# Patient Record
Sex: FEMALE | Race: OTHER | HISPANIC OR LATINO
[De-identification: names, ages, dates, MRNs, and addresses within clinical notes are randomized per-mention and may not be internally consistent; named-entity substitution may affect disease eponyms.]

---

## 2017-02-07 ENCOUNTER — TRANSCRIPTION ENCOUNTER (OUTPATIENT)
Age: 82
End: 2017-02-07

## 2017-02-07 ENCOUNTER — OUTPATIENT (OUTPATIENT)
Dept: OUTPATIENT SERVICES | Facility: HOSPITAL | Age: 82
LOS: 1 days | End: 2017-02-07
Payer: COMMERCIAL

## 2017-02-07 DIAGNOSIS — M70.70 OTHER BURSITIS OF HIP, UNSPECIFIED HIP: ICD-10-CM

## 2017-02-07 DIAGNOSIS — M51.17 INTERVERTEBRAL DISC DISORDERS WITH RADICULOPATHY, LUMBOSACRAL REGION: ICD-10-CM

## 2017-02-07 PROCEDURE — T1013: CPT

## 2017-02-07 PROCEDURE — 64483 NJX AA&/STRD TFRM EPI L/S 1: CPT | Mod: RT

## 2017-02-07 PROCEDURE — 76000 FLUOROSCOPY <1 HR PHYS/QHP: CPT

## 2017-02-07 PROCEDURE — 64484 NJX AA&/STRD TFRM EPI L/S EA: CPT | Mod: RT

## 2017-05-09 ENCOUNTER — APPOINTMENT (OUTPATIENT)
Dept: ELECTROPHYSIOLOGY | Facility: CLINIC | Age: 82
End: 2017-05-09

## 2017-06-02 ENCOUNTER — NON-APPOINTMENT (OUTPATIENT)
Age: 82
End: 2017-06-02

## 2017-06-02 ENCOUNTER — APPOINTMENT (OUTPATIENT)
Dept: CARDIOLOGY | Facility: CLINIC | Age: 82
End: 2017-06-02
Payer: MEDICARE

## 2017-06-02 VITALS
HEART RATE: 83 BPM | OXYGEN SATURATION: 98 % | WEIGHT: 118 LBS | BODY MASS INDEX: 24.66 KG/M2 | DIASTOLIC BLOOD PRESSURE: 80 MMHG | SYSTOLIC BLOOD PRESSURE: 145 MMHG

## 2017-06-02 DIAGNOSIS — I65.29 OCCLUSION AND STENOSIS OF UNSPECIFIED CAROTID ARTERY: ICD-10-CM

## 2017-06-02 PROCEDURE — 93000 ELECTROCARDIOGRAM COMPLETE: CPT

## 2017-06-02 PROCEDURE — 99214 OFFICE O/P EST MOD 30 MIN: CPT | Mod: 25

## 2017-06-30 ENCOUNTER — OTHER (OUTPATIENT)
Age: 82
End: 2017-06-30

## 2017-07-07 ENCOUNTER — OUTPATIENT (OUTPATIENT)
Dept: OUTPATIENT SERVICES | Facility: HOSPITAL | Age: 82
LOS: 1 days | End: 2017-07-07
Payer: COMMERCIAL

## 2017-07-07 DIAGNOSIS — Z01.810 ENCOUNTER FOR PREPROCEDURAL CARDIOVASCULAR EXAMINATION: ICD-10-CM

## 2017-07-07 PROCEDURE — 93018 CV STRESS TEST I&R ONLY: CPT

## 2017-07-07 PROCEDURE — 78452 HT MUSCLE IMAGE SPECT MULT: CPT

## 2017-07-07 PROCEDURE — 93016 CV STRESS TEST SUPVJ ONLY: CPT

## 2017-07-07 PROCEDURE — 78452 HT MUSCLE IMAGE SPECT MULT: CPT | Mod: 26

## 2017-07-07 PROCEDURE — 93017 CV STRESS TEST TRACING ONLY: CPT

## 2017-07-07 PROCEDURE — T1013: CPT

## 2017-07-07 PROCEDURE — A9500: CPT

## 2017-07-28 ENCOUNTER — OUTPATIENT (OUTPATIENT)
Dept: OUTPATIENT SERVICES | Facility: HOSPITAL | Age: 82
LOS: 1 days | End: 2017-07-28
Payer: COMMERCIAL

## 2017-07-28 VITALS
SYSTOLIC BLOOD PRESSURE: 135 MMHG | RESPIRATION RATE: 16 BRPM | WEIGHT: 121.03 LBS | HEART RATE: 84 BPM | TEMPERATURE: 98 F | HEIGHT: 58 IN | DIASTOLIC BLOOD PRESSURE: 60 MMHG

## 2017-07-28 DIAGNOSIS — Z01.818 ENCOUNTER FOR OTHER PREPROCEDURAL EXAMINATION: ICD-10-CM

## 2017-07-28 DIAGNOSIS — I25.10 ATHEROSCLEROTIC HEART DISEASE OF NATIVE CORONARY ARTERY WITHOUT ANGINA PECTORIS: ICD-10-CM

## 2017-07-28 DIAGNOSIS — N18.9 CHRONIC KIDNEY DISEASE, UNSPECIFIED: ICD-10-CM

## 2017-07-28 DIAGNOSIS — E11.9 TYPE 2 DIABETES MELLITUS WITHOUT COMPLICATIONS: ICD-10-CM

## 2017-07-28 DIAGNOSIS — Z95.1 PRESENCE OF AORTOCORONARY BYPASS GRAFT: Chronic | ICD-10-CM

## 2017-07-28 LAB
ANION GAP SERPL CALC-SCNC: 14 MMOL/L — SIGNIFICANT CHANGE UP (ref 5–17)
ANISOCYTOSIS BLD QL: SIGNIFICANT CHANGE UP
APTT BLD: 33.9 SEC — SIGNIFICANT CHANGE UP (ref 27.5–37.4)
BASOPHILS NFR BLD AUTO: 1 % — SIGNIFICANT CHANGE UP (ref 0–2)
BUN SERPL-MCNC: 64 MG/DL — HIGH (ref 8–20)
CALCIUM SERPL-MCNC: 8.7 MG/DL — SIGNIFICANT CHANGE UP (ref 8.6–10.2)
CHLORIDE SERPL-SCNC: 106 MMOL/L — SIGNIFICANT CHANGE UP (ref 98–107)
CO2 SERPL-SCNC: 21 MMOL/L — LOW (ref 22–29)
CREAT SERPL-MCNC: 4.19 MG/DL — HIGH (ref 0.5–1.3)
EOSINOPHIL NFR BLD AUTO: 2 % — SIGNIFICANT CHANGE UP (ref 0–5)
GLUCOSE SERPL-MCNC: 124 MG/DL — HIGH (ref 70–115)
HBA1C BLD-MCNC: 7.7 % — HIGH (ref 4–5.6)
HCT VFR BLD CALC: 27.6 % — LOW (ref 37–47)
HGB BLD-MCNC: 9.1 G/DL — LOW (ref 12–16)
HYPOCHROMIA BLD QL: SLIGHT — SIGNIFICANT CHANGE UP
INR BLD: 1.12 RATIO — SIGNIFICANT CHANGE UP (ref 0.88–1.16)
LYMPHOCYTES # BLD AUTO: 18 % — LOW (ref 20–55)
MCHC RBC-ENTMCNC: 28.6 PG — SIGNIFICANT CHANGE UP (ref 27–31)
MCHC RBC-ENTMCNC: 33 G/DL — SIGNIFICANT CHANGE UP (ref 32–36)
MCV RBC AUTO: 86.8 FL — SIGNIFICANT CHANGE UP (ref 81–99)
MICROCYTES BLD QL: SIGNIFICANT CHANGE UP
MONOCYTES NFR BLD AUTO: 6 % — SIGNIFICANT CHANGE UP (ref 3–10)
NEUTROPHILS NFR BLD AUTO: 72 % — SIGNIFICANT CHANGE UP (ref 37–73)
NEUTS BAND # BLD: 1 % — SIGNIFICANT CHANGE UP (ref 0–8)
OVALOCYTES BLD QL SMEAR: SLIGHT — SIGNIFICANT CHANGE UP
PLAT MORPH BLD: NORMAL — SIGNIFICANT CHANGE UP
PLATELET # BLD AUTO: 154 K/UL — SIGNIFICANT CHANGE UP (ref 150–400)
POIKILOCYTOSIS BLD QL AUTO: SLIGHT — SIGNIFICANT CHANGE UP
POTASSIUM SERPL-MCNC: 5.7 MMOL/L — HIGH (ref 3.5–5.3)
POTASSIUM SERPL-SCNC: 5.7 MMOL/L — HIGH (ref 3.5–5.3)
PROTHROM AB SERPL-ACNC: 12.3 SEC — SIGNIFICANT CHANGE UP (ref 9.8–12.7)
RBC # BLD: 3.18 M/UL — LOW (ref 4.4–5.2)
RBC # FLD: 15.9 % — HIGH (ref 11–15.6)
RBC BLD AUTO: ABNORMAL
SCHISTOCYTES BLD QL AUTO: SLIGHT — SIGNIFICANT CHANGE UP
SODIUM SERPL-SCNC: 141 MMOL/L — SIGNIFICANT CHANGE UP (ref 135–145)
WBC # BLD: 4.9 K/UL — SIGNIFICANT CHANGE UP (ref 4.8–10.8)
WBC # FLD AUTO: 4.9 K/UL — SIGNIFICANT CHANGE UP (ref 4.8–10.8)

## 2017-07-28 PROCEDURE — 85730 THROMBOPLASTIN TIME PARTIAL: CPT

## 2017-07-28 PROCEDURE — 93010 ELECTROCARDIOGRAM REPORT: CPT

## 2017-07-28 PROCEDURE — 83036 HEMOGLOBIN GLYCOSYLATED A1C: CPT

## 2017-07-28 PROCEDURE — 85027 COMPLETE CBC AUTOMATED: CPT

## 2017-07-28 PROCEDURE — T1013: CPT

## 2017-07-28 PROCEDURE — 80048 BASIC METABOLIC PNL TOTAL CA: CPT

## 2017-07-28 PROCEDURE — G0463: CPT

## 2017-07-28 PROCEDURE — 85610 PROTHROMBIN TIME: CPT

## 2017-07-28 PROCEDURE — 36415 COLL VENOUS BLD VENIPUNCTURE: CPT

## 2017-07-28 PROCEDURE — 93005 ELECTROCARDIOGRAM TRACING: CPT

## 2017-07-28 NOTE — H&P PST ADULT - PROBLEM SELECTOR PLAN 3
Cardiac clearance pending. Aspirin and Plavix on hold since 8/27/17 as per Dr. Smith and cleared by cardiology.

## 2017-07-28 NOTE — H&P PST ADULT - LAB RESULTS AND INTERPRETATION
Abnormal labs discussed with Jackie from Dr. De Leon's office (Nephrology), also faxed to Dr. Smith and Dr. YOMI Morejon.

## 2017-07-28 NOTE — H&P PST ADULT - NSANTHOSAYNRD_GEN_A_CORE
No. KERRIE screening performed.  STOP BANG Legend: 0-2 = LOW Risk; 3-4 = INTERMEDIATE Risk; 5-8 = HIGH Risk

## 2017-07-28 NOTE — H&P PST ADULT - ASSESSMENT
85F PMH HTN, Hyperlipidemia, LBBB, CAD, CABG, DM, Chronic Renal Failure, Hypothyroid, PAD, Diabetic Neuropathy, Osteoporosis and Spinal Stenosis for Right Brachial Axillary AV Graft.

## 2017-07-28 NOTE — ASU PATIENT PROFILE, ADULT - LEARNING ASSESSMENT (PATIENT) ADDITIONAL COMMENTS
Patient And patient's son verbalized understanding of all instructions including surgical wash and pain scale.  in attendancea

## 2017-07-28 NOTE — ASU PATIENT PROFILE, ADULT - ABILITY TO HEAR (WITH HEARING AID OR HEARING APPLIANCE IF NORMALLY USED):
Mildly to Moderately Impaired: difficulty hearing in some environments or speaker may need to increase volume or speak distinctly/left ear

## 2017-08-03 ENCOUNTER — OUTPATIENT (OUTPATIENT)
Dept: OUTPATIENT SERVICES | Facility: HOSPITAL | Age: 82
LOS: 1 days | End: 2017-08-03
Payer: COMMERCIAL

## 2017-08-03 VITALS
OXYGEN SATURATION: 100 % | TEMPERATURE: 97 F | WEIGHT: 119.93 LBS | HEART RATE: 75 BPM | RESPIRATION RATE: 16 BRPM | HEIGHT: 59 IN

## 2017-08-03 VITALS — RESPIRATION RATE: 14 BRPM | OXYGEN SATURATION: 99 %

## 2017-08-03 DIAGNOSIS — N18.9 CHRONIC KIDNEY DISEASE, UNSPECIFIED: ICD-10-CM

## 2017-08-03 DIAGNOSIS — Z95.1 PRESENCE OF AORTOCORONARY BYPASS GRAFT: Chronic | ICD-10-CM

## 2017-08-03 LAB
BLD GP AB SCN SERPL QL: SIGNIFICANT CHANGE UP
POTASSIUM SERPL-MCNC: 3.2 MMOL/L — LOW (ref 3.5–5.3)
POTASSIUM SERPL-SCNC: 3.2 MMOL/L — LOW (ref 3.5–5.3)
TYPE + AB SCN PNL BLD: SIGNIFICANT CHANGE UP

## 2017-08-03 PROCEDURE — C1768: CPT

## 2017-08-03 PROCEDURE — 84132 ASSAY OF SERUM POTASSIUM: CPT

## 2017-08-03 PROCEDURE — 86900 BLOOD TYPING SEROLOGIC ABO: CPT

## 2017-08-03 PROCEDURE — 86850 RBC ANTIBODY SCREEN: CPT

## 2017-08-03 PROCEDURE — 36821 AV FUSION DIRECT ANY SITE: CPT | Mod: RT

## 2017-08-03 PROCEDURE — 36415 COLL VENOUS BLD VENIPUNCTURE: CPT

## 2017-08-03 PROCEDURE — 86901 BLOOD TYPING SEROLOGIC RH(D): CPT

## 2017-08-03 PROCEDURE — T1013: CPT

## 2017-08-03 PROCEDURE — 36830 ARTERY-VEIN NONAUTOGRAFT: CPT | Mod: AS,RT

## 2017-08-03 RX ORDER — SODIUM CHLORIDE 9 MG/ML
3 INJECTION INTRAMUSCULAR; INTRAVENOUS; SUBCUTANEOUS ONCE
Qty: 0 | Refills: 0 | Status: DISCONTINUED | OUTPATIENT
Start: 2017-08-03 | End: 2017-08-03

## 2017-08-03 RX ORDER — FENTANYL CITRATE 50 UG/ML
25 INJECTION INTRAVENOUS
Qty: 0 | Refills: 0 | Status: DISCONTINUED | OUTPATIENT
Start: 2017-08-03 | End: 2017-08-03

## 2017-08-03 RX ORDER — ONDANSETRON 8 MG/1
4 TABLET, FILM COATED ORAL ONCE
Qty: 0 | Refills: 0 | Status: COMPLETED | OUTPATIENT
Start: 2017-08-03 | End: 2017-08-03

## 2017-08-03 RX ORDER — SODIUM CHLORIDE 9 MG/ML
1000 INJECTION INTRAMUSCULAR; INTRAVENOUS; SUBCUTANEOUS
Qty: 0 | Refills: 0 | Status: DISCONTINUED | OUTPATIENT
Start: 2017-08-03 | End: 2017-08-03

## 2017-08-03 RX ORDER — VANCOMYCIN HCL 1 G
750 VIAL (EA) INTRAVENOUS ONCE
Qty: 0 | Refills: 0 | Status: COMPLETED | OUTPATIENT
Start: 2017-08-03 | End: 2017-08-03

## 2017-08-03 RX ADMIN — ONDANSETRON 4 MILLIGRAM(S): 8 TABLET, FILM COATED ORAL at 14:36

## 2017-08-03 RX ADMIN — Medication 150 MILLIGRAM(S): at 07:21

## 2017-08-03 NOTE — ASU DISCHARGE PLAN (ADULT/PEDIATRIC). - MEDICATION SUMMARY - MEDICATIONS TO TAKE
I will START or STAY ON the medications listed below when I get home from the hospital:    traMADol 50 mg oral tablet  -- 1 tab(s) by mouth 3 times a day, As Needed - for severe pain  -- Indication: For pain    Percocet 5/325 oral tablet  -- 1 tab(s) by mouth every 4 hours MDD:6 tabs. Take for pain as needed  -- Caution federal law prohibits the transfer of this drug to any person other  than the person for whom it was prescribed.  May cause drowsiness.  Alcohol may intensify this effect.  Use care when operating dangerous machinery.  This prescription cannot be refilled.  This product contains acetaminophen.  Do not use  with any other product containing acetaminophen to prevent possible liver damage.  Using more of this medication than prescribed may cause serious breathing problems.    -- Indication: For pain    cloNIDine 0.3 mg/24 hr transdermal film, extended release  -- 1 patch by transdermal patch once a week  -- Indication: For per md    gabapentin 100 mg oral capsule  -- 1 cap(s) by mouth 3 times a day  -- Indication: For per md    Levemir 100 units/mL subcutaneous solution  --  subcutaneous once a day (at bedtime) according to sliding scale  -- patient states takes between 7-12 units bases on accucheck  -- Indication: For DM    labetalol 100 mg oral tablet  -- 1 tab(s) by mouth 2 times a day  -- Indication: For HTN    potassium chloride 10 mEq oral capsule, extended release  -- 1 cap(s) by mouth once a day  -- Indication: For electrolyte supplement    Artificial Tears ophthalmic solution  -- 1 drop(s) to each affected eye 4 times a day  -- Indication: For per md    levothyroxine 100 mcg (0.1 mg) oral tablet  -- 1 tab(s) by mouth once a day  -- Indication: For hypothyroidism     folic acid 0.4 mg oral tablet  -- 1 tab(s) by mouth once a day  -- Indication: For supplement    calcitriol 0.25 mcg oral capsule  -- 1 cap(s) by mouth once a day  -- Indication: For supplement

## 2017-08-03 NOTE — BRIEF OPERATIVE NOTE - POST-OP DX
CRF (chronic renal failure), stage 4 (severe)  08/03/2017    Active  Goyo Smith
CRF (chronic renal failure), stage 4 (severe)  08/03/2017    Active  Goyo Smith

## 2017-08-03 NOTE — ASU DISCHARGE PLAN (ADULT/PEDIATRIC). - ITEMS TO FOLLOWUP WITH YOUR PHYSICIAN'S
BATHING: Please do not submerge wound underwater. You may shower and/or sponge bathe in 24 hours.   ACTIVITY: No heavy lifting or straining, do not lift anything with your right arm. Otherwise, you may return to your usual level of physical activity. If you are taking narcotic pain medication (such as Percocet) DO NOT drive a car, operate machinery or make important decisions.  DIET: Return to your usual diet.  NOTIFY YOUR SURGEON IF: You have any bleeding that does not stop, any pus draining from your wound(s), any fever (over 100.4 F) or chills, persistent nausea/vomiting, persistent diarrhea, or if your pain is not controlled on your discharge pain medications. Please call your doctor with any pain , color changes, temperature changes, numbness or tingling in your right hand.  FOLLOW-UP: Please follow up with your primary care physician and Dr. Smith in one week regarding your hospitalization. Call for appointment upon discharge.   Do not allow anyone to use your right arm for any blood draws or IVs

## 2017-08-03 NOTE — BRIEF OPERATIVE NOTE - PROCEDURE
Arteriovenous anastomosis, for renal dialysis  08/03/2017  Creation of right brachial to axillary arteriovenous graft (4-7)  Active  MOLLY
AV fistula  08/03/2017  right brachial axillary AV fistula creation with graft  Active  TFELD1

## 2017-08-03 NOTE — ASU DISCHARGE PLAN (ADULT/PEDIATRIC). - NOTIFY
Persistent Nausea and Vomiting/Fever greater than 101/Unable to Urinate/Numbness, color, or temperature change to extremity/Inability to Tolerate Liquids or Foods/Numbness, tingling/Swelling that continues/Increased Irritability or Sluggishness/Pain not relieved by Medications/Bleeding that does not stop

## 2017-08-03 NOTE — ASU DISCHARGE PLAN (ADULT/PEDIATRIC). - SPECIAL INSTRUCTIONS
BATHING: Please do not submerge wound underwater. You may shower and/or sponge bathe in 24 hours.   ACTIVITY: No heavy lifting or straining, do not lift anything with your right arm. Otherwise, you may return to your usual level of physical activity. If you are taking narcotic pain medication (such as Percocet) DO NOT drive a car, operate machinery or make important decisions.  DIET: Return to your usual diet.  PAIN: For pain, please take Percocet, one every four hours as needed. Please take only as directed and please do not drive or operate machinery while taking pain medication  NOTIFY YOUR SURGEON IF: You have any bleeding that does not stop, any pus draining from your wound(s), any fever (over 100.4 F) or chills, persistent nausea/vomiting, persistent diarrhea, or if your pain is not controlled on your discharge pain medications. Please call your doctor with any pain , color changes, temperature changes, numbness or tingling in your right hand.  FOLLOW-UP: Please follow up with your primary care physician and Dr. Smith in one week regarding your hospitalization. Call for appointment upon discharge.   Do not allow anyone to use your right arm for any blood draws or IVs

## 2017-08-04 ENCOUNTER — TRANSCRIPTION ENCOUNTER (OUTPATIENT)
Age: 82
End: 2017-08-04

## 2017-11-15 ENCOUNTER — APPOINTMENT (OUTPATIENT)
Dept: ELECTROPHYSIOLOGY | Facility: CLINIC | Age: 82
End: 2017-11-15

## 2018-01-05 ENCOUNTER — APPOINTMENT (OUTPATIENT)
Dept: CARDIOLOGY | Facility: CLINIC | Age: 83
End: 2018-01-05

## 2018-01-09 ENCOUNTER — APPOINTMENT (OUTPATIENT)
Dept: CARDIOLOGY | Facility: CLINIC | Age: 83
End: 2018-01-09
Payer: MEDICARE

## 2018-01-09 ENCOUNTER — NON-APPOINTMENT (OUTPATIENT)
Age: 83
End: 2018-01-09

## 2018-01-09 VITALS — DIASTOLIC BLOOD PRESSURE: 84 MMHG | SYSTOLIC BLOOD PRESSURE: 164 MMHG

## 2018-01-09 VITALS
BODY MASS INDEX: 27.8 KG/M2 | HEART RATE: 81 BPM | OXYGEN SATURATION: 99 % | WEIGHT: 133 LBS | DIASTOLIC BLOOD PRESSURE: 84 MMHG | SYSTOLIC BLOOD PRESSURE: 200 MMHG

## 2018-01-09 DIAGNOSIS — Z01.810 ENCOUNTER FOR PREPROCEDURAL CARDIOVASCULAR EXAMINATION: ICD-10-CM

## 2018-01-09 DIAGNOSIS — I44.7 LEFT BUNDLE-BRANCH BLOCK, UNSPECIFIED: ICD-10-CM

## 2018-01-09 DIAGNOSIS — E87.70 FLUID OVERLOAD, UNSPECIFIED: ICD-10-CM

## 2018-01-09 DIAGNOSIS — R60.9 EDEMA, UNSPECIFIED: ICD-10-CM

## 2018-01-09 DIAGNOSIS — I70.219 ATHEROSCLEROSIS OF NATIVE ARTERIES OF EXTREMITIES WITH INTERMITTENT CLAUDICATION, UNSPECIFIED EXTREMITY: ICD-10-CM

## 2018-01-09 PROCEDURE — 99214 OFFICE O/P EST MOD 30 MIN: CPT

## 2018-01-09 PROCEDURE — 93000 ELECTROCARDIOGRAM COMPLETE: CPT

## 2018-01-17 ENCOUNTER — APPOINTMENT (OUTPATIENT)
Dept: ELECTROPHYSIOLOGY | Facility: CLINIC | Age: 83
End: 2018-01-17

## 2018-02-05 ENCOUNTER — INPATIENT (INPATIENT)
Facility: HOSPITAL | Age: 83
LOS: 11 days | Discharge: HOME | DRG: 682 | End: 2018-02-17
Attending: INTERNAL MEDICINE | Admitting: INTERNAL MEDICINE
Payer: COMMERCIAL

## 2018-02-05 VITALS
WEIGHT: 132.06 LBS | RESPIRATION RATE: 16 BRPM | HEART RATE: 96 BPM | DIASTOLIC BLOOD PRESSURE: 78 MMHG | SYSTOLIC BLOOD PRESSURE: 176 MMHG | HEIGHT: 59 IN | TEMPERATURE: 98 F | OXYGEN SATURATION: 100 %

## 2018-02-05 DIAGNOSIS — R74.8 ABNORMAL LEVELS OF OTHER SERUM ENZYMES: ICD-10-CM

## 2018-02-05 DIAGNOSIS — E11.42 TYPE 2 DIABETES MELLITUS WITH DIABETIC POLYNEUROPATHY: ICD-10-CM

## 2018-02-05 DIAGNOSIS — N19 UNSPECIFIED KIDNEY FAILURE: ICD-10-CM

## 2018-02-05 DIAGNOSIS — E03.9 HYPOTHYROIDISM, UNSPECIFIED: ICD-10-CM

## 2018-02-05 DIAGNOSIS — N18.6 END STAGE RENAL DISEASE: ICD-10-CM

## 2018-02-05 DIAGNOSIS — Z95.1 PRESENCE OF AORTOCORONARY BYPASS GRAFT: Chronic | ICD-10-CM

## 2018-02-05 DIAGNOSIS — I25.10 ATHEROSCLEROTIC HEART DISEASE OF NATIVE CORONARY ARTERY WITHOUT ANGINA PECTORIS: ICD-10-CM

## 2018-02-05 LAB
ALBUMIN SERPL ELPH-MCNC: 3.6 G/DL — SIGNIFICANT CHANGE UP (ref 3.3–5.2)
ALP SERPL-CCNC: 242 U/L — HIGH (ref 40–120)
ALT FLD-CCNC: 13 U/L — SIGNIFICANT CHANGE UP
ANION GAP SERPL CALC-SCNC: 20 MMOL/L — HIGH (ref 5–17)
APTT BLD: 38.1 SEC — HIGH (ref 27.5–37.4)
AST SERPL-CCNC: 22 U/L — SIGNIFICANT CHANGE UP
BASOPHILS # BLD AUTO: 0 K/UL — SIGNIFICANT CHANGE UP (ref 0–0.2)
BASOPHILS NFR BLD AUTO: 0.4 % — SIGNIFICANT CHANGE UP (ref 0–2)
BILIRUB SERPL-MCNC: 0.7 MG/DL — SIGNIFICANT CHANGE UP (ref 0.4–2)
BUN SERPL-MCNC: 70 MG/DL — HIGH (ref 8–20)
CALCIUM SERPL-MCNC: 7.3 MG/DL — LOW (ref 8.6–10.2)
CHLORIDE SERPL-SCNC: 101 MMOL/L — SIGNIFICANT CHANGE UP (ref 98–107)
CO2 SERPL-SCNC: 24 MMOL/L — SIGNIFICANT CHANGE UP (ref 22–29)
CREAT SERPL-MCNC: 4.27 MG/DL — HIGH (ref 0.5–1.3)
EOSINOPHIL # BLD AUTO: 0.2 K/UL — SIGNIFICANT CHANGE UP (ref 0–0.5)
EOSINOPHIL NFR BLD AUTO: 2.9 % — SIGNIFICANT CHANGE UP (ref 0–6)
GLUCOSE BLDC GLUCOMTR-MCNC: 129 MG/DL — HIGH (ref 70–99)
GLUCOSE BLDC GLUCOMTR-MCNC: 143 MG/DL — HIGH (ref 70–99)
GLUCOSE SERPL-MCNC: 128 MG/DL — HIGH (ref 70–115)
HBA1C BLD-MCNC: 7.6 % — HIGH (ref 4–5.6)
HCT VFR BLD CALC: 34.8 % — LOW (ref 37–47)
HGB BLD-MCNC: 11.3 G/DL — LOW (ref 12–16)
INR BLD: 1.4 RATIO — HIGH (ref 0.88–1.16)
LYMPHOCYTES # BLD AUTO: 1 K/UL — SIGNIFICANT CHANGE UP (ref 1–4.8)
LYMPHOCYTES # BLD AUTO: 19.6 % — LOW (ref 20–55)
MCHC RBC-ENTMCNC: 28.9 PG — SIGNIFICANT CHANGE UP (ref 27–31)
MCHC RBC-ENTMCNC: 32.5 G/DL — SIGNIFICANT CHANGE UP (ref 32–36)
MCV RBC AUTO: 89 FL — SIGNIFICANT CHANGE UP (ref 81–99)
MONOCYTES # BLD AUTO: 0.5 K/UL — SIGNIFICANT CHANGE UP (ref 0–0.8)
MONOCYTES NFR BLD AUTO: 10.6 % — HIGH (ref 3–10)
NEUTROPHILS # BLD AUTO: 3.4 K/UL — SIGNIFICANT CHANGE UP (ref 1.8–8)
NEUTROPHILS NFR BLD AUTO: 66.5 % — SIGNIFICANT CHANGE UP (ref 37–73)
NT-PROBNP SERPL-SCNC: HIGH PG/ML (ref 0–300)
PLATELET # BLD AUTO: 106 K/UL — LOW (ref 150–400)
POTASSIUM SERPL-MCNC: 3.9 MMOL/L — SIGNIFICANT CHANGE UP (ref 3.5–5.3)
POTASSIUM SERPL-SCNC: 3.9 MMOL/L — SIGNIFICANT CHANGE UP (ref 3.5–5.3)
PROT SERPL-MCNC: 6.1 G/DL — LOW (ref 6.6–8.7)
PROTHROM AB SERPL-ACNC: 15.5 SEC — HIGH (ref 9.8–12.7)
RBC # BLD: 3.91 M/UL — LOW (ref 4.4–5.2)
RBC # FLD: 18.5 % — HIGH (ref 11–15.6)
SODIUM SERPL-SCNC: 145 MMOL/L — SIGNIFICANT CHANGE UP (ref 135–145)
TROPONIN T SERPL-MCNC: 0.11 NG/ML — HIGH (ref 0–0.06)
TROPONIN T SERPL-MCNC: 0.12 NG/ML — HIGH (ref 0–0.06)
WBC # BLD: 5.1 K/UL — SIGNIFICANT CHANGE UP (ref 4.8–10.8)
WBC # FLD AUTO: 5.1 K/UL — SIGNIFICANT CHANGE UP (ref 4.8–10.8)

## 2018-02-05 PROCEDURE — 99285 EMERGENCY DEPT VISIT HI MDM: CPT

## 2018-02-05 PROCEDURE — 99222 1ST HOSP IP/OBS MODERATE 55: CPT

## 2018-02-05 PROCEDURE — 71045 X-RAY EXAM CHEST 1 VIEW: CPT | Mod: 26

## 2018-02-05 PROCEDURE — 93010 ELECTROCARDIOGRAM REPORT: CPT

## 2018-02-05 PROCEDURE — 99223 1ST HOSP IP/OBS HIGH 75: CPT

## 2018-02-05 RX ORDER — GABAPENTIN 400 MG/1
100 CAPSULE ORAL THREE TIMES A DAY
Qty: 0 | Refills: 0 | Status: DISCONTINUED | OUTPATIENT
Start: 2018-02-05 | End: 2018-02-06

## 2018-02-05 RX ORDER — LABETALOL HCL 100 MG
100 TABLET ORAL
Qty: 0 | Refills: 0 | Status: DISCONTINUED | OUTPATIENT
Start: 2018-02-05 | End: 2018-02-07

## 2018-02-05 RX ORDER — POTASSIUM CHLORIDE 20 MEQ
1 PACKET (EA) ORAL
Qty: 0 | Refills: 0 | COMMUNITY

## 2018-02-05 RX ORDER — INSULIN DETEMIR 100/ML (3)
6 INSULIN PEN (ML) SUBCUTANEOUS AT BEDTIME
Qty: 0 | Refills: 0 | Status: DISCONTINUED | OUTPATIENT
Start: 2018-02-05 | End: 2018-02-06

## 2018-02-05 RX ORDER — LEVOTHYROXINE SODIUM 125 MCG
100 TABLET ORAL DAILY
Qty: 0 | Refills: 0 | Status: DISCONTINUED | OUTPATIENT
Start: 2018-02-05 | End: 2018-02-06

## 2018-02-05 RX ORDER — FUROSEMIDE 40 MG
40 TABLET ORAL DAILY
Qty: 0 | Refills: 0 | Status: DISCONTINUED | OUTPATIENT
Start: 2018-02-05 | End: 2018-02-12

## 2018-02-05 RX ORDER — ASPIRIN/CALCIUM CARB/MAGNESIUM 324 MG
81 TABLET ORAL DAILY
Qty: 0 | Refills: 0 | Status: DISCONTINUED | OUTPATIENT
Start: 2018-02-05 | End: 2018-02-17

## 2018-02-05 RX ORDER — CLOPIDOGREL BISULFATE 75 MG/1
75 TABLET, FILM COATED ORAL DAILY
Qty: 0 | Refills: 0 | Status: DISCONTINUED | OUTPATIENT
Start: 2018-02-05 | End: 2018-02-17

## 2018-02-05 RX ORDER — CALCIUM ACETATE 667 MG
1334 TABLET ORAL
Qty: 0 | Refills: 0 | Status: DISCONTINUED | OUTPATIENT
Start: 2018-02-05 | End: 2018-02-17

## 2018-02-05 RX ORDER — FOLIC ACID 0.8 MG
1 TABLET ORAL DAILY
Qty: 0 | Refills: 0 | Status: DISCONTINUED | OUTPATIENT
Start: 2018-02-05 | End: 2018-02-17

## 2018-02-05 RX ORDER — HEPARIN SODIUM 5000 [USP'U]/ML
5000 INJECTION INTRAVENOUS; SUBCUTANEOUS EVERY 12 HOURS
Qty: 0 | Refills: 0 | Status: DISCONTINUED | OUTPATIENT
Start: 2018-02-05 | End: 2018-02-17

## 2018-02-05 RX ORDER — TRAMADOL HYDROCHLORIDE 50 MG/1
50 TABLET ORAL
Qty: 0 | Refills: 0 | Status: DISCONTINUED | OUTPATIENT
Start: 2018-02-05 | End: 2018-02-10

## 2018-02-05 RX ORDER — CALCITRIOL 0.5 UG/1
0.25 CAPSULE ORAL DAILY
Qty: 0 | Refills: 0 | Status: DISCONTINUED | OUTPATIENT
Start: 2018-02-05 | End: 2018-02-17

## 2018-02-05 RX ADMIN — Medication 1334 MILLIGRAM(S): at 18:53

## 2018-02-05 RX ADMIN — Medication 100 MILLIGRAM(S): at 17:59

## 2018-02-05 RX ADMIN — Medication 6 UNIT(S): at 23:02

## 2018-02-05 RX ADMIN — HEPARIN SODIUM 5000 UNIT(S): 5000 INJECTION INTRAVENOUS; SUBCUTANEOUS at 18:55

## 2018-02-05 RX ADMIN — GABAPENTIN 100 MILLIGRAM(S): 400 CAPSULE ORAL at 23:02

## 2018-02-05 NOTE — ED PROVIDER NOTE - NS_ ATTENDINGSCRIBEDETAILS _ED_A_ED_FT
I, Erik Qiu, performed the initial face to face bedside interview with this patient regarding history of present illness, review of symptoms and relevant past medical, social and family history.  I completed an independent physical examination.   The history, relevant review of systems, past medical and surgical history, medical decision making, and physical examination was documented by the scribe in my presence and I attest to the accuracy of the documentation.

## 2018-02-05 NOTE — ED ADULT NURSE NOTE - ABDOMEN
Discharged from [facility].  Therapy stopped 07/07/2016 by Dr. Kailash Friend, PHARMD         soft/nontender/nondistended

## 2018-02-05 NOTE — ED ADULT NURSE NOTE - OBJECTIVE STATEMENT
Patient BIBA to ED today after being sent by Dr. De Leon in need for dialysis.  Patient has dyspnea on exertion as per son.  Patient has 4+ edema to LE's bilaterally.  Fistula to left arm + thrill, graft has not yet been used. Patient does make urine as per son.  Patient denies pain of any kind, fever, numbness or tingling, n/v.

## 2018-02-05 NOTE — ED PROVIDER NOTE - PMH
Chronic renal failure    Coronary artery disease    Diabetes    Diabetic neuropathy    Hyperlipemia    Hypertension    Hypothyroid    Left bundle branch block    Osteoporosis    Pacemaker  [employer] 2011  Peripheral arterial disease    Spinal stenosis

## 2018-02-05 NOTE — CONSULT NOTE ADULT - SUBJECTIVE AND OBJECTIVE BOX
History obtained from patient with son providing interpretation.     This is a pleasant 86 year old female patient with a known history of hypertension, dyslipidemia, DM, CAD s/p CABG, CKD, PAD, hypothyroidism and sinus node dysfunction (MDT dual chamber pacemaker implantation) who presents to the ER with a complaint of worsening fatigue, dyspnea on exertion, and abdominal and LE edema for past month with has become much worse last 3-4 days. At this point she reports she can only take 2-3 steps before she becomes very short of breath. The swelling in her legs has been going on for a month but has gotten much worse these last few days. She presented to her Nephrologist' s office who sent her to the ED in anticipation of hemodialysis. Otherwise she denies any overt chest pain, palpitations or syncope.     MDT device interrogation performed personally in the ED revealing excellent dual chamber pacemaker pacing and sensing. NO arrhythmias recorded recently.     Nephrologist: Dr. De Leon  Cardiologist: Dr. Padilla    PAST MEDICAL & SURGICAL HISTORY:  Left bundle branch block  Osteoporosis  Diabetic neuropathy  Peripheral arterial disease  Spinal stenosis  Coronary artery disease  Chronic renal failure  Pacemaker: SYSTRAN   Hypothyroid  Hyperlipemia  Hypertension  Diabetes  S/P CABG x 3  Artificial cardiac pacemaker    REVIEW OF SYSTEMS  General: +weakness, - fever or chills	  Skin/Breast: - rashes  Ophthalmologic: - blurred vision	  ENMT: - sore throat	  Respiratory and Thorax: +Dyspnea, +LE edema and abdominal girth	  Cardiovascular: - chest pain or palpitations	  Gastrointestinal:	- N/V/D/C  Genitourinary: - dysuria  Musculoskeletal:	 - arthritis  Neurological: -CVA	  Psychiatric: +anxiety	  Endocrine: +DM    MEDICATIONS  (STANDING):  aspirin enteric coated 81 milliGRAM(s) Oral daily  calcitriol   Capsule 0.25 MICROGram(s) Oral daily  calcium acetate 1334 milliGRAM(s) Oral three times a day with meals  cloNIDine Patch 0.3 mG/24Hr(s) 1 patch Topical every 7 days  clopidogrel Tablet 75 milliGRAM(s) Oral daily  folic acid 1 milliGRAM(s) Oral daily  furosemide    Tablet 40 milliGRAM(s) Oral daily  gabapentin 100 milliGRAM(s) Oral three times a day  heparin  Injectable 5000 Unit(s) SubCutaneous every 12 hours  insulin detemir injectable (LEVEMIR) 6 Unit(s) SubCutaneous at bedtime  labetalol 100 milliGRAM(s) Oral two times a day  levothyroxine 100 MICROGram(s) Oral daily    MEDICATIONS  (PRN):  traMADol 50 milliGRAM(s) Oral five times a day PRN Moderate Pain (4 - 6)    Allergies  penicillin (Anaphylaxis)  seafood (Anaphylaxis)  shellfish (Anaphylaxis)    SOCIAL HISTORY: former smoker stopped 20 years ago (40 years 5-6 cigarettes/day) non-drinker. No drug use    FAMILY HISTORY:  No pertinent family history in first degree relatives    Vital Signs Last 24 Hrs  T(C): 36.7 (2018 10:31), Max: 36.7 (2018 10:31)  T(F): 98 (2018 10:31), Max: 98 (2018 10:31)  HR: 66 (2018 10:38) (66 - 96)  BP: 162/69 (2018 10:38) (149/65 - 176/78)  RR: 16 (2018 10:38) (16 - 18)  SpO2: 97% (2018 10:38) (97% - 100%)    Physical Exam:  Constitutional: AAOx3, NAD  Neck: supple, No JVD  Cardiovascular: +S1S2 RRR, no murmurs, rubs, gallops   Pulmonary: Coarse breath sounds b/l, Slight rales at bases  Abdomen: +BS, soft NTND  Extremities: 3+ bilateral LE edema, +distal pulses b/l  RUE Graft. Unused.   Neuro: non focal, speech clear, LOONEY x 4    LABS:                        11.3   5.1   )-----------( 106      ( 2018 09:28 )             34.8   02-05    145  |  101  |  70.0<H>  ----------------------------<  128<H>  3.9   |  24.0  |  4.27<H>    Ca    7.3<L>      2018 09:28  TPro  6.1<L>  /  Alb  3.6  /  TBili  0.7  /  DBili  x   /  AST  22  /  ALT  13  /  AlkPhos  242<H>  02-05  LIVER FUNCTIONS - ( 2018 09:28 )  Alb: 3.6 g/dL / Pro: 6.1 g/dL / ALK PHOS: 242 U/L / ALT: 13 U/L / AST: 22 U/L / GGT: x         PT/INR - ( 2018 09:28 )   PT: 15.5 sec;   INR: 1.40 ratio    PTT - ( 2018 09:28 )  PTT:38.1 secCARDIAC MARKERS ( 2018 09:28 )  x     / 0.12 ng/mL / x     / x     / x        RADIOLOGY & ADDITIONAL STUDIES:    Stress Test: 2017  IMPRESSIONS: Normal Study  Unable to exercise due to unsteady gait / walks with cane  No Symptom. ECG changes could not be interpreted due to  LBBB.  The left ventricle was normal LV size.  No ischemia/infarction.  Gated wall motion analysis shows normal wall motion with  LVEF of 59%.    EK17  SR at 68 bpm; LBBB    Echo: 10/2014  Normal LV systolic function    CXR 18  Findings:  Cardiomegaly. Again noted is a pacemaker with 2 leads, unchanged. No   hilar or mediastinal abnormality. Pulmonary vascular congestion is noted.   No definite evidence of pleural effusion..  Impression:  Cardiomegaly and pulmonary vascular congestion..      A/P   86 year old female patient with a known history of hypertension, dyslipidemia, DM, CAD s/p CABG, CKD, PAD, hypothyroidism and sinus node dysfunction (MDT dual chamber pacemaker implantation) who presents with ESRD in anticipation of HD. Noted to have elevated BNP and positive troponin.     - Elevated BNP and troponin likely due to fluid overload secondary to renal failure  - would obtain TTE.   - full recommendations to follow. History obtained from patient with son providing interpretation.     This is a pleasant 86 year old female patient with a known history of hypertension, dyslipidemia, DM, CAD s/p CABG, CKD, PAD, hypothyroidism and sinus node dysfunction (MDT dual chamber pacemaker implantation) who presents to the ER with a complaint of worsening fatigue, dyspnea on exertion, and abdominal and LE edema for past month with has become much worse last 3-4 days. At this point she reports she can only take 2-3 steps before she becomes very short of breath. The swelling in her legs has been going on for a month but has gotten much worse these last few days. She presented to her Nephrologist' s office who sent her to the ED in anticipation of hemodialysis. Otherwise she denies any overt chest pain, palpitations or syncope.     MDT device interrogation performed personally in the ED revealing excellent dual chamber pacemaker pacing and sensing. NO arrhythmias recorded recently.     Nephrologist: Dr. De Leon  Cardiologist: Dr. Padilla    PAST MEDICAL & SURGICAL HISTORY:  Left bundle branch block  Osteoporosis  Diabetic neuropathy  Peripheral arterial disease  Spinal stenosis  Coronary artery disease  Chronic renal failure  Pacemaker: [employer]   Hypothyroid  Hyperlipemia  Hypertension  Diabetes  S/P CABG x 3  Artificial cardiac pacemaker    REVIEW OF SYSTEMS  General: +weakness, - fever or chills	  Skin/Breast: - rashes  Ophthalmologic: - blurred vision	  ENMT: - sore throat	  Respiratory and Thorax: +Dyspnea, +LE edema and abdominal girth	  Cardiovascular: - chest pain or palpitations	  Gastrointestinal:	- N/V/D/C  Genitourinary: - dysuria  Musculoskeletal:	 - arthritis  Neurological: -CVA	  Psychiatric: +anxiety	  Endocrine: +DM    MEDICATIONS  (STANDING):  aspirin enteric coated 81 milliGRAM(s) Oral daily  calcitriol   Capsule 0.25 MICROGram(s) Oral daily  calcium acetate 1334 milliGRAM(s) Oral three times a day with meals  cloNIDine Patch 0.3 mG/24Hr(s) 1 patch Topical every 7 days  clopidogrel Tablet 75 milliGRAM(s) Oral daily  folic acid 1 milliGRAM(s) Oral daily  furosemide    Tablet 40 milliGRAM(s) Oral daily  gabapentin 100 milliGRAM(s) Oral three times a day  heparin  Injectable 5000 Unit(s) SubCutaneous every 12 hours  insulin detemir injectable (LEVEMIR) 6 Unit(s) SubCutaneous at bedtime  labetalol 100 milliGRAM(s) Oral two times a day  levothyroxine 100 MICROGram(s) Oral daily    MEDICATIONS  (PRN):  traMADol 50 milliGRAM(s) Oral five times a day PRN Moderate Pain (4 - 6)    Allergies  penicillin (Anaphylaxis)  seafood (Anaphylaxis)  shellfish (Anaphylaxis)    SOCIAL HISTORY: former smoker stopped 20 years ago (40 years 5-6 cigarettes/day) non-drinker. No drug use    FAMILY HISTORY:  No pertinent family history in first degree relatives    Vital Signs Last 24 Hrs  T(C): 36.7 (2018 10:31), Max: 36.7 (2018 10:31)  T(F): 98 (2018 10:31), Max: 98 (2018 10:31)  HR: 66 (2018 10:38) (66 - 96)  BP: 162/69 (2018 10:38) (149/65 - 176/78)  RR: 16 (2018 10:38) (16 - 18)  SpO2: 97% (2018 10:38) (97% - 100%)    Physical Exam:  Constitutional: AAOx3, NAD  Neck: supple, No JVD  Cardiovascular: +S1S2 RRR, no murmurs, rubs, gallops   Pulmonary: Coarse breath sounds b/l, Slight rales at bases  Abdomen: +BS, soft NTND  Extremities: 3+ bilateral LE edema, +distal pulses b/l  RUE Graft. Unused.   Neuro: non focal, speech clear, LOONEY x 4    LABS:                        11.3   5.1   )-----------( 106      ( 2018 09:28 )             34.8   02-05    145  |  101  |  70.0<H>  ----------------------------<  128<H>  3.9   |  24.0  |  4.27<H>    Ca    7.3<L>      2018 09:28  TPro  6.1<L>  /  Alb  3.6  /  TBili  0.7  /  DBili  x   /  AST  22  /  ALT  13  /  AlkPhos  242<H>  02-05  LIVER FUNCTIONS - ( 2018 09:28 )  Alb: 3.6 g/dL / Pro: 6.1 g/dL / ALK PHOS: 242 U/L / ALT: 13 U/L / AST: 22 U/L / GGT: x         PT/INR - ( 2018 09:28 )   PT: 15.5 sec;   INR: 1.40 ratio    PTT - ( 2018 09:28 )  PTT:38.1 secCARDIAC MARKERS ( 2018 09:28 )  x     / 0.12 ng/mL / x     / x     / x        RADIOLOGY & ADDITIONAL STUDIES:    Stress Test: 2017  IMPRESSIONS: Normal Study  Unable to exercise due to unsteady gait / walks with cane  No Symptom. ECG changes could not be interpreted due to  LBBB.  The left ventricle was normal LV size.  No ischemia/infarction.  Gated wall motion analysis shows normal wall motion with  LVEF of 59%.    EK17  SR at 68 bpm; LBBB    Echo: 10/2014  Normal LV systolic function    CXR 18  Findings:  Cardiomegaly. Again noted is a pacemaker with 2 leads, unchanged. No   hilar or mediastinal abnormality. Pulmonary vascular congestion is noted.   No definite evidence of pleural effusion..  Impression:  Cardiomegaly and pulmonary vascular congestion..      A/P   86 year old female patient with a known history of hypertension, dyslipidemia, DM, CAD s/p CABG, CKD, PAD, hypothyroidism and sinus node dysfunction (MDT dual chamber pacemaker implantation) who presents with ESRD in anticipation of HD. Noted to have elevated BNP and positive troponin.     - Elevated BNP and troponin likely due to fluid overload secondary to renal failure  - would obtain TTE.   - Trend Troponin levels.  - full recommendations to follow.

## 2018-02-05 NOTE — ED PROVIDER NOTE - OBJECTIVE STATEMENT
85 y/o F with PMHx spinal stenosis, osteoporosis, HTN, DM, chronic renal failure and pacemaker presents to ED c/o need for dialysis. Dr. De Leon (nephrologist) recommended pt needed dialysis "right away". As per family, pt has difficulty breathing with certain movements. Family reports pt's creatinine levels were over 4.7 but is unsure of levels now. Pt is currently on blood thinners, water pill (Torsemide) and takes tramadol for pain. Family members denies fever or chills. No further complaints at this time.    : Family member   PCP: Dr. Kasper

## 2018-02-05 NOTE — ED PROVIDER NOTE - MEDICAL DECISION MAKING DETAILS
Pt with multiple medical problems. Sent to ED by nephrologist (Dr. De Leon) with recommendation for dialysis. Lab work, EKG and chest x-ray.

## 2018-02-05 NOTE — H&P ADULT - HISTORY OF PRESENT ILLNESS
87 yo F w/ hx CKD5, CAD, Dm2 presents to ER for dialysis initiation from nephrologist office.  Patient seen with son at bedside, complaining of SOB and ROSS.  no fevers/cough or cp/palps.   +large abdominal girth and LE edema.

## 2018-02-05 NOTE — CONSULT NOTE ADULT - SUBJECTIVE AND OBJECTIVE BOX
HPI: 85 y/o F with PMHx spinal stenosis, osteoporosis, HTN, DM, chronic renal failure and pacemaker presents to ED volume overloaded with uremic symptoms and decreased UOP. Pt spoke w Dr. De Leon (nephrologist) who recommended dialysis "right away". As per family, pt has difficulty breathing with certain movements. Family reports pt's creatinine levels were over 4.7 but is unsure of levels now. Pt is currently on blood thinners, water pill (Torsemide) and takes tramadol for pain. Family members denies fever or chills, HA CP V/D has decreased appetite +nausea    ROS per HPI    PAST MEDICAL & SURGICAL HISTORY:  Left bundle branch block  Osteoporosis  Diabetic neuropathy  Peripheral arterial disease  Spinal stenosis  Coronary artery disease  Chronic renal failure  Pacemaker: [employer] 2011  Hypothyroid  Hyperlipemia  Hypertension  Diabetes  S/P CABG x 3  Artificial cardiac pacemaker      FAMILY HISTORY:  No pertinent family history in first degree relatives  [place]    Social History:Non smoker    MEDICATIONS  (STANDING):    MEDICATIONS  (PRN):   Meds reviewed      Vital Signs Last 24 Hrs  T(C): 36.7 (05 Feb 2018 10:31), Max: 36.7 (05 Feb 2018 10:31)  T(F): 98 (05 Feb 2018 10:31), Max: 98 (05 Feb 2018 10:31)  HR: 66 (05 Feb 2018 10:38) (66 - 96)  BP: 162/69 (05 Feb 2018 10:38) (149/65 - 176/78)  BP(mean): --  RR: 16 (05 Feb 2018 10:38) (16 - 18)  SpO2: 97% (05 Feb 2018 10:38) (97% - 100%)  Daily Height in cm: 149.86 (05 Feb 2018 08:14)    Daily     PHYSICAL EXAM:    GENERAL: appears chronically ill  HEAD:  Atraumatic, Normocephalic  EYES: EOMI  NECK: Supple, neck  veins full  NERVOUS SYSTEM:  Alert & Oriented X3, Setswana speaking primarily  CHEST/LUNG: Clear to percussion bilaterally; No rales  HEART: Regular rate and rhythm; No murmurs  ABDOMEN: Soft, Nontender, Nondistended; Bowel sounds present  EXTREMITIES:  +2 pitting edema B/L LE      LABS:                        11.3   5.1   )-----------( 106      ( 05 Feb 2018 09:28 )             34.8     02-05    145  |  101  |  70.0<H>  ----------------------------<  128<H>  3.9   |  24.0  |  4.27<H>    Ca    7.3<L>      05 Feb 2018 09:28    TPro  6.1<L>  /  Alb  3.6  /  TBili  0.7  /  DBili  x   /  AST  22  /  ALT  13  /  AlkPhos  242<H>  02-05    PT/INR - ( 05 Feb 2018 09:28 )   PT: 15.5 sec;   INR: 1.40 ratio         PTT - ( 05 Feb 2018 09:28 )  PTT:38.1 sec            RADIOLOGY & ADDITIONAL TESTS:

## 2018-02-06 DIAGNOSIS — I50.82 BIVENTRICULAR HEART FAILURE: ICD-10-CM

## 2018-02-06 LAB
ANION GAP SERPL CALC-SCNC: 19 MMOL/L — HIGH (ref 5–17)
BUN SERPL-MCNC: 48 MG/DL — HIGH (ref 8–20)
CALCIUM SERPL-MCNC: 7.5 MG/DL — LOW (ref 8.4–10.5)
CALCIUM SERPL-MCNC: 8.1 MG/DL — LOW (ref 8.6–10.2)
CHLORIDE SERPL-SCNC: 99 MMOL/L — SIGNIFICANT CHANGE UP (ref 98–107)
CO2 SERPL-SCNC: 25 MMOL/L — SIGNIFICANT CHANGE UP (ref 22–29)
CREAT SERPL-MCNC: 3.1 MG/DL — HIGH (ref 0.5–1.3)
GLUCOSE BLDC GLUCOMTR-MCNC: 109 MG/DL — HIGH (ref 70–99)
GLUCOSE BLDC GLUCOMTR-MCNC: 121 MG/DL — HIGH (ref 70–99)
GLUCOSE BLDC GLUCOMTR-MCNC: 165 MG/DL — HIGH (ref 70–99)
GLUCOSE BLDC GLUCOMTR-MCNC: 221 MG/DL — HIGH (ref 70–99)
GLUCOSE BLDC GLUCOMTR-MCNC: 46 MG/DL — LOW (ref 70–99)
GLUCOSE SERPL-MCNC: 50 MG/DL — CRITICAL LOW (ref 70–115)
HBV SURFACE AB SER-ACNC: <3 MIU/ML — LOW
HBV SURFACE AG SER-ACNC: SIGNIFICANT CHANGE UP
HCT VFR BLD CALC: 37.9 % — SIGNIFICANT CHANGE UP (ref 37–47)
HCV AB S/CO SERPL IA: 0.06 S/CO — SIGNIFICANT CHANGE UP
HCV AB SERPL-IMP: SIGNIFICANT CHANGE UP
HGB BLD-MCNC: 12.4 G/DL — SIGNIFICANT CHANGE UP (ref 12–16)
MCHC RBC-ENTMCNC: 29 PG — SIGNIFICANT CHANGE UP (ref 27–31)
MCHC RBC-ENTMCNC: 32.7 G/DL — SIGNIFICANT CHANGE UP (ref 32–36)
MCV RBC AUTO: 88.8 FL — SIGNIFICANT CHANGE UP (ref 81–99)
PLATELET # BLD AUTO: 129 K/UL — LOW (ref 150–400)
POTASSIUM SERPL-MCNC: 3.7 MMOL/L — SIGNIFICANT CHANGE UP (ref 3.5–5.3)
POTASSIUM SERPL-SCNC: 3.7 MMOL/L — SIGNIFICANT CHANGE UP (ref 3.5–5.3)
PTH-INTACT FLD-MCNC: 186 PG/ML — HIGH (ref 15–65)
RBC # BLD: 4.27 M/UL — LOW (ref 4.4–5.2)
RBC # FLD: 18.4 % — HIGH (ref 11–15.6)
SODIUM SERPL-SCNC: 143 MMOL/L — SIGNIFICANT CHANGE UP (ref 135–145)
WBC # BLD: 5.7 K/UL — SIGNIFICANT CHANGE UP (ref 4.8–10.8)
WBC # FLD AUTO: 5.7 K/UL — SIGNIFICANT CHANGE UP (ref 4.8–10.8)

## 2018-02-06 PROCEDURE — 99233 SBSQ HOSP IP/OBS HIGH 50: CPT

## 2018-02-06 PROCEDURE — 93306 TTE W/DOPPLER COMPLETE: CPT | Mod: 26

## 2018-02-06 RX ORDER — SODIUM CHLORIDE 9 MG/ML
1000 INJECTION, SOLUTION INTRAVENOUS
Qty: 0 | Refills: 0 | Status: DISCONTINUED | OUTPATIENT
Start: 2018-02-06 | End: 2018-02-17

## 2018-02-06 RX ORDER — CALCIUM ACETATE 667 MG
2 TABLET ORAL
Qty: 0 | Refills: 0 | COMMUNITY

## 2018-02-06 RX ORDER — DEXTROSE 50 % IN WATER 50 %
12.5 SYRINGE (ML) INTRAVENOUS ONCE
Qty: 0 | Refills: 0 | Status: DISCONTINUED | OUTPATIENT
Start: 2018-02-06 | End: 2018-02-17

## 2018-02-06 RX ORDER — TUBERCULIN PURIFIED PROTEIN DERIVATIVE 5 [IU]/.1ML
5 INJECTION, SOLUTION INTRADERMAL ONCE
Qty: 0 | Refills: 0 | Status: COMPLETED | OUTPATIENT
Start: 2018-02-06 | End: 2018-02-06

## 2018-02-06 RX ORDER — DEXTROSE 50 % IN WATER 50 %
25 SYRINGE (ML) INTRAVENOUS ONCE
Qty: 0 | Refills: 0 | Status: DISCONTINUED | OUTPATIENT
Start: 2018-02-06 | End: 2018-02-17

## 2018-02-06 RX ORDER — DEXTROSE 50 % IN WATER 50 %
25 SYRINGE (ML) INTRAVENOUS ONCE
Qty: 0 | Refills: 0 | Status: COMPLETED | OUTPATIENT
Start: 2018-02-06 | End: 2018-02-06

## 2018-02-06 RX ORDER — ERGOCALCIFEROL 1.25 MG/1
5000 CAPSULE ORAL
Qty: 0 | Refills: 0 | COMMUNITY

## 2018-02-06 RX ORDER — LISINOPRIL 2.5 MG/1
10 TABLET ORAL DAILY
Qty: 0 | Refills: 0 | Status: DISCONTINUED | OUTPATIENT
Start: 2018-02-06 | End: 2018-02-08

## 2018-02-06 RX ORDER — GLUCAGON INJECTION, SOLUTION 0.5 MG/.1ML
1 INJECTION, SOLUTION SUBCUTANEOUS ONCE
Qty: 0 | Refills: 0 | Status: DISCONTINUED | OUTPATIENT
Start: 2018-02-06 | End: 2018-02-17

## 2018-02-06 RX ORDER — PETROLATUM,WHITE
1 JELLY (GRAM) TOPICAL
Qty: 0 | Refills: 0 | Status: DISCONTINUED | OUTPATIENT
Start: 2018-02-06 | End: 2018-02-17

## 2018-02-06 RX ORDER — DEXTROSE 50 % IN WATER 50 %
1 SYRINGE (ML) INTRAVENOUS ONCE
Qty: 0 | Refills: 0 | Status: DISCONTINUED | OUTPATIENT
Start: 2018-02-06 | End: 2018-02-17

## 2018-02-06 RX ORDER — INSULIN DETEMIR 100/ML (3)
0 INSULIN PEN (ML) SUBCUTANEOUS
Qty: 0 | Refills: 0 | COMMUNITY

## 2018-02-06 RX ORDER — GABAPENTIN 400 MG/1
1 CAPSULE ORAL
Qty: 0 | Refills: 0 | COMMUNITY

## 2018-02-06 RX ORDER — ASPIRIN/CALCIUM CARB/MAGNESIUM 324 MG
1 TABLET ORAL
Qty: 0 | Refills: 0 | COMMUNITY

## 2018-02-06 RX ORDER — INSULIN LISPRO 100/ML
VIAL (ML) SUBCUTANEOUS
Qty: 0 | Refills: 0 | Status: DISCONTINUED | OUTPATIENT
Start: 2018-02-06 | End: 2018-02-08

## 2018-02-06 RX ORDER — LABETALOL HCL 100 MG
1 TABLET ORAL
Qty: 0 | Refills: 0 | COMMUNITY

## 2018-02-06 RX ORDER — FUROSEMIDE 40 MG
1 TABLET ORAL
Qty: 0 | Refills: 0 | COMMUNITY

## 2018-02-06 RX ORDER — LEVOTHYROXINE SODIUM 125 MCG
112 TABLET ORAL DAILY
Qty: 0 | Refills: 0 | Status: DISCONTINUED | OUTPATIENT
Start: 2018-02-07 | End: 2018-02-17

## 2018-02-06 RX ORDER — LEVOTHYROXINE SODIUM 125 MCG
1 TABLET ORAL
Qty: 0 | Refills: 0 | COMMUNITY

## 2018-02-06 RX ORDER — CLOPIDOGREL BISULFATE 75 MG/1
1 TABLET, FILM COATED ORAL
Qty: 0 | Refills: 0 | COMMUNITY

## 2018-02-06 RX ADMIN — TUBERCULIN PURIFIED PROTEIN DERIVATIVE 5 UNIT(S): 5 INJECTION, SOLUTION INTRADERMAL at 22:59

## 2018-02-06 RX ADMIN — Medication 1334 MILLIGRAM(S): at 14:57

## 2018-02-06 RX ADMIN — Medication 100 MILLIGRAM(S): at 06:10

## 2018-02-06 RX ADMIN — Medication 40 MILLIGRAM(S): at 06:09

## 2018-02-06 RX ADMIN — Medication 100 MICROGRAM(S): at 06:10

## 2018-02-06 RX ADMIN — Medication 25 GRAM(S): at 07:50

## 2018-02-06 RX ADMIN — CALCITRIOL 0.25 MICROGRAM(S): 0.5 CAPSULE ORAL at 14:56

## 2018-02-06 RX ADMIN — LISINOPRIL 10 MILLIGRAM(S): 2.5 TABLET ORAL at 22:29

## 2018-02-06 RX ADMIN — TRAMADOL HYDROCHLORIDE 50 MILLIGRAM(S): 50 TABLET ORAL at 03:21

## 2018-02-06 RX ADMIN — Medication 1 PATCH: at 14:56

## 2018-02-06 RX ADMIN — Medication 81 MILLIGRAM(S): at 14:56

## 2018-02-06 RX ADMIN — Medication 1 APPLICATION(S): at 22:32

## 2018-02-06 RX ADMIN — GABAPENTIN 100 MILLIGRAM(S): 400 CAPSULE ORAL at 06:10

## 2018-02-06 RX ADMIN — Medication 100 MILLIGRAM(S): at 18:36

## 2018-02-06 RX ADMIN — Medication 1334 MILLIGRAM(S): at 16:59

## 2018-02-06 RX ADMIN — Medication 1 MILLIGRAM(S): at 14:57

## 2018-02-06 RX ADMIN — HEPARIN SODIUM 5000 UNIT(S): 5000 INJECTION INTRAVENOUS; SUBCUTANEOUS at 16:59

## 2018-02-06 RX ADMIN — HEPARIN SODIUM 5000 UNIT(S): 5000 INJECTION INTRAVENOUS; SUBCUTANEOUS at 06:09

## 2018-02-06 RX ADMIN — CLOPIDOGREL BISULFATE 75 MILLIGRAM(S): 75 TABLET, FILM COATED ORAL at 14:57

## 2018-02-06 NOTE — PROGRESS NOTE ADULT - ASSESSMENT
CKD V h/o DM 40yrs and HTN the likely etiology of renal disease  Now has uremic symptoms is clinically volume overloaded  Pt has a AVG  w +bruit  HD 2/5 first treatment will HD again today and jones    will check HEP B and C panel and PPD all in preparation for outpt HD chair    Pt family preference is [place] [facility]  Second Choice is [place]- [facility]  Cosent for HD obtained by me from pt son     HypoCalcemia will check VitD 25OH and iPTH    HTN BP on high side suspect will improve w UF removal on HD

## 2018-02-06 NOTE — PROGRESS NOTE ADULT - PROBLEM SELECTOR PLAN 1
-Echo shows Biventricular Failure with LVEF 35-40%  -If Patient agrees needs Right and Left heart Cardiac Cath will be scheduled for Friday, will d/w patient and family on 2/6/2018  -Suggest ACE Inhibitor therapy and Coreg

## 2018-02-06 NOTE — PROGRESS NOTE ADULT - SUBJECTIVE AND OBJECTIVE BOX
[place] [facility]                                                        Office: [address] [address]                                                       Telephone: [phone]. Fax:[phone]                                                                             PROGRESS NOTE    Subjective: Patient still appears dyspneic     Review of symptoms: Cardiac:  No chest pain. + dyspnea. No palpitations.  Respiratory:no cough. No dyspnea  Gastrointestinal: No diarrhea. No abdominal pain. No bleeding.       	  Vitals:  T(C): 36.7 (02-06-18 @ 17:55), Max: 36.9 (02-05-18 @ 23:44)  HR: 73 (02-06-18 @ 17:55) (67 - 75)  BP: 179/80 (02-06-18 @ 17:55) (149/68 - 179/80)  RR: 18 (02-06-18 @ 17:55) (17 - 18)  SpO2: 100% (02-06-18 @ 17:55) (96% - 100%)  Wt(kg): --  I&O's Summary    05 Feb 2018 07:01  -  06 Feb 2018 07:00  --------------------------------------------------------  IN: 600 mL / OUT: 1800 mL / NET: -1200 mL      Weight (kg): 59.9 (02-05 @ 08:14)    PHYSICAL EXAM:  Appearance: Mild respiratory distress  HEENT:  Head and neck: Atraumatic. Normocephalic.  Normal oral mucosa, PERRL, Neck is supple. No JVD, No carotid bruit.   Neurologic: A & O x 3, no focal deficits. EOMI , Cranial nerves are intact.  Lymphatic: No cervical lymphadenopathy  Cardiovascular: Normal S1 S2, No murmur, rubs/gallops. No JVD, No edema  Respiratory: Lungs clear to auscultation  Gastrointestinal:  Soft, Non-tender, + BS  Lower Extremities: + edema  Psychiatry: Patient is calm. No agitation. Mood & affect appropriate  Skin: No rashes/ ecchymoses/cyanosis/ulcers visualized on the face, hands or feet.    CURRENT MEDICATIONS:  cloNIDine Patch 0.3 mG/24Hr(s) 1 patch Topical every 7 days  furosemide    Tablet 40 milliGRAM(s) Oral daily  labetalol 100 milliGRAM(s) Oral two times a day    dextrose 50% Injectable  dextrose 50% Injectable  dextrose 50% Injectable  insulin lispro (HumaLOG) corrective regimen sliding scale  levothyroxine  AQUAPHOR (petrolatum Ointment)  aspirin enteric coated  calcitriol   Capsule  calcium acetate  clopidogrel Tablet  dextrose 5%.  folic acid  heparin  Injectable  PPD  5 Tuberculin Unit(s) Injectable      LABS:	 	  CARDIAC MARKERS ( 05 Feb 2018 16:14 )  x     / 0.11 ng/mL / x     / x     / x      p-BNP 05 Feb 2018 16:14: x    , CARDIAC MARKERS ( 05 Feb 2018 09:28 )  x     / 0.12 ng/mL / x     / x     / x      p-BNP 05 Feb 2018 09:28: >06880 pg/mL                            12.4   5.7   )-----------( 129      ( 06 Feb 2018 07:04 )             37.9     02-06    143  |  99  |  48.0<H>  ----------------------------<  50<LL>  3.7   |  25.0  |  3.10<H>    Ca    8.1<L>      06 Feb 2018 07:04    TPro  6.1<L>  /  Alb  3.6  /  TBili  0.7  /  DBili  x   /  AST  22  /  ALT  13  /  AlkPhos  242<H>  02-05    proBNP: Serum Pro-Brain Natriuretic Peptide: >37518 pg/mL (02-05 @ 09:28)    Lipid Profile:   HgA1c: Hemoglobin A1C, Whole Blood: 7.6 %  TSH:     < from: TTE Echo Complete w/Doppler (02.06.18 @ 13:32) >  Summary:   1. Left ventricular ejection fraction, by visual estimation, is 35 to   40%.   2. Moderately to severely decreased global left ventricular systolic   function.   3. Spectral Doppler shows pseudonormal pattern of left ventricular   myocardial filling (Grade II diastolic dysfunction).   4. There is moderate concentric left ventricular hypertrophy.   5. Moderately reduced RV systolic function.   6. Mildly dilated right atrium.   7. Moderate-severe tricuspid regurgitation.   8. Sclerotic aortic valve with decreased opening.   9. Estimated pulmonary artery systolic pressure is 50.0 mmHg assuming a   right atrial pressure of 8 mmHg, which is consistent with mild pulmonary   hypertension.  10. Severely enlarged left atrium.  11. There is no evidence of pericardial effusion.    MD David Electronically signed on 2/6/2018 at 6:47:09 PM       < end of copied text >

## 2018-02-06 NOTE — PROGRESS NOTE ADULT - SUBJECTIVE AND OBJECTIVE BOX
seen for HD initiation    ROSS persists but improved  ROS otherwise negative     MEDICATIONS  (STANDING):  aspirin enteric coated 81 milliGRAM(s) Oral daily  calcitriol   Capsule 0.25 MICROGram(s) Oral daily  calcium acetate 1334 milliGRAM(s) Oral three times a day with meals  cloNIDine Patch 0.3 mG/24Hr(s) 1 patch Topical every 7 days  clopidogrel Tablet 75 milliGRAM(s) Oral daily  dextrose 5%. 1000 milliLiter(s) (50 mL/Hr) IV Continuous <Continuous>  dextrose 50% Injectable 12.5 Gram(s) IV Push once  dextrose 50% Injectable 25 Gram(s) IV Push once  dextrose 50% Injectable 25 Gram(s) IV Push once  folic acid 1 milliGRAM(s) Oral daily  furosemide    Tablet 40 milliGRAM(s) Oral daily  gabapentin 100 milliGRAM(s) Oral three times a day  heparin  Injectable 5000 Unit(s) SubCutaneous every 12 hours  insulin lispro (HumaLOG) corrective regimen sliding scale   SubCutaneous three times a day before meals  labetalol 100 milliGRAM(s) Oral two times a day  levothyroxine 100 MICROGram(s) Oral daily    MEDICATIONS  (PRN):  dextrose Gel 1 Dose(s) Oral once PRN Blood Glucose LESS THAN 70 milliGRAM(s)/deciliter  glucagon  Injectable 1 milliGRAM(s) IntraMuscular once PRN Glucose LESS THAN 70 milligrams/deciliter  traMADol 50 milliGRAM(s) Oral five times a day PRN Moderate Pain (4 - 6)      Allergies    penicillin (Anaphylaxis)  seafood (Anaphylaxis)  shellfish (Anaphylaxis)    Vital Signs Last 24 Hrs  T(C): 36.7 (06 Feb 2018 07:21), Max: 36.9 (05 Feb 2018 23:44)  T(F): 98.1 (06 Feb 2018 07:21), Max: 98.4 (05 Feb 2018 23:44)  HR: 69 (06 Feb 2018 07:21) (69 - 80)  BP: 163/66 (06 Feb 2018 07:21) (160/72 - 182/79)  BP(mean): --  RR: 18 (06 Feb 2018 07:21) (17 - 20)  SpO2: 100% (06 Feb 2018 07:21) (95% - 100%)    PHYSICAL EXAM:    GENERAL: NAD  CHEST/LUNG: dec bs at bases  HEART: Regular rate and rhythm; S1 S2  ABDOMEN: Soft, Nontender, Nondistended; Bowel sounds present  EXTREMITIES:  trace edema.   NERVOUS SYSTEM:  Alert & Oriented X3, nonfocal  PSYCH: normal mood, appropriate response.    LABS:                        12.4   5.7   )-----------( 129      ( 06 Feb 2018 07:04 )             37.9     02-06    143  |  99  |  48.0<H>  ----------------------------<  50<LL>  3.7   |  25.0  |  3.10<H>    Ca    8.1<L>      06 Feb 2018 07:04    TPro  6.1<L>  /  Alb  3.6  /  TBili  0.7  /  DBili  x   /  AST  22  /  ALT  13  /  AlkPhos  242<H>  02-05    PT/INR - ( 05 Feb 2018 09:28 )   PT: 15.5 sec;   INR: 1.40 ratio         PTT - ( 05 Feb 2018 09:28 )  PTT:38.1 sec      CAPILLARY BLOOD GLUCOSE      POCT Blood Glucose.: 121 mg/dL (06 Feb 2018 11:25)  POCT Blood Glucose.: 165 mg/dL (06 Feb 2018 08:08)  POCT Blood Glucose.: 46 mg/dL (06 Feb 2018 07:38)  POCT Blood Glucose.: 143 mg/dL (05 Feb 2018 22:36)  POCT Blood Glucose.: 129 mg/dL (05 Feb 2018 17:33)        RADIOLOGY & ADDITIONAL TESTS:

## 2018-02-06 NOTE — PROGRESS NOTE ADULT - ASSESSMENT
Patient is an elderly 86 year old female patient with a known history of hypertension, dyslipidemia, DM, CAD s/p CABG, CKD, PAD, hypothyroidism,CKD comes to ER with progressive dyspnea, leg swelling

## 2018-02-06 NOTE — PROVIDER CONTACT NOTE (CRITICAL VALUE NOTIFICATION) - SITUATION
PT was received on bedside report, alert and awake, Lab called with critical glucose 50, RN checked BSFS 46, MD called to get hypoglycemic orders and ACHS orders.

## 2018-02-06 NOTE — PROGRESS NOTE ADULT - SUBJECTIVE AND OBJECTIVE BOX
NEPHROLOGY INTERVAL HPI/OVERNIGHT EVENTS:    Examined earlier    MEDICATIONS  (STANDING):  AQUAPHOR (petrolatum Ointment) 1 Application(s) Topical two times a day  aspirin enteric coated 81 milliGRAM(s) Oral daily  calcitriol   Capsule 0.25 MICROGram(s) Oral daily  calcium acetate 1334 milliGRAM(s) Oral three times a day with meals  cloNIDine Patch 0.3 mG/24Hr(s) 1 patch Topical every 7 days  clopidogrel Tablet 75 milliGRAM(s) Oral daily  dextrose 5%. 1000 milliLiter(s) (50 mL/Hr) IV Continuous <Continuous>  dextrose 50% Injectable 12.5 Gram(s) IV Push once  dextrose 50% Injectable 25 Gram(s) IV Push once  dextrose 50% Injectable 25 Gram(s) IV Push once  folic acid 1 milliGRAM(s) Oral daily  furosemide    Tablet 40 milliGRAM(s) Oral daily  gabapentin 100 milliGRAM(s) Oral three times a day  heparin  Injectable 5000 Unit(s) SubCutaneous every 12 hours  insulin lispro (HumaLOG) corrective regimen sliding scale   SubCutaneous three times a day before meals  labetalol 100 milliGRAM(s) Oral two times a day  levothyroxine 100 MICROGram(s) Oral daily  PPD  5 Tuberculin Unit(s) Injectable 5 Unit(s) IntraDermal once    MEDICATIONS  (PRN):  dextrose Gel 1 Dose(s) Oral once PRN Blood Glucose LESS THAN 70 milliGRAM(s)/deciliter  glucagon  Injectable 1 milliGRAM(s) IntraMuscular once PRN Glucose LESS THAN 70 milligrams/deciliter  traMADol 50 milliGRAM(s) Oral five times a day PRN Moderate Pain (4 - 6)      Allergies    penicillin (Anaphylaxis)  seafood (Anaphylaxis)  shellfish (Anaphylaxis)    Intolerances        Vital Signs Last 24 Hrs  T(C): 36.9 (06 Feb 2018 13:18), Max: 36.9 (05 Feb 2018 23:44)  T(F): 98.4 (06 Feb 2018 13:18), Max: 98.4 (05 Feb 2018 23:44)  HR: 67 (06 Feb 2018 13:18) (67 - 80)  BP: 150/64 (06 Feb 2018 13:18) (150/64 - 182/79)  BP(mean): --  RR: 17 (06 Feb 2018 13:18) (17 - 20)  SpO2: 100% (06 Feb 2018 13:18) (95% - 100%)  Daily     Daily     PHYSICAL EXAM:  GENERAL: appears chronically ill  HEAD:  Atraumatic, Normocephalic  EYES: EOMI  NECK: Supple, neck  veins full  NERVOUS SYSTEM:  Alert & Oriented X3, Barb[place] speaking primarily  CHEST/LUNG: Clear to percussion bilaterally; No rales  HEART: Regular rate and rhythm; No murmurs  ABDOMEN: Soft, Nontender, Nondistended; Bowel sounds present  EXTREMITIES:  +2 pitting edema B/L LE    LABS:                        12.4   5.7   )-----------( 129      ( 06 Feb 2018 07:04 )             37.9     02-06    143  |  99  |  48.0<H>  ----------------------------<  50<LL>  3.7   |  25.0  |  3.10<H>    Ca    8.1<L>      06 Feb 2018 07:04    TPro  6.1<L>  /  Alb  3.6  /  TBili  0.7  /  DBili  x   /  AST  22  /  ALT  13  /  AlkPhos  242<H>  02-05    PT/INR - ( 05 Feb 2018 09:28 )   PT: 15.5 sec;   INR: 1.40 ratio         PTT - ( 05 Feb 2018 09:28 )  PTT:38.1 sec            RADIOLOGY & ADDITIONAL TESTS:

## 2018-02-06 NOTE — PROVIDER CONTACT NOTE (CRITICAL VALUE NOTIFICATION) - BACKGROUND
[facility] Inter[facility] Nae came to assist RN, on interp pt c/o of blurry vision, but stated "felt better after RN gave sugar", RN and inter stayed with pt till 0815am, BSFS rechecked 15 min from 0745pm, and bsfs 165, pt states "feel so much better, and my shortness of breath is gone now, I felt lost all night", "feel so much better', MERARI Gurrola aware- covering for MD Reddy

## 2018-02-06 NOTE — PROVIDER CONTACT NOTE (CRITICAL VALUE NOTIFICATION) - ASSESSMENT
PT received hypoglycemic protocol, rechecked BSFS, Neurochecks performed as well, MERARI beltrán aware of full assessment

## 2018-02-06 NOTE — PROGRESS NOTE ADULT - PROBLEM SELECTOR PLAN 2
likely poor clearance. no cardiac complaints  [facility] cardiology following  neg stress test recently  echo pending.

## 2018-02-07 DIAGNOSIS — I34.0 NONRHEUMATIC MITRAL (VALVE) INSUFFICIENCY: ICD-10-CM

## 2018-02-07 LAB
24R-OH-CALCIDIOL SERPL-MCNC: 18.3 NG/ML — LOW (ref 30–80)
24R-OH-CALCIDIOL SERPL-MCNC: 21.9 NG/ML — LOW (ref 30–80)
CALCIUM SERPL-MCNC: 8.3 MG/DL — LOW (ref 8.4–10.5)
GLUCOSE BLDC GLUCOMTR-MCNC: 121 MG/DL — HIGH (ref 70–99)
GLUCOSE BLDC GLUCOMTR-MCNC: 130 MG/DL — HIGH (ref 70–99)
GLUCOSE BLDC GLUCOMTR-MCNC: 134 MG/DL — HIGH (ref 70–99)
GLUCOSE BLDC GLUCOMTR-MCNC: 167 MG/DL — HIGH (ref 70–99)
HBV CORE AB SER-ACNC: SIGNIFICANT CHANGE UP
HBV CORE IGM SER-ACNC: SIGNIFICANT CHANGE UP
HBV E AG SER-ACNC: NEGATIVE — SIGNIFICANT CHANGE UP
HBV SURFACE AB SER-ACNC: <3 MIU/ML — LOW
HBV SURFACE AB SER-ACNC: SIGNIFICANT CHANGE UP
PTH-INTACT FLD-MCNC: 144 PG/ML — HIGH (ref 15–65)

## 2018-02-07 PROCEDURE — 99233 SBSQ HOSP IP/OBS HIGH 50: CPT

## 2018-02-07 PROCEDURE — 99232 SBSQ HOSP IP/OBS MODERATE 35: CPT

## 2018-02-07 RX ORDER — GABAPENTIN 400 MG/1
100 CAPSULE ORAL AT BEDTIME
Qty: 0 | Refills: 0 | Status: DISCONTINUED | OUTPATIENT
Start: 2018-02-07 | End: 2018-02-17

## 2018-02-07 RX ORDER — CARVEDILOL PHOSPHATE 80 MG/1
6.25 CAPSULE, EXTENDED RELEASE ORAL EVERY 12 HOURS
Qty: 0 | Refills: 0 | Status: DISCONTINUED | OUTPATIENT
Start: 2018-02-07 | End: 2018-02-09

## 2018-02-07 RX ADMIN — CALCITRIOL 0.25 MICROGRAM(S): 0.5 CAPSULE ORAL at 12:35

## 2018-02-07 RX ADMIN — Medication 40 MILLIGRAM(S): at 05:09

## 2018-02-07 RX ADMIN — Medication 81 MILLIGRAM(S): at 12:35

## 2018-02-07 RX ADMIN — LISINOPRIL 10 MILLIGRAM(S): 2.5 TABLET ORAL at 05:09

## 2018-02-07 RX ADMIN — Medication 1 MILLIGRAM(S): at 12:35

## 2018-02-07 RX ADMIN — TRAMADOL HYDROCHLORIDE 50 MILLIGRAM(S): 50 TABLET ORAL at 08:04

## 2018-02-07 RX ADMIN — Medication 1 APPLICATION(S): at 05:09

## 2018-02-07 RX ADMIN — CARVEDILOL PHOSPHATE 6.25 MILLIGRAM(S): 80 CAPSULE, EXTENDED RELEASE ORAL at 18:20

## 2018-02-07 RX ADMIN — Medication 100 MILLIGRAM(S): at 05:09

## 2018-02-07 RX ADMIN — Medication 1 APPLICATION(S): at 18:20

## 2018-02-07 RX ADMIN — TRAMADOL HYDROCHLORIDE 50 MILLIGRAM(S): 50 TABLET ORAL at 12:33

## 2018-02-07 RX ADMIN — CLOPIDOGREL BISULFATE 75 MILLIGRAM(S): 75 TABLET, FILM COATED ORAL at 12:35

## 2018-02-07 RX ADMIN — Medication 1: at 12:36

## 2018-02-07 RX ADMIN — Medication 1334 MILLIGRAM(S): at 18:20

## 2018-02-07 RX ADMIN — Medication 1334 MILLIGRAM(S): at 08:04

## 2018-02-07 RX ADMIN — HEPARIN SODIUM 5000 UNIT(S): 5000 INJECTION INTRAVENOUS; SUBCUTANEOUS at 05:09

## 2018-02-07 RX ADMIN — Medication 112 MICROGRAM(S): at 05:09

## 2018-02-07 RX ADMIN — HEPARIN SODIUM 5000 UNIT(S): 5000 INJECTION INTRAVENOUS; SUBCUTANEOUS at 18:20

## 2018-02-07 RX ADMIN — GABAPENTIN 100 MILLIGRAM(S): 400 CAPSULE ORAL at 21:45

## 2018-02-07 NOTE — PROGRESS NOTE ADULT - ASSESSMENT
ESRD - Next HD tomorrow   Watch on Lasix   Hep B anc C negative     Anemia - Hgb stable     RO - Rocaltrol and Phoslo     HTN - Watch on meds

## 2018-02-07 NOTE — PROGRESS NOTE ADULT - SUBJECTIVE AND OBJECTIVE BOX
[place] [facility]                                                        Office: [address] [address]                                                       Telephone: [phone]. Fax:[phone]                                                                             PROGRESS NOTE    Subjective: Patient is complaining of a  right shoulder pain and the  right-sided headache, she apparently fell about 2 weeks ago. Patient's breathing has improved.   -Patient's son    Review of symptoms: as per HPI  	  Vitals:  T(C): 37 (02-07-18 @ 15:42), Max: 37 (02-07-18 @ 15:42)  HR: 64 (02-07-18 @ 15:42) (64 - 73)  BP: 164/63 (02-07-18 @ 15:42) (149/68 - 179/80)  RR: 18 (02-07-18 @ 15:42) (17 - 18)  SpO2: 100% (02-07-18 @ 15:42) (98% - 100%)  Wt(kg): --  I&O's Summary    06 Feb 2018 07:01  -  07 Feb 2018 07:00  --------------------------------------------------------  IN: 400 mL / OUT: 2300 mL / NET: -1900 mL      Weight (kg): 59.9 (02-05 @ 08:14)    PHYSICAL EXAM:  Appearance: Comfortable. No acute distress, Frail elderly lady  HEENT:  Head and neck: Atraumatic. Normocephalic.  Normal oral mucosa, PERRL, Neck is supple. No JVD, No carotid bruit.   Neurologic: A & O x 3, no focal deficits. EOMI , Cranial nerves are intact.  Lymphatic: No cervical lymphadenopathy  Cardiovascular: Normal S1 S2, No murmur, rubs/gallops. No JVD, No edema  Respiratory: Lungs clear to auscultation  Gastrointestinal:  Soft, Non-tender, + BS  Psychiatry: Patient is calm. No agitation. Mood & affect appropriate  Skin: No rashes/ ecchymoses/cyanosis/ulcers visualized on the face, hands or feet.    CURRENT MEDICATIONS:  carvedilol 6.25 milliGRAM(s) Oral every 12 hours  cloNIDine Patch 0.3 mG/24Hr(s) 1 patch Topical every 7 days  furosemide    Tablet 40 milliGRAM(s) Oral daily  lisinopril 10 milliGRAM(s) Oral daily    gabapentin  dextrose 50% Injectable  dextrose 50% Injectable  dextrose 50% Injectable  insulin lispro (HumaLOG) corrective regimen sliding scale  levothyroxine  AQUAPHOR (petrolatum Ointment)  aspirin enteric coated  calcitriol   Capsule  calcium acetate  clopidogrel Tablet  dextrose 5%.  folic acid  heparin  Injectable      LABS:	 	  CARDIAC MARKERS ( 05 Feb 2018 16:14 )  x     / 0.11 ng/mL / x     / x     / x      p-BNP 05 Feb 2018 16:14: x    , CARDIAC MARKERS ( 05 Feb 2018 09:28 )  x     / 0.12 ng/mL / x     / x     / x      p-BNP 05 Feb 2018 09:28: >86472 pg/mL                            12.4   5.7   )-----------( 129      ( 06 Feb 2018 07:04 )             37.9     02-06    143  |  99  |  48.0<H>  ----------------------------<  50<LL>  3.7   |  25.0  |  3.10<H>    Ca    8.1<L>      06 Feb 2018 07:04      proBNP: Serum Pro-Brain Natriuretic Peptide: >88111 pg/mL (02-05 @ 09:28)    Lipid Profile:   HgA1c: Hemoglobin A1C, Whole Blood: 7.6 %  TSH:     TELEMETRY: Reviewed  - No arrhythmias on the monitor.    < from: TTE Echo Complete w/Doppler (02.06.18 @ 13:32) >  Summary:   1. Left ventricular ejection fraction, by visual estimation, is 35 to   40%.   2. Moderately to severely decreased global left ventricular systolic   function.   3. Spectral Doppler shows pseudonormal pattern of left ventricular   myocardial filling (Grade II diastolic dysfunction).   4. There is moderate concentric left ventricular hypertrophy.   5. Moderately reduced RV systolic function.   6. Mildly dilated right atrium.   7. Moderate-severe tricuspid regurgitation.   8. Sclerotic aortic valve with decreased opening.   9. Estimated pulmonary artery systolic pressure is 50.0 mmHg assuming a   right atrial pressure of 8 mmHg, which is consistent with mild pulmonary   hypertension.  10. Severely enlarged left atrium.  11. There is no evidence of pericardial effusion.  12. Moderate MR    MD David Electronically signed on 2/6/2018 at 6:47:09 PM       < end of copied text >

## 2018-02-07 NOTE — PROGRESS NOTE ADULT - PROBLEM SELECTOR PLAN 1
-Echo shows Biventricular Failure with LVEF 35-40%.  I had a long discussion with the patient along with her son who translated about Cardiac Cath, procedure risks and benefits were discussed. Patient is agreeable  For Right and Left heart Cath on 2/9/2018  Continue Coreg and Lisinopril

## 2018-02-07 NOTE — PROGRESS NOTE ADULT - SUBJECTIVE AND OBJECTIVE BOX
CC: shortness of breath    HPI:  87 yo F w/ hx CKD5, CAD, Dm2 presents to ER for dialysis initiation from nephrologist office.      INTERVAL HPI/OVERNIGHT EVENTS: Patient seen and examined with son at bedside.  Patient complains of SOB, abdominal distention and difficulty swallowing.  Patient denies any headache, dizziness, CP, nausea, vomiting.  Other ROS reviewed and are negative.    Vital Signs Last 24 Hrs  T(C): 37 (07 Feb 2018 15:42), Max: 37 (07 Feb 2018 15:42)  T(F): 98.6 (07 Feb 2018 15:42), Max: 98.6 (07 Feb 2018 15:42)  HR: 64 (07 Feb 2018 15:42) (64 - 73)  BP: 164/63 (07 Feb 2018 15:42) (149/68 - 179/80)  BP(mean): --  RR: 18 (07 Feb 2018 15:42) (17 - 18)  SpO2: 100% (07 Feb 2018 15:42) (98% - 100%)  I&O's Detail    06 Feb 2018 07:01  -  07 Feb 2018 07:00  --------------------------------------------------------  IN:    Other: 400 mL  Total IN: 400 mL    OUT:    Other: 2300 mL  Total OUT: 2300 mL    Total NET: -1900 mL    PHYSICAL EXAM:  GENERAL: NAD, well-groomed, well-developed  HEAD:  Atraumatic, Normocephalic  NECK: Supple, No JVD, Normal thyroid  NERVOUS SYSTEM:  Alert & Oriented X3, Good concentration; Motor Strength 5/5 B/L upper and lower extremities  CHEST/LUNG: Clear to auscultation bilaterally; No rales, rhonchi, wheezing, or rubs  HEART: Regular rate and rhythm; No murmurs, rubs, or gallops  ABDOMEN: Soft, Nontender, distended; Bowel sounds present  EXTREMITIES:  2+ Peripheral Pulses, No clubbing, cyanosis, or edema        CARDIAC MARKERS ( 05 Feb 2018 16:14 )  x     / 0.11 ng/mL / x     / x     / x                                12.4   5.7   )-----------( 129      ( 06 Feb 2018 07:04 )             37.9     06 Feb 2018 07:04    143    |  99     |  48.0   ----------------------------<  50     3.7     |  25.0   |  3.10     Ca    8.1        06 Feb 2018 07:04        CAPILLARY BLOOD GLUCOSE      POCT Blood Glucose.: 167 mg/dL (07 Feb 2018 11:54)  POCT Blood Glucose.: 121 mg/dL (07 Feb 2018 08:11)  POCT Blood Glucose.: 221 mg/dL (06 Feb 2018 22:28)  POCT Blood Glucose.: 109 mg/dL (06 Feb 2018 16:57)        Hemoglobin A1C, Whole Blood: 7.6 % (02-05-18 @ 16:14)    MEDICATIONS  (STANDING):  AQUAPHOR (petrolatum Ointment) 1 Application(s) Topical two times a day  aspirin enteric coated 81 milliGRAM(s) Oral daily  calcitriol   Capsule 0.25 MICROGram(s) Oral daily  calcium acetate 1334 milliGRAM(s) Oral three times a day with meals  carvedilol 6.25 milliGRAM(s) Oral every 12 hours  cloNIDine Patch 0.3 mG/24Hr(s) 1 patch Topical every 7 days  clopidogrel Tablet 75 milliGRAM(s) Oral daily  dextrose 5%. 1000 milliLiter(s) (50 mL/Hr) IV Continuous <Continuous>  dextrose 50% Injectable 12.5 Gram(s) IV Push once  dextrose 50% Injectable 25 Gram(s) IV Push once  dextrose 50% Injectable 25 Gram(s) IV Push once  folic acid 1 milliGRAM(s) Oral daily  furosemide    Tablet 40 milliGRAM(s) Oral daily  heparin  Injectable 5000 Unit(s) SubCutaneous every 12 hours  insulin lispro (HumaLOG) corrective regimen sliding scale   SubCutaneous three times a day before meals  levothyroxine 112 MICROGram(s) Oral daily  lisinopril 10 milliGRAM(s) Oral daily    MEDICATIONS  (PRN):  dextrose Gel 1 Dose(s) Oral once PRN Blood Glucose LESS THAN 70 milliGRAM(s)/deciliter  glucagon  Injectable 1 milliGRAM(s) IntraMuscular once PRN Glucose LESS THAN 70 milligrams/deciliter  traMADol 50 milliGRAM(s) Oral five times a day PRN Moderate Pain (4 - 6)

## 2018-02-07 NOTE — PROGRESS NOTE ADULT - SUBJECTIVE AND OBJECTIVE BOX
yes NEPHROLOGY INTERVAL HPI/OVERNIGHT EVENTS:    Feels better post second HD   No new events   no access issues       MEDICATIONS  (STANDING):  AQUAPHOR (petrolatum Ointment) 1 Application(s) Topical two times a day  aspirin enteric coated 81 milliGRAM(s) Oral daily  calcitriol   Capsule 0.25 MICROGram(s) Oral daily  calcium acetate 1334 milliGRAM(s) Oral three times a day with meals  carvedilol 6.25 milliGRAM(s) Oral every 12 hours  cloNIDine Patch 0.3 mG/24Hr(s) 1 patch Topical every 7 days  clopidogrel Tablet 75 milliGRAM(s) Oral daily  dextrose 5%. 1000 milliLiter(s) (50 mL/Hr) IV Continuous <Continuous>  dextrose 50% Injectable 12.5 Gram(s) IV Push once  dextrose 50% Injectable 25 Gram(s) IV Push once  dextrose 50% Injectable 25 Gram(s) IV Push once  folic acid 1 milliGRAM(s) Oral daily  furosemide    Tablet 40 milliGRAM(s) Oral daily  heparin  Injectable 5000 Unit(s) SubCutaneous every 12 hours  insulin lispro (HumaLOG) corrective regimen sliding scale   SubCutaneous three times a day before meals  levothyroxine 112 MICROGram(s) Oral daily  lisinopril 10 milliGRAM(s) Oral daily    MEDICATIONS  (PRN):  dextrose Gel 1 Dose(s) Oral once PRN Blood Glucose LESS THAN 70 milliGRAM(s)/deciliter  glucagon  Injectable 1 milliGRAM(s) IntraMuscular once PRN Glucose LESS THAN 70 milligrams/deciliter  traMADol 50 milliGRAM(s) Oral five times a day PRN Moderate Pain (4 - 6)      Allergies    penicillin (Anaphylaxis)  seafood (Anaphylaxis)  shellfish (Anaphylaxis)    Intolerances          Vital Signs Last 24 Hrs  T(C): 36.8 (07 Feb 2018 08:07), Max: 36.9 (06 Feb 2018 13:18)  T(F): 98.2 (07 Feb 2018 08:07), Max: 98.5 (06 Feb 2018 23:41)  HR: 66 (07 Feb 2018 08:07) (66 - 73)  BP: 154/63 (07 Feb 2018 08:07) (149/68 - 179/80)  BP(mean): --  RR: 18 (07 Feb 2018 08:07) (17 - 18)  SpO2: 98% (07 Feb 2018 08:07) (98% - 100%)  Daily     Daily   I&O's Detail    06 Feb 2018 07:01  -  07 Feb 2018 07:00  --------------------------------------------------------  IN:    Other: 400 mL  Total IN: 400 mL    OUT:    Other: 2300 mL  Total OUT: 2300 mL    Total NET: -1900 mL        I&O's Summary    06 Feb 2018 07:01  -  07 Feb 2018 07:00  --------------------------------------------------------  IN: 400 mL / OUT: 2300 mL / NET: -1900 mL        PHYSICAL EXAM:  GENERAL: Feels better   HEAD:  Atraumatic, Normocephalic  EYES: EOMI  NECK: Supple, neck  veins full  CHEST/LUNG: Clear to percussion bilaterally; No rales  HEART: Regular rate and rhythm; No murmurs  ABDOMEN: Soft, Nontender, Nondistended; Bowel sounds present  EXTREMITIES:  +2 pitting edema B/L LE, R access with thrill       LABS:                        12.4   5.7   )-----------( 129      ( 06 Feb 2018 07:04 )             37.9     02-06    143  |  99  |  48.0<H>  ----------------------------<  50<LL>  3.7   |  25.0  |  3.10<H>    Ca    8.1<L>      06 Feb 2018 07:04          Intact PTH: 186 pg/mL (02-06 @ 18:16)  Vitamin D, 25-Hydroxy: 21.9 ng/mL (02-06 @ 17:48)          RADIOLOGY & ADDITIONAL TESTS:

## 2018-02-07 NOTE — PROGRESS NOTE ADULT - PROBLEM SELECTOR PLAN 2
likely poor clearance. no cardiac complaints  [facility] cardiology following  neg stress test recently  echo with EF 35-40%.  [facility] pending - possibly Friday 2/9/18.

## 2018-02-08 LAB
ANION GAP SERPL CALC-SCNC: 12 MMOL/L — SIGNIFICANT CHANGE UP (ref 5–17)
BUN SERPL-MCNC: 35 MG/DL — HIGH (ref 8–20)
CALCIUM SERPL-MCNC: 7.9 MG/DL — LOW (ref 8.6–10.2)
CHLORIDE SERPL-SCNC: 98 MMOL/L — SIGNIFICANT CHANGE UP (ref 98–107)
CO2 SERPL-SCNC: 27 MMOL/L — SIGNIFICANT CHANGE UP (ref 22–29)
CREAT SERPL-MCNC: 2.85 MG/DL — HIGH (ref 0.5–1.3)
GLUCOSE BLDC GLUCOMTR-MCNC: 100 MG/DL — HIGH (ref 70–99)
GLUCOSE BLDC GLUCOMTR-MCNC: 182 MG/DL — HIGH (ref 70–99)
GLUCOSE BLDC GLUCOMTR-MCNC: 225 MG/DL — HIGH (ref 70–99)
GLUCOSE BLDC GLUCOMTR-MCNC: 93 MG/DL — SIGNIFICANT CHANGE UP (ref 70–99)
GLUCOSE SERPL-MCNC: 107 MG/DL — SIGNIFICANT CHANGE UP (ref 70–115)
HCT VFR BLD CALC: 34.6 % — LOW (ref 37–47)
HGB BLD-MCNC: 11.3 G/DL — LOW (ref 12–16)
MCHC RBC-ENTMCNC: 29.5 PG — SIGNIFICANT CHANGE UP (ref 27–31)
MCHC RBC-ENTMCNC: 32.7 G/DL — SIGNIFICANT CHANGE UP (ref 32–36)
MCV RBC AUTO: 90.3 FL — SIGNIFICANT CHANGE UP (ref 81–99)
PLATELET # BLD AUTO: 117 K/UL — LOW (ref 150–400)
POTASSIUM SERPL-MCNC: 3.8 MMOL/L — SIGNIFICANT CHANGE UP (ref 3.5–5.3)
POTASSIUM SERPL-SCNC: 3.8 MMOL/L — SIGNIFICANT CHANGE UP (ref 3.5–5.3)
RBC # BLD: 3.83 M/UL — LOW (ref 4.4–5.2)
RBC # FLD: 18.2 % — HIGH (ref 11–15.6)
SODIUM SERPL-SCNC: 137 MMOL/L — SIGNIFICANT CHANGE UP (ref 135–145)
WBC # BLD: 3.6 K/UL — LOW (ref 4.8–10.8)
WBC # FLD AUTO: 3.6 K/UL — LOW (ref 4.8–10.8)

## 2018-02-08 PROCEDURE — 99233 SBSQ HOSP IP/OBS HIGH 50: CPT

## 2018-02-08 PROCEDURE — 99232 SBSQ HOSP IP/OBS MODERATE 35: CPT

## 2018-02-08 RX ORDER — INSULIN LISPRO 100/ML
VIAL (ML) SUBCUTANEOUS
Qty: 0 | Refills: 0 | Status: DISCONTINUED | OUTPATIENT
Start: 2018-02-08 | End: 2018-02-17

## 2018-02-08 RX ORDER — INSULIN LISPRO 100/ML
2 VIAL (ML) SUBCUTANEOUS ONCE
Qty: 0 | Refills: 0 | Status: COMPLETED | OUTPATIENT
Start: 2018-02-08 | End: 2018-02-08

## 2018-02-08 RX ORDER — LISINOPRIL 2.5 MG/1
20 TABLET ORAL DAILY
Qty: 0 | Refills: 0 | Status: DISCONTINUED | OUTPATIENT
Start: 2018-02-09 | End: 2018-02-11

## 2018-02-08 RX ORDER — ERGOCALCIFEROL 1.25 MG/1
50000 CAPSULE ORAL
Qty: 0 | Refills: 0 | Status: DISCONTINUED | OUTPATIENT
Start: 2018-02-08 | End: 2018-02-17

## 2018-02-08 RX ORDER — HYDRALAZINE HCL 50 MG
25 TABLET ORAL EVERY 6 HOURS
Qty: 0 | Refills: 0 | Status: DISCONTINUED | OUTPATIENT
Start: 2018-02-08 | End: 2018-02-17

## 2018-02-08 RX ORDER — LISINOPRIL 2.5 MG/1
10 TABLET ORAL ONCE
Qty: 0 | Refills: 0 | Status: COMPLETED | OUTPATIENT
Start: 2018-02-08 | End: 2018-02-08

## 2018-02-08 RX ADMIN — CARVEDILOL PHOSPHATE 6.25 MILLIGRAM(S): 80 CAPSULE, EXTENDED RELEASE ORAL at 05:39

## 2018-02-08 RX ADMIN — Medication 1334 MILLIGRAM(S): at 08:46

## 2018-02-08 RX ADMIN — LISINOPRIL 10 MILLIGRAM(S): 2.5 TABLET ORAL at 05:40

## 2018-02-08 RX ADMIN — Medication 1 MILLIGRAM(S): at 16:00

## 2018-02-08 RX ADMIN — Medication 40 MILLIGRAM(S): at 05:39

## 2018-02-08 RX ADMIN — Medication 112 MICROGRAM(S): at 05:39

## 2018-02-08 RX ADMIN — HEPARIN SODIUM 5000 UNIT(S): 5000 INJECTION INTRAVENOUS; SUBCUTANEOUS at 05:39

## 2018-02-08 RX ADMIN — GABAPENTIN 100 MILLIGRAM(S): 400 CAPSULE ORAL at 22:24

## 2018-02-08 RX ADMIN — Medication 2 UNIT(S): at 23:05

## 2018-02-08 RX ADMIN — Medication 25 MILLIGRAM(S): at 16:00

## 2018-02-08 RX ADMIN — TUBERCULIN PURIFIED PROTEIN DERIVATIVE 5 UNIT(S): 5 INJECTION, SOLUTION INTRADERMAL at 22:11

## 2018-02-08 RX ADMIN — HEPARIN SODIUM 5000 UNIT(S): 5000 INJECTION INTRAVENOUS; SUBCUTANEOUS at 17:39

## 2018-02-08 RX ADMIN — Medication 81 MILLIGRAM(S): at 16:00

## 2018-02-08 RX ADMIN — CLOPIDOGREL BISULFATE 75 MILLIGRAM(S): 75 TABLET, FILM COATED ORAL at 16:00

## 2018-02-08 RX ADMIN — LISINOPRIL 10 MILLIGRAM(S): 2.5 TABLET ORAL at 16:00

## 2018-02-08 RX ADMIN — Medication 1334 MILLIGRAM(S): at 17:39

## 2018-02-08 RX ADMIN — ERGOCALCIFEROL 50000 UNIT(S): 1.25 CAPSULE ORAL at 17:39

## 2018-02-08 RX ADMIN — Medication 1 APPLICATION(S): at 17:39

## 2018-02-08 RX ADMIN — CALCITRIOL 0.25 MICROGRAM(S): 0.5 CAPSULE ORAL at 16:00

## 2018-02-08 RX ADMIN — Medication 1: at 17:39

## 2018-02-08 RX ADMIN — CARVEDILOL PHOSPHATE 6.25 MILLIGRAM(S): 80 CAPSULE, EXTENDED RELEASE ORAL at 17:39

## 2018-02-08 RX ADMIN — Medication 0.1 MILLIGRAM(S): at 13:55

## 2018-02-08 NOTE — PHYSICAL THERAPY INITIAL EVALUATION ADULT - CRITERIA FOR SKILLED THERAPEUTIC INTERVENTIONS
risk reduction/prevention/functional limitations in following categories/therapy frequency/predicted duration of therapy intervention/rehab potential/impairments found/anticipated discharge recommendation/anticipated equipment needs at discharge

## 2018-02-08 NOTE — PROGRESS NOTE ADULT - PROBLEM SELECTOR PLAN 1
-Echo shows Biventricular Failure with LVEF 35-40%.  -Continue Lisinopril/Coreg  -For Right and Left heart Cath on 2/9/2018

## 2018-02-08 NOTE — PROGRESS NOTE ADULT - PROBLEM SELECTOR PLAN 2
appreciate cardiology input  TTE showed LVEF 35-40%, grade 2 diastolic dysfunction, moderately reduced RV systolic function.  For Right and Left heart Cath on 2/9/2018  Continue aspirin, plavix, Coreg and Lisinopril.

## 2018-02-08 NOTE — PROGRESS NOTE ADULT - PROBLEM SELECTOR PLAN 4
home meds uncontrolled, newly started on HD  c/w lisinopril 10, clonidine Patch 0.3, coreg   monitor BP and adjust medication as needed

## 2018-02-08 NOTE — PROGRESS NOTE ADULT - PROBLEM SELECTOR PROBLEM 4
Coronary artery disease involving native coronary artery of native heart without angina pectoris R/O HTN (hypertension)

## 2018-02-08 NOTE — PROGRESS NOTE ADULT - SUBJECTIVE AND OBJECTIVE BOX
NEPHROLOGY INTERVAL HPI/OVERNIGHT EVENTS:    Feels better   No new complaints     MEDICATIONS  (STANDING):  AQUAPHOR (petrolatum Ointment) 1 Application(s) Topical two times a day  aspirin enteric coated 81 milliGRAM(s) Oral daily  calcitriol   Capsule 0.25 MICROGram(s) Oral daily  calcium acetate 1334 milliGRAM(s) Oral three times a day with meals  carvedilol 6.25 milliGRAM(s) Oral every 12 hours  cloNIDine Patch 0.3 mG/24Hr(s) 1 patch Topical every 7 days  clopidogrel Tablet 75 milliGRAM(s) Oral daily  dextrose 5%. 1000 milliLiter(s) (50 mL/Hr) IV Continuous <Continuous>  dextrose 50% Injectable 12.5 Gram(s) IV Push once  dextrose 50% Injectable 25 Gram(s) IV Push once  dextrose 50% Injectable 25 Gram(s) IV Push once  folic acid 1 milliGRAM(s) Oral daily  furosemide    Tablet 40 milliGRAM(s) Oral daily  gabapentin 100 milliGRAM(s) Oral at bedtime  heparin  Injectable 5000 Unit(s) SubCutaneous every 12 hours  insulin lispro (HumaLOG) corrective regimen sliding scale   SubCutaneous three times a day before meals  levothyroxine 112 MICROGram(s) Oral daily  lisinopril 10 milliGRAM(s) Oral daily    MEDICATIONS  (PRN):  dextrose Gel 1 Dose(s) Oral once PRN Blood Glucose LESS THAN 70 milliGRAM(s)/deciliter  glucagon  Injectable 1 milliGRAM(s) IntraMuscular once PRN Glucose LESS THAN 70 milligrams/deciliter  traMADol 50 milliGRAM(s) Oral five times a day PRN Moderate Pain (4 - 6)      Allergies    penicillin (Anaphylaxis)  seafood (Anaphylaxis)  shellfish (Anaphylaxis)    Intolerances        Vital Signs Last 24 Hrs  T(C): 36.5 (08 Feb 2018 08:02), Max: 37 (07 Feb 2018 15:42)  T(F): 97.7 (08 Feb 2018 08:02), Max: 98.6 (07 Feb 2018 15:42)  HR: 61 (08 Feb 2018 08:02) (61 - 70)  BP: 161/63 (08 Feb 2018 08:02) (155/63 - 164/63)  BP(mean): --  RR: 18 (08 Feb 2018 08:02) (18 - 18)  SpO2: 98% (08 Feb 2018 08:02) (98% - 100%)  Daily     Daily   I&O's Detail    I&O's Summary      PHYSICAL EXAM:  GENERAL: Feels better   HEAD:  Atraumatic, Normocephalic  EYES: EOMI  NECK: Supple, neck  veins full  CHEST/LUNG: Clear to percussion bilaterally; No rales  HEART: Regular rate and rhythm; No murmurs  ABDOMEN: Soft, Nontender, Nondistended; Bowel sounds present  EXTREMITIES:  +2 pitting edema B/L LE, R access with thrill   LABS:                        11.3   3.6   )-----------( 117      ( 08 Feb 2018 07:04 )             34.6     02-08    137  |  98  |  35.0<H>  ----------------------------<  107  3.8   |  27.0  |  2.85<H>    Ca    7.9<L>      08 Feb 2018 07:02          Vitamin D, 25-Hydroxy: 18.3 ng/mL (02-07 @ 18:20)  Intact PTH: 144 pg/mL (02-07 @ 18:20)          RADIOLOGY & ADDITIONAL TESTS:

## 2018-02-08 NOTE — PROGRESS NOTE ADULT - SUBJECTIVE AND OBJECTIVE BOX
Internal Medicine Hospitalist - Dr. Giovanni ROBERSON    [id number]    86y      Female    Patient is a 86y old  Female who presents with a chief complaint of shortness of breath (05 Feb 2018 13:31)    INTERVAL HPI/ OVERNIGHT EVENTS: Patient is seen and examined, report feeling better     REVIEW OF SYSTEMS:    Denied fever, chills, abd. pain, nausea, vomiting, chest pain, SOB, headache, dizziness    PHYSICAL EXAM:    Vital Signs Last 24 Hrs  T(C): 36.5 (08 Feb 2018 08:02), Max: 37 (07 Feb 2018 15:42)  T(F): 97.7 (08 Feb 2018 08:02), Max: 98.6 (07 Feb 2018 15:42)  HR: 61 (08 Feb 2018 08:02) (61 - 70)  BP: 161/63 (08 Feb 2018 08:02) (155/63 - 164/63)  BP(mean): --  RR: 18 (08 Feb 2018 08:02) (18 - 18)  SpO2: 98% (08 Feb 2018 08:02) (98% - 100%)    GENERAL: NAD  HEENT: EOMI  Neck: supple  CHEST/LUNG: positive air entry   HEART: S1S2+ audible  ABDOMEN: Soft, Nontender, Nondistended; Bowel sounds present  EXTREMITIES:  no edema  CNS: AAO X 3  Psychiatry: normal mood    LABS:                        11.3   3.6   )-----------( 117      ( 08 Feb 2018 07:04 )             34.6     02-08    137  |  98  |  35.0<H>  ----------------------------<  107  3.8   |  27.0  |  2.85<H>    Ca    7.9<L>      08 Feb 2018 07:02              MEDICATIONS  (STANDING):  AQUAPHOR (petrolatum Ointment) 1 Application(s) Topical two times a day  aspirin enteric coated 81 milliGRAM(s) Oral daily  calcitriol   Capsule 0.25 MICROGram(s) Oral daily  calcium acetate 1334 milliGRAM(s) Oral three times a day with meals  carvedilol 6.25 milliGRAM(s) Oral every 12 hours  cloNIDine Patch 0.3 mG/24Hr(s) 1 patch Topical every 7 days  clopidogrel Tablet 75 milliGRAM(s) Oral daily  dextrose 5%. 1000 milliLiter(s) (50 mL/Hr) IV Continuous <Continuous>  dextrose 50% Injectable 12.5 Gram(s) IV Push once  dextrose 50% Injectable 25 Gram(s) IV Push once  dextrose 50% Injectable 25 Gram(s) IV Push once  folic acid 1 milliGRAM(s) Oral daily  furosemide    Tablet 40 milliGRAM(s) Oral daily  gabapentin 100 milliGRAM(s) Oral at bedtime  heparin  Injectable 5000 Unit(s) SubCutaneous every 12 hours  insulin lispro (HumaLOG) corrective regimen sliding scale   SubCutaneous three times a day before meals  levothyroxine 112 MICROGram(s) Oral daily  lisinopril 10 milliGRAM(s) Oral daily    MEDICATIONS  (PRN):  dextrose Gel 1 Dose(s) Oral once PRN Blood Glucose LESS THAN 70 milliGRAM(s)/deciliter  glucagon  Injectable 1 milliGRAM(s) IntraMuscular once PRN Glucose LESS THAN 70 milligrams/deciliter  traMADol 50 milliGRAM(s) Oral five times a day PRN Moderate Pain (4 - 6)      RADIOLOGY & ADDITIONAL TEST  < from: TTE Echo Complete w/Doppler (02.06.18 @ 13:32) >  Summary:   1. Left ventricular ejection fraction, by visual estimation, is 35 to   40%.   2. Moderately to severely decreased global left ventricular systolic   function.   3. Spectral Doppler shows pseudonormal pattern of left ventricular   myocardial filling (Grade II diastolic dysfunction).   4. There is moderate concentric left ventricular hypertrophy.   5. Moderately reduced RV systolic function.   6. Mildly dilated right atrium.   7. Moderate-severe tricuspid regurgitation.   8. Sclerotic aortic valve with decreased opening.   9. Estimated pulmonary artery systolic pressure is 50.0 mmHg assuming a   right atrial pressure of 8 mmHg, which is consistent with mild pulmonary   hypertension.  10. Severely enlarged left atrium.  11. There is no evidence of pericardial effusion.    < end of copied text >

## 2018-02-08 NOTE — PHYSICAL THERAPY INITIAL EVALUATION ADULT - ADDITIONAL COMMENTS
Pt. reports she lives in a house with 3 HILARIO and one rail. No stairs inside. Pt. was modified independent with all functional skills prior to admission with SAC vs RW.

## 2018-02-08 NOTE — PROGRESS NOTE ADULT - PROBLEM SELECTOR PROBLEM 5
Acquired hypothyroidism Coronary artery disease involving native coronary artery of native heart without angina pectoris

## 2018-02-08 NOTE — PROGRESS NOTE ADULT - PROBLEM SELECTOR PLAN 1
appreciate renal input, newly started on HD  f/u PPD   hepatitis B and C negative appreciate renal input, newly started on HD  f/u PPD   hepatitis B and C negative  c/w lasix per renal

## 2018-02-08 NOTE — PROGRESS NOTE ADULT - ASSESSMENT
87 yo F w/ hx CKD5, CAD, Dm2 presents to ER for dialysis initiation from nephrologist office. Pt was noted to have positive troponin, TTE showed LVEF 35-40%, grade 2 diastolic dysfunction, moderately reduced RV systolic function. Pt was seen by cardiology, schedule  For Right and Left heart Cath on 2/9/2018

## 2018-02-08 NOTE — PROGRESS NOTE ADULT - ASSESSMENT
ESRD - Next HD today  Awaits outpt slot   Watch on Lasix   Hep B and C negative     Anemia - Hgb stable     RO - Rocaltrol and Phoslo , Add weekly ergo    HTN - Watch on meds

## 2018-02-08 NOTE — PROGRESS NOTE ADULT - SUBJECTIVE AND OBJECTIVE BOX
[place] [facility]                                                        Office: [address] [address]                                                       Telephone: [phone]. Fax:[phone]                                                                             PROGRESS NOTE    Subjective: Patient's breathing is better.   Patients son translated via phone     Review of symptoms: Cardiac:  No chest pain. + dyspnea. No palpitations.  Respiratory:no cough. No dyspnea  Gastrointestinal: No diarrhea. No abdominal pain. No bleeding.       	  Vitals:  T(C): 36.5 (02-08-18 @ 15:42), Max: 36.8 (02-08-18 @ 00:38)  HR: 72 (02-08-18 @ 15:42) (61 - 72)  BP: 178/66 (02-08-18 @ 15:42) (154/58 - 178/66)  RR: 18 (02-08-18 @ 15:42) (18 - 18)  SpO2: 97% (02-08-18 @ 15:42) (97% - 100%)  Wt(kg): --  I&O's Summary    08 Feb 2018 07:01  -  08 Feb 2018 17:48  --------------------------------------------------------  IN: 0 mL / OUT: 2000 mL / NET: -2000 mL      Weight (kg): 59.9 (02-05 @ 08:14)    PHYSICAL EXAM:  Appearance: Comfortable. No acute distress  HEENT:  Head and neck: Atraumatic. Normocephalic.  Normal oral mucosa, PERRL, Neck is supple. No JVD, No carotid bruit.   Neurologic: A & O x 3, no focal deficits. EOMI , Cranial nerves are intact.  Lymphatic: No cervical lymphadenopathy  Cardiovascular: Normal S1 S2, No murmur, rubs/gallops. No JVD, No edema  Respiratory: Lungs clear to auscultation  Gastrointestinal:  Soft, Non-tender, + BS  Lower Extremities: No edema  Psychiatry: Patient is calm. No agitation. Mood & affect appropriate  Skin: No rashes/ ecchymoses/cyanosis/ulcers visualized on the face, hands or feet.    CURRENT MEDICATIONS:  carvedilol 6.25 milliGRAM(s) Oral every 12 hours  cloNIDine Patch 0.3 mG/24Hr(s) 1 patch Topical every 7 days  furosemide    Tablet 40 milliGRAM(s) Oral daily  hydrALAZINE 25 milliGRAM(s) Oral every 6 hours PRN    gabapentin  dextrose 50% Injectable  dextrose 50% Injectable  dextrose 50% Injectable  insulin lispro (HumaLOG) corrective regimen sliding scale  levothyroxine  AQUAPHOR (petrolatum Ointment)  aspirin enteric coated  calcitriol   Capsule  calcium acetate  clopidogrel Tablet  dextrose 5%.  ergocalciferol  folic acid  heparin  Injectable      LABS:	 	                              11.3   3.6   )-----------( 117      ( 08 Feb 2018 07:04 )             34.6     02-08    137  |  98  |  35.0<H>  ----------------------------<  107  3.8   |  27.0  |  2.85<H>    Ca    7.9<L>      08 Feb 2018 07:02      proBNP: Serum Pro-Brain Natriuretic Peptide: >92088 pg/mL (02-05 @ 09:28)    Lipid Profile:   HgA1c: Hemoglobin A1C, Whole Blood: 7.6 %  TSH:     TELEMETRY: Reviewed      ECG:  Reviewed by me

## 2018-02-09 LAB
GLUCOSE BLDC GLUCOMTR-MCNC: 107 MG/DL — HIGH (ref 70–99)
GLUCOSE BLDC GLUCOMTR-MCNC: 125 MG/DL — HIGH (ref 70–99)
GLUCOSE BLDC GLUCOMTR-MCNC: 132 MG/DL — HIGH (ref 70–99)
GLUCOSE BLDC GLUCOMTR-MCNC: 144 MG/DL — HIGH (ref 70–99)
GLUCOSE BLDC GLUCOMTR-MCNC: 167 MG/DL — HIGH (ref 70–99)
GLUCOSE BLDC GLUCOMTR-MCNC: 173 MG/DL — HIGH (ref 70–99)

## 2018-02-09 PROCEDURE — 99223 1ST HOSP IP/OBS HIGH 75: CPT | Mod: 24

## 2018-02-09 PROCEDURE — 99233 SBSQ HOSP IP/OBS HIGH 50: CPT

## 2018-02-09 PROCEDURE — 93461 R&L HRT ART/VENTRICLE ANGIO: CPT | Mod: 26

## 2018-02-09 RX ORDER — CARVEDILOL PHOSPHATE 80 MG/1
12.5 CAPSULE, EXTENDED RELEASE ORAL EVERY 12 HOURS
Qty: 0 | Refills: 0 | Status: DISCONTINUED | OUTPATIENT
Start: 2018-02-09 | End: 2018-02-17

## 2018-02-09 RX ADMIN — TRAMADOL HYDROCHLORIDE 50 MILLIGRAM(S): 50 TABLET ORAL at 23:10

## 2018-02-09 RX ADMIN — CLOPIDOGREL BISULFATE 75 MILLIGRAM(S): 75 TABLET, FILM COATED ORAL at 13:26

## 2018-02-09 RX ADMIN — CALCITRIOL 0.25 MICROGRAM(S): 0.5 CAPSULE ORAL at 13:26

## 2018-02-09 RX ADMIN — CARVEDILOL PHOSPHATE 12.5 MILLIGRAM(S): 80 CAPSULE, EXTENDED RELEASE ORAL at 17:16

## 2018-02-09 RX ADMIN — TRAMADOL HYDROCHLORIDE 50 MILLIGRAM(S): 50 TABLET ORAL at 22:35

## 2018-02-09 RX ADMIN — LISINOPRIL 20 MILLIGRAM(S): 2.5 TABLET ORAL at 06:04

## 2018-02-09 RX ADMIN — Medication 112 MICROGRAM(S): at 06:04

## 2018-02-09 RX ADMIN — HEPARIN SODIUM 5000 UNIT(S): 5000 INJECTION INTRAVENOUS; SUBCUTANEOUS at 18:39

## 2018-02-09 RX ADMIN — Medication 40 MILLIGRAM(S): at 06:04

## 2018-02-09 RX ADMIN — HEPARIN SODIUM 5000 UNIT(S): 5000 INJECTION INTRAVENOUS; SUBCUTANEOUS at 06:04

## 2018-02-09 RX ADMIN — Medication 1334 MILLIGRAM(S): at 13:26

## 2018-02-09 RX ADMIN — Medication 1 APPLICATION(S): at 06:04

## 2018-02-09 RX ADMIN — GABAPENTIN 100 MILLIGRAM(S): 400 CAPSULE ORAL at 21:19

## 2018-02-09 RX ADMIN — Medication 1334 MILLIGRAM(S): at 17:16

## 2018-02-09 RX ADMIN — Medication 81 MILLIGRAM(S): at 13:25

## 2018-02-09 RX ADMIN — CARVEDILOL PHOSPHATE 6.25 MILLIGRAM(S): 80 CAPSULE, EXTENDED RELEASE ORAL at 06:04

## 2018-02-09 RX ADMIN — Medication 1 APPLICATION(S): at 21:18

## 2018-02-09 RX ADMIN — Medication 1: at 22:33

## 2018-02-09 RX ADMIN — Medication 1 MILLIGRAM(S): at 18:39

## 2018-02-09 NOTE — PROGRESS NOTE ADULT - ASSESSMENT
85 yo F w/ hx CKD5, CAD, Dm2 presents to ER for dialysis initiation from nephrologist office. Pt was noted to have positive troponin, TTE showed LVEF 35-40%, grade 2 diastolic dysfunction, moderately reduced RV systolic function. Pt was seen by cardiology. S/p Right and Left heart Cath on 2/9/2018.

## 2018-02-09 NOTE — PROGRESS NOTE ADULT - SUBJECTIVE AND OBJECTIVE BOX
[place] [facility]                                                        Office: [address] [address]                                                       Telephone: [phone]. Fax:[phone]                                                                             PROGRESS NOTE    Subjective:  Patient is s/p Cardiac Cath. Patients son at bedside. Patient is overall feeling much better compared to admission. Dyspnea has improved.     Review of symptoms: Cardiac:  No chest pain.  dyspnea has improved, No palpitations.  Respiratory:no cough. No dyspnea  Gastrointestinal: No diarrhea. No abdominal pain. No bleeding.       	  Vitals:  T(C): 36.3 (02-09-18 @ 11:03), Max: 36.9 (02-08-18 @ 23:30)  HR: 60 (02-09-18 @ 13:30) (60 - 72)  BP: 161/72 (02-09-18 @ 13:30) (136/57 - 178/66)  RR: 16 (02-09-18 @ 13:30) (16 - 20)  SpO2: 100% (02-09-18 @ 13:30) (97% - 100%)  Wt(kg): --  I&O's Summary    08 Feb 2018 07:01  -  09 Feb 2018 07:00  --------------------------------------------------------  IN: 0 mL / OUT: 2000 mL / NET: -2000 mL      Weight (kg): 59.9 (02-05 @ 08:14)    PHYSICAL EXAM:  Appearance: Comfortable. No acute distress  HEENT:  Head and neck: Atraumatic. Normocephalic.  Normal oral mucosa, PERRL, Neck is supple. No JVD, No carotid bruit.   Neurologic: A & O x 3, no focal deficits. EOMI , Cranial nerves are intact.  Lymphatic: No cervical lymphadenopathy  Cardiovascular: Normal S1 S2, No murmur, rubs/gallops. No JVD, No edema  Respiratory: Lungs clear to auscultation  Gastrointestinal:  Soft, Non-tender, + BS  Lower Extremities: No edema  Psychiatry: Patient is calm. No agitation. Mood & affect appropriate    CURRENT MEDICATIONS:  carvedilol 12.5 milliGRAM(s) Oral every 12 hours  cloNIDine Patch 0.3 mG/24Hr(s) 1 patch Topical every 7 days  furosemide    Tablet 40 milliGRAM(s) Oral daily  hydrALAZINE 25 milliGRAM(s) Oral every 6 hours PRN  lisinopril 20 milliGRAM(s) Oral daily    gabapentin  dextrose 50% Injectable  dextrose 50% Injectable  dextrose 50% Injectable  insulin lispro (HumaLOG) corrective regimen sliding scale  levothyroxine  AQUAPHOR (petrolatum Ointment)  aspirin enteric coated  calcitriol   Capsule  calcium acetate  clopidogrel Tablet  dextrose 5%.  ergocalciferol  folic acid  heparin  Injectable      LABS:	 	                              11.3   3.6   )-----------( 117      ( 08 Feb 2018 07:04 )             34.6     02-08    137  |  98  |  35.0<H>  ----------------------------<  107  3.8   |  27.0  |  2.85<H>    Ca    7.9<L>      08 Feb 2018 07:02      proBNP: Serum Pro-Brain Natriuretic Peptide: >73358 pg/mL (02-05 @ 09:28)    Lipid Profile:   HgA1c: Hemoglobin A1C, Whole Blood: 7.6 %  TSH:     < from: TTE Echo Complete w/Doppler (02.06.18 @ 13:32) >  Summary:   1. Left ventricular ejection fraction, by visual estimation, is 35 to   40%.   2. Moderately to severely decreased global left ventricular systolic   function.   3. Spectral Doppler shows pseudonormal pattern of left ventricular   myocardial filling (Grade II diastolic dysfunction).   4. There is moderate concentric left ventricular hypertrophy.   5. Moderately reduced RV systolic function.   6. Mildly dilated right atrium.   7. Moderate-severe tricuspid regurgitation.   8. Sclerotic aortic valve with decreased opening.   9. Estimated pulmonary artery systolic pressure is 50.0 mmHg assuming a   right atrial pressure of 8 mmHg, which is consistent with mild pulmonary   hypertension.  10. Severely enlarged left atrium.  11. There is no evidence of pericardial effusion.    MD David Electronically signed on 2/6/2018 at 6:47:09 PM    < end of copied text >    Right and Left Heart Cath 2/9/2018  [place], Moderate MR.

## 2018-02-09 NOTE — PROGRESS NOTE ADULT - PROBLEM SELECTOR PLAN 1
-Echo shows Biventricular Failure with LVEF 35-40%.  -Continue Lisinopril/Coreg  - Consider D/C Clonidine  - Titrate Coreg (Increase the dose as tolerated)  - Increase the dose of Lisinopril as tolerated.

## 2018-02-09 NOTE — PROGRESS NOTE ADULT - SUBJECTIVE AND OBJECTIVE BOX
NEPHROLOGY INTERVAL HPI/OVERNIGHT EVENTS: no new events.    MEDICATIONS  (STANDING):  AQUAPHOR (petrolatum Ointment) 1 Application(s) Topical two times a day  aspirin enteric coated 81 milliGRAM(s) Oral daily  calcitriol   Capsule 0.25 MICROGram(s) Oral daily  calcium acetate 1334 milliGRAM(s) Oral three times a day with meals  carvedilol 6.25 milliGRAM(s) Oral every 12 hours  cloNIDine Patch 0.3 mG/24Hr(s) 1 patch Topical every 7 days  clopidogrel Tablet 75 milliGRAM(s) Oral daily  dextrose 5%. 1000 milliLiter(s) (50 mL/Hr) IV Continuous <Continuous>  dextrose 50% Injectable 12.5 Gram(s) IV Push once  dextrose 50% Injectable 25 Gram(s) IV Push once  dextrose 50% Injectable 25 Gram(s) IV Push once  ergocalciferol 41166 Unit(s) Oral every week  folic acid 1 milliGRAM(s) Oral daily  furosemide    Tablet 40 milliGRAM(s) Oral daily  gabapentin 100 milliGRAM(s) Oral at bedtime  heparin  Injectable 5000 Unit(s) SubCutaneous every 12 hours  insulin lispro (HumaLOG) corrective regimen sliding scale   SubCutaneous Before meals and at bedtime  levothyroxine 112 MICROGram(s) Oral daily  lisinopril 20 milliGRAM(s) Oral daily    MEDICATIONS  (PRN):  dextrose Gel 1 Dose(s) Oral once PRN Blood Glucose LESS THAN 70 milliGRAM(s)/deciliter  glucagon  Injectable 1 milliGRAM(s) IntraMuscular once PRN Glucose LESS THAN 70 milligrams/deciliter  hydrALAZINE 25 milliGRAM(s) Oral every 6 hours PRN Systolic blood pressure > 160  traMADol 50 milliGRAM(s) Oral five times a day PRN Moderate Pain (4 - 6)      Allergies    penicillin (Anaphylaxis)  seafood (Anaphylaxis)  shellfish (Anaphylaxis)            Vital Signs Last 24 Hrs  T(C): 36.9 (2018 06:02), Max: 36.9 (2018 23:30)  T(F): 98.4 (2018 06:02), Max: 98.5 (2018 23:30)  HR: 70 (2018 06:02) (65 - 72)  BP: 150/70 (2018 06:02) (136/57 - 178/66)  BP(mean): --  RR: 18 (2018 06:02) (18 - 18)  SpO2: 98% (2018 06:02) (97% - 100%)  Daily     Daily Weight in k (2018 14:35)    PHYSICAL EXAM:    GENERAL: appears  chronically ill  HEAD:  wnl  EYES:   ENMT:   NECK: veins  flat  NERVOUS SYSTEM:  same  CHEST/LUNG: clearer  HEART: sternal scar, no rub  ABDOMEN:  soft not t ivory, eating in bed  EXTREMITIES:  diffuse muscle wasting  LYMPH:   SKIN: no rash    LABS:                        11.3   3.6   )-----------( 117      ( 2018 07:04 )             34.6     02-08    137  |  98  |  35.0<H>  ----------------------------<  107  3.8   |  27.0  |  2.85<H>    Ca    7.9<L>      2018 07:02                  RADIOLOGY & ADDITIONAL TESTS:

## 2018-02-09 NOTE — CONSULT NOTE ADULT - ASSESSMENT
85 y/o female with hx of CAD s/p CABG ( x 3 ) , CKD started on dialysis presented with volume overload, cardiomyopathy with elevated TNI and BNP   LHC/RHC are indicated for evaluation of filling pressure and presence of ischemia triggering heart failure
CKD V h/o DM 40yrs and HTN the likely etiology of renal disease  No has uremic symptoms is clinically volume overloaded  Pt has a AVG  w +bruit/ thrill will HD today  will check HEP B and C panel and PPD all in preparation for outpt HD chair  Pt family preference is [place] [facility]  Second Choice is [place]- [facility]  Cosent for HD obtained by me from pt son     HypoCalcemia will check VitD 25OH and iPTH    HTN BP on high side suspect will improve w UF removal on HD
Patient was seen and examined by me.    Patient is an elderly 86 year old female patient with a known history of hypertension, dyslipidemia, DM, CAD s/p CABG, CKD, PAD, hypothyroidism,CKD comes to ER with progressive dyspnea, leg swelling as described in HPI. Patient denies any chest pain or any symptoms suggestive of angina. Patient is supposed to go on Hemodialysis. Patient was recently seen by Dr Padilla in the office.   Patients son at bedside who translated for me.    Exam: HEENT-Unremarkable, except for engorged veins on in the neck  Chest - Bilateral fine crackles  Cardiac-Normal S1 and S2  Abdominal exam-soft, non distended and tender  legs -Bilateral 2-3+ pitting leg edema    Diagnosis:  1. Elevated Tropoin- Two troponins are low range and flat, Elevated tropnin is probably related to CKD. Unlilkely to be ACS. Patient had a normal nuclear stress test  2. CKD- Patient to be started on dialysis soon    Recommendations:  Continue home meds  Check Echo    Discussed with Dr Roque Bernardo

## 2018-02-09 NOTE — PROGRESS NOTE ADULT - SUBJECTIVE AND OBJECTIVE BOX
Patient is a 86y old  Female who presents with a chief complaint of shortness of breath (05 Feb 2018 13:31)       HPI:  85 yo F w/ hx CKD5, CAD, Dm2 presents to ER for dialysis initiation from nephrologist office.  Patient seen with son at bedside, complaining of SOB and ROSS.  no fevers/cough or cp/palps.   +large abdominal girth and LE edema. (05 Feb 2018 13:31)      PAST MEDICAL & SURGICAL HISTORY:  Left bundle branch block  Osteoporosis  Diabetic neuropathy  Peripheral arterial disease  Spinal stenosis  Coronary artery disease  Chronic renal failure  Pacemaker: [employer]onic 2011  Hypothyroid  Hyperlipemia  Hypertension  Diabetes  S/P CABG x 3  Artificial cardiac pacemaker      Allergies:    penicillin (Anaphylaxis)  seafood (Anaphylaxis)  shellfish (Anaphylaxis)    MEDICATIONS  (STANDING):  AQUAPHOR (petrolatum Ointment) 1 Application(s) Topical two times a day  aspirin enteric coated 81 milliGRAM(s) Oral daily  calcitriol   Capsule 0.25 MICROGram(s) Oral daily  calcium acetate 1334 milliGRAM(s) Oral three times a day with meals  carvedilol 6.25 milliGRAM(s) Oral every 12 hours  cloNIDine Patch 0.3 mG/24Hr(s) 1 patch Topical every 7 days  clopidogrel Tablet 75 milliGRAM(s) Oral daily  dextrose 5%. 1000 milliLiter(s) (50 mL/Hr) IV Continuous <Continuous>  dextrose 50% Injectable 12.5 Gram(s) IV Push once  dextrose 50% Injectable 25 Gram(s) IV Push once  dextrose 50% Injectable 25 Gram(s) IV Push once  ergocalciferol 05628 Unit(s) Oral every week  folic acid 1 milliGRAM(s) Oral daily  furosemide    Tablet 40 milliGRAM(s) Oral daily  gabapentin 100 milliGRAM(s) Oral at bedtime  heparin  Injectable 5000 Unit(s) SubCutaneous every 12 hours  insulin lispro (HumaLOG) corrective regimen sliding scale   SubCutaneous Before meals and at bedtime  levothyroxine 112 MICROGram(s) Oral daily  lisinopril 20 milliGRAM(s) Oral daily    MEDICATIONS  (PRN):  dextrose Gel 1 Dose(s) Oral once PRN Blood Glucose LESS THAN 70 milliGRAM(s)/deciliter  glucagon  Injectable 1 milliGRAM(s) IntraMuscular once PRN Glucose LESS THAN 70 milligrams/deciliter  hydrALAZINE 25 milliGRAM(s) Oral every 6 hours PRN Systolic blood pressure > 160  traMADol 50 milliGRAM(s) Oral five times a day PRN Moderate Pain (4 - 6)    	    Vital Signs Last 24 Hrs  T(C): 36.3 (09 Feb 2018 11:03), Max: 36.9 (08 Feb 2018 23:30)  T(F): 97.3 (09 Feb 2018 11:03), Max: 98.5 (08 Feb 2018 23:30)  HR: 65 (09 Feb 2018 11:03) (65 - 72)  BP: 156/72 (09 Feb 2018 11:03) (136/57 - 178/66)    RR: 20 (09 Feb 2018 11:03) (18 - 20)  SpO2: 98% (09 Feb 2018 11:03) (97% - 100%)    I&O's Detail    08 Feb 2018 07:01  -  09 Feb 2018 07:00  --------------------------------------------------------  IN:  Total IN: 0 mL    OUT:    Other: 2000 mL  Total OUT: 2000 mL    Total NET: -2000 mL          PHYSICAL EXAM:  Appearance: Normal, frail 	  HEENT:   Normal oral mucosa,	  Cardiovascular: Normal S1 S2, No JVD, No cardiac murmurs, No carotid bruits, No peripheral edema  Respiratory: Lungs clear to auscultation	  Psychiatry: A & O x 3, Mood & affecGastrointestinal:  Soft, Non-tender, 	  Skin: No rashes, No ecchymoses, No cyanosis  Neurologic: Grossly non-focal with full strength in all four extremities  Extremities: Normal range of motion, No clubbing, cyanosis or edema  Vascular: Peripheral pulses palpable 2+ bilaterally      LABS:                        11.3   3.6   )-----------( 117      ( 08 Feb 2018 07:04 )             34.6     02-08    137  |  98  |  35.0<H>  ----------------------------<  107  3.8   |  27.0  |  2.85<H>    Ca    7.9<L>      08 Feb 2018 07:02      I&O's Summary    08 Feb 2018 07:01  -  09 Feb 2018 07:00  --------------------------------------------------------  IN: 0 mL / OUT: 2000 mL / NET: -2000 m          < from: TTE Echo Complete w/Doppler (02.06.18 @ 13:32) >  Summary:   1. Left ventricular ejection fraction, by visual estimation, is 35 to   40%.   2. Moderately to severely decreased global left ventricular systolic   function.   3. Spectral Doppler shows pseudonormal pattern of left ventricular   myocardial filling (Grade II diastolic dysfunction).   4. There is moderate concentric left ventricular hypertrophy.   5. Moderately reduced RV systolic function.   6. Mildly dilated right atrium.   7. Moderate-severe tricuspid regurgitation.   8. Sclerotic aortic valve with decreased opening.   9. Estimated pulmonary artery systolic pressure is 50.0 mmHg assuming a   right atrial pressure of 8 mmHg, which is consistent with mild pulmonary   hypertension.  10. Severely enlarged left atrium.  11. There is no evidence of pericardial effusion.    MD David Electronically signed on 2/6/2018 at 6:47:09 PM         < end of copied text >        ASSESSMENT and PLAN:    Abnormal Echo, HFrEF with elevated PA systolic pressure  Plan: Left and Right Heart Cath

## 2018-02-09 NOTE — PROGRESS NOTE ADULT - ASSESSMENT
Patient is an elderly 86 year old female patient with a known history of hypertension, dyslipidemia, DM, CAD s/p CABG, CKD, PAD, hypothyroidism,CKD comes to ER with progressive dyspnea, leg swelling. Since admission patient has been started on Dialysis. Patient is treated for Biventricular failure with Coreg and Lisinopril. Patient had Cardiac Cath today, she has patent grafts.    Patient is cleared for Discharge from Cardiology Standpoint.    D/W Dr Davila. Patient is an elderly 86 year old female patient with a known history of hypertension, dyslipidemia, DM, CAD s/p CABG, CKD, PAD, hypothyroidism,CKD comes to ER with progressive dyspnea, leg swelling. Since admission patient has been started on Dialysis. Patient is treated for Biventricular failure with Coreg and Lisinopril. Patient had Cardiac Cath today, she has patent grafts.    Patient is cleared for Discharge from Cardiology Standpoint.    D/W Dr Davila.    I WILL SIGN OFF

## 2018-02-09 NOTE — PROGRESS NOTE ADULT - PROBLEM SELECTOR PLAN 1
appreciate renal input, newly started on HD  f/u PPD   hepatitis B and C negative  c/w lasix per renal

## 2018-02-09 NOTE — PROGRESS NOTE ADULT - PROBLEM SELECTOR PLAN 2
appreciate cardiology input  TTE showed LVEF 35-40%, grade 2 diastolic dysfunction, moderately reduced RV systolic function.  S/p Right and Left heart Cath today  Continue aspirin, plavix, Coreg and Lisinopril.

## 2018-02-09 NOTE — PROGRESS NOTE ADULT - SUBJECTIVE AND OBJECTIVE BOX
Nurse Practitioner Progress note:   s/p LHC and RHC which revealed 80% occlusion to the LM; 100% occlusion to the mLAD; 100% occlusion to the RCA and 100% occlusion to the OM1 WITH patent LIMA to the LAD; patent SVG to the PDA:  and patent SVG to the OM. No intervention needed at this time.  PA pressures improving from ECHO.     Patient feels well.  Denies chest pain, shortness of breath, dizziness or palpitations at this time  All other ROS unremarkable at this time      Pt A&O x3  Lungs CTA  S1S2 no MRG  Right groin procedure site CDI.  no bleeding, no hematoma, site soft, non tender, positive pedal pulses bilaterally      T(C): 36.3 (02-09-18 @ 11:03), Max: 36.9 (02-08-18 @ 23:30)  HR: 69 (02-09-18 @ 12:54) (65 - 72)  BP: 169/70 (02-09-18 @ 12:54) (136/57 - 178/66)  RR: 18 (02-09-18 @ 12:54) (18 - 20)  SpO2: 99% (02-09-18 @ 12:54) (97% - 100%)    CBC Full  -  ( 08 Feb 2018 07:04 )  WBC Count : 3.6 K/uL  Hemoglobin : 11.3 g/dL  Hematocrit : 34.6 %  Platelet Count - Automated : 117 K/uL  Mean Cell Volume : 90.3 fl  Mean Cell Hemoglobin : 29.5 pg  Mean Cell Hemoglobin Concentration : 32.7 g/dL      02-08    137  |  98  |  35.0<H>  ----------------------------<  107  3.8   |  27.0  |  2.85<H>    Ca    7.9<L>      08 Feb 2018 07:02              MEDICATIONS  (STANDING):  AQUAPHOR (petrolatum Ointment) 1 Application(s) Topical two times a day  aspirin enteric coated 81 milliGRAM(s) Oral daily  calcitriol   Capsule 0.25 MICROGram(s) Oral daily  calcium acetate 1334 milliGRAM(s) Oral three times a day with meals  carvedilol 12.5 milliGRAM(s) Oral every 12 hours  cloNIDine Patch 0.3 mG/24Hr(s) 1 patch Topical every 7 days  clopidogrel Tablet 75 milliGRAM(s) Oral daily  dextrose 5%. 1000 milliLiter(s) (50 mL/Hr) IV Continuous <Continuous>  dextrose 50% Injectable 12.5 Gram(s) IV Push once  dextrose 50% Injectable 25 Gram(s) IV Push once  dextrose 50% Injectable 25 Gram(s) IV Push once  ergocalciferol 90154 Unit(s) Oral every week  folic acid 1 milliGRAM(s) Oral daily  furosemide    Tablet 40 milliGRAM(s) Oral daily  gabapentin 100 milliGRAM(s) Oral at bedtime  heparin  Injectable 5000 Unit(s) SubCutaneous every 12 hours  insulin lispro (HumaLOG) corrective regimen sliding scale   SubCutaneous Before meals and at bedtime  levothyroxine 112 MICROGram(s) Oral daily  lisinopril 20 milliGRAM(s) Oral daily    MEDICATIONS  (PRN):  dextrose Gel 1 Dose(s) Oral once PRN Blood Glucose LESS THAN 70 milliGRAM(s)/deciliter  glucagon  Injectable 1 milliGRAM(s) IntraMuscular once PRN Glucose LESS THAN 70 milligrams/deciliter  hydrALAZINE 25 milliGRAM(s) Oral every 6 hours PRN Systolic blood pressure > 160  traMADol 50 milliGRAM(s) Oral five times a day PRN Moderate Pain (4 - 6)        HPI:  87 yo F w/ hx CKD5, CAD, Dm2 presents to ER for dialysis initiation from nephrologist office.  Patient seen with son at bedside, complaining of SOB and ROSS.  no fevers/cough or cp/palps.   +large abdominal girth and LE edema. (05 Feb 2018 13:31)    s/p LHC and RHC which revealed 80% occlusion to the LM; 100% occlusion to the mLAD; 100% occlusion to the RCA and 100% occlusion to the OM1 WITH patent LIMA to the LAD; patent SVG to the PDA:  and patent SVG to the OM. No intervention needed at this time.  PA pressures improving from ECHO.         ASSESSMENT/PLAN:    Coronary artery disease  -Return to telemetry bed  -VS, labs, diet, activity as per PCI orders  -IV hydration  -Continue current medications  -Hemodialysis as scheduled for tomorrow for dye load/  -Plan of care discussed with patient, family and MD  -NP to see in AM to evaluate labs, EKG and procedure site check  -Follow-up with attending MD   within 1 week  -Discussed therapeutic lifestyle changes to reduce risk factors such as following a cardiac diet, weight loss, maintaining a healthy weight, exercise, smoking cessation, medication compliance, and regular follow-up  with MD to know your numbers (BP, cholesterol, weight, and glucose)

## 2018-02-09 NOTE — PROGRESS NOTE ADULT - SUBJECTIVE AND OBJECTIVE BOX
CC: shortness of breath, ESRD requiring HD    HPI:  87 yo F w/ hx CKD5, CAD, Dm2 presents to ER for dialysis initiation from nephrologist office.      INTERVAL HPI/OVERNIGHT EVENTS:  Patient seen and examined this am with son at bedside providing translation.  Patient reported ROSS.  Patient denied any headache, dizziness, CP, abdominal pain, nausea, vomiting.  Other ROS reviewed and are negative.    Vital Signs Last 24 Hrs  T(C): 36.3 (09 Feb 2018 11:03), Max: 36.9 (08 Feb 2018 23:30)  T(F): 97.3 (09 Feb 2018 11:03), Max: 98.5 (08 Feb 2018 23:30)  HR: 60 (09 Feb 2018 13:30) (60 - 72)  BP: 161/72 (09 Feb 2018 13:30) (136/57 - 178/66)  BP(mean): --  RR: 16 (09 Feb 2018 13:30) (16 - 20)  SpO2: 100% (09 Feb 2018 13:30) (97% - 100%)  I&O's Detail    08 Feb 2018 07:01  -  09 Feb 2018 07:00  --------------------------------------------------------  IN:  Total IN: 0 mL    OUT:    Other: 2000 mL  Total OUT: 2000 mL    Total NET: -2000 mL      PHYSICAL EXAM:  GENERAL: NAD, well-groomed, well-developed  HEAD:  Atraumatic, Normocephalic  NECK: Supple, No JVD, Normal thyroid  NERVOUS SYSTEM:  Alert & Oriented X3, Good concentration; Motor Strength 5/5 B/L upper and lower extremities  CHEST/LUNG: Rhonchi in bases bilaterally  HEART: Regular rate and rhythm; No murmurs, rubs, or gallops  ABDOMEN: Soft, Nontender, Nondistended; Bowel sounds present  EXTREMITIES:  2+ Peripheral Pulses, No clubbing, cyanosis, or edema                                11.3   3.6   )-----------( 117      ( 08 Feb 2018 07:04 )             34.6     08 Feb 2018 07:02    137    |  98     |  35.0   ----------------------------<  107    3.8     |  27.0   |  2.85     Ca    7.9        08 Feb 2018 07:02        CAPILLARY BLOOD GLUCOSE      POCT Blood Glucose.: 132 mg/dL (09 Feb 2018 13:01)  POCT Blood Glucose.: 173 mg/dL (09 Feb 2018 10:58)  POCT Blood Glucose.: 107 mg/dL (09 Feb 2018 08:13)  POCT Blood Glucose.: 225 mg/dL (08 Feb 2018 22:15)  POCT Blood Glucose.: 182 mg/dL (08 Feb 2018 17:30)        Hemoglobin A1C, Whole Blood: 7.6 % (02-05-18 @ 16:14)    MEDICATIONS  (STANDING):  AQUAPHOR (petrolatum Ointment) 1 Application(s) Topical two times a day  aspirin enteric coated 81 milliGRAM(s) Oral daily  calcitriol   Capsule 0.25 MICROGram(s) Oral daily  calcium acetate 1334 milliGRAM(s) Oral three times a day with meals  carvedilol 12.5 milliGRAM(s) Oral every 12 hours  cloNIDine Patch 0.3 mG/24Hr(s) 1 patch Topical every 7 days  clopidogrel Tablet 75 milliGRAM(s) Oral daily  dextrose 5%. 1000 milliLiter(s) (50 mL/Hr) IV Continuous <Continuous>  dextrose 50% Injectable 12.5 Gram(s) IV Push once  dextrose 50% Injectable 25 Gram(s) IV Push once  dextrose 50% Injectable 25 Gram(s) IV Push once  ergocalciferol 17856 Unit(s) Oral every week  folic acid 1 milliGRAM(s) Oral daily  furosemide    Tablet 40 milliGRAM(s) Oral daily  gabapentin 100 milliGRAM(s) Oral at bedtime  heparin  Injectable 5000 Unit(s) SubCutaneous every 12 hours  insulin lispro (HumaLOG) corrective regimen sliding scale   SubCutaneous Before meals and at bedtime  levothyroxine 112 MICROGram(s) Oral daily  lisinopril 20 milliGRAM(s) Oral daily    MEDICATIONS  (PRN):  dextrose Gel 1 Dose(s) Oral once PRN Blood Glucose LESS THAN 70 milliGRAM(s)/deciliter  glucagon  Injectable 1 milliGRAM(s) IntraMuscular once PRN Glucose LESS THAN 70 milligrams/deciliter  hydrALAZINE 25 milliGRAM(s) Oral every 6 hours PRN Systolic blood pressure > 160  traMADol 50 milliGRAM(s) Oral five times a day PRN Moderate Pain (4 - 6)

## 2018-02-09 NOTE — CONSULT NOTE ADULT - SUBJECTIVE AND OBJECTIVE BOX
CARDIOLOGY CONSULTATION NOTE ([facility])      History obtained by: Patient and medical record     obtained: No    Chief complaint:    Patient is a 86y old  Female who presents with a chief complaint of shortness of breath (05 Feb 2018 13:31)      HPI:  87 yo F w/ hx CKD5, CAD s/p CABG with LIMA to LAD , SVG to PDA and SVG to OM1, Dm2,PAD, hypothyroidism and sinus node dysfunction (MDT dual chamber pacemaker implantation) presents with progressive SOB, LE edema and increased abdominal girth.  . Her SOB became functional class III in the last week.  At this point . She presented to her Nephrologist' s office who sent her to the ED in anticipation of hemodialysis. Otherwise she denies any overt chest pain, palpitations or syncope.       REVIEW OF SYMPTOMS: Cardiovascular:  as per HPI;  Respiratory:  as per HPI  Genitourinary:  No dysuria, no hematuria; Gastrointestinal:  No nausea, no vomiting. No diarrhea.  No abdominal pain. No dark color stool, no melena ; Neurological: No headache, no dizziness, no slurred speech;  Psychiatric: No agitation, no anxiety.  ALL OTHER REVIEW OF SYSTEMS ARE NEGATIVE.    ALLERGIES: Allergies    penicillin (Anaphylaxis)  seafood (Anaphylaxis)  shellfish (Anaphylaxis)    Intolerances        MEDICATIONS  (STANDING):  AQUAPHOR (petrolatum Ointment) 1 Application(s) Topical two times a day  aspirin enteric coated 81 milliGRAM(s) Oral daily  calcitriol   Capsule 0.25 MICROGram(s) Oral daily  calcium acetate 1334 milliGRAM(s) Oral three times a day with meals  carvedilol 12.5 milliGRAM(s) Oral every 12 hours  cloNIDine Patch 0.3 mG/24Hr(s) 1 patch Topical every 7 days  clopidogrel Tablet 75 milliGRAM(s) Oral daily  dextrose 5%. 1000 milliLiter(s) (50 mL/Hr) IV Continuous <Continuous>  dextrose 50% Injectable 12.5 Gram(s) IV Push once  dextrose 50% Injectable 25 Gram(s) IV Push once  dextrose 50% Injectable 25 Gram(s) IV Push once  ergocalciferol 80070 Unit(s) Oral every week  folic acid 1 milliGRAM(s) Oral daily  furosemide    Tablet 40 milliGRAM(s) Oral daily  gabapentin 100 milliGRAM(s) Oral at bedtime  heparin  Injectable 5000 Unit(s) SubCutaneous every 12 hours  insulin lispro (HumaLOG) corrective regimen sliding scale   SubCutaneous Before meals and at bedtime  levothyroxine 112 MICROGram(s) Oral daily  lisinopril 20 milliGRAM(s) Oral daily    MEDICATIONS  (PRN):  dextrose Gel 1 Dose(s) Oral once PRN Blood Glucose LESS THAN 70 milliGRAM(s)/deciliter  glucagon  Injectable 1 milliGRAM(s) IntraMuscular once PRN Glucose LESS THAN 70 milligrams/deciliter  hydrALAZINE 25 milliGRAM(s) Oral every 6 hours PRN Systolic blood pressure > 160  traMADol 50 milliGRAM(s) Oral five times a day PRN Moderate Pain (4 - 6)          PAST MEDICAL HISTORY  Left bundle branch block  Osteoporosis  Diabetic neuropathy  Peripheral arterial disease  Spinal stenosis  Coronary artery disease  Chronic renal failure  Renal insufficiency  Pacemaker  Hypothyroid  Hyperlipemia  Hypertension  Diabetes      PAST SURGICAL HISTORY  S/P CABG x 3  Artificial cardiac pacemaker      FAMILY HISTORY:  No pertinent family history in first degree relatives      SOCIAL HISTORY:  Denies smoking/alcohol/drugs      Vital Signs Last 24 Hrs  T(C): 36.3 (09 Feb 2018 11:03), Max: 36.9 (08 Feb 2018 23:30)  T(F): 97.3 (09 Feb 2018 11:03), Max: 98.5 (08 Feb 2018 23:30)  HR: 60 (09 Feb 2018 13:09) (60 - 72)  BP: 169/72 (09 Feb 2018 13:09) (136/57 - 178/66)  BP(mean): --  RR: 16 (09 Feb 2018 13:09) (16 - 20)  SpO2: 100% (09 Feb 2018 13:09) (97% - 100%)      PHYSICAL EXAM:  Constitutional: Comfortable . No acute distress.   HEENT: Atraumatic and normcephalic , neck is supple . no JVD. No carotid bruit.   CNS: A&Ox3. No focal deficits.  Lymph Nodes: Cervical : Not palpable.  Respiratory: CTAB  Cardiovascular: S1S2 RRR. No murmur/rubs or gallop.  Gastrointestinal: Soft non-tender and non distended . +Bowel sounds, no HSM  Extremities: mild LE Edema  Psychiatric: Calm . no agitation., mood appropriate  Skin: No skin lesions    Intake and output:   02-08 @ 07:01  -  02-09 @ 07:00  --------------------------------------------------------  IN: 0 mL / OUT: 2000 mL / NET: -2000 mL        LABS:                        11.3   3.6   )-----------( 117      ( 08 Feb 2018 07:04 )             34.6     02-08    137  |  98  |  35.0<H>  ----------------------------<  107  3.8   |  27.0  |  2.85<H>    Ca    7.9<L>      08 Feb 2018 07:02          INTERPRETATION OF TELEMETRY:  ECG: NSR, LAE, LBBB     RADIOLOGY & ADDITIONAL STUDIES:     ECHO FINDINGS:    1. Left ventricular ejection fraction, by visual estimation, is 35 to   40%.   2. Moderately to severely decreased global left ventricular systolic   function.   3. Spectral Doppler shows pseudonormal pattern of left ventricular   myocardial filling (Grade II diastolic dysfunction).   4. There is moderate concentric left ventricular hypertrophy.   5. Moderately reduced RV systolic function.   6. Mildly dilated right atrium.   7. Moderate-severe tricuspid regurgitation.   8. Sclerotic aortic valve with decreased opening.   9. Estimated pulmonary artery systolic pressure is 50.0 mmHg assuming a   right atrial pressure of 8 mmHg, which is consistent with mild pulmonary   hypertension.  10. Severely enlarged left atrium.  11. There is no evidence of pericardial effusion.

## 2018-02-09 NOTE — PROGRESS NOTE ADULT - PROBLEM SELECTOR PLAN 4
uncontrolled, newly started on HD  c/w lisinopril 20, clonidine Patch 0.3, coreg   monitor BP and adjust medication as needed uncontrolled, newly started on HD  c/w lisinopril 20  Coreg increased to 12.5mg BID  Clonidine patch discontinued.   monitor BP and adjust medication as needed

## 2018-02-10 LAB
ANION GAP SERPL CALC-SCNC: 14 MMOL/L — SIGNIFICANT CHANGE UP (ref 5–17)
BUN SERPL-MCNC: 34 MG/DL — HIGH (ref 8–20)
CALCIUM SERPL-MCNC: 7.9 MG/DL — LOW (ref 8.6–10.2)
CHLORIDE SERPL-SCNC: 101 MMOL/L — SIGNIFICANT CHANGE UP (ref 98–107)
CO2 SERPL-SCNC: 27 MMOL/L — SIGNIFICANT CHANGE UP (ref 22–29)
CREAT SERPL-MCNC: 2.78 MG/DL — HIGH (ref 0.5–1.3)
GLUCOSE BLDC GLUCOMTR-MCNC: 101 MG/DL — HIGH (ref 70–99)
GLUCOSE BLDC GLUCOMTR-MCNC: 115 MG/DL — HIGH (ref 70–99)
GLUCOSE BLDC GLUCOMTR-MCNC: 122 MG/DL — HIGH (ref 70–99)
GLUCOSE BLDC GLUCOMTR-MCNC: 200 MG/DL — HIGH (ref 70–99)
GLUCOSE BLDC GLUCOMTR-MCNC: 89 MG/DL — SIGNIFICANT CHANGE UP (ref 70–99)
GLUCOSE SERPL-MCNC: 92 MG/DL — SIGNIFICANT CHANGE UP (ref 70–115)
HCT VFR BLD CALC: 34.8 % — LOW (ref 37–47)
HGB BLD-MCNC: 11.1 G/DL — LOW (ref 12–16)
MCHC RBC-ENTMCNC: 29.4 PG — SIGNIFICANT CHANGE UP (ref 27–31)
MCHC RBC-ENTMCNC: 31.9 G/DL — LOW (ref 32–36)
MCV RBC AUTO: 92.1 FL — SIGNIFICANT CHANGE UP (ref 81–99)
PHOSPHATE SERPL-MCNC: 4.8 MG/DL — HIGH (ref 2.4–4.7)
PLATELET # BLD AUTO: 128 K/UL — LOW (ref 150–400)
POTASSIUM SERPL-MCNC: 4.2 MMOL/L — SIGNIFICANT CHANGE UP (ref 3.5–5.3)
POTASSIUM SERPL-SCNC: 4.2 MMOL/L — SIGNIFICANT CHANGE UP (ref 3.5–5.3)
RBC # BLD: 3.78 M/UL — LOW (ref 4.4–5.2)
RBC # FLD: 18.5 % — HIGH (ref 11–15.6)
SODIUM SERPL-SCNC: 142 MMOL/L — SIGNIFICANT CHANGE UP (ref 135–145)
WBC # BLD: 4.4 K/UL — LOW (ref 4.8–10.8)
WBC # FLD AUTO: 4.4 K/UL — LOW (ref 4.8–10.8)

## 2018-02-10 PROCEDURE — 99233 SBSQ HOSP IP/OBS HIGH 50: CPT

## 2018-02-10 RX ADMIN — Medication 1334 MILLIGRAM(S): at 17:39

## 2018-02-10 RX ADMIN — CALCITRIOL 0.25 MICROGRAM(S): 0.5 CAPSULE ORAL at 17:39

## 2018-02-10 RX ADMIN — HEPARIN SODIUM 5000 UNIT(S): 5000 INJECTION INTRAVENOUS; SUBCUTANEOUS at 05:39

## 2018-02-10 RX ADMIN — Medication 81 MILLIGRAM(S): at 17:39

## 2018-02-10 RX ADMIN — Medication 1 APPLICATION(S): at 17:41

## 2018-02-10 RX ADMIN — LISINOPRIL 20 MILLIGRAM(S): 2.5 TABLET ORAL at 05:39

## 2018-02-10 RX ADMIN — Medication 1 APPLICATION(S): at 05:40

## 2018-02-10 RX ADMIN — Medication 2: at 13:18

## 2018-02-10 RX ADMIN — CARVEDILOL PHOSPHATE 12.5 MILLIGRAM(S): 80 CAPSULE, EXTENDED RELEASE ORAL at 05:39

## 2018-02-10 RX ADMIN — Medication 1334 MILLIGRAM(S): at 09:15

## 2018-02-10 RX ADMIN — GABAPENTIN 100 MILLIGRAM(S): 400 CAPSULE ORAL at 21:55

## 2018-02-10 RX ADMIN — Medication 112 MICROGRAM(S): at 05:39

## 2018-02-10 RX ADMIN — TRAMADOL HYDROCHLORIDE 50 MILLIGRAM(S): 50 TABLET ORAL at 21:55

## 2018-02-10 RX ADMIN — Medication 1 MILLIGRAM(S): at 17:40

## 2018-02-10 RX ADMIN — CLOPIDOGREL BISULFATE 75 MILLIGRAM(S): 75 TABLET, FILM COATED ORAL at 17:39

## 2018-02-10 RX ADMIN — Medication 40 MILLIGRAM(S): at 05:39

## 2018-02-10 RX ADMIN — HEPARIN SODIUM 5000 UNIT(S): 5000 INJECTION INTRAVENOUS; SUBCUTANEOUS at 17:40

## 2018-02-10 RX ADMIN — TRAMADOL HYDROCHLORIDE 50 MILLIGRAM(S): 50 TABLET ORAL at 22:55

## 2018-02-10 RX ADMIN — CARVEDILOL PHOSPHATE 12.5 MILLIGRAM(S): 80 CAPSULE, EXTENDED RELEASE ORAL at 17:40

## 2018-02-10 NOTE — PROGRESS NOTE ADULT - SUBJECTIVE AND OBJECTIVE BOX
Internal Medicine Hospitalist - Dr. Giovanni ROBERSON    [id number]    86y      Female    Patient is a 86y old  Female who presents with a chief complaint of shortness of breath (05 Feb 2018 13:31)    INTERVAL HPI/ OVERNIGHT EVENTS: Patient is seen and examined, s/p cardiac cath yesterday, feeling better, denied chest pain, SOB     REVIEW OF SYSTEMS:    Denied fever, chills, abd. pain, nausea, vomiting, chest pain, SOB, headache, dizziness    PHYSICAL EXAM:    Vital Signs Last 24 Hrs  T(C): 37.1 (10 Feb 2018 05:38), Max: 37.1 (10 Feb 2018 05:38)  T(F): 98.7 (10 Feb 2018 05:38), Max: 98.7 (10 Feb 2018 05:38)  HR: 62 (10 Feb 2018 05:38) (60 - 71)  BP: 138/68 (10 Feb 2018 05:38) (132/62 - 178/71)  BP(mean): --  RR: 17 (10 Feb 2018 05:38) (16 - 22)  SpO2: 98% (10 Feb 2018 05:38) (98% - 100%)    GENERAL: NAD  HEENT: EOMI  Neck: supple  CHEST/LUNG: CTA b/l   HEART: S1S2+ audible  ABDOMEN: Soft, Nontender, Nondistended; Bowel sounds present  EXTREMITIES:  no edema  CNS: AAO X 3  Psychiatry: normal mood    LABS:                        11.1   4.4   )-----------( 128      ( 10 Feb 2018 05:12 )             34.8     02-10    142  |  101  |  34.0<H>  ----------------------------<  92  4.2   |  27.0  |  2.78<H>    Ca    7.9<L>      10 Feb 2018 05:11              MEDICATIONS  (STANDING):  AQUAPHOR (petrolatum Ointment) 1 Application(s) Topical two times a day  aspirin enteric coated 81 milliGRAM(s) Oral daily  calcitriol   Capsule 0.25 MICROGram(s) Oral daily  calcium acetate 1334 milliGRAM(s) Oral three times a day with meals  carvedilol 12.5 milliGRAM(s) Oral every 12 hours  clopidogrel Tablet 75 milliGRAM(s) Oral daily  dextrose 5%. 1000 milliLiter(s) (50 mL/Hr) IV Continuous <Continuous>  dextrose 50% Injectable 12.5 Gram(s) IV Push once  dextrose 50% Injectable 25 Gram(s) IV Push once  dextrose 50% Injectable 25 Gram(s) IV Push once  ergocalciferol 54525 Unit(s) Oral every week  folic acid 1 milliGRAM(s) Oral daily  furosemide    Tablet 40 milliGRAM(s) Oral daily  gabapentin 100 milliGRAM(s) Oral at bedtime  heparin  Injectable 5000 Unit(s) SubCutaneous every 12 hours  insulin lispro (HumaLOG) corrective regimen sliding scale   SubCutaneous Before meals and at bedtime  levothyroxine 112 MICROGram(s) Oral daily  lisinopril 20 milliGRAM(s) Oral daily    MEDICATIONS  (PRN):  dextrose Gel 1 Dose(s) Oral once PRN Blood Glucose LESS THAN 70 milliGRAM(s)/deciliter  glucagon  Injectable 1 milliGRAM(s) IntraMuscular once PRN Glucose LESS THAN 70 milligrams/deciliter  hydrALAZINE 25 milliGRAM(s) Oral every 6 hours PRN Systolic blood pressure > 160  traMADol 50 milliGRAM(s) Oral five times a day PRN Moderate Pain (4 - 6)      RADIOLOGY & ADDITIONAL TEST

## 2018-02-10 NOTE — PROGRESS NOTE ADULT - SUBJECTIVE AND OBJECTIVE BOX
NEPHROLOGY INTERVAL HPI/OVERNIGHT EVENTS: no new events.    MEDICATIONS  (STANDING):  AQUAPHOR (petrolatum Ointment) 1 Application(s) Topical two times a day  aspirin enteric coated 81 milliGRAM(s) Oral daily  calcitriol   Capsule 0.25 MICROGram(s) Oral daily  calcium acetate 1334 milliGRAM(s) Oral three times a day with meals  carvedilol 6.25 milliGRAM(s) Oral every 12 hours  cloNIDine Patch 0.3 mG/24Hr(s) 1 patch Topical every 7 days  clopidogrel Tablet 75 milliGRAM(s) Oral daily  dextrose 5%. 1000 milliLiter(s) (50 mL/Hr) IV Continuous <Continuous>  dextrose 50% Injectable 12.5 Gram(s) IV Push once  dextrose 50% Injectable 25 Gram(s) IV Push once  dextrose 50% Injectable 25 Gram(s) IV Push once  ergocalciferol 83191 Unit(s) Oral every week  folic acid 1 milliGRAM(s) Oral daily  furosemide    Tablet 40 milliGRAM(s) Oral daily  gabapentin 100 milliGRAM(s) Oral at bedtime  heparin  Injectable 5000 Unit(s) SubCutaneous every 12 hours  insulin lispro (HumaLOG) corrective regimen sliding scale   SubCutaneous Before meals and at bedtime  levothyroxine 112 MICROGram(s) Oral daily  lisinopril 20 milliGRAM(s) Oral daily    MEDICATIONS  (PRN):  dextrose Gel 1 Dose(s) Oral once PRN Blood Glucose LESS THAN 70 milliGRAM(s)/deciliter  glucagon  Injectable 1 milliGRAM(s) IntraMuscular once PRN Glucose LESS THAN 70 milligrams/deciliter  hydrALAZINE 25 milliGRAM(s) Oral every 6 hours PRN Systolic blood pressure > 160  traMADol 50 milliGRAM(s) Oral five times a day PRN Moderate Pain (4 - 6)      Allergies    penicillin (Anaphylaxis)  seafood (Anaphylaxis)  shellfish (Anaphylaxis)            Vital Signs Last 24 Hrs    T(C): 36.3 (10 Feb 2018 13:30), Max: 37.1 (10 Feb 2018 05:38)  T(F): 97.4 (10 Feb 2018 13:30), Max: 98.7 (10 Feb 2018 05:38)  HR: 70 (10 Feb 2018 13:30) (60 - 84)  BP: 152/54 (10 Feb 2018 13:30) (132/62 - 156/52)  BP(mean): --  RR: 18 (10 Feb 2018 13:30) (16 - 22)  SpO2: 99% (10 Feb 2018 13:30) (98% - 100%)  T(C): 36.9 (09 Feb 2018 06:02), Max: 36.9 (08 Feb 2018 23:30)  T(F): 98.4 (09 Feb 2018 06:02), Max: 98.5 (08 Feb 2018 23:30)  HR: 70 (09 Feb 2018 06:02) (65 - 72)  BP: 150/70 (09 Feb 2018 06:02) (136/57 - 178/66)  BP(mean): --  RR: 18 (09 Feb 2018 06:02) (18 - 18)  SpO2: 98% (09 Feb 2018 06:02) (97% - 100%)      PHYSICAL EXAM:    GENERAL: appears  chronically ill  HEAD:  wnl  EYES:   ENMT:   NECK: veins  flat  NERVOUS SYSTEM:  same  CHEST/LUNG: clearer  HEART: sternal scar, no rub  ABDOMEN:  soft not tender, comfortable in bed  EXTREMITIES:  diffuse muscle wasting  LYMPH:   SKIN: no rash    LABS:  02-10    142  |  101  |  34.0<H>  ----------------------------<  92  4.2   |  27.0  |  2.78<H>    Ca    7.9<L>      10 Feb 2018 05:11  Phos  4.8     02-10                            11.3   3.6   )-----------( 117      ( 08 Feb 2018 07:04 )             34.6     02-08    137  |  98  |  35.0<H>  ----------------------------<  107  3.8   |  27.0  |  2.85<H>    Ca    7.9<L>      08 Feb 2018 07:02                  RADIOLOGY & ADDITIONAL TESTS:

## 2018-02-10 NOTE — PROGRESS NOTE ADULT - SUBJECTIVE AND OBJECTIVE BOX
Department of Cardiology                                                                  [facility]/[place] [address]                                                            Telephone: [phone]. Fax:[phone]                                                                                         PCI NOTE       Subjective:  86y  Female who had a left heart catheterization which showed:       LM: Normal       LAD: Normal       LCX: Normal       RCA: Normal    PAST MEDICAL & SURGICAL HISTORY:  Left bundle branch block  Osteoporosis  Diabetic neuropathy  Peripheral arterial disease  Spinal stenosis  Coronary artery disease  Chronic renal failure  Pacemaker: Medtronic 2011  Hypothyroid  Hyperlipemia  Hypertension  Diabetes  S/P CABG x 3  Artificial cardiac pacemaker    FAMILY HISTORY:  No pertinent family history in first degree relatives    Home Medications:  calcitriol 0.25 mcg oral capsule: 1 cap(s) orally once a day (06 Feb 2018 12:13)  calcium acetate 667 mg oral tablet: 2 tab(s) orally 3 times a day (06 Feb 2018 12:13)  cloNIDine 0.3 mg/24 hr transdermal film, extended release: 1 patch transdermal once a week (06 Feb 2018 12:13)  clopidogrel 75 mg oral tablet: 1 tab(s) orally once a day (06 Feb 2018 12:13)  fludrocortisone 0.1 mg oral tablet: 1 tab(s) orally once a day (06 Feb 2018 12:13)  furosemide 40 mg oral tablet: 1 tab(s) orally once a day (06 Feb 2018 12:13)  Levemir 100 units/mL subcutaneous solution: 20 unit(s) subcutaneous once a day (at bedtime) (06 Feb 2018 12:13)  levothyroxine 112 mcg (0.112 mg) oral tablet: 1 tab(s) orally once a day (06 Feb 2018 12:13)  omeprazole 20 mg oral delayed release capsule: 1 cap(s) orally once a day (06 Feb 2018 12:13)  traMADol 50 mg oral tablet: 1 tab(s) orally 3 times a day, As Needed - for severe pain (06 Feb 2018 12:13)  Vitamin D2 50,000 intl units (1.25 mg) oral capsule: 1 cap(s) orally once a week (06 Feb 2018 12:13)    Patient is a 86y old  Female who presents with a chief complaint of shortness of breath (05 Feb 2018 13:31)    HEALTH ISSUES - PROBLEM Dx:  Mitral valve insufficiency, unspecified etiology: Mitral valve insufficiency, unspecified etiology  Biventricular heart failure: Biventricular heart failure  Diabetic polyneuropathy associated with type 2 diabetes mellitus: Diabetic polyneuropathy associated with type 2 diabetes mellitus  Acquired hypothyroidism: Acquired hypothyroidism  Coronary artery disease involving native coronary artery of native heart without angina pectoris: Coronary artery disease involving native coronary artery of native heart without angina pectoris  Elevated troponin: Elevated troponin  ESRD (end stage renal disease) on dialysis: ESRD (end stage renal disease) on dialysis  Right groin site benign         HPI:  85 yo F w/ hx CKD5, CAD, Dm2 presents to ER for dialysis initiation from nephrologist office.  Patient seen with son at bedside, complaining of SOB and ROSS.  no fevers/cough or cp/palps.   +large abdominal girth and LE edema. (05 Feb 2018 13:31)    General: No fatigue, no fevers/chills  Respiratory: No dyspnea, no cough, no wheeze  CV: No chest pain, no palpitations  Abd: No nausea  Neuro: No headache, no dizziness  penicillin (Anaphylaxis)  seafood (Anaphylaxis)  shellfish (Anaphylaxis)      Objective:  Vital Signs Last 24 Hrs  T(C): 36.3 (10 Feb 2018 13:30), Max: 37.1 (10 Feb 2018 05:38)  T(F): 97.4 (10 Feb 2018 13:30), Max: 98.7 (10 Feb 2018 05:38)  HR: 70 (10 Feb 2018 13:30) (60 - 84)  BP: 152/54 (10 Feb 2018 13:30) (132/62 - 156/52)  BP(mean): --  RR: 18 (10 Feb 2018 13:30) (16 - 22)  SpO2: 99% (10 Feb 2018 13:30) (98% - 100%)    CM: SR  Neuro: A&OX3, CN 2-12 intact  HEENT: NC, AT  Lungs: CTA B/L  CV: S1, S2, no murmur, RRR  Abd: Soft  Right Groin: Soft, no bleeding, no hematoma  Extremity: + distal pulses                          11.1   4.4   )-----------( 128      ( 10 Feb 2018 05:12 )             34.8     02-10    142  |  101  |  34.0<H>  ----------------------------<  92  4.2   |  27.0  |  2.78<H>    Ca    7.9<L>      10 Feb 2018 05:11  Phos  4.8     02-10

## 2018-02-10 NOTE — PROGRESS NOTE ADULT - PROBLEM SELECTOR PLAN 4
improving   c/w lisinopril 20 Coreg increased to 12.5mg BID  Clonidine patch discontinued.   monitor BP and adjust medication as needed

## 2018-02-10 NOTE — PROGRESS NOTE ADULT - ASSESSMENT
87 yo F w/ hx CKD5, CAD, Dm2 presents to ER for dialysis initiation from nephrologist office. Pt was noted to have positive troponin, TTE showed LVEF 35-40%, grade 2 diastolic dysfunction, moderately reduced RV systolic function. Pt was seen by cardiology. S/p Right and Left heart Cath on 2/9/2018, [place]- Patent Grafts, Moderate MR.

## 2018-02-10 NOTE — PROGRESS NOTE ADULT - PROBLEM SELECTOR PLAN 2
appreciate cardiology input  TTE showed LVEF 35-40%, grade 2 diastolic dysfunction, moderately reduced RV systolic function.  Right and Left Heart Cath 2/9/2018 Findings- Patent Grafts, Moderate MR.  Continue aspirin, plavix, Coreg and Lisinopril.

## 2018-02-11 DIAGNOSIS — R13.10 DYSPHAGIA, UNSPECIFIED: ICD-10-CM

## 2018-02-11 LAB
GLUCOSE BLDC GLUCOMTR-MCNC: 145 MG/DL — HIGH (ref 70–99)
GLUCOSE BLDC GLUCOMTR-MCNC: 162 MG/DL — HIGH (ref 70–99)
GLUCOSE BLDC GLUCOMTR-MCNC: 229 MG/DL — HIGH (ref 70–99)

## 2018-02-11 PROCEDURE — 99233 SBSQ HOSP IP/OBS HIGH 50: CPT

## 2018-02-11 RX ORDER — LISINOPRIL 2.5 MG/1
10 TABLET ORAL ONCE
Qty: 0 | Refills: 0 | Status: COMPLETED | OUTPATIENT
Start: 2018-02-11 | End: 2018-02-11

## 2018-02-11 RX ORDER — ACETAMINOPHEN 500 MG
650 TABLET ORAL EVERY 6 HOURS
Qty: 0 | Refills: 0 | Status: DISCONTINUED | OUTPATIENT
Start: 2018-02-11 | End: 2018-02-17

## 2018-02-11 RX ORDER — LISINOPRIL 2.5 MG/1
30 TABLET ORAL DAILY
Qty: 0 | Refills: 0 | Status: DISCONTINUED | OUTPATIENT
Start: 2018-02-11 | End: 2018-02-17

## 2018-02-11 RX ADMIN — CARVEDILOL PHOSPHATE 12.5 MILLIGRAM(S): 80 CAPSULE, EXTENDED RELEASE ORAL at 18:03

## 2018-02-11 RX ADMIN — Medication 2: at 18:06

## 2018-02-11 RX ADMIN — Medication 1 MILLIGRAM(S): at 12:59

## 2018-02-11 RX ADMIN — Medication 25 MILLIGRAM(S): at 18:02

## 2018-02-11 RX ADMIN — Medication 1: at 21:40

## 2018-02-11 RX ADMIN — GABAPENTIN 100 MILLIGRAM(S): 400 CAPSULE ORAL at 21:02

## 2018-02-11 RX ADMIN — CALCITRIOL 0.25 MICROGRAM(S): 0.5 CAPSULE ORAL at 12:59

## 2018-02-11 RX ADMIN — LISINOPRIL 20 MILLIGRAM(S): 2.5 TABLET ORAL at 05:26

## 2018-02-11 RX ADMIN — Medication 1334 MILLIGRAM(S): at 13:00

## 2018-02-11 RX ADMIN — CLOPIDOGREL BISULFATE 75 MILLIGRAM(S): 75 TABLET, FILM COATED ORAL at 12:59

## 2018-02-11 RX ADMIN — LISINOPRIL 10 MILLIGRAM(S): 2.5 TABLET ORAL at 13:02

## 2018-02-11 RX ADMIN — CARVEDILOL PHOSPHATE 12.5 MILLIGRAM(S): 80 CAPSULE, EXTENDED RELEASE ORAL at 05:26

## 2018-02-11 RX ADMIN — Medication 1334 MILLIGRAM(S): at 18:02

## 2018-02-11 RX ADMIN — Medication 1 APPLICATION(S): at 18:02

## 2018-02-11 RX ADMIN — Medication 1 APPLICATION(S): at 05:27

## 2018-02-11 RX ADMIN — HEPARIN SODIUM 5000 UNIT(S): 5000 INJECTION INTRAVENOUS; SUBCUTANEOUS at 18:03

## 2018-02-11 RX ADMIN — Medication 112 MICROGRAM(S): at 05:26

## 2018-02-11 RX ADMIN — Medication 40 MILLIGRAM(S): at 05:26

## 2018-02-11 RX ADMIN — Medication 1334 MILLIGRAM(S): at 09:15

## 2018-02-11 RX ADMIN — HEPARIN SODIUM 5000 UNIT(S): 5000 INJECTION INTRAVENOUS; SUBCUTANEOUS at 05:26

## 2018-02-11 RX ADMIN — Medication 81 MILLIGRAM(S): at 12:58

## 2018-02-11 NOTE — SWALLOW BEDSIDE ASSESSMENT ADULT - ORAL PHASE
Within functional limits Decreased anterior-posterior movement of the bolus/Delayed oral transit time/Lingual stasis

## 2018-02-11 NOTE — SWALLOW BEDSIDE ASSESSMENT ADULT - SWALLOW EVAL: RECOMMENDED FEEDING/EATING TECHNIQUES
alternate food with liquid/oral hygiene/check mouth frequently for oral residue/pocketing/crush medication (when feasible)/position upright (90 degrees)/allow for swallow between intakes/maintain upright posture during/after eating for 30 mins/small sips/bites

## 2018-02-11 NOTE — PROGRESS NOTE ADULT - SUBJECTIVE AND OBJECTIVE BOX
Internal Medicine Hospitalist - Dr. Giovanni ROBERSON    [id number]    86y      Female    Patient is a 86y old  Female who presents with a chief complaint of shortness of breath (05 Feb 2018 13:31)    INTERVAL HPI/ OVERNIGHT EVENTS: Patient is seen and examined, pt was noted to be choking this morning, seen by speech, denied chest pain, SOB.     REVIEW OF SYSTEMS:    Denied fever, chills, abd. pain, nausea, vomiting, chest pain, SOB, headache, dizziness    PHYSICAL EXAM:    Vital Signs Last 24 Hrs  T(C): 36.7 (11 Feb 2018 10:09), Max: 37.1 (10 Feb 2018 20:45)  T(F): 98.1 (11 Feb 2018 10:09), Max: 98.7 (10 Feb 2018 20:45)  HR: 81 (11 Feb 2018 10:09) (60 - 81)  BP: 170/78 (11 Feb 2018 10:09) (152/54 - 170/78)  BP(mean): --  RR: 18 (11 Feb 2018 10:09) (18 - 20)  SpO2: 100% (10 Feb 2018 20:45) (99% - 100%)    GENERAL: NAD  HEENT: EOMI  Neck: supple  CHEST/LUNG: CTA b/l   HEART: S1S2+ audible  ABDOMEN: Soft, Nontender, Nondistended; Bowel sounds present  EXTREMITIES:  no edema  CNS: AAO X 3  Psychiatry: normal mood    LABS:                        11.1   4.4   )-----------( 128      ( 10 Feb 2018 05:12 )             34.8     02-10    142  |  101  |  34.0<H>  ----------------------------<  92  4.2   |  27.0  |  2.78<H>    Ca    7.9<L>      10 Feb 2018 05:11  Phos  4.8     02-10              MEDICATIONS  (STANDING):  AQUAPHOR (petrolatum Ointment) 1 Application(s) Topical two times a day  aspirin enteric coated 81 milliGRAM(s) Oral daily  calcitriol   Capsule 0.25 MICROGram(s) Oral daily  calcium acetate 1334 milliGRAM(s) Oral three times a day with meals  carvedilol 12.5 milliGRAM(s) Oral every 12 hours  clopidogrel Tablet 75 milliGRAM(s) Oral daily  dextrose 5%. 1000 milliLiter(s) (50 mL/Hr) IV Continuous <Continuous>  dextrose 50% Injectable 12.5 Gram(s) IV Push once  dextrose 50% Injectable 25 Gram(s) IV Push once  dextrose 50% Injectable 25 Gram(s) IV Push once  ergocalciferol 63702 Unit(s) Oral every week  folic acid 1 milliGRAM(s) Oral daily  furosemide    Tablet 40 milliGRAM(s) Oral daily  gabapentin 100 milliGRAM(s) Oral at bedtime  heparin  Injectable 5000 Unit(s) SubCutaneous every 12 hours  insulin lispro (HumaLOG) corrective regimen sliding scale   SubCutaneous Before meals and at bedtime  levothyroxine 112 MICROGram(s) Oral daily  lisinopril 30 milliGRAM(s) Oral daily  lisinopril 10 milliGRAM(s) Oral once    MEDICATIONS  (PRN):  acetaminophen   Tablet. 650 milliGRAM(s) Oral every 6 hours PRN headache  dextrose Gel 1 Dose(s) Oral once PRN Blood Glucose LESS THAN 70 milliGRAM(s)/deciliter  glucagon  Injectable 1 milliGRAM(s) IntraMuscular once PRN Glucose LESS THAN 70 milligrams/deciliter  hydrALAZINE 25 milliGRAM(s) Oral every 6 hours PRN Systolic blood pressure > 160  traMADol 50 milliGRAM(s) Oral five times a day PRN Moderate Pain (4 - 6)      RADIOLOGY & ADDITIONAL TEST

## 2018-02-11 NOTE — PROGRESS NOTE ADULT - PROBLEM SELECTOR PLAN 4
improving   increase lisinopril 30, c/w Coreg to 12.5mg BID  Clonidine patch discontinued.   monitor BP and adjust medication as needed

## 2018-02-11 NOTE — SWALLOW BEDSIDE ASSESSMENT ADULT - ASR SWALLOW ASPIRATION MONITOR
upper respiratory infection/change of breathing pattern/cough/gurgly voice/oral hygiene/position upright (90Y)/fever/throat clearing/pneumonia

## 2018-02-12 LAB
ANION GAP SERPL CALC-SCNC: 14 MMOL/L — SIGNIFICANT CHANGE UP (ref 5–17)
BUN SERPL-MCNC: 31 MG/DL — HIGH (ref 8–20)
CALCIUM SERPL-MCNC: 8 MG/DL — LOW (ref 8.6–10.2)
CHLORIDE SERPL-SCNC: 94 MMOL/L — LOW (ref 98–107)
CO2 SERPL-SCNC: 26 MMOL/L — SIGNIFICANT CHANGE UP (ref 22–29)
CREAT SERPL-MCNC: 2.76 MG/DL — HIGH (ref 0.5–1.3)
GLUCOSE BLDC GLUCOMTR-MCNC: 103 MG/DL — HIGH (ref 70–99)
GLUCOSE BLDC GLUCOMTR-MCNC: 150 MG/DL — HIGH (ref 70–99)
GLUCOSE BLDC GLUCOMTR-MCNC: 163 MG/DL — HIGH (ref 70–99)
GLUCOSE BLDC GLUCOMTR-MCNC: 97 MG/DL — SIGNIFICANT CHANGE UP (ref 70–99)
GLUCOSE SERPL-MCNC: 83 MG/DL — SIGNIFICANT CHANGE UP (ref 70–115)
HCT VFR BLD CALC: 35.6 % — LOW (ref 37–47)
HGB BLD-MCNC: 11.9 G/DL — LOW (ref 12–16)
MCHC RBC-ENTMCNC: 30.2 PG — SIGNIFICANT CHANGE UP (ref 27–31)
MCHC RBC-ENTMCNC: 33.4 G/DL — SIGNIFICANT CHANGE UP (ref 32–36)
MCV RBC AUTO: 90.4 FL — SIGNIFICANT CHANGE UP (ref 81–99)
PLATELET # BLD AUTO: 113 K/UL — LOW (ref 150–400)
POTASSIUM SERPL-MCNC: 3.9 MMOL/L — SIGNIFICANT CHANGE UP (ref 3.5–5.3)
POTASSIUM SERPL-SCNC: 3.9 MMOL/L — SIGNIFICANT CHANGE UP (ref 3.5–5.3)
RBC # BLD: 3.94 M/UL — LOW (ref 4.4–5.2)
RBC # FLD: 18.3 % — HIGH (ref 11–15.6)
SODIUM SERPL-SCNC: 134 MMOL/L — LOW (ref 135–145)
WBC # BLD: 3.5 K/UL — LOW (ref 4.8–10.8)
WBC # FLD AUTO: 3.5 K/UL — LOW (ref 4.8–10.8)

## 2018-02-12 PROCEDURE — 99233 SBSQ HOSP IP/OBS HIGH 50: CPT

## 2018-02-12 RX ADMIN — Medication 1334 MILLIGRAM(S): at 12:05

## 2018-02-12 RX ADMIN — Medication 1334 MILLIGRAM(S): at 08:23

## 2018-02-12 RX ADMIN — HEPARIN SODIUM 5000 UNIT(S): 5000 INJECTION INTRAVENOUS; SUBCUTANEOUS at 05:59

## 2018-02-12 RX ADMIN — Medication 81 MILLIGRAM(S): at 12:04

## 2018-02-12 RX ADMIN — HEPARIN SODIUM 5000 UNIT(S): 5000 INJECTION INTRAVENOUS; SUBCUTANEOUS at 17:27

## 2018-02-12 RX ADMIN — LISINOPRIL 30 MILLIGRAM(S): 2.5 TABLET ORAL at 06:00

## 2018-02-12 RX ADMIN — CLOPIDOGREL BISULFATE 75 MILLIGRAM(S): 75 TABLET, FILM COATED ORAL at 12:04

## 2018-02-12 RX ADMIN — CALCITRIOL 0.25 MICROGRAM(S): 0.5 CAPSULE ORAL at 12:04

## 2018-02-12 RX ADMIN — Medication 40 MILLIGRAM(S): at 06:00

## 2018-02-12 RX ADMIN — Medication 1: at 12:39

## 2018-02-12 RX ADMIN — Medication 1334 MILLIGRAM(S): at 17:26

## 2018-02-12 RX ADMIN — GABAPENTIN 100 MILLIGRAM(S): 400 CAPSULE ORAL at 23:35

## 2018-02-12 RX ADMIN — Medication 1 APPLICATION(S): at 17:26

## 2018-02-12 RX ADMIN — Medication 650 MILLIGRAM(S): at 23:35

## 2018-02-12 RX ADMIN — CARVEDILOL PHOSPHATE 12.5 MILLIGRAM(S): 80 CAPSULE, EXTENDED RELEASE ORAL at 17:27

## 2018-02-12 RX ADMIN — Medication 1 MILLIGRAM(S): at 12:04

## 2018-02-12 RX ADMIN — Medication 112 MICROGRAM(S): at 05:59

## 2018-02-12 RX ADMIN — Medication 1 APPLICATION(S): at 06:08

## 2018-02-12 RX ADMIN — CARVEDILOL PHOSPHATE 12.5 MILLIGRAM(S): 80 CAPSULE, EXTENDED RELEASE ORAL at 06:00

## 2018-02-12 NOTE — PROGRESS NOTE ADULT - PROBLEM SELECTOR PLAN 4
improving   c/w lisinopril 30, c/w Coreg to 12.5mg BID  Clonidine patch discontinued.   monitor BP and adjust medication as needed

## 2018-02-12 NOTE — PROGRESS NOTE ADULT - SUBJECTIVE AND OBJECTIVE BOX
NEPHROLOGY INTERVAL HPI/OVERNIGHT EVENTS: no new events.    MEDICATIONS  (STANDING):  AQUAPHOR (petrolatum Ointment) 1 Application(s) Topical two times a day  aspirin enteric coated 81 milliGRAM(s) Oral daily  calcitriol   Capsule 0.25 MICROGram(s) Oral daily  calcium acetate 1334 milliGRAM(s) Oral three times a day with meals  carvedilol 6.25 milliGRAM(s) Oral every 12 hours  cloNIDine Patch 0.3 mG/24Hr(s) 1 patch Topical every 7 days  clopidogrel Tablet 75 milliGRAM(s) Oral daily  dextrose 5%. 1000 milliLiter(s) (50 mL/Hr) IV Continuous <Continuous>  dextrose 50% Injectable 12.5 Gram(s) IV Push once  dextrose 50% Injectable 25 Gram(s) IV Push once  dextrose 50% Injectable 25 Gram(s) IV Push once  ergocalciferol 27392 Unit(s) Oral every week  folic acid 1 milliGRAM(s) Oral daily  furosemide    Tablet 40 milliGRAM(s) Oral daily  gabapentin 100 milliGRAM(s) Oral at bedtime  heparin  Injectable 5000 Unit(s) SubCutaneous every 12 hours  insulin lispro (HumaLOG) corrective regimen sliding scale   SubCutaneous Before meals and at bedtime  levothyroxine 112 MICROGram(s) Oral daily  lisinopril 20 milliGRAM(s) Oral daily    MEDICATIONS  (PRN):  dextrose Gel 1 Dose(s) Oral once PRN Blood Glucose LESS THAN 70 milliGRAM(s)/deciliter  glucagon  Injectable 1 milliGRAM(s) IntraMuscular once PRN Glucose LESS THAN 70 milligrams/deciliter  hydrALAZINE 25 milliGRAM(s) Oral every 6 hours PRN Systolic blood pressure > 160  traMADol 50 milliGRAM(s) Oral five times a day PRN Moderate Pain (4 - 6)      Allergies    penicillin (Anaphylaxis)  seafood (Anaphylaxis)  shellfish (Anaphylaxis)            Vital Signs Last 24 Hrs    T(C): 36.9 (12 Feb 2018 10:15), Max: 37.1 (12 Feb 2018 05:53)  T(F): 98.5 (12 Feb 2018 10:15), Max: 98.8 (12 Feb 2018 05:53)  HR: 75 (12 Feb 2018 10:15) (64 - 75)  BP: 150/50 (12 Feb 2018 05:53) (145/50 - 162/64)  BP(mean): --  RR: 18 (12 Feb 2018 10:15) (18 - 18)  SpO2: 97% (12 Feb 2018 05:53) (94% - 97%)    T(C): 36.3 (10 Feb 2018 13:30), Max: 37.1 (10 Feb 2018 05:38)  T(F): 97.4 (10 Feb 2018 13:30), Max: 98.7 (10 Feb 2018 05:38)  HR: 70 (10 Feb 2018 13:30) (60 - 84)  BP: 152/54 (10 Feb 2018 13:30) (132/62 - 156/52)  BP(mean): --  RR: 18 (10 Feb 2018 13:30) (16 - 22)  SpO2: 99% (10 Feb 2018 13:30) (98% - 100%)  T(C): 36.9 (09 Feb 2018 06:02), Max: 36.9 (08 Feb 2018 23:30)  T(F): 98.4 (09 Feb 2018 06:02), Max: 98.5 (08 Feb 2018 23:30)  HR: 70 (09 Feb 2018 06:02) (65 - 72)  BP: 150/70 (09 Feb 2018 06:02) (136/57 - 178/66)  BP(mean): --  RR: 18 (09 Feb 2018 06:02) (18 - 18)  SpO2: 98% (09 Feb 2018 06:02) (97% - 100%)      PHYSICAL EXAM:    GENERAL: appears  chronically ill  HEAD:  wnl  EYES:   ENMT:  difficulty in swallowing, food gets " stuck "  NECK: veins  flat  NERVOUS SYSTEM:  same  CHEST/LUNG: no 02, no wheezes  HEART: sternal scar, no rub  ABDOMEN:  soft not tender, comfortable in bed  EXTREMITIES: same  LYMPH:   SKIN: no rash    LABS:      02-12    134<L>  |  94<L>  |  31.0<H>  ----------------------------<  83  3.9   |  26.0  |  2.76<H>    Ca    8.0<L>      12 Feb 2018 05:45  Phos  4.8     02-10      02-10    142  |  101  |  34.0<H>  ----------------------------<  92  4.2   |  27.0  |  2.78<H>    Ca    7.9<L>      10 Feb 2018 05:11  Phos  4.8     02-10                            11.3   3.6   )-----------( 117      ( 08 Feb 2018 07:04 )             34.6     02-08    137  |  98  |  35.0<H>  ----------------------------<  107  3.8   |  27.0  |  2.85<H>    Ca    7.9<L>      08 Feb 2018 07:02                  RADIOLOGY & ADDITIONAL TESTS:

## 2018-02-12 NOTE — PROGRESS NOTE ADULT - PROBLEM SELECTOR PLAN 2
appreciate cardiology input  TTE showed LVEF 35-40%, grade 2 diastolic dysfunction, moderately reduced RV systolic function.  Right and Left Heart Cath 2/9/2018 Findings- Patent Grafts, Moderate MR.  Continue aspirin, plavix, Coreg and Lisinopril. appreciate cardiology input  TTE showed LVEF 35-40%, grade 2 diastolic dysfunction, moderately reduced RV systolic function.  Right and Left Heart Cath 2/9/2018 Findings- Patent Grafts, Moderate MR.  Continue aspirin, plavix, Coreg and Lisinopril.  discussed with Dr. Mckeon, no further work up needed, can be downgraded to medical floor

## 2018-02-12 NOTE — PROGRESS NOTE ADULT - SUBJECTIVE AND OBJECTIVE BOX
Internal Medicine Hospitalist - Dr. Giovanni ROBERSON    [id number]    86y      Female    Patient is a 86y old  Female who presents with a chief complaint of shortness of breath (05 Feb 2018 13:31)      INTERVAL HPI/ OVERNIGHT EVENTS: Patient is seen and examined, report mild headache, denied chest pain, SOB. Pt report difficulty swallowing with puree diet.     REVIEW OF SYSTEMS:    Denied fever, chills, abd. pain, nausea, vomiting, chest pain, SOB, dizziness    PHYSICAL EXAM:    Vital Signs Last 24 Hrs  T(C): 36.9 (12 Feb 2018 10:15), Max: 37.1 (12 Feb 2018 05:53)  T(F): 98.5 (12 Feb 2018 10:15), Max: 98.8 (12 Feb 2018 05:53)  HR: 75 (12 Feb 2018 10:15) (64 - 75)  BP: 150/50 (12 Feb 2018 05:53) (145/50 - 162/64)  BP(mean): --  RR: 18 (12 Feb 2018 10:15) (18 - 18)  SpO2: 97% (12 Feb 2018 05:53) (94% - 97%)    GENERAL: NAD  HEENT: EOMI  Neck: supple  CHEST/LUNG: CTA b/l   HEART: S1S2+ audible  ABDOMEN: Soft, Nontender, Nondistended; Bowel sounds present  EXTREMITIES:  no edema  CNS: AAO X 3    LABS:                        11.9   3.5   )-----------( 113      ( 12 Feb 2018 05:45 )             35.6     02-12    134<L>  |  94<L>  |  31.0<H>  ----------------------------<  83  3.9   |  26.0  |  2.76<H>    Ca    8.0<L>      12 Feb 2018 05:45  Phos  4.8     02-10        MEDICATIONS  (STANDING):  AQUAPHOR (petrolatum Ointment) 1 Application(s) Topical two times a day  aspirin enteric coated 81 milliGRAM(s) Oral daily  calcitriol   Capsule 0.25 MICROGram(s) Oral daily  calcium acetate 1334 milliGRAM(s) Oral three times a day with meals  carvedilol 12.5 milliGRAM(s) Oral every 12 hours  clopidogrel Tablet 75 milliGRAM(s) Oral daily  dextrose 5%. 1000 milliLiter(s) (50 mL/Hr) IV Continuous <Continuous>  dextrose 50% Injectable 12.5 Gram(s) IV Push once  dextrose 50% Injectable 25 Gram(s) IV Push once  dextrose 50% Injectable 25 Gram(s) IV Push once  ergocalciferol 47631 Unit(s) Oral every week  folic acid 1 milliGRAM(s) Oral daily  gabapentin 100 milliGRAM(s) Oral at bedtime  heparin  Injectable 5000 Unit(s) SubCutaneous every 12 hours  insulin lispro (HumaLOG) corrective regimen sliding scale   SubCutaneous Before meals and at bedtime  levothyroxine 112 MICROGram(s) Oral daily  lisinopril 30 milliGRAM(s) Oral daily    MEDICATIONS  (PRN):  acetaminophen   Tablet. 650 milliGRAM(s) Oral every 6 hours PRN headache  dextrose Gel 1 Dose(s) Oral once PRN Blood Glucose LESS THAN 70 milliGRAM(s)/deciliter  glucagon  Injectable 1 milliGRAM(s) IntraMuscular once PRN Glucose LESS THAN 70 milligrams/deciliter  hydrALAZINE 25 milliGRAM(s) Oral every 6 hours PRN Systolic blood pressure > 160  traMADol 50 milliGRAM(s) Oral five times a day PRN Moderate Pain (4 - 6)      RADIOLOGY & ADDITIONAL TEST

## 2018-02-13 LAB
ANION GAP SERPL CALC-SCNC: 16 MMOL/L — SIGNIFICANT CHANGE UP (ref 5–17)
BUN SERPL-MCNC: 42 MG/DL — HIGH (ref 8–20)
CALCIUM SERPL-MCNC: 8.1 MG/DL — LOW (ref 8.6–10.2)
CHLORIDE SERPL-SCNC: 99 MMOL/L — SIGNIFICANT CHANGE UP (ref 98–107)
CO2 SERPL-SCNC: 26 MMOL/L — SIGNIFICANT CHANGE UP (ref 22–29)
CREAT SERPL-MCNC: 3.46 MG/DL — HIGH (ref 0.5–1.3)
GLUCOSE BLDC GLUCOMTR-MCNC: 102 MG/DL — HIGH (ref 70–99)
GLUCOSE BLDC GLUCOMTR-MCNC: 133 MG/DL — HIGH (ref 70–99)
GLUCOSE BLDC GLUCOMTR-MCNC: 351 MG/DL — HIGH (ref 70–99)
GLUCOSE BLDC GLUCOMTR-MCNC: 88 MG/DL — SIGNIFICANT CHANGE UP (ref 70–99)
GLUCOSE SERPL-MCNC: 100 MG/DL — SIGNIFICANT CHANGE UP (ref 70–115)
HBV E AB SER-ACNC: NEGATIVE — SIGNIFICANT CHANGE UP
HCT VFR BLD CALC: 33.9 % — LOW (ref 37–47)
HGB BLD-MCNC: 11.1 G/DL — LOW (ref 12–16)
MCHC RBC-ENTMCNC: 29.4 PG — SIGNIFICANT CHANGE UP (ref 27–31)
MCHC RBC-ENTMCNC: 32.7 G/DL — SIGNIFICANT CHANGE UP (ref 32–36)
MCV RBC AUTO: 89.9 FL — SIGNIFICANT CHANGE UP (ref 81–99)
PHOSPHATE SERPL-MCNC: 4.1 MG/DL — SIGNIFICANT CHANGE UP (ref 2.4–4.7)
PLATELET # BLD AUTO: 107 K/UL — LOW (ref 150–400)
POTASSIUM SERPL-MCNC: 4.2 MMOL/L — SIGNIFICANT CHANGE UP (ref 3.5–5.3)
POTASSIUM SERPL-SCNC: 4.2 MMOL/L — SIGNIFICANT CHANGE UP (ref 3.5–5.3)
RBC # BLD: 3.77 M/UL — LOW (ref 4.4–5.2)
RBC # FLD: 18 % — HIGH (ref 11–15.6)
SODIUM SERPL-SCNC: 141 MMOL/L — SIGNIFICANT CHANGE UP (ref 135–145)
WBC # BLD: 4 K/UL — LOW (ref 4.8–10.8)
WBC # FLD AUTO: 4 K/UL — LOW (ref 4.8–10.8)

## 2018-02-13 PROCEDURE — 99232 SBSQ HOSP IP/OBS MODERATE 35: CPT

## 2018-02-13 RX ORDER — OMEPRAZOLE 10 MG/1
1 CAPSULE, DELAYED RELEASE ORAL
Qty: 0 | Refills: 0 | COMMUNITY

## 2018-02-13 RX ORDER — FUROSEMIDE 40 MG
1 TABLET ORAL
Qty: 0 | Refills: 0 | COMMUNITY

## 2018-02-13 RX ORDER — TRAMADOL HYDROCHLORIDE 50 MG/1
50 TABLET ORAL ONCE
Qty: 0 | Refills: 0 | Status: DISCONTINUED | OUTPATIENT
Start: 2018-02-13 | End: 2018-02-13

## 2018-02-13 RX ORDER — LEVOTHYROXINE SODIUM 125 MCG
1 TABLET ORAL
Qty: 0 | Refills: 0 | COMMUNITY

## 2018-02-13 RX ORDER — FLUDROCORTISONE ACETATE 0.1 MG/1
1 TABLET ORAL
Qty: 0 | Refills: 0 | COMMUNITY

## 2018-02-13 RX ORDER — INSULIN DETEMIR 100/ML (3)
20 INSULIN PEN (ML) SUBCUTANEOUS
Qty: 0 | Refills: 0 | COMMUNITY

## 2018-02-13 RX ADMIN — Medication 1 APPLICATION(S): at 17:12

## 2018-02-13 RX ADMIN — TRAMADOL HYDROCHLORIDE 50 MILLIGRAM(S): 50 TABLET ORAL at 16:32

## 2018-02-13 RX ADMIN — Medication 650 MILLIGRAM(S): at 11:32

## 2018-02-13 RX ADMIN — Medication 112 MICROGRAM(S): at 06:18

## 2018-02-13 RX ADMIN — CARVEDILOL PHOSPHATE 12.5 MILLIGRAM(S): 80 CAPSULE, EXTENDED RELEASE ORAL at 17:12

## 2018-02-13 RX ADMIN — Medication 81 MILLIGRAM(S): at 12:45

## 2018-02-13 RX ADMIN — Medication 650 MILLIGRAM(S): at 10:54

## 2018-02-13 RX ADMIN — Medication 1334 MILLIGRAM(S): at 12:45

## 2018-02-13 RX ADMIN — Medication 5: at 17:09

## 2018-02-13 RX ADMIN — TRAMADOL HYDROCHLORIDE 50 MILLIGRAM(S): 50 TABLET ORAL at 16:48

## 2018-02-13 RX ADMIN — HEPARIN SODIUM 5000 UNIT(S): 5000 INJECTION INTRAVENOUS; SUBCUTANEOUS at 17:09

## 2018-02-13 RX ADMIN — CALCITRIOL 0.25 MICROGRAM(S): 0.5 CAPSULE ORAL at 12:45

## 2018-02-13 RX ADMIN — Medication 1 APPLICATION(S): at 06:19

## 2018-02-13 RX ADMIN — CLOPIDOGREL BISULFATE 75 MILLIGRAM(S): 75 TABLET, FILM COATED ORAL at 12:45

## 2018-02-13 RX ADMIN — Medication 650 MILLIGRAM(S): at 00:35

## 2018-02-13 RX ADMIN — HEPARIN SODIUM 5000 UNIT(S): 5000 INJECTION INTRAVENOUS; SUBCUTANEOUS at 06:19

## 2018-02-13 RX ADMIN — Medication 1 PATCH: at 12:50

## 2018-02-13 RX ADMIN — CARVEDILOL PHOSPHATE 12.5 MILLIGRAM(S): 80 CAPSULE, EXTENDED RELEASE ORAL at 06:18

## 2018-02-13 RX ADMIN — GABAPENTIN 100 MILLIGRAM(S): 400 CAPSULE ORAL at 21:06

## 2018-02-13 RX ADMIN — Medication 1334 MILLIGRAM(S): at 17:12

## 2018-02-13 RX ADMIN — LISINOPRIL 30 MILLIGRAM(S): 2.5 TABLET ORAL at 06:19

## 2018-02-13 RX ADMIN — Medication 1 MILLIGRAM(S): at 12:45

## 2018-02-13 NOTE — PROGRESS NOTE ADULT - PROBLEM SELECTOR PLAN 4
stable   c/w lisinopril 30, c/w Coreg to 12.5mg BID  Clonidine patch discontinued.   monitor BP and adjust medication as needed

## 2018-02-13 NOTE — PROGRESS NOTE ADULT - SUBJECTIVE AND OBJECTIVE BOX
Internal Medicine Hospitalist - Dr. Giovanni ROBERSON    [id number]    86y      Female    Patient is a 86y old  Female who presents with a chief complaint of shortness of breath (05 Feb 2018 13:31)    INTERVAL HPI/ OVERNIGHT EVENTS: Patient is seen and examined, having HD today.     REVIEW OF SYSTEMS:    Denied fever, chills, abd. pain, nausea, vomiting, chest pain, SOB, dizziness    PHYSICAL EXAM:    Vital Signs Last 24 Hrs  T(C): 36.8 (13 Feb 2018 11:15), Max: 36.8 (12 Feb 2018 17:25)  T(F): 98.2 (13 Feb 2018 11:15), Max: 98.3 (12 Feb 2018 23:34)  HR: 75 (13 Feb 2018 11:15) (68 - 80)  BP: 110/80 (13 Feb 2018 11:15) (110/80 - 157/66)  BP(mean): --  RR: 18 (13 Feb 2018 11:15) (18 - 18)  SpO2: 98% (13 Feb 2018 11:15) (95% - 100%)    GENERAL: NAD  CHEST/LUNG: CTA b/l   HEART: S1S2+ audible  ABDOMEN: Soft, Nontender, Nondistended; Bowel sounds present  EXTREMITIES:  no edema        LABS:                        11.1   4.0   )-----------( 107      ( 13 Feb 2018 08:30 )             33.9     02-13    141  |  99  |  42.0<H>  ----------------------------<  100  4.2   |  26.0  |  3.46<H>    Ca    8.1<L>      13 Feb 2018 08:30  Phos  4.1     02-13              MEDICATIONS  (STANDING):  AQUAPHOR (petrolatum Ointment) 1 Application(s) Topical two times a day  aspirin enteric coated 81 milliGRAM(s) Oral daily  calcitriol   Capsule 0.25 MICROGram(s) Oral daily  calcium acetate 1334 milliGRAM(s) Oral three times a day with meals  carvedilol 12.5 milliGRAM(s) Oral every 12 hours  clopidogrel Tablet 75 milliGRAM(s) Oral daily  dextrose 5%. 1000 milliLiter(s) (50 mL/Hr) IV Continuous <Continuous>  dextrose 50% Injectable 12.5 Gram(s) IV Push once  dextrose 50% Injectable 25 Gram(s) IV Push once  dextrose 50% Injectable 25 Gram(s) IV Push once  ergocalciferol 37603 Unit(s) Oral every week  folic acid 1 milliGRAM(s) Oral daily  gabapentin 100 milliGRAM(s) Oral at bedtime  heparin  Injectable 5000 Unit(s) SubCutaneous every 12 hours  insulin lispro (HumaLOG) corrective regimen sliding scale   SubCutaneous Before meals and at bedtime  levothyroxine 112 MICROGram(s) Oral daily  lisinopril 30 milliGRAM(s) Oral daily    MEDICATIONS  (PRN):  acetaminophen   Tablet. 650 milliGRAM(s) Oral every 6 hours PRN headache  dextrose Gel 1 Dose(s) Oral once PRN Blood Glucose LESS THAN 70 milliGRAM(s)/deciliter  glucagon  Injectable 1 milliGRAM(s) IntraMuscular once PRN Glucose LESS THAN 70 milligrams/deciliter  hydrALAZINE 25 milliGRAM(s) Oral every 6 hours PRN Systolic blood pressure > 160      RADIOLOGY & ADDITIONAL TEST

## 2018-02-13 NOTE — PROGRESS NOTE ADULT - ASSESSMENT
87 yo F w/ hx CKD5, CAD, Dm2 presents to ER for dialysis initiation from nephrologist office. Pt was noted to have positive troponin, TTE showed LVEF 35-40%, grade 2 diastolic dysfunction, moderately reduced RV systolic function. Pt was seen by cardiology. S/p Right and Left heart Cath on 2/9/2018, [place]- Patent Grafts, Moderate MR. Pt is started on new HD, CC working on HD spot.

## 2018-02-13 NOTE — PROGRESS NOTE ADULT - SUBJECTIVE AND OBJECTIVE BOX
NEPHROLOGY INTERVAL HPI/OVERNIGHT EVENTS: no new events.    MEDICATIONS  (STANDING):  AQUAPHOR (petrolatum Ointment) 1 Application(s) Topical two times a day  aspirin enteric coated 81 milliGRAM(s) Oral daily  calcitriol   Capsule 0.25 MICROGram(s) Oral daily  calcium acetate 1334 milliGRAM(s) Oral three times a day with meals  carvedilol 6.25 milliGRAM(s) Oral every 12 hours  cloNIDine Patch 0.3 mG/24Hr(s) 1 patch Topical every 7 days  clopidogrel Tablet 75 milliGRAM(s) Oral daily  dextrose 5%. 1000 milliLiter(s) (50 mL/Hr) IV Continuous <Continuous>  dextrose 50% Injectable 12.5 Gram(s) IV Push once  dextrose 50% Injectable 25 Gram(s) IV Push once  dextrose 50% Injectable 25 Gram(s) IV Push once  ergocalciferol 98914 Unit(s) Oral every week  folic acid 1 milliGRAM(s) Oral daily  furosemide    Tablet 40 milliGRAM(s) Oral daily  gabapentin 100 milliGRAM(s) Oral at bedtime  heparin  Injectable 5000 Unit(s) SubCutaneous every 12 hours  insulin lispro (HumaLOG) corrective regimen sliding scale   SubCutaneous Before meals and at bedtime  levothyroxine 112 MICROGram(s) Oral daily  lisinopril 20 milliGRAM(s) Oral daily    MEDICATIONS  (PRN):  dextrose Gel 1 Dose(s) Oral once PRN Blood Glucose LESS THAN 70 milliGRAM(s)/deciliter  glucagon  Injectable 1 milliGRAM(s) IntraMuscular once PRN Glucose LESS THAN 70 milligrams/deciliter  hydrALAZINE 25 milliGRAM(s) Oral every 6 hours PRN Systolic blood pressure > 160  traMADol 50 milliGRAM(s) Oral five times a day PRN Moderate Pain (4 - 6)      Allergies    penicillin (Anaphylaxis)  seafood (Anaphylaxis)  shellfish (Anaphylaxis)            Vital Signs Last 24 Hrs    T(C): 36.8 (13 Feb 2018 08:25), Max: 36.8 (12 Feb 2018 17:25)  T(F): 98.2 (13 Feb 2018 08:25), Max: 98.3 (12 Feb 2018 23:34)  HR: 80 (13 Feb 2018 08:25) (68 - 80)  BP: 156/60 (13 Feb 2018 08:25) (140/50 - 157/66)  BP(mean): --  RR: 18 (13 Feb 2018 08:25) (18 - 18)  SpO2: 99% (13 Feb 2018 08:25) (95% - 100%)  T(C): 36.3 (10 Feb 2018 13:30), Max: 37.1 (10 Feb 2018 05:38)  T(F): 97.4 (10 Feb 2018 13:30), Max: 98.7 (10 Feb 2018 05:38)  HR: 70 (10 Feb 2018 13:30) (60 - 84)  BP: 152/54 (10 Feb 2018 13:30) (132/62 - 156/52)  BP(mean): --  RR: 18 (10 Feb 2018 13:30) (16 - 22)  SpO2: 99% (10 Feb 2018 13:30) (98% - 100%)  T(C): 36.9 (09 Feb 2018 06:02), Max: 36.9 (08 Feb 2018 23:30)  T(F): 98.4 (09 Feb 2018 06:02), Max: 98.5 (08 Feb 2018 23:30)  HR: 70 (09 Feb 2018 06:02) (65 - 72)  BP: 150/70 (09 Feb 2018 06:02) (136/57 - 178/66)  BP(mean): --  RR: 18 (09 Feb 2018 06:02) (18 - 18)  SpO2: 98% (09 Feb 2018 06:02) (97% - 100%)      PHYSICAL EXAM:    GENERAL: appears  chronically ill  HEAD:  wnl  EYES:   ENMT:   NECK: veins  flat  NERVOUS SYSTEM:  same  CHEST/LUNG: clearer  HEART: sternal scar, no rub  ABDOMEN:  soft not tender, comfortable in bed  EXTREMITIES:  diffuse muscle wasting  LYMPH:   SKIN: no rash    LABS:  02-13    141  |  99  |  42.0<H>  ----------------------------<  100  4.2   |  26.0  |  3.46<H>    Ca    8.1<L>      13 Feb 2018 08:30  Phos  4.1     02-13          02-10    142  |  101  |  34.0<H>  ----------------------------<  92  4.2   |  27.0  |  2.78<H>    Ca    7.9<L>      10 Feb 2018 05:11  Phos  4.8     02-10                            11.3   3.6   )-----------( 117      ( 08 Feb 2018 07:04 )             34.6     02-08    137  |  98  |  35.0<H>  ----------------------------<  107  3.8   |  27.0  |  2.85<H>    Ca    7.9<L>      08 Feb 2018 07:02                  RADIOLOGY & ADDITIONAL TESTS:

## 2018-02-14 DIAGNOSIS — R13.12 DYSPHAGIA, OROPHARYNGEAL PHASE: ICD-10-CM

## 2018-02-14 LAB
GLUCOSE BLDC GLUCOMTR-MCNC: 116 MG/DL — HIGH (ref 70–99)
GLUCOSE BLDC GLUCOMTR-MCNC: 138 MG/DL — HIGH (ref 70–99)
GLUCOSE BLDC GLUCOMTR-MCNC: 242 MG/DL — HIGH (ref 70–99)
GLUCOSE BLDC GLUCOMTR-MCNC: 84 MG/DL — SIGNIFICANT CHANGE UP (ref 70–99)

## 2018-02-14 PROCEDURE — 74230 X-RAY XM SWLNG FUNCJ C+: CPT | Mod: 26

## 2018-02-14 PROCEDURE — 99232 SBSQ HOSP IP/OBS MODERATE 35: CPT

## 2018-02-14 PROCEDURE — 99223 1ST HOSP IP/OBS HIGH 75: CPT

## 2018-02-14 RX ADMIN — HEPARIN SODIUM 5000 UNIT(S): 5000 INJECTION INTRAVENOUS; SUBCUTANEOUS at 17:10

## 2018-02-14 RX ADMIN — Medication 2: at 11:22

## 2018-02-14 RX ADMIN — CARVEDILOL PHOSPHATE 12.5 MILLIGRAM(S): 80 CAPSULE, EXTENDED RELEASE ORAL at 17:10

## 2018-02-14 RX ADMIN — HEPARIN SODIUM 5000 UNIT(S): 5000 INJECTION INTRAVENOUS; SUBCUTANEOUS at 05:40

## 2018-02-14 RX ADMIN — Medication 1334 MILLIGRAM(S): at 10:37

## 2018-02-14 RX ADMIN — Medication 1334 MILLIGRAM(S): at 07:57

## 2018-02-14 RX ADMIN — CLOPIDOGREL BISULFATE 75 MILLIGRAM(S): 75 TABLET, FILM COATED ORAL at 10:38

## 2018-02-14 RX ADMIN — Medication 81 MILLIGRAM(S): at 10:38

## 2018-02-14 RX ADMIN — GABAPENTIN 100 MILLIGRAM(S): 400 CAPSULE ORAL at 21:43

## 2018-02-14 RX ADMIN — Medication 1 APPLICATION(S): at 05:40

## 2018-02-14 RX ADMIN — Medication 1 APPLICATION(S): at 17:10

## 2018-02-14 RX ADMIN — LISINOPRIL 30 MILLIGRAM(S): 2.5 TABLET ORAL at 05:40

## 2018-02-14 RX ADMIN — Medication 112 MICROGRAM(S): at 05:40

## 2018-02-14 RX ADMIN — CALCITRIOL 0.25 MICROGRAM(S): 0.5 CAPSULE ORAL at 10:38

## 2018-02-14 RX ADMIN — CARVEDILOL PHOSPHATE 12.5 MILLIGRAM(S): 80 CAPSULE, EXTENDED RELEASE ORAL at 05:40

## 2018-02-14 RX ADMIN — Medication 1334 MILLIGRAM(S): at 17:09

## 2018-02-14 RX ADMIN — Medication 30 MILLILITER(S): at 20:26

## 2018-02-14 RX ADMIN — Medication 1 MILLIGRAM(S): at 10:38

## 2018-02-14 NOTE — SWALLOW VFSS/MBS ASSESSMENT ADULT - SLP PERTINENT HISTORY OF CURRENT PROBLEM
Pt last seen at bedside 2/12/18, pt with report of +globus sensation and vomiting after pills in applesauce, Puree with thin Rx'd & MBS.

## 2018-02-14 NOTE — PROGRESS NOTE ADULT - SUBJECTIVE AND OBJECTIVE BOX
Patient is a 86y old  Female who presents with a chief complaint of shortness of breath.    Pt seen and examined at bedside. Pt denies any medical complaints. Pt denies n/v, fever, chills, chest pain, sob, abdominal pain, dysuria.     MEDICATIONS  (STANDING):  AQUAPHOR (petrolatum Ointment) 1 Application(s) Topical two times a day  aspirin enteric coated 81 milliGRAM(s) Oral daily  calcitriol   Capsule 0.25 MICROGram(s) Oral daily  calcium acetate 1334 milliGRAM(s) Oral three times a day with meals  carvedilol 12.5 milliGRAM(s) Oral every 12 hours  clopidogrel Tablet 75 milliGRAM(s) Oral daily  dextrose 5%. 1000 milliLiter(s) (50 mL/Hr) IV Continuous <Continuous>  dextrose 50% Injectable 12.5 Gram(s) IV Push once  dextrose 50% Injectable 25 Gram(s) IV Push once  dextrose 50% Injectable 25 Gram(s) IV Push once  ergocalciferol 83850 Unit(s) Oral every week  folic acid 1 milliGRAM(s) Oral daily  gabapentin 100 milliGRAM(s) Oral at bedtime  heparin  Injectable 5000 Unit(s) SubCutaneous every 12 hours  insulin lispro (HumaLOG) corrective regimen sliding scale   SubCutaneous Before meals and at bedtime  levothyroxine 112 MICROGram(s) Oral daily  lisinopril 30 milliGRAM(s) Oral daily    MEDICATIONS  (PRN):  acetaminophen   Tablet. 650 milliGRAM(s) Oral every 6 hours PRN headache  dextrose Gel 1 Dose(s) Oral once PRN Blood Glucose LESS THAN 70 milliGRAM(s)/deciliter  glucagon  Injectable 1 milliGRAM(s) IntraMuscular once PRN Glucose LESS THAN 70 milligrams/deciliter  hydrALAZINE 25 milliGRAM(s) Oral every 6 hours PRN Systolic blood pressure > 160    ROS: negative    PHYSICAL EXAM:  Vital Signs Last 24 Hrs  T(C): 36.8 (14 Feb 2018 07:48), Max: 37.1 (14 Feb 2018 01:17)  T(F): 98.2 (14 Feb 2018 07:48), Max: 98.8 (14 Feb 2018 01:17)  HR: 64 (14 Feb 2018 07:48) (64 - 80)  BP: 134/60 (14 Feb 2018 07:48) (134/60 - 162/57)  BP(mean): --  RR: 18 (14 Feb 2018 07:48) (18 - 18)  SpO2: 97% (14 Feb 2018 01:17) (97% - 98%)    Physical Exam:  GENERAL: NAD  CHEST/LUNG: CTA b/l   HEART: S1S2+ audible  ABDOMEN: Soft, Nontender, Nondistended; Bowel sounds present  EXTREMITIES:  no edema      LABS:                        11.1   4.0   )-----------( 107      ( 13 Feb 2018 08:30 )             33.9     02-13    141  |  99  |  42.0<H>  ----------------------------<  100  4.2   |  26.0  |  3.46<H>    Ca    8.1<L>      13 Feb 2018 08:30  Phos  4.1     02-13          RADIOLOGY & ADDITIONAL TEST

## 2018-02-14 NOTE — SWALLOW VFSS/MBS ASSESSMENT ADULT - ORAL PHASE
Delayed oral transit time/Reduced anterior - posterior transport/Uncontrolled bolus / spillover in abdirizak-pharynx/Uncontrolled bolus / spillover in hypopharynx Reduced anterior - posterior transport/Uncontrolled bolus / spillover in abdirizak-pharynx/Uncontrolled bolus / spillover in hypopharynx/Delayed oral transit time Uncontrolled bolus / spillover in abdirizak-pharynx/+Piecemeal deglutition/Delayed oral transit time/Reduced anterior - posterior transport/Uncontrolled bolus / spillover in hypopharynx Uncontrolled bolus / spillover in hypopharynx/Uncontrolled bolus / spillover in abdirizak-pharynx/+Piecemeal deglutition Uncontrolled bolus / spillover in abdirizak-pharynx/Uncontrolled bolus / spillover in hypopharynx/Delayed oral transit time/Reduced anterior - posterior transport

## 2018-02-14 NOTE — PROGRESS NOTE ADULT - PROBLEM SELECTOR PLAN 1
Appreciate renal input, newly started on HD  hepatitis B and C negative  CC working on HD spot. Spot approved for TTS

## 2018-02-14 NOTE — PROGRESS NOTE ADULT - ASSESSMENT
87 yo F w/ hx CKD5, CAD, Dm2 presents to ER for dialysis initiation from nephrologist office. Pt was noted to have positive troponin, TTE showed LVEF 35-40%, grade 2 diastolic dysfunction, moderately reduced RV systolic function. Pt was seen by cardiology. S/p Right and Left heart Cath on 2/9/2018, [place]- Patent Grafts, Moderate MR. Pt is started on new HD, received HD Tuesday february 13. Pt had MBS done this morning after noted to have some difficult swallowing food. MBS noted with thyroglossal (c4/c5) hypertrophy. Dysphagia 1 diet as per speech and swallow. GI consult placed, follow up GI recommendation. Case management working on HD spot and transportation to HD. As per family, pt has an Aid at home 7 days a week from 10:30am - 5pm. Family interested in obtaining another aid during the night time. Case management has set up HD spot, 5am spot TTS. Case management to inquire further about increasing aid hours. Pt will also need to be discharged home with transport wheel chair for transportation to and from HD due to decreased mobility.

## 2018-02-14 NOTE — SWALLOW VFSS/MBS ASSESSMENT ADULT - RECOMMENDED FEEDING/EATING TECHNIQUES
crush medication (when feasible)/oral hygiene/position upright (90 degrees)/maintain upright posture during/after eating for 30 mins/small sips/bites

## 2018-02-14 NOTE — PROGRESS NOTE ADULT - ATTENDING COMMENTS
HD  tomorrow, maybe able to decrease time in future.
HD now with fluid removal.
HD today.
HD tomorrow, meds discussed.
Thank you for allowing me to participate in care of your patient.   Please call as needed.
discussed with son present bedside, updated
s/p MBS - discussed with speech - puree with nectar and at C4-5 cricopharyngeal ?hypertrophy noted, patient had episodes of choking during hospitalization, speech suggest GI eval - consulted  cont. rest of home medication  CC working on HD spot
Pt is s/p cardiac cath today, discussed with Dr. Mckeon, non obstructive CAD, suggest to titrate coreg and lisinopril, d/c'd clonidine, monitor BP  c/w HD per renal   discussed with son present bedside, updated

## 2018-02-14 NOTE — SWALLOW VFSS/MBS ASSESSMENT ADULT - NS SWALLOW VFSS REC ASPIR MON
position upright (90Y)/gurgly voice/upper respiratory infection/cough/fever/pneumonia/throat clearing/oral hygiene/change of breathing pattern

## 2018-02-14 NOTE — SWALLOW VFSS/MBS ASSESSMENT ADULT - ESOPHAGEAL STAGE
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  Pt reporting "it feels like shes's choking".

## 2018-02-14 NOTE — SWALLOW VFSS/MBS ASSESSMENT ADULT - ROSENBEK'S PENETRATION ASPIRATION SCALE
(1) no aspiration, contrast does not enter airway (2) contrast enters airway, remains above the vocal cords, no residue remains (penetration)/intermittently with large sips; 1= no penetration observed after small sips with head neutral; 3= contrast remains above the vocal cords, visible residue remains during attempted chin tuck posture/(8) contrast passes glottis, visible subglottic residue remains, absent patient response (aspiration)

## 2018-02-14 NOTE — PROGRESS NOTE ADULT - PROBLEM SELECTOR PLAN 8
MBS studied showed some hypertrophy c4/c5  Pt placed on dysphagia 1 diet as per speech and swallow for now  GI consult placed, will need to follow up GI recommendation

## 2018-02-14 NOTE — CONSULT NOTE ADULT - SUBJECTIVE AND OBJECTIVE BOX
Patient is a 86y old  Female who presents with a chief complaint of shortness of breath (05 Feb 2018 13:31)      HPI:  87 yo F w/ hx CKD5, CAD, Dm2 presents to ER for dialysis initiation from nephrologist office.    Came in with SOB, abdominal distension and URIEL. Had uremia and fluid overload. Had dialysis initiated. Patient had episode of dysphagia. On 2/10 she "chiked " while eating chicken and rice. Bedside swallow  showed decreased mastication ability, and decreased bolus movement. AS per PA note, MBS showed thyroglossal hypertrophy. I saw pt at bedside. Now on dysphagia 1 diet. Pt just ate the entire lunch tray with no difficulty.     REVIEW OF SYSTEMS:  Constitutional: No fever, weight loss or fatigue  ENMT:  No difficulty hearing, tinnitus, vertigo; No sinus or throat pain  Respiratory: No cough, wheezing, chills or hemoptysis  Cardiovascular: No chest pain, palpitations, dizziness or leg swelling +SOB  Gastrointestinal: No abdominal or epigastric pain. No nausea, vomiting or hematemesis; No diarrhea or constipation. No melena or hematochezia.  Skin: No itching, burning, rashes or lesions   Musculoskeletal: No joint pain or swelling; No muscle, back or extremity pain    PAST MEDICAL & SURGICAL HISTORY:  Left bundle branch block  Osteoporosis  Diabetic neuropathy  Peripheral arterial disease  Spinal stenosis  Coronary artery disease  Chronic renal failure  Pacemaker: Medtronic 2011  Hypothyroid  Hyperlipemia  Hypertension  Diabetes  S/P CABG x 3  Artificial cardiac pacemaker      FAMILY HISTORY:  No pertinent family history in first degree relatives      SOCIAL HISTORY:  Smoking Status: [ ] Current, [ ] Former, [ ] Never  Pack Years:  [  ] EtOH-no  [  ] IVDA-no    MEDICATIONS:  MEDICATIONS  (STANDING):  AQUAPHOR (petrolatum Ointment) 1 Application(s) Topical two times a day  aspirin enteric coated 81 milliGRAM(s) Oral daily  calcitriol   Capsule 0.25 MICROGram(s) Oral daily  calcium acetate 1334 milliGRAM(s) Oral three times a day with meals  carvedilol 12.5 milliGRAM(s) Oral every 12 hours  clopidogrel Tablet 75 milliGRAM(s) Oral daily  dextrose 5%. 1000 milliLiter(s) (50 mL/Hr) IV Continuous <Continuous>  dextrose 50% Injectable 12.5 Gram(s) IV Push once  dextrose 50% Injectable 25 Gram(s) IV Push once  dextrose 50% Injectable 25 Gram(s) IV Push once  ergocalciferol 34491 Unit(s) Oral every week  folic acid 1 milliGRAM(s) Oral daily  gabapentin 100 milliGRAM(s) Oral at bedtime  heparin  Injectable 5000 Unit(s) SubCutaneous every 12 hours  insulin lispro (HumaLOG) corrective regimen sliding scale   SubCutaneous Before meals and at bedtime  levothyroxine 112 MICROGram(s) Oral daily  lisinopril 30 milliGRAM(s) Oral daily    MEDICATIONS  (PRN):  acetaminophen   Tablet. 650 milliGRAM(s) Oral every 6 hours PRN headache  dextrose Gel 1 Dose(s) Oral once PRN Blood Glucose LESS THAN 70 milliGRAM(s)/deciliter  glucagon  Injectable 1 milliGRAM(s) IntraMuscular once PRN Glucose LESS THAN 70 milligrams/deciliter  hydrALAZINE 25 milliGRAM(s) Oral every 6 hours PRN Systolic blood pressure > 160      Allergies    penicillin (Anaphylaxis)  seafood (Anaphylaxis)  shellfish (Anaphylaxis)    Intolerances        Vital Signs Last 24 Hrs  T(C): 36.8 (14 Feb 2018 07:48), Max: 37.1 (14 Feb 2018 01:17)  T(F): 98.2 (14 Feb 2018 07:48), Max: 98.8 (14 Feb 2018 01:17)  HR: 64 (14 Feb 2018 07:48) (64 - 80)  BP: 134/60 (14 Feb 2018 07:48) (134/60 - 162/57)  BP(mean): --  RR: 18 (14 Feb 2018 07:48) (18 - 18)  SpO2: 97% (14 Feb 2018 01:17) (97% - 98%)    02-13 @ 07:01  -  02-14 @ 07:00  --------------------------------------------------------  IN: 1000 mL / OUT: 2500 mL / NET: -1500 mL          PHYSICAL EXAM:    General: Well developed; well nourished; in no acute distress  HEENT: MMM, conjunctiva and sclera clear  H -RRR  l -CTA  Gastrointestinal: Soft, non-tender non-distended; Normal bowel sounds; No rebound or guarding  Extremities: Normal range of motion, No clubbing, cyanosis or edema  Neurological: Alert and oriented x3  Skin: Warm and dry. No obvious rash      LABS:                        11.1   4.0   )-----------( 107      ( 13 Feb 2018 08:30 )             33.9     13 Feb 2018 08:30    141    |  99     |  42.0   ----------------------------<  100    4.2     |  26.0   |  3.46     Ca    8.1        13 Feb 2018 08:30  Phos  4.1       13 Feb 2018 08:30                RADIOLOGY & ADDITIONAL STUDIES: Patient is a 86y old  Female who presents with a chief complaint of shortness of breath (05 Feb 2018 13:31)      HPI: anisha  used   87 yo F w/ hx CKD5, CAD, Dm2 presents to ER for dialysis initiation from nephrologist office.    Came in with SOB, abdominal distension and URIEL. Had uremia and fluid overload. Had dialysis initiated.  Patient states for the past one month, pills ge stuck in the cervical esophagus area. Hoarse voice, dry mouth and tounge.  Patient had episode of dysphagia. On 2/10 she "choked  " while eating chicken and rice. Bedside swallow  showed decreased mastication ability, and decreased bolus movement. AS per PA note, MBS showed thyroglossal/cricopharygeal  hypertrophy. I saw pt at bedside. Now on dysphagia 1 diet. Pt just ate the entire lunch tray with no difficulty. + gerd, has heartburn/regurgitation at night. Sometimes severe.  was taking Mylanta . No weight loss.     REVIEW OF SYSTEMS:  Constitutional: No fever, weight loss or fatigue  ENMT:  No difficulty hearing, tinnitus, vertigo; No sinus or throat pain  Respiratory: No cough, wheezing, chills or hemoptysis  Cardiovascular: No chest pain, palpitations, dizziness or leg swelling +SOB  Gastrointestinal: No abdominal or epigastric pain. No nausea, vomiting or hematemesis; No diarrhea or constipation. No melena or hematochezia.+GERD  Skin: No itching, burning, rashes or lesions   Musculoskeletal: No joint pain or swelling; No muscle, back or extremity pain    PAST MEDICAL & SURGICAL HISTORY:  Left bundle branch block  Osteoporosis  Diabetic neuropathy  Peripheral arterial disease  Spinal stenosis  Coronary artery disease  Chronic renal failure  Pacemaker: Medtronic 2011  Hypothyroid  Hyperlipemia  Hypertension  Diabetes  S/P CABG x 3  Artificial cardiac pacemaker      FAMILY HISTORY:  No pertinent family history in first degree relatives      SOCIAL HISTORY:  Smoking Status: [ ] Current, [ ] Former, [ ] Never  Pack Years:  [  ] EtOH-no  [  ] IVDA-no    MEDICATIONS:  MEDICATIONS  (STANDING):  AQUAPHOR (petrolatum Ointment) 1 Application(s) Topical two times a day  aspirin enteric coated 81 milliGRAM(s) Oral daily  calcitriol   Capsule 0.25 MICROGram(s) Oral daily  calcium acetate 1334 milliGRAM(s) Oral three times a day with meals  carvedilol 12.5 milliGRAM(s) Oral every 12 hours  clopidogrel Tablet 75 milliGRAM(s) Oral daily  dextrose 5%. 1000 milliLiter(s) (50 mL/Hr) IV Continuous <Continuous>  dextrose 50% Injectable 12.5 Gram(s) IV Push once  dextrose 50% Injectable 25 Gram(s) IV Push once  dextrose 50% Injectable 25 Gram(s) IV Push once  ergocalciferol 14852 Unit(s) Oral every week  folic acid 1 milliGRAM(s) Oral daily  gabapentin 100 milliGRAM(s) Oral at bedtime  heparin  Injectable 5000 Unit(s) SubCutaneous every 12 hours  insulin lispro (HumaLOG) corrective regimen sliding scale   SubCutaneous Before meals and at bedtime  levothyroxine 112 MICROGram(s) Oral daily  lisinopril 30 milliGRAM(s) Oral daily    MEDICATIONS  (PRN):  acetaminophen   Tablet. 650 milliGRAM(s) Oral every 6 hours PRN headache  dextrose Gel 1 Dose(s) Oral once PRN Blood Glucose LESS THAN 70 milliGRAM(s)/deciliter  glucagon  Injectable 1 milliGRAM(s) IntraMuscular once PRN Glucose LESS THAN 70 milligrams/deciliter  hydrALAZINE 25 milliGRAM(s) Oral every 6 hours PRN Systolic blood pressure > 160      Allergies    penicillin (Anaphylaxis)  seafood (Anaphylaxis)  shellfish (Anaphylaxis)    Intolerances        Vital Signs Last 24 Hrs  T(C): 36.8 (14 Feb 2018 07:48), Max: 37.1 (14 Feb 2018 01:17)  T(F): 98.2 (14 Feb 2018 07:48), Max: 98.8 (14 Feb 2018 01:17)  HR: 64 (14 Feb 2018 07:48) (64 - 80)  BP: 134/60 (14 Feb 2018 07:48) (134/60 - 162/57)  BP(mean): --  RR: 18 (14 Feb 2018 07:48) (18 - 18)  SpO2: 97% (14 Feb 2018 01:17) (97% - 98%)    02-13 @ 07:01  -  02-14 @ 07:00  --------------------------------------------------------  IN: 1000 mL / OUT: 2500 mL / NET: -1500 mL          PHYSICAL EXAM:    General: Well developed; well nourished; in no acute distress  HEENT: MMM, conjunctiva and sclera clear  H -RRR  l -CTA  Gastrointestinal: Soft, non-tender non-distended; Normal bowel sounds; No rebound or guarding  Extremities: Normal range of motion, No clubbing, cyanosis or edema  Neurological: Alert and oriented x3  Skin: Warm and dry. No obvious rash      LABS:                        11.1   4.0   )-----------( 107      ( 13 Feb 2018 08:30 )             33.9     13 Feb 2018 08:30    141    |  99     |  42.0   ----------------------------<  100    4.2     |  26.0   |  3.46     Ca    8.1        13 Feb 2018 08:30  Phos  4.1       13 Feb 2018 08:30                RADIOLOGY & ADDITIONAL STUDIES:

## 2018-02-14 NOTE — SWALLOW VFSS/MBS ASSESSMENT ADULT - DEMONSTRATES NEED FOR REFERRAL TO ANOTHER SERVICE
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  Pt reporting "it feels like shes's choking".

## 2018-02-14 NOTE — SWALLOW VFSS/MBS ASSESSMENT ADULT - SLP GENERAL OBSERVATIONS
A&A, Ox3, c/o pain in left side (head, neck and shoulder), MICKEY Barth notified, Language line utilized ID#[id number]

## 2018-02-14 NOTE — SWALLOW VFSS/MBS ASSESSMENT ADULT - DIAGNOSTIC IMPRESSIONS
Mild-moderate oral dysphagia marked by reduced lingual coordination; Mild pharyngeal dysphagia with foods marked by reduced tongue base retraction, vallecular residue noted to increase after soft solid; Mild pharyngeal dysphagia with nectar thick liquids during large sips marked by reduced airway closure, reduced laryngeal elevation +intermittent trace penetration above the vocal cords with clearance, no penetration observed during small sips; Severe pharyngeal dysphagia with thin liquid marked by +silent aspiration with head neutral, +silent penetration above the vocal cords without clearance observed during chin tuck posture.  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  Pt reporting "it feels like shes's choking" after all consistencies with exception of puree.

## 2018-02-14 NOTE — SWALLOW VFSS/MBS ASSESSMENT ADULT - LARYNGEAL PENETRATION DURING THE SWALLOW - SILENT
Trace/intermittently with large sips, above the vocal cords with clearance Mild/during attempted chin tuck posture

## 2018-02-15 LAB
ANION GAP SERPL CALC-SCNC: 13 MMOL/L — SIGNIFICANT CHANGE UP (ref 5–17)
BUN SERPL-MCNC: 47 MG/DL — HIGH (ref 8–20)
CALCIUM SERPL-MCNC: 8.1 MG/DL — LOW (ref 8.6–10.2)
CHLORIDE SERPL-SCNC: 99 MMOL/L — SIGNIFICANT CHANGE UP (ref 98–107)
CO2 SERPL-SCNC: 28 MMOL/L — SIGNIFICANT CHANGE UP (ref 22–29)
CREAT SERPL-MCNC: 3.04 MG/DL — HIGH (ref 0.5–1.3)
GLUCOSE BLDC GLUCOMTR-MCNC: 104 MG/DL — HIGH (ref 70–99)
GLUCOSE BLDC GLUCOMTR-MCNC: 147 MG/DL — HIGH (ref 70–99)
GLUCOSE BLDC GLUCOMTR-MCNC: 164 MG/DL — HIGH (ref 70–99)
GLUCOSE BLDC GLUCOMTR-MCNC: 98 MG/DL — SIGNIFICANT CHANGE UP (ref 70–99)
GLUCOSE SERPL-MCNC: 107 MG/DL — SIGNIFICANT CHANGE UP (ref 70–115)
HCT VFR BLD CALC: 34.4 % — LOW (ref 37–47)
HGB BLD-MCNC: 11 G/DL — LOW (ref 12–16)
MCHC RBC-ENTMCNC: 29.2 PG — SIGNIFICANT CHANGE UP (ref 27–31)
MCHC RBC-ENTMCNC: 32 G/DL — SIGNIFICANT CHANGE UP (ref 32–36)
MCV RBC AUTO: 91.2 FL — SIGNIFICANT CHANGE UP (ref 81–99)
PLATELET # BLD AUTO: 152 K/UL — SIGNIFICANT CHANGE UP (ref 150–400)
POTASSIUM SERPL-MCNC: 5 MMOL/L — SIGNIFICANT CHANGE UP (ref 3.5–5.3)
POTASSIUM SERPL-SCNC: 5 MMOL/L — SIGNIFICANT CHANGE UP (ref 3.5–5.3)
RBC # BLD: 3.77 M/UL — LOW (ref 4.4–5.2)
RBC # FLD: 17.8 % — HIGH (ref 11–15.6)
SODIUM SERPL-SCNC: 140 MMOL/L — SIGNIFICANT CHANGE UP (ref 135–145)
TSH SERPL-MCNC: 5.54 UIU/ML — HIGH (ref 0.27–4.2)
WBC # BLD: 4.8 K/UL — SIGNIFICANT CHANGE UP (ref 4.8–10.8)
WBC # FLD AUTO: 4.8 K/UL — SIGNIFICANT CHANGE UP (ref 4.8–10.8)

## 2018-02-15 PROCEDURE — 99232 SBSQ HOSP IP/OBS MODERATE 35: CPT

## 2018-02-15 RX ORDER — PANTOPRAZOLE SODIUM 20 MG/1
40 TABLET, DELAYED RELEASE ORAL DAILY
Qty: 0 | Refills: 0 | Status: DISCONTINUED | OUTPATIENT
Start: 2018-02-15 | End: 2018-02-15

## 2018-02-15 RX ORDER — TRAMADOL HYDROCHLORIDE 50 MG/1
50 TABLET ORAL ONCE
Qty: 0 | Refills: 0 | Status: DISCONTINUED | OUTPATIENT
Start: 2018-02-15 | End: 2018-02-15

## 2018-02-15 RX ORDER — PANTOPRAZOLE SODIUM 20 MG/1
40 TABLET, DELAYED RELEASE ORAL
Qty: 0 | Refills: 0 | Status: DISCONTINUED | OUTPATIENT
Start: 2018-02-15 | End: 2018-02-17

## 2018-02-15 RX ORDER — ONDANSETRON 8 MG/1
4 TABLET, FILM COATED ORAL EVERY 6 HOURS
Qty: 0 | Refills: 0 | Status: DISCONTINUED | OUTPATIENT
Start: 2018-02-15 | End: 2018-02-17

## 2018-02-15 RX ADMIN — ONDANSETRON 4 MILLIGRAM(S): 8 TABLET, FILM COATED ORAL at 17:32

## 2018-02-15 RX ADMIN — Medication 1 MILLIGRAM(S): at 12:27

## 2018-02-15 RX ADMIN — Medication 112 MICROGRAM(S): at 06:36

## 2018-02-15 RX ADMIN — GABAPENTIN 100 MILLIGRAM(S): 400 CAPSULE ORAL at 22:18

## 2018-02-15 RX ADMIN — Medication 25 MILLIGRAM(S): at 17:28

## 2018-02-15 RX ADMIN — Medication 1 APPLICATION(S): at 06:36

## 2018-02-15 RX ADMIN — Medication 650 MILLIGRAM(S): at 18:27

## 2018-02-15 RX ADMIN — Medication 81 MILLIGRAM(S): at 12:27

## 2018-02-15 RX ADMIN — CALCITRIOL 0.25 MICROGRAM(S): 0.5 CAPSULE ORAL at 12:27

## 2018-02-15 RX ADMIN — PANTOPRAZOLE SODIUM 40 MILLIGRAM(S): 20 TABLET, DELAYED RELEASE ORAL at 17:28

## 2018-02-15 RX ADMIN — LISINOPRIL 30 MILLIGRAM(S): 2.5 TABLET ORAL at 06:36

## 2018-02-15 RX ADMIN — CARVEDILOL PHOSPHATE 12.5 MILLIGRAM(S): 80 CAPSULE, EXTENDED RELEASE ORAL at 17:28

## 2018-02-15 RX ADMIN — Medication 650 MILLIGRAM(S): at 06:39

## 2018-02-15 RX ADMIN — Medication 1 APPLICATION(S): at 17:28

## 2018-02-15 RX ADMIN — HEPARIN SODIUM 5000 UNIT(S): 5000 INJECTION INTRAVENOUS; SUBCUTANEOUS at 17:29

## 2018-02-15 RX ADMIN — Medication 650 MILLIGRAM(S): at 06:58

## 2018-02-15 RX ADMIN — Medication 650 MILLIGRAM(S): at 17:28

## 2018-02-15 RX ADMIN — HEPARIN SODIUM 5000 UNIT(S): 5000 INJECTION INTRAVENOUS; SUBCUTANEOUS at 06:36

## 2018-02-15 RX ADMIN — CARVEDILOL PHOSPHATE 12.5 MILLIGRAM(S): 80 CAPSULE, EXTENDED RELEASE ORAL at 06:36

## 2018-02-15 RX ADMIN — CLOPIDOGREL BISULFATE 75 MILLIGRAM(S): 75 TABLET, FILM COATED ORAL at 12:27

## 2018-02-15 RX ADMIN — Medication 1334 MILLIGRAM(S): at 12:27

## 2018-02-15 RX ADMIN — TRAMADOL HYDROCHLORIDE 50 MILLIGRAM(S): 50 TABLET ORAL at 12:12

## 2018-02-15 RX ADMIN — ERGOCALCIFEROL 50000 UNIT(S): 1.25 CAPSULE ORAL at 12:27

## 2018-02-15 RX ADMIN — Medication 1: at 17:28

## 2018-02-15 RX ADMIN — TRAMADOL HYDROCHLORIDE 50 MILLIGRAM(S): 50 TABLET ORAL at 13:00

## 2018-02-15 NOTE — PROGRESS NOTE ADULT - ASSESSMENT
87 yo F w/ hx CKD5, CAD, Dm2 presents to ER for dialysis initiation from nephrologist office. Pt was noted to have positive troponin, TTE showed LVEF 35-40%, grade 2 diastolic dysfunction, moderately reduced RV systolic function. Pt was seen by cardiology. S/p Right and Left heart Cath on 2/9/2018, [place]- Patent Grafts, Moderate MR. Pt is started on new HD, received HD Tuesday february 13. Pt had MBS done this morning after noted to have some difficult swallowing food. MBS noted with thyroglossal (c4/c5) hypertrophy. Dysphagia 1 diet as per speech and swallow. GI consult placed, follow up GI recommendation. Case management working on HD spot and transportation to HD. As per family, pt has an Aid at home 7 days a week from 10:30am - 5pm. Family interested in obtaining another aid during the night time. Case management has set up HD spot, 5am spot TTS. Case management to inquire further about increasing aid hours. Pt will also need to be discharged home with transport wheel chair for transportation to and from HD due to decreased mobility. 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 May have element of GERD as well. Nevertheless she is doing better on modified diet. Suggest continue pantoprazole BID. No further w/u or plans from GI standpoint.

## 2018-02-15 NOTE — PROGRESS NOTE ADULT - SUBJECTIVE AND OBJECTIVE BOX
NEPHROLOGY INTERVAL HPI/OVERNIGHT EVENTS:  pt seen at HD  tolerating well with no complaints    MEDICATIONS  (STANDING):  AQUAPHOR (petrolatum Ointment) 1 Application(s) Topical two times a day  aspirin enteric coated 81 milliGRAM(s) Oral daily  calcitriol   Capsule 0.25 MICROGram(s) Oral daily  calcium acetate 1334 milliGRAM(s) Oral three times a day with meals  carvedilol 12.5 milliGRAM(s) Oral every 12 hours  clopidogrel Tablet 75 milliGRAM(s) Oral daily  dextrose 5%. 1000 milliLiter(s) (50 mL/Hr) IV Continuous <Continuous>  dextrose 50% Injectable 12.5 Gram(s) IV Push once  dextrose 50% Injectable 25 Gram(s) IV Push once  dextrose 50% Injectable 25 Gram(s) IV Push once  ergocalciferol 03518 Unit(s) Oral every week  folic acid 1 milliGRAM(s) Oral daily  gabapentin 100 milliGRAM(s) Oral at bedtime  heparin  Injectable 5000 Unit(s) SubCutaneous every 12 hours  insulin lispro (HumaLOG) corrective regimen sliding scale   SubCutaneous Before meals and at bedtime  levothyroxine 112 MICROGram(s) Oral daily  lisinopril 30 milliGRAM(s) Oral daily  pantoprazole   Suspension 40 milliGRAM(s) Oral daily    MEDICATIONS  (PRN):  acetaminophen   Tablet. 650 milliGRAM(s) Oral every 6 hours PRN headache  dextrose Gel 1 Dose(s) Oral once PRN Blood Glucose LESS THAN 70 milliGRAM(s)/deciliter  glucagon  Injectable 1 milliGRAM(s) IntraMuscular once PRN Glucose LESS THAN 70 milligrams/deciliter  hydrALAZINE 25 milliGRAM(s) Oral every 6 hours PRN Systolic blood pressure > 160      Allergies    penicillin (Anaphylaxis)  seafood (Anaphylaxis)  shellfish (Anaphylaxis)      Vital Signs Last 24 Hrs  T(C): 36.6 (15 Feb 2018 07:30), Max: 37.1 (14 Feb 2018 16:22)  T(F): 97.9 (15 Feb 2018 07:30), Max: 98.8 (14 Feb 2018 16:22)  HR: 64 (15 Feb 2018 07:30) (64 - 74)  BP: 143/59 (15 Feb 2018 07:30) (143/59 - 151/63)  BP(mean): --  RR: 18 (15 Feb 2018 07:30) (18 - 18)  SpO2: 100% (15 Feb 2018 07:30) (100% - 100%)    PHYSICAL EXAM:  GENERAL: NAD   HEAD:  Atraumatic, Normocephalic  EYES: EOMI  NECK: Supple, no jvd  CHEST/LUNG: Clear bilaterally  HEART: Regular rate and rhythm; No rub  ABDOMEN: Soft, Nontender, Nondistended; Bowel sounds+  EXTREMITIES:  + edema B/L LE, R AV access patent    LABS:                        11.0   4.8   )-----------( 152      ( 15 Feb 2018 08:09 )             34.4     02-15    140  |  99  |  47.0<H>  ----------------------------<  107  5.0   |  28.0  |  3.04<H>    Ca    8.1<L>      15 Feb 2018 08:09              RADIOLOGY & ADDITIONAL TESTS:

## 2018-02-15 NOTE — PROGRESS NOTE ADULT - SUBJECTIVE AND OBJECTIVE BOX
Internal Medicine Hospitalist - Dr. Giovanni ROBERSON    [id number]    86y      Female    Patient is a 86y old  Female who presents with a chief complaint of shortness of breath (05 Feb 2018 13:31)    INTERVAL HPI/ OVERNIGHT EVENTS: Patient is seen and examined, getting HD today, denied cough and SOB, report mild cough.    REVIEW OF SYSTEMS:    Denied fever, chills, abd. pain, nausea, vomiting, chest pain, SOB, dizziness    PHYSICAL EXAM:    Vital Signs Last 24 Hrs  T(C): 36.6 (15 Feb 2018 07:30), Max: 37.1 (14 Feb 2018 16:22)  T(F): 97.9 (15 Feb 2018 07:30), Max: 98.8 (14 Feb 2018 16:22)  HR: 64 (15 Feb 2018 07:30) (64 - 74)  BP: 143/59 (15 Feb 2018 07:30) (143/59 - 151/63)  BP(mean): --  RR: 18 (15 Feb 2018 07:30) (18 - 18)  SpO2: 100% (15 Feb 2018 07:30) (100% - 100%)    GENERAL: NAD  HEENT: EOMI  Neck: supple  CHEST/LUNG: CTA b/l   HEART: S1S2+ audible  ABDOMEN: Soft, Nontender, Nondistended; Bowel sounds present  EXTREMITIES:  no edema  CNS: AAO X 3    LABS:                        11.0   4.8   )-----------( 152      ( 15 Feb 2018 08:09 )             34.4     02-15    140  |  99  |  47.0<H>  ----------------------------<  107  5.0   |  28.0  |  3.04<H>    Ca    8.1<L>      15 Feb 2018 08:09    Thyroid Stimulating Hormone, Serum (02.15.18 @ 08:09)    Thyroid Stimulating Hormone, Serum: 5.54 uIU/mL        MEDICATIONS  (STANDING):  AQUAPHOR (petrolatum Ointment) 1 Application(s) Topical two times a day  aspirin enteric coated 81 milliGRAM(s) Oral daily  calcitriol   Capsule 0.25 MICROGram(s) Oral daily  calcium acetate 1334 milliGRAM(s) Oral three times a day with meals  carvedilol 12.5 milliGRAM(s) Oral every 12 hours  clopidogrel Tablet 75 milliGRAM(s) Oral daily  dextrose 5%. 1000 milliLiter(s) (50 mL/Hr) IV Continuous <Continuous>  dextrose 50% Injectable 12.5 Gram(s) IV Push once  dextrose 50% Injectable 25 Gram(s) IV Push once  dextrose 50% Injectable 25 Gram(s) IV Push once  ergocalciferol 00700 Unit(s) Oral every week  folic acid 1 milliGRAM(s) Oral daily  gabapentin 100 milliGRAM(s) Oral at bedtime  heparin  Injectable 5000 Unit(s) SubCutaneous every 12 hours  insulin lispro (HumaLOG) corrective regimen sliding scale   SubCutaneous Before meals and at bedtime  levothyroxine 112 MICROGram(s) Oral daily  lisinopril 30 milliGRAM(s) Oral daily  pantoprazole   Suspension 40 milliGRAM(s) Oral daily  traMADol 50 milliGRAM(s) Oral once    MEDICATIONS  (PRN):  acetaminophen   Tablet. 650 milliGRAM(s) Oral every 6 hours PRN headache  dextrose Gel 1 Dose(s) Oral once PRN Blood Glucose LESS THAN 70 milliGRAM(s)/deciliter  glucagon  Injectable 1 milliGRAM(s) IntraMuscular once PRN Glucose LESS THAN 70 milligrams/deciliter  hydrALAZINE 25 milliGRAM(s) Oral every 6 hours PRN Systolic blood pressure > 160      RADIOLOGY & ADDITIONAL TEST Internal Medicine Hospitalist - Dr. Giovanni ROBERSON    [id number]    86y      Female    Patient is a 86y old  Female who presents with a chief complaint of shortness of breath (05 Feb 2018 13:31)    INTERVAL HPI/ OVERNIGHT EVENTS: Patient is seen and examined, getting HD today, denied cough and SOB, report mild cough.    REVIEW OF SYSTEMS:    Denied fever, chills, abd. pain, nausea, vomiting, chest pain, SOB, dizziness    PHYSICAL EXAM:    Vital Signs Last 24 Hrs  T(C): 36.6 (15 Feb 2018 07:30), Max: 37.1 (14 Feb 2018 16:22)  T(F): 97.9 (15 Feb 2018 07:30), Max: 98.8 (14 Feb 2018 16:22)  HR: 64 (15 Feb 2018 07:30) (64 - 74)  BP: 143/59 (15 Feb 2018 07:30) (143/59 - 151/63)  BP(mean): --  RR: 18 (15 Feb 2018 07:30) (18 - 18)  SpO2: 100% (15 Feb 2018 07:30) (100% - 100%)    GENERAL: NAD  CHEST/LUNG: CTA b/l   HEART: S1S2+ audible  ABDOMEN: Soft, Nontender, Nondistended; Bowel sounds present  EXTREMITIES:  no edema      LABS:                        11.0   4.8   )-----------( 152      ( 15 Feb 2018 08:09 )             34.4     02-15    140  |  99  |  47.0<H>  ----------------------------<  107  5.0   |  28.0  |  3.04<H>    Ca    8.1<L>      15 Feb 2018 08:09    Thyroid Stimulating Hormone, Serum (02.15.18 @ 08:09)    Thyroid Stimulating Hormone, Serum: 5.54 uIU/mL        MEDICATIONS  (STANDING):  AQUAPHOR (petrolatum Ointment) 1 Application(s) Topical two times a day  aspirin enteric coated 81 milliGRAM(s) Oral daily  calcitriol   Capsule 0.25 MICROGram(s) Oral daily  calcium acetate 1334 milliGRAM(s) Oral three times a day with meals  carvedilol 12.5 milliGRAM(s) Oral every 12 hours  clopidogrel Tablet 75 milliGRAM(s) Oral daily  dextrose 5%. 1000 milliLiter(s) (50 mL/Hr) IV Continuous <Continuous>  dextrose 50% Injectable 12.5 Gram(s) IV Push once  dextrose 50% Injectable 25 Gram(s) IV Push once  dextrose 50% Injectable 25 Gram(s) IV Push once  ergocalciferol 09706 Unit(s) Oral every week  folic acid 1 milliGRAM(s) Oral daily  gabapentin 100 milliGRAM(s) Oral at bedtime  heparin  Injectable 5000 Unit(s) SubCutaneous every 12 hours  insulin lispro (HumaLOG) corrective regimen sliding scale   SubCutaneous Before meals and at bedtime  levothyroxine 112 MICROGram(s) Oral daily  lisinopril 30 milliGRAM(s) Oral daily  pantoprazole   Suspension 40 milliGRAM(s) Oral daily  traMADol 50 milliGRAM(s) Oral once    MEDICATIONS  (PRN):  acetaminophen   Tablet. 650 milliGRAM(s) Oral every 6 hours PRN headache  dextrose Gel 1 Dose(s) Oral once PRN Blood Glucose LESS THAN 70 milliGRAM(s)/deciliter  glucagon  Injectable 1 milliGRAM(s) IntraMuscular once PRN Glucose LESS THAN 70 milligrams/deciliter  hydrALAZINE 25 milliGRAM(s) Oral every 6 hours PRN Systolic blood pressure > 160      RADIOLOGY & ADDITIONAL TEST

## 2018-02-15 NOTE — PROGRESS NOTE ADULT - ASSESSMENT
ESRD - HD now  - awaiting outpatient HD arrangements    Anemia - Hgb stable   - no need for MILLIE as yet    RO - Rocaltrol, Phoslo and Ergocalciferol

## 2018-02-15 NOTE — PROGRESS NOTE ADULT - SUBJECTIVE AND OBJECTIVE BOX
Pt seen and examined f/u oropharyngeal dysphagia  This morning results of MBS appreciated and patient on dysphagia 1 pureed diet with nectar consistancy. Yesterday she had some cramping and a loose BM as well as heartburn but tolerating diet without dysphagia, nausea or vomiting. This morning she feels much better without abdominal pain or further BMs. No heartburn and tolerating the diet.    REVIEW OF SYSTEMS:    CONSTITUTIONAL: No fever, weight loss, or fatigue  EYES: No eye pain, visual disturbances, or discharge  ENMT:  No difficulty hearing, tinnitus, vertigo; No sinus or throat pain  RESPIRATORY: No cough, wheezing, chills or hemoptysis; No shortness of breath  CARDIOVASCULAR: No chest pain, palpitations, dizziness, or leg swelling  GASTROINTESTINAL: as above    MEDICATIONS:  MEDICATIONS  (STANDING):  AQUAPHOR (petrolatum Ointment) 1 Application(s) Topical two times a day  aspirin enteric coated 81 milliGRAM(s) Oral daily  calcitriol   Capsule 0.25 MICROGram(s) Oral daily  calcium acetate 1334 milliGRAM(s) Oral three times a day with meals  carvedilol 12.5 milliGRAM(s) Oral every 12 hours  clopidogrel Tablet 75 milliGRAM(s) Oral daily  dextrose 5%. 1000 milliLiter(s) (50 mL/Hr) IV Continuous <Continuous>  dextrose 50% Injectable 12.5 Gram(s) IV Push once  dextrose 50% Injectable 25 Gram(s) IV Push once  dextrose 50% Injectable 25 Gram(s) IV Push once  ergocalciferol 39144 Unit(s) Oral every week  folic acid 1 milliGRAM(s) Oral daily  gabapentin 100 milliGRAM(s) Oral at bedtime  heparin  Injectable 5000 Unit(s) SubCutaneous every 12 hours  insulin lispro (HumaLOG) corrective regimen sliding scale   SubCutaneous Before meals and at bedtime  levothyroxine 112 MICROGram(s) Oral daily  lisinopril 30 milliGRAM(s) Oral daily  pantoprazole   Suspension 40 milliGRAM(s) Oral daily    MEDICATIONS  (PRN):  acetaminophen   Tablet. 650 milliGRAM(s) Oral every 6 hours PRN headache  dextrose Gel 1 Dose(s) Oral once PRN Blood Glucose LESS THAN 70 milliGRAM(s)/deciliter  glucagon  Injectable 1 milliGRAM(s) IntraMuscular once PRN Glucose LESS THAN 70 milligrams/deciliter  hydrALAZINE 25 milliGRAM(s) Oral every 6 hours PRN Systolic blood pressure > 160      Allergies    penicillin (Anaphylaxis)  seafood (Anaphylaxis)  shellfish (Anaphylaxis)    Intolerances        Vital Signs Last 24 Hrs  T(C): 37.1 (14 Feb 2018 23:41), Max: 37.1 (14 Feb 2018 16:22)  T(F): 98.8 (14 Feb 2018 23:41), Max: 98.8 (14 Feb 2018 16:22)  HR: 72 (15 Feb 2018 06:36) (72 - 74)  BP: 145/59 (15 Feb 2018 06:36) (145/59 - 151/63)  BP(mean): --  RR: 18 (14 Feb 2018 23:41) (18 - 18)  SpO2: 100% (14 Feb 2018 23:41) (100% - 100%)      PHYSICAL EXAM:    General: Well developed; well nourished, elderly female seen in dialysis and in no acute distress  HEENT: MMM, conjunctiva and sclera clear  Gastrointestinal:Abdomen: Soft non-tender non-distended; Normal bowel sounds; No hepatosplenomegaly  Extremities: no cyanosis, clubbing or edema.  Skin: Warm and dry. No obvious rash    LABS:      CBC Full  -  ( 13 Feb 2018 08:30 )  WBC Count : 4.0 K/uL  Hemoglobin : 11.1 g/dL  Hematocrit : 33.9 %  Platelet Count - Automated : 107 K/uL  Mean Cell Volume : 89.9 fl  Mean Cell Hemoglobin : 29.4 pg  Mean Cell Hemoglobin Concentration : 32.7 g/dL  Auto Neutrophil # : x  Auto Lymphocyte # : x  Auto Monocyte # : x  Auto Eosinophil # : x  Auto Basophil # : x  Auto Neutrophil % : x  Auto Lymphocyte % : x  Auto Monocyte % : x  Auto Eosinophil % : x  Auto Basophil % : x    02-13    141  |  99  |  42.0<H>  ----------------------------<  100  4.2   |  26.0  |  3.46<H>    Ca    8.1<L>      13 Feb 2018 08:30  Phos  4.1     02-13

## 2018-02-15 NOTE — PROGRESS NOTE ADULT - PROBLEM SELECTOR PLAN 8
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 May have element of GERD as well. Nevertheless she is doing better on modified diet. Suggest continue pantoprazole BID. No further w/u or plans from GI standpoint. 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 May have element of GERD as well. Nevertheless she is doing better on modified diet. Suggest continue pantoprazole BID. No further w/u or plans from GI standpoint.

## 2018-02-15 NOTE — PROGRESS NOTE ADULT - PROBLEM SELECTOR PLAN 1
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 May have element of GERD as well. Nevertheless she is doing better on modified diet. Suggest continue pantoprazole. No further w/u or plans from GI standpoint. Will see prn your request.

## 2018-02-16 ENCOUNTER — TRANSCRIPTION ENCOUNTER (OUTPATIENT)
Age: 83
End: 2018-02-16

## 2018-02-16 LAB
GLUCOSE BLDC GLUCOMTR-MCNC: 109 MG/DL — HIGH (ref 70–99)
GLUCOSE BLDC GLUCOMTR-MCNC: 134 MG/DL — HIGH (ref 70–99)
GLUCOSE BLDC GLUCOMTR-MCNC: 155 MG/DL — HIGH (ref 70–99)
GLUCOSE BLDC GLUCOMTR-MCNC: 167 MG/DL — HIGH (ref 70–99)

## 2018-02-16 PROCEDURE — 99239 HOSP IP/OBS DSCHRG MGMT >30: CPT

## 2018-02-16 RX ORDER — TRAMADOL HYDROCHLORIDE 50 MG/1
1 TABLET ORAL
Qty: 0 | Refills: 0 | COMMUNITY
Start: 2018-02-16

## 2018-02-16 RX ORDER — INSULIN DETEMIR 100/ML (3)
0 INSULIN PEN (ML) SUBCUTANEOUS
Qty: 0 | Refills: 0 | COMMUNITY

## 2018-02-16 RX ORDER — LEVOTHYROXINE SODIUM 125 MCG
1 TABLET ORAL
Qty: 0 | Refills: 0 | COMMUNITY

## 2018-02-16 RX ORDER — BUMETANIDE 0.25 MG/ML
1 INJECTION INTRAMUSCULAR; INTRAVENOUS
Qty: 0 | Refills: 0 | COMMUNITY

## 2018-02-16 RX ORDER — LEVOTHYROXINE SODIUM 125 MCG
1 TABLET ORAL
Qty: 30 | Refills: 0
Start: 2018-02-16 | End: 2018-03-17

## 2018-02-16 RX ORDER — ASPIRIN/CALCIUM CARB/MAGNESIUM 324 MG
1 TABLET ORAL
Qty: 0 | Refills: 0 | COMMUNITY
Start: 2018-02-16

## 2018-02-16 RX ORDER — INSULIN LISPRO 100/ML
1 VIAL (ML) SUBCUTANEOUS
Qty: 1 | Refills: 0
Start: 2018-02-16 | End: 2018-03-17

## 2018-02-16 RX ORDER — PANTOPRAZOLE SODIUM 20 MG/1
1 TABLET, DELAYED RELEASE ORAL
Qty: 30 | Refills: 0
Start: 2018-02-16 | End: 2018-03-17

## 2018-02-16 RX ORDER — LABETALOL HCL 100 MG
1 TABLET ORAL
Qty: 0 | Refills: 0 | COMMUNITY

## 2018-02-16 RX ORDER — TRAMADOL HYDROCHLORIDE 50 MG/1
50 TABLET ORAL ONCE
Qty: 0 | Refills: 0 | Status: DISCONTINUED | OUTPATIENT
Start: 2018-02-16 | End: 2018-02-16

## 2018-02-16 RX ORDER — GABAPENTIN 400 MG/1
1 CAPSULE ORAL
Qty: 30 | Refills: 0 | OUTPATIENT
Start: 2018-02-16 | End: 2018-03-17

## 2018-02-16 RX ORDER — CARVEDILOL PHOSPHATE 80 MG/1
1 CAPSULE, EXTENDED RELEASE ORAL
Qty: 60 | Refills: 0
Start: 2018-02-16 | End: 2018-03-17

## 2018-02-16 RX ORDER — TRAMADOL HYDROCHLORIDE 50 MG/1
50 TABLET ORAL
Qty: 0 | Refills: 0 | Status: DISCONTINUED | OUTPATIENT
Start: 2018-02-16 | End: 2018-02-17

## 2018-02-16 RX ORDER — LISINOPRIL 2.5 MG/1
1 TABLET ORAL
Qty: 30 | Refills: 0 | OUTPATIENT
Start: 2018-02-16 | End: 2018-03-17

## 2018-02-16 RX ADMIN — CALCITRIOL 0.25 MICROGRAM(S): 0.5 CAPSULE ORAL at 11:45

## 2018-02-16 RX ADMIN — TRAMADOL HYDROCHLORIDE 50 MILLIGRAM(S): 50 TABLET ORAL at 17:42

## 2018-02-16 RX ADMIN — CARVEDILOL PHOSPHATE 12.5 MILLIGRAM(S): 80 CAPSULE, EXTENDED RELEASE ORAL at 17:22

## 2018-02-16 RX ADMIN — ONDANSETRON 4 MILLIGRAM(S): 8 TABLET, FILM COATED ORAL at 10:46

## 2018-02-16 RX ADMIN — HEPARIN SODIUM 5000 UNIT(S): 5000 INJECTION INTRAVENOUS; SUBCUTANEOUS at 17:22

## 2018-02-16 RX ADMIN — Medication 650 MILLIGRAM(S): at 06:51

## 2018-02-16 RX ADMIN — Medication 1 APPLICATION(S): at 17:22

## 2018-02-16 RX ADMIN — CARVEDILOL PHOSPHATE 12.5 MILLIGRAM(S): 80 CAPSULE, EXTENDED RELEASE ORAL at 06:14

## 2018-02-16 RX ADMIN — TRAMADOL HYDROCHLORIDE 50 MILLIGRAM(S): 50 TABLET ORAL at 21:50

## 2018-02-16 RX ADMIN — PANTOPRAZOLE SODIUM 40 MILLIGRAM(S): 20 TABLET, DELAYED RELEASE ORAL at 06:14

## 2018-02-16 RX ADMIN — Medication 112 MICROGRAM(S): at 06:14

## 2018-02-16 RX ADMIN — Medication 1 MILLIGRAM(S): at 11:45

## 2018-02-16 RX ADMIN — Medication 1: at 17:22

## 2018-02-16 RX ADMIN — TRAMADOL HYDROCHLORIDE 50 MILLIGRAM(S): 50 TABLET ORAL at 18:42

## 2018-02-16 RX ADMIN — LISINOPRIL 30 MILLIGRAM(S): 2.5 TABLET ORAL at 06:14

## 2018-02-16 RX ADMIN — PANTOPRAZOLE SODIUM 40 MILLIGRAM(S): 20 TABLET, DELAYED RELEASE ORAL at 17:23

## 2018-02-16 RX ADMIN — Medication 1 APPLICATION(S): at 06:15

## 2018-02-16 RX ADMIN — Medication 1334 MILLIGRAM(S): at 17:22

## 2018-02-16 RX ADMIN — HEPARIN SODIUM 5000 UNIT(S): 5000 INJECTION INTRAVENOUS; SUBCUTANEOUS at 06:14

## 2018-02-16 RX ADMIN — CLOPIDOGREL BISULFATE 75 MILLIGRAM(S): 75 TABLET, FILM COATED ORAL at 11:45

## 2018-02-16 RX ADMIN — TRAMADOL HYDROCHLORIDE 50 MILLIGRAM(S): 50 TABLET ORAL at 12:46

## 2018-02-16 RX ADMIN — TRAMADOL HYDROCHLORIDE 50 MILLIGRAM(S): 50 TABLET ORAL at 11:46

## 2018-02-16 RX ADMIN — Medication 25 MILLIGRAM(S): at 06:14

## 2018-02-16 RX ADMIN — Medication 650 MILLIGRAM(S): at 06:15

## 2018-02-16 RX ADMIN — TRAMADOL HYDROCHLORIDE 50 MILLIGRAM(S): 50 TABLET ORAL at 21:20

## 2018-02-16 RX ADMIN — Medication 1: at 11:45

## 2018-02-16 RX ADMIN — Medication 1334 MILLIGRAM(S): at 07:43

## 2018-02-16 RX ADMIN — Medication 1334 MILLIGRAM(S): at 11:45

## 2018-02-16 RX ADMIN — Medication 81 MILLIGRAM(S): at 11:45

## 2018-02-16 RX ADMIN — GABAPENTIN 100 MILLIGRAM(S): 400 CAPSULE ORAL at 21:20

## 2018-02-16 NOTE — DISCHARGE NOTE ADULT - PROVIDER TOKENS
FREE:[LAST:[Primary care physician],PHONE:[(   )    -],FAX:[(   )    -]] TOKEN:'[id number]',TOKEN:'[id number]',FREE:[LAST:[Primary care physician],PHONE:[(   )    -],FAX:[(   )    -]],TOKEN:'[id number]'

## 2018-02-16 NOTE — DISCHARGE NOTE ADULT - CARE PLAN
Principal Discharge DX:	ESRD (end stage renal disease) on dialysis  Goal:	Stable  Assessment and plan of treatment:	Newly started on HD  Pt has outpatient HD spot for TTS  Pt to follow up with o/p nephrologist as needed  Secondary Diagnosis:	Coronary artery disease  Assessment and plan of treatment:	Pt to continue taking all home meds   Stable  Secondary Diagnosis:	Diabetes  Assessment and plan of treatment:	Pt to continue taking all home meds  Secondary Diagnosis:	HTN (hypertension)  Assessment and plan of treatment:	Home meds  Secondary Diagnosis:	Hyperlipemia  Assessment and plan of treatment:	Home meds  Secondary Diagnosis:	Acquired hypothyroidism  Assessment and plan of treatment:	Synthroid increased  Will need repeat TFT in 4-6 weeks as outpatient  Secondary Diagnosis:	Dysphagia  Assessment and plan of treatment:	Modified diet  Protonix BID   Follow up with o/p GI as needed Principal Discharge DX:	ESRD (end stage renal disease) on dialysis  Goal:	Stable  Assessment and plan of treatment:	Newly started on HD  Pt has outpatient HD spot for TTS  Pt to follow up with o/p nephrologist as needed  Secondary Diagnosis:	Coronary artery disease  Assessment and plan of treatment:	Pt to continue taking all home meds   Stable  Secondary Diagnosis:	Diabetes  Assessment and plan of treatment:	Pt to continue taking all home meds  Secondary Diagnosis:	HTN (hypertension)  Assessment and plan of treatment:	c/w medication as prescribed, monitor BP  Secondary Diagnosis:	Hyperlipemia  Assessment and plan of treatment:	Home meds  Secondary Diagnosis:	Acquired hypothyroidism  Assessment and plan of treatment:	Synthroid increased 112  Will need repeat TFT in 4-6 weeks as outpatient  Secondary Diagnosis:	Dysphagia  Assessment and plan of treatment:	Modified diet  Protonix BID   Follow up with o/p GI as needed Principal Discharge DX:	ESRD (end stage renal disease) on dialysis  Goal:	Stable  Assessment and plan of treatment:	Newly started on HD  Pt has outpatient HD spot for TTS  Pt to follow up with o/p nephrologist as needed  Secondary Diagnosis:	Coronary artery disease  Assessment and plan of treatment:	Pt to continue taking all home meds   Stable  Secondary Diagnosis:	Diabetes  Assessment and plan of treatment:	sliding scale  Secondary Diagnosis:	HTN (hypertension)  Assessment and plan of treatment:	c/w medication as prescribed, monitor BP  Secondary Diagnosis:	Hyperlipemia  Assessment and plan of treatment:	Home meds  Secondary Diagnosis:	Acquired hypothyroidism  Assessment and plan of treatment:	Synthroid increased 112  Will need repeat TFT in 4-6 weeks as outpatient  Secondary Diagnosis:	Dysphagia  Assessment and plan of treatment:	Modified diet  Protonix BID   Follow up with o/p GI as needed

## 2018-02-16 NOTE — DISCHARGE NOTE ADULT - MEDICATION SUMMARY - MEDICATIONS TO TAKE
I will START or STAY ON the medications listed below when I get home from the hospital:    transport wheelchair   -- 1 application between cheek and gums once a day   -- Indication: For weakness     fludrocortisone 0.1 mg oral tablet  -- 1 tab(s) by mouth every other day   -- Indication: For Home medication    traMADol 50 mg oral tablet  -- 1 tab(s) by mouth 2 times a day, As needed, Severe Pain (7 - 10)  -- Indication: For Home medication    aspirin 81 mg oral delayed release tablet  -- 1 tab(s) by mouth once a day  -- Indication: For Cad    lisinopril 20 mg oral tablet  -- 1 tab(s) by mouth once a day   -- Do not take this drug if you are pregnant.  It is very important that you take or use this exactly as directed.  Do not skip doses or discontinue unless directed by your doctor.  Some non-prescription drugs may aggravate your condition.  Read all labels carefully.  If a warning appears, check with your doctor before taking.    -- Indication: For HTN (hypertension)    gabapentin 100 mg oral capsule  -- 1 cap(s) by mouth once a day (at bedtime)  -- Indication: For neuropathy    HumaLOG 100 units/mL injectable solution  -- 1 milliliter(s) injectable 3 times a day (before meals)   -- Indication: For Dm    clopidogrel 75 mg oral tablet  -- 1 tab(s) by mouth once a day  -- Indication: For Cad    carvedilol 12.5 mg oral tablet  -- 1 tab(s) by mouth every 12 hours  -- Indication: For Chf    calcium acetate 667 mg oral tablet  -- 2 tab(s) by mouth 3 times a day  -- Indication: For ESRD (end stage renal disease) on dialysis    pantoprazole 40 mg oral granule, delayed release  -- 1 tab(s) by mouth once a day   -- Indication: For prophylaxis    levothyroxine 112 mcg (0.112 mg) oral tablet  -- 1 tab(s) by mouth once a day  -- Indication: For Hypothyroidism     calcitriol 0.25 mcg oral capsule  -- 1 cap(s) by mouth once a day  -- Indication: For supplement    Vitamin D2 50,000 intl units (1.25 mg) oral capsule  -- 1 cap(s) by mouth once a week  -- Indication: For supplement    folic acid 0.4 mg oral tablet  -- 1 tab(s) by mouth once a day  -- Indication: For supplement

## 2018-02-16 NOTE — DISCHARGE NOTE ADULT - CARE PROVIDER_API CALL
Primary care physician,   Phone: (   )    -  Fax: (   )    - Laura Philippe (DO), Gastroenterology  [address]  [address]  [place] [address]  Phone: [phone]  Fax: [phone]    Omar Lou), Internal Medicine; Nephrology  [address] [place] [address] A  [place], [place] [id number]  Phone: [phone]  Fax: [phone]    Primary care physician,   Phone: (   )    -  Fax: (   )    -    Rishi Mckeon), Cardiology; Cardiovascular Disease; Internal Medicine  [address] [place] [address]  Phone: [phone]  Fax: [phone]

## 2018-02-16 NOTE — PROGRESS NOTE ADULT - ASSESSMENT
85 yo F w/ hx CKD5, CAD, Dm2 presents to ER for dialysis initiation from nephrologist office. Pt was noted to have positive troponin, TTE showed LVEF 35-40%, grade 2 diastolic dysfunction, moderately reduced RV systolic function. Pt was seen by cardiology. S/p Right and Left heart Cath on 2/9/2018, [place]- [place], Moderate MR. Pt is started on new HD, received HD Tuesday february 13. Pt had MBS done this morning after noted to have some difficult swallowing food. MBS noted with thyroglossal (c4/c5) hypertrophy. Dysphagia 1 diet as per speech and swallow. GI consult placed, follow up GI recommendation. Case management working on HD spot and transportation to HD. As per family, pt has an Aid at home 7 days a week from 10:30am - 5pm. Family interested in obtaining another aid during the night time. Case management has set up HD spot, 5am spot TTS. Case management to inquire further about increasing aid hours. Pt will also need to be discharged home with transport wheel chair for transportation to and from HD due to decreased mobility. 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 May have element of GERD as well. Nevertheless she is doing better on modified diet. Suggest continue pantoprazole BID. No further w/u or plans from GI standpoint. Pt is possibly discharge to home later today

## 2018-02-16 NOTE — PROGRESS NOTE ADULT - SUBJECTIVE AND OBJECTIVE BOX
Patient seen and examined    Feels better  no c/o CP SOB NV   No swelling feet    Vital Signs Last 24 Hrs  T(C): 36.9 (16 Feb 2018 17:23), Max: 37.1 (16 Feb 2018 00:11)  T(F): 98.4 (16 Feb 2018 17:23), Max: 98.7 (16 Feb 2018 00:11)  HR: 68 (16 Feb 2018 17:23) (68 - 72)  BP: 122/77 (16 Feb 2018 17:23) (119/59 - 161/75)  BP(mean): --  RR: 19 (16 Feb 2018 07:22) (18 - 19)  SpO2: 98% (16 Feb 2018 00:11) (98% - 98%)    PHYSICAL EXAM    GENERAL: NAD,   EYES:  conjunctiva and sclera clear  NECK: Supple, No JVD/Bruit  NERVOUS SYSTEM:  A/O x3,   CHEST:  No rales, No rhonchi  HEART:  RRR, No murmur  ABDOMEN: Soft, NT/ND BS+  EXTREMITIES:  No Edema;  SKIN: No rashes    15 Feb 2018 08:09    140    |  99     |  47.0   ----------------------------<  107    5.0     |  28.0   |  3.04     Ca    8.1        15 Feb 2018 08:09                            11.0   4.8   )-----------( 152      ( 15 Feb 2018 08:09 )             34.4       HD am  likely d/c after HD  Continue present treatment

## 2018-02-16 NOTE — DISCHARGE NOTE ADULT - HOSPITAL COURSE
87 yo F w/ hx CKD5, CAD, Dm2 presents to ER for dialysis initiation from nephrologist office. Pt was noted to have positive troponin, TTE showed LVEF 35-40%, grade 2 diastolic dysfunction, moderately reduced RV systolic function. Pt was seen by cardiology. S/p Right and Left heart Cath on 2/9/2018, [place]- [place], Moderate MR. Pt is started on new HD, received HD Tuesday february 13. Pt had MBS done this morning after noted to have some difficult swallowing food. MBS noted with thyroglossal (c4/c5) hypertrophy. Dysphagia 1 diet as per speech and swallow. GI consult placed, follow up GI recommendation. Case management working on HD spot and transportation to HD. As per family, pt has an Aid at home 7 days a week from 10:30am - 5pm. Family interested in obtaining another aid during the night time. Case management has set up HD spot, 5am spot TTS. Case management to inquire further about increasing aid hours. Pt will also need to be discharged home with transport wheel chair for transportation to and from HD due to decreased mobility. 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 May have element of GERD as well. Nevertheless she is doing better on modified diet. Suggest continue pantoprazole BID. No further w/u or plans from GI standpoint. Pt to receive dialysis tomorrow Saturday 2/17/18 and then to be discharged home. Pt has dialysis appointment scheduled for Tuesday as outpatient This is 87 yo woman w/ hx CKD5, CAD, Dm2 presents to ER for dialysis initiation from nephrologist office. Pt was noted to have positive troponin, TTE showed LVEF 35-40%, grade 2 diastolic dysfunction, moderately reduced RV systolic function. Pt was seen by cardiology. S/p Right and Left heart Cath on 2/9/2018, [place]- [place], Moderate MR. Pt is started on new HD, received HD Tuesday february 13.  Case management involved for outpatient HD spot and transportation to HD. As per family, pt has an Aid at home 7 days a week from 10:30am - 5pm. Case management has set up HD spot, 5am spot TTS. Pt will also need to be discharged home with transport wheel chair for transportation to and from HD due to decreased mobility. 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 May have element of GERD as well. Nevertheless she is doing better on modified diet. Suggest continue pantoprazole BID. No further w/u or plans from GI standpoint. Pt to receive dialysis today Saturday 2/17/18 and then to be discharged home. Pt has dialysis appointment scheduled for Tuesday as outpatient.     Time spent 40 minutes

## 2018-02-16 NOTE — DISCHARGE NOTE ADULT - MEDICATION SUMMARY - MEDICATIONS TO STOP TAKING
I will STOP taking the medications listed below when I get home from the hospital:    labetalol 100 mg oral tablet  -- 1 tab(s) by mouth 2 times a day    Levemir 100 units/mL subcutaneous solution  --  subcutaneous once a day (at bedtime) according to sliding scale  -- patient states takes between 7-12 units bases on accucheck    cloNIDine 0.3 mg/24 hr transdermal film, extended release  -- 1 patch by transdermal patch once a week    furosemide 40 mg oral tablet  -- 1 tab(s) by mouth once a day    Levemir 100 units/mL subcutaneous solution  -- 6 to 10 unit(s) subcutaneous once a day (at bedtime)    bumetanide 2 mg oral tablet  -- 1 tab(s) by mouth once a day

## 2018-02-16 NOTE — DISCHARGE NOTE ADULT - PATIENT PORTAL LINK FT
You can access the [facility] Patient Portal, offered by [facility], by registering with the following website: [email or web address]

## 2018-02-16 NOTE — DISCHARGE NOTE ADULT - CARE PROVIDERS DIRECT ADDRESSES
,DirectAddress_Unknown ,[email or web address],DirectAddress_Unknown,DirectAddress_Unknown,DirectAddress_Unknown

## 2018-02-16 NOTE — DISCHARGE NOTE ADULT - PLAN OF CARE
Home meds Synthroid increased  Will need repeat TFT in 4-6 weeks as outpatient Modified diet  Protonix BID   Follow up with o/p GI as needed Stable Newly started on HD  Pt has outpatient HD spot for TTS  Pt to follow up with o/p nephrologist as needed Pt to continue taking all home meds   Stable Pt to continue taking all home meds c/w medication as prescribed, monitor BP Synthroid increased 112  Will need repeat TFT in 4-6 weeks as outpatient sliding scale

## 2018-02-16 NOTE — DISCHARGE NOTE ADULT - MEDICATION SUMMARY - MEDICATIONS TO CHANGE
I will SWITCH the dose or number of times a day I take the medications listed below when I get home from the hospital:    levothyroxine 100 mcg (0.1 mg) oral tablet  -- 1 tab(s) by mouth once a day    levothyroxine 100 mcg (0.1 mg) oral tablet  -- 1 tab(s) by mouth once a day

## 2018-02-16 NOTE — PROGRESS NOTE ADULT - PROBLEM SELECTOR PLAN 8
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 May have element of GERD as well. Nevertheless she is doing better on modified diet. Suggest continue pantoprazole BID. No further w/u or plans from GI standpoint.

## 2018-02-16 NOTE — PROGRESS NOTE ADULT - PROBLEM SELECTOR PLAN 1
Appreciate renal input, newly started on HD  hepatitis B and C negative  CC - has HD spot in community

## 2018-02-16 NOTE — PROGRESS NOTE ADULT - SUBJECTIVE AND OBJECTIVE BOX
Internal Medicine Hospitalist - Dr. Giovanni ROBERSON    [id number]    86y      Female    Patient is a 86y old  Female who presents with a chief complaint of shortness of breath (05 Feb 2018 13:31)    INTERVAL HPI/ OVERNIGHT EVENTS: Patient is seen and examined, no new event overnight.     REVIEW OF SYSTEMS:    Denied fever, chills, abd. pain, nausea, vomiting, chest pain, SOB, headache, dizziness    PHYSICAL EXAM:    Vital Signs Last 24 Hrs  T(C): 36.7 (16 Feb 2018 07:22), Max: 37.1 (16 Feb 2018 00:11)  T(F): 98.1 (16 Feb 2018 07:22), Max: 98.7 (16 Feb 2018 00:11)  HR: 69 (16 Feb 2018 07:22) (69 - 72)  BP: 119/59 (16 Feb 2018 07:22) (119/59 - 166/66)  BP(mean): --  RR: 19 (16 Feb 2018 07:22) (18 - 19)  SpO2: 98% (16 Feb 2018 00:11) (98% - 100%)    GENERAL: NAD  CHEST/LUNG: CTA b/l   HEART: S1S2+ audible  ABDOMEN: Soft, Nontender, Nondistended; Bowel sounds present  EXTREMITIES:  no edema      LABS:                        11.0   4.8   )-----------( 152      ( 15 Feb 2018 08:09 )             34.4     02-15    140  |  99  |  47.0<H>  ----------------------------<  107  5.0   |  28.0  |  3.04<H>    Ca    8.1<L>      15 Feb 2018 08:09              MEDICATIONS  (STANDING):  AQUAPHOR (petrolatum Ointment) 1 Application(s) Topical two times a day  aspirin enteric coated 81 milliGRAM(s) Oral daily  calcitriol   Capsule 0.25 MICROGram(s) Oral daily  calcium acetate 1334 milliGRAM(s) Oral three times a day with meals  carvedilol 12.5 milliGRAM(s) Oral every 12 hours  clopidogrel Tablet 75 milliGRAM(s) Oral daily  dextrose 5%. 1000 milliLiter(s) (50 mL/Hr) IV Continuous <Continuous>  dextrose 50% Injectable 12.5 Gram(s) IV Push once  dextrose 50% Injectable 25 Gram(s) IV Push once  dextrose 50% Injectable 25 Gram(s) IV Push once  ergocalciferol 64179 Unit(s) Oral every week  folic acid 1 milliGRAM(s) Oral daily  gabapentin 100 milliGRAM(s) Oral at bedtime  heparin  Injectable 5000 Unit(s) SubCutaneous every 12 hours  insulin lispro (HumaLOG) corrective regimen sliding scale   SubCutaneous Before meals and at bedtime  levothyroxine 112 MICROGram(s) Oral daily  lisinopril 30 milliGRAM(s) Oral daily  pantoprazole   Suspension 40 milliGRAM(s) Oral two times a day before meals    MEDICATIONS  (PRN):  acetaminophen   Tablet. 650 milliGRAM(s) Oral every 6 hours PRN headache  dextrose Gel 1 Dose(s) Oral once PRN Blood Glucose LESS THAN 70 milliGRAM(s)/deciliter  glucagon  Injectable 1 milliGRAM(s) IntraMuscular once PRN Glucose LESS THAN 70 milligrams/deciliter  hydrALAZINE 25 milliGRAM(s) Oral every 6 hours PRN Systolic blood pressure > 160  ondansetron Injectable 4 milliGRAM(s) IV Push every 6 hours PRN Nausea and/or Vomiting      RADIOLOGY & ADDITIONAL TEST

## 2018-02-17 VITALS
HEART RATE: 85 BPM | OXYGEN SATURATION: 98 % | SYSTOLIC BLOOD PRESSURE: 123 MMHG | DIASTOLIC BLOOD PRESSURE: 59 MMHG | TEMPERATURE: 98 F | RESPIRATION RATE: 18 BRPM

## 2018-02-17 LAB
ANION GAP SERPL CALC-SCNC: 14 MMOL/L — SIGNIFICANT CHANGE UP (ref 5–17)
BUN SERPL-MCNC: 43 MG/DL — HIGH (ref 8–20)
CALCIUM SERPL-MCNC: 8.7 MG/DL — SIGNIFICANT CHANGE UP (ref 8.6–10.2)
CHLORIDE SERPL-SCNC: 96 MMOL/L — LOW (ref 98–107)
CO2 SERPL-SCNC: 25 MMOL/L — SIGNIFICANT CHANGE UP (ref 22–29)
CREAT SERPL-MCNC: 3.36 MG/DL — HIGH (ref 0.5–1.3)
GLUCOSE BLDC GLUCOMTR-MCNC: 109 MG/DL — HIGH (ref 70–99)
GLUCOSE BLDC GLUCOMTR-MCNC: 117 MG/DL — HIGH (ref 70–99)
GLUCOSE BLDC GLUCOMTR-MCNC: 135 MG/DL — HIGH (ref 70–99)
GLUCOSE SERPL-MCNC: 123 MG/DL — HIGH (ref 70–115)
HCT VFR BLD CALC: 36.1 % — LOW (ref 37–47)
HGB BLD-MCNC: 11.4 G/DL — LOW (ref 12–16)
MCHC RBC-ENTMCNC: 29.2 PG — SIGNIFICANT CHANGE UP (ref 27–31)
MCHC RBC-ENTMCNC: 31.6 G/DL — LOW (ref 32–36)
MCV RBC AUTO: 92.6 FL — SIGNIFICANT CHANGE UP (ref 81–99)
PLATELET # BLD AUTO: 204 K/UL — SIGNIFICANT CHANGE UP (ref 150–400)
POTASSIUM SERPL-MCNC: 5.5 MMOL/L — HIGH (ref 3.5–5.3)
POTASSIUM SERPL-SCNC: 5.5 MMOL/L — HIGH (ref 3.5–5.3)
RBC # BLD: 3.9 M/UL — LOW (ref 4.4–5.2)
RBC # FLD: 17.8 % — HIGH (ref 11–15.6)
SODIUM SERPL-SCNC: 135 MMOL/L — SIGNIFICANT CHANGE UP (ref 135–145)
WBC # BLD: 4.4 K/UL — LOW (ref 4.8–10.8)
WBC # FLD AUTO: 4.4 K/UL — LOW (ref 4.8–10.8)

## 2018-02-17 PROCEDURE — 80053 COMPREHEN METABOLIC PANEL: CPT

## 2018-02-17 PROCEDURE — 99153 MOD SED SAME PHYS/QHP EA: CPT

## 2018-02-17 PROCEDURE — 82310 ASSAY OF CALCIUM: CPT

## 2018-02-17 PROCEDURE — 92610 EVALUATE SWALLOWING FUNCTION: CPT

## 2018-02-17 PROCEDURE — 92611 MOTION FLUOROSCOPY/SWALLOW: CPT

## 2018-02-17 PROCEDURE — 99152 MOD SED SAME PHYS/QHP 5/>YRS: CPT

## 2018-02-17 PROCEDURE — 84443 ASSAY THYROID STIM HORMONE: CPT

## 2018-02-17 PROCEDURE — T1013: CPT

## 2018-02-17 PROCEDURE — C1887: CPT

## 2018-02-17 PROCEDURE — 83970 ASSAY OF PARATHORMONE: CPT

## 2018-02-17 PROCEDURE — 82962 GLUCOSE BLOOD TEST: CPT

## 2018-02-17 PROCEDURE — 85730 THROMBOPLASTIN TIME PARTIAL: CPT

## 2018-02-17 PROCEDURE — 99285 EMERGENCY DEPT VISIT HI MDM: CPT | Mod: 25

## 2018-02-17 PROCEDURE — 82306 VITAMIN D 25 HYDROXY: CPT

## 2018-02-17 PROCEDURE — 93005 ELECTROCARDIOGRAM TRACING: CPT

## 2018-02-17 PROCEDURE — 99232 SBSQ HOSP IP/OBS MODERATE 35: CPT

## 2018-02-17 PROCEDURE — C1894: CPT

## 2018-02-17 PROCEDURE — 97163 PT EVAL HIGH COMPLEX 45 MIN: CPT

## 2018-02-17 PROCEDURE — 84484 ASSAY OF TROPONIN QUANT: CPT

## 2018-02-17 PROCEDURE — 71045 X-RAY EXAM CHEST 1 VIEW: CPT

## 2018-02-17 PROCEDURE — 36415 COLL VENOUS BLD VENIPUNCTURE: CPT

## 2018-02-17 PROCEDURE — 93461 R&L HRT ART/VENTRICLE ANGIO: CPT

## 2018-02-17 PROCEDURE — 85027 COMPLETE CBC AUTOMATED: CPT

## 2018-02-17 PROCEDURE — 92526 ORAL FUNCTION THERAPY: CPT

## 2018-02-17 PROCEDURE — 80048 BASIC METABOLIC PNL TOTAL CA: CPT

## 2018-02-17 PROCEDURE — 97116 GAIT TRAINING THERAPY: CPT

## 2018-02-17 PROCEDURE — 85610 PROTHROMBIN TIME: CPT

## 2018-02-17 PROCEDURE — 97110 THERAPEUTIC EXERCISES: CPT

## 2018-02-17 PROCEDURE — 84100 ASSAY OF PHOSPHORUS: CPT

## 2018-02-17 PROCEDURE — 97530 THERAPEUTIC ACTIVITIES: CPT

## 2018-02-17 PROCEDURE — 74230 X-RAY XM SWLNG FUNCJ C+: CPT

## 2018-02-17 PROCEDURE — 93306 TTE W/DOPPLER COMPLETE: CPT

## 2018-02-17 PROCEDURE — C1760: CPT

## 2018-02-17 PROCEDURE — C1769: CPT

## 2018-02-17 PROCEDURE — 83036 HEMOGLOBIN GLYCOSYLATED A1C: CPT

## 2018-02-17 PROCEDURE — C1889: CPT

## 2018-02-17 PROCEDURE — 83880 ASSAY OF NATRIURETIC PEPTIDE: CPT

## 2018-02-17 PROCEDURE — 99261: CPT

## 2018-02-17 RX ORDER — LISINOPRIL 2.5 MG/1
1 TABLET ORAL
Qty: 30 | Refills: 0
Start: 2018-02-17 | End: 2018-03-18

## 2018-02-17 RX ADMIN — Medication 81 MILLIGRAM(S): at 11:28

## 2018-02-17 RX ADMIN — CARVEDILOL PHOSPHATE 12.5 MILLIGRAM(S): 80 CAPSULE, EXTENDED RELEASE ORAL at 06:03

## 2018-02-17 RX ADMIN — HEPARIN SODIUM 5000 UNIT(S): 5000 INJECTION INTRAVENOUS; SUBCUTANEOUS at 16:45

## 2018-02-17 RX ADMIN — Medication 650 MILLIGRAM(S): at 10:26

## 2018-02-17 RX ADMIN — PANTOPRAZOLE SODIUM 40 MILLIGRAM(S): 20 TABLET, DELAYED RELEASE ORAL at 15:52

## 2018-02-17 RX ADMIN — CARVEDILOL PHOSPHATE 12.5 MILLIGRAM(S): 80 CAPSULE, EXTENDED RELEASE ORAL at 16:45

## 2018-02-17 RX ADMIN — Medication 1334 MILLIGRAM(S): at 12:12

## 2018-02-17 RX ADMIN — TRAMADOL HYDROCHLORIDE 50 MILLIGRAM(S): 50 TABLET ORAL at 15:51

## 2018-02-17 RX ADMIN — LISINOPRIL 30 MILLIGRAM(S): 2.5 TABLET ORAL at 06:03

## 2018-02-17 RX ADMIN — Medication 1 APPLICATION(S): at 15:54

## 2018-02-17 RX ADMIN — ONDANSETRON 4 MILLIGRAM(S): 8 TABLET, FILM COATED ORAL at 16:47

## 2018-02-17 RX ADMIN — PANTOPRAZOLE SODIUM 40 MILLIGRAM(S): 20 TABLET, DELAYED RELEASE ORAL at 06:02

## 2018-02-17 RX ADMIN — Medication 112 MICROGRAM(S): at 06:02

## 2018-02-17 RX ADMIN — CLOPIDOGREL BISULFATE 75 MILLIGRAM(S): 75 TABLET, FILM COATED ORAL at 11:28

## 2018-02-17 RX ADMIN — HEPARIN SODIUM 5000 UNIT(S): 5000 INJECTION INTRAVENOUS; SUBCUTANEOUS at 06:03

## 2018-02-17 RX ADMIN — CALCITRIOL 0.25 MICROGRAM(S): 0.5 CAPSULE ORAL at 11:28

## 2018-02-17 RX ADMIN — Medication 1 MILLIGRAM(S): at 11:28

## 2018-02-17 RX ADMIN — Medication 1 APPLICATION(S): at 06:03

## 2018-02-17 RX ADMIN — TRAMADOL HYDROCHLORIDE 50 MILLIGRAM(S): 50 TABLET ORAL at 15:00

## 2018-02-17 RX ADMIN — Medication 25 MILLIGRAM(S): at 15:57

## 2018-02-17 NOTE — PROGRESS NOTE ADULT - PROBLEM SELECTOR PLAN 7
d/c levemir due to hypoglycemia  stable FS now   c/w raiss  gabapentin, tramadol prn
d/c levemir due to hypoglycemia  c/w raiss  gabapentin, tramadol prn
d/c levemir due to hypoglycemia  c/w raiss  gabapentin, tramadol prn
d/c levemir due to hypoglycemia  stable FS now   c/w raiss  gabapentin, tramadol prn
d/c levemir due to hypoglycemia  c/w raiss  gabapentin, tramadol prn

## 2018-02-17 NOTE — PROGRESS NOTE ADULT - ASSESSMENT
87 yo F w/ hx CKD5, CAD, Dm2 presents to ER for dialysis initiation from nephrologist office. Pt was noted to have positive troponin, TTE showed LVEF 35-40%, grade 2 diastolic dysfunction, moderately reduced RV systolic function. Pt was seen by cardiology. S/p Right and Left heart Cath on 2/9/2018, [place]- Patent Grafts, Moderate MR. Pt is started on new HD, received HD Tuesday february 13. Pt had MBS done this morning after noted to have some difficult swallowing food. MBS noted with thyroglossal (c4/c5) hypertrophy. Dysphagia 1 diet as per speech and swallow. GI consult placed, follow up GI recommendation. Case management working on HD spot and transportation to HD. As per family, pt has an Aid at home 7 days a week from 10:30am - 5pm. Family interested in obtaining another aid during the night time. Case management has set up HD spot, 5am spot TTS. Case management to inquire further about increasing aid hours. Pt will also need to be discharged home with transport wheel chair for transportation to and from HD due to decreased mobility. 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 May have element of GERD as well. Nevertheless she is doing better on modified diet. Suggest continue pantoprazole BID. No further w/u or plans from GI standpoint. Pt is getting HD today and schedule for HS on Tuesday, will be discharge after HD

## 2018-02-17 NOTE — PROGRESS NOTE ADULT - PROBLEM SELECTOR PLAN 4
noted to have low normal BP at HD  decersae lisinopril 20, c/w Coreg to 12.5mg BID  Clonidine patch discontinued.   monitor BP and adjust medication as needed

## 2018-02-17 NOTE — PROGRESS NOTE ADULT - PROBLEM SELECTOR PLAN 3
appreciate cardiology input  TTE showed LVEF 35-40%, grade 2 diastolic dysfunction, moderately reduced RV systolic function.  Right and Left Heart Cath 2/9/2018 Findings- Patent Grafts, Moderate MR.  Continue aspirin, plavix, Coreg and Lisinopril.  Per cardiology d/c clonidine patch, titrate coreg and lisinopril
appreciate cardiology input  TTE showed LVEF 35-40%, grade 2 diastolic dysfunction, moderately reduced RV systolic function.  For Right and Left heart Cath on 2/9/2018  Continue aspirin, plavix, Coreg and Lisinopril.
appreciate cardiology input  TTE showed LVEF 35-40%, grade 2 diastolic dysfunction, moderately reduced RV systolic function.  S/p Right and Left heart Cath today  Continue aspirin, plavix, Coreg and Lisinopril.
appreciate cardiology input  TTE showed LVEF 35-40%, grade 2 diastolic dysfunction, moderately reduced RV systolic function.  Right and Left Heart Cath 2/9/2018 Findings- Patent Grafts, Moderate MR.  Continue aspirin, plavix, Coreg and Lisinopril.  Per cardiology d/c clonidine patch, titrate coreg and lisinopril
home meds
appreciate cardiology input  TTE showed LVEF 35-40%, grade 2 diastolic dysfunction, moderately reduced RV systolic function.  Right and Left Heart Cath 2/9/2018 Findings- Patent Grafts, Moderate MR.  Continue aspirin, plavix, Coreg and Lisinopril.  Per cardiology d/c clonidine patch, titrate coreg and lisinopril
home meds

## 2018-02-17 NOTE — PROGRESS NOTE ADULT - SUBJECTIVE AND OBJECTIVE BOX
Patient was seen and evaluated on dialysis.   No c/o CP SOB NV  no F/C  no swelling    T(C): 36.6 (02-17-18 @ 15:45), Max: 37.3 (02-16-18 @ 23:35)  HR: 89 (02-17-18 @ 15:45) (68 - 89)  BP: 172/76 (02-17-18 @ 15:45) (122/77 - 172/76)  Wt(kg): --  PE ;  NAD  lungs - CTA  CV gr 1 murmer,  No gallop or rub  Abd : soft, NT BS +, No masses  Ext- No edema  Neuro : Grossly intact, moving extremities                             11.4   4.4   )-----------( 204      ( 17 Feb 2018 08:12 )             36.1        02-17    135  |  96<L>  |  43.0<H>  ----------------------------<  123<H>  5.5<H>   |  25.0  |  3.36<H>    Ca    8.7      17 Feb 2018 08:12        MEDICATIONS  (STANDING):  acetaminophen   Tablet. PRN  AQUAPHOR (petrolatum Ointment)  aspirin enteric coated  calcitriol   Capsule  calcium acetate  carvedilol  clopidogrel Tablet  dextrose 5%.  dextrose 50% Injectable  dextrose 50% Injectable  dextrose 50% Injectable  dextrose Gel PRN  ergocalciferol  folic acid  gabapentin  glucagon  Injectable PRN  heparin  Injectable  hydrALAZINE PRN  insulin lispro (HumaLOG) corrective regimen sliding scale  levothyroxine  lisinopril  ondansetron Injectable PRN  pantoprazole   Suspension  traMADol PRN      Patient stable  [facility] HD easily; Changed to 2 K bath last 30 minutes  Continue  has an outpt chair at [facility]

## 2018-02-17 NOTE — PROGRESS NOTE ADULT - PROBLEM SELECTOR PROBLEM 2
Elevated troponin
Coronary artery disease involving native coronary artery of native heart without angina pectoris
Elevated troponin

## 2018-02-17 NOTE — PROGRESS NOTE ADULT - PROBLEM SELECTOR PROBLEM 7
Diabetic polyneuropathy associated with type 2 diabetes mellitus

## 2018-02-17 NOTE — PROGRESS NOTE ADULT - PROBLEM SELECTOR PROBLEM 1
ESRD (end stage renal disease) on dialysis
Oropharyngeal dysphagia
Biventricular heart failure
ESRD (end stage renal disease) on dialysis

## 2018-02-17 NOTE — PROGRESS NOTE ADULT - PROVIDER SPECIALTY LIST ADULT
Cardiology
Gastroenterology
Hospitalist
Internal Medicine
Nephrology
Hospitalist

## 2018-02-17 NOTE — PROGRESS NOTE ADULT - PROBLEM SELECTOR PROBLEM 3
R/O Acute systolic congestive heart failure
Coronary artery disease involving native coronary artery of native heart without angina pectoris
Elevated troponin
R/O Acute systolic congestive heart failure
Coronary artery disease involving native coronary artery of native heart without angina pectoris
R/O Acute systolic congestive heart failure

## 2018-02-17 NOTE — PROGRESS NOTE ADULT - SUBJECTIVE AND OBJECTIVE BOX
Internal Medicine Hospitalist - Dr. Giovanni ROBERSON    [id number]    86y      Female    Patient is a 86y old  Female who presents with a chief complaint of SOB (16 Feb 2018 15:55)    INTERVAL HPI/ OVERNIGHT EVENTS: Patient is seen and examined, getting HD today, denied any new complaints     REVIEW OF SYSTEMS:    Denied fever, chills, abd. pain, nausea, vomiting, chest pain, SOB, headache, dizziness    PHYSICAL EXAM:    Vital Signs Last 24 Hrs  T(C): 36.9 (17 Feb 2018 11:33), Max: 37.3 (16 Feb 2018 23:35)  T(F): 98.5 (17 Feb 2018 11:33), Max: 99.1 (16 Feb 2018 23:35)  HR: 80 (17 Feb 2018 11:33) (68 - 88)  BP: 129/68 (17 Feb 2018 11:33) (122/77 - 170/77)  BP(mean): --  RR: 18 (17 Feb 2018 11:33) (18 - 18)  SpO2: 98% (17 Feb 2018 11:33) (93% - 98%)    GENERAL: NAD  CHEST/LUNG: CTA b/l   HEART: S1S2+ audible  ABDOMEN: Soft, Nontender, Nondistended; Bowel sounds present  EXTREMITIES:  no edema    LABS:                        11.4   4.4   )-----------( 204      ( 17 Feb 2018 08:12 )             36.1     02-17    135  |  96<L>  |  43.0<H>  ----------------------------<  123<H>  5.5<H>   |  25.0  |  3.36<H>    Ca    8.7      17 Feb 2018 08:12              MEDICATIONS  (STANDING):  AQUAPHOR (petrolatum Ointment) 1 Application(s) Topical two times a day  aspirin enteric coated 81 milliGRAM(s) Oral daily  calcitriol   Capsule 0.25 MICROGram(s) Oral daily  calcium acetate 1334 milliGRAM(s) Oral three times a day with meals  carvedilol 12.5 milliGRAM(s) Oral every 12 hours  clopidogrel Tablet 75 milliGRAM(s) Oral daily  dextrose 5%. 1000 milliLiter(s) (50 mL/Hr) IV Continuous <Continuous>  dextrose 50% Injectable 12.5 Gram(s) IV Push once  dextrose 50% Injectable 25 Gram(s) IV Push once  dextrose 50% Injectable 25 Gram(s) IV Push once  ergocalciferol 08570 Unit(s) Oral every week  folic acid 1 milliGRAM(s) Oral daily  gabapentin 100 milliGRAM(s) Oral at bedtime  heparin  Injectable 5000 Unit(s) SubCutaneous every 12 hours  insulin lispro (HumaLOG) corrective regimen sliding scale   SubCutaneous Before meals and at bedtime  levothyroxine 112 MICROGram(s) Oral daily  lisinopril 30 milliGRAM(s) Oral daily  pantoprazole   Suspension 40 milliGRAM(s) Oral two times a day before meals    MEDICATIONS  (PRN):  acetaminophen   Tablet. 650 milliGRAM(s) Oral every 6 hours PRN headache  dextrose Gel 1 Dose(s) Oral once PRN Blood Glucose LESS THAN 70 milliGRAM(s)/deciliter  glucagon  Injectable 1 milliGRAM(s) IntraMuscular once PRN Glucose LESS THAN 70 milligrams/deciliter  hydrALAZINE 25 milliGRAM(s) Oral every 6 hours PRN Systolic blood pressure > 160  ondansetron Injectable 4 milliGRAM(s) IV Push every 6 hours PRN Nausea and/or Vomiting  traMADol 50 milliGRAM(s) Oral two times a day PRN Severe Pain (7 - 10)      RADIOLOGY & ADDITIONAL TEST

## 2018-03-05 ENCOUNTER — APPOINTMENT (OUTPATIENT)
Dept: CARDIOLOGY | Facility: CLINIC | Age: 83
End: 2018-03-05
Payer: MEDICARE

## 2018-03-05 ENCOUNTER — TRANSCRIPTION ENCOUNTER (OUTPATIENT)
Age: 83
End: 2018-03-05

## 2018-03-05 ENCOUNTER — NON-APPOINTMENT (OUTPATIENT)
Age: 83
End: 2018-03-05

## 2018-03-05 VITALS
BODY MASS INDEX: 22.46 KG/M2 | DIASTOLIC BLOOD PRESSURE: 68 MMHG | OXYGEN SATURATION: 98 % | HEART RATE: 81 BPM | SYSTOLIC BLOOD PRESSURE: 123 MMHG | HEIGHT: 58 IN | WEIGHT: 107 LBS

## 2018-03-05 DIAGNOSIS — K21.9 GASTRO-ESOPHAGEAL REFLUX DISEASE W/OUT ESOPHAGITIS: ICD-10-CM

## 2018-03-05 PROCEDURE — 99214 OFFICE O/P EST MOD 30 MIN: CPT

## 2018-03-05 PROCEDURE — 93000 ELECTROCARDIOGRAM COMPLETE: CPT

## 2018-03-05 RX ORDER — CALCITRIOL 0.25 UG/1
0.25 CAPSULE, LIQUID FILLED ORAL
Qty: 90 | Refills: 0 | Status: ACTIVE | COMMUNITY
Start: 2017-05-01

## 2018-03-05 RX ORDER — OMEPRAZOLE 20 MG/1
20 CAPSULE, DELAYED RELEASE ORAL
Qty: 90 | Refills: 0 | Status: DISCONTINUED | COMMUNITY
Start: 2017-04-06

## 2018-03-05 RX ORDER — CALCIUM ACETATE 667 MG/1
667 CAPSULE ORAL
Qty: 540 | Refills: 0 | Status: ACTIVE | COMMUNITY
Start: 2017-09-07

## 2018-03-05 RX ORDER — CARVEDILOL 12.5 MG/1
12.5 TABLET, FILM COATED ORAL TWICE DAILY
Qty: 120 | Refills: 2 | Status: ACTIVE | COMMUNITY
Start: 2018-03-05 | End: 1900-01-01

## 2018-03-05 RX ORDER — CYCLOSPORINE 0.5 MG/ML
0.05 EMULSION OPHTHALMIC
Qty: 60 | Refills: 0 | Status: ACTIVE | COMMUNITY
Start: 2017-04-07

## 2018-03-05 RX ORDER — FLUDROCORTISONE ACETATE 0.1 MG/1
0.1 TABLET ORAL
Qty: 45 | Refills: 0 | Status: ACTIVE | COMMUNITY
Start: 2017-06-07

## 2018-03-05 RX ORDER — BRIMONIDINE TARTRATE 1 MG/ML
0.1 SOLUTION/ DROPS OPHTHALMIC
Qty: 5 | Refills: 0 | Status: ACTIVE | COMMUNITY
Start: 2017-10-25

## 2018-03-05 RX ORDER — BLOOD SUGAR DIAGNOSTIC
STRIP MISCELLANEOUS
Qty: 100 | Refills: 0 | Status: ACTIVE | COMMUNITY
Start: 2017-02-15

## 2018-03-05 RX ORDER — INSULIN DETEMIR 100 [IU]/ML
100 INJECTION, SOLUTION SUBCUTANEOUS
Qty: 6 | Refills: 0 | Status: ACTIVE | COMMUNITY
Start: 2017-09-16

## 2018-03-05 RX ORDER — LANCETS 33 GAUGE
31G X 6 MM EACH MISCELLANEOUS
Qty: 100 | Refills: 0 | Status: ACTIVE | COMMUNITY
Start: 2017-10-02

## 2018-03-05 RX ORDER — CHOLECALCIFEROL (VITAMIN D3) 10(400)/ML
DROPS ORAL
Qty: 100 | Refills: 0 | Status: ACTIVE | COMMUNITY
Start: 2017-05-24

## 2018-03-06 ENCOUNTER — MEDICATION RENEWAL (OUTPATIENT)
Age: 83
End: 2018-03-06

## 2018-03-06 RX ORDER — LABETALOL HYDROCHLORIDE 100 MG/1
100 TABLET, FILM COATED ORAL
Qty: 180 | Refills: 1 | Status: ACTIVE | COMMUNITY
Start: 1900-01-01 | End: 1900-01-01

## 2018-03-20 ENCOUNTER — APPOINTMENT (OUTPATIENT)
Dept: CARDIOLOGY | Facility: CLINIC | Age: 83
End: 2018-03-20

## 2018-06-07 ENCOUNTER — NON-APPOINTMENT (OUTPATIENT)
Age: 83
End: 2018-06-07

## 2018-06-07 ENCOUNTER — APPOINTMENT (OUTPATIENT)
Dept: CARDIOLOGY | Facility: CLINIC | Age: 83
End: 2018-06-07
Payer: MEDICARE

## 2018-06-07 VITALS
OXYGEN SATURATION: 98 % | BODY MASS INDEX: 23.2 KG/M2 | WEIGHT: 111 LBS | SYSTOLIC BLOOD PRESSURE: 118 MMHG | DIASTOLIC BLOOD PRESSURE: 58 MMHG | HEART RATE: 85 BPM

## 2018-06-07 PROCEDURE — 99213 OFFICE O/P EST LOW 20 MIN: CPT

## 2018-06-07 PROCEDURE — 93000 ELECTROCARDIOGRAM COMPLETE: CPT

## 2018-07-09 ENCOUNTER — APPOINTMENT (OUTPATIENT)
Dept: ELECTROPHYSIOLOGY | Facility: CLINIC | Age: 83
End: 2018-07-09
Payer: MEDICARE

## 2018-07-09 PROCEDURE — 93296 REM INTERROG EVL PM/IDS: CPT

## 2018-07-09 PROCEDURE — 93294 REM INTERROG EVL PM/LDLS PM: CPT

## 2018-08-17 ENCOUNTER — APPOINTMENT (OUTPATIENT)
Dept: CARDIOLOGY | Facility: CLINIC | Age: 83
End: 2018-08-17

## 2018-09-05 PROBLEM — N18.9 CHRONIC KIDNEY DISEASE, UNSPECIFIED: Chronic | Status: ACTIVE | Noted: 2017-07-28

## 2018-09-05 PROBLEM — M81.0 AGE-RELATED OSTEOPOROSIS WITHOUT CURRENT PATHOLOGICAL FRACTURE: Chronic | Status: ACTIVE | Noted: 2017-07-28

## 2018-09-05 PROBLEM — I44.7 LEFT BUNDLE-BRANCH BLOCK, UNSPECIFIED: Chronic | Status: ACTIVE | Noted: 2017-07-28

## 2018-09-05 PROBLEM — E11.40 TYPE 2 DIABETES MELLITUS WITH DIABETIC NEUROPATHY, UNSPECIFIED: Chronic | Status: ACTIVE | Noted: 2017-07-28

## 2018-09-05 PROBLEM — I73.9 PERIPHERAL VASCULAR DISEASE, UNSPECIFIED: Chronic | Status: ACTIVE | Noted: 2017-07-28

## 2018-09-05 PROBLEM — I25.10 ATHEROSCLEROTIC HEART DISEASE OF NATIVE CORONARY ARTERY WITHOUT ANGINA PECTORIS: Chronic | Status: ACTIVE | Noted: 2017-07-28

## 2018-09-05 PROBLEM — M48.00 SPINAL STENOSIS, SITE UNSPECIFIED: Chronic | Status: ACTIVE | Noted: 2017-07-28

## 2018-09-24 NOTE — H&P PST ADULT - MALLAMPATI CLASS
Pt calling     Stating she has not had any symptoms of seizures since removal in 2014 , pt feels just a neurologist at this time for follow up  for referral and would benny to go the [facility] area     Pt has been having on and off again headaches more than normal - denies headaches affecting vision, but odes affect the depth perception     Please advise     Thank you     Naila Mar RN, BSN  [place] [facility]riage        Class II - visualization of the soft palate, fauces, and uvula

## 2018-12-21 ENCOUNTER — NON-APPOINTMENT (OUTPATIENT)
Age: 83
End: 2018-12-21

## 2018-12-21 ENCOUNTER — APPOINTMENT (OUTPATIENT)
Dept: CARDIOLOGY | Facility: CLINIC | Age: 83
End: 2018-12-21
Payer: MEDICARE

## 2018-12-21 ENCOUNTER — APPOINTMENT (OUTPATIENT)
Dept: ELECTROPHYSIOLOGY | Facility: CLINIC | Age: 83
End: 2018-12-21
Payer: MEDICARE

## 2018-12-21 VITALS
BODY MASS INDEX: 24.56 KG/M2 | HEART RATE: 74 BPM | HEIGHT: 58 IN | DIASTOLIC BLOOD PRESSURE: 80 MMHG | SYSTOLIC BLOOD PRESSURE: 140 MMHG | OXYGEN SATURATION: 99 % | WEIGHT: 117 LBS

## 2018-12-21 DIAGNOSIS — Z95.0 PRESENCE OF CARDIAC PACEMAKER: ICD-10-CM

## 2018-12-21 DIAGNOSIS — I25.10 ATHEROSCLEROTIC HEART DISEASE OF NATIVE CORONARY ARTERY W/OUT ANGINA PECTORIS: ICD-10-CM

## 2018-12-21 DIAGNOSIS — N18.6 END STAGE RENAL DISEASE: ICD-10-CM

## 2018-12-21 DIAGNOSIS — E11.9 TYPE 2 DIABETES MELLITUS W/OUT COMPLICATIONS: ICD-10-CM

## 2018-12-21 DIAGNOSIS — E03.9 HYPOTHYROIDISM, UNSPECIFIED: ICD-10-CM

## 2018-12-21 PROCEDURE — 93000 ELECTROCARDIOGRAM COMPLETE: CPT | Mod: 59

## 2018-12-21 PROCEDURE — 93280 PM DEVICE PROGR EVAL DUAL: CPT

## 2018-12-21 PROCEDURE — 99214 OFFICE O/P EST MOD 30 MIN: CPT

## 2018-12-21 RX ORDER — LISINOPRIL 20 MG/1
20 TABLET ORAL DAILY
Qty: 90 | Refills: 3 | Status: ACTIVE | COMMUNITY
Start: 2018-03-05 | End: 1900-01-01

## 2018-12-21 RX ORDER — TRAMADOL HYDROCHLORIDE 50 MG/1
50 TABLET, COATED ORAL
Qty: 90 | Refills: 0 | Status: DISCONTINUED | COMMUNITY
Start: 2016-12-07 | End: 2018-12-21

## 2018-12-21 RX ORDER — PANTOPRAZOLE 40 MG/1
40 TABLET, DELAYED RELEASE ORAL DAILY
Qty: 90 | Refills: 3 | Status: ACTIVE | COMMUNITY
Start: 2018-03-05 | End: 1900-01-01

## 2018-12-21 RX ORDER — LEVOTHYROXINE SODIUM 0.1 MG/1
100 TABLET ORAL
Qty: 90 | Refills: 3 | Status: ACTIVE | COMMUNITY
Start: 2016-11-29

## 2019-01-13 NOTE — HISTORY OF PRESENT ILLNESS
[FreeTextEntry1] : 87 yr old female with a history of 3-vessel CAD s/p CABG, diabetes mellitus, carotid atherosclerosis, hypertension, hyperlipidemia, LBBB, pacemaker,   small vessel PAD, diabetic neuropathy, who presents to the office for routine follow up. She is now on hemodialysis and had a cardiac catheterization in Feb 2018 with diffuce CAD that is being managed medically.  Her LVEF was 45% at the time. She has trace pretibial edema and her lungs are clear bilateral. She has been compliant with her medications and her Blood pressure is optimized on labetalol and lisinopril. Nicolle denies chest pain, palpitations, SOB, ROSS, activity intolerance, dizziness, lightheadedness, syncope or near syncope.  Pacemaker check today with normal function.

## 2019-01-13 NOTE — REVIEW OF SYSTEMS
[Negative] : Heme/Lymph [Recent Weight Loss (___ Lbs)] : no recent weight loss [Dyspnea on exertion] : not dyspnea during exertion [Chest Pain] : no chest pain [Lower Ext Edema] : no extremity edema

## 2019-01-13 NOTE — PHYSICAL EXAM
[General Appearance - Well Developed] : well developed [Normal Appearance] : normal appearance [General Appearance - Well Nourished] : well nourished [General Appearance - In No Acute Distress] : no acute distress [Normal Conjunctiva] : the conjunctiva exhibited no abnormalities [No Oral Pallor] : no oral pallor [No Oral Cyanosis] : no oral cyanosis [JVD Elevated _____cm] : JVD elevated [unfilled] ~U cm above clavicle [Normal Jugular Venous V Waves Present] : normal jugular venous V waves present [No Jugular Venous Gil A Waves] : no jugular venous gil A waves [Respiration, Rhythm And Depth] : normal respiratory rhythm and effort [Exaggerated Use Of Accessory Muscles For Inspiration] : no accessory muscle use [Auscultation Breath Sounds / Voice Sounds] : lungs were clear to auscultation bilaterally [Heart Rate And Rhythm] : heart rate and rhythm were normal [Heart Sounds] : normal S1 and S2 [Murmurs] : no murmurs present [Arterial Pulses Normal] : the arterial pulses were normal [Abdomen Soft] : soft [Abdomen Tenderness] : non-tender [Abnormal Walk] : normal gait [Nail Clubbing] : no clubbing of the fingernails [Cyanosis, Localized] : no localized cyanosis [Skin Turgor] : normal skin turgor [] : no rash [Oriented To Time, Place, And Person] : oriented to person, place, and time [Impaired Insight] : insight and judgment were intact [Affect] : the affect was normal [Memory Recent] : recent memory was not impaired [FreeTextEntry1] : 4+ bilateral edema to shins

## 2019-03-24 ENCOUNTER — INPATIENT (INPATIENT)
Facility: HOSPITAL | Age: 84
LOS: 1 days | Discharge: HOME | DRG: 64 | End: 2019-03-26
Attending: INTERNAL MEDICINE | Admitting: HOSPITALIST
Payer: COMMERCIAL

## 2019-03-24 VITALS
HEART RATE: 87 BPM | SYSTOLIC BLOOD PRESSURE: 190 MMHG | HEIGHT: 60 IN | WEIGHT: 149.91 LBS | DIASTOLIC BLOOD PRESSURE: 89 MMHG | TEMPERATURE: 98 F | RESPIRATION RATE: 18 BRPM | OXYGEN SATURATION: 95 %

## 2019-03-24 DIAGNOSIS — Z95.1 PRESENCE OF AORTOCORONARY BYPASS GRAFT: Chronic | ICD-10-CM

## 2019-03-24 DIAGNOSIS — Z95.828 PRESENCE OF OTHER VASCULAR IMPLANTS AND GRAFTS: Chronic | ICD-10-CM

## 2019-03-24 PROCEDURE — 99285 EMERGENCY DEPT VISIT HI MDM: CPT | Mod: 25

## 2019-03-24 RX ORDER — HYDRALAZINE HCL 50 MG
10 TABLET ORAL ONCE
Qty: 0 | Refills: 0 | Status: COMPLETED | OUTPATIENT
Start: 2019-03-24 | End: 2019-03-24

## 2019-03-24 NOTE — ED ADULT NURSE NOTE - OBJECTIVE STATEMENT
Pt. presents AxOx3 to ED brought in by family for increasing dizziness, pain, and weakness. Pt.'s son last saw her normal last night around 2200. Pt.'s son states she has "not been feeling well" and this morning he noticed nystagmus and stated her eyes seemed unable to focus. Pt. states she has had increasingly blurry vision on R. eye. Pt.'s son pt. appears weak and not like her normal self. Pt.'s son states she has also been having high BP's and blood sugars at home. Pt. gets dialysis Mondays and Fridays with no recent schedule changes. Pt. endorses 10/10 R. hip/leg pain. PERRL 2mm.

## 2019-03-24 NOTE — ED ADULT NURSE NOTE - NSIMPLEMENTINTERV_GEN_ALL_ED
Implemented All Fall with Harm Risk Interventions:  [place] to call system. Call bell, personal items and telephone within reach. Instruct patient to call for assistance. Room bathroom lighting operational. Non-slip footwear when patient is off stretcher. Physically safe environment: no spills, clutter or unnecessary equipment. Stretcher in lowest position, wheels locked, appropriate side rails in place. Provide visual cue, wrist band, yellow gown, etc. Monitor gait and stability. Monitor for mental status changes and reorient to person, place, and time. Review medications for side effects contributing to fall risk. Reinforce activity limits and safety measures with patient and family. Provide visual clues: red socks.

## 2019-03-24 NOTE — ED PROVIDER NOTE - OBJECTIVE STATEMENT
88 y/o F with PMHx of CAD, DM, chronic renal failure, HLD, HTN (on Labetalol and Carvedilol), hypothyroid, osteoporosis, peripheral artery disease and spinal stenosis  and PSHx of CABG x3, pacemaker and dialysis catheter placement presents to ED c/o intermittent dizziness onset today. Feels unsteady on her feet, occasionally sees double and feels as if everything is moving around her, unchanged with sitting or ambulating. As per son, they noticed her eyes appear to "deviate" when visiting her around 1100 this morning, which was not happening when he saw her last night. Patient states she has had changes in her vision/difficulty seeing (worse in the R eye) for several months, only seeing shadows.     The patient has nursing care in home, family states they believe the patient is compliant with her medications. She goes to dialysis 2 days per week (monday/friday) and as per family "the dialysis makes her blood pressure jump all over the place all the time". Denies fevers, chills, chest pain/palpitations/shortness of breath, abdominal pain, n/v/d, urinary symptoms, syncope, recent travel or sick contacts. No further acute complaints at this time. 86 y/o F with PMHx of CAD, DM, chronic renal failure, HLD, HTN (on Labetalol and Carvedilol), hypothyroid, osteoporosis, peripheral artery disease and spinal stenosis  and PSHx of CABG x3, pacemaker and dialysis catheter placement presents to ED c/o intermittent dizziness onset today. Feels unsteady on her feet, occasionally sees double and feels as if everything is moving around her, unchanged with sitting or ambulating. As per son, they noticed her eyes appear to "deviate" when visiting her around 1100 this morning, which was not happening when he saw her last night. Patient states she has had changes in her vision/difficulty seeing (worse in the R eye) for several months, only seeing shadows. Additionally is experiencing R knee pain, which she states is chronic despite multiple injections as well as R calf pain described as cramping, onset unknown.     The patient has nursing care in home, family states they believe the patient is compliant with her medications. She goes to dialysis 2 days per week (monday/friday) and as per family "the dialysis makes her blood pressure jump all over the place all the time". Denies fevers, chills, chest pain/palpitations/shortness of breath, abdominal pain, n/v/d, urinary symptoms, syncope, recent travel or sick contacts. No further acute complaints at this time. 86 y/o F with PMHx of CAD, DM, chronic renal failure, HLD, HTN (on Labetalol and Carvedilol), hypothyroid, osteoporosis, peripheral artery disease and spinal stenosis  and PSHx of CABG x3, pacemaker and dialysis catheter placement presents to ED c/o intermittent dizziness onset today. Feels unsteady on her feet, occasionally sees double and feels as if everything is moving around her, unchanged with sitting or ambulating. As per son, they noticed her eyes appear to "deviate" when visiting her around 1100 this morning, which was not happening when he saw her last night. Patient states she has had changes in her vision/difficulty seeing (worse in the R eye) for several months, only seeing shadows. Additionally is experiencing R knee pain, which she states is chronic despite multiple injections as well as R calf pain described as cramping, onset unknown.     The patient has nursing care in home, family states they believe the patient is compliant with her medications. She goes to dialysis 2 days per week (monday/friday) and as per family "the dialysis makes her blood pressure jump all over the place all the time". Denies fevers, chills, chest pain/palpitations/shortness of breath, abdominal pain, n/v/d, urinary symptoms, syncope, recent travel or sick contacts. No further acute complaints at this time.    Nephrologist: Dr. De Leon   PCP: Dr. España 88 y/o F with PMHx of CAD, DM, chronic renal failure, HLD, HTN (on Labetalol and Carvedilol), hypothyroid, osteoporosis, peripheral artery disease and spinal stenosis  and PSHx of CABG x3, pacemaker and dialysis catheter placement presents to ED c/o intermittent dizziness onset today. Feels unsteady on her feet, occasionally sees double and feels as if everything is moving around her, unchanged with sitting or ambulating. As per son, they noticed her eyes appear to "deviate" when visiting her around 1100 this morning, which was not happening when he saw her last night. Patient states she has had changes in her vision/difficulty seeing (worse in the R eye) for several months, only seeing shadows. Additionally is experiencing R knee pain, which she states is chronic despite multiple injections as well as R calf pain described as cramping, onset unknown.     The patient has nursing care in home, family states they believe the patient is compliant with her medications. She goes to dialysis 2 days per week (monday/friday) and as per family "the dialysis makes her blood pressure jump all over the place all the time". Denies fevers, chills, chest pain/palpitations/shortness of breath, abdominal pain, n/v/d, urinary symptoms, syncope, recent travel or sick contacts. No further acute complaints at this time.    Nephrologist: Dr. De Leon   PCP: Dr. Morejon

## 2019-03-24 NOTE — ED PROVIDER NOTE - CLINICAL SUMMARY MEDICAL DECISION MAKING FREE TEXT BOX
88 y/o F with PMHx of CAD, DM, chronic renal failure, HLD, HTN (on Labetalol and Carvedilol), hypothyroid, osteoporosis, peripheral artery disease and spinal stenosis  and PSHx of CABG x3, pacemaker and dialysis catheter placement presents to ED c/o intermittent dizziness onset today. 88 y/o F with PMHx of CAD, DM, chronic renal failure, HLD, HTN (on Labetalol and Carvedilol), hypothyroid, osteoporosis, peripheral artery disease and spinal stenosis  and PSHx of CABG x3, pacemaker and dialysis catheter placement presents to ED c/o intermittent dizziness onset today with trochlear nerve palsy - concern for CVA, no acute finding on CTH, will admit for further work up

## 2019-03-24 NOTE — ED ADULT TRIAGE NOTE - CHIEF COMPLAINT QUOTE
Pt A&Ox4 states "I am dizzy and feel weak." BIBA c/o RLE pain, pain all over, headache. MD CHRISTINA Shay at bedside for eval. Patient is calm and cooperative, skin warm and dry, breathing even and un labored, Patient appears in no acute distress.

## 2019-03-25 ENCOUNTER — TRANSCRIPTION ENCOUNTER (OUTPATIENT)
Age: 84
End: 2019-03-25

## 2019-03-25 DIAGNOSIS — I63.9 CEREBRAL INFARCTION, UNSPECIFIED: ICD-10-CM

## 2019-03-25 LAB
ALBUMIN SERPL ELPH-MCNC: 4.2 G/DL — SIGNIFICANT CHANGE UP (ref 3.3–5.2)
ALP SERPL-CCNC: 119 U/L — SIGNIFICANT CHANGE UP (ref 40–120)
ALT FLD-CCNC: 17 U/L — SIGNIFICANT CHANGE UP
ANION GAP SERPL CALC-SCNC: 17 MMOL/L — SIGNIFICANT CHANGE UP (ref 5–17)
APTT BLD: 35 SEC — SIGNIFICANT CHANGE UP (ref 27.5–36.3)
AST SERPL-CCNC: 21 U/L — SIGNIFICANT CHANGE UP
BASOPHILS # BLD AUTO: 0 K/UL — SIGNIFICANT CHANGE UP (ref 0–0.2)
BASOPHILS NFR BLD AUTO: 0.5 % — SIGNIFICANT CHANGE UP (ref 0–2)
BILIRUB SERPL-MCNC: 0.3 MG/DL — LOW (ref 0.4–2)
BUN SERPL-MCNC: 42 MG/DL — HIGH (ref 8–20)
CALCIUM SERPL-MCNC: 8.4 MG/DL — LOW (ref 8.6–10.2)
CHLORIDE SERPL-SCNC: 96 MMOL/L — LOW (ref 98–107)
CO2 SERPL-SCNC: 25 MMOL/L — SIGNIFICANT CHANGE UP (ref 22–29)
CREAT SERPL-MCNC: 3.8 MG/DL — HIGH (ref 0.5–1.3)
EOSINOPHIL # BLD AUTO: 0.1 K/UL — SIGNIFICANT CHANGE UP (ref 0–0.5)
EOSINOPHIL NFR BLD AUTO: 2.5 % — SIGNIFICANT CHANGE UP (ref 0–6)
GLUCOSE BLDC GLUCOMTR-MCNC: 108 MG/DL — HIGH (ref 70–99)
GLUCOSE BLDC GLUCOMTR-MCNC: 116 MG/DL — HIGH (ref 70–99)
GLUCOSE BLDC GLUCOMTR-MCNC: 134 MG/DL — HIGH (ref 70–99)
GLUCOSE SERPL-MCNC: 207 MG/DL — HIGH (ref 70–115)
HCT VFR BLD CALC: 35.9 % — LOW (ref 37–47)
HGB BLD-MCNC: 11.7 G/DL — LOW (ref 12–16)
INR BLD: 1.1 RATIO — SIGNIFICANT CHANGE UP (ref 0.88–1.16)
LYMPHOCYTES # BLD AUTO: 1.1 K/UL — SIGNIFICANT CHANGE UP (ref 1–4.8)
LYMPHOCYTES # BLD AUTO: 26.8 % — SIGNIFICANT CHANGE UP (ref 20–55)
MCHC RBC-ENTMCNC: 32.1 PG — HIGH (ref 27–31)
MCHC RBC-ENTMCNC: 32.6 G/DL — SIGNIFICANT CHANGE UP (ref 32–36)
MCV RBC AUTO: 98.4 FL — SIGNIFICANT CHANGE UP (ref 81–99)
MONOCYTES # BLD AUTO: 0.2 K/UL — SIGNIFICANT CHANGE UP (ref 0–0.8)
MONOCYTES NFR BLD AUTO: 6.2 % — SIGNIFICANT CHANGE UP (ref 3–10)
NEUTROPHILS # BLD AUTO: 2.6 K/UL — SIGNIFICANT CHANGE UP (ref 1.8–8)
NEUTROPHILS NFR BLD AUTO: 63.8 % — SIGNIFICANT CHANGE UP (ref 37–73)
PLATELET # BLD AUTO: 115 K/UL — LOW (ref 150–400)
POTASSIUM SERPL-MCNC: 4.3 MMOL/L — SIGNIFICANT CHANGE UP (ref 3.5–5.3)
POTASSIUM SERPL-SCNC: 4.3 MMOL/L — SIGNIFICANT CHANGE UP (ref 3.5–5.3)
PROT SERPL-MCNC: 6.5 G/DL — LOW (ref 6.6–8.7)
PROTHROM AB SERPL-ACNC: 12.7 SEC — SIGNIFICANT CHANGE UP (ref 10–12.9)
RBC # BLD: 3.65 M/UL — LOW (ref 4.4–5.2)
RBC # FLD: 15.1 % — SIGNIFICANT CHANGE UP (ref 11–15.6)
SODIUM SERPL-SCNC: 138 MMOL/L — SIGNIFICANT CHANGE UP (ref 135–145)
TROPONIN T SERPL-MCNC: 0.05 NG/ML — SIGNIFICANT CHANGE UP (ref 0–0.06)
WBC # BLD: 4.1 K/UL — LOW (ref 4.8–10.8)
WBC # FLD AUTO: 4.1 K/UL — LOW (ref 4.8–10.8)

## 2019-03-25 PROCEDURE — 70450 CT HEAD/BRAIN W/O DYE: CPT | Mod: 26

## 2019-03-25 PROCEDURE — 12345: CPT | Mod: NC

## 2019-03-25 PROCEDURE — 93880 EXTRACRANIAL BILAT STUDY: CPT | Mod: 26

## 2019-03-25 PROCEDURE — 93971 EXTREMITY STUDY: CPT | Mod: 26,RT

## 2019-03-25 PROCEDURE — 93010 ELECTROCARDIOGRAM REPORT: CPT

## 2019-03-25 PROCEDURE — 71045 X-RAY EXAM CHEST 1 VIEW: CPT | Mod: 26

## 2019-03-25 RX ORDER — ATORVASTATIN CALCIUM 80 MG/1
40 TABLET, FILM COATED ORAL AT BEDTIME
Qty: 0 | Refills: 0 | Status: DISCONTINUED | OUTPATIENT
Start: 2019-03-25 | End: 2019-03-26

## 2019-03-25 RX ORDER — CLOPIDOGREL BISULFATE 75 MG/1
75 TABLET, FILM COATED ORAL DAILY
Qty: 0 | Refills: 0 | Status: DISCONTINUED | OUTPATIENT
Start: 2019-03-25 | End: 2019-03-26

## 2019-03-25 RX ORDER — CHLORHEXIDINE GLUCONATE 213 G/1000ML
1 SOLUTION TOPICAL
Qty: 0 | Refills: 0 | Status: DISCONTINUED | OUTPATIENT
Start: 2019-03-25 | End: 2019-03-26

## 2019-03-25 RX ORDER — DEXTROSE 50 % IN WATER 50 %
12.5 SYRINGE (ML) INTRAVENOUS ONCE
Qty: 0 | Refills: 0 | Status: DISCONTINUED | OUTPATIENT
Start: 2019-03-25 | End: 2019-03-26

## 2019-03-25 RX ORDER — PANTOPRAZOLE SODIUM 20 MG/1
40 TABLET, DELAYED RELEASE ORAL
Qty: 0 | Refills: 0 | Status: DISCONTINUED | OUTPATIENT
Start: 2019-03-25 | End: 2019-03-26

## 2019-03-25 RX ORDER — DEXTROSE 50 % IN WATER 50 %
25 SYRINGE (ML) INTRAVENOUS ONCE
Qty: 0 | Refills: 0 | Status: DISCONTINUED | OUTPATIENT
Start: 2019-03-25 | End: 2019-03-26

## 2019-03-25 RX ORDER — ASPIRIN/CALCIUM CARB/MAGNESIUM 324 MG
81 TABLET ORAL DAILY
Qty: 0 | Refills: 0 | Status: DISCONTINUED | OUTPATIENT
Start: 2019-03-25 | End: 2019-03-26

## 2019-03-25 RX ORDER — HYDRALAZINE HCL 50 MG
50 TABLET ORAL THREE TIMES A DAY
Qty: 0 | Refills: 0 | Status: DISCONTINUED | OUTPATIENT
Start: 2019-03-25 | End: 2019-03-26

## 2019-03-25 RX ORDER — LISINOPRIL 2.5 MG/1
20 TABLET ORAL DAILY
Qty: 0 | Refills: 0 | Status: DISCONTINUED | OUTPATIENT
Start: 2019-03-25 | End: 2019-03-26

## 2019-03-25 RX ORDER — ASPIRIN/CALCIUM CARB/MAGNESIUM 324 MG
300 TABLET ORAL DAILY
Qty: 0 | Refills: 0 | Status: DISCONTINUED | OUTPATIENT
Start: 2019-03-25 | End: 2019-03-25

## 2019-03-25 RX ORDER — MORPHINE SULFATE 50 MG/1
2 CAPSULE, EXTENDED RELEASE ORAL ONCE
Qty: 0 | Refills: 0 | Status: DISCONTINUED | OUTPATIENT
Start: 2019-03-25 | End: 2019-03-25

## 2019-03-25 RX ORDER — GLUCAGON INJECTION, SOLUTION 0.5 MG/.1ML
1 INJECTION, SOLUTION SUBCUTANEOUS ONCE
Qty: 0 | Refills: 0 | Status: DISCONTINUED | OUTPATIENT
Start: 2019-03-25 | End: 2019-03-26

## 2019-03-25 RX ORDER — OXYCODONE AND ACETAMINOPHEN 5; 325 MG/1; MG/1
1 TABLET ORAL EVERY 6 HOURS
Qty: 0 | Refills: 0 | Status: DISCONTINUED | OUTPATIENT
Start: 2019-03-25 | End: 2019-03-26

## 2019-03-25 RX ORDER — DEXTROSE 50 % IN WATER 50 %
15 SYRINGE (ML) INTRAVENOUS ONCE
Qty: 0 | Refills: 0 | Status: DISCONTINUED | OUTPATIENT
Start: 2019-03-25 | End: 2019-03-26

## 2019-03-25 RX ORDER — CARVEDILOL PHOSPHATE 80 MG/1
12.5 CAPSULE, EXTENDED RELEASE ORAL EVERY 12 HOURS
Qty: 0 | Refills: 0 | Status: DISCONTINUED | OUTPATIENT
Start: 2019-03-25 | End: 2019-03-26

## 2019-03-25 RX ORDER — LEVOTHYROXINE SODIUM 125 MCG
112 TABLET ORAL DAILY
Qty: 0 | Refills: 0 | Status: DISCONTINUED | OUTPATIENT
Start: 2019-03-25 | End: 2019-03-26

## 2019-03-25 RX ORDER — SODIUM CHLORIDE 9 MG/ML
1000 INJECTION, SOLUTION INTRAVENOUS
Qty: 0 | Refills: 0 | Status: DISCONTINUED | OUTPATIENT
Start: 2019-03-25 | End: 2019-03-26

## 2019-03-25 RX ORDER — HYDRALAZINE HCL 50 MG
15 TABLET ORAL EVERY 6 HOURS
Qty: 0 | Refills: 0 | Status: DISCONTINUED | OUTPATIENT
Start: 2019-03-25 | End: 2019-03-26

## 2019-03-25 RX ORDER — HEPARIN SODIUM 5000 [USP'U]/ML
5000 INJECTION INTRAVENOUS; SUBCUTANEOUS EVERY 12 HOURS
Qty: 0 | Refills: 0 | Status: DISCONTINUED | OUTPATIENT
Start: 2019-03-25 | End: 2019-03-26

## 2019-03-25 RX ORDER — INSULIN LISPRO 100/ML
VIAL (ML) SUBCUTANEOUS
Qty: 0 | Refills: 0 | Status: DISCONTINUED | OUTPATIENT
Start: 2019-03-25 | End: 2019-03-26

## 2019-03-25 RX ADMIN — ATORVASTATIN CALCIUM 40 MILLIGRAM(S): 80 TABLET, FILM COATED ORAL at 21:11

## 2019-03-25 RX ADMIN — PANTOPRAZOLE SODIUM 40 MILLIGRAM(S): 20 TABLET, DELAYED RELEASE ORAL at 08:42

## 2019-03-25 RX ADMIN — OXYCODONE AND ACETAMINOPHEN 1 TABLET(S): 5; 325 TABLET ORAL at 17:46

## 2019-03-25 RX ADMIN — CARVEDILOL PHOSPHATE 12.5 MILLIGRAM(S): 80 CAPSULE, EXTENDED RELEASE ORAL at 05:20

## 2019-03-25 RX ADMIN — Medication 0.1 MILLIGRAM(S): at 23:54

## 2019-03-25 RX ADMIN — Medication 0.1 MILLIGRAM(S): at 18:54

## 2019-03-25 RX ADMIN — CARVEDILOL PHOSPHATE 12.5 MILLIGRAM(S): 80 CAPSULE, EXTENDED RELEASE ORAL at 18:54

## 2019-03-25 RX ADMIN — OXYCODONE AND ACETAMINOPHEN 1 TABLET(S): 5; 325 TABLET ORAL at 16:46

## 2019-03-25 RX ADMIN — CLOPIDOGREL BISULFATE 75 MILLIGRAM(S): 75 TABLET, FILM COATED ORAL at 11:20

## 2019-03-25 RX ADMIN — Medication 50 MILLIGRAM(S): at 22:56

## 2019-03-25 RX ADMIN — LISINOPRIL 20 MILLIGRAM(S): 2.5 TABLET ORAL at 05:20

## 2019-03-25 RX ADMIN — Medication 10 MILLIGRAM(S): at 00:54

## 2019-03-25 RX ADMIN — MORPHINE SULFATE 2 MILLIGRAM(S): 50 CAPSULE, EXTENDED RELEASE ORAL at 03:05

## 2019-03-25 RX ADMIN — MORPHINE SULFATE 2 MILLIGRAM(S): 50 CAPSULE, EXTENDED RELEASE ORAL at 00:25

## 2019-03-25 RX ADMIN — Medication 112 MICROGRAM(S): at 05:31

## 2019-03-25 RX ADMIN — OXYCODONE AND ACETAMINOPHEN 1 TABLET(S): 5; 325 TABLET ORAL at 05:20

## 2019-03-25 RX ADMIN — HEPARIN SODIUM 5000 UNIT(S): 5000 INJECTION INTRAVENOUS; SUBCUTANEOUS at 18:53

## 2019-03-25 RX ADMIN — HEPARIN SODIUM 5000 UNIT(S): 5000 INJECTION INTRAVENOUS; SUBCUTANEOUS at 05:20

## 2019-03-25 RX ADMIN — Medication 81 MILLIGRAM(S): at 11:20

## 2019-03-25 RX ADMIN — Medication 50 MILLIGRAM(S): at 05:20

## 2019-03-25 RX ADMIN — Medication 15 MILLIGRAM(S): at 20:03

## 2019-03-25 NOTE — H&P ADULT - HISTORY OF PRESENT ILLNESS
87F presented with difficulty seeing and dizziness. The patient had been reporting intermittent episodes of dizziness and double vision. When she was seen by her son on Sunday, he had noticed that the patient had deviation of her eyes. The patient denied any eye pain but did note intermittent headaches. She has had poor vision in the past and has required laser treatments for what appears to be a history of diabetic retinopathy. Most recently she has had worsened vision from her right eye. She is unaware of any aggravating or relieving factors. Her son had also noted that her right eyelid was also drooping. The patient is noted to have had declining vision over the past few months, but not to the extend or the acuity that developed in her right eye yesterday. She denied any difficulty with speech or noted any slurring of her words. Her son reports that the patient had been having difficulty with ambulation but this was due to pain in the right knee which has been chronic. On presentation to the emergency department, the patient was noted to have poorly controlled blood pressure.

## 2019-03-25 NOTE — CONSULT NOTE ADULT - SUBJECTIVE AND OBJECTIVE BOX
Patient is a 87y old  Female who presents with a chief complaint of high Bp with HA.    HPI:  87F presented with difficulty seeing and dizziness. The patient had been reporting intermittent episodes of dizziness and double vision. When she was seen by her son on Sunday, he had noticed that the patient had deviation of her eyes. The patient denied any eye pain but did note intermittent headaches. She has had poor vision in the past and has required laser treatments for what appears to be a history of diabetic retinopathy. Most recently she has had worsened vision from her right eye. She is unaware of any aggravating or relieving factors. Her son had also noted that her right eyelid was also drooping. The patient is noted to have had declining vision over the past few months, but not to the extend or the acuity that developed in her right eye yesterday. She denied any difficulty with speech or noted any slurring of her words. Her son reports that the patient had been having difficulty with ambulation but this was due to pain in the right knee which has been chronic. On presentation to the emergency department, the patient was noted to have poorly controlled blood pressure. (25 Mar 2019 03:46)     PAST MEDICAL & SURGICAL HISTORY:  Left bundle branch block  Osteoporosis  Diabetic neuropathy  Peripheral arterial disease  Spinal stenosis  Coronary artery disease  Chronic renal failure  Pacemaker: Medtronic 2011  Hypothyroid  Hyperlipemia  Hypertension  Diabetes  S/P dialysis catheter insertion: R. arm  S/P CABG x 3  Artificial cardiac pacemaker      FAMILY HISTORY:  No pertinent family history in first degree relatives  [place]    Social History:Non smoker    MEDICATIONS  (STANDING):  aspirin enteric coated 81 milliGRAM(s) Oral daily  carvedilol 12.5 milliGRAM(s) Oral every 12 hours  clopidogrel Tablet 75 milliGRAM(s) Oral daily  dextrose 5%. 1000 milliLiter(s) (50 mL/Hr) IV Continuous <Continuous>  dextrose 50% Injectable 12.5 Gram(s) IV Push once  dextrose 50% Injectable 25 Gram(s) IV Push once  dextrose 50% Injectable 25 Gram(s) IV Push once  heparin  Injectable 5000 Unit(s) SubCutaneous every 12 hours  hydrALAZINE 50 milliGRAM(s) Oral three times a day  insulin lispro (HumaLOG) corrective regimen sliding scale   SubCutaneous three times a day before meals  levothyroxine 112 MICROGram(s) Oral daily  lisinopril 20 milliGRAM(s) Oral daily  pantoprazole    Tablet 40 milliGRAM(s) Oral before breakfast    MEDICATIONS  (PRN):  dextrose 40% Gel 15 Gram(s) Oral once PRN Blood Glucose LESS THAN 70 milliGRAM(s)/deciliter  glucagon  Injectable 1 milliGRAM(s) IntraMuscular once PRN Glucose LESS THAN 70 milligrams/deciliter  oxyCODONE    5 mG/acetaminophen 325 mG 1 Tablet(s) Oral every 6 hours PRN Moderate Pain (4 - 6)   Meds reviewed    Allergies    penicillin (Anaphylaxis)  seafood (Anaphylaxis)  shellfish (Anaphylaxis)        REVIEW OF SYSTEMS:    CONSTITUTIONAL:  feels weak  EYES: No eye pain, visual disturbances, or discharge  ENMT:  No difficulty hearing, tinnitus, vertigo; No sinus or throat pain  NECK: No pain or stiffness  BREASTS: No pain, masses, or nipple discharge  RESPIRATORY: neg  CARDIOVASCULAR: No chest pain, palpitations, dizziness,   GASTROINTESTINAL: No abdominal or epigastric pain. No nausea, vomiting, or hematemesis; No diarrhea or constipation. No melena or hematochezia.Trunckal obesity  GENITOURINARY: No dysuria, frequency, hematuria, or incontinence  NEUROLOGICAL: + headaches  SKIN: Neg  LYMPH NODES: No enlarged glands  ENDOCRINE: No heat or cold intolerance; No hair loss  MUSCULOSKELETAL: Chronic muscle wasting  PSYCHIATRIC: No depression, anxiety, mood swings, or difficulty sleeping  HEME/LYMPH: No easy bruising, or bleeding gums  ALLERY AND IMMUNOLOGIC: No hives or eczema      Vital Signs Last 24 Hrs  T(C): 36.6 (25 Mar 2019 07:13), Max: 36.7 (24 Mar 2019 22:35)  T(F): 97.9 (25 Mar 2019 07:13), Max: 98.1 (24 Mar 2019 22:35)  HR: 67 (25 Mar 2019 07:13) (67 - 87)  BP: 112/61 (25 Mar 2019 07:13) (112/61 - 232/92)  BP(mean): --  RR: 20 (25 Mar 2019 07:13) (18 - 22)  SpO2: 93% (25 Mar 2019 07:13) (93% - 99%)  Daily Height in cm: 152.4 (24 Mar 2019 22:35)    Daily     PHYSICAL EXAM:    GENERAL: appears chronically ill, oriented.  HEAD:  Atraumatic, Normocephalic  EYES: EOMI, PERRLA, conjunctiva and sclera clear  ENMT: No tonsillar erythema, exudates, or enlargement; Moist mucous membranes, Good dentition, No lesions  NECK: Supple, neck  veins full  NERVOUS SYSTEM:  Alert , right eye not moving medially; Moves upper and lower extremities;  CHEST/LUNG: Clear to percussion bilaterally; No rales, rhonchi, wheezing, or rubs  HEART: Regular rate and rhythm; + murmurs, no rubs, or gallops; scar sternal area.  ABDOMEN: Soft, Nontender, Nondistended; Bowel sounds present, body wall and flank edema  EXTREMITIES: no edema; right arm fistula with bruit  LYMPH: No lymphadenopathy noted  SKIN: No rashes or lesions, pale      LABS:                        11.7   4.1   )-----------( 115      ( 25 Mar 2019 00:33 )             35.9     03-25    138  |  96<L>  |  42.0<H>  ----------------------------<  207<H>  4.3   |  25.0  |  3.80<H>    Ca    8.4<L>      25 Mar 2019 00:33    TPro  6.5<L>  /  Alb  4.2  /  TBili  0.3<L>  /  DBili  x   /  AST  21  /  ALT  17  /  AlkPhos  119  03-25    PT/INR - ( 25 Mar 2019 00:33 )   PT: 12.7 sec;   INR: 1.10 ratio         PTT - ( 25 Mar 2019 00:33 )  PTT:35.0 sec            RADIOLOGY & ADDITIONAL TESTS:

## 2019-03-25 NOTE — H&P ADULT - ASSESSMENT
87F with decrease vision and dizziness found to have deviated right eye    Occulomotor palsy - CT of the head was without acute intracranial pathology. Unable to have MRI/MRA due to the presence of a pacemaker. For CT angiogram of the head. No unilateral motor deficits on examination. Neurology consultation requested.    ESRD - For hemodialysis. Nephrology consultation requested.    Hypertension - Poorly controlled. On carvedilol and lisinopril with the addition of hydralazine. Close blood pressure monitoring.    Diabetes - Insulin coverage, close monitoring of blood glucose levels.    Hypothyroidism - On levothyroxine.    CAD - On aspirin and clopidogrel. EKG was compared to a prior study and was without significant change.    Spinal stenosis - Analgesic medication as needed.    Discussed with the patient and her son at the bedside. 87F with decrease vision and dizziness found to have deviated right eye    Occulomotor palsy - CT of the head was without acute intracranial pathology. Unable to have MRI/MRA due to the presence of a pacemaker. For CT angiogram of the head. No unilateral motor deficits on examination. Neurology consultation requested.    ESRD - For hemodialysis. Nephrology consultation requested.    Hypertension - Poorly controlled. On carvedilol and lisinopril with the addition of hydralazine. Close blood pressure monitoring.    Diabetes - Insulin coverage, close monitoring of blood glucose levels.    Hypothyroidism - On levothyroxine.    CAD - On aspirin and clopidogrel. EKG was compared to a prior study and was without significant change.    Dysphagia - Continue on modifed diet as per prior Speech Pathology recommendations.    Spinal stenosis - Analgesic medication as needed.    Discussed with the patient and her son at the bedside. 87F with decrease vision and dizziness found to have deviated right eye    Occulomotor palsy - CT of the head was without acute intracranial pathology. Unable to have MRI/MRA due to the presence of a pacemaker. For CT angiogram of the head. No unilateral motor deficits on examination. Neurology consultation requested.    ESRD - For hemodialysis. Nephrology consultation requested.    Hypertension - Poorly controlled. On carvedilol and lisinopril with the addition of hydralazine. Close blood pressure monitoring.    Diabetes - Insulin coverage, close monitoring of blood glucose levels.    Hypothyroidism - On levothyroxine.    CAD - On aspirin and clopidogrel. EKG was compared to a prior study and was without significant change.    Dysphagia - Continue on modified diet as per prior Speech Pathology recommendations.    Spinal stenosis - Analgesic medication as needed.    Discussed with the patient and her son at the bedside.

## 2019-03-25 NOTE — H&P ADULT - NEGATIVE OPHTHALMOLOGIC SYMPTOMS
Tried calling patient.     Phone numbers on file have been disconnected.     Please send reminder letter.      no discharge L/no pain L/no photophobia/no discharge R/no pain R

## 2019-03-25 NOTE — ED ADULT NURSE REASSESSMENT NOTE - NS ED NURSE REASSESS COMMENT FT1
MD Linda made aware pt. failed dysphagia screen following 20ml thin liquid.  Pt. able to tolerate meds with applesauce and new diet to be placed.  MD Linda also made aware as per family, pt. makes urine sparsely and  approximately once a day if at all; MD Linda to cancel UA pending on pt.

## 2019-03-25 NOTE — H&P ADULT - NSICDXPASTSURGICALHX_GEN_ALL_CORE_FT
PAST SURGICAL HISTORY:  Artificial cardiac pacemaker     S/P CABG x 3     S/P dialysis catheter insertion R. arm

## 2019-03-25 NOTE — H&P ADULT - NSHPLABSRESULTS_GEN_ALL_CORE
EKG was reviewed, 73 beats per minute with left bundle branch block and T wave inversion in V6    CT of the brain was reviewed, no acute intracranial pathology.

## 2019-03-25 NOTE — H&P ADULT - NSHPPHYSICALEXAM_GEN_ALL_CORE
Vital Signs Last 24 Hrs  T(C): 36.7 (24 Mar 2019 22:35), Max: 36.7 (24 Mar 2019 22:35)  T(F): 98.1 (24 Mar 2019 22:35), Max: 98.1 (24 Mar 2019 22:35)  HR: 81 (25 Mar 2019 01:05) (71 - 87)  BP: 184/71 (25 Mar 2019 01:05) (184/71 - 232/92)  BP(mean): --  RR: 20 (24 Mar 2019 23:12) (18 - 20)  SpO2: 99% (24 Mar 2019 23:12) (95% - 99%)    General appearance: No acute distress, Awake, Alert  HEENT: Normocephalic, Atraumatic, Conjunctiva clear, Right eye deviated to the right, Pupils responsive  Neck: Supple, No JVD, No tenderness  Lungs: Breath sound equal bilaterally, No wheezes, No rales  Cardiovascular: S1S2, Regular rhythm  Abdomen: Soft, Nontender, Nondistended, No guarding/rebound, Positive bowel sounds  Extremities: No clubbing, No cyanosis, No edema, No calf tenderness, Right upper extremity AV graft  Neuro: Strength equal bilaterally, No tremors  Psychiatric: Appropriate mood, Normal affect

## 2019-03-25 NOTE — H&P ADULT - NSICDXPASTMEDICALHX_GEN_ALL_CORE_FT
PAST MEDICAL HISTORY:  Chronic renal failure     Coronary artery disease     Diabetes     Diabetic neuropathy     Hyperlipemia     Hypertension     Hypothyroid     Left bundle branch block     Osteoporosis     Pacemaker Medtronic 2011    Peripheral arterial disease     Spinal stenosis

## 2019-03-25 NOTE — CONSULT NOTE ADULT - SUBJECTIVE AND OBJECTIVE BOX
CHIEF COMPLAINT: diplopia    HPI: 87yFemale  87F presented with difficulty seeing and dizziness. The patient had been reporting intermittent episodes of dizziness and double vision. When she was seen by her son on Sunday, he had noticed that the patient had deviation of her eyes. The patient denied any eye pain but did note intermittent headaches. She has had poor vision in the past and has required laser treatments for what appears to be a history of diabetic retinopathy. Most recently she has had worsened vision from her right eye. She is unaware of any aggravating or relieving factors. Her son had also noted that her right eyelid was also drooping. The patient is noted to have had declining vision over the past few months, but not to the extend or the acuity that developed in her right eye yesterday. She denied any difficulty with speech or noted any slurring of her words. Her son reports that the patient had been having difficulty with ambulation but this was due to pain in the right knee which has been chronic. On presentation to the emergency department, the patient was noted to have poorly controlled blood pressure.     PAST MEDICAL & SURGICAL HISTORY:  Left bundle branch block  Osteoporosis  Diabetic neuropathy  Peripheral arterial disease  Spinal stenosis  Coronary artery disease  Chronic renal failure  Pacemaker: [place] 2011  Hypothyroid  Hyperlipemia  Hypertension  Diabetes  S/P dialysis catheter insertion: R. arm  S/P CABG x 3  Artificial cardiac pacemaker    MEDICATIONS  (STANDING):  aspirin enteric coated 81 milliGRAM(s) Oral daily  carvedilol 12.5 milliGRAM(s) Oral every 12 hours  cloNIDine 0.1 milliGRAM(s) Oral every 6 hours  clopidogrel Tablet 75 milliGRAM(s) Oral daily  dextrose 5%. 1000 milliLiter(s) (50 mL/Hr) IV Continuous <Continuous>  dextrose 50% Injectable 12.5 Gram(s) IV Push once  dextrose 50% Injectable 25 Gram(s) IV Push once  dextrose 50% Injectable 25 Gram(s) IV Push once  heparin  Injectable 5000 Unit(s) SubCutaneous every 12 hours  hydrALAZINE 50 milliGRAM(s) Oral three times a day  insulin lispro (HumaLOG) corrective regimen sliding scale   SubCutaneous three times a day before meals  levothyroxine 112 MICROGram(s) Oral daily  lisinopril 20 milliGRAM(s) Oral daily  pantoprazole    Tablet 40 milliGRAM(s) Oral before breakfast    MEDICATIONS  (PRN):  dextrose 40% Gel 15 Gram(s) Oral once PRN Blood Glucose LESS THAN 70 milliGRAM(s)/deciliter  glucagon  Injectable 1 milliGRAM(s) IntraMuscular once PRN Glucose LESS THAN 70 milligrams/deciliter  hydrALAZINE Injectable 15 milliGRAM(s) IV Push every 6 hours PRN systolic > 165  oxyCODONE    5 mG/acetaminophen 325 mG 1 Tablet(s) Oral every 6 hours PRN Moderate Pain (4 - 6)    Allergies    penicillin (Anaphylaxis)  seafood (Anaphylaxis)  shellfish (Anaphylaxis)    Intolerances        FAMILY HISTORY:  No pertinent family history in first degree relatives          SOCIAL HISTORY:    Tobacco:  no  Alcohol:  no  Drugs:  no        REVIEW OF SYSTEMS:    Relevant systems are negative except as noted in the chart, HPI, and PMH      VITAL SIGNS:  Vital Signs Last 24 Hrs  T(C): 36.6 (25 Mar 2019 07:13), Max: 36.7 (24 Mar 2019 22:35)  T(F): 97.9 (25 Mar 2019 07:13), Max: 98.1 (24 Mar 2019 22:35)  HR: 67 (25 Mar 2019 07:13) (67 - 87)  BP: 112/61 (25 Mar 2019 07:13) (112/61 - 232/92)  BP(mean): --  RR: 20 (25 Mar 2019 07:13) (18 - 22)  SpO2: 93% (25 Mar 2019 07:13) (93% - 99%)    PHYSICAL EXAMINATION:    General: Well-developed, well nourished, in no acute distress.  Cardiac:  Regular rate and rhythm. No carotid bruits appreciated.  Eyes: Fundoscopic examination was deferred.  Neurologic:  - Mental Status:  Alert, awake, oriented to person, place, and time; Speech is fluent. Language is normal. Follows commands well.  Insight and knowledge appear appropriate.  Cranial Nerves II-XII:    II:  Visual acuity is normal for age ; Visual fields are full to confrontation; Pupils are equal, round, and reactive to light.  III, IV, VI:   Right DOMINICK  V:  Facial sensation is intact in the V1-V3 distribution bilaterally.  VII:  Face is symmetric with normal eye closure and smile  VIII:  Hearing is grossly intact  IX, X, XII:  speech is clear  XI:  Head turning and shoulder shrug are intact.  - Motor:  Strength is 5/5 omn right. Mild left hemiparesis  There is slight left pronator drift. .  - Reflexes:  1+ and symmetric at the knees.  Plantar responses extensor on the left  - Sensory:  Symmetric to light touch  - Coordination:  Finger-nose-finger is normal. Rapid alternating hand and foot  movements are intact. Dexterity appears normal      LABS:                          11.7   4.1   )-----------( 115      ( 25 Mar 2019 00:33 )             35.9     25 Mar 2019 00:33    138    |  96     |  42.0   ----------------------------<  207    4.3     |  25.0   |  3.80     Ca    8.4        25 Mar 2019 00:33    TPro  6.5    /  Alb  4.2    /  TBili  0.3    /  DBili  x      /  AST  21     /  ALT  17     /  AlkPhos  119    25 Mar 2019 00:33    LIVER FUNCTIONS - ( 25 Mar 2019 00:33 )  Alb: 4.2 g/dL / Pro: 6.5 g/dL / ALK PHOS: 119 U/L / ALT: 17 U/L / AST: 21 U/L / GGT: x           PT/INR - ( 25 Mar 2019 00:33 )   PT: 12.7 sec;   INR: 1.10 ratio         PTT - ( 25 Mar 2019 00:33 )  PTT:35.0 sec      RADIOLOGY & ADDITIONAL STUDIES:    < from: CT Head No Cont (03.25.19 @ 00:05) >  There is no intracranial hemorrhage, abnormal extra-axial fluid   collection, mass effect, midline shift or large acute cortical infarct.    Gray-white differentiation is maintained.  There are age appropriate   involutional changes with commensurate dilatation of the CSF spaces.    There are subcortical and periventricular white matter lucencies likely   representing microvascular ischemic disease.    The mastoid air cells and visualized paranasal sinuses are well aerated.    IMPRESSION:    No intracranial hemorrhage, mass effect or large acute cortical infarct.    < end of copied text >        IMPRESSION:  87 year old woman with multiple risk factors for stroke presents with severe HTN and diplopia  Her exam demonstrates an right DOMINICK with mild left hemiparesis consistent with a lacunar infarct in the right ted (brainstem).  Cannot do MRI (pacemaker).    PLAN:  1. Cerebrovascular risk factor assessment and management. Gradual Normalization of BP  2. Antiplatelet therapy as prescribed.  3. Medical and Cardiac evaluation and treatment as indicated  4. Consider CTA but she is on dialysis.  Will do Carotid duplex  5.

## 2019-03-25 NOTE — H&P ADULT - NSHPSOCIALHISTORY_GEN_ALL_CORE
Prior tobacco use  Denied alcohol or illicit drug use  Live at home by herself    Family History:  Mother with diabetes. No recollection fo strokes in her parents.

## 2019-03-26 ENCOUNTER — TRANSCRIPTION ENCOUNTER (OUTPATIENT)
Age: 84
End: 2019-03-26

## 2019-03-26 VITALS
HEART RATE: 74 BPM | OXYGEN SATURATION: 97 % | SYSTOLIC BLOOD PRESSURE: 126 MMHG | TEMPERATURE: 98 F | DIASTOLIC BLOOD PRESSURE: 68 MMHG | RESPIRATION RATE: 18 BRPM

## 2019-03-26 LAB
ANION GAP SERPL CALC-SCNC: 13 MMOL/L — SIGNIFICANT CHANGE UP (ref 5–17)
BUN SERPL-MCNC: 23 MG/DL — HIGH (ref 8–20)
CALCIUM SERPL-MCNC: 8.2 MG/DL — LOW (ref 8.6–10.2)
CHLORIDE SERPL-SCNC: 102 MMOL/L — SIGNIFICANT CHANGE UP (ref 98–107)
CHOLEST SERPL-MCNC: 186 MG/DL — SIGNIFICANT CHANGE UP (ref 110–199)
CO2 SERPL-SCNC: 25 MMOL/L — SIGNIFICANT CHANGE UP (ref 22–29)
CREAT SERPL-MCNC: 2.99 MG/DL — HIGH (ref 0.5–1.3)
GLUCOSE BLDC GLUCOMTR-MCNC: 132 MG/DL — HIGH (ref 70–99)
GLUCOSE BLDC GLUCOMTR-MCNC: 150 MG/DL — HIGH (ref 70–99)
GLUCOSE BLDC GLUCOMTR-MCNC: 195 MG/DL — HIGH (ref 70–99)
GLUCOSE SERPL-MCNC: 139 MG/DL — HIGH (ref 70–115)
HBA1C BLD-MCNC: 6.7 % — HIGH (ref 4–5.6)
HBV CORE AB SER-ACNC: SIGNIFICANT CHANGE UP
HBV CORE IGM SER-ACNC: SIGNIFICANT CHANGE UP
HBV SURFACE AB SER-ACNC: <3 MIU/ML — LOW
HBV SURFACE AG SER-ACNC: SIGNIFICANT CHANGE UP
HCV AB S/CO SERPL IA: 0.07 S/CO — SIGNIFICANT CHANGE UP (ref 0–0.79)
HCV AB SERPL-IMP: SIGNIFICANT CHANGE UP
HDLC SERPL-MCNC: 73 MG/DL — SIGNIFICANT CHANGE UP
LIPID PNL WITH DIRECT LDL SERPL: 97 MG/DL — SIGNIFICANT CHANGE UP
POTASSIUM SERPL-MCNC: 4.1 MMOL/L — SIGNIFICANT CHANGE UP (ref 3.5–5.3)
POTASSIUM SERPL-SCNC: 4.1 MMOL/L — SIGNIFICANT CHANGE UP (ref 3.5–5.3)
SODIUM SERPL-SCNC: 140 MMOL/L — SIGNIFICANT CHANGE UP (ref 135–145)
TOTAL CHOLESTEROL/HDL RATIO MEASUREMENT: 3 RATIO — LOW (ref 3.3–7.1)
TRIGL SERPL-MCNC: 80 MG/DL — SIGNIFICANT CHANGE UP (ref 10–200)

## 2019-03-26 PROCEDURE — 99285 EMERGENCY DEPT VISIT HI MDM: CPT | Mod: 25

## 2019-03-26 PROCEDURE — 93306 TTE W/DOPPLER COMPLETE: CPT

## 2019-03-26 PROCEDURE — 99223 1ST HOSP IP/OBS HIGH 75: CPT | Mod: GC

## 2019-03-26 PROCEDURE — 86704 HEP B CORE ANTIBODY TOTAL: CPT

## 2019-03-26 PROCEDURE — 99239 HOSP IP/OBS DSCHRG MGMT >30: CPT

## 2019-03-26 PROCEDURE — 83036 HEMOGLOBIN GLYCOSYLATED A1C: CPT

## 2019-03-26 PROCEDURE — 93971 EXTREMITY STUDY: CPT

## 2019-03-26 PROCEDURE — 85610 PROTHROMBIN TIME: CPT

## 2019-03-26 PROCEDURE — 96375 TX/PRO/DX INJ NEW DRUG ADDON: CPT

## 2019-03-26 PROCEDURE — 93306 TTE W/DOPPLER COMPLETE: CPT | Mod: 26

## 2019-03-26 PROCEDURE — 93005 ELECTROCARDIOGRAM TRACING: CPT

## 2019-03-26 PROCEDURE — 70450 CT HEAD/BRAIN W/O DYE: CPT

## 2019-03-26 PROCEDURE — 87340 HEPATITIS B SURFACE AG IA: CPT

## 2019-03-26 PROCEDURE — 36415 COLL VENOUS BLD VENIPUNCTURE: CPT

## 2019-03-26 PROCEDURE — 93880 EXTRACRANIAL BILAT STUDY: CPT

## 2019-03-26 PROCEDURE — 80048 BASIC METABOLIC PNL TOTAL CA: CPT

## 2019-03-26 PROCEDURE — 97163 PT EVAL HIGH COMPLEX 45 MIN: CPT

## 2019-03-26 PROCEDURE — 86803 HEPATITIS C AB TEST: CPT

## 2019-03-26 PROCEDURE — 86705 HEP B CORE ANTIBODY IGM: CPT

## 2019-03-26 PROCEDURE — 84484 ASSAY OF TROPONIN QUANT: CPT

## 2019-03-26 PROCEDURE — 85730 THROMBOPLASTIN TIME PARTIAL: CPT

## 2019-03-26 PROCEDURE — 86706 HEP B SURFACE ANTIBODY: CPT

## 2019-03-26 PROCEDURE — 80061 LIPID PANEL: CPT

## 2019-03-26 PROCEDURE — 85027 COMPLETE CBC AUTOMATED: CPT

## 2019-03-26 PROCEDURE — 71045 X-RAY EXAM CHEST 1 VIEW: CPT

## 2019-03-26 PROCEDURE — 80053 COMPREHEN METABOLIC PANEL: CPT

## 2019-03-26 PROCEDURE — 99261: CPT

## 2019-03-26 PROCEDURE — 96374 THER/PROPH/DIAG INJ IV PUSH: CPT

## 2019-03-26 PROCEDURE — 82962 GLUCOSE BLOOD TEST: CPT

## 2019-03-26 RX ORDER — LABETALOL HCL 100 MG
1 TABLET ORAL
Qty: 0 | Refills: 0 | COMMUNITY

## 2019-03-26 RX ORDER — ATORVASTATIN CALCIUM 80 MG/1
1 TABLET, FILM COATED ORAL
Qty: 30 | Refills: 0
Start: 2019-03-26 | End: 2019-04-24

## 2019-03-26 RX ADMIN — Medication 50 MILLIGRAM(S): at 12:09

## 2019-03-26 RX ADMIN — PANTOPRAZOLE SODIUM 40 MILLIGRAM(S): 20 TABLET, DELAYED RELEASE ORAL at 05:51

## 2019-03-26 RX ADMIN — Medication 81 MILLIGRAM(S): at 12:08

## 2019-03-26 RX ADMIN — Medication 0.1 MILLIGRAM(S): at 12:08

## 2019-03-26 RX ADMIN — CHLORHEXIDINE GLUCONATE 1 APPLICATION(S): 213 SOLUTION TOPICAL at 05:52

## 2019-03-26 RX ADMIN — Medication 0.1 MILLIGRAM(S): at 05:50

## 2019-03-26 RX ADMIN — HEPARIN SODIUM 5000 UNIT(S): 5000 INJECTION INTRAVENOUS; SUBCUTANEOUS at 05:51

## 2019-03-26 RX ADMIN — OXYCODONE AND ACETAMINOPHEN 1 TABLET(S): 5; 325 TABLET ORAL at 07:17

## 2019-03-26 RX ADMIN — LISINOPRIL 20 MILLIGRAM(S): 2.5 TABLET ORAL at 05:51

## 2019-03-26 RX ADMIN — CLOPIDOGREL BISULFATE 75 MILLIGRAM(S): 75 TABLET, FILM COATED ORAL at 12:08

## 2019-03-26 RX ADMIN — Medication 50 MILLIGRAM(S): at 05:50

## 2019-03-26 RX ADMIN — Medication 112 MICROGRAM(S): at 05:51

## 2019-03-26 RX ADMIN — Medication 2: at 12:07

## 2019-03-26 RX ADMIN — CARVEDILOL PHOSPHATE 12.5 MILLIGRAM(S): 80 CAPSULE, EXTENDED RELEASE ORAL at 05:51

## 2019-03-26 NOTE — DISCHARGE NOTE NURSING/CASE MANAGEMENT/SOCIAL WORK - NSDCPEPTSTRK_GEN_ALL_CORE
Prescribed medications/Risk factors for stroke/Need for follow up after discharge/Stroke education booklet/Stroke support groups for patients, families, and friends/Stroke warning signs and symptoms/Signs and symptoms of stroke/Call 911 for stroke

## 2019-03-26 NOTE — DISCHARGE NOTE PROVIDER - NSDCCPCAREPLAN_GEN_ALL_CORE_FT
PRINCIPAL DISCHARGE DIAGNOSIS  Diagnosis: CVA (cerebral vascular accident)  Assessment and Plan of Treatment: Po lipitor 40mg OD  Continue aspirin and plavix  Follow up with neurology for outpatient ocular rehab referral      SECONDARY DISCHARGE DIAGNOSES  Diagnosis: Hypertensive urgency  Assessment and Plan of Treatment: Started on PO clonidine  Close blood pressure monitoring

## 2019-03-26 NOTE — DISCHARGE NOTE PROVIDER - HOSPITAL COURSE
The patient is an 87 year old female with a past medical history of ESRD on HD, cad status post cabg, diabetes mellitus type 2 and hypertension who was brought to the ER for blurred vision and dizziness. Noted to have hypertensive urgency with right cranial nerve palsy on admission. CT head was negative, unable to have MRI due to presence of pacemaker.     Carotid duplex showed mild right IC plaque, moderate LICA plaque <50%. Evaluated by neurology, recommend medical risk factor management. Evaluated by PT- HOme.         Discharged home in stable condition. 48 mins spent .

## 2019-03-26 NOTE — DISCHARGE NOTE PROVIDER - CARE PROVIDER_API CALL
Kana Marcial)  Neurology  [address] [place] [address]  Phone: [phone]  Fax: [phone]  Follow Up Time: 2 weeks
See HPI

## 2019-03-26 NOTE — DISCHARGE NOTE NURSING/CASE MANAGEMENT/SOCIAL WORK - NSDCVIVACCINE_GEN_ALL_CORE_FT
Influenza , 2015/1/9 15:00 , [place], [employer] (IS) Influenza , 2015/1/9 15:00 , [place], [employer] (IS)

## 2019-03-26 NOTE — PROGRESS NOTE ADULT - SUBJECTIVE AND OBJECTIVE BOX
CC: Follow up right eye weakness    INTERVAL HPI/OVERNIGHT EVENTS: Patient seen and examined, sons at the bedside for translation. Blurred vision right eye.       Vital Signs Last 24 Hrs  T(C): 36.9 (26 Mar 2019 11:15), Max: 37.3 (26 Mar 2019 03:56)  T(F): 98.4 (26 Mar 2019 11:15), Max: 99.1 (26 Mar 2019 03:56)  HR: 77 (26 Mar 2019 11:15) (65 - 77)  BP: 137/82 (26 Mar 2019 11:15) (128/49 - 198/68)  BP(mean): --  RR: 18 (26 Mar 2019 11:15) (18 - 20)  SpO2: 94% (26 Mar 2019 11:15) (94% - 96%)    PHYSICAL EXAM:    GENERAL: NAD, AOX3  HEAD:  Atraumatic, Normocephalic  EYES: riight ocular nerve palsy   ENMT: Moist mucous membranes  NECK: Supple, No JVD  NERVOUS SYSTEM:  Alert & Oriented X3   CHEST/LUNG: Clear to auscultation bilaterally; No rales, rhonchi, wheezing, or rubs  HEART: Regular rate and rhythm; No murmurs, rubs, or gallops  ABDOMEN: Soft, Nontender, Nondistended; Bowel sounds present  EXTREMITIES:  2+ Peripheral Pulses, No clubbing, cyanosis, or edema        MEDICATIONS  (STANDING):  aspirin enteric coated 81 milliGRAM(s) Oral daily  atorvastatin 40 milliGRAM(s) Oral at bedtime  carvedilol 12.5 milliGRAM(s) Oral every 12 hours  chlorhexidine 2% Cloths 1 Application(s) Topical <User Schedule>  cloNIDine 0.1 milliGRAM(s) Oral every 6 hours  clopidogrel Tablet 75 milliGRAM(s) Oral daily  dextrose 5%. 1000 milliLiter(s) (50 mL/Hr) IV Continuous <Continuous>  dextrose 50% Injectable 12.5 Gram(s) IV Push once  dextrose 50% Injectable 25 Gram(s) IV Push once  dextrose 50% Injectable 25 Gram(s) IV Push once  heparin  Injectable 5000 Unit(s) SubCutaneous every 12 hours  hydrALAZINE 50 milliGRAM(s) Oral three times a day  insulin lispro (HumaLOG) corrective regimen sliding scale   SubCutaneous three times a day before meals  levothyroxine 112 MICROGram(s) Oral daily  lisinopril 20 milliGRAM(s) Oral daily  pantoprazole    Tablet 40 milliGRAM(s) Oral before breakfast    MEDICATIONS  (PRN):  dextrose 40% Gel 15 Gram(s) Oral once PRN Blood Glucose LESS THAN 70 milliGRAM(s)/deciliter  glucagon  Injectable 1 milliGRAM(s) IntraMuscular once PRN Glucose LESS THAN 70 milligrams/deciliter  hydrALAZINE Injectable 15 milliGRAM(s) IV Push every 6 hours PRN systolic > 165  oxyCODONE    5 mG/acetaminophen 325 mG 1 Tablet(s) Oral every 6 hours PRN Moderate Pain (4 - 6)      Allergies    penicillin (Anaphylaxis)  seafood (Anaphylaxis)  shellfish (Anaphylaxis)    Intolerances          LABS:                          11.7   4.1   )-----------( 115      ( 25 Mar 2019 00:33 )             35.9     03-26    140  |  102  |  23.0<H>  ----------------------------<  139<H>  4.1   |  25.0  |  2.99<H>    Ca    8.2<L>      26 Mar 2019 06:34    TPro  6.5<L>  /  Alb  4.2  /  TBili  0.3<L>  /  DBili  x   /  AST  21  /  ALT  17  /  AlkPhos  119  03-25    PT/INR - ( 25 Mar 2019 00:33 )   PT: 12.7 sec;   INR: 1.10 ratio         PTT - ( 25 Mar 2019 00:33 )  PTT:35.0 sec      RADIOLOGY & ADDITIONAL TESTS:
CC: diplopia r/o cva (25 Mar 2019 09:23)    HPI: 87 y.o. Female with PMHx of ESRD on HD, CAD s/p CABG, DMII with DPNP and diabetic ophthalmopathy, HLD, HTN, Hypothyroidism, Spinal stenosis, PAD, s/p PPM presented with difficulty seeing and dizziness. The patient had been reporting intermittent episodes of dizziness and double vision. When she was seen by her son on Sunday, he had noticed that the patient had deviation of her eyes. The patient denied any eye pain but did note intermittent headaches. She has had poor vision in the past and has required laser treatments for what appears to be a history of diabetic retinopathy. Most recently she has had worsened vision from her right eye. She is unaware of any aggravating or relieving factors. Her son had also noted that her right eyelid was also drooping. The patient is noted to have had declining vision over the past few months, but not to the extend or the acuity that developed in her right eye yesterday. She denied any difficulty with speech or noted any slurring of her words. Her son reports that the patient had been having difficulty with ambulation but this was due to pain in the right knee which has been chronic. On presentation to the emergency department, the patient was noted to have poorly controlled blood pressure. (25 Mar 2019 03:46)    INTERVAL HPI/OVERNIGHT EVENTS: pt denies any complaints    Vital Signs Last 24 Hrs  T(C): 36.6 (25 Mar 2019 07:13), Max: 36.7 (24 Mar 2019 22:35)  T(F): 97.9 (25 Mar 2019 07:13), Max: 98.1 (24 Mar 2019 22:35)  HR: 67 (25 Mar 2019 07:13) (67 - 87)  BP: 112/61 (25 Mar 2019 07:13) (112/61 - 232/92)  RR: 20 (25 Mar 2019 07:13) (18 - 22)  SpO2: 93% (25 Mar 2019 07:13) (93% - 99%)  I&O's Detail    CARDIAC MARKERS ( 25 Mar 2019 00:33 )  x     / 0.05 ng/mL / x     / x     / x                      11.7   4.1   )-----------( 115      ( 25 Mar 2019 00:33 )             35.9     25 Mar 2019 00:33    138    |  96     |  42.0   ----------------------------<  207    4.3     |  25.0   |  3.80     Ca    8.4        25 Mar 2019 00:33    TPro  6.5    /  Alb  4.2    /  TBili  0.3    /  DBili  x      /  AST  21     /  ALT  17     /  AlkPhos  119    25 Mar 2019 00:33    PT/INR - ( 25 Mar 2019 00:33 )   PT: 12.7 sec;   INR: 1.10 ratio      PTT - ( 25 Mar 2019 00:33 )  PTT:35.0 sec  CAPILLARY BLOOD GLUCOSE    POCT Blood Glucose.: 116 mg/dL (25 Mar 2019 11:20)  POCT Blood Glucose.: 134 mg/dL (25 Mar 2019 08:04)  POCT Blood Glucose.: 242 mg/dL (24 Mar 2019 22:54)    LIVER FUNCTIONS - ( 25 Mar 2019 00:33 )  Alb: 4.2 g/dL / Pro: 6.5 g/dL / ALK PHOS: 119 U/L / ALT: 17 U/L / AST: 21 U/L / GGT: x           MEDICATIONS  (STANDING):  aspirin enteric coated 81 milliGRAM(s) Oral daily  atorvastatin 40 milliGRAM(s) Oral at bedtime  carvedilol 12.5 milliGRAM(s) Oral every 12 hours  chlorhexidine 2% Cloths 1 Application(s) Topical <User Schedule>  cloNIDine 0.1 milliGRAM(s) Oral every 6 hours  clopidogrel Tablet 75 milliGRAM(s) Oral daily  dextrose 5%. 1000 milliLiter(s) (50 mL/Hr) IV Continuous <Continuous>  dextrose 50% Injectable 12.5 Gram(s) IV Push once  dextrose 50% Injectable 25 Gram(s) IV Push once  dextrose 50% Injectable 25 Gram(s) IV Push once  heparin  Injectable 5000 Unit(s) SubCutaneous every 12 hours  hydrALAZINE 50 milliGRAM(s) Oral three times a day  insulin lispro (HumaLOG) corrective regimen sliding scale   SubCutaneous three times a day before meals  levothyroxine 112 MICROGram(s) Oral daily  lisinopril 20 milliGRAM(s) Oral daily  pantoprazole    Tablet 40 milliGRAM(s) Oral before breakfast    MEDICATIONS  (PRN):  dextrose 40% Gel 15 Gram(s) Oral once PRN Blood Glucose LESS THAN 70 milliGRAM(s)/deciliter  glucagon  Injectable 1 milliGRAM(s) IntraMuscular once PRN Glucose LESS THAN 70 milligrams/deciliter  hydrALAZINE Injectable 15 milliGRAM(s) IV Push every 6 hours PRN systolic > 165  oxyCODONE    5 mG/acetaminophen 325 mG 1 Tablet(s) Oral every 6 hours PRN Moderate Pain (4 - 6)    RADIOLOGY & ADDITIONAL TESTS: personally visualized    PHYSICAL EXAM:    General: elderly fragile female in no acute distress  Eyes: right eye externally deviated with left horizontal nystagmus to left  Head: Normocephalic; atraumatic  ENMT: No nasal discharge; airway clear  Neck: Supple; non tender; no masses  Respiratory: No wheezes, rales or rhonchi  Cardiovascular: S1, S2 reg  Gastrointestinal: Soft abd, NT, + BS  Genitourinary: No costovertebral angle tenderness  Extremities: No clubbing, cyanosis or edema, mild left sided weaknes  Vascular: Peripheral pulses palpable 2+ bilaterally  Neurological: Alert in NAD  Skin: Warm and dry.   Musculoskeletal: Normal tone, mild left sided drift  Psychiatric: Cooperative and appropriate
INTERVAL HISTORY:  stable, no interval changes      VITAL SIGNS:  Vital Signs Last 24 Hrs  T(C): 36.8 (26 Mar 2019 07:59), Max: 37.3 (26 Mar 2019 03:56)  T(F): 98.2 (26 Mar 2019 07:59), Max: 99.1 (26 Mar 2019 03:56)  HR: 65 (26 Mar 2019 07:59) (65 - 76)  BP: 142/53 (26 Mar 2019 07:59) (128/49 - 198/68)  BP(mean): --  RR: 18 (26 Mar 2019 07:59) (18 - 20)  SpO2: 96% (26 Mar 2019 07:59) (94% - 96%)    PHYSICAL EXAMINATION:    Mentation:  nl  Language/Speech: nl  CN: PERRL,  Right DOMINICK  Visual Fields: full  Motor: nl  Sensory:  DTR:  Babinski:      MEDS:  MEDICATIONS  (STANDING):  aspirin enteric coated 81 milliGRAM(s) Oral daily  atorvastatin 40 milliGRAM(s) Oral at bedtime  carvedilol 12.5 milliGRAM(s) Oral every 12 hours  chlorhexidine 2% Cloths 1 Application(s) Topical <User Schedule>  cloNIDine 0.1 milliGRAM(s) Oral every 6 hours  clopidogrel Tablet 75 milliGRAM(s) Oral daily  dextrose 5%. 1000 milliLiter(s) (50 mL/Hr) IV Continuous <Continuous>  dextrose 50% Injectable 12.5 Gram(s) IV Push once  dextrose 50% Injectable 25 Gram(s) IV Push once  dextrose 50% Injectable 25 Gram(s) IV Push once  heparin  Injectable 5000 Unit(s) SubCutaneous every 12 hours  hydrALAZINE 50 milliGRAM(s) Oral three times a day  insulin lispro (HumaLOG) corrective regimen sliding scale   SubCutaneous three times a day before meals  levothyroxine 112 MICROGram(s) Oral daily  lisinopril 20 milliGRAM(s) Oral daily  pantoprazole    Tablet 40 milliGRAM(s) Oral before breakfast    MEDICATIONS  (PRN):  dextrose 40% Gel 15 Gram(s) Oral once PRN Blood Glucose LESS THAN 70 milliGRAM(s)/deciliter  glucagon  Injectable 1 milliGRAM(s) IntraMuscular once PRN Glucose LESS THAN 70 milligrams/deciliter  hydrALAZINE Injectable 15 milliGRAM(s) IV Push every 6 hours PRN systolic > 165  oxyCODONE    5 mG/acetaminophen 325 mG 1 Tablet(s) Oral every 6 hours PRN Moderate Pain (4 - 6)      LABS:                          11.7   4.1   )-----------( 115      ( 25 Mar 2019 00:33 )             35.9     03-26    140  |  102  |  23.0<H>  ----------------------------<  139<H>  4.1   |  25.0  |  2.99<H>    Ca    8.2<L>      26 Mar 2019 06:34    TPro  6.5<L>  /  Alb  4.2  /  TBili  0.3<L>  /  DBili  x   /  AST  21  /  ALT  17  /  AlkPhos  119  03-25    LIVER FUNCTIONS - ( 25 Mar 2019 00:33 )  Alb: 4.2 g/dL / Pro: 6.5 g/dL / ALK PHOS: 119 U/L / ALT: 17 U/L / AST: 21 U/L / GGT: x               RADIOLOGY & ADDITIONAL STUDIES:    Carotid duplex- mild bilateral plaque- carotids    IMPRESSION & PLAN:    Brainstem stroke  (Right DOMINICK)     Antiplatelet therapy as prescribed.  Cerebrovascular risk factor assessment and management  Cerebrovascular risk factor assessment and management  Defer addtional CT contrast studies- will not change treament or prognosis  Will ophthalmology eval as outpateint along with oculomotor therapy  Consider alternate eye patching -(q3h)   Will not actively follow.   Neurologically cleared for discharge/disposition.  Please recontact as needed.  Follow up in office in  4-6 weeks
NEPHROLOGY INTERVAL HPI/OVERNIGHT EVENTS:  No new events.    MEDICATIONS  (STANDING):  aspirin enteric coated 81 milliGRAM(s) Oral daily  atorvastatin 40 milliGRAM(s) Oral at bedtime  carvedilol 12.5 milliGRAM(s) Oral every 12 hours  chlorhexidine 2% Cloths 1 Application(s) Topical <User Schedule>  cloNIDine 0.1 milliGRAM(s) Oral every 6 hours  clopidogrel Tablet 75 milliGRAM(s) Oral daily  dextrose 5%. 1000 milliLiter(s) (50 mL/Hr) IV Continuous <Continuous>  dextrose 50% Injectable 12.5 Gram(s) IV Push once  dextrose 50% Injectable 25 Gram(s) IV Push once  dextrose 50% Injectable 25 Gram(s) IV Push once  heparin  Injectable 5000 Unit(s) SubCutaneous every 12 hours  hydrALAZINE 50 milliGRAM(s) Oral three times a day  insulin lispro (HumaLOG) corrective regimen sliding scale   SubCutaneous three times a day before meals  levothyroxine 112 MICROGram(s) Oral daily  lisinopril 20 milliGRAM(s) Oral daily  pantoprazole    Tablet 40 milliGRAM(s) Oral before breakfast    MEDICATIONS  (PRN):  dextrose 40% Gel 15 Gram(s) Oral once PRN Blood Glucose LESS THAN 70 milliGRAM(s)/deciliter  glucagon  Injectable 1 milliGRAM(s) IntraMuscular once PRN Glucose LESS THAN 70 milligrams/deciliter  hydrALAZINE Injectable 15 milliGRAM(s) IV Push every 6 hours PRN systolic > 165  oxyCODONE    5 mG/acetaminophen 325 mG 1 Tablet(s) Oral every 6 hours PRN Moderate Pain (4 - 6)      Allergies    penicillin (Anaphylaxis)  seafood (Anaphylaxis)  shellfish (Anaphylaxis)    Intolerances        Vital Signs Last 24 Hrs  T(C): 36.8 (26 Mar 2019 07:59), Max: 37.3 (26 Mar 2019 03:56)  T(F): 98.2 (26 Mar 2019 07:59), Max: 99.1 (26 Mar 2019 03:56)  HR: 65 (26 Mar 2019 07:59) (65 - 76)  BP: 142/53 (26 Mar 2019 07:59) (128/49 - 198/68)  BP(mean): --  RR: 18 (26 Mar 2019 07:59) (18 - 20)  SpO2: 96% (26 Mar 2019 07:59) (94% - 96%)      PHYSICAL EXAM:    GENERAL: appears chronically ill  HEAD:    EYES: double vision, right medial motion limited same  ENMT:   NECK: veins not distended  NERVOUS SYSTEM:  more alert  CHEST/LUNG: no 02, no rales noted, decreased bs bases  HEART: no rub; sternal scar, no rub  ABDOMEN: soft, not tender  EXTREMITIES:  muscle wasting, no edema  LYMPH:   SKIN: same    LABS:                        11.7   4.1   )-----------( 115      ( 25 Mar 2019 00:33 )             35.9     03-26    140  |  102  |  23.0<H>  ----------------------------<  139<H>  4.1   |  25.0  |  2.99<H>    Ca    8.2<L>      26 Mar 2019 06:34    TPro  6.5<L>  /  Alb  4.2  /  TBili  0.3<L>  /  DBili  x   /  AST  21  /  ALT  17  /  AlkPhos  119  03-25    PT/INR - ( 25 Mar 2019 00:33 )   PT: 12.7 sec;   INR: 1.10 ratio         PTT - ( 25 Mar 2019 00:33 )  PTT:35.0 sec            RADIOLOGY & ADDITIONAL TESTS:

## 2019-03-26 NOTE — DISCHARGE NOTE NURSING/CASE MANAGEMENT/SOCIAL WORK - NSDCDPATPORTLINK_GEN_ALL_CORE
You can access the [facility] Patient Portal, offered by [facility], by registering with the following website: [email or web address]

## 2019-03-26 NOTE — PROGRESS NOTE ADULT - ASSESSMENT
The patient is an 87 year old female with a past medical history of ESRD on HD, cad status post cabg, diabetes mellitus type 2 and hypertension who was brought to the ER for blurred vision and dizziness. Noted to have hypertensive urgency with right cranial nerve palsy on admission. CT head was negative, unable to have MRI due to presence of pacemaker.   Carotid duplex showed mild right IC plaque, moderate LICA plaque <50%. Evaluated by neurology, recommend medical risk factor management.     Assessment/Plan:    1. Hypertensive urgency on admission: BP now improved    2. Right eye deviation with right DOMINICK and left sided weakness secondary to acute brainstem cva- ASA, statin, DM control, bp control, counselled on the importance of compliance with lipitor.   PT= HOme  Outpatient referred for ocular therapy    3. CAD s/p CABG - medical management    4. ESRD - HD    5. DMII - HISS    6. Hypothyroidism - synthroid    7. HTN - ACEI, coreg, hydralazine      Discharge home pending echocardiogram
87 y.o. Female with PMHx of ESRD on HD, CAD s/p CABG, DMII with DPNP and diabetic ophthalmopathy, HLD, HTN, Hypothyroidism, Spinal stenosis, PAD, s/p PPM presented with difficulty seeing and dizziness - found to be with HTN urgency and right eye CNIII palsy    1. HTN urgency - treated and resolved    2. Right eye deviation with right DOMINICK and left sided weakness due to acute lacunar right ted CVA - ASA, statin, DM control   - f/u CUS   - CTA can be done with HD afterwards but won't    - PT eval    3. CAD s/p CABG - medical management    4. ESRD - HD    5. DMII - HISS    6. Hypothyroidism - synthroid    7. HTN - ACEI, coreg, hydralazine
Acute brain stem CVA, Hypertension better, CAD, CRF5.

## 2019-03-26 NOTE — CHART NOTE - NSCHARTNOTEFT_GEN_A_CORE
D/w Dr. Simpson s/p TTE if wnl can discharge the patient. Patient TTE showed preserved EF and DD grade 2. Unchanged from prior TTE in the system. Will allow patient to be discharged to f/u outpt with pmd/ cardio.

## 2019-03-26 NOTE — CONSULT NOTE ADULT - ATTENDING COMMENTS
Bp meds, HD today, chest x-ray.
Patient seen and examined and cased discussed with resident. Agree with recommendations.    Clinical CVA given CN findings.    Patient performed well with therapy.    Recommend DC HOME with HOME CARE.     Will sign off, please reconsult for additional rehab dispo needs if functional status changes.

## 2019-03-26 NOTE — CONSULT NOTE ADULT - SUBJECTIVE AND OBJECTIVE BOX
HPI:  87F with PMHx of ESRD on HD, CAD s/p CABG, DMII with DPNP and diabetic ophthalmopathy, HLD, HTN, Hypothyroidism, Spinal stenosis, PAD, s/p PPM  who was admitted on 3/25 for difficulty with her vision and dizziness. Son at bedside and reports that she was complaining of trouble seeing out of  her right side. At baseline, patient has problems with vision and has require laser treatments for diabetic retinopathy, but this was worse than before. Son also noticed that the patient's eyes were deviated and so brought her to the hospital. In the ED, patient was found to have hypertensive urgency with /86. CTH did not show acute changes. MRI unable to be done because patient has pacemaker. Neurology was consulted and believes exam is consistent with lacunar infarct in the right ted. Plan for CTA with HD.     Upon evaluation, patient reports that she continues to have trouble with her vision, worse on the right. Denies headache, chest pain, SOB, abdominal or urinary sx.     Imaging showed:  HEAD CT - No intracranial hemorrhage, mass effect or large acute cortical infarct.    REVIEW OF SYSTEMS  Constitutional - No fever, +generalized fatigue  HEENT - No eye pain, + visual disturbances, No difficulty hearing, No tinnitus, No vertigo, No neck pain  Respiratory - No cough, No wheezing, No shortness of breath  Cardiovascular - No chest pain, No palpitations  Gastrointestinal - No abdominal pain, No nausea, No vomiting, No diarrhea, No constipation  Genitourinary - No dysuria, No frequency, No hematuria, No incontinence  Neurological - No headaches, No memory loss, + generalized weakness, No numbness, No tremors  Skin - No itching, No rashes  Endocrine - No temperature intolerance  Musculoskeletal - +joint pain, No joint swelling, +muscle pain  Psychiatric - No depression, No anxiety    VITALS  T(C): 36.8 (03-26-19 @ 07:59), Max: 37.3 (03-26-19 @ 03:56)  HR: 65 (03-26-19 @ 07:59) (65 - 76)  BP: 142/53 (03-26-19 @ 07:59) (128/49 - 198/68)  RR: 18 (03-26-19 @ 07:59) (18 - 20)  SpO2: 96% (03-26-19 @ 07:59) (94% - 96%)  Wt(kg): --    PAST MEDICAL & SURGICAL HISTORY  Left bundle branch block  Osteoporosis  Diabetic neuropathy  Peripheral arterial disease  Spinal stenosis  Coronary artery disease  Chronic renal failure  Renal insufficiency  Pacemaker  Hypothyroid  Hyperlipemia  Hypertension  Diabetes  S/P dialysis catheter insertion  S/P CABG x 3  Artificial cardiac pacemaker      SOCIAL HISTORY  Smoking - Denied  EtOH - Denied   Drugs - Denied    FUNCTIONAL HISTORY  Lives in home alone. She has a nurse that comes 7 days a week for 7-8 hours a day. There are 4 HILARIO and no stairs within the home.   Patient used cane and RW for ambulation. Nurses helped her with dressing and bathing. Per son, has had difficulty with swallowing PTA    CURRENT FUNCTIONAL STATUS  Pending evaluation    FAMILY HISTORY   No pertinent family history in first degree relatives      RECENT LABS/IMAGING  CBC Full  -  ( 25 Mar 2019 00:33 )  WBC Count : 4.1 K/uL  RBC Count : 3.65 M/uL  Hemoglobin : 11.7 g/dL  Hematocrit : 35.9 %  Platelet Count - Automated : 115 K/uL  Mean Cell Volume : 98.4 fl  Mean Cell Hemoglobin : 32.1 pg  Mean Cell Hemoglobin Concentration : 32.6 g/dL  Auto Neutrophil # : 2.6 K/uL  Auto Lymphocyte # : 1.1 K/uL  Auto Monocyte # : 0.2 K/uL  Auto Eosinophil # : 0.1 K/uL  Auto Basophil # : 0.0 K/uL  Auto Neutrophil % : 63.8 %  Auto Lymphocyte % : 26.8 %  Auto Monocyte % : 6.2 %  Auto Eosinophil % : 2.5 %  Auto Basophil % : 0.5 %    03-26    140  |  102  |  23.0<H>  ----------------------------<  139<H>  4.1   |  25.0  |  2.99<H>    Ca    8.2<L>      26 Mar 2019 06:34    TPro  6.5<L>  /  Alb  4.2  /  TBili  0.3<L>  /  DBili  x   /  AST  21  /  ALT  17  /  AlkPhos  119  03-25        ALLERGIES  penicillin (Anaphylaxis)  seafood (Anaphylaxis)  shellfish (Anaphylaxis)      MEDICATIONS   aspirin enteric coated 81 milliGRAM(s) Oral daily  atorvastatin 40 milliGRAM(s) Oral at bedtime  carvedilol 12.5 milliGRAM(s) Oral every 12 hours  chlorhexidine 2% Cloths 1 Application(s) Topical <User Schedule>  cloNIDine 0.1 milliGRAM(s) Oral every 6 hours  clopidogrel Tablet 75 milliGRAM(s) Oral daily  dextrose 40% Gel 15 Gram(s) Oral once PRN  dextrose 5%. 1000 milliLiter(s) IV Continuous <Continuous>  dextrose 50% Injectable 12.5 Gram(s) IV Push once  dextrose 50% Injectable 25 Gram(s) IV Push once  dextrose 50% Injectable 25 Gram(s) IV Push once  glucagon  Injectable 1 milliGRAM(s) IntraMuscular once PRN  heparin  Injectable 5000 Unit(s) SubCutaneous every 12 hours  hydrALAZINE 50 milliGRAM(s) Oral three times a day  hydrALAZINE Injectable 15 milliGRAM(s) IV Push every 6 hours PRN  insulin lispro (HumaLOG) corrective regimen sliding scale   SubCutaneous three times a day before meals  levothyroxine 112 MICROGram(s) Oral daily  lisinopril 20 milliGRAM(s) Oral daily  oxyCODONE    5 mG/acetaminophen 325 mG 1 Tablet(s) Oral every 6 hours PRN  pantoprazole    Tablet 40 milliGRAM(s) Oral before breakfast    ----------------------------------------------------------------------------------------  PHYSICAL EXAM  Constitutional - NAD, Comfortable  HEENT - NCAT  Neck - Supple, No limited ROM  Chest - CTA bilaterally, breathing comfortably on room air  Cardiovascular - RRR, S1S2  Abdomen - BS+, Soft, NTND  Extremities - No C/C/E, No calf tenderness   Neurologic Exam -                    Cognitive - Awake, Alert, oriented to self, place, date, year, situation     Communication - Fluent, No dysarthria     Attention - able to days of the week backward     Naming/Abstract - intact     Memory - able to recall 3/3 items immediate and 0/3 after 3 minutes      Cranial Nerves - CN 2-12 grossly intact except for impaired right eye adduction and decreased visual acuity BL     Motor - No focal deficits                    LEFT    UE - ShAB 5/5, EF 5/5, EE 5/5, WE 5/5,  5/5                    RIGHT UE - ShAB 5/5, EF 5/5, EE 5/5, WE 5/5,  5/5                    LEFT    LE - HF 5/5, KE 5/5, DF 5/5, PF 5/5                    RIGHT LE - HF 5/5, KE 5/5, DF 5/5, PF 5/5        Sensory - Intact to LT     Reflexes - DTR Intact     Coordination - FTN impaired but limited due to visual acuity     Psychiatric - Mood stable, Affect WNL  ----------------------------------------------------------------------------------------  ASSESSMENT/PLAN  87y Female with functional deficits after HTN urgency and clinical presentation of CVA  Pain - Percocet  CVA - ASA, Plavix, Lipitor  HTN - Coreg, Clonidine, Hydralazine, Lisinopril  DVT PPX - Heparin, SCDs  Rehab - Continue bedside therapy as well as OOB throughout the day with mobilization by staff to maintain cardiopulmonary function and prevention of secondary complications related to debility. HPI:  87F with PMHx of ESRD on HD, CAD s/p CABG, DMII with DPNP and diabetic ophthalmopathy, HLD, HTN, Hypothyroidism, Spinal stenosis, PAD, s/p PPM  who was admitted on 3/25 for difficulty with her vision and dizziness. Son at bedside and reports that she was complaining of trouble seeing out of  her right side. At baseline, patient has problems with vision and has require laser treatments for diabetic retinopathy, but this was worse than before. Son also noticed that the patient's eyes were deviated and so brought her to the hospital. In the ED, patient was found to have hypertensive urgency with /86. CTH did not show acute changes. MRI unable to be done because patient has pacemaker. Neurology was consulted and believes exam is consistent with lacunar infarct in the right ted. Plan for CTA with HD.     Upon evaluation, patient reports that she continues to have trouble with her vision, worse on the right. Denies headache, chest pain, SOB, abdominal or urinary sx.     Imaging showed:  HEAD CT - No intracranial hemorrhage, mass effect or large acute cortical infarct.    REVIEW OF SYSTEMS  Constitutional - No fever, +generalized fatigue  HEENT - No eye pain, + visual disturbances, No difficulty hearing, No tinnitus, No vertigo, No neck pain  Respiratory - No cough, No wheezing, No shortness of breath  Cardiovascular - No chest pain, No palpitations  Gastrointestinal - No abdominal pain, No nausea, No vomiting, No diarrhea, No constipation  Genitourinary - No dysuria, No frequency, No hematuria, No incontinence  Neurological - No headaches, No memory loss, + generalized weakness, No numbness, No tremors  Skin - No itching, No rashes  Endocrine - No temperature intolerance  Musculoskeletal - +joint pain, No joint swelling, +muscle pain  Psychiatric - No depression, No anxiety    VITALS  T(C): 36.8 (03-26-19 @ 07:59), Max: 37.3 (03-26-19 @ 03:56)  HR: 65 (03-26-19 @ 07:59) (65 - 76)  BP: 142/53 (03-26-19 @ 07:59) (128/49 - 198/68)  RR: 18 (03-26-19 @ 07:59) (18 - 20)  SpO2: 96% (03-26-19 @ 07:59) (94% - 96%)  Wt(kg): --    PAST MEDICAL & SURGICAL HISTORY  Left bundle branch block  Osteoporosis  Diabetic neuropathy  Peripheral arterial disease  Spinal stenosis  Coronary artery disease  Chronic renal failure  Renal insufficiency  Pacemaker  Hypothyroid  Hyperlipemia  Hypertension  Diabetes  S/P dialysis catheter insertion  S/P CABG x 3  Artificial cardiac pacemaker      SOCIAL HISTORY  Smoking - Denied  EtOH - Denied   Drugs - Denied    FUNCTIONAL HISTORY  Lives in home alone. She has a nurse that comes 7 days a week for 7-8 hours a day. There are 4 HILARIO and no stairs within the home.   Patient used cane and RW for ambulation. Nurses helped her with dressing and bathing. Per son, has had difficulty with swallowing PTA    CURRENT FUNCTIONAL STATUS  Pending evaluation    FAMILY HISTORY   No pertinent family history in first degree relatives      RECENT LABS/IMAGING  CBC Full  -  ( 25 Mar 2019 00:33 )  WBC Count : 4.1 K/uL  RBC Count : 3.65 M/uL  Hemoglobin : 11.7 g/dL  Hematocrit : 35.9 %  Platelet Count - Automated : 115 K/uL  Mean Cell Volume : 98.4 fl  Mean Cell Hemoglobin : 32.1 pg  Mean Cell Hemoglobin Concentration : 32.6 g/dL  Auto Neutrophil # : 2.6 K/uL  Auto Lymphocyte # : 1.1 K/uL  Auto Monocyte # : 0.2 K/uL  Auto Eosinophil # : 0.1 K/uL  Auto Basophil # : 0.0 K/uL  Auto Neutrophil % : 63.8 %  Auto Lymphocyte % : 26.8 %  Auto Monocyte % : 6.2 %  Auto Eosinophil % : 2.5 %  Auto Basophil % : 0.5 %    03-26    140  |  102  |  23.0<H>  ----------------------------<  139<H>  4.1   |  25.0  |  2.99<H>    Ca    8.2<L>      26 Mar 2019 06:34    TPro  6.5<L>  /  Alb  4.2  /  TBili  0.3<L>  /  DBili  x   /  AST  21  /  ALT  17  /  AlkPhos  119  03-25        ALLERGIES  penicillin (Anaphylaxis)  seafood (Anaphylaxis)  shellfish (Anaphylaxis)      MEDICATIONS   aspirin enteric coated 81 milliGRAM(s) Oral daily  atorvastatin 40 milliGRAM(s) Oral at bedtime  carvedilol 12.5 milliGRAM(s) Oral every 12 hours  chlorhexidine 2% Cloths 1 Application(s) Topical <User Schedule>  cloNIDine 0.1 milliGRAM(s) Oral every 6 hours  clopidogrel Tablet 75 milliGRAM(s) Oral daily  dextrose 40% Gel 15 Gram(s) Oral once PRN  dextrose 5%. 1000 milliLiter(s) IV Continuous <Continuous>  dextrose 50% Injectable 12.5 Gram(s) IV Push once  dextrose 50% Injectable 25 Gram(s) IV Push once  dextrose 50% Injectable 25 Gram(s) IV Push once  glucagon  Injectable 1 milliGRAM(s) IntraMuscular once PRN  heparin  Injectable 5000 Unit(s) SubCutaneous every 12 hours  hydrALAZINE 50 milliGRAM(s) Oral three times a day  hydrALAZINE Injectable 15 milliGRAM(s) IV Push every 6 hours PRN  insulin lispro (HumaLOG) corrective regimen sliding scale   SubCutaneous three times a day before meals  levothyroxine 112 MICROGram(s) Oral daily  lisinopril 20 milliGRAM(s) Oral daily  oxyCODONE    5 mG/acetaminophen 325 mG 1 Tablet(s) Oral every 6 hours PRN  pantoprazole    Tablet 40 milliGRAM(s) Oral before breakfast    ----------------------------------------------------------------------------------------  PHYSICAL EXAM  Constitutional - NAD, Comfortable  HEENT - NCAT  Neck - Supple, No limited ROM  Chest - CTA bilaterally, breathing comfortably on room air  Cardiovascular - RRR, S1S2  Abdomen - BS+, Soft, NTND  Extremities - No C/C/E, No calf tenderness   Neurologic Exam -                    Cognitive - Awake, Alert, oriented to self, place, date, year, situation     Communication - Fluent, No dysarthria     Attention - able to days of the week backward     Naming/Abstract - intact     Memory - able to recall 3/3 items immediate and 0/3 after 3 minutes      Cranial Nerves - CN 2-12 grossly intact except for impaired right eye adduction with leftward gaze and decreased visual acuity BL     Motor - No focal deficits                    LEFT    UE - ShAB 5/5, EF 5/5, EE 5/5, WE 5/5,  5/5                    RIGHT UE - ShAB 5/5, EF 5/5, EE 5/5, WE 5/5,  5/5                    LEFT    LE - HF 5/5, KE 5/5, DF 5/5, PF 5/5                    RIGHT LE - HF 5/5, KE 5/5, DF 5/5, PF 5/5        Sensory - Intact to LT     Reflexes - DTR Intact     Coordination - FTN impaired but limited due to visual acuity     Psychiatric - Mood stable, Affect WNL  ----------------------------------------------------------------------------------------  ASSESSMENT/PLAN  87y Female with functional deficits after HTN urgency and clinical presentation of CVA  Pain - Percocet  CVA - ASA, Plavix, Lipitor  HTN - Coreg, Clonidine, Hydralazine, Lisinopril  DVT PPX - Heparin, SCDs  Rehab - Continue bedside therapy as well as OOB throughout the day with mobilization by staff to maintain cardiopulmonary function and prevention of secondary complications related to debility. HPI:  87F with PMHx of ESRD on HD, CAD s/p CABG, DMII with DPNP and diabetic ophthalmopathy, HLD, HTN, Hypothyroidism, Spinal stenosis, PAD, s/p PPM  who was admitted on 3/25 for difficulty with her vision and dizziness. Son at bedside and reports that she was complaining of trouble seeing out of  her right side. At baseline, patient has problems with vision and has require laser treatments for diabetic retinopathy, but this was worse than before. Son also noticed that the patient's eyes were deviated and so brought her to the hospital. In the ED, patient was found to have hypertensive urgency with /86. CTH did not show acute changes. MRI unable to be done because patient has pacemaker. Neurology was consulted and believes exam is consistent with lacunar infarct in the right ted. Plan for CTA with HD.     Upon evaluation, patient reports that she continues to have trouble with her vision, worse on the right. Denies headache, chest pain, SOB, abdominal or urinary sx.     Imaging showed:  HEAD CT - No intracranial hemorrhage, mass effect or large acute cortical infarct.    REVIEW OF SYSTEMS  Constitutional - No fever, +generalized fatigue  HEENT - No eye pain, + visual disturbances, No difficulty hearing, No tinnitus, No vertigo, No neck pain  Respiratory - No cough, No wheezing, No shortness of breath  Cardiovascular - No chest pain, No palpitations  Gastrointestinal - No abdominal pain, No nausea, No vomiting, No diarrhea, No constipation  Genitourinary - No dysuria, No frequency, No hematuria, No incontinence  Neurological - No headaches, No memory loss, + generalized weakness, No numbness, No tremors  Skin - No itching, No rashes  Endocrine - No temperature intolerance  Musculoskeletal - +joint pain, No joint swelling, +muscle pain  Psychiatric - No depression, No anxiety    VITALS  T(C): 36.8 (03-26-19 @ 07:59), Max: 37.3 (03-26-19 @ 03:56)  HR: 65 (03-26-19 @ 07:59) (65 - 76)  BP: 142/53 (03-26-19 @ 07:59) (128/49 - 198/68)  RR: 18 (03-26-19 @ 07:59) (18 - 20)  SpO2: 96% (03-26-19 @ 07:59) (94% - 96%)  Wt(kg): --    PAST MEDICAL & SURGICAL HISTORY  Left bundle branch block  Osteoporosis  Diabetic neuropathy  Peripheral arterial disease  Spinal stenosis  Coronary artery disease  Chronic renal failure  Renal insufficiency  Pacemaker  Hypothyroid  Hyperlipemia  Hypertension  Diabetes  S/P dialysis catheter insertion  S/P CABG x 3  Artificial cardiac pacemaker      SOCIAL HISTORY  Smoking - Denied  EtOH - Denied   Drugs - Denied    FUNCTIONAL HISTORY  Lives in home alone. She has a nurse that comes 7 days a week for 7-8 hours a day. There are 4 HILARIO and no stairs within the home.   Patient used cane and RW for ambulation. Nurses helped her with dressing and bathing. Per son, has had difficulty with swallowing PTA    CURRENT FUNCTIONAL STATUS - 3/26  Bed Mobility: Sit to Supine:     · Level of [place]	supervision	  · Physical Assist/Nonphysical Assist	supervision	    Bed Mobility: Supine to Sit:     · Level of [place]	minimum assist (75% patients effort); moderate assist (50% patients effort)	  · Physical Assist/Nonphysical Assist	1 person assist	  · Assistive Device	bed rails	    Bed Mobility Analysis:     · Bed Mobility Limitations	decreased ability to use legs for bridging/pushing; decreased ability to use arms for pushing/pulling	  · Impairments Contributing to Impaired Bed Mobility	impaired balance; cognition; decreased strength	    Transfer: Sit to Stand:     · Level of [place]	contact guard; stand-by assist	  · Physical Assist/Nonphysical Assist	1 person assist	  · Weight-Bearing Restrictions	weight-bearing as tolerated	  · Assistive Device	rolling walker	    Transfer: Stand to Sit:     · Level of [place]	contact guard; stand-by assist	  · Physical Assist/Nonphysical Assist	1 person assist	  · Weight-Bearing Restrictions	weight-bearing as tolerated	  · Assistive Device	rolling walker	    Sit/Stand Transfer Safety Analysis:     · Transfer Safety Concerns Noted	decreased step length; losing balance; decreased safety awareness	  · Impairments Contributing to Impaired Transfers	impaired balance; cognition; decreased strength	    Gait Skills:     · Level of [place]	contact guard; stand-by assist	  · Physical Assist/Nonphysical Assist	1 person assist	  · Weight-Bearing Restrictions	weight-bearing as tolerated	  · Assistive Device	rolling walker	  · Gait Distance	100 feet	      FAMILY HISTORY   No pertinent family history in first degree relatives      RECENT LABS/IMAGING  CBC Full  -  ( 25 Mar 2019 00:33 )  WBC Count : 4.1 K/uL  RBC Count : 3.65 M/uL  Hemoglobin : 11.7 g/dL  Hematocrit : 35.9 %  Platelet Count - Automated : 115 K/uL  Mean Cell Volume : 98.4 fl  Mean Cell Hemoglobin : 32.1 pg  Mean Cell Hemoglobin Concentration : 32.6 g/dL  Auto Neutrophil # : 2.6 K/uL  Auto Lymphocyte # : 1.1 K/uL  Auto Monocyte # : 0.2 K/uL  Auto Eosinophil # : 0.1 K/uL  Auto Basophil # : 0.0 K/uL  Auto Neutrophil % : 63.8 %  Auto Lymphocyte % : 26.8 %  Auto Monocyte % : 6.2 %  Auto Eosinophil % : 2.5 %  Auto Basophil % : 0.5 %    03-26    140  |  102  |  23.0<H>  ----------------------------<  139<H>  4.1   |  25.0  |  2.99<H>    Ca    8.2<L>      26 Mar 2019 06:34    TPro  6.5<L>  /  Alb  4.2  /  TBili  0.3<L>  /  DBili  x   /  AST  21  /  ALT  17  /  AlkPhos  119  03-25        ALLERGIES  penicillin (Anaphylaxis)  seafood (Anaphylaxis)  shellfish (Anaphylaxis)      MEDICATIONS   aspirin enteric coated 81 milliGRAM(s) Oral daily  atorvastatin 40 milliGRAM(s) Oral at bedtime  carvedilol 12.5 milliGRAM(s) Oral every 12 hours  chlorhexidine 2% Cloths 1 Application(s) Topical <User Schedule>  cloNIDine 0.1 milliGRAM(s) Oral every 6 hours  clopidogrel Tablet 75 milliGRAM(s) Oral daily  dextrose 40% Gel 15 Gram(s) Oral once PRN  dextrose 5%. 1000 milliLiter(s) IV Continuous <Continuous>  dextrose 50% Injectable 12.5 Gram(s) IV Push once  dextrose 50% Injectable 25 Gram(s) IV Push once  dextrose 50% Injectable 25 Gram(s) IV Push once  glucagon  Injectable 1 milliGRAM(s) IntraMuscular once PRN  heparin  Injectable 5000 Unit(s) SubCutaneous every 12 hours  hydrALAZINE 50 milliGRAM(s) Oral three times a day  hydrALAZINE Injectable 15 milliGRAM(s) IV Push every 6 hours PRN  insulin lispro (HumaLOG) corrective regimen sliding scale   SubCutaneous three times a day before meals  levothyroxine 112 MICROGram(s) Oral daily  lisinopril 20 milliGRAM(s) Oral daily  oxyCODONE    5 mG/acetaminophen 325 mG 1 Tablet(s) Oral every 6 hours PRN  pantoprazole    Tablet 40 milliGRAM(s) Oral before breakfast    ----------------------------------------------------------------------------------------  PHYSICAL EXAM  Constitutional - NAD, Comfortable  HEENT - NCAT  Neck - Supple, No limited ROM  Chest - CTA bilaterally, breathing comfortably on room air  Cardiovascular - RRR, S1S2  Abdomen - BS+, Soft, NTND  Extremities - No C/C/E, No calf tenderness   Neurologic Exam -                    Cognitive - Awake, Alert, oriented to self, place, date, year, situation     Communication - Fluent, No dysarthria     Attention - able to days of the week backward     Naming/Abstract - intact     Memory - able to recall 3/3 items immediate and 0/3 after 3 minutes      Cranial Nerves - CN 2-12 grossly intact except for impaired right eye adduction with leftward gaze and decreased visual acuity BL     Motor - No focal deficits                    LEFT    UE - ShAB 5/5, EF 5/5, EE 5/5, WE 5/5,  5/5                    RIGHT UE - ShAB 5/5, EF 5/5, EE 5/5, WE 5/5,  5/5                    LEFT    LE - HF 5/5, KE 5/5, DF 5/5, PF 5/5                    RIGHT LE - HF 5/5, KE 5/5, DF 5/5, PF 5/5        Sensory - Intact to LT     Reflexes - DTR Intact     Coordination - FTN impaired but limited due to visual acuity     Psychiatric - Mood stable, Affect WNL  ----------------------------------------------------------------------------------------  ASSESSMENT/PLAN  87y Female with functional deficits after HTN urgency and clinical presentation of CVA  Pain - Percocet  CVA - ASA, Plavix, Lipitor  HTN - Coreg, Clonidine, Hydralazine, Lisinopril  DVT PPX - Heparin, SCDs  Rehab - Continue bedside therapy as well as OOB throughout the day with mobilization by staff to maintain cardiopulmonary function and prevention of secondary complications related to debility. HPI:  87F with PMHx of ESRD on HD, CAD s/p CABG, DMII with DPNP and diabetic ophthalmopathy, HLD, HTN, Hypothyroidism, Spinal stenosis, PAD, s/p PPM  who was admitted on 3/25 for difficulty with her vision and dizziness. Son at bedside and reports that she was complaining of trouble seeing out of  her right eye. At baseline, patient has problems with vision and has required laser treatments for diabetic retinopathy, but this was worse than before. Son also noticed that the patient's eyes were deviated and so brought her to the hospital. In the ED, patient was found to have hypertensive urgency with /86. CTH did not show acute changes. MRI unable to be done because patient has pacemaker. Neurology was consulted and believes exam is consistent with lacunar infarct in the right ted.     Upon evaluation, patient reports that she continues to have trouble with her vision, worse on the right. Denies headache, chest pain, SOB, abdominal or urinary sx.     Imaging showed:  HEAD CT - No intracranial hemorrhage, mass effect or large acute cortical infarct.    REVIEW OF SYSTEMS  Constitutional - No fever, +generalized fatigue  HEENT - No eye pain, + visual disturbances, No difficulty hearing, No tinnitus, No vertigo, No neck pain  Respiratory - No cough, No wheezing, No shortness of breath  Cardiovascular - No chest pain, No palpitations  Gastrointestinal - No abdominal pain, No nausea, No vomiting, No diarrhea, No constipation  Genitourinary - No dysuria, No frequency, No hematuria, No incontinence  Neurological - No headaches, No memory loss, + generalized weakness, No numbness, No tremors  Skin - No itching, No rashes  Endocrine - No temperature intolerance  Musculoskeletal - +joint pain, No joint swelling, +muscle pain  Psychiatric - No depression, No anxiety    VITALS  T(C): 36.8 (03-26-19 @ 07:59), Max: 37.3 (03-26-19 @ 03:56)  HR: 65 (03-26-19 @ 07:59) (65 - 76)  BP: 142/53 (03-26-19 @ 07:59) (128/49 - 198/68)  RR: 18 (03-26-19 @ 07:59) (18 - 20)  SpO2: 96% (03-26-19 @ 07:59) (94% - 96%)  Wt(kg): --    PAST MEDICAL & SURGICAL HISTORY  Left bundle branch block  Osteoporosis  Diabetic neuropathy  Peripheral arterial disease  Spinal stenosis  Coronary artery disease  Chronic renal failure  Renal insufficiency  Pacemaker  Hypothyroid  Hyperlipemia  Hypertension  Diabetes  S/P dialysis catheter insertion  S/P CABG x 3  Artificial cardiac pacemaker      SOCIAL HISTORY  Smoking - Denied  EtOH - Denied   Drugs - Denied    FUNCTIONAL HISTORY  Lives in home alone. She has a nurse that comes 7 days a week for 7-8 hours a day. There are 4 HILARIO and no stairs within the home.   Patient used cane and RW for ambulation. Nurses helped her with dressing and bathing. Per son, has had difficulty with swallowing PTA    CURRENT FUNCTIONAL STATUS - 3/26  Bed Mobility: Sit to Supine:     · Level of [place]	supervision	  · Physical Assist/Nonphysical Assist	supervision	    Bed Mobility: Supine to Sit:     · Level of [place]	minimum assist (75% patients effort); moderate assist (50% patients effort)	  · Physical Assist/Nonphysical Assist	1 person assist	  · Assistive Device	bed rails	    Bed Mobility Analysis:     · Bed Mobility Limitations	decreased ability to use legs for bridging/pushing; decreased ability to use arms for pushing/pulling	  · Impairments Contributing to Impaired Bed Mobility	impaired balance; cognition; decreased strength	    Transfer: Sit to Stand:     · Level of [place]	contact guard; stand-by assist	  · Physical Assist/Nonphysical Assist	1 person assist	  · Weight-Bearing Restrictions	weight-bearing as tolerated	  · Assistive Device	rolling walker	    Transfer: Stand to Sit:     · Level of [place]	contact guard; stand-by assist	  · Physical Assist/Nonphysical Assist	1 person assist	  · Weight-Bearing Restrictions	weight-bearing as tolerated	  · Assistive Device	rolling walker	    Sit/Stand Transfer Safety Analysis:     · Transfer Safety Concerns Noted	decreased step length; losing balance; decreased safety awareness	  · Impairments Contributing to Impaired Transfers	impaired balance; cognition; decreased strength	    Gait Skills:     · Level of [place]	contact guard; stand-by assist	  · Physical Assist/Nonphysical Assist	1 person assist	  · Weight-Bearing Restrictions	weight-bearing as tolerated	  · Assistive Device	rolling walker	  · Gait Distance	100 feet	      FAMILY HISTORY   No pertinent family history in first degree relatives      RECENT LABS/IMAGING  CBC Full  -  ( 25 Mar 2019 00:33 )  WBC Count : 4.1 K/uL  RBC Count : 3.65 M/uL  Hemoglobin : 11.7 g/dL  Hematocrit : 35.9 %  Platelet Count - Automated : 115 K/uL  Mean Cell Volume : 98.4 fl  Mean Cell Hemoglobin : 32.1 pg  Mean Cell Hemoglobin Concentration : 32.6 g/dL  Auto Neutrophil # : 2.6 K/uL  Auto Lymphocyte # : 1.1 K/uL  Auto Monocyte # : 0.2 K/uL  Auto Eosinophil # : 0.1 K/uL  Auto Basophil # : 0.0 K/uL  Auto Neutrophil % : 63.8 %  Auto Lymphocyte % : 26.8 %  Auto Monocyte % : 6.2 %  Auto Eosinophil % : 2.5 %  Auto Basophil % : 0.5 %    03-26    140  |  102  |  23.0<H>  ----------------------------<  139<H>  4.1   |  25.0  |  2.99<H>    Ca    8.2<L>      26 Mar 2019 06:34    TPro  6.5<L>  /  Alb  4.2  /  TBili  0.3<L>  /  DBili  x   /  AST  21  /  ALT  17  /  AlkPhos  119  03-25        ALLERGIES  penicillin (Anaphylaxis)  seafood (Anaphylaxis)  shellfish (Anaphylaxis)      MEDICATIONS   aspirin enteric coated 81 milliGRAM(s) Oral daily  atorvastatin 40 milliGRAM(s) Oral at bedtime  carvedilol 12.5 milliGRAM(s) Oral every 12 hours  chlorhexidine 2% Cloths 1 Application(s) Topical <User Schedule>  cloNIDine 0.1 milliGRAM(s) Oral every 6 hours  clopidogrel Tablet 75 milliGRAM(s) Oral daily  dextrose 40% Gel 15 Gram(s) Oral once PRN  dextrose 5%. 1000 milliLiter(s) IV Continuous <Continuous>  dextrose 50% Injectable 12.5 Gram(s) IV Push once  dextrose 50% Injectable 25 Gram(s) IV Push once  dextrose 50% Injectable 25 Gram(s) IV Push once  glucagon  Injectable 1 milliGRAM(s) IntraMuscular once PRN  heparin  Injectable 5000 Unit(s) SubCutaneous every 12 hours  hydrALAZINE 50 milliGRAM(s) Oral three times a day  hydrALAZINE Injectable 15 milliGRAM(s) IV Push every 6 hours PRN  insulin lispro (HumaLOG) corrective regimen sliding scale   SubCutaneous three times a day before meals  levothyroxine 112 MICROGram(s) Oral daily  lisinopril 20 milliGRAM(s) Oral daily  oxyCODONE    5 mG/acetaminophen 325 mG 1 Tablet(s) Oral every 6 hours PRN  pantoprazole    Tablet 40 milliGRAM(s) Oral before breakfast    ----------------------------------------------------------------------------------------  PHYSICAL EXAM  Constitutional - NAD, Comfortable  HEENT - NCAT  Neck - Supple, No limited ROM  Chest - CTA bilaterally, breathing comfortably on room air  Cardiovascular - RRR, S1S2  Abdomen - BS+, Soft, NTND  Extremities - No C/C/E, No calf tenderness   Neurologic Exam -                    Cognitive - Awake, Alert, oriented to self, place, date, year, situation     Communication - Fluent, No dysarthria     Attention - able to days of the week backward     Naming/Abstract - intact     Memory - able to recall 3/3 items immediate and 0/3 after 3 minutes      Cranial Nerves - CN 2-12 grossly intact except for impaired right eye adduction with leftward gaze and decreased visual acuity BL     Motor - No focal deficits                    LEFT    UE - ShAB 5/5, EF 5/5, EE 5/5, WE 5/5,  5/5                    RIGHT UE - ShAB 5/5, EF 5/5, EE 5/5, WE 5/5,  5/5                    LEFT    LE - HF 5/5, KE 5/5, DF 5/5, PF 5/5                    RIGHT LE - HF 5/5, KE 5/5, DF 5/5, PF 5/5        Sensory - Intact to LT     Reflexes - DTR Intact     Coordination - FTN impaired but limited due to visual acuity     Psychiatric - Mood stable, Affect WNL  ----------------------------------------------------------------------------------------  ASSESSMENT/PLAN  87y Female with functional deficits after HTN urgency and clinical presentation consistent with CVA.  Pain - Percocet  CVA - ASA, Plavix, Lipitor  HTN - Coreg, Clonidine, Hydralazine, Lisinopril, Clonidine  DVT PPX - Heparin, SCDs  Rehab - Continue bedside therapy as well as OOB throughout the day with mobilization by staff to maintain cardiopulmonary function and prevention of secondary complications related to debility. Expect patient to achieve functional goals for DC HOME with OUTPATIENT HPI:  87F with PMHx of ESRD on HD, CAD s/p CABG, DMII with DPNP and diabetic ophthalmopathy, HLD, HTN, Hypothyroidism, Spinal stenosis, PAD, s/p PPM  who was admitted on 3/25 for difficulty with her vision and dizziness. Son at bedside and reports that she was complaining of trouble seeing out of  her right eye. At baseline, patient has problems with vision and has required laser treatments for diabetic retinopathy, but this was worse than before. Son also noticed that the patient's eyes were deviated and so brought her to the hospital. In the ED, patient was found to have hypertensive urgency with /86. CTH did not show acute changes. MRI unable to be done because patient has pacemaker. Neurology was consulted and believes exam is consistent with lacunar infarct in the right ted.     Upon evaluation, patient reports that she continues to have trouble with her vision, worse on the right. Denies headache, chest pain, SOB, abdominal or urinary sx.     Imaging showed:  HEAD CT - No intracranial hemorrhage, mass effect or large acute cortical infarct.    REVIEW OF SYSTEMS  Constitutional - No fever, +generalized fatigue  HEENT - No eye pain, + visual disturbances, No difficulty hearing, No tinnitus, No vertigo, No neck pain  Respiratory - No cough, No wheezing, No shortness of breath  Cardiovascular - No chest pain, No palpitations  Gastrointestinal - No abdominal pain, No nausea, No vomiting, No diarrhea, No constipation  Genitourinary - No dysuria, No frequency, No hematuria, No incontinence  Neurological - No headaches, No memory loss, + generalized weakness, No numbness, No tremors  Skin - No itching, No rashes  Endocrine - No temperature intolerance  Musculoskeletal - +joint pain, No joint swelling, +muscle pain  Psychiatric - No depression, No anxiety    VITALS  T(C): 36.8 (03-26-19 @ 07:59), Max: 37.3 (03-26-19 @ 03:56)  HR: 65 (03-26-19 @ 07:59) (65 - 76)  BP: 142/53 (03-26-19 @ 07:59) (128/49 - 198/68)  RR: 18 (03-26-19 @ 07:59) (18 - 20)  SpO2: 96% (03-26-19 @ 07:59) (94% - 96%)  Wt(kg): --    PAST MEDICAL & SURGICAL HISTORY  Left bundle branch block  Osteoporosis  Diabetic neuropathy  Peripheral arterial disease  Spinal stenosis  Coronary artery disease  Chronic renal failure  Renal insufficiency  Pacemaker  Hypothyroid  Hyperlipemia  Hypertension  Diabetes  S/P dialysis catheter insertion  S/P CABG x 3  Artificial cardiac pacemaker      SOCIAL HISTORY  Smoking - Denied  EtOH - Denied   Drugs - Denied    FUNCTIONAL HISTORY  Lives in home alone. She has a nurse that comes 7 days a week for 7-8 hours a day. There are 4 HILARIO and no stairs within the home.   Patient used cane and RW for ambulation. Nurses helped her with dressing and bathing. Per son, has had difficulty with swallowing PTA    CURRENT FUNCTIONAL STATUS - 3/26  Bed Mobility: Sit to Supine:     · Level of [place]	supervision	  · Physical Assist/Nonphysical Assist	supervision	    Bed Mobility: Supine to Sit:     · Level of [place]	minimum assist (75% patients effort); moderate assist (50% patients effort)	  · Physical Assist/Nonphysical Assist	1 person assist	  · Assistive Device	bed rails	    Bed Mobility Analysis:     · Bed Mobility Limitations	decreased ability to use legs for bridging/pushing; decreased ability to use arms for pushing/pulling	  · Impairments Contributing to Impaired Bed Mobility	impaired balance; cognition; decreased strength	    Transfer: Sit to Stand:     · Level of [place]	contact guard; stand-by assist	  · Physical Assist/Nonphysical Assist	1 person assist	  · Weight-Bearing Restrictions	weight-bearing as tolerated	  · Assistive Device	rolling walker	    Transfer: Stand to Sit:     · Level of [place]	contact guard; stand-by assist	  · Physical Assist/Nonphysical Assist	1 person assist	  · Weight-Bearing Restrictions	weight-bearing as tolerated	  · Assistive Device	rolling walker	    Sit/Stand Transfer Safety Analysis:     · Transfer Safety Concerns Noted	decreased step length; losing balance; decreased safety awareness	  · Impairments Contributing to Impaired Transfers	impaired balance; cognition; decreased strength	    Gait Skills:     · Level of [place]	contact guard; stand-by assist	  · Physical Assist/Nonphysical Assist	1 person assist	  · Weight-Bearing Restrictions	weight-bearing as tolerated	  · Assistive Device	rolling walker	  · Gait Distance	100 feet	      FAMILY HISTORY   No pertinent family history in first degree relatives      RECENT LABS/IMAGING  CBC Full  -  ( 25 Mar 2019 00:33 )  WBC Count : 4.1 K/uL  RBC Count : 3.65 M/uL  Hemoglobin : 11.7 g/dL  Hematocrit : 35.9 %  Platelet Count - Automated : 115 K/uL  Mean Cell Volume : 98.4 fl  Mean Cell Hemoglobin : 32.1 pg  Mean Cell Hemoglobin Concentration : 32.6 g/dL  Auto Neutrophil # : 2.6 K/uL  Auto Lymphocyte # : 1.1 K/uL  Auto Monocyte # : 0.2 K/uL  Auto Eosinophil # : 0.1 K/uL  Auto Basophil # : 0.0 K/uL  Auto Neutrophil % : 63.8 %  Auto Lymphocyte % : 26.8 %  Auto Monocyte % : 6.2 %  Auto Eosinophil % : 2.5 %  Auto Basophil % : 0.5 %    03-26    140  |  102  |  23.0<H>  ----------------------------<  139<H>  4.1   |  25.0  |  2.99<H>    Ca    8.2<L>      26 Mar 2019 06:34    TPro  6.5<L>  /  Alb  4.2  /  TBili  0.3<L>  /  DBili  x   /  AST  21  /  ALT  17  /  AlkPhos  119  03-25        ALLERGIES  penicillin (Anaphylaxis)  seafood (Anaphylaxis)  shellfish (Anaphylaxis)      MEDICATIONS   aspirin enteric coated 81 milliGRAM(s) Oral daily  atorvastatin 40 milliGRAM(s) Oral at bedtime  carvedilol 12.5 milliGRAM(s) Oral every 12 hours  chlorhexidine 2% Cloths 1 Application(s) Topical <User Schedule>  cloNIDine 0.1 milliGRAM(s) Oral every 6 hours  clopidogrel Tablet 75 milliGRAM(s) Oral daily  dextrose 40% Gel 15 Gram(s) Oral once PRN  dextrose 5%. 1000 milliLiter(s) IV Continuous <Continuous>  dextrose 50% Injectable 12.5 Gram(s) IV Push once  dextrose 50% Injectable 25 Gram(s) IV Push once  dextrose 50% Injectable 25 Gram(s) IV Push once  glucagon  Injectable 1 milliGRAM(s) IntraMuscular once PRN  heparin  Injectable 5000 Unit(s) SubCutaneous every 12 hours  hydrALAZINE 50 milliGRAM(s) Oral three times a day  hydrALAZINE Injectable 15 milliGRAM(s) IV Push every 6 hours PRN  insulin lispro (HumaLOG) corrective regimen sliding scale   SubCutaneous three times a day before meals  levothyroxine 112 MICROGram(s) Oral daily  lisinopril 20 milliGRAM(s) Oral daily  oxyCODONE    5 mG/acetaminophen 325 mG 1 Tablet(s) Oral every 6 hours PRN  pantoprazole    Tablet 40 milliGRAM(s) Oral before breakfast    ----------------------------------------------------------------------------------------  PHYSICAL EXAM  Constitutional - NAD, Comfortable  HEENT - NCAT  Neck - Supple, No limited ROM  Chest - CTA bilaterally, breathing comfortably on room air  Cardiovascular - RRR, S1S2  Abdomen - BS+, Soft, NTND  Extremities - No C/C/E, No calf tenderness   Neurologic Exam -                    Cognitive - Awake, Alert, oriented to self, place, date, year, situation     Communication - Fluent, No dysarthria     Attention - able to days of the week backward     Naming/Abstract - intact     Memory - able to recall 3/3 items immediate and 0/3 after 3 minutes      Cranial Nerves - CN 2-12 grossly intact except for impaired right eye adduction with leftward gaze and decreased visual acuity BL     Motor - No focal deficits                    LEFT    UE - ShAB 5/5, EF 5/5, EE 5/5, WE 5/5,  5/5                    RIGHT UE - ShAB 5/5, EF 5/5, EE 5/5, WE 5/5,  5/5                    LEFT    LE - HF 5/5, KE 5/5, DF 5/5, PF 5/5                    RIGHT LE - HF 5/5, KE 5/5, DF 5/5, PF 5/5        Sensory - Intact to LT     Reflexes - DTR Intact     Coordination - FTN impaired but limited due to visual acuity     Psychiatric - Mood stable, Affect WNL  ----------------------------------------------------------------------------------------  ASSESSMENT/PLAN  87y Female with functional deficits after HTN urgency and clinical presentation consistent with CVA.  Pain - Percocet  CVA - ASA, Plavix, Lipitor  HTN - Coreg, Clonidine, Hydralazine, Lisinopril, Clonidine  DVT PPX - Heparin, SCDs

## 2019-03-26 NOTE — PHYSICAL THERAPY INITIAL EVALUATION ADULT - ADDITIONAL COMMENTS
Pt lives alone in a 1 story house with 4 steps to enter (+) rail. Has HHA, 7hrs/day, 7 days/wk. PTA, Modified Independent with all ADLs and self care, with SAC.

## 2019-03-29 ENCOUNTER — EMERGENCY (EMERGENCY)
Facility: HOSPITAL | Age: 84
LOS: 1 days | Discharge: HOME | End: 2019-03-29
Attending: EMERGENCY MEDICINE
Payer: COMMERCIAL

## 2019-03-29 VITALS
RESPIRATION RATE: 20 BRPM | OXYGEN SATURATION: 97 % | HEIGHT: 64 IN | HEART RATE: 66 BPM | DIASTOLIC BLOOD PRESSURE: 84 MMHG | TEMPERATURE: 98 F | SYSTOLIC BLOOD PRESSURE: 140 MMHG | WEIGHT: 139.99 LBS

## 2019-03-29 DIAGNOSIS — Z95.828 PRESENCE OF OTHER VASCULAR IMPLANTS AND GRAFTS: Chronic | ICD-10-CM

## 2019-03-29 DIAGNOSIS — Z95.1 PRESENCE OF AORTOCORONARY BYPASS GRAFT: Chronic | ICD-10-CM

## 2019-03-29 LAB
ACANTHOCYTES BLD QL SMEAR: SLIGHT — SIGNIFICANT CHANGE UP
ALBUMIN SERPL ELPH-MCNC: 3.3 G/DL — SIGNIFICANT CHANGE UP (ref 3.3–5.2)
ALP SERPL-CCNC: 104 U/L — SIGNIFICANT CHANGE UP (ref 40–120)
ALT FLD-CCNC: 17 U/L — SIGNIFICANT CHANGE UP
ANION GAP SERPL CALC-SCNC: 16 MMOL/L — SIGNIFICANT CHANGE UP (ref 5–17)
ANISOCYTOSIS BLD QL: SLIGHT — SIGNIFICANT CHANGE UP
APTT BLD: 32.6 SEC — SIGNIFICANT CHANGE UP (ref 27.5–36.3)
AST SERPL-CCNC: 24 U/L — SIGNIFICANT CHANGE UP
BASOPHILS # BLD AUTO: 0 K/UL — SIGNIFICANT CHANGE UP (ref 0–0.2)
BASOPHILS NFR BLD AUTO: 0.2 % — SIGNIFICANT CHANGE UP (ref 0–2)
BILIRUB SERPL-MCNC: 0.2 MG/DL — LOW (ref 0.4–2)
BITE CELLS BLD QL SMEAR: PRESENT — SIGNIFICANT CHANGE UP
BLD GP AB SCN SERPL QL: SIGNIFICANT CHANGE UP
BUN SERPL-MCNC: 62 MG/DL — HIGH (ref 8–20)
BURR CELLS BLD QL SMEAR: PRESENT — SIGNIFICANT CHANGE UP
CALCIUM SERPL-MCNC: 7.6 MG/DL — LOW (ref 8.6–10.2)
CHLORIDE SERPL-SCNC: 101 MMOL/L — SIGNIFICANT CHANGE UP (ref 98–107)
CO2 SERPL-SCNC: 20 MMOL/L — LOW (ref 22–29)
CREAT SERPL-MCNC: 5.24 MG/DL — HIGH (ref 0.5–1.3)
DACRYOCYTES BLD QL SMEAR: SLIGHT — SIGNIFICANT CHANGE UP
ELLIPTOCYTES BLD QL SMEAR: SLIGHT — SIGNIFICANT CHANGE UP
EOSINOPHIL # BLD AUTO: 0.1 K/UL — SIGNIFICANT CHANGE UP (ref 0–0.5)
EOSINOPHIL NFR BLD AUTO: 2.2 % — SIGNIFICANT CHANGE UP (ref 0–6)
GLUCOSE SERPL-MCNC: 165 MG/DL — HIGH (ref 70–115)
HCT VFR BLD CALC: 30.4 % — LOW (ref 37–47)
HGB BLD-MCNC: 10.1 G/DL — LOW (ref 12–16)
HYPOCHROMIA BLD QL: SLIGHT — SIGNIFICANT CHANGE UP
INR BLD: 1.08 RATIO — SIGNIFICANT CHANGE UP (ref 0.88–1.16)
LYMPHOCYTES # BLD AUTO: 1.5 K/UL — SIGNIFICANT CHANGE UP (ref 1–4.8)
LYMPHOCYTES # BLD AUTO: 24.5 % — SIGNIFICANT CHANGE UP (ref 20–55)
MACROCYTES BLD QL: SLIGHT — SIGNIFICANT CHANGE UP
MACROCYTES OVAL BLD QL SMEAR: SLIGHT — SIGNIFICANT CHANGE UP
MCHC RBC-ENTMCNC: 32.3 PG — HIGH (ref 27–31)
MCHC RBC-ENTMCNC: 33.2 G/DL — SIGNIFICANT CHANGE UP (ref 32–36)
MCV RBC AUTO: 97.1 FL — SIGNIFICANT CHANGE UP (ref 81–99)
MICROCYTES BLD QL: SLIGHT — SIGNIFICANT CHANGE UP
MONOCYTES # BLD AUTO: 0.3 K/UL — SIGNIFICANT CHANGE UP (ref 0–0.8)
MONOCYTES NFR BLD AUTO: 5 % — SIGNIFICANT CHANGE UP (ref 3–10)
NEUTROPHILS # BLD AUTO: 4.1 K/UL — SIGNIFICANT CHANGE UP (ref 1.8–8)
NEUTROPHILS NFR BLD AUTO: 68.1 % — SIGNIFICANT CHANGE UP (ref 37–73)
PLAT MORPH BLD: NORMAL — SIGNIFICANT CHANGE UP
PLATELET # BLD AUTO: 104 K/UL — LOW (ref 150–400)
POIKILOCYTOSIS BLD QL AUTO: SLIGHT — SIGNIFICANT CHANGE UP
POLYCHROMASIA BLD QL SMEAR: SLIGHT — SIGNIFICANT CHANGE UP
POTASSIUM SERPL-MCNC: 4.9 MMOL/L — SIGNIFICANT CHANGE UP (ref 3.5–5.3)
POTASSIUM SERPL-SCNC: 4.9 MMOL/L — SIGNIFICANT CHANGE UP (ref 3.5–5.3)
PROT SERPL-MCNC: 5.4 G/DL — LOW (ref 6.6–8.7)
PROTHROM AB SERPL-ACNC: 12.4 SEC — SIGNIFICANT CHANGE UP (ref 10–12.9)
RBC # BLD: 3.13 M/UL — LOW (ref 4.4–5.2)
RBC # FLD: 14.8 % — SIGNIFICANT CHANGE UP (ref 11–15.6)
RBC BLD AUTO: ABNORMAL
SCHISTOCYTES BLD QL AUTO: SLIGHT — SIGNIFICANT CHANGE UP
SODIUM SERPL-SCNC: 137 MMOL/L — SIGNIFICANT CHANGE UP (ref 135–145)
T4 AB SER-ACNC: 6.5 UG/DL — SIGNIFICANT CHANGE UP (ref 4.5–12)
TSH SERPL-MCNC: 2.43 UIU/ML — SIGNIFICANT CHANGE UP (ref 0.27–4.2)
TYPE + AB SCN PNL BLD: SIGNIFICANT CHANGE UP
WBC # BLD: 6 K/UL — SIGNIFICANT CHANGE UP (ref 4.8–10.8)
WBC # FLD AUTO: 6 K/UL — SIGNIFICANT CHANGE UP (ref 4.8–10.8)

## 2019-03-29 PROCEDURE — 85730 THROMBOPLASTIN TIME PARTIAL: CPT

## 2019-03-29 PROCEDURE — 84443 ASSAY THYROID STIM HORMONE: CPT

## 2019-03-29 PROCEDURE — 86900 BLOOD TYPING SEROLOGIC ABO: CPT

## 2019-03-29 PROCEDURE — 36415 COLL VENOUS BLD VENIPUNCTURE: CPT

## 2019-03-29 PROCEDURE — 86901 BLOOD TYPING SEROLOGIC RH(D): CPT

## 2019-03-29 PROCEDURE — 80053 COMPREHEN METABOLIC PANEL: CPT

## 2019-03-29 PROCEDURE — 85027 COMPLETE CBC AUTOMATED: CPT

## 2019-03-29 PROCEDURE — T1013: CPT

## 2019-03-29 PROCEDURE — 84436 ASSAY OF TOTAL THYROXINE: CPT

## 2019-03-29 PROCEDURE — 99284 EMERGENCY DEPT VISIT MOD MDM: CPT

## 2019-03-29 PROCEDURE — 86850 RBC ANTIBODY SCREEN: CPT

## 2019-03-29 PROCEDURE — 85610 PROTHROMBIN TIME: CPT

## 2019-03-29 PROCEDURE — 99283 EMERGENCY DEPT VISIT LOW MDM: CPT

## 2019-03-29 NOTE — ED PROVIDER NOTE - MUSCULOSKELETAL, MLM
Spine appears normal, range of motion is not limited, no muscle or joint tenderness. fistul;a to RUE, no active bleeding

## 2019-03-29 NOTE — ED ADULT TRIAGE NOTE - CHIEF COMPLAINT QUOTE
Patient brought in by ambulance A/Ox3, sent from [facility] dialysis [place] for bleeding fistula.  Bleeding controlled at this time.

## 2019-03-29 NOTE — ED ADULT NURSE NOTE - NSIMPLEMENTINTERV_GEN_ALL_ED
Implemented All Universal Safety Interventions:  [place] to call system. Call bell, personal items and telephone within reach. Instruct patient to call for assistance. Room bathroom lighting operational. Non-slip footwear when patient is off stretcher. Physically safe environment: no spills, clutter or unnecessary equipment. Stretcher in lowest position, wheels locked, appropriate side rails in place.

## 2019-03-29 NOTE — ED ADULT NURSE NOTE - CHIEF COMPLAINT QUOTE
Patient brought in by ambulance A/Ox3, sent from [facility] dialysis [place] for bleeding fistula.  Bleeding controlled at this time.

## 2019-03-29 NOTE — ED PROVIDER NOTE - OBJECTIVE STATEMENT
87 year old female with PMH ESRD on HD, CABD, LBBB, HTN, HLD, DM presents with bleeding fistula. pt states that she received 2 out of 3 hours of dialysis today but was having profuse bleeding from the fistula site, and was sent to the ED. She states she feels fatigued, but denies chest pain, SOB, abd pain, blurry vision, numbness, tingling, cough, black stools, fever,. chills.

## 2019-03-29 NOTE — ED PROVIDER NOTE - CLINICAL SUMMARY MEDICAL DECISION MAKING FREE TEXT BOX
OPt has not been bleeding at all the entire time here. No coagulopathy, hbg stable, and she is at her baseline. Discussed labs with Dr. Lou, he agrees with dc and f/u monday for continued dialysis

## 2019-05-08 ENCOUNTER — OUTPATIENT (OUTPATIENT)
Dept: OUTPATIENT SERVICES | Facility: HOSPITAL | Age: 84
LOS: 1 days | End: 2019-05-08

## 2019-05-08 ENCOUNTER — APPOINTMENT (OUTPATIENT)
Dept: CT IMAGING | Facility: CLINIC | Age: 84
End: 2019-05-08
Payer: MEDICARE

## 2019-05-08 DIAGNOSIS — Z95.1 PRESENCE OF AORTOCORONARY BYPASS GRAFT: Chronic | ICD-10-CM

## 2019-05-08 DIAGNOSIS — Z00.8 ENCOUNTER FOR OTHER GENERAL EXAMINATION: ICD-10-CM

## 2019-05-08 DIAGNOSIS — Z95.828 PRESENCE OF OTHER VASCULAR IMPLANTS AND GRAFTS: Chronic | ICD-10-CM

## 2019-05-08 PROCEDURE — 70450 CT HEAD/BRAIN W/O DYE: CPT | Mod: 26

## 2019-07-04 ENCOUNTER — INPATIENT (INPATIENT)
Facility: HOSPITAL | Age: 84
LOS: 32 days | Discharge: HOSPICE | DRG: 314 | End: 2019-08-06
Attending: INTERNAL MEDICINE | Admitting: STUDENT IN AN ORGANIZED HEALTH CARE EDUCATION/TRAINING PROGRAM
Payer: COMMERCIAL

## 2019-07-04 VITALS
RESPIRATION RATE: 16 BRPM | HEART RATE: 81 BPM | DIASTOLIC BLOOD PRESSURE: 64 MMHG | TEMPERATURE: 100 F | OXYGEN SATURATION: 95 % | SYSTOLIC BLOOD PRESSURE: 146 MMHG

## 2019-07-04 DIAGNOSIS — N30.00 ACUTE CYSTITIS WITHOUT HEMATURIA: ICD-10-CM

## 2019-07-04 DIAGNOSIS — Z95.828 PRESENCE OF OTHER VASCULAR IMPLANTS AND GRAFTS: Chronic | ICD-10-CM

## 2019-07-04 DIAGNOSIS — Z95.1 PRESENCE OF AORTOCORONARY BYPASS GRAFT: Chronic | ICD-10-CM

## 2019-07-04 LAB
ALBUMIN SERPL ELPH-MCNC: 3.1 G/DL — LOW (ref 3.3–5.2)
ALP SERPL-CCNC: 119 U/L — SIGNIFICANT CHANGE UP (ref 40–120)
ALT FLD-CCNC: 29 U/L — SIGNIFICANT CHANGE UP
ANION GAP SERPL CALC-SCNC: 15 MMOL/L — SIGNIFICANT CHANGE UP (ref 5–17)
APPEARANCE UR: CLEAR — SIGNIFICANT CHANGE UP
APTT BLD: 32.1 SEC — SIGNIFICANT CHANGE UP (ref 27.5–36.3)
AST SERPL-CCNC: 66 U/L — HIGH
BACTERIA # UR AUTO: ABNORMAL
BASOPHILS # BLD AUTO: 0.04 K/UL — SIGNIFICANT CHANGE UP (ref 0–0.2)
BASOPHILS NFR BLD AUTO: 0.3 % — SIGNIFICANT CHANGE UP (ref 0–2)
BILIRUB SERPL-MCNC: 0.6 MG/DL — SIGNIFICANT CHANGE UP (ref 0.4–2)
BILIRUB UR-MCNC: NEGATIVE — SIGNIFICANT CHANGE UP
BUN SERPL-MCNC: 52 MG/DL — HIGH (ref 8–20)
CALCIUM SERPL-MCNC: 8 MG/DL — LOW (ref 8.6–10.2)
CHLORIDE SERPL-SCNC: 91 MMOL/L — LOW (ref 98–107)
CO2 SERPL-SCNC: 25 MMOL/L — SIGNIFICANT CHANGE UP (ref 22–29)
COLOR SPEC: YELLOW — SIGNIFICANT CHANGE UP
CREAT SERPL-MCNC: 5.65 MG/DL — HIGH (ref 0.5–1.3)
DIFF PNL FLD: ABNORMAL
EOSINOPHIL # BLD AUTO: 0.15 K/UL — SIGNIFICANT CHANGE UP (ref 0–0.5)
EOSINOPHIL NFR BLD AUTO: 1 % — SIGNIFICANT CHANGE UP (ref 0–6)
EPI CELLS # UR: NEGATIVE — SIGNIFICANT CHANGE UP
GLUCOSE SERPL-MCNC: 131 MG/DL — HIGH (ref 70–115)
GLUCOSE UR QL: NEGATIVE MG/DL — SIGNIFICANT CHANGE UP
HCT VFR BLD CALC: 36.3 % — SIGNIFICANT CHANGE UP (ref 34.5–45)
HGB BLD-MCNC: 12 G/DL — SIGNIFICANT CHANGE UP (ref 11.5–15.5)
IMM GRANULOCYTES NFR BLD AUTO: 1.1 % — SIGNIFICANT CHANGE UP (ref 0–1.5)
INR BLD: 1.34 RATIO — HIGH (ref 0.88–1.16)
KETONES UR-MCNC: ABNORMAL
LACTATE BLDV-MCNC: 2 MMOL/L — SIGNIFICANT CHANGE UP (ref 0.5–2)
LEUKOCYTE ESTERASE UR-ACNC: ABNORMAL
LIDOCAIN IGE QN: 4 U/L — LOW (ref 22–51)
LYMPHOCYTES # BLD AUTO: 0.6 K/UL — LOW (ref 1–3.3)
LYMPHOCYTES # BLD AUTO: 4.1 % — LOW (ref 13–44)
MAGNESIUM SERPL-MCNC: 1.9 MG/DL — SIGNIFICANT CHANGE UP (ref 1.6–2.6)
MCHC RBC-ENTMCNC: 32.2 PG — SIGNIFICANT CHANGE UP (ref 27–34)
MCHC RBC-ENTMCNC: 33.1 GM/DL — SIGNIFICANT CHANGE UP (ref 32–36)
MCV RBC AUTO: 97.3 FL — SIGNIFICANT CHANGE UP (ref 80–100)
MONOCYTES # BLD AUTO: 1.02 K/UL — HIGH (ref 0–0.9)
MONOCYTES NFR BLD AUTO: 7 % — SIGNIFICANT CHANGE UP (ref 2–14)
NEUTROPHILS # BLD AUTO: 12.53 K/UL — HIGH (ref 1.8–7.4)
NEUTROPHILS NFR BLD AUTO: 86.5 % — HIGH (ref 43–77)
NITRITE UR-MCNC: NEGATIVE — SIGNIFICANT CHANGE UP
PH UR: 6 — SIGNIFICANT CHANGE UP (ref 5–8)
PHOSPHATE SERPL-MCNC: 3.9 MG/DL — SIGNIFICANT CHANGE UP (ref 2.4–4.7)
PLATELET # BLD AUTO: 90 K/UL — LOW (ref 150–400)
POTASSIUM SERPL-MCNC: 3.6 MMOL/L — SIGNIFICANT CHANGE UP (ref 3.5–5.3)
POTASSIUM SERPL-SCNC: 3.6 MMOL/L — SIGNIFICANT CHANGE UP (ref 3.5–5.3)
PROT SERPL-MCNC: 5.8 G/DL — LOW (ref 6.6–8.7)
PROT UR-MCNC: 500 MG/DL
PROTHROM AB SERPL-ACNC: 15.5 SEC — HIGH (ref 10–12.9)
RBC # BLD: 3.73 M/UL — LOW (ref 3.8–5.2)
RBC # FLD: 14.7 % — HIGH (ref 10.3–14.5)
RBC CASTS # UR COMP ASSIST: SIGNIFICANT CHANGE UP /HPF (ref 0–4)
SODIUM SERPL-SCNC: 131 MMOL/L — LOW (ref 135–145)
SP GR SPEC: 1.02 — SIGNIFICANT CHANGE UP (ref 1.01–1.02)
TROPONIN T SERPL-MCNC: 0.16 NG/ML — HIGH (ref 0–0.06)
UROBILINOGEN FLD QL: NEGATIVE MG/DL — SIGNIFICANT CHANGE UP
WBC # BLD: 14.5 K/UL — HIGH (ref 3.8–10.5)
WBC # FLD AUTO: 14.5 K/UL — HIGH (ref 3.8–10.5)
WBC UR QL: ABNORMAL

## 2019-07-04 PROCEDURE — 74176 CT ABD & PELVIS W/O CONTRAST: CPT | Mod: 26

## 2019-07-04 PROCEDURE — 99291 CRITICAL CARE FIRST HOUR: CPT

## 2019-07-04 PROCEDURE — 72128 CT CHEST SPINE W/O DYE: CPT | Mod: 26

## 2019-07-04 PROCEDURE — 99223 1ST HOSP IP/OBS HIGH 75: CPT

## 2019-07-04 PROCEDURE — 72125 CT NECK SPINE W/O DYE: CPT | Mod: 26

## 2019-07-04 PROCEDURE — 72131 CT LUMBAR SPINE W/O DYE: CPT | Mod: 26

## 2019-07-04 PROCEDURE — 71045 X-RAY EXAM CHEST 1 VIEW: CPT | Mod: 26

## 2019-07-04 PROCEDURE — 76705 ECHO EXAM OF ABDOMEN: CPT | Mod: 26

## 2019-07-04 PROCEDURE — 70450 CT HEAD/BRAIN W/O DYE: CPT | Mod: 26

## 2019-07-04 PROCEDURE — 71250 CT THORAX DX C-: CPT | Mod: 26

## 2019-07-04 RX ORDER — ACETAMINOPHEN 500 MG
650 TABLET ORAL EVERY 6 HOURS
Refills: 0 | Status: DISCONTINUED | OUTPATIENT
Start: 2019-07-04 | End: 2019-07-24

## 2019-07-04 RX ORDER — FOLIC ACID 0.8 MG
1 TABLET ORAL DAILY
Refills: 0 | Status: DISCONTINUED | OUTPATIENT
Start: 2019-07-04 | End: 2019-07-24

## 2019-07-04 RX ORDER — DEXTROSE 50 % IN WATER 50 %
25 SYRINGE (ML) INTRAVENOUS ONCE
Refills: 0 | Status: DISCONTINUED | OUTPATIENT
Start: 2019-07-04 | End: 2019-07-24

## 2019-07-04 RX ORDER — MORPHINE SULFATE 50 MG/1
4 CAPSULE, EXTENDED RELEASE ORAL ONCE
Refills: 0 | Status: DISCONTINUED | OUTPATIENT
Start: 2019-07-04 | End: 2019-07-04

## 2019-07-04 RX ORDER — HEPARIN SODIUM 5000 [USP'U]/ML
5000 INJECTION INTRAVENOUS; SUBCUTANEOUS EVERY 8 HOURS
Refills: 0 | Status: DISCONTINUED | OUTPATIENT
Start: 2019-07-04 | End: 2019-07-16

## 2019-07-04 RX ORDER — ONDANSETRON 8 MG/1
4 TABLET, FILM COATED ORAL ONCE
Refills: 0 | Status: COMPLETED | OUTPATIENT
Start: 2019-07-04 | End: 2019-07-04

## 2019-07-04 RX ORDER — INSULIN LISPRO 100/ML
VIAL (ML) SUBCUTANEOUS AT BEDTIME
Refills: 0 | Status: DISCONTINUED | OUTPATIENT
Start: 2019-07-04 | End: 2019-07-24

## 2019-07-04 RX ORDER — INSULIN LISPRO 100/ML
VIAL (ML) SUBCUTANEOUS
Refills: 0 | Status: DISCONTINUED | OUTPATIENT
Start: 2019-07-04 | End: 2019-07-24

## 2019-07-04 RX ORDER — SODIUM CHLORIDE 9 MG/ML
1000 INJECTION INTRAMUSCULAR; INTRAVENOUS; SUBCUTANEOUS ONCE
Refills: 0 | Status: COMPLETED | OUTPATIENT
Start: 2019-07-04 | End: 2019-07-04

## 2019-07-04 RX ORDER — LEVOTHYROXINE SODIUM 125 MCG
112 TABLET ORAL DAILY
Refills: 0 | Status: DISCONTINUED | OUTPATIENT
Start: 2019-07-04 | End: 2019-07-24

## 2019-07-04 RX ORDER — GLUCAGON INJECTION, SOLUTION 0.5 MG/.1ML
1 INJECTION, SOLUTION SUBCUTANEOUS ONCE
Refills: 0 | Status: DISCONTINUED | OUTPATIENT
Start: 2019-07-04 | End: 2019-07-24

## 2019-07-04 RX ORDER — PANTOPRAZOLE SODIUM 20 MG/1
40 TABLET, DELAYED RELEASE ORAL
Refills: 0 | Status: DISCONTINUED | OUTPATIENT
Start: 2019-07-04 | End: 2019-07-24

## 2019-07-04 RX ORDER — DEXTROSE 50 % IN WATER 50 %
15 SYRINGE (ML) INTRAVENOUS ONCE
Refills: 0 | Status: DISCONTINUED | OUTPATIENT
Start: 2019-07-04 | End: 2019-07-24

## 2019-07-04 RX ORDER — CALCIUM ACETATE 667 MG
667 TABLET ORAL
Refills: 0 | Status: DISCONTINUED | OUTPATIENT
Start: 2019-07-04 | End: 2019-07-24

## 2019-07-04 RX ORDER — CALCITRIOL 0.5 UG/1
0.25 CAPSULE ORAL DAILY
Refills: 0 | Status: DISCONTINUED | OUTPATIENT
Start: 2019-07-04 | End: 2019-07-24

## 2019-07-04 RX ORDER — AZTREONAM 2 G
1000 VIAL (EA) INJECTION ONCE
Refills: 0 | Status: COMPLETED | OUTPATIENT
Start: 2019-07-04 | End: 2019-07-04

## 2019-07-04 RX ORDER — AZTREONAM 2 G
250 VIAL (EA) INJECTION EVERY 8 HOURS
Refills: 0 | Status: DISCONTINUED | OUTPATIENT
Start: 2019-07-04 | End: 2019-07-06

## 2019-07-04 RX ORDER — CARVEDILOL PHOSPHATE 80 MG/1
12.5 CAPSULE, EXTENDED RELEASE ORAL EVERY 12 HOURS
Refills: 0 | Status: DISCONTINUED | OUTPATIENT
Start: 2019-07-04 | End: 2019-07-12

## 2019-07-04 RX ORDER — CLOPIDOGREL BISULFATE 75 MG/1
75 TABLET, FILM COATED ORAL DAILY
Refills: 0 | Status: DISCONTINUED | OUTPATIENT
Start: 2019-07-04 | End: 2019-07-24

## 2019-07-04 RX ORDER — DEXTROSE 50 % IN WATER 50 %
12.5 SYRINGE (ML) INTRAVENOUS ONCE
Refills: 0 | Status: DISCONTINUED | OUTPATIENT
Start: 2019-07-04 | End: 2019-07-24

## 2019-07-04 RX ORDER — LISINOPRIL 2.5 MG/1
20 TABLET ORAL DAILY
Refills: 0 | Status: DISCONTINUED | OUTPATIENT
Start: 2019-07-04 | End: 2019-07-12

## 2019-07-04 RX ORDER — VANCOMYCIN HCL 1 G
1000 VIAL (EA) INTRAVENOUS ONCE
Refills: 0 | Status: COMPLETED | OUTPATIENT
Start: 2019-07-04 | End: 2019-07-04

## 2019-07-04 RX ORDER — SODIUM CHLORIDE 9 MG/ML
1000 INJECTION, SOLUTION INTRAVENOUS
Refills: 0 | Status: DISCONTINUED | OUTPATIENT
Start: 2019-07-04 | End: 2019-07-24

## 2019-07-04 RX ORDER — ATORVASTATIN CALCIUM 80 MG/1
40 TABLET, FILM COATED ORAL AT BEDTIME
Refills: 0 | Status: DISCONTINUED | OUTPATIENT
Start: 2019-07-04 | End: 2019-07-24

## 2019-07-04 RX ORDER — ASPIRIN/CALCIUM CARB/MAGNESIUM 324 MG
81 TABLET ORAL DAILY
Refills: 0 | Status: DISCONTINUED | OUTPATIENT
Start: 2019-07-04 | End: 2019-07-24

## 2019-07-04 RX ORDER — ACETAMINOPHEN 500 MG
650 TABLET ORAL ONCE
Refills: 0 | Status: COMPLETED | OUTPATIENT
Start: 2019-07-04 | End: 2019-07-04

## 2019-07-04 RX ADMIN — MORPHINE SULFATE 4 MILLIGRAM(S): 50 CAPSULE, EXTENDED RELEASE ORAL at 15:49

## 2019-07-04 RX ADMIN — Medication 650 MILLIGRAM(S): at 13:30

## 2019-07-04 RX ADMIN — Medication 650 MILLIGRAM(S): at 15:50

## 2019-07-04 RX ADMIN — SODIUM CHLORIDE 1000 MILLILITER(S): 9 INJECTION INTRAMUSCULAR; INTRAVENOUS; SUBCUTANEOUS at 13:20

## 2019-07-04 RX ADMIN — Medication 100 MILLIGRAM(S): at 13:25

## 2019-07-04 RX ADMIN — ONDANSETRON 4 MILLIGRAM(S): 8 TABLET, FILM COATED ORAL at 13:52

## 2019-07-04 RX ADMIN — Medication 1000 MILLIGRAM(S): at 15:49

## 2019-07-04 RX ADMIN — Medication 250 MILLIGRAM(S): at 13:52

## 2019-07-04 RX ADMIN — MORPHINE SULFATE 4 MILLIGRAM(S): 50 CAPSULE, EXTENDED RELEASE ORAL at 13:52

## 2019-07-04 RX ADMIN — SODIUM CHLORIDE 1000 MILLILITER(S): 9 INJECTION INTRAMUSCULAR; INTRAVENOUS; SUBCUTANEOUS at 15:49

## 2019-07-04 RX ADMIN — Medication 1000 MILLIGRAM(S): at 13:52

## 2019-07-04 NOTE — CONSULT NOTE ADULT - SUBJECTIVE AND OBJECTIVE BOX
87 yr old female w CAD s/p CABG, CVA, DM II, ESRD on HD M-F, HTN, hypothyroid LBBB, PPM presented to ED by ambulance c/o weakness x 3 days      PAST MEDICAL HX  Chronic renal failure    CAD coronary artery disease s/p CABG  CVA     DM diabetes mellitus II    Diabetic neuropathy    HLD hyperlipemia    HTN hypertension    Hypothyroid    LBBB  left bundle branch block    Osteoporosis    Pacemaker  Medtronic   Peripheral arterial disease    Spinal stenosis      PAST SURGICAL HX  Artificial cardiac pacemaker: Medtronic     S/P CABG x 3    S/P dialysis catheter insertion  R arm      FAMILY HX  Am unable to obtain      SOCIAL HX  Nonsmoker  No alcohol        T(C): 37.1 (19 @ 16:00), Max: 39.2 (19 @ 13:14)  HR: 60 (19 @ 16:00) (60 - 81)  BP: 118/70 (19 @ 16:00) (118/70 - 146/64)  RR: 18 (19 @ 16:00) (16 - 20)  SpO2: 94% (19 @ 16:00) (94% - 95%)    PHYSICAL EXAM: evaluation precludes physical exam. Pertinent physical exam findings as per video conference with  teleNA at bedside is as follows:    CARDIAC MARKERS ( 2019 13:29 )  x     / 0.16 ng/mL / x     / x     / x                                12.0   14.50 )-----------( 90       ( 2019 13:29 )             36.3     2019 13:29    131    |  91     |  52.0   ----------------------------<  131    3.6     |  25.0   |  5.65     Ca    8.0        2019 13:29  Phos  3.9       2019 13:29  Mg     1.9       2019 13:29    TPro  5.8    /  Alb  3.1    /  TBili  0.6    /  DBili  x      /  AST  66     /  ALT  29     /  AlkPhos  119    2019 13:29    PT/INR - ( 2019 13:29 )   PT: 15.5 sec;   INR: 1.34 ratio         PTT - ( 2019 13:29 )  PTT:32.1 sec  CAPILLARY BLOOD GLUCOSE        LIVER FUNCTIONS - ( 2019 13:29 )  Alb: 3.1 g/dL / Pro: 5.8 g/dL / ALK PHOS: 119 U/L / ALT: 29 U/L / AST: 66 U/L / GGT: x           Urinalysis Basic - ( 2019 14:34 )    Color: Yellow / Appearance: Clear / S.020 / pH: x  Gluc: x / Ketone: Trace  / Bili: Negative / Urobili: Negative mg/dL   Blood: x / Protein: 500 mg/dL / Nitrite: Negative   Leuk Esterase: Moderate / RBC: 0-2 /HPF / WBC 11-25   Sq Epi: x / Non Sq Epi: Negative / Bacteria: Many      RADIOLOGY    1)   EXAM:  CT REFORM SPINE T                         EXAM:  CT REFORM SPINE L                         EXAM:  CT ABDOMEN AND PELVIS OC                         EXAM:  CT CHEST                          PROCEDURE DATE:  2019          INTERPRETATION:  CT CHEST, CT ABDOMEN AND PELVIS WITH ORAL CONTRAST, CT   LUMBAR SPINE REFORMAT, CT THORACIC SPINE REFORMAT    CLINICAL INFORMATION: sepsis, cough, generalized abdominal pain      COMPARISON: CT abdomen and pelvis 2015    PROCEDURE:   CT of the Chest, Abdomen and Pelvis was performed without intravenous   contrast.   Oral contrast: positive contrast was administered.  Sagittal and coronal reformats were performed of the thoracolumbar spine.    Contrast: Oral contrast only    FINDINGS:    CHEST:     LUNGS, AIRWAYS: The central airways are patent. No focal consolidation.   Small bilateral atelectasis.  PLEURA: Trace bilateral effusions.  VESSELS: Aortic atherosclerosis without aneurysm.  HEART: Left dual-lead pacer. Normal heart size. No pericardial effusion.   Coronary artery calcifications are present.  MEDIASTINUM AND TRACY: No adenopathy.  CHEST WALL: No masses.    ABDOMEN AND PELVIS:    LIVER: Normal.  BILE DUCTS: Nondilated.  GALLBLADDER: Mild haziness of the gallbladder wall.  SPLEEN: Normal.  PANCREAS: Diffuse atrophy. Multiple cystic lesions.  ADRENALS: Stable right adrenal nodule  KIDNEYS/URETERS: Atrophic kidneys. No hydronephrosis. Extensive vascular calcification.    BLADDER: Underdistended limiting evaluation.  REPRODUCTIVE ORGANS: No uterine or adnexal abnormality.    BOWEL: No bowel-related abnormality. Specifically, no evidence of acute   diverticulitis. Normal appendix and ileocecal region. No bowel   obstruction or bowel inflammation.  PERITONEUM: No intra-abdominal fluid collection. No free air or ascites.  VESSELS:  Aortoiliac atherosclerosis without aneurysm.  RETROPERITONEUM: No adenopathy or hematoma.    ABDOMINAL WALL: Postsurgical changes. No collection.  BONES: Sternotomy. No aggressive lesion.    THORACOLUMBAR SPINE: No acute fracture, subluxation, or aggressive   process.    IMPRESSION:     No focal consolidation.    Haziness of the gallbladder wall. Correlate for cholecystitis.     THORACOLUMBAR SPINE: No acute fracture, subluxation, or aggressive   process.        2)   EXAM:  CT BRAIN                          PROCEDURE DATE:  2019    INTERPRETATION:  CLINICAL Indications:  AMS; weakness, sepsis    COMPARISON: CT head dated 2019    TECHNIQUE: Noncontrast CT of the head. Multiplanar reformations are   submitted.    FINDINGS:  Chronic small right cerebellar hemisphere infarcts. Chronic tiny right of   midline pontine lacunar infarct.  There is periventricular and subcortical white matter hypodensity without   mass effect, nonspecific, likely representing moderate chronic   microvascular ischemic changes. There is no compelling evidence for an   acute transcortical infarction. There is no evidence of mass, mass   effect, midline shift or extra-axial fluid collection. The lateral   ventricles and cortical sulci are age-appropriate in size and   configuration. The patient is status post bilateral ocular lens   replacement surgery.  The orbits, mastoid air cells and visualized   paranasal sinuses are unremarkable. The calvarium is intact.    IMPRESSION:  Moderate chronic microvascular changes without evidence of   an acute transcortical infarction or hemorrhage. MR is a more sensitive   imaging modality for the evaluation of an acute infarction.       3)   EXAM:  CT CERVICAL SPINE                          PROCEDURE DATE:  2019    INTERPRETATION:  CLINICAL INDICATIONS: neck pain; sepsis; ? Fall    COMPARISON: None.    TECHNIQUE: Noncontrast CT of the cervical spine. Multiple contiguous   axial images through the cervical spine as well as multiplanar   reformatted images are submitted for interpretation without the   administration of intravenous contrast. 3-D images.    FINDINGS:  Osseous structures are osteopenic. Severe loss of disc space height at   C3/C4 and C6/C7 levels. Moderate loss of disc space height at the   remaining levels. Facet hypertrophy and moderate multilevel uncovertebral   hypertrophy. Moderate rightward curvature to the mid cervical spine. 4 mm   grade 1 retrolisthesis of C3 over C4 likely on the basis of degenerative   changes.    There is no evidence for acute fracture.  Craniocervical junction is   normal. The cervicovertebral body heights and remaining intervertebral   disc spaces are preserved. There is no prevertebral soft tissue   abnormality. The odontoid process is intact.     Thyroid gland demonstrates small bilateral calcifications and mild   heterogeneity. The airway is patent. The upper lung apices are   unremarkable. Left-sided pacemaker leads are noted. Severe bilateral   carotid bulb calcifications.    Evaluation of the individual levels:    C2/C3 level: No spinal canal stenosis or foraminal narrowing.    C3/C4 level: Broad-based ridging causing severe left-sided foraminal   narrowing mild right-sided foraminal narrowing. Mild spinal canal   stenosis.     C4/C5 level: Broad-based ridging. No spinal canal stenosis or foraminal   narrowing.     C5/C6 level: Broad-based ridging causing severe bilateral foraminal   narrowing. No spinal canal stenosis.     C6/C7 level: Broad-based ridging causing moderate bilateral foraminal   narrowing. No spinal canal stenosis.     C7/T1 level: No spinal canal stenosis or foraminal narrowing.      IMPRESSION:      Advanced degenerative changes. No acute osseous abnormality.        4)  EXAM:  US GALLBLADDER                          PROCEDURE DATE:  2019    INTERPRETATION:  CLINICAL INFORMATION: Sepsis and abdominal pain    COMPARISON: CT dated 2019    TECHNIQUE: Sonography of the right upper quadrant.     FINDINGS:    Liver: Visualized portions within normal limits.    Bile ducts: Normal caliber. Common bile duct measures 5.8 mm.     Gallbladder: No cholelithiasis. Borderline wall thickening (3 mm).   Negative sonographic Stewart's sign.        Ascites: None.    IMPRESSION:     No cholelithiasis or biliary ductal dilatation. 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  No fever or chills.  Has felt progressive weakness of her lower extremities as well as diffuse myalgias  She has had nausea and a few episodes of vomiting.  Cannot tell mw what makes it better or worse.  Had c/o generalized abd pain to ED but not when I interviewed her.    In ambulance VSS.  Pt alert and oriented according to the Ambulance Call Report.    On ED arrival, was code sepsis w /64 HR 79 T 102.6 rectal RR 29. 91% sat RA.  Given 1 L NS, cultured then placed on aztreonam, vanco  Head, C pine CT unremarkable.  CT chest/abd and pelvis revealed haziness aroung gallbladder.  No other acute pathology seen.  GB ultrasound neg for acute cholecystitis w neg Stewart's    Hx obtained through chart review and interview w pt (in [place]) who is a poor historian.    Renal  Dr. Lou [phone]    PAST MEDICAL HX  Chronic renal failure    CAD coronary artery disease s/p CABG  CVA -    DM diabetes mellitus II    Diabetic neuropathy    HLD hyperlipemia    HTN hypertension    Hypothyroid    LBBB  left bundle branch block    Osteoporosis    Pacemaker  Medtronic   Peripheral arterial disease    Spinal stenosis      PAST SURGICAL HX  Artificial cardiac pacemaker: Medtronic     S/P CABG x 3    S/P dialysis catheter insertion  R arm      FAMILY HX  Am unable to obtain      SOCIAL HX  Nonsmoker  No alcohol    ROS  very limited as pt appeared to be lethargic and di not answer questions at times        T(C): 37.1 (19 @ 16:00), Max: 39.2 (19 @ 13:14)  HR: 60 (19 @ 16:00) (60 - 81)  BP: 118/70 (19 @ 16:00) (118/70 - 146/64)  RR: 18 (19 @ 16:00) (16 - 20)  SpO2: 94% (19 @ 16:00) (94% - 95%)    PHYSICAL EXAM: evaluation precludes physical exam. Pertinent physical exam findings as per video conference with  teleNA at bedside is as follows:    Lethargic chronically ill appearing female  VS repeated at bedside T 98.5  /76 HR 60s sats 98% on 2 L nasal    SR w premature supraventricular complexes, LBBB  no change when I compared to older ekgs    CARDIAC MARKERS ( 2019 13:29 )  x     / 0.16 ng/mL / x     / x     / x                                12.0   14.50 )-----------( 90       ( 2019 13:29 )             36.3     2019 13:29    131    |  91     |  52.0   ----------------------------<  131    3.6     |  25.0   |  5.65     Ca    8.0        2019 13:29  Phos  3.9       2019 13:29  Mg     1.9       2019 13:29    TPro  5.8    /  Alb  3.1    /  TBili  0.6    /  DBili  x      /  AST  66     /  ALT  29     /  AlkPhos  119    2019 13:29    PT/INR - ( 2019 13:29 )   PT: 15.5 sec;   INR: 1.34 ratio         PTT - ( 2019 13:29 )  PTT:32.1 sec  CAPILLARY BLOOD GLUCOSE        LIVER FUNCTIONS - ( 2019 13:29 )  Alb: 3.1 g/dL / Pro: 5.8 g/dL / ALK PHOS: 119 U/L / ALT: 29 U/L / AST: 66 U/L / GGT: x           Urinalysis Basic - ( 2019 14:34 )    Color: Yellow / Appearance: Clear / S.020 / pH: x  Gluc: x / Ketone: Trace  / Bili: Negative / Urobili: Negative mg/dL   Blood: x / Protein: 500 mg/dL / Nitrite: Negative   Leuk Esterase: Moderate / RBC: 0-2 /HPF / WBC 11-25   Sq Epi: x / Non Sq Epi: Negative / Bacteria: Many      RADIOLOGY    1)   EXAM:  CT REFORM SPINE T                         EXAM:  CT REFORM SPINE L                         EXAM:  CT ABDOMEN AND PELVIS OC                         EXAM:  CT CHEST                          PROCEDURE DATE:  2019          INTERPRETATION:  CT CHEST, CT ABDOMEN AND PELVIS WITH ORAL CONTRAST, CT   LUMBAR SPINE REFORMAT, CT THORACIC SPINE REFORMAT    CLINICAL INFORMATION: sepsis, cough, generalized abdominal pain      COMPARISON: CT abdomen and pelvis 2015    PROCEDURE:   CT of the Chest, Abdomen and Pelvis was performed without intravenous   contrast.   Oral contrast: positive contrast was administered.  Sagittal and coronal reformats were performed of the thoracolumbar spine.    Contrast: Oral contrast only    FINDINGS:    CHEST:     LUNGS, AIRWAYS: The central airways are patent. No focal consolidation.   Small bilateral atelectasis.  PLEURA: Trace bilateral effusions.  VESSELS: Aortic atherosclerosis without aneurysm.  HEART: Left dual-lead pacer. Normal heart size. No pericardial effusion.   Coronary artery calcifications are present.  MEDIASTINUM AND TRACY: No adenopathy.  CHEST WALL: No masses.    ABDOMEN AND PELVIS:    LIVER: Normal.  BILE DUCTS: Nondilated.  GALLBLADDER: Mild haziness of the gallbladder wall.  SPLEEN: Normal.  PANCREAS: Diffuse atrophy. Multiple cystic lesions.  ADRENALS: Stable right adrenal nodule  KIDNEYS/URETERS: Atrophic kidneys. No hydronephrosis. Extensive vascular calcification.    BLADDER: Underdistended limiting evaluation.  REPRODUCTIVE ORGANS: No uterine or adnexal abnormality.    BOWEL: No bowel-related abnormality. Specifically, no evidence of acute   diverticulitis. Normal appendix and ileocecal region. No bowel   obstruction or bowel inflammation.  PERITONEUM: No intra-abdominal fluid collection. No free air or ascites.  VESSELS:  Aortoiliac atherosclerosis without aneurysm.  RETROPERITONEUM: No adenopathy or hematoma.    ABDOMINAL WALL: Postsurgical changes. No collection.  BONES: Sternotomy. No aggressive lesion.    THORACOLUMBAR SPINE: No acute fracture, subluxation, or aggressive   process.    IMPRESSION:     No focal consolidation.    Haziness of the gallbladder wall. Correlate for cholecystitis.     THORACOLUMBAR SPINE: No acute fracture, subluxation, or aggressive   process.        2)   EXAM:  CT BRAIN                          PROCEDURE DATE:  2019    INTERPRETATION:  CLINICAL Indications:  AMS; weakness, sepsis    COMPARISON: CT head dated 2019    TECHNIQUE: Noncontrast CT of the head. Multiplanar reformations are   submitted.    FINDINGS:  Chronic small right cerebellar hemisphere infarcts. Chronic tiny right of   midline pontine lacunar infarct.  There is periventricular and subcortical white matter hypodensity without   mass effect, nonspecific, likely representing moderate chronic   microvascular ischemic changes. There is no compelling evidence for an   acute transcortical infarction. There is no evidence of mass, mass   effect, midline shift or extra-axial fluid collection. The lateral   ventricles and cortical sulci are age-appropriate in size and   configuration. The patient is status post bilateral ocular lens   replacement surgery.  The orbits, mastoid air cells and visualized   paranasal sinuses are unremarkable. The calvarium is intact.    IMPRESSION:  Moderate chronic microvascular changes without evidence of   an acute transcortical infarction or hemorrhage. MR is a more sensitive   imaging modality for the evaluation of an acute infarction.       3)   EXAM:  CT CERVICAL SPINE                          PROCEDURE DATE:  2019    INTERPRETATION:  CLINICAL INDICATIONS: neck pain; sepsis; ? Fall    COMPARISON: None.    TECHNIQUE: Noncontrast CT of the cervical spine. Multiple contiguous   axial images through the cervical spine as well as multiplanar   reformatted images are submitted for interpretation without the   administration of intravenous contrast. 3-D images.    FINDINGS:  Osseous structures are osteopenic. Severe loss of disc space height at   C3/C4 and C6/C7 levels. Moderate loss of disc space height at the   remaining levels. Facet hypertrophy and moderate multilevel uncovertebral   hypertrophy. Moderate rightward curvature to the mid cervical spine. 4 mm   grade 1 retrolisthesis of C3 over C4 likely on the basis of degenerative   changes.    There is no evidence for acute fracture.  Craniocervical junction is   normal. The cervicovertebral body heights and remaining intervertebral   disc spaces are preserved. There is no prevertebral soft tissue   abnormality. The odontoid process is intact.     Thyroid gland demonstrates small bilateral calcifications and mild   heterogeneity. The airway is patent. The upper lung apices are   unremarkable. Left-sided pacemaker leads are noted. Severe bilateral   carotid bulb calcifications.    Evaluation of the individual levels:    C2/C3 level: No spinal canal stenosis or foraminal narrowing.    C3/C4 level: Broad-based ridging causing severe left-sided foraminal   narrowing mild right-sided foraminal narrowing. Mild spinal canal   stenosis.     C4/C5 level: Broad-based ridging. No spinal canal stenosis or foraminal   narrowing.     C5/C6 level: Broad-based ridging causing severe bilateral foraminal   narrowing. No spinal canal stenosis.     C6/C7 level: Broad-based ridging causing moderate bilateral foraminal   narrowing. No spinal canal stenosis.     C7/T1 level: No spinal canal stenosis or foraminal narrowing.      IMPRESSION:      Advanced degenerative changes. No acute osseous abnormality.        4)  EXAM:  US GALLBLADDER                          PROCEDURE DATE:  2019    INTERPRETATION:  CLINICAL INFORMATION: Sepsis and abdominal pain    COMPARISON: CT dated 2019    TECHNIQUE: Sonography of the right upper quadrant.     FINDINGS:    Liver: Visualized portions within normal limits.    Bile ducts: Normal caliber. Common bile duct measures 5.8 mm.     Gallbladder: No cholelithiasis. Borderline wall thickening (3 mm).   Negative sonographic Stewart's sign.        Ascites: None.    IMPRESSION:     No cholelithiasis or biliary ductal dilatation.

## 2019-07-04 NOTE — ED ADULT NURSE NOTE - OBJECTIVE STATEMENT
pt received from critical care with labs / IV / antibx infusing. alert and pleasantly confused per Bengali interpret. denies any complaints of pain, pmd - HD Monday and Wednesday.

## 2019-07-04 NOTE — ED ADULT NURSE REASSESSMENT NOTE - NS ED NURSE REASSESS COMMENT FT1
received pt in room. pt a&ox3. pt has no complaints at this time. denies chest pain or sob at this time. pt maintaining airway on room air. pt maintianing nsr on cardiac monitor. vitals stable at this time. will continue to monitor.

## 2019-07-04 NOTE — CONSULT NOTE ADULT - ATTENDING COMMENTS
VTE  med rec through EMR, pt unable to assist  renal consulted at 22:39 for AM consult at [phone]

## 2019-07-04 NOTE — ED PROVIDER NOTE - PHYSICAL EXAMINATION
Const: Awake, alert and oriented. In no acute distress. Appears chronically unwell.  HEENT: NC/AT. Moist mucous membranes.  Eyes: No scleral icterus. EOMI.  Neck:. Soft and supple. Full ROM without pain.  Cardiac: Regular rate and regular rhythm. +S1/S2. No murmurs. AV fistula in right upper arm with + thrill. No LE edema.  Resp: Speaking in full sentences. No evidence of respiratory distress. No wheezes, rales or rhonchi.  Abd: Soft, non-tender, non-distended. Normal bowel sounds in all 4 quadrants. No guarding or rebound.  Back: Spine midline and non-tender. No CVAT.  Skin: Hot to touch. No rashes, abrasions or lacerations.  Neuro: Awake, alert & oriented x 3. Moves all extremities symmetrically.

## 2019-07-04 NOTE — ED PROVIDER NOTE - NS ED ROS FT
Const: + fever, + chills, + generalized weakness, + diffuse body pain.  HEENT: Denies blurry vision, sore throat  Neck: Denies neck pain/stiffness  Resp: + coughing, + SOB  Cardiovascular: + CP.  Denies palpitations, LE edema  GI: + nausea, + vomiting, + abdominal pain. Denies diarrhea, constipation, blood in stool  : Denies urinary frequency/urgency/dysuria, hematuria  MSK: + back pain  Neuro: Denies HA, dizziness, numbness, weakness  Skin: Denies rashes.

## 2019-07-04 NOTE — H&P ADULT - HISTORY OF PRESENT ILLNESS
87yoF hx ESRD on HD (M and F only), CAD s/p CABG, HTN, DM, PAD, hypothyroidism presenting with generalized weakness.  Pt is poor historian despite use of   She reports generalized weakness for past few days associated with generalized, abdominal pain that she is unable to describe associated with nausea, some episodes of vomiting.  Reports difficulty with urination and ?dysuria but believes her urinary symptoms are due to not drinking enough fluids recently. Unable to explain why she is only on HD twice per week, but says last HD session was on 7/1.  Denies fevers, chest pain, dyspnea, endorses chronic pain in face and all extremities which is chronic for her.  On admission, febrile, leukocytosis, UA positive, was pan scanned by ED and CT abd/pelvis showed haziness around the gallbladder however follow up abd US was negative for cholelithiasis and Stewart's sign and no LFT elevation on labs.

## 2019-07-04 NOTE — ED ADULT TRIAGE NOTE - CHIEF COMPLAINT QUOTE
pt a+ox3, reports weakness x 3 days with vomiting. pt a+ox3, reports weakness x 3 days with vomiting. MD Smith @ bedside.

## 2019-07-04 NOTE — H&P ADULT - NSHPLABSRESULTS_GEN_ALL_CORE
12.0   14.50 )-----------( 90       ( 04 Jul 2019 13:29 )             36.3       07-04    131<L>  |  91<L>  |  52.0<H>  ----------------------------<  131<H>  3.6   |  25.0  |  5.65<H>    Ca    8.0<L>      04 Jul 2019 13:29  Phos  3.9     07-04  Mg     1.9     07-04    TPro  5.8<L>  /  Alb  3.1<L>  /  TBili  0.6  /  DBili  x   /  AST  66<H>  /  ALT  29  /  AlkPhos  119  07-04 12.0   14.50 )-----------( 90       ( 04 Jul 2019 13:29 )             36.3       07-04    131<L>  |  91<L>  |  52.0<H>  ----------------------------<  131<H>  3.6   |  25.0  |  5.65<H>    Ca    8.0<L>      04 Jul 2019 13:29  Phos  3.9     07-04  Mg     1.9     07-04    TPro  5.8<L>  /  Alb  3.1<L>  /  TBili  0.6  /  DBili  x   /  AST  66<H>  /  ALT  29  /  AlkPhos  119  07-04    EKG: LBBB

## 2019-07-04 NOTE — H&P ADULT - NSHPPHYSICALEXAM_GEN_ALL_CORE
Vital Signs Last 24 Hrs  T(C): 37.4 (05 Jul 2019 00:11), Max: 39.2 (04 Jul 2019 13:14)  T(F): 99.4 (05 Jul 2019 00:11), Max: 102.6 (04 Jul 2019 13:14)  HR: 59 (05 Jul 2019 06:05) (59 - 81)  BP: 100/60 (05 Jul 2019 06:05) (100/60 - 146/71)  BP(mean): --  RR: 18 (05 Jul 2019 06:05) (16 - 20)  SpO2: 96% (05 Jul 2019 06:05) (94% - 98%)    GENERAL:  Tired appearing elderly female,, not in acute distress  EYES:  Clear conjuctiva, extraocular movement intact  ENT: Moist mucous membranes  RESP:  Non-labored breathing pattern, bibasilar crackles at bases   CV: Regular rate and rhythm, no murmurs appreciated, no lower extremity edema, RUE AVF with palpable thrill   GI: Soft, non-tender, non-distended  NEURO: Awake, alert, conversant, oriented to self, location, year, non-focal, light touch sensation grossly intact  PSYCH: Calm, cooperative  SKIN: No rash or lesions, warm and dry Vital Signs Last 24 Hrs  T(C): 37.4 (05 Jul 2019 00:11), Max: 39.2 (04 Jul 2019 13:14)  T(F): 99.4 (05 Jul 2019 00:11), Max: 102.6 (04 Jul 2019 13:14)  HR: 59 (05 Jul 2019 06:05) (59 - 81)  BP: 100/60 (05 Jul 2019 06:05) (100/60 - 146/71)  BP(mean): --  RR: 18 (05 Jul 2019 06:05) (16 - 20)  SpO2: 96% (05 Jul 2019 06:05) (94% - 98%)    GENERAL:  Tired appearing elderly female,, not in acute distress  EYES:  Clear conjuctiva, extraocular movement intact  ENT: Moist mucous membranes  RESP:  Non-labored breathing pattern, bibasilar crackles at bases   CV: Regular rate and rhythm, no murmurs appreciated, no lower extremity edema, RUE AVF with palpable thrill   GI: Soft, suprapubic tenderness, non-distended  NEURO: Awake, alert, conversant, oriented to self, location, year, non-focal, light touch sensation grossly intact  PSYCH: Calm, cooperative  SKIN: No rash or lesions, warm and dry

## 2019-07-04 NOTE — ED PROVIDER NOTE - CARE PLAN
Principal Discharge DX:	Acute cystitis without hematuria  Secondary Diagnosis:	Sepsis, due to unspecified organism

## 2019-07-04 NOTE — ED PROVIDER NOTE - CLINICAL SUMMARY MEDICAL DECISION MAKING FREE TEXT BOX
Patient with multiple medical problems, on dialysis, presents from home for generalized weakness, vomiting, upper back pain (chronic) and pain in her lower legs. She presents as Code Sepsis, febrile and pulse ox 91 on room air, improved to 97 on 2L NC, lungs clear, appears dry, will give gentle fluids 1000 mL over 60 minutes and reassess volume status as patient is on dialysis and 30 cc/kg fluid bolus per protocol will cause pulmonary edema. Will evaluate for PNA vs. UTI as source of infection, will treat empirically with Vanco/Azactam, tylenol for fever, morphine for pain and reassess.

## 2019-07-04 NOTE — ED PROVIDER NOTE - OBJECTIVE STATEMENT
Patient with PMH ESRD on HD (M/F ??), DM, HTN, LBBB, PPM, PAD, hypothyroidism, CAD s/p CABG x 3 presents by ambulance for generalized weakness x 3 days, complaining of diffuse body pain all over and a non-productive cough, nausea, vomiting, back pain and abdominal pain. Her initial symptoms were vomiting and pain in her feet and symmetrical weakness in her legs. Then her chronic upper back pain worsened and she began having intermittent, mild, diffuse abdominal pain. She was last dialyzed 3 days ago. She does make some urine, but states she doesn't drink enough which is why she doesn't make much urine. She does not use oxygen at home. She cannot recall the name of her renal doctor. She is an otherwise poor historian. She notes allergy to PCN and fish.

## 2019-07-04 NOTE — H&P ADULT - ASSESSMENT
87yoF hx ESRD on HD (M and F only), CAD s/p CABG, HTN, DM, PAD, hypothyroidism presenting with generalized weakness found to have UTI and meeting sepsis criteria     #UTI complicated by sepsis- Admit to medicine, received aztreonam and vanco in ED.  Penicillin allergy noted, will continue with aztreonam for now and hold off on vanco.  1L IVF given, will not do maintenance IVF given ESRD status.  Urine and blood cultures pending.    #Elevated trop- troponin 0.16, EKG shows LBBB which is not new.  No chest pain or SOB.  Likely due to renal insufficiency or demand ischemia in setting of sepsis.  Trend cardiac enzymes and repeat EKG    #CAD/Hx CVA- ASA, plavix, statin resumed.     #HTN- coreg 12.5 mg BID w parameters, atorvastatin 40  mg, lisinopril 20 mg, clonidine 0.1 mg po q 6 hrs.    #ESRD on HD Monday and Friday-  Renal consulted.  Calcitriol 0.25 mcg and calcium acetate 667 mg 1 po TID w meals.    #Hypothyroid- Check TSH, Continue [id number] mcg    #Prophylactic measure- heparin. 87yoF hx ESRD on HD (M and F only), CAD s/p CABG, HTN, DM, PAD, hypothyroidism presenting with generalized weakness found to have UTI and meeting sepsis criteria     #UTI complicated by sepsis- Admit to medicine, received aztreonam and vanco in ED.  Penicillin allergy noted, will continue with aztreonam for now and hold off on vanco.  1L IVF given, will not do maintenance IVF given ESRD status.  Urine and blood cultures pending.    #Elevated trop- troponin 0.16, EKG shows LBBB which is not new.  No chest pain or SOB.  Likely due to renal insufficiency or demand ischemia in setting of sepsis.  Trend cardiac enzymes and repeat EKG.    #CAD/Hx CVA- ASA, plavix, statin resumed.     #HTN- coreg 12.5 mg BID w parameters, atorvastatin 40  mg, lisinopril 20 mg, clonidine 0.1 mg po q 6 hrs.    #ESRD on HD Monday and Friday-  Renal consulted.  Calcitriol 0.25 mcg and calcium acetate 667 mg 1 po TID w meals.    #Hypothyroid- Check TSH, Continue [id number] mcg.    #Prophylactic measure- heparin.

## 2019-07-04 NOTE — ED ADULT NURSE REASSESSMENT NOTE - NS ED NURSE REASSESS COMMENT FT1
pt's son and daughter in law at bedside updated on plan of care, questions asked and answered. pt to sono .

## 2019-07-05 LAB
ANION GAP SERPL CALC-SCNC: 17 MMOL/L — SIGNIFICANT CHANGE UP (ref 5–17)
BASOPHILS # BLD AUTO: 0.03 K/UL — SIGNIFICANT CHANGE UP (ref 0–0.2)
BASOPHILS NFR BLD AUTO: 0.2 % — SIGNIFICANT CHANGE UP (ref 0–2)
BUN SERPL-MCNC: 60 MG/DL — HIGH (ref 8–20)
CALCIUM SERPL-MCNC: 7.1 MG/DL — LOW (ref 8.6–10.2)
CHLORIDE SERPL-SCNC: 92 MMOL/L — LOW (ref 98–107)
CO2 SERPL-SCNC: 24 MMOL/L — SIGNIFICANT CHANGE UP (ref 22–29)
CREAT SERPL-MCNC: 5.86 MG/DL — HIGH (ref 0.5–1.3)
EOSINOPHIL # BLD AUTO: 0.01 K/UL — SIGNIFICANT CHANGE UP (ref 0–0.5)
EOSINOPHIL NFR BLD AUTO: 0.1 % — SIGNIFICANT CHANGE UP (ref 0–6)
GLUCOSE BLDC GLUCOMTR-MCNC: 106 MG/DL — HIGH (ref 70–99)
GLUCOSE BLDC GLUCOMTR-MCNC: 109 MG/DL — HIGH (ref 70–99)
GLUCOSE BLDC GLUCOMTR-MCNC: 117 MG/DL — HIGH (ref 70–99)
GLUCOSE BLDC GLUCOMTR-MCNC: 239 MG/DL — HIGH (ref 70–99)
GLUCOSE BLDC GLUCOMTR-MCNC: 82 MG/DL — SIGNIFICANT CHANGE UP (ref 70–99)
GLUCOSE SERPL-MCNC: 100 MG/DL — SIGNIFICANT CHANGE UP (ref 70–115)
HBA1C BLD-MCNC: 6.2 % — HIGH (ref 4–5.6)
HCT VFR BLD CALC: 35.4 % — SIGNIFICANT CHANGE UP (ref 34.5–45)
HGB BLD-MCNC: 11.6 G/DL — SIGNIFICANT CHANGE UP (ref 11.5–15.5)
IMM GRANULOCYTES NFR BLD AUTO: 1.2 % — SIGNIFICANT CHANGE UP (ref 0–1.5)
LYMPHOCYTES # BLD AUTO: 0.86 K/UL — LOW (ref 1–3.3)
LYMPHOCYTES # BLD AUTO: 6.7 % — LOW (ref 13–44)
MCHC RBC-ENTMCNC: 32 PG — SIGNIFICANT CHANGE UP (ref 27–34)
MCHC RBC-ENTMCNC: 32.8 GM/DL — SIGNIFICANT CHANGE UP (ref 32–36)
MCV RBC AUTO: 97.5 FL — SIGNIFICANT CHANGE UP (ref 80–100)
METHOD TYPE: SIGNIFICANT CHANGE UP
MONOCYTES # BLD AUTO: 1.46 K/UL — HIGH (ref 0–0.9)
MONOCYTES NFR BLD AUTO: 11.3 % — SIGNIFICANT CHANGE UP (ref 2–14)
MSSA DNA SPEC QL NAA+PROBE: SIGNIFICANT CHANGE UP
NEUTROPHILS # BLD AUTO: 10.35 K/UL — HIGH (ref 1.8–7.4)
NEUTROPHILS NFR BLD AUTO: 80.5 % — HIGH (ref 43–77)
PHOSPHATE SERPL-MCNC: 5.4 MG/DL — HIGH (ref 2.4–4.7)
PLATELET # BLD AUTO: 70 K/UL — LOW (ref 150–400)
POTASSIUM SERPL-MCNC: 3.7 MMOL/L — SIGNIFICANT CHANGE UP (ref 3.5–5.3)
POTASSIUM SERPL-SCNC: 3.7 MMOL/L — SIGNIFICANT CHANGE UP (ref 3.5–5.3)
RBC # BLD: 3.63 M/UL — LOW (ref 3.8–5.2)
RBC # FLD: 14.9 % — HIGH (ref 10.3–14.5)
SODIUM SERPL-SCNC: 133 MMOL/L — LOW (ref 135–145)
TROPONIN T SERPL-MCNC: 0.15 NG/ML — HIGH (ref 0–0.06)
VANCOMYCIN TROUGH SERPL-MCNC: 9.9 UG/ML — LOW (ref 10–20)
WBC # BLD: 12.87 K/UL — HIGH (ref 3.8–10.5)
WBC # FLD AUTO: 12.87 K/UL — HIGH (ref 3.8–10.5)

## 2019-07-05 PROCEDURE — 99223 1ST HOSP IP/OBS HIGH 75: CPT

## 2019-07-05 PROCEDURE — 99233 SBSQ HOSP IP/OBS HIGH 50: CPT

## 2019-07-05 RX ORDER — VANCOMYCIN HCL 1 G
1000 VIAL (EA) INTRAVENOUS ONCE
Refills: 0 | Status: COMPLETED | OUTPATIENT
Start: 2019-07-05 | End: 2019-07-05

## 2019-07-05 RX ADMIN — Medication 650 MILLIGRAM(S): at 22:07

## 2019-07-05 RX ADMIN — HEPARIN SODIUM 5000 UNIT(S): 5000 INJECTION INTRAVENOUS; SUBCUTANEOUS at 00:08

## 2019-07-05 RX ADMIN — HEPARIN SODIUM 5000 UNIT(S): 5000 INJECTION INTRAVENOUS; SUBCUTANEOUS at 06:07

## 2019-07-05 RX ADMIN — LISINOPRIL 20 MILLIGRAM(S): 2.5 TABLET ORAL at 06:07

## 2019-07-05 RX ADMIN — Medication 50 MILLIGRAM(S): at 00:07

## 2019-07-05 RX ADMIN — Medication 667 MILLIGRAM(S): at 17:18

## 2019-07-05 RX ADMIN — CARVEDILOL PHOSPHATE 12.5 MILLIGRAM(S): 80 CAPSULE, EXTENDED RELEASE ORAL at 00:09

## 2019-07-05 RX ADMIN — ATORVASTATIN CALCIUM 40 MILLIGRAM(S): 80 TABLET, FILM COATED ORAL at 00:07

## 2019-07-05 RX ADMIN — Medication 81 MILLIGRAM(S): at 17:18

## 2019-07-05 RX ADMIN — Medication 50 MILLIGRAM(S): at 17:15

## 2019-07-05 RX ADMIN — ATORVASTATIN CALCIUM 40 MILLIGRAM(S): 80 TABLET, FILM COATED ORAL at 21:36

## 2019-07-05 RX ADMIN — Medication 650 MILLIGRAM(S): at 08:26

## 2019-07-05 RX ADMIN — Medication 650 MILLIGRAM(S): at 02:41

## 2019-07-05 RX ADMIN — HEPARIN SODIUM 5000 UNIT(S): 5000 INJECTION INTRAVENOUS; SUBCUTANEOUS at 21:36

## 2019-07-05 RX ADMIN — CALCITRIOL 0.25 MICROGRAM(S): 0.5 CAPSULE ORAL at 17:18

## 2019-07-05 RX ADMIN — Medication 112 MICROGRAM(S): at 06:07

## 2019-07-05 RX ADMIN — HEPARIN SODIUM 5000 UNIT(S): 5000 INJECTION INTRAVENOUS; SUBCUTANEOUS at 17:20

## 2019-07-05 RX ADMIN — Medication 667 MILLIGRAM(S): at 08:26

## 2019-07-05 RX ADMIN — Medication 250 MILLIGRAM(S): at 21:37

## 2019-07-05 RX ADMIN — Medication 1 MILLIGRAM(S): at 17:19

## 2019-07-05 RX ADMIN — CLOPIDOGREL BISULFATE 75 MILLIGRAM(S): 75 TABLET, FILM COATED ORAL at 17:18

## 2019-07-05 RX ADMIN — Medication 650 MILLIGRAM(S): at 21:37

## 2019-07-05 RX ADMIN — Medication 650 MILLIGRAM(S): at 02:11

## 2019-07-05 RX ADMIN — Medication 650 MILLIGRAM(S): at 11:58

## 2019-07-05 RX ADMIN — PANTOPRAZOLE SODIUM 40 MILLIGRAM(S): 20 TABLET, DELAYED RELEASE ORAL at 06:07

## 2019-07-05 RX ADMIN — CARVEDILOL PHOSPHATE 12.5 MILLIGRAM(S): 80 CAPSULE, EXTENDED RELEASE ORAL at 17:19

## 2019-07-05 RX ADMIN — Medication 50 MILLIGRAM(S): at 06:07

## 2019-07-05 RX ADMIN — Medication 50 MILLIGRAM(S): at 22:54

## 2019-07-05 NOTE — PROGRESS NOTE ADULT - SUBJECTIVE AND OBJECTIVE BOX
Patient: LAUREN ROBERSON [id number] 87y Female                           Internal Medicine Hospitalist Progress Note    Initial HPI:  87yoF hx ESRD on HD (M and F only), CAD s/p CABG, HTN, DM, PAD, hypothyroidism presenting with generalized weakness.  On admission, febrile, leukocytosis, UA positive, was CT abd/pelvis showed haziness around the gallbladder however follow up abd US was negative for cholelithiasis and Stewart's sign and no LFT elevation on labs.  Blood cultures positive for gram positive cocci.      Interval History:  Seen today with  device.  Pt offers no specific complaints.     ____________________PHYSICAL EXAM:  Vitals reviewed as indicated below  GENERAL:  NAD.  Alert, confused.   HEENT: NCAT  CARDIOVASCULAR:  S1, S2  LUNGS: CTAB  ABDOMEN:  soft, (-) tenderness, (-) distension, (+) bowel sounds, (-) guarding, (-) rebound (-) rigidity  EXTREMITIES:  no cyanosis / clubbing / edema.   NEURO: strength symmetric.   ____________________      BACKGROUND:  HEALTH ISSUES - PROBLEM Dx:        Allergies    penicillin (Anaphylaxis)  seafood (Anaphylaxis)  shellfish (Anaphylaxis)    Intolerances      PAST MEDICAL & SURGICAL HISTORY:  Left bundle branch block  Osteoporosis  Diabetic neuropathy  Peripheral arterial disease  Spinal stenosis  Coronary artery disease  Chronic renal failure  Pacemaker: Medtronic   Hypothyroid  Hyperlipemia  Hypertension  Diabetes  S/P dialysis catheter insertion: R. arm  S/P CABG x 3  Artificial cardiac pacemaker        VITALS:  Vital Signs Last 24 Hrs  T(C): 36.4 (2019 08:57), Max: 39.2 (2019 13:14)  T(F): 97.5 (2019 08:57), Max: 102.6 (2019 13:14)  HR: 62 (2019 08:57) (59 - 81)  BP: 118/74 (2019 08:57) (100/60 - 146/71)  BP(mean): --  RR: 18 (2019 08:57) (16 - 20)  SpO2: 98% (2019 08:57) (94% - 98%) Daily     Daily   CAPILLARY BLOOD GLUCOSE      POCT Blood Glucose.: 106 mg/dL (2019 08:14)  POCT Blood Glucose.: 82 mg/dL (2019 00:04)    I&O's Summary    2019 07:01  -  2019 07:00  --------------------------------------------------------  IN: 1300 mL / OUT: 0 mL / NET: 1300 mL          LABS:                        11.6   12.87 )-----------( x        ( 2019 08:20 )             35.4       Culture - Urine (collected 2019 14:35)  Source: .Urine  Preliminary Report (2019 09:21):    >100,000 CFU/ml Gram Negative Rods Identification and susceptibility to    follow.    Culture in progress    Culture - Blood (collected 2019 13:32)  Source: .Blood  Preliminary Report (2019 09:15):    Growth in aerobic and anaerobic bottles: Gram Positive Cocci in Clusters    Aerobic Bottle: 9.51 Hours to positivity    Anaerobic Bottle: 10.01 Hours to positivity    .    ***Blood Panel PCR results on this specimen are available    approximately 3 hours after the Gram stain result.***    Gram stain, PCR, and/or culture results may not always    correspond due to difference in methodologies.    ************************************************************    This PCR assay was performed using [employer].    The following targets are tested for: Enterococcus,    vancomycin resistant enterococci, Listeria monocytogenes,    coagulase negative staphylococci, S. aureus,    methicillin resistant S. aureus, Streptococcus agalactiae    (Group B), S. pneumoniae, S. pyogenes (Group A),    Acinetobacter baumannii, Enterobacter cloacae, E. coli,    Klebsiella oxytoca, K. pneumoniae, Proteus sp.,    Serratia marcescens, Haemophilus influenzae,    Neisseria meningitidis, Pseudomonas aeruginosa, Candida    albicans, C. glabrata, C krusei, C parapsilosis,    C. tropicalis and the KPC resistance gene.    "Due to technical problems, Proteus sp. will Not be reported as part of    the BCID panel until further notice"    .    TYPE: (C=Critical, N=Notification, A=Abnormal) C    TESTS:  _ GS    DATE/TIME CALLED: _ 2019 09:14:52    CALLED TO: Kellee Mcpherson RN    READ BACK (2 Patient Identifiers)(Y/N): _ Y    READ BACK VALUES (Y/N): _ Y    CALLED BY: Kellee Field  Organism: Blood Culture PCR (2019 09:16)  Organism: Blood Culture PCR (2019 09:16)    Culture - Blood (collected 2019 13:31)  Source: .Blood  Preliminary Report (2019 09:19):    Growth in aerobic and anaerobic bottles: Gram Positive Cocci in Clusters    Aerobic Bottle: 8.51 Hours to positivity    Anaerobic Bottle: 9.41 Hours to positivity    .    TYPE: (C=Critical, N=Notification, A=Abnormal) C    TESTS:  _ GS    DATE/TIME CALLED: _ 2019 09:17:54    CALLED TO: Kellee Mcpherson RN    READ BACK (2 Patient Identifiers)(Y/N): _ Y    READ BACK VALUES (Y/N): _ Y    CALLED BY: Kellee Field        133<L>  |  92<L>  |  60.0<H>  ----------------------------<  100  3.7   |  24.0  |  5.86<H>    Ca    7.1<L>      2019 08:20  Phos  5.4     07-05  Mg     1.9     07-    TPro  5.8<L>  /  Alb  3.1<L>  /  TBili  0.6  /  DBili  x   /  AST  66<H>  /  ALT  29  /  AlkPhos  119  07-04    PT/INR - ( 2019 13:29 )   PT: 15.5 sec;   INR: 1.34 ratio         PTT - ( 2019 13:29 )  PTT:32.1 sec  LIVER FUNCTIONS - ( 2019 13:29 )  Alb: 3.1 g/dL / Pro: 5.8 g/dL / ALK PHOS: 119 U/L / ALT: 29 U/L / AST: 66 U/L / GGT: x           Urinalysis Basic - ( 2019 14:34 )    Color: Yellow / Appearance: Clear / S.020 / pH: x  Gluc: x / Ketone: Trace  / Bili: Negative / Urobili: Negative mg/dL   Blood: x / Protein: 500 mg/dL / Nitrite: Negative   Leuk Esterase: Moderate / RBC: 0-2 /HPF / WBC 11-25   Sq Epi: x / Non Sq Epi: Negative / Bacteria: Many      CARDIAC MARKERS ( 2019 08:20 )  x     / 0.15 ng/mL / x     / x     / x      CARDIAC MARKERS ( 2019 13:29 )  x     / 0.16 ng/mL / x     / x     / x          Culture - Urine (collected 2019 14:35)  Source: .Urine  Preliminary Report (2019 09:21):    >100,000 CFU/ml Gram Negative Rods Identification and susceptibility to    follow.    Culture in progress    Culture - Blood (collected 2019 13:32)  Source: .Blood  Preliminary Report (2019 09:15):    Growth in aerobic and anaerobic bottles: Gram Positive Cocci in Clusters    Aerobic Bottle: 9.51 Hours to positivity    Anaerobic Bottle: 10.01 Hours to positivity    .    ***Blood Panel PCR results on this specimen are available    approximately 3 hours after the Gram stain result.***    Gram stain, PCR, and/or culture results may not always    correspond due to difference in methodologies.    ************************************************************    This PCR assay was performed using [employer].    The following targets are tested for: Enterococcus,    vancomycin resistant enterococci, Listeria monocytogenes,    coagulase negative staphylococci, S. aureus,    methicillin resistant S. aureus, Streptococcus agalactiae    (Group B), S. pneumoniae, S. pyogenes (Group A),    Acinetobacter baumannii, Enterobacter cloacae, E. coli,    Klebsiella oxytoca, K. pneumoniae, Proteus sp.,    Serratia marcescens, Haemophilus influenzae,    Neisseria meningitidis, Pseudomonas aeruginosa, Candida    albicans, C. glabrata, C krusei, C parapsilosis,    C. tropicalis and the KPC resistance gene.    "Due to technical problems, Proteus sp. will Not be reported as part of    the BCID panel until further notice"    .    TYPE: (C=Critical, N=Notification, A=Abnormal) C    TESTS:  _ GS    DATE/TIME CALLED: _ 2019 09:14:52    CALLED TO: Kellee Mcpherson RN    READ BACK (2 Patient Identifiers)(Y/N): _ Y    READ BACK VALUES (Y/N): _ Y    CALLED BY: Kellee Field  Organism: Blood Culture PCR (2019 09:16)  Organism: Blood Culture PCR (2019 09:16)    Culture - Blood (collected 2019 13:31)  Source: .Blood  Preliminary Report (2019 09:19):    Growth in aerobic and anaerobic bottles: Gram Positive Cocci in Clusters    Aerobic Bottle: 8.51 Hours to positivity    Anaerobic Bottle: 9.41 Hours to positivity    .    TYPE: (C=Critical, N=Notification, A=Abnormal) C    TESTS:  _ GS    DATE/TIME CALLED: _ 2019 09:17:54    CALLED TO: Kellee Mcpherson RN    READ BACK (2 Patient Identifiers)(Y/N): _ Y    READ BACK VALUES (Y/N): _ Y    CALLED BY: Kellee Field          MEDICATIONS:  MEDICATIONS  (STANDING):  aspirin  chewable 81 milliGRAM(s) Oral daily  atorvastatin 40 milliGRAM(s) Oral at bedtime  aztreonam  IVPB 250 milliGRAM(s) IV Intermittent every 8 hours  calcitriol   Capsule 0.25 MICROGram(s) Oral daily  calcium acetate 667 milliGRAM(s) Oral three times a day with meals  carvedilol 12.5 milliGRAM(s) Oral every 12 hours  clopidogrel Tablet 75 milliGRAM(s) Oral daily  dextrose 5%. 1000 milliLiter(s) (50 mL/Hr) IV Continuous <Continuous>  dextrose 50% Injectable 12.5 Gram(s) IV Push once  dextrose 50% Injectable 25 Gram(s) IV Push once  dextrose 50% Injectable 25 Gram(s) IV Push once  folic acid 1 milliGRAM(s) Oral daily  heparin  Injectable 5000 Unit(s) SubCutaneous every 8 hours  insulin lispro (HumaLOG) corrective regimen sliding scale   SubCutaneous three times a day before meals  insulin lispro (HumaLOG) corrective regimen sliding scale   SubCutaneous at bedtime  levothyroxine 112 MICROGram(s) Oral daily  lisinopril 20 milliGRAM(s) Oral daily  pantoprazole    Tablet 40 milliGRAM(s) Oral before breakfast    MEDICATIONS  (PRN):  acetaminophen   Tablet .. 650 milliGRAM(s) Oral every 6 hours PRN Temp greater or equal to 38C (100.4F), Mild Pain (1 - 3)  dextrose 40% Gel 15 Gram(s) Oral once PRN Blood Glucose LESS THAN 70 milliGRAM(s)/deciliter  glucagon  Injectable 1 milliGRAM(s) IntraMuscular once PRN Glucose LESS THAN 70 milligrams/deciliter

## 2019-07-05 NOTE — PROGRESS NOTE ADULT - ASSESSMENT
87yoF hx ESRD on HD (M and F only), CAD s/p CABG, HTN, DM, PAD, hypothyroidism presenting with generalized weakness found to have UTI and meeting sepsis criteria     #Sepsis / UTI / Gram positive bacteremia - UCx suggesting gram negative rods.  continue Aztreonam, s/p Vanco.  Repeat blood cultures requested.  ID evaluated requested.      #Elevated trop- troponin 0.16, EKG shows LBBB which is not new.  Elevated troponin is stable, and in setting of ESRD is not clinically suggestive of acute cardiac issue.  Pt symptomatic.      #CAD/Hx CVA- ASA, plavix, statin resumed.     #HTN- coreg 12.5 mg BID w parameters, atorvastatin 40  mg, lisinopril 20 mg, clonidine 0.1 mg po q 6 hrs.    #ESRD on HD Monday and Friday-  Renal consulted.  Calcitriol 0.25 mcg and calcium acetate 667 mg 1 po TID w meals.    #Hypothyroid- Check TSH, Continue [id number] mcg.    #Prophylactic measure- heparin.

## 2019-07-05 NOTE — CONSULT NOTE ADULT - SUBJECTIVE AND OBJECTIVE BOX
Patient is a 87y old  Female who presents with a chief complaint of weakness, sepsis 2/2 UTI (2019 09:40)      HPI:  87yoF hx ESRD on HD (M and F only), CAD s/p CABG, HTN, DM, PAD, hypothyroidism presenting with generalized weakness.  Pt is poor historian despite use of   She reports generalized weakness for past few days associated with generalized, abdominal pain that she is unable to describe associated with nausea, some episodes of vomiting.  Reports difficulty with urination and ?dysuria but believes her urinary symptoms are due to not drinking enough fluids recently. Unable to explain why she is only on HD twice per week, but says last HD session was on .  Denies fevers, chest pain, dyspnea, endorses chronic pain in face and all extremities which is chronic for her.  On admission, febrile, leukocytosis, UA positive, was pan scanned by ED and CT abd/pelvis showed haziness around the gallbladder however follow up abd US was negative for cholelithiasis and Stewart's sign and no LFT elevation on labs. (2019 22:54)  Above noted   She gets HD [facility] in [place] , due today   Hemodynamics stable   + blood cultures and urine culture  Received Vanco x 1 on  , + Azactam , ID to see     PAST MEDICAL & SURGICAL HISTORY:  Left bundle branch block  Osteoporosis  Diabetic neuropathy  Peripheral arterial disease  Spinal stenosis  Coronary artery disease  Chronic renal failure  Pacemaker: Medtronic   Hypothyroid  Hyperlipemia  Hypertension  Diabetes  S/P dialysis catheter insertion: R. arm  S/P CABG x 3  Artificial cardiac pacemaker      FAMILY HISTORY:  No pertinent family history in first degree relatives      Social History:    MEDICATIONS  (STANDING):  aspirin  chewable 81 milliGRAM(s) Oral daily  atorvastatin 40 milliGRAM(s) Oral at bedtime  aztreonam  IVPB 250 milliGRAM(s) IV Intermittent every 8 hours  calcitriol   Capsule 0.25 MICROGram(s) Oral daily  calcium acetate 667 milliGRAM(s) Oral three times a day with meals  carvedilol 12.5 milliGRAM(s) Oral every 12 hours  clopidogrel Tablet 75 milliGRAM(s) Oral daily  dextrose 5%. 1000 milliLiter(s) (50 mL/Hr) IV Continuous <Continuous>  dextrose 50% Injectable 12.5 Gram(s) IV Push once  dextrose 50% Injectable 25 Gram(s) IV Push once  dextrose 50% Injectable 25 Gram(s) IV Push once  folic acid 1 milliGRAM(s) Oral daily  heparin  Injectable 5000 Unit(s) SubCutaneous every 8 hours  insulin lispro (HumaLOG) corrective regimen sliding scale   SubCutaneous three times a day before meals  insulin lispro (HumaLOG) corrective regimen sliding scale   SubCutaneous at bedtime  levothyroxine 112 MICROGram(s) Oral daily  lisinopril 20 milliGRAM(s) Oral daily  pantoprazole    Tablet 40 milliGRAM(s) Oral before breakfast    MEDICATIONS  (PRN):  acetaminophen   Tablet .. 650 milliGRAM(s) Oral every 6 hours PRN Temp greater or equal to 38C (100.4F), Mild Pain (1 - 3)  dextrose 40% Gel 15 Gram(s) Oral once PRN Blood Glucose LESS THAN 70 milliGRAM(s)/deciliter  glucagon  Injectable 1 milliGRAM(s) IntraMuscular once PRN Glucose LESS THAN 70 milligrams/deciliter      Allergies    penicillin (Anaphylaxis)  seafood (Anaphylaxis)  shellfish (Anaphylaxis)    Intolerances    Vital Signs Last 24 Hrs  T(C): 36.4 (2019 08:57), Max: 39.2 (2019 13:14)  T(F): 97.5 (2019 08:57), Max: 102.6 (2019 13:14)  HR: 62 (2019 08:57) (59 - 81)  BP: 118/74 (2019 08:57) (100/60 - 146/71)  BP(mean): --  RR: 18 (2019 08:57) (16 - 20)  SpO2: 98% (2019 08:57) (94% - 98%)  Daily     Daily   I&O's Detail    2019 07:  -  2019 07:00  --------------------------------------------------------  IN:    IV PiggyBack: 300 mL    Sodium Chloride 0.9% IV Bolus: 1000 mL  Total IN: 1300 mL    OUT:  Total OUT: 0 mL    Total NET: 1300 mL        I&O's Summary    2019 07:01  -  2019 07:00  --------------------------------------------------------  IN: 1300 mL / OUT: 0 mL / NET: 1300 mL        PHYSICAL EXAM:    PHYSICAL EXAM:  GENERAL: NAD , Comfortable flat   HEAD:  Atraumatic, Normocephalic  NECK: Supple, neck  veins full  CHEST/LUNG: EAE , Bibasilar crackles ; No wheeze   HEART: Regular rate and rhythm; No rub   ABDOMEN: Soft, Nontender, Nondistended; Bowel sounds present  EXTREMITIES:  + edema , access with thrill       LABS:                        11.6   12.87 )-----------( 70       ( 2019 08:20 )             35.4     07-05    133<L>  |  92<L>  |  60.0<H>  ----------------------------<  100  3.7   |  24.0  |  5.86<H>    Ca    7.1<L>      2019 08:20  Phos  5.4     07-05  Mg     1.9     07-    TPro  5.8<L>  /  Alb  3.1<L>  /  TBili  0.6  /  DBili  x   /  AST  66<H>  /  ALT  29  /  AlkPhos  119  07-04    PT/INR - ( 2019 13:29 )   PT: 15.5 sec;   INR: 1.34 ratio         PTT - ( 2019 13:29 )  PTT:32.1 sec  Urinalysis Basic - ( 2019 14:34 )    Color: Yellow / Appearance: Clear / S.020 / pH: x  Gluc: x / Ketone: Trace  / Bili: Negative / Urobili: Negative mg/dL   Blood: x / Protein: 500 mg/dL / Nitrite: Negative   Leuk Esterase: Moderate / RBC: 0-2 /HPF / WBC 11-25   Sq Epi: x / Non Sq Epi: Negative / Bacteria: Many      Phosphorus Level, Serum: 5.4 mg/dL ( @ 08:20)  Magnesium, Serum: 1.9 mg/dL ( @ 13:29)  Phosphorus Level, Serum: 3.9 mg/dL ( @ 13:29)          RADIOLOGY & ADDITIONAL TESTS:

## 2019-07-05 NOTE — CONSULT NOTE ADULT - SUBJECTIVE AND OBJECTIVE BOX
[facility]  INFECTIOUS DISEASES AND INTERNAL MEDICINE [place]  =======================================================  Paresh Stark MD  Diplomates American Board of Internal Medicine and Infectious Diseases  Tel: [phone]      Fax: [phone]  =======================================================      [id number]  LAUREN ROBERSON is a 87y  Female     CC: Patient is a 87y old  Female who presents with a chief complaint of weakness, sepsis 2/2 UTI (2019 10:41)    HPI:  87yoF hx ESRD on HD (M and F only), CAD s/p CABG, HTN, DM, PAD, hypothyroidism presenting with generalized weakness.  Pt is poor historian despite use of   She reports generalized weakness for past few days associated with generalized, abdominal pain that she is unable to describe associated with nausea, some episodes of vomiting.  Reports difficulty with urination and ?dysuria but believes her urinary symptoms are due to not drinking enough fluids recently. Unable to explain why she is only on HD twice per week, but says last HD session was on .  Denies fevers, chest pain, dyspnea, endorses chronic pain in face and all extremities which is chronic for her.  On admission, febrile, leukocytosis, UA positive, was pan scanned by ED and CT abd/pelvis showed haziness around the gallbladder however follow up abd US was negative for cholelithiasis and Stewart's sign and no LFT elevation on labs. (2019 22:54)    ID note-above reviewed. Thai interpeter used  Patient says she arrived because she is very tired and had pain all over  Says ssmarisa is on HD twice weekly for about 4 months via AVG. Unclear if she had a catheter prior to this. Dr De Leon renal doc  Has noticed for about 1 month pain in her AVG  Also still makes urine but less than before-noticed pain and uncomfortable feeling while passing urine  No other skin infections, diarrhea, cough, dental work  Has a PPM but no other metal in her body  Does not remember reaction ot penicillin    PAST MEDICAL & SURGICAL HISTORY:  Left bundle branch block  Osteoporosis  Diabetic neuropathy  Peripheral arterial disease  Spinal stenosis  Coronary artery disease  Chronic renal failure  Pacemaker: Medtronic   Hypothyroid  Hyperlipemia  Hypertension  Diabetes  S/P dialysis catheter insertion: R. arm  S/P CABG x 3  Artificial cardiac pacemaker      Social Hx: lives alone, denies smoking etoh or drug use    FAMILY HISTORY:  No pertinent family history in first degree relatives      Allergies    penicillin (Anaphylaxis)  seafood (Anaphylaxis)  shellfish (Anaphylaxis)    Intolerances        MEDICATIONS  (STANDING):  aspirin  chewable 81 milliGRAM(s) Oral daily  atorvastatin 40 milliGRAM(s) Oral at bedtime  aztreonam  IVPB 250 milliGRAM(s) IV Intermittent every 8 hours  calcitriol   Capsule 0.25 MICROGram(s) Oral daily  calcium acetate 667 milliGRAM(s) Oral three times a day with meals  carvedilol 12.5 milliGRAM(s) Oral every 12 hours  clopidogrel Tablet 75 milliGRAM(s) Oral daily  dextrose 5%. 1000 milliLiter(s) (50 mL/Hr) IV Continuous <Continuous>  dextrose 50% Injectable 12.5 Gram(s) IV Push once  dextrose 50% Injectable 25 Gram(s) IV Push once  dextrose 50% Injectable 25 Gram(s) IV Push once  folic acid 1 milliGRAM(s) Oral daily  heparin  Injectable 5000 Unit(s) SubCutaneous every 8 hours  insulin lispro (HumaLOG) corrective regimen sliding scale   SubCutaneous three times a day before meals  insulin lispro (HumaLOG) corrective regimen sliding scale   SubCutaneous at bedtime  levothyroxine 112 MICROGram(s) Oral daily  lisinopril 20 milliGRAM(s) Oral daily  pantoprazole    Tablet 40 milliGRAM(s) Oral before breakfast    MEDICATIONS  (PRN):  acetaminophen   Tablet .. 650 milliGRAM(s) Oral every 6 hours PRN Temp greater or equal to 38C (100.4F), Mild Pain (1 - 3)  dextrose 40% Gel 15 Gram(s) Oral once PRN Blood Glucose LESS THAN 70 milliGRAM(s)/deciliter  glucagon  Injectable 1 milliGRAM(s) IntraMuscular once PRN Glucose LESS THAN 70 milligrams/deciliter      ANTIMICROBIALS:  aztreonam  IVPB 250 every 8 hours      OTHER MEDS: MEDICATIONS  (STANDING):  acetaminophen   Tablet .. 650 every 6 hours PRN  aspirin  chewable 81 daily  atorvastatin 40 at bedtime  carvedilol 12.5 every 12 hours  clopidogrel Tablet 75 daily  dextrose 40% Gel 15 once PRN  dextrose 50% Injectable 12.5 once  dextrose 50% Injectable 25 once  dextrose 50% Injectable 25 once  glucagon  Injectable 1 once PRN  heparin  Injectable 5000 every 8 hours  insulin lispro (HumaLOG) corrective regimen sliding scale  three times a day before meals  insulin lispro (HumaLOG) corrective regimen sliding scale  at bedtime  levothyroxine 112 daily  lisinopril 20 daily  pantoprazole    Tablet 40 before breakfast             REVIEW OF SYSTEMS:  CONSTITUTIONAL:  No Fever or chills +weakness, generalized pain  HEENT:  No diplopia or blurred vision.  No earache, sore throat or runny nose.  CARDIOVASCULAR:  No pressure, squeezing, strangling, tightness, heaviness or aching about the chest, neck, axilla or epigastrium.  RESPIRATORY:  No cough, shortness of breath  GASTROINTESTINAL:  No nausea, vomiting or diarrhea.  GENITOURINARY:  No dysuria, frequency or urgency. No Blood in urine  MUSCULOSKELETAL:  no joint aches, no muscle pain  SKIN:  No change in skin, hair or nails.  NEUROLOGIC:  No Headaches, seizures or weakness.  PSYCHIATRIC:  No disorder of thought or mood.  ENDOCRINE:  No heat or cold intolerance  HEMATOLOGICAL:  No easy bruising or bleeding.           I&O's Detail    2019 07:01  -  2019 07:00  --------------------------------------------------------  IN:    IV PiggyBack: 300 mL    Sodium Chloride 0.9% IV Bolus: 1000 mL  Total IN: 1300 mL    OUT:  Total OUT: 0 mL    Total NET: 1300 mL            Physical Exam:  Vital Signs Last 24 Hrs  T(C): 36.4 (2019 12:22), Max: 37.4 (2019 00:11)  T(F): 97.6 (2019 12:22), Max: 99.4 (2019 00:11)  HR: 84 (2019 12:22) (59 - 84)  BP: 117/53 (2019 12:22) (100/60 - 146/71)  BP(mean): --  RR: 18 (2019 12:22) (18 - 20)  SpO2: 95% (2019 12:22) (94% - 98%)    GEN: NAD, pleasant axox3  HEENT: normocephalic and atraumatic. EOMI. PERRL.  Anicteric  NECK: Supple.   LUNGS: Clear to auscultation.  HEART: Regular rate and rhythm without murmur. left chest ppm in place  ABDOMEN: Soft, nontender, and nondistended.  Positive bowel sounds.    : No CVA tenderness  EXTREMITIES: Without any edema.  MSK: No joint swelling  NEUROLOGIC: Cranial nerves II through XII are grossly intact. No Focal Deficits  PSYCHIATRIC: Appropriate affect .  SKIN: right upper arm AVG undergoing HD        Labs:      133<L>  |  92<L>  |  60.0<H>  ----------------------------<  100  3.7   |  24.0  |  5.86<H>    Ca    7.1<L>      2019 08:20  Phos  5.4     07-05  Mg     1.9     07-04    TPro  5.8<L>  /  Alb  3.1<L>  /  TBili  0.6  /  DBili  x   /  AST  66<H>  /  ALT  29  /  AlkPhos  119  07-04                          11.6   12.87 )-----------( 70       ( 2019 08:20 )             35.4       PT/INR - ( 2019 13:29 )   PT: 15.5 sec;   INR: 1.34 ratio         PTT - ( 2019 13:29 )  PTT:32.1 sec  Urinalysis Basic - ( 2019 14:34 )    Color: Yellow / Appearance: Clear / S.020 / pH: x  Gluc: x / Ketone: Trace  / Bili: Negative / Urobili: Negative mg/dL   Blood: x / Protein: 500 mg/dL / Nitrite: Negative   Leuk Esterase: Moderate / RBC: 0-2 /HPF / WBC 11-25   Sq Epi: x / Non Sq Epi: Negative / Bacteria: Many      LIVER FUNCTIONS - ( 2019 13:29 )  Alb: 3.1 g/dL / Pro: 5.8 g/dL / ALK PHOS: 119 U/L / ALT: 29 U/L / AST: 66 U/L / GGT: x           CARDIAC MARKERS ( 2019 08:20 )  x     / 0.15 ng/mL / x     / x     / x      CARDIAC MARKERS ( 2019 13:29 )  x     / 0.16 ng/mL / x     / x     / x          CAPILLARY BLOOD GLUCOSE      POCT Blood Glucose.: 117 mg/dL (2019 12:34)  POCT Blood Glucose.: 106 mg/dL (2019 08:14)  POCT Blood Glucose.: 82 mg/dL (2019 00:04)        RECENT CULTURES:   @ 14:35 .Urine     >100,000 CFU/ml Gram Negative Rods Identification and susceptibility to  follow.  Culture in progress         @ 13:32 .Blood Blood Culture PCR    Growth in aerobic and anaerobic bottles: Gram Positive Cocci in Clusters  Aerobic Bottle: 9.51 Hours to positivity  Anaerobic Bottle: 10.01 Hours to positivity  .  ***Blood Panel PCR results on this specimen are available  approximately 3 hours after the Gram stain result.***  Gram stain, PCR, and/or culture results may not always  correspond due to difference in methodologies.  ************************************************************  This PCR assay was performed using [employer].  The following targets are tested for: Enterococcus,  vancomycin resistant enterococci, Listeria monocytogenes,  coagulase negative staphylococci, S. aureus,  methicillin resistant S. aureus, Streptococcus agalactiae  (Group B), S. pneumoniae, S. pyogenes (Group A),  Acinetobacter baumannii, Enterobacter cloacae, E. coli,  Klebsiella oxytoca, K. pneumoniae, Proteus sp.,  Serratia marcescens, Haemophilus influenzae,  Neisseria meningitidis, Pseudomonas aeruginosa, Candida  albicans, C. glabrata, C krusei, C parapsilosis,  C. tropicalis and the KPC resistance gene.  "Due to technical problems, Proteus sp. will Not be reported as part of  the BCID panel until further notice"  .  TYPE: (C=Critical, N=Notification, A=Abnormal) C  TESTS:  _ GS  DATE/TIME CALLED: _ 2019 09:14:52  CALLED TO: Kellee Mcpherson RN  READ BACK (2 Patient Identifiers)(Y/N): _ Y  READ BACK VALUES (Y/N): _ Y  CALLED BY: Kellee Field         @ 13:31 .Blood     Growth in aerobic and anaerobic bottles: Gram Positive Cocci in Clusters  Aerobic Bottle: 8.51 Hours to positivity  Anaerobic Bottle: 9.41 Hours to positivity  .  TYPE: (C=Critical, N=Notification, A=Abnormal) C  TESTS:  _ GS  DATE/TIME CALLED: _ 2019 09:17:54  CALLED TO: Kellee Mcpherson RN  READ BACK (2 Patient Identifiers)(Y/N): _ Y  READ BACK VALUES (Y/N): _ Y  CALLED BY: Kellee Field              Radiology: < from: US Gallbladder (19 @ 18:31) >  IMPRESSION:     No cholelithiasis or biliary ductal dilatation.    < end of copied text >    < from: CT Thoracic Spine Reform No Cont (19 @ 16:06) >  IMPRESSION:     No focal consolidation.    Haziness of the gallbladder wall. Correlate for cholecystitis.     THORACOLUMBAR SPINE: No acute fracture, subluxation, or aggressive   process.    < end of copied text >

## 2019-07-06 LAB
-  AMIKACIN: SIGNIFICANT CHANGE UP
-  AMPICILLIN/SULBACTAM: SIGNIFICANT CHANGE UP
-  AMPICILLIN: SIGNIFICANT CHANGE UP
-  AZTREONAM: SIGNIFICANT CHANGE UP
-  CEFAZOLIN: SIGNIFICANT CHANGE UP
-  CEFEPIME: SIGNIFICANT CHANGE UP
-  CEFOXITIN: SIGNIFICANT CHANGE UP
-  CEFTRIAXONE: SIGNIFICANT CHANGE UP
-  CIPROFLOXACIN: SIGNIFICANT CHANGE UP
-  CLINDAMYCIN: SIGNIFICANT CHANGE UP
-  CLINDAMYCIN: SIGNIFICANT CHANGE UP
-  ERTAPENEM: SIGNIFICANT CHANGE UP
-  ERYTHROMYCIN: SIGNIFICANT CHANGE UP
-  ERYTHROMYCIN: SIGNIFICANT CHANGE UP
-  GENTAMICIN: SIGNIFICANT CHANGE UP
-  IMIPENEM: SIGNIFICANT CHANGE UP
-  LEVOFLOXACIN: SIGNIFICANT CHANGE UP
-  MEROPENEM: SIGNIFICANT CHANGE UP
-  NITROFURANTOIN: SIGNIFICANT CHANGE UP
-  OXACILLIN: SIGNIFICANT CHANGE UP
-  OXACILLIN: SIGNIFICANT CHANGE UP
-  PENICILLIN: SIGNIFICANT CHANGE UP
-  PENICILLIN: SIGNIFICANT CHANGE UP
-  PIPERACILLIN/TAZOBACTAM: SIGNIFICANT CHANGE UP
-  RIFAMPIN: SIGNIFICANT CHANGE UP
-  RIFAMPIN: SIGNIFICANT CHANGE UP
-  TETRACYCLINE: SIGNIFICANT CHANGE UP
-  TETRACYCLINE: SIGNIFICANT CHANGE UP
-  TIGECYCLINE: SIGNIFICANT CHANGE UP
-  TOBRAMYCIN: SIGNIFICANT CHANGE UP
-  TRIMETHOPRIM/SULFAMETHOXAZOLE: SIGNIFICANT CHANGE UP
-  VANCOMYCIN: SIGNIFICANT CHANGE UP
-  VANCOMYCIN: SIGNIFICANT CHANGE UP
ANION GAP SERPL CALC-SCNC: 16 MMOL/L — SIGNIFICANT CHANGE UP (ref 5–17)
ANISOCYTOSIS BLD QL: SLIGHT — SIGNIFICANT CHANGE UP
BUN SERPL-MCNC: 32 MG/DL — HIGH (ref 8–20)
CALCIUM SERPL-MCNC: 7.3 MG/DL — LOW (ref 8.6–10.2)
CHLORIDE SERPL-SCNC: 93 MMOL/L — LOW (ref 98–107)
CO2 SERPL-SCNC: 25 MMOL/L — SIGNIFICANT CHANGE UP (ref 22–29)
CREAT SERPL-MCNC: 3.62 MG/DL — HIGH (ref 0.5–1.3)
CULTURE RESULTS: SIGNIFICANT CHANGE UP
ELLIPTOCYTES BLD QL SMEAR: SLIGHT — SIGNIFICANT CHANGE UP
GIANT PLATELETS BLD QL SMEAR: PRESENT — SIGNIFICANT CHANGE UP
GLUCOSE BLDC GLUCOMTR-MCNC: 132 MG/DL — HIGH (ref 70–99)
GLUCOSE BLDC GLUCOMTR-MCNC: 164 MG/DL — HIGH (ref 70–99)
GLUCOSE BLDC GLUCOMTR-MCNC: 222 MG/DL — HIGH (ref 70–99)
GLUCOSE BLDC GLUCOMTR-MCNC: 98 MG/DL — SIGNIFICANT CHANGE UP (ref 70–99)
GLUCOSE SERPL-MCNC: 162 MG/DL — HIGH (ref 70–115)
HCT VFR BLD CALC: 37 % — SIGNIFICANT CHANGE UP (ref 34.5–45)
HGB BLD-MCNC: 12.2 G/DL — SIGNIFICANT CHANGE UP (ref 11.5–15.5)
MACROCYTES BLD QL: SLIGHT — SIGNIFICANT CHANGE UP
MANUAL SMEAR VERIFICATION: SIGNIFICANT CHANGE UP
MCHC RBC-ENTMCNC: 32.3 PG — SIGNIFICANT CHANGE UP (ref 27–34)
MCHC RBC-ENTMCNC: 33 GM/DL — SIGNIFICANT CHANGE UP (ref 32–36)
MCV RBC AUTO: 97.9 FL — SIGNIFICANT CHANGE UP (ref 80–100)
METHOD TYPE: SIGNIFICANT CHANGE UP
MICROCYTES BLD QL: SLIGHT — SIGNIFICANT CHANGE UP
MYELOCYTES NFR BLD: 0.9 % — HIGH (ref 0–0)
NEUTS BAND # BLD: 7 % — SIGNIFICANT CHANGE UP (ref 0–8)
ORGANISM # SPEC MICROSCOPIC CNT: SIGNIFICANT CHANGE UP
OVALOCYTES BLD QL SMEAR: SLIGHT — SIGNIFICANT CHANGE UP
PLAT MORPH BLD: ABNORMAL
PLATELET # BLD AUTO: 82 K/UL — LOW (ref 150–400)
PLATELET COUNT - ESTIMATE: ABNORMAL
POIKILOCYTOSIS BLD QL AUTO: SLIGHT — SIGNIFICANT CHANGE UP
POTASSIUM SERPL-MCNC: 4.1 MMOL/L — SIGNIFICANT CHANGE UP (ref 3.5–5.3)
POTASSIUM SERPL-SCNC: 4.1 MMOL/L — SIGNIFICANT CHANGE UP (ref 3.5–5.3)
RBC # BLD: 3.78 M/UL — LOW (ref 3.8–5.2)
RBC # FLD: 14.7 % — HIGH (ref 10.3–14.5)
RBC BLD AUTO: ABNORMAL
SODIUM SERPL-SCNC: 134 MMOL/L — LOW (ref 135–145)
SPECIMEN SOURCE: SIGNIFICANT CHANGE UP
WBC # BLD: 12.26 K/UL — HIGH (ref 3.8–10.5)
WBC # FLD AUTO: 12.26 K/UL — HIGH (ref 3.8–10.5)

## 2019-07-06 PROCEDURE — 99233 SBSQ HOSP IP/OBS HIGH 50: CPT

## 2019-07-06 PROCEDURE — 93990 DOPPLER FLOW TESTING: CPT | Mod: 26

## 2019-07-06 PROCEDURE — 99232 SBSQ HOSP IP/OBS MODERATE 35: CPT

## 2019-07-06 RX ORDER — OXYCODONE HYDROCHLORIDE 5 MG/1
5 TABLET ORAL EVERY 6 HOURS
Refills: 0 | Status: DISCONTINUED | OUTPATIENT
Start: 2019-07-06 | End: 2019-07-13

## 2019-07-06 RX ORDER — GABAPENTIN 400 MG/1
100 CAPSULE ORAL THREE TIMES A DAY
Refills: 0 | Status: DISCONTINUED | OUTPATIENT
Start: 2019-07-06 | End: 2019-07-17

## 2019-07-06 RX ORDER — CEFAZOLIN SODIUM 1 G
2000 VIAL (EA) INJECTION
Refills: 0 | Status: DISCONTINUED | OUTPATIENT
Start: 2019-07-06 | End: 2019-07-08

## 2019-07-06 RX ADMIN — HEPARIN SODIUM 5000 UNIT(S): 5000 INJECTION INTRAVENOUS; SUBCUTANEOUS at 13:49

## 2019-07-06 RX ADMIN — Medication 667 MILLIGRAM(S): at 08:21

## 2019-07-06 RX ADMIN — ATORVASTATIN CALCIUM 40 MILLIGRAM(S): 80 TABLET, FILM COATED ORAL at 22:14

## 2019-07-06 RX ADMIN — HEPARIN SODIUM 5000 UNIT(S): 5000 INJECTION INTRAVENOUS; SUBCUTANEOUS at 06:45

## 2019-07-06 RX ADMIN — CLOPIDOGREL BISULFATE 75 MILLIGRAM(S): 75 TABLET, FILM COATED ORAL at 13:49

## 2019-07-06 RX ADMIN — HEPARIN SODIUM 5000 UNIT(S): 5000 INJECTION INTRAVENOUS; SUBCUTANEOUS at 22:14

## 2019-07-06 RX ADMIN — PANTOPRAZOLE SODIUM 40 MILLIGRAM(S): 20 TABLET, DELAYED RELEASE ORAL at 06:45

## 2019-07-06 RX ADMIN — Medication 81 MILLIGRAM(S): at 13:49

## 2019-07-06 RX ADMIN — Medication 100 MILLIGRAM(S): at 14:34

## 2019-07-06 RX ADMIN — LISINOPRIL 20 MILLIGRAM(S): 2.5 TABLET ORAL at 06:45

## 2019-07-06 RX ADMIN — CARVEDILOL PHOSPHATE 12.5 MILLIGRAM(S): 80 CAPSULE, EXTENDED RELEASE ORAL at 17:14

## 2019-07-06 RX ADMIN — OXYCODONE HYDROCHLORIDE 5 MILLIGRAM(S): 5 TABLET ORAL at 11:00

## 2019-07-06 RX ADMIN — Medication 667 MILLIGRAM(S): at 17:14

## 2019-07-06 RX ADMIN — Medication 667 MILLIGRAM(S): at 10:31

## 2019-07-06 RX ADMIN — CALCITRIOL 0.25 MICROGRAM(S): 0.5 CAPSULE ORAL at 14:34

## 2019-07-06 RX ADMIN — GABAPENTIN 100 MILLIGRAM(S): 400 CAPSULE ORAL at 22:14

## 2019-07-06 RX ADMIN — CARVEDILOL PHOSPHATE 12.5 MILLIGRAM(S): 80 CAPSULE, EXTENDED RELEASE ORAL at 06:45

## 2019-07-06 RX ADMIN — Medication 50 MILLIGRAM(S): at 06:45

## 2019-07-06 RX ADMIN — Medication 1 MILLIGRAM(S): at 13:49

## 2019-07-06 RX ADMIN — OXYCODONE HYDROCHLORIDE 5 MILLIGRAM(S): 5 TABLET ORAL at 10:31

## 2019-07-06 RX ADMIN — Medication 4: at 08:20

## 2019-07-06 RX ADMIN — Medication 112 MICROGRAM(S): at 06:46

## 2019-07-06 RX ADMIN — GABAPENTIN 100 MILLIGRAM(S): 400 CAPSULE ORAL at 10:30

## 2019-07-06 NOTE — PROGRESS NOTE ADULT - SUBJECTIVE AND OBJECTIVE BOX
Patient: LARUEN ROBERSON [id number] 87y Female                           Internal Medicine Hospitalist Progress Note    Initial HPI:  87yoF hx ESRD on HD (M and F only), CAD s/p CABG, HTN, DM, PAD, hypothyroidism presenting with generalized weakness.  On admission, febrile, leukocytosis, UA positive, was CT abd/pelvis showed haziness around the gallbladder however follow up abd US was negative for cholelithiasis and Stewart's sign and no LFT elevation on labs.  Blood cultures positive for gram positive cocci.      Interval History:  Seen today with RN and  device.  c/o b/l LE pain x 1 day, but was unable to elaborate despite extensive questioning with .   No weakness / numbness.     ____________________PHYSICAL EXAM:  Vitals reviewed as indicated below  GENERAL:  NAD.  Alert and Oriented x 2 to person, place. confused.   HEENT: NCAT  CARDIOVASCULAR:  S1, S2  LUNGS: CTAB  ABDOMEN:  soft, (-) tenderness, (-) distension, (+) bowel sounds, (-) guarding, (-) rebound (-) rigidity  EXTREMITIES:  no cyanosis / clubbing / edema.  2+ pedal pulses.    NEURO: strength symmetric.   ____________________    VITALS:  Vital Signs Last 24 Hrs  T(C): 36.8 (2019 08:42), Max: 37.1 (2019 15:20)  T(F): 98.2 (2019 08:42), Max: 98.7 (2019 15:20)  HR: 65 (2019 08:42) (61 - 84)  BP: 127/54 (2019 08:42) (116/53 - 140/65)  BP(mean): --  RR: 18 (2019 08:42) (18 - 18)  SpO2: 94% (2019 08:42) (93% - 98%) Daily     Daily Weight in k.9 (2019 12:22)  CAPILLARY BLOOD GLUCOSE      POCT Blood Glucose.: 222 mg/dL (2019 08:11)  POCT Blood Glucose.: 239 mg/dL (2019 22:53)  POCT Blood Glucose.: 109 mg/dL (2019 17:06)  POCT Blood Glucose.: 117 mg/dL (2019 12:34)    I&O's Summary    2019 07:01  -  2019 07:00  --------------------------------------------------------  IN: 0 mL / OUT: 1000 mL / NET: -1000 mL        LABS:                        12.2   12.26 )-----------( 82       ( 2019 07:27 )             37.0     07-05    133<L>  |  92<L>  |  60.0<H>  ----------------------------<  100  3.7   |  24.0  |  5.86<H>    Ca    7.1<L>      2019 08:20  Phos  5.4     07-05  Mg     1.9     07-04    TPro  5.8<L>  /  Alb  3.1<L>  /  TBili  0.6  /  DBili  x   /  AST  66<H>  /  ALT  29  /  AlkPhos  119  07-04    PT/INR - ( 2019 13:29 )   PT: 15.5 sec;   INR: 1.34 ratio         PTT - ( 2019 13:29 )  PTT:32.1 sec  LIVER FUNCTIONS - ( 2019 13:29 )  Alb: 3.1 g/dL / Pro: 5.8 g/dL / ALK PHOS: 119 U/L / ALT: 29 U/L / AST: 66 U/L / GGT: x           Urinalysis Basic - ( 2019 14:34 )    Color: Yellow / Appearance: Clear / S.020 / pH: x  Gluc: x / Ketone: Trace  / Bili: Negative / Urobili: Negative mg/dL   Blood: x / Protein: 500 mg/dL / Nitrite: Negative   Leuk Esterase: Moderate / RBC: 0-2 /HPF / WBC 11-25   Sq Epi: x / Non Sq Epi: Negative / Bacteria: Many      CARDIAC MARKERS ( 2019 08:20 )  x     / 0.15 ng/mL / x     / x     / x      CARDIAC MARKERS ( 2019 13:29 )  x     / 0.16 ng/mL / x     / x     / x            Culture - Urine (collected 2019 14:35)  Source: .Urine  Preliminary Report (2019 09:21):    >100,000 CFU/ml Gram Negative Rods Identification and susceptibility to    follow.    Culture in progress    Culture - Blood (collected 2019 13:32)  Source: .Blood  Preliminary Report (2019 09:15):    Growth in aerobic and anaerobic bottles: Gram Positive Cocci in Clusters    Aerobic Bottle: 9.51 Hours to positivity    Anaerobic Bottle: 10.01 Hours to positivity    .    ***Blood Panel PCR results on this specimen are available    approximately 3 hours after the Gram stain result.***    Gram stain, PCR, and/or culture results may not always    correspond due to difference in methodologies.    ************************************************************    This PCR assay was performed using [employer].    The following targets are tested for: Enterococcus,    vancomycin resistant enterococci, Listeria monocytogenes,    coagulase negative staphylococci, S. aureus,    methicillin resistant S. aureus, Streptococcus agalactiae    (Group B), S. pneumoniae, S. pyogenes (Group A),    Acinetobacter baumannii, Enterobacter cloacae, E. coli,    Klebsiella oxytoca, K. pneumoniae, Proteus sp.,    Serratia marcescens, Haemophilus influenzae,    Neisseria meningitidis, Pseudomonas aeruginosa, Candida    albicans, C. glabrata, C krusei, C parapsilosis,    C. tropicalis and the KPC resistance gene.    "Due to technical problems, Proteus sp. will Not be reported as part of    the BCID panel until further notice"    .    TYPE: (C=Critical, N=Notification, A=Abnormal) C    TESTS:  _ LUCIAN    DATE/TIME CALLED: _ 2019 09:14:52    CALLED TO: Kellee Mcpherson RN    READ BACK (2 Patient Identifiers)(Y/N): _ Y    READ BACK VALUES (Y/N): _ Y    CALLED BY: Kellee Field  Organism: Blood Culture PCR (2019 09:16)  Organism: Blood Culture PCR (2019 09:16)    Culture - Blood (collected 2019 13:31)  Source: .Blood  Preliminary Report (2019 09:19):    Growth in aerobic and anaerobic bottles: Gram Positive Cocci in Clusters    Aerobic Bottle: 8.51 Hours to positivity    Anaerobic Bottle: 9.41 Hours to positivity    .    TYPE: (C=Critical, N=Notification, A=Abnormal) C    TESTS:  _ GS    DATE/TIME CALLED: _ 2019 09:17:54    CALLED TO: Kellee Mcpherson RN    READ BACK (2 Patient Identifiers)(Y/N): _ Y    READ BACK VALUES (Y/N): _ Y    CALLED BY: Kellee Field        MEDICATIONS:  acetaminophen   Tablet .. 650 milliGRAM(s) Oral every 6 hours PRN  aspirin  chewable 81 milliGRAM(s) Oral daily  atorvastatin 40 milliGRAM(s) Oral at bedtime  aztreonam  IVPB 250 milliGRAM(s) IV Intermittent every 8 hours  calcitriol   Capsule 0.25 MICROGram(s) Oral daily  calcium acetate 667 milliGRAM(s) Oral three times a day with meals  carvedilol 12.5 milliGRAM(s) Oral every 12 hours  clopidogrel Tablet 75 milliGRAM(s) Oral daily  dextrose 40% Gel 15 Gram(s) Oral once PRN  dextrose 5%. 1000 milliLiter(s) IV Continuous <Continuous>  dextrose 50% Injectable 12.5 Gram(s) IV Push once  dextrose 50% Injectable 25 Gram(s) IV Push once  dextrose 50% Injectable 25 Gram(s) IV Push once  folic acid 1 milliGRAM(s) Oral daily  gabapentin 100 milliGRAM(s) Oral three times a day  glucagon  Injectable 1 milliGRAM(s) IntraMuscular once PRN  heparin  Injectable 5000 Unit(s) SubCutaneous every 8 hours  insulin lispro (HumaLOG) corrective regimen sliding scale   SubCutaneous three times a day before meals  insulin lispro (HumaLOG) corrective regimen sliding scale   SubCutaneous at bedtime  levothyroxine 112 MICROGram(s) Oral daily  lisinopril 20 milliGRAM(s) Oral daily  oxyCODONE    IR 5 milliGRAM(s) Oral every 6 hours PRN  pantoprazole    Tablet 40 milliGRAM(s) Oral before breakfast

## 2019-07-06 NOTE — PROGRESS NOTE ADULT - ASSESSMENT
ESRD - BIW HD had HD yest    Anemia - Hgb stable . MILLIE held.    RO - cont Phoslo with meals and Rocaltrol    Gram (+) bacteremia , Gram (-) UTI ---> Multiple CT scans noted , S/P Vanco x 1 on  7-4 , Azactam  Follow up repeat cultures   ID to see     will follow

## 2019-07-06 NOTE — PROGRESS NOTE ADULT - ASSESSMENT
87yoF hx ESRD on HD (M and F only), CAD s/p CABG, HTN, DM, PAD, hypothyroidism presenting with generalized weakness, dysuria. Reports pain at graft site for 1 month.  In Ed temp 102.6, leukocytosis to 14. Blood cx 4/4 Staph aureus. Imaging with no focus (no pneumonia, abscess, OM)    Overall Staph aureus bacteremia, anaphylaxis to penicillin, UTI GNR in urine, ESRD on HD M/F only via AVG. Concern for AVG infection, endocarditis    Recommend:  - Blood cultures + 4/4 Staph aureus. Repeat blood cultures tomorrow   - SANFORD for endocarditis/lead vegetations  - USG AVG   - Trend Fever   PT WITH RPORTED PCN ALLERGY  I LOOKE IN CHART FORM 2013 AND SHE RECEIVED CEFTRIAXONE WITHOUT INCIDENT THEN  WILL D/C AZACTAM AND VANCO AN D START KEFZOL 2MGS ADJUSTED FOR DIALYSIS  WILL FOLLOW  UP

## 2019-07-06 NOTE — PROGRESS NOTE ADULT - ASSESSMENT
87yoF hx ESRD on HD (M and F only), CAD s/p CABG, HTN, DM, PAD, hypothyroidism presenting with generalized weakness found to have UTI and meeting sepsis criteria     #Sepsis / UTI / Gram positive bacteremia - UCx suggesting gram negative rods, however, BCx showing gram positive cocci.  Continue IV Abx per ID.  I requested a cardiology evaluation for SANFORD.  F/u repeat cultures    #Leg Pain ? Neuropathy - no focal findings on exam.  trial of Neurontin.      #Thrombocytopenia - appears to be chronic, but now slightly worse.  Will need Hematology evaluation, possibly as outpatient.      #Elevated trop- troponin 0.16, EKG shows LBBB which is not new.  Elevated troponin is stable, and in setting of ESRD is not clinically suggestive of acute cardiac issue.  Pt symptomatic.      #CAD/Hx CVA- ASA, plavix, statin resumed.     #HTN- coreg 12.5 mg BID w parameters, atorvastatin 40  mg, lisinopril 20 mg, clonidine 0.1 mg po q 6 hrs.    #ESRD on HD Monday and Friday-  Renal consulted.  Calcitriol 0.25 mcg and calcium acetate 667 mg 1 po TID w meals.    #Hypothyroid- Continue [id number] mcg.    #Prophylactic measure- heparin.

## 2019-07-06 NOTE — CONSULT NOTE ADULT - SUBJECTIVE AND OBJECTIVE BOX
LAUREN ROBERSON  87y  Female  [id number]      Patient is a 87y old  Female who presents with a chief complaint of weakness, sepsis 2/2 UTI (06 Jul 2019 12:57)    HPI:  87yoF hx ESRD on HD (M and F only), CAD s/p CABG, HTN, DM, PAD, hypothyroidism presenting with generalized weakness.  Pt is poor historian despite use of   She reports generalized weakness for past few days associated with generalized, abdominal pain that she is unable to describe associated with nausea, some episodes of vomiting.  Reports difficulty with urination and ?dysuria but believes her urinary symptoms are due to not drinking enough fluids recently. Unable to explain why she is only on HD twice per week, but says last HD session was on 7/1.  Denies fevers, chest pain, dyspnea, endorses chronic pain in face and all extremities which is chronic for her.  On admission, febrile, leukocytosis, UA positive, was pan scanned by ED and CT abd/pelvis showed haziness around the gallbladder however follow up abd US was negative for cholelithiasis and Stewart's sign and no LFT elevation on labs. (04 Jul 2019 22:54)  Has bacteremia  and is on Abx through ID.  Asked to see for thrombocytopenia.  No overt bleeding.      PAST MEDICAL & SURGICAL HISTORY:  Left bundle branch block  Osteoporosis  Diabetic neuropathy  Peripheral arterial disease  Spinal stenosis  Coronary artery disease  Chronic renal failure  Pacemaker: Medtronic 2011  Hypothyroid  Hyperlipemia  Hypertension  Diabetes  S/P dialysis catheter insertion: R. arm  S/P CABG x 3  Artificial cardiac pacemaker    FAMILY HISTORY:  No pertinent family history in first degree relatives    REVIEW OF SYSTEMS      General:	  Cannot be obtained fully.  Patient not a very verbal person and unable to give hx.    	    PHYSICAL EXAM:  Constitutional:    Look ill but in NAD  Mild pallor  No bleeding  No LAD  Lungs: Clear  Heart: RR  Abdomen: Non-tender  No HSM  Skin and extremities : No ecchymoses or petechiae      CBC Full  -  ( 06 Jul 2019 07:27 )  WBC Count : 12.26 K/uL  RBC Count : 3.78 M/uL  Hemoglobin : 12.2 g/dL  Hematocrit : 37.0 %  Platelet Count - Automated : 82 K/uL  Mean Cell Volume : 97.9 fl  Mean Cell Hemoglobin : 32.3 pg  Mean Cell Hemoglobin Concentration : 33.0 gm/dL  Auto Neutrophil # : x  Auto Lymphocyte # : x  Auto Monocyte # : x  Auto Eosinophil # : x  Auto Basophil # : x  Auto Neutrophil % : x  Auto Lymphocyte % : x  Auto Monocyte % : x  Auto Eosinophil % : x  Auto Basophil % : x      06 Jul 2019 07:27    134    |  93     |  32.0   ----------------------------<  162    4.1     |  25.0   |  3.62     Ca    7.3        06 Jul 2019 07:27  Phos  5.4       05 Jul 2019 08:20      Mild thrombocytopenia likely due to bacteremia  No bleeding    Recommend Observation  Check platelets after the infection has cleared up.  Thank you.

## 2019-07-06 NOTE — PROGRESS NOTE ADULT - SUBJECTIVE AND OBJECTIVE BOX
NEPHROLOGY INTERVAL HPI/OVERNIGHT EVENTS:    Examined  Looks frail    MEDICATIONS  (STANDING):  aspirin  chewable 81 milliGRAM(s) Oral daily  atorvastatin 40 milliGRAM(s) Oral at bedtime  aztreonam  IVPB 250 milliGRAM(s) IV Intermittent every 8 hours  calcitriol   Capsule 0.25 MICROGram(s) Oral daily  calcium acetate 667 milliGRAM(s) Oral three times a day with meals  carvedilol 12.5 milliGRAM(s) Oral every 12 hours  clopidogrel Tablet 75 milliGRAM(s) Oral daily  dextrose 5%. 1000 milliLiter(s) (50 mL/Hr) IV Continuous <Continuous>  dextrose 50% Injectable 12.5 Gram(s) IV Push once  dextrose 50% Injectable 25 Gram(s) IV Push once  dextrose 50% Injectable 25 Gram(s) IV Push once  folic acid 1 milliGRAM(s) Oral daily  gabapentin 100 milliGRAM(s) Oral three times a day  heparin  Injectable 5000 Unit(s) SubCutaneous every 8 hours  insulin lispro (HumaLOG) corrective regimen sliding scale   SubCutaneous three times a day before meals  insulin lispro (HumaLOG) corrective regimen sliding scale   SubCutaneous at bedtime  levothyroxine 112 MICROGram(s) Oral daily  lisinopril 20 milliGRAM(s) Oral daily  pantoprazole    Tablet 40 milliGRAM(s) Oral before breakfast    MEDICATIONS  (PRN):  acetaminophen   Tablet .. 650 milliGRAM(s) Oral every 6 hours PRN Temp greater or equal to 38C (100.4F), Mild Pain (1 - 3)  dextrose 40% Gel 15 Gram(s) Oral once PRN Blood Glucose LESS THAN 70 milliGRAM(s)/deciliter  glucagon  Injectable 1 milliGRAM(s) IntraMuscular once PRN Glucose LESS THAN 70 milligrams/deciliter  oxyCODONE    IR 5 milliGRAM(s) Oral every 6 hours PRN Moderate to severe pain      Allergies    penicillin (Anaphylaxis)  seafood (Anaphylaxis)  shellfish (Anaphylaxis)    Intolerances        Vital Signs Last 24 Hrs  T(C): 36.8 (2019 08:42), Max: 37.1 (2019 15:20)  T(F): 98.2 (2019 08:42), Max: 98.7 (2019 15:20)  HR: 65 (2019 08:42) (61 - 84)  BP: 127/54 (2019 08:42) (116/53 - 140/65)  BP(mean): --  RR: 18 (2019 08:42) (18 - 18)  SpO2: 94% (2019 08:42) (93% - 98%)  Daily     Daily Weight in k.9 (2019 12:22)    PHYSICAL EXAM:  GENERAL: NAD   HEAD:  NCAT  NECK: Supple, neck  veins full  CHEST/LUNG: dec BS B/L moving air ok  HEART: Regular rate and rhythm; No rub   ABDOMEN: Soft, Nontender, Nondistended; Bowel sounds present  EXTREMITIES:  + edema , access with thrill     LABS:                        12.2   12.26 )-----------( 82       ( 2019 07:27 )             37.0     07-06    134<L>  |  93<L>  |  32.0<H>  ----------------------------<  162<H>  4.1   |  25.0  |  3.62<H>    Ca    7.3<L>      2019 07:27  Phos  5.4     07-05  Mg     1.9     07-04    TPro  5.8<L>  /  Alb  3.1<L>  /  TBili  0.6  /  DBili  x   /  AST  66<H>  /  ALT  29  /  AlkPhos  119  07-04    PT/INR - ( 2019 13:29 )   PT: 15.5 sec;   INR: 1.34 ratio         PTT - ( 2019 13:29 )  PTT:32.1 sec  Urinalysis Basic - ( 2019 14:34 )    Color: Yellow / Appearance: Clear / S.020 / pH: x  Gluc: x / Ketone: Trace  / Bili: Negative / Urobili: Negative mg/dL   Blood: x / Protein: 500 mg/dL / Nitrite: Negative   Leuk Esterase: Moderate / RBC: 0-2 /HPF / WBC 11-25   Sq Epi: x / Non Sq Epi: Negative / Bacteria: Many              RADIOLOGY & ADDITIONAL TESTS:

## 2019-07-06 NOTE — PROGRESS NOTE ADULT - SUBJECTIVE AND OBJECTIVE BOX
[facility]  INFECTIOUS DISEASES AND INTERNAL MEDICINE at [place]  =======================================================  Paresh Dey MD  Diplomates American Board of Internal Medicine and Infectious Diseases  =======================================================    LAUREN ROBERSON [phone]    Follow up: [facility] BACTEREMIA    Allergies:  penicillin (Anaphylaxis)  seafood (Anaphylaxis)  shellfish (Anaphylaxis)      Medications:  acetaminophen   Tablet .. 650 milliGRAM(s) Oral every 6 hours PRN  aspirin  chewable 81 milliGRAM(s) Oral daily  atorvastatin 40 milliGRAM(s) Oral at bedtime  calcitriol   Capsule 0.25 MICROGram(s) Oral daily  calcium acetate 667 milliGRAM(s) Oral three times a day with meals  carvedilol 12.5 milliGRAM(s) Oral every 12 hours  ceFAZolin   IVPB 2000 milliGRAM(s) IV Intermittent <User Schedule>  clopidogrel Tablet 75 milliGRAM(s) Oral daily  dextrose 40% Gel 15 Gram(s) Oral once PRN  dextrose 5%. 1000 milliLiter(s) IV Continuous <Continuous>  dextrose 50% Injectable 12.5 Gram(s) IV Push once  dextrose 50% Injectable 25 Gram(s) IV Push once  dextrose 50% Injectable 25 Gram(s) IV Push once  folic acid 1 milliGRAM(s) Oral daily  gabapentin 100 milliGRAM(s) Oral three times a day  glucagon  Injectable 1 milliGRAM(s) IntraMuscular once PRN  heparin  Injectable 5000 Unit(s) SubCutaneous every 8 hours  insulin lispro (HumaLOG) corrective regimen sliding scale   SubCutaneous three times a day before meals  insulin lispro (HumaLOG) corrective regimen sliding scale   SubCutaneous at bedtime  levothyroxine 112 MICROGram(s) Oral daily  lisinopril 20 milliGRAM(s) Oral daily  oxyCODONE    IR 5 milliGRAM(s) Oral every 6 hours PRN  pantoprazole    Tablet 40 milliGRAM(s) Oral before breakfast    SOCIAL       FAMILY   FAMILY HISTORY:  No pertinent family history in first degree relatives    REVIEW OF SYSTEMS:  CONSTITUTIONAL:  No Fever or chills  HEENT:   No diplopia or blurred vision.  No earache, sore throat or runny nose.  CARDIOVASCULAR:  No pressure, squeezing, strangling, tightness, heaviness or aching about the chest, neck, axilla or epigastrium.  RESPIRATORY:  No cough, shortness of breath, PND or orthopnea.  GASTROINTESTINAL:  No nausea, vomiting or diarrhea.  GENITOURINARY:  No dysuria, frequency or urgency. No Blood in urine  MUSCULOSKELETAL:   AS PER HPI  SKIN:  No change in skin, hair or nails.  NEUROLOGIC:  No paresthesias, fasciculations, seizures or weakness.  PSYCHIATRIC:  No disorder of thought or mood.  ENDOCRINE:  No heat or cold intolerance, polyuria or polydipsia.  HEMATOLOGICAL:  No easy bruising or bleeding.            Physical Exam:  ICU Vital Signs Last 24 Hrs  T(C): 36.8 (2019 08:42), Max: 37.1 (2019 15:20)  T(F): 98.2 (2019 08:42), Max: 98.7 (2019 15:20)  HR: 65 (2019 08:42) (61 - 69)  BP: 127/54 (2019 08:42) (116/53 - 140/65)  BP(mean): --  ABP: --  ABP(mean): --  RR: 18 (2019 08:42) (18 - 18)  SpO2: 94% (2019 08:42) (93% - 98%)    GEN: NAD, CONFUSED   HEENT: normocephalic and atraumatic. EOMI. BOBO.    NECK: Supple. No carotid bruits.  No lymphadenopathy or thyromegaly.  LUNGS: Clear to auscultation.  HEART: Regular rate and rhythm without murmur.  ABDOMEN: Soft, nontender, and nondistended.  Positive bowel sounds.    : No CVA tenderness  EXTREMITIES: Without any cyanosis, clubbing, rash, lesions or edema.  MSK: no joint swelling  NEUROLOGIC:  CONFUSED         Labs:      134<L>  |  93<L>  |  32.0<H>  ----------------------------<  162<H>  4.1   |  25.0  |  3.62<H>    Ca    7.3<L>      2019 07:27  Phos  5.4     07-  Mg     1.9         TPro  5.8<L>  /  Alb  3.1<L>  /  TBili  0.6  /  DBili  x   /  AST  66<H>  /  ALT  29  /  AlkPhos  119                            12.2   12.26 )-----------( 82       ( 2019 07:27 )             37.0       PT/INR - ( 2019 13:29 )   PT: 15.5 sec;   INR: 1.34 ratio         PTT - ( 2019 13:29 )  PTT:32.1 sec  Urinalysis Basic - ( 2019 14:34 )    Color: Yellow / Appearance: Clear / S.020 / pH: x  Gluc: x / Ketone: Trace  / Bili: Negative / Urobili: Negative mg/dL   Blood: x / Protein: 500 mg/dL / Nitrite: Negative   Leuk Esterase: Moderate / RBC: 0-2 /HPF / WBC 11-25   Sq Epi: x / Non Sq Epi: Negative / Bacteria: Many      LIVER FUNCTIONS - ( 2019 13:29 )  Alb: 3.1 g/dL / Pro: 5.8 g/dL / ALK PHOS: 119 U/L / ALT: 29 U/L / AST: 66 U/L / GGT: x           CARDIAC MARKERS ( 2019 08:20 )  x     / 0.15 ng/mL / x     / x     / x      CARDIAC MARKERS ( 2019 13:29 )  x     / 0.16 ng/mL / x     / x     / x          CAPILLARY BLOOD GLUCOSE      POCT Blood Glucose.: 98 mg/dL (2019 10:29)  POCT Blood Glucose.: 222 mg/dL (2019 08:11)  POCT Blood Glucose.: 239 mg/dL (2019 22:53)  POCT Blood Glucose.: 109 mg/dL (2019 17:06)        RECENT CULTURES:   @ 10:46 .Blood     Growth in aerobic and anaerobic bottles: Gram Positive Cocci in Clusters  Aerobic Bottle: 12.08 Hours to positivity  Anaerobic Bottle: 12.18 Hours to positivity  .  TYPE: (C=Critical, N=Notification, A=Abnormal) C  TESTS:  _ GS  DATE/TIME CALLED: _2019 09:39:33  CALLED TO: Kellee Chery RN  READ BACK (2 Patient Identifiers)(Y/N): _ Y  READ BACK VALUES (Y/N): _ Y  CALLED BY: Kellee Field         @ 14:35 .Urine Escherichia coli    >100,000 CFU/ml Escherichia coli         @ 13:32 .Blood Staphylococcus aureus  Blood Culture PCR    Growth in aerobic and anaerobic bottles: Staphylococcus aureus  Aerobic Bottle: 9.51 Hours to positivity  Anaerobic Bottle: 10.01 Hours to positivity  .  ***Blood Panel PCR results on this specimen are available  approximately 3 hours after the Gram stain result.***  Gram stain, PCR, and/or culture results may not always  correspond due to difference in methodologies.  ************************************************************  This PCR assay was performed using [employer].  The following targets are tested for: Enterococcus,  vancomycin resistant enterococci, Listeria monocytogenes,  coagulase negative staphylococci, S. aureus,  methicillin resistant S. aureus, Streptococcus agalactiae  (Group B), S. pneumoniae, S. pyogenes (GroupA),  Acinetobacter baumannii, Enterobacter cloacae, E. coli,  Klebsiella oxytoca, K. pneumoniae, Proteus sp.,  Serratia marcescens, Haemophilus influenzae,  Neisseria meningitidis, Pseudomonas aeruginosa, Candida  albicans, C. glabrata, C krusei, C parapsilosis,  C. tropicalis and the KPC resistance gene.  "Due to technical problems, Proteus sp. will Not be reported as part of  the BCID panel until further notice"  .  TYPE: (C=Critical, N=Notification, A=Abnormal) C  TESTS:  _ GS  DATE/TIME CALLED: _ 2019 09:14:52  CALLED TO: Kellee Mcpherson RN  READ BACK (2 Patient Identifiers)(Y/N): _ Y  READ BACK VALUES (Y/N): _ Y  CALLED BY: Kellee Field         @ 13:31 .Blood Staphylococcus aureus    Growth in aerobic and anaerobic bottles: Staphylococcus aureus  Aerobic Bottle: 8.51 Hours to positivity  Anaerobic Bottle: 9.41 Hours to positivity  .  TYPE: (C=Critical, N=Notification, A=Abnormal) C  TESTS:  _ LUCIAN  DATE/TIME CALLED: _ 2019 09:17:54  CALLED TO: Kellee Mcpherson RN  READ BACK (2 Patient Identifiers)(Y/N): _ Y  READ BACK VALUES (Y/N): _ Y  CALLED BY: Kellee Field

## 2019-07-07 LAB
-  AMPICILLIN/SULBACTAM: SIGNIFICANT CHANGE UP
-  AMPICILLIN/SULBACTAM: SIGNIFICANT CHANGE UP
-  CEFAZOLIN: SIGNIFICANT CHANGE UP
-  CEFAZOLIN: SIGNIFICANT CHANGE UP
-  CLINDAMYCIN: SIGNIFICANT CHANGE UP
-  CLINDAMYCIN: SIGNIFICANT CHANGE UP
-  DAPTOMYCIN: SIGNIFICANT CHANGE UP
-  ERYTHROMYCIN: SIGNIFICANT CHANGE UP
-  ERYTHROMYCIN: SIGNIFICANT CHANGE UP
-  GENTAMICIN: SIGNIFICANT CHANGE UP
-  GENTAMICIN: SIGNIFICANT CHANGE UP
-  LINEZOLID: SIGNIFICANT CHANGE UP
-  OXACILLIN: SIGNIFICANT CHANGE UP
-  OXACILLIN: SIGNIFICANT CHANGE UP
-  PENICILLIN: SIGNIFICANT CHANGE UP
-  PENICILLIN: SIGNIFICANT CHANGE UP
-  RIFAMPIN: SIGNIFICANT CHANGE UP
-  RIFAMPIN: SIGNIFICANT CHANGE UP
-  TETRACYCLINE: SIGNIFICANT CHANGE UP
-  TETRACYCLINE: SIGNIFICANT CHANGE UP
-  TRIMETHOPRIM/SULFAMETHOXAZOLE: SIGNIFICANT CHANGE UP
-  TRIMETHOPRIM/SULFAMETHOXAZOLE: SIGNIFICANT CHANGE UP
-  VANCOMYCIN: SIGNIFICANT CHANGE UP
-  VANCOMYCIN: SIGNIFICANT CHANGE UP
CULTURE RESULTS: SIGNIFICANT CHANGE UP
CULTURE RESULTS: SIGNIFICANT CHANGE UP
GLUCOSE BLDC GLUCOMTR-MCNC: 147 MG/DL — HIGH (ref 70–99)
GLUCOSE BLDC GLUCOMTR-MCNC: 150 MG/DL — HIGH (ref 70–99)
GLUCOSE BLDC GLUCOMTR-MCNC: 168 MG/DL — HIGH (ref 70–99)
GLUCOSE BLDC GLUCOMTR-MCNC: 192 MG/DL — HIGH (ref 70–99)
METHOD TYPE: SIGNIFICANT CHANGE UP
METHOD TYPE: SIGNIFICANT CHANGE UP
ORGANISM # SPEC MICROSCOPIC CNT: SIGNIFICANT CHANGE UP
SPECIMEN SOURCE: SIGNIFICANT CHANGE UP
SPECIMEN SOURCE: SIGNIFICANT CHANGE UP

## 2019-07-07 PROCEDURE — 99222 1ST HOSP IP/OBS MODERATE 55: CPT

## 2019-07-07 PROCEDURE — 99233 SBSQ HOSP IP/OBS HIGH 50: CPT

## 2019-07-07 RX ADMIN — Medication 81 MILLIGRAM(S): at 11:37

## 2019-07-07 RX ADMIN — OXYCODONE HYDROCHLORIDE 5 MILLIGRAM(S): 5 TABLET ORAL at 22:30

## 2019-07-07 RX ADMIN — Medication 2: at 17:02

## 2019-07-07 RX ADMIN — Medication 667 MILLIGRAM(S): at 07:31

## 2019-07-07 RX ADMIN — Medication 1 MILLIGRAM(S): at 11:37

## 2019-07-07 RX ADMIN — HEPARIN SODIUM 5000 UNIT(S): 5000 INJECTION INTRAVENOUS; SUBCUTANEOUS at 06:15

## 2019-07-07 RX ADMIN — CARVEDILOL PHOSPHATE 12.5 MILLIGRAM(S): 80 CAPSULE, EXTENDED RELEASE ORAL at 06:15

## 2019-07-07 RX ADMIN — ATORVASTATIN CALCIUM 40 MILLIGRAM(S): 80 TABLET, FILM COATED ORAL at 21:38

## 2019-07-07 RX ADMIN — HEPARIN SODIUM 5000 UNIT(S): 5000 INJECTION INTRAVENOUS; SUBCUTANEOUS at 21:38

## 2019-07-07 RX ADMIN — CARVEDILOL PHOSPHATE 12.5 MILLIGRAM(S): 80 CAPSULE, EXTENDED RELEASE ORAL at 17:02

## 2019-07-07 RX ADMIN — GABAPENTIN 100 MILLIGRAM(S): 400 CAPSULE ORAL at 11:42

## 2019-07-07 RX ADMIN — GABAPENTIN 100 MILLIGRAM(S): 400 CAPSULE ORAL at 06:15

## 2019-07-07 RX ADMIN — GABAPENTIN 100 MILLIGRAM(S): 400 CAPSULE ORAL at 21:39

## 2019-07-07 RX ADMIN — Medication 667 MILLIGRAM(S): at 17:02

## 2019-07-07 RX ADMIN — OXYCODONE HYDROCHLORIDE 5 MILLIGRAM(S): 5 TABLET ORAL at 21:47

## 2019-07-07 RX ADMIN — LISINOPRIL 20 MILLIGRAM(S): 2.5 TABLET ORAL at 06:15

## 2019-07-07 RX ADMIN — CLOPIDOGREL BISULFATE 75 MILLIGRAM(S): 75 TABLET, FILM COATED ORAL at 11:37

## 2019-07-07 RX ADMIN — Medication 667 MILLIGRAM(S): at 11:37

## 2019-07-07 RX ADMIN — Medication 112 MICROGRAM(S): at 06:15

## 2019-07-07 RX ADMIN — HEPARIN SODIUM 5000 UNIT(S): 5000 INJECTION INTRAVENOUS; SUBCUTANEOUS at 11:42

## 2019-07-07 RX ADMIN — CALCITRIOL 0.25 MICROGRAM(S): 0.5 CAPSULE ORAL at 11:37

## 2019-07-07 RX ADMIN — PANTOPRAZOLE SODIUM 40 MILLIGRAM(S): 20 TABLET, DELAYED RELEASE ORAL at 06:15

## 2019-07-07 NOTE — PROGRESS NOTE ADULT - ASSESSMENT
87yoF hx ESRD on HD (M and F only), CAD s/p CABG, HTN, DM, PAD, hypothyroidism presenting with generalized weakness, dysuria. Reports pain at graft site for 1 month.  In Ed temp 102.6, leukocytosis to 14. Blood cx 4/4 Staph aureus. Imaging with no focus (no pneumonia, abscess, OM)    Overall Staph aureus bacteremia, anaphylaxis to penicillin, UTI GNR in urine, ESRD on HD M/F only via AVG. Concern for AVG infection, endocarditis    Recommend:  - Blood cultures + 4/4 Staph aureus. Repeat blood cultures tomorrow   - [facility] for endocarditis/lead vegetations  - [facility] AVG   - Trend Fever   PT WITH RePORTED PCN ALLERGY  In    2013    SHE RECEIVED CEFTRIAXONE WITHOUT INCIDENT THEN   STARTed  KEFZOL 2MGS ADJUSTED FOR DIALYSIS and TOLERATED THE FIRST DOSE NO RASH   WILL FOLLOW  UP  FOR [facility]

## 2019-07-07 NOTE — CONSULT NOTE ADULT - SUBJECTIVE AND OBJECTIVE BOX
CARDIOLOGY CONSULTATION NOTE   Consult requested by:      Reason for Consultation:     History obtained by: Patient, family and medical record     obtained: No    Chief complaint:    Patient is a 87y old  Female who presents with a chief complaint of weakness, sepsis 2/2 UTI (07 Jul 2019 12:05)      HPI:  87yoF hx ESRD on HD (M and F only), CAD s/p CABG, HTN, DM, PAD, hypothyroidism presenting with generalized weakness.  Pt is poor historian despite use of   She reports generalized weakness for past few days associated with generalized, abdominal pain that she is unable to describe associated with nausea, some episodes of vomiting.  Reports difficulty with urination and ?dysuria but believes her urinary symptoms are due to not drinking enough fluids recently. Unable to explain why she is only on HD twice per week, but says last HD session was on 7/1.  Denies fevers, chest pain, dyspnea, endorses chronic pain in face and all extremities which is chronic for her.  On admission, febrile, leukocytosis, UA positive, was pan scanned by ED and CT abd/pelvis showed haziness around the gallbladder however follow up abd US was negative for cholelithiasis and Stewart's sign and no LFT elevation on labs. (04 Jul 2019 22:54)    Duration:  Location:   Quality:   Severity:  Context:  Timing:  Modifying factors:  Associated symptoms:      REVIEW OF SYMPTOMS:   Constitutional: Denied: fever, chills, weight loss or gain  Eyes: Denied: reddened ayes, eye discharge, eye pain  ENMT: Denied: ear pain, nasal discharge, mouth pain, throat pain or swelling  Cardiovascular: Denied: chest pain,  Chest pressure, palpitation, arrhythmia, irregular or fast heart beats, edema  Respiratory: Denied: cough, phlegm production, wheezes, dyspnea, orthopnea, PND,   GI: Denied: Abdominal pain, nausea, vomiting, diarrhea, constipation, melena, rectal bleed  : Denied: hematuria, frequency  Musculoskeletal: + leg pain Denied: Muscle aches, weakness, pain  Hematology/lymp: Denied: Bleeding disorder, anemia, blood clotting  Neuro: Denied: Headache, light headedness, dizziness, numbness, aphasia, dysarthria, seizure,  syncope, near syncope  Psych: Denied: depression, anxiety  Integumentary/skin: Denied: rash, bruises, ecchymosis, itching  Allergy/Immunology: denied environmental or drug allergies    ALL OTHER REVIEW OF SYSTEMS ARE NEGATIVE.    MEDICATIONS  (STANDING):  aspirin  chewable 81 milliGRAM(s) Oral daily  atorvastatin 40 milliGRAM(s) Oral at bedtime  calcitriol   Capsule 0.25 MICROGram(s) Oral daily  calcium acetate 667 milliGRAM(s) Oral three times a day with meals  carvedilol 12.5 milliGRAM(s) Oral every 12 hours  ceFAZolin   IVPB 2000 milliGRAM(s) IV Intermittent <User Schedule>  clopidogrel Tablet 75 milliGRAM(s) Oral daily  dextrose 5%. 1000 milliLiter(s) (50 mL/Hr) IV Continuous <Continuous>  dextrose 50% Injectable 12.5 Gram(s) IV Push once  dextrose 50% Injectable 25 Gram(s) IV Push once  dextrose 50% Injectable 25 Gram(s) IV Push once  folic acid 1 milliGRAM(s) Oral daily  gabapentin 100 milliGRAM(s) Oral three times a day  heparin  Injectable 5000 Unit(s) SubCutaneous every 8 hours  insulin lispro (HumaLOG) corrective regimen sliding scale   SubCutaneous three times a day before meals  insulin lispro (HumaLOG) corrective regimen sliding scale   SubCutaneous at bedtime  levothyroxine 112 MICROGram(s) Oral daily  lisinopril 20 milliGRAM(s) Oral daily  pantoprazole    Tablet 40 milliGRAM(s) Oral before breakfast    MEDICATIONS  (PRN):  acetaminophen   Tablet .. 650 milliGRAM(s) Oral every 6 hours PRN Temp greater or equal to 38C (100.4F), Mild Pain (1 - 3)  dextrose 40% Gel 15 Gram(s) Oral once PRN Blood Glucose LESS THAN 70 milliGRAM(s)/deciliter  glucagon  Injectable 1 milliGRAM(s) IntraMuscular once PRN Glucose LESS THAN 70 milligrams/deciliter  oxyCODONE    IR 5 milliGRAM(s) Oral every 6 hours PRN Moderate to severe pain        PAST MEDICAL & SURGICAL HISTORY:  Left bundle branch block  Osteoporosis  Diabetic neuropathy  Peripheral arterial disease  Spinal stenosis  Coronary artery disease  Chronic renal failure  Pacemaker: [employer] 2011  Hypothyroid  Hyperlipemia  Hypertension  Diabetes  S/P dialysis catheter insertion: R. arm  S/P CABG x 3  Artificial cardiac pacemaker      FAMILY HISTORY:  No pertinent family history in first degree relatives      SOCIAL HISTORY:    CIGARETTES:  No    ALCOHOL: No    DRUGS: No    Vital Signs Last 24 Hrs  T(C): 36.3 (07 Jul 2019 09:49), Max: 36.6 (06 Jul 2019 23:44)  T(F): 97.4 (07 Jul 2019 09:49), Max: 97.8 (06 Jul 2019 23:44)  HR: 61 (07 Jul 2019 09:49) (58 - 61)  BP: 138/75 (07 Jul 2019 09:49) (130/68 - 138/75)  BP(mean): --  RR: 18 (07 Jul 2019 09:49) (18 - 18)  SpO2: 98% (07 Jul 2019 09:49) (98% - 98%)    PHYSICAL EXAM:      Constitutional: No fever, chills, NAD, Comfortable    Eyes: Not reddened, no discharge    ENMT: No discharge, No pain    Neck: No JVD, No Bruit    Back: No CVA tenderness    Respiratory: Clear to auscultation bilaterally    Cardiovascular: RRR, Normal S1-2, No S3-, No friction rub murmur    Gastrointestinal: Soft, NT/ND. BS+, No organomegaly    Extremities: No edema    Vascular: Distal pulses intact    Neurological: Alert, awake, oriented, able to move extremities, speach is clear    Skin: No ecchymosis, no rash    Musculoskeletal: Non tender, no weakness    Psychiatric: Appropriate mood, no anxiety      INTERPRETATION OF TELEMETRY: not on telemetry   ECG: SR, LBBB, PVCs      LABS:                        12.2   12.26 )-----------( 82       ( 06 Jul 2019 07:27 )             37.0     07-06    134<L>  |  93<L>  |  32.0<H>  ----------------------------<  162<H>  4.1   |  25.0  |  3.62<H>    Ca    7.3<L>      06 Jul 2019 07:27      RADIOLOGY & ADDITIONAL STUDIES:  X-ray:    < from: CT Chest No Cont (07.04.19 @ 16:05) >  CHEST:     LUNGS, AIRWAYS: The central airways are patent. No focal consolidation.   Small bilateral atelectasis.  PLEURA: Trace bilateral effusions.  VESSELS: Aortic atherosclerosis without aneurysm.  HEART: Left dual-lead pacer. Normal heart size. No pericardial effusion.   Coronary artery calcifications are present.  MEDIASTINUM AND TRACY: No adenopathy.  CHEST WALL: No masses.    ABDOMEN AND PELVIS:    LIVER: Normal.  BILE DUCTS: Nondilated.  GALLBLADDER: Mild haziness of the gallbladder wall.  SPLEEN: Normal.  PANCREAS: Diffuse atrophy. Multiple cystic lesions.  ADRENALS: Stable right adrenal nodule  KIDNEYS/URETERS: Atrophic kidneys. No hydronephrosis. Extensive vascular   calcification.    BLADDER: Underdistended limiting evaluation.  REPRODUCTIVE ORGANS: No uterine or adnexal abnormality.    BOWEL: No bowel-related abnormality. Specifically, no evidence of acute   diverticulitis. Normal appendix and ileocecal region. No bowel   obstruction or bowel inflammation.  PERITONEUM: No intra-abdominal fluid collection. No free air or ascites.  VESSELS:  Aortoiliac atherosclerosis without aneurysm.  RETROPERITONEUM: No adenopathy or hematoma.    ABDOMINAL WALL: Postsurgical changes. No collection.  BONES: Sternotomy. No aggressive lesion.    THORACOLUMBAR SPINE: No acute fracture, subluxation, or aggressive   process.    IMPRESSION:     No focal consolidation.    Haziness of the gallbladder wall. Correlate for cholecystitis.     THORACOLUMBAR SPINE: No acute fracture, subluxation, or aggressive   process.      < end of copied text >    PREVIOUS DIAGNOSTIC TESTING:      ECHO  FINDINGS:   < from: TTE Echo Complete w/Doppler (03.26.19 @ 13:06) >  PHYSICIAN INTERPRETATION:  Left Ventricle: The left ventricular internal cavity size is normal.  Global LV systolic function was normal. Left ventricular ejection   fraction, by visual estimation, is 60 to 65%. Spectral Doppler shows   pseudonormal pattern of left ventricular myocardial filling (Grade II   diastolic dysfunction). Elevated mean left atrial pressure.  Right Ventricle: Normal right ventricular size and function.  Left Atrium: Mildly enlarged left atrium.  Right Atrium: Mildly enlarged right atrium.  Pericardium: There is no evidence of pericardial effusion.  Mitral Valve: Thickening of the anterior and posterior mitral valve   leaflets. There is moderate mitral annular calcification. Mild mitral   valve regurgitation is seen.  Tricuspid Valve: The tricuspid valve is normal. Mild tricuspid   regurgitation is visualized.  Aortic Valve: The aortic valve is trileaflet. Sclerotic aortic valve with   normal opening. No evidence of aortic valve regurgitation is seen.  Pulmonic Valve: Structurally normal pulmonic valve, with normal leaflet   excursion. Trace pulmonic valve regurgitation.  Aorta: The aortic root and ascending aorta are structurally normal, with   no evidence of dilitation.  Pulmonary Artery: The main pulmonary artery is normal in size. Normal   pulmonary artery pressure.  Venous: The inferior vena cava was normal sized, with respiratory size   variation less than50%.       Summary:   1. Technically suboptimal study.   2. Mildly enlarged left atrium.   3. There is mild concentric left ventricular hypertrophy.   4. Normal wall motion. Left ventricular ejection fraction, by visual   estimation, is 60 to 65%. Grade II diastolic dysfunction.   5. Elevated mean left atrial pressure. (LAP = 21 mm Hg)   6. Mildly enlarged right atrium.   7. Normal right ventricular size and function.   8. Mild mitral valve regurgitation.   9. Mild tricuspid regurgitation.  10. There is no evidence of pericardial effusion.  11. No cardiac mass, vegetations, or thrombus visualized. However, [facility]ORD is   a more sensitive test to evaluate for these findings.    [id number] Lester Ricardo MD, RPVI, Electronically signed on 3/26/2019 at   5:31:01 PM         < end of copied text >        CATHETERIZATION  FINDINGS:      < from: Cardiac Cath Lab - Adult (02.09.18 @ 11:10) >  VENTRICLES: EF estimated was 45 %.  VALVES: MITRAL VALVE: The mitral valve exhibited moderate regurgitation.  CORONARY VESSELS: The coronary circulation is right dominant.  LM:   --  Distal left main: There was a discrete 80 % stenosis in the  distal third of the vessel segment. The lesion was moderately calcified.  LAD:   --  Proximal LAD: There was a 40 % stenosis.  --  Mid LAD: There was a tubular 70 % stenosis.  --  Distal LAD: The vessel was normal sized. Angiography showed no evidence  of disease. The artery was supplied by a patent bypass graft.  --  D1: The vessel was medium sized. Angiography showed moderate  atherosclerosis.  CX:   --  Circumflex: The vessel was medium sized. Angiography showed mild  atherosclerosis with no flow limiting lesions.  --OM1: The artery was supplied by a patent bypass graft. There was a 100  % stenosis. This lesion is a chronic total occlusion.  RCA:   --  Mid RCA: There was a 100 % stenosis. This lesion is a chronic  total occlusion.  GRAFTS:   --  Graft to the mid LAD: The graft was a LIMA. Graft angiography  showed no evidence of disease.  --  Graft to the 1st obtuse marginal: The graft was a saphenous vein graft.  Graft angiography showed no evidence of disease.  --  Graft to the RPDA: The graft was a saphenous vein graft. Graft  angiography showed no evidence of disease.  COMPLICATIONS: There were no complications.  DIAGNOSTIC IMPRESSIONS: Patent LIMA to LAD and SVG to OM 1 and RPDA  Mildly elevated left ventricular filling pressure  Moderaly elevated right filling pressures  Normal cardiac output  Mild LV systolic dysfunction  Moderate mitral regurgitation  DIAGNOSTIC RECOMMENDATIONS: continue medical therapy for CAD  continue therapy for volume overload /systolic heart failure/MR as per Dr. Mckeon  Prepared and signed by  Albino Summers MD  Signed 02/09/2018 18:13:29    < end of copied text > CARDIOLOGY CONSULTATION NOTE   Consult requested by:      Reason for Consultation:     History obtained by: Patient, family and medical record     obtained: No    Chief complaint:    Patient is a 87y old  Female who presents with a chief complaint of weakness, sepsis 2/2 UTI (07 Jul 2019 12:05)      HPI:  87yoF hx ESRD on HD (M and F only), CAD s/p CABG, HTN, DM, PAD, hypothyroidism presenting with generalized weakness.  Pt is poor historian despite use of   She reports generalized weakness for past few days associated with generalized, abdominal pain that she is unable to describe associated with nausea, some episodes of vomiting.  Reports difficulty with urination and ?dysuria but believes her urinary symptoms are due to not drinking enough fluids recently. Unable to explain why she is only on HD twice per week, but says last HD session was on 7/1.  Denies fevers, chest pain, dyspnea, endorses chronic pain in face and all extremities which is chronic for her.  On admission, febrile, leukocytosis, UA positive, was pan scanned by ED and CT abd/pelvis showed haziness around the gallbladder however follow up abd US was negative for cholelithiasis and Stewart's sign and no LFT elevation on labs. (04 Jul 2019 22:54)      REVIEW OF SYMPTOMS:   Constitutional: Denied: fever, chills, weight loss or gain  Eyes: Denied: reddened ayes, eye discharge, eye pain  ENMT: Denied: ear pain, nasal discharge, mouth pain, throat pain or swelling  Cardiovascular: Denied: chest pain,  Chest pressure, palpitation, arrhythmia, irregular or fast heart beats, edema  Respiratory: Denied: cough, phlegm production, wheezes, dyspnea, orthopnea, PND,   GI: Denied: Abdominal pain, nausea, vomiting, diarrhea, constipation, melena, rectal bleed  : Denied: hematuria, frequency  Musculoskeletal: + leg pain Denied: Muscle aches, weakness, pain  Hematology/lymp: Denied: Bleeding disorder, anemia, blood clotting  Neuro: Denied: Headache, light headedness, dizziness, numbness, aphasia, dysarthria, seizure,  syncope, near syncope  Psych: Denied: depression, anxiety  Integumentary/skin: Denied: rash, bruises, ecchymosis, itching  Allergy/Immunology: denied environmental or drug allergies    ALL OTHER REVIEW OF SYSTEMS ARE NEGATIVE.    MEDICATIONS  (STANDING):  aspirin  chewable 81 milliGRAM(s) Oral daily  atorvastatin 40 milliGRAM(s) Oral at bedtime  calcitriol   Capsule 0.25 MICROGram(s) Oral daily  calcium acetate 667 milliGRAM(s) Oral three times a day with meals  carvedilol 12.5 milliGRAM(s) Oral every 12 hours  ceFAZolin   IVPB 2000 milliGRAM(s) IV Intermittent <User Schedule>  clopidogrel Tablet 75 milliGRAM(s) Oral daily  dextrose 5%. 1000 milliLiter(s) (50 mL/Hr) IV Continuous <Continuous>  dextrose 50% Injectable 12.5 Gram(s) IV Push once  dextrose 50% Injectable 25 Gram(s) IV Push once  dextrose 50% Injectable 25 Gram(s) IV Push once  folic acid 1 milliGRAM(s) Oral daily  gabapentin 100 milliGRAM(s) Oral three times a day  heparin  Injectable 5000 Unit(s) SubCutaneous every 8 hours  insulin lispro (HumaLOG) corrective regimen sliding scale   SubCutaneous three times a day before meals  insulin lispro (HumaLOG) corrective regimen sliding scale   SubCutaneous at bedtime  levothyroxine 112 MICROGram(s) Oral daily  lisinopril 20 milliGRAM(s) Oral daily  pantoprazole    Tablet 40 milliGRAM(s) Oral before breakfast    MEDICATIONS  (PRN):  acetaminophen   Tablet .. 650 milliGRAM(s) Oral every 6 hours PRN Temp greater or equal to 38C (100.4F), Mild Pain (1 - 3)  dextrose 40% Gel 15 Gram(s) Oral once PRN Blood Glucose LESS THAN 70 milliGRAM(s)/deciliter  glucagon  Injectable 1 milliGRAM(s) IntraMuscular once PRN Glucose LESS THAN 70 milligrams/deciliter  oxyCODONE    IR 5 milliGRAM(s) Oral every 6 hours PRN Moderate to severe pain        PAST MEDICAL & SURGICAL HISTORY:  Left bundle branch block  Osteoporosis  Diabetic neuropathy  Peripheral arterial disease  Spinal stenosis  Coronary artery disease  Chronic renal failure  Pacemaker: [employer] 2011  Hypothyroid  Hyperlipemia  Hypertension  Diabetes  S/P dialysis catheter insertion: R. arm  S/P CABG x 3  Artificial cardiac pacemaker      FAMILY HISTORY:  No pertinent family history in first degree relatives      SOCIAL HISTORY:    CIGARETTES:  No    ALCOHOL: No    DRUGS: No    Vital Signs Last 24 Hrs  T(C): 36.3 (07 Jul 2019 09:49), Max: 36.6 (06 Jul 2019 23:44)  T(F): 97.4 (07 Jul 2019 09:49), Max: 97.8 (06 Jul 2019 23:44)  HR: 61 (07 Jul 2019 09:49) (58 - 61)  BP: 138/75 (07 Jul 2019 09:49) (130/68 - 138/75)  BP(mean): --  RR: 18 (07 Jul 2019 09:49) (18 - 18)  SpO2: 98% (07 Jul 2019 09:49) (98% - 98%)    PHYSICAL EXAM:      Constitutional: No fever, chills, NAD, Comfortable    Eyes: Not reddened, no discharge    ENMT: No discharge, No pain    Neck: No JVD, No Bruit    Back: No CVA tenderness    Respiratory: Clear to auscultation bilaterally    Cardiovascular: RRR, Normal S1-2, No S3-, No friction rub murmur    Gastrointestinal: Soft, NT/ND. BS+, No organomegaly    Extremities: No edema    Vascular: Distal pulses intact    Neurological: Alert, awake, oriented, able to move extremities, speach is clear    Skin: No ecchymosis, no rash    Musculoskeletal: Non tender, no weakness    Psychiatric: Appropriate mood, no anxiety      INTERPRETATION OF TELEMETRY: not on telemetry   ECG: SR, LBBB, PVCs      LABS:                        12.2   12.26 )-----------( 82       ( 06 Jul 2019 07:27 )             37.0     07-06    134<L>  |  93<L>  |  32.0<H>  ----------------------------<  162<H>  4.1   |  25.0  |  3.62<H>    Ca    7.3<L>      06 Jul 2019 07:27      RADIOLOGY & ADDITIONAL STUDIES:  X-ray:    < from: CT Chest No Cont (07.04.19 @ 16:05) >  CHEST:     LUNGS, AIRWAYS: The central airways are patent. No focal consolidation.   Small bilateral atelectasis.  PLEURA: Trace bilateral effusions.  VESSELS: Aortic atherosclerosis without aneurysm.  HEART: Left dual-lead pacer. Normal heart size. No pericardial effusion.   Coronary artery calcifications are present.  MEDIASTINUM AND TRACY: No adenopathy.  CHEST WALL: No masses.    ABDOMEN AND PELVIS:    LIVER: Normal.  BILE DUCTS: Nondilated.  GALLBLADDER: Mild haziness of the gallbladder wall.  SPLEEN: Normal.  PANCREAS: Diffuse atrophy. Multiple cystic lesions.  ADRENALS: Stable right adrenal nodule  KIDNEYS/URETERS: Atrophic kidneys. No hydronephrosis. Extensive vascular   calcification.    BLADDER: Underdistended limiting evaluation.  REPRODUCTIVE ORGANS: No uterine or adnexal abnormality.    BOWEL: No bowel-related abnormality. Specifically, no evidence of acute   diverticulitis. Normal appendix and ileocecal region. No bowel   obstruction or bowel inflammation.  PERITONEUM: No intra-abdominal fluid collection. No free air or ascites.  VESSELS:  Aortoiliac atherosclerosis without aneurysm.  RETROPERITONEUM: No adenopathy or hematoma.    ABDOMINAL WALL: Postsurgical changes. No collection.  BONES: Sternotomy. No aggressive lesion.    THORACOLUMBAR SPINE: No acute fracture, subluxation, or aggressive   process.    IMPRESSION:     No focal consolidation.    Haziness of the gallbladder wall. Correlate for cholecystitis.     THORACOLUMBAR SPINE: No acute fracture, subluxation, or aggressive   process.      < end of copied text >    PREVIOUS DIAGNOSTIC TESTING:      ECHO  FINDINGS:   < from: TTE Echo Complete w/Doppler (03.26.19 @ 13:06) >  PHYSICIAN INTERPRETATION:  Left Ventricle: The left ventricular internal cavity size is normal.  Global LV systolic function was normal. Left ventricular ejection   fraction, by visual estimation, is 60 to 65%. Spectral Doppler shows   pseudonormal pattern of left ventricular myocardial filling (Grade II   diastolic dysfunction). Elevated mean left atrial pressure.  Right Ventricle: Normal right ventricular size and function.  Left Atrium: Mildly enlarged left atrium.  Right Atrium: Mildly enlarged right atrium.  Pericardium: There is no evidence of pericardial effusion.  Mitral Valve: Thickening of the anterior and posterior mitral valve   leaflets. There is moderate mitral annular calcification. Mild mitral   valve regurgitation is seen.  Tricuspid Valve: The tricuspid valve is normal. Mild tricuspid   regurgitation is visualized.  Aortic Valve: The aortic valve is trileaflet. Sclerotic aortic valve with   normal opening. No evidence of aortic valve regurgitation is seen.  Pulmonic Valve: Structurally normal pulmonic valve, with normal leaflet   excursion. Trace pulmonic valve regurgitation.  Aorta: The aortic root and ascending aorta are structurally normal, with   no evidence of dilitation.  Pulmonary Artery: The main pulmonary artery is normal in size. Normal   pulmonary artery pressure.  Venous: The inferior vena cava was normal sized, with respiratory size   variation less than50%.       Summary:   1. Technically suboptimal study.   2. Mildly enlarged left atrium.   3. There is mild concentric left ventricular hypertrophy.   4. Normal wall motion. Left ventricular ejection fraction, by visual   estimation, is 60 to 65%. Grade II diastolic dysfunction.   5. Elevated mean left atrial pressure. (LAP = 21 mm Hg)   6. Mildly enlarged right atrium.   7. Normal right ventricular size and function.   8. Mild mitral valve regurgitation.   9. Mild tricuspid regurgitation.  10. There is no evidence of pericardial effusion.  11. No cardiac mass, vegetations, or thrombus visualized. However, [facility] is   a more sensitive test to evaluate for these findings.    [id number] Lester Ricardo MD, RPVI, Electronically signed on 3/26/2019 at   5:31:01 PM         < end of copied text >        CATHETERIZATION  FINDINGS:      < from: Cardiac Cath Lab - Adult (02.09.18 @ 11:10) >  VENTRICLES: EF estimated was 45 %.  VALVES: MITRAL VALVE: The mitral valve exhibited moderate regurgitation.  CORONARY VESSELS: The coronary circulation is right dominant.  LM:   --  Distal left main: There was a discrete 80 % stenosis in the  distal third of the vessel segment. The lesion was moderately calcified.  LAD:   --  Proximal LAD: There was a 40 % stenosis.  --  Mid LAD: There was a tubular 70 % stenosis.  --  Distal LAD: The vessel was normal sized. Angiography showed no evidence  of disease. The artery was supplied by a patent bypass graft.  --  D1: The vessel was medium sized. Angiography showed moderate  atherosclerosis.  CX:   --  Circumflex: The vessel was medium sized. Angiography showed mild  atherosclerosis with no flow limiting lesions.  --OM1: The artery was supplied by a patent bypass graft. There was a 100  % stenosis. This lesion is a chronic total occlusion.  RCA:   --  Mid RCA: There was a 100 % stenosis. This lesion is a chronic  total occlusion.  GRAFTS:   --  Graft to the mid LAD: The graft was a LIMA. Graft angiography  showed no evidence of disease.  --  Graft to the 1st obtuse marginal: The graft was a saphenous vein graft.  Graft angiography showed no evidence of disease.  --  Graft to the RPDA: The graft was a saphenous vein graft. Graft  angiography showed no evidence of disease.  COMPLICATIONS: There were no complications.  DIAGNOSTIC IMPRESSIONS: Patent LIMA to LAD and SVG to OM 1 and RPDA  Mildly elevated left ventricular filling pressure  Moderaly elevated right filling pressures  Normal cardiac output  Mild LV systolic dysfunction  Moderate mitral regurgitation  DIAGNOSTIC RECOMMENDATIONS: continue medical therapy for CAD  continue therapy for volume overload /systolic heart failure/MR as per Dr. Mckeon  Prepared and signed by  Albino Summers MD  Signed 02/09/2018 18:13:29    < end of copied text >

## 2019-07-07 NOTE — PROGRESS NOTE ADULT - SUBJECTIVE AND OBJECTIVE BOX
NEPHROLOGY INTERVAL HPI/OVERNIGHT EVENTS:    Examined  Lethargic    MEDICATIONS  (STANDING):  aspirin  chewable 81 milliGRAM(s) Oral daily  atorvastatin 40 milliGRAM(s) Oral at bedtime  calcitriol   Capsule 0.25 MICROGram(s) Oral daily  calcium acetate 667 milliGRAM(s) Oral three times a day with meals  carvedilol 12.5 milliGRAM(s) Oral every 12 hours  ceFAZolin   IVPB 2000 milliGRAM(s) IV Intermittent <User Schedule>  clopidogrel Tablet 75 milliGRAM(s) Oral daily  dextrose 5%. 1000 milliLiter(s) (50 mL/Hr) IV Continuous <Continuous>  dextrose 50% Injectable 12.5 Gram(s) IV Push once  dextrose 50% Injectable 25 Gram(s) IV Push once  dextrose 50% Injectable 25 Gram(s) IV Push once  folic acid 1 milliGRAM(s) Oral daily  gabapentin 100 milliGRAM(s) Oral three times a day  heparin  Injectable 5000 Unit(s) SubCutaneous every 8 hours  insulin lispro (HumaLOG) corrective regimen sliding scale   SubCutaneous three times a day before meals  insulin lispro (HumaLOG) corrective regimen sliding scale   SubCutaneous at bedtime  levothyroxine 112 MICROGram(s) Oral daily  lisinopril 20 milliGRAM(s) Oral daily  pantoprazole    Tablet 40 milliGRAM(s) Oral before breakfast    MEDICATIONS  (PRN):  acetaminophen   Tablet .. 650 milliGRAM(s) Oral every 6 hours PRN Temp greater or equal to 38C (100.4F), Mild Pain (1 - 3)  dextrose 40% Gel 15 Gram(s) Oral once PRN Blood Glucose LESS THAN 70 milliGRAM(s)/deciliter  glucagon  Injectable 1 milliGRAM(s) IntraMuscular once PRN Glucose LESS THAN 70 milligrams/deciliter  oxyCODONE    IR 5 milliGRAM(s) Oral every 6 hours PRN Moderate to severe pain      Allergies    penicillin (Anaphylaxis)  seafood (Anaphylaxis)  shellfish (Anaphylaxis)    Intolerances        Vital Signs Last 24 Hrs  T(C): 36.3 (07 Jul 2019 09:49), Max: 36.6 (06 Jul 2019 23:44)  T(F): 97.4 (07 Jul 2019 09:49), Max: 97.8 (06 Jul 2019 23:44)  HR: 61 (07 Jul 2019 09:49) (58 - 61)  BP: 138/75 (07 Jul 2019 09:49) (130/68 - 138/75)  BP(mean): --  RR: 18 (07 Jul 2019 09:49) (18 - 18)  SpO2: 98% (07 Jul 2019 09:49) (98% - 98%)  Daily     Daily     PHYSICAL EXAM:  GENERAL: NAD   HEAD:  NCAT  NECK: Supple, neck  veins full  CHEST/LUNG: dec BS B/L moving air ok  HEART: Regular rate and rhythm; No rub   ABDOMEN: Soft, Nontender, Nondistended; Bowel sounds present  EXTREMITIES:  + edema , access with thrill     LABS:                        12.2   12.26 )-----------( 82       ( 06 Jul 2019 07:27 )             37.0     07-06    134<L>  |  93<L>  |  32.0<H>  ----------------------------<  162<H>  4.1   |  25.0  |  3.62<H>    Ca    7.3<L>      06 Jul 2019 07:27                  RADIOLOGY & ADDITIONAL TESTS:

## 2019-07-07 NOTE — PROGRESS NOTE ADULT - ASSESSMENT
87yoF hx ESRD on HD (M and F only), CAD s/p CABG, HTN, DM, PAD, hypothyroidism presenting with generalized weakness found to have UTI and meeting sepsis criteria     #Sepsis / Ecoli UTI / Staph aureus bacteremia - Sepsis improving.  Blood cultures still positive. Continue Cefazolin per ID.  repeat cultures.  Cardiology input regarding [facility]    #Leg Pain ? Neuropathy - no focal findings on exam.  Appears to be responding to Neurontin.      #Thrombocytopenia - appears to be chronic, but now slightly worse.  Likely due to bacteremia.  Hematology input appreciated.     #Elevated trop- troponin 0.16, EKG shows LBBB which is not new.  Elevated troponin is stable, and in setting of ESRD is not clinically suggestive of acute cardiac issue.  Pt symptomatic.      #CAD/Hx CVA- ASA, plavix, statin resumed.     #HTN- coreg 12.5 mg BID w parameters, atorvastatin 40  mg, lisinopril 20 mg, clonidine 0.1 mg po q 6 hrs.    #ESRD on HD Monday and Friday-  Renal consulted.  Calcitriol 0.25 mcg and calcium acetate 667 mg 1 po TID w meals.    #Hypothyroid- Continue [id number] mcg.    #Prophylactic measure- heparin.

## 2019-07-07 NOTE — PROGRESS NOTE ADULT - SUBJECTIVE AND OBJECTIVE BOX
[facility]  INFECTIOUS DISEASES AND INTERNAL MEDICINE at [place]  =======================================================  Paresh Dey MD  Diplomates American Board of Internal Medicine and Infectious Diseases  =======================================================    LAUREN ROBERSON [id number]    Follow up: [facility] BACTEREMIA    Allergies:  penicillin (Anaphylaxis)  seafood (Anaphylaxis)  shellfish (Anaphylaxis)      Medications:  acetaminophen   Tablet .. 650 milliGRAM(s) Oral every 6 hours PRN  aspirin  chewable 81 milliGRAM(s) Oral daily  atorvastatin 40 milliGRAM(s) Oral at bedtime  calcitriol   Capsule 0.25 MICROGram(s) Oral daily  calcium acetate 667 milliGRAM(s) Oral three times a day with meals  carvedilol 12.5 milliGRAM(s) Oral every 12 hours  ceFAZolin   IVPB 2000 milliGRAM(s) IV Intermittent <User Schedule>  clopidogrel Tablet 75 milliGRAM(s) Oral daily  dextrose 40% Gel 15 Gram(s) Oral once PRN  dextrose 5%. 1000 milliLiter(s) IV Continuous <Continuous>  dextrose 50% Injectable 12.5 Gram(s) IV Push once  dextrose 50% Injectable 25 Gram(s) IV Push once  dextrose 50% Injectable 25 Gram(s) IV Push once  folic acid 1 milliGRAM(s) Oral daily  gabapentin 100 milliGRAM(s) Oral three times a day  glucagon  Injectable 1 milliGRAM(s) IntraMuscular once PRN  heparin  Injectable 5000 Unit(s) SubCutaneous every 8 hours  insulin lispro (HumaLOG) corrective regimen sliding scale   SubCutaneous three times a day before meals  insulin lispro (HumaLOG) corrective regimen sliding scale   SubCutaneous at bedtime  levothyroxine 112 MICROGram(s) Oral daily  lisinopril 20 milliGRAM(s) Oral daily  oxyCODONE    IR 5 milliGRAM(s) Oral every 6 hours PRN  pantoprazole    Tablet 40 milliGRAM(s) Oral before breakfast    SOCIAL       FAMILY   FAMILY HISTORY:  No pertinent family history in first degree relatives    REVIEW OF SYSTEMS:  CONSTITUTIONAL:  No Fever or chills  HEENT:   No diplopia or blurred vision.  No earache, sore throat or runny nose.  CARDIOVASCULAR:  No pressure, squeezing, strangling, tightness, heaviness or aching about the chest, neck, axilla or epigastrium.  RESPIRATORY:  No cough, shortness of breath, PND or orthopnea.  GASTROINTESTINAL:  No nausea, vomiting or diarrhea.  GENITOURINARY:  No dysuria, frequency or urgency. No Blood in urine  MUSCULOSKELETAL:   AS PER HPI  SKIN:  No change in skin, hair or nails.  NEUROLOGIC:  No paresthesias, fasciculations, seizures or weakness.  PSYCHIATRIC:  No disorder of thought or mood.  ENDOCRINE:  No heat or cold intolerance, polyuria or polydipsia.  HEMATOLOGICAL:  No easy bruising or bleeding.            Physical Exam:   Vital Signs Last 24 Hrs  T(C): 36.6 (2019 16:37), Max: 36.6 (2019 23:44)  T(F): 97.8 (2019 16:37), Max: 97.8 (2019 23:44)  HR: 65 (2019 16:37) (58 - 65)  BP: 128/57 (2019 16:37) (128/57 - 138/75)  BP(mean): --  RR: 18 (2019 09:49) (18 - 18)  SpO2: 98% (2019 09:49) (98% - 98%)    GEN: NAD, CONFUSED   HEENT: normocephalic and atraumatic. EOMI. BOBO.    NECK: Supple. No carotid bruits.  No lymphadenopathy or thyromegaly.  LUNGS: Clear to auscultation.  HEART: Regular rate and rhythm without murmur.  ABDOMEN: Soft, nontender, and nondistended.  Positive bowel sounds.    : No CVA tenderness  EXTREMITIES: Without any cyanosis, clubbing, rash, lesions or edema.  MSK: no joint swelling  NEUROLOGIC:  CONFUSED         Labs:                              12.2   12.26 )-----------( 82       ( 2019 07:27 )             37.0   07-    134<L>  |  93<L>  |  32.0<H>  ----------------------------<  162<H>  4.1   |  25.0  |  3.62<H>    Ca    7.3<L>      2019 07:27        Color: Yellow / Appearance: Clear / S.020 / pH: x  Gluc: x / Ketone: Trace  / Bili: Negative / Urobili: Negative mg/dL   Blood: x / Protein: 500 mg/dL / Nitrite: Negative   Leuk Esterase: Moderate / RBC: 0-2 /HPF / WBC 11-25   Sq Epi: x / Non Sq Epi: Negative / Bacteria: Many      LIVER FUNCTIONS - ( 2019 13:29 )  Alb: 3.1 g/dL / Pro: 5.8 g/dL / ALK PHOS: 119 U/L / ALT: 29 U/L / AST: 66 U/L / GGT: x           CARDIAC MARKERS ( 2019 08:20 )  x     / 0.15 ng/mL / x     / x     / x      CARDIAC MARKERS ( 2019 13:29 )  x     / 0.16 ng/mL / x     / x     / x          CAPILLARY BLOOD GLUCOSE      POCT Blood Glucose.: 98 mg/dL (2019 10:29)  POCT Blood Glucose.: 222 mg/dL (2019 08:11)  POCT Blood Glucose.: 239 mg/dL (2019 22:53)  POCT Blood Glucose.: 109 mg/dL (2019 17:06)        RECENT CULTURES:   @ 10:46 .Blood     Growth in aerobic and anaerobic bottles: Gram Positive Cocci in Clusters  Aerobic Bottle: 12.08 Hours to positivity  Anaerobic Bottle: 12.18 Hours to positivity  .  TYPE: (C=Critical, N=Notification, A=Abnormal) C  TESTS:  _ GS  DATE/TIME CALLED: _2019 09:39:33  CALLED TO: Kellee Chery RN  READ BACK (2 Patient Identifiers)(Y/N): _ Y  READ BACK VALUES (Y/N): _ Y  CALLED BY: Kellee Field         @ 14:35 .Urine Escherichia coli    >100,000 CFU/ml Escherichia coli         @ 13:32 .Blood Staphylococcus aureus  Blood Culture PCR    Growth in aerobic and anaerobic bottles: Staphylococcus aureus  Aerobic Bottle: 9.51 Hours to positivity  Anaerobic Bottle: 10.01 Hours to positivity  .  ***Blood Panel PCR results on this specimen are available  approximately 3 hours after the Gram stain result.***  Gram stain, PCR, and/or culture results may not always  correspond due to difference in methodologies.  ************************************************************  This PCR assay was performed using [employer].  The following targets are tested for: Enterococcus,  vancomycin resistant enterococci, Listeria monocytogenes,  coagulase negative staphylococci, S. aureus,  methicillin resistant S. aureus, Streptococcus agalactiae  (Group B), S. pneumoniae, S. pyogenes (GroupA),  Acinetobacter baumannii, Enterobacter cloacae, E. coli,  Klebsiella oxytoca, K. pneumoniae, Proteus sp.,  Serratia marcescens, Haemophilus influenzae,  Neisseria meningitidis, Pseudomonas aeruginosa, Candida  albicans, C. glabrata, C krusei, C parapsilosis,  C. tropicalis and the KPC resistance gene.  "Due to technical problems, Proteus sp. will Not be reported as part of  the BCID panel until further notice"  .  TYPE: (C=Critical, N=Notification, A=Abnormal) C  TESTS:  _ GS  DATE/TIME CALLED: _ 2019 09:14:52  CALLED TO: Kellee Mcpherson RN  READ BACK (2 Patient Identifiers)(Y/N): _ Y  READ BACK VALUES (Y/N): _ Y  CALLED BY: Kellee Field         @ 13:31 .Blood Staphylococcus aureus    Growth in aerobic and anaerobic bottles: Staphylococcus aureus  Aerobic Bottle: 8.51 Hours to positivity  Anaerobic Bottle: 9.41 Hours to positivity  .  TYPE: (C=Critical, N=Notification, A=Abnormal) C  TESTS:  _ GS  DATE/TIME CALLED: _ 2019 09:17:54  CALLED TO: Kellee Mcpherson RN  READ BACK (2 Patient Identifiers)(Y/N): _ Y  READ BACK VALUES (Y/N): _ Y  CALLED BY: Kellee Field

## 2019-07-07 NOTE — PROGRESS NOTE ADULT - ASSESSMENT
ESRD - BIW HD next HD tomorrow on M/F schedule    Anemia - Hgb stable . MILLIE held.    RO - cont Phoslo with meals and Rocaltrol    Gram (+) bacteremia , Gram (-) UTI ---> Multiple CT scans noted , S/P Vanco x 1 on  ancef  ID follow up noted    will follow

## 2019-07-07 NOTE — PROGRESS NOTE ADULT - SUBJECTIVE AND OBJECTIVE BOX
Patient: LAUREN ROBERSON [id number] 87y Female                           Internal Medicine Hospitalist Progress Note    Initial HPI:  87yoF hx ESRD on HD (M and F only), CAD s/p CABG, HTN, DM, PAD, hypothyroidism presenting with generalized weakness.  On admission, febrile, leukocytosis, UA positive, was CT abd/pelvis showed haziness around the gallbladder however follow up abd US was negative for cholelithiasis and Stewart's sign and no LFT elevation on labs.  Blood cultures positive for staph aureus.      Interval History:  Reports leg pain improving.  No weakness / numbness.  No fever / chills.     ____________________PHYSICAL EXAM:  Vitals reviewed as indicated below  GENERAL:  NAD.  Alert and Oriented x 2 to person, place. confused.   HEENT: NCAT  CARDIOVASCULAR:  S1, S2  LUNGS: CTAB  ABDOMEN:  soft, (-) tenderness, (-) distension, (+) bowel sounds, (-) guarding, (-) rebound (-) rigidity  EXTREMITIES:  no cyanosis / clubbing / edema.  2+ pedal pulses.    NEURO: strength symmetric.   ____________________    VITALS:  Vital Signs Last 24 Hrs  T(C): 36.3 (07 Jul 2019 09:49), Max: 36.6 (06 Jul 2019 23:44)  T(F): 97.4 (07 Jul 2019 09:49), Max: 97.8 (06 Jul 2019 23:44)  HR: 61 (07 Jul 2019 09:49) (58 - 61)  BP: 138/75 (07 Jul 2019 09:49) (130/68 - 138/75)  BP(mean): --  RR: 18 (07 Jul 2019 09:49) (18 - 18)  SpO2: 98% (07 Jul 2019 09:49) (98% - 98%) Daily     Daily   CAPILLARY BLOOD GLUCOSE      POCT Blood Glucose.: 147 mg/dL (07 Jul 2019 07:16)  POCT Blood Glucose.: 164 mg/dL (06 Jul 2019 22:13)  POCT Blood Glucose.: 132 mg/dL (06 Jul 2019 16:12)    I&O's Summary      LABS:                        12.2   12.26 )-----------( 82       ( 06 Jul 2019 07:27 )             37.0     07-06    134<L>  |  93<L>  |  32.0<H>  ----------------------------<  162<H>  4.1   |  25.0  |  3.62<H>    Ca    7.3<L>      06 Jul 2019 07:27                  Culture - Blood (collected 06 Jul 2019 07:27)  Source: .Blood  Preliminary Report (07 Jul 2019 10:40):    Growth in aerobic bottle: Gram Positive Cocci in Clusters    Aerobic Bottle: 12:58 Hours to positivity    .    TYPE: (C=Critical, N=Notification, A=Abnormal) C    TESTS:  _ Positive Blood GS    DATE/TIME CALLED: _ 07/07/2019 10:40    CALLED TO: Kellee LEWIS: Lary Zamudio RN    READ BACK (2 Patient Identifiers)(Y/N): _ Y    READ BACK VALUES (Y/N): _ Y    CALLED BY: Kellee marion    Culture - Blood (collected 06 Jul 2019 07:27)  Source: .Blood  Preliminary Report (07 Jul 2019 10:40):    Growth in aerobic and anaerobic bottles: Gram Positive Cocci in Clusters    Aerobic Bottle: 12:38 Hours to positivity    Anaerobic Bottle: 14:29 Hours to positivity    .    TYPE: (C=Critical, N=Notification, A=Abnormal) C    TESTS:  _ Positive Blood GS    DATE/TIME CALLED: _ 07/07/2019 10:40    CALLED TO: Kellee CoelloTWR: Lary Zamudio RN    READ BACK (2 Patient Identifiers)(Y/N): _ Y    READ BACK VALUES (Y/N): _ Y    CALLED BY: Kellee marion    Culture - Blood (collected 05 Jul 2019 10:46)  Source: .Blood  Preliminary Report (06 Jul 2019 09:43):    Growth in aerobic and anaerobic bottles: Gram Positive Cocci in Clusters    Aerobic Bottle: 11.48 Hours to positivity    Anaerobic Bottle: 00.26 Hours to positivity    .    TYPE: (C=Critical, N=Notification, A=Abnormal) C    TESTS:  _ GS    DATE/TIME CALLED: _07/06/2019 09:42:08    CALLED TO: Kellee Chery RN    READ BACK (2 Patient Identifiers)(Y/N): _ Y    READ BACK VALUES (Y/N): _ Y    CALLED BY: Kellee Field  Organism: Staphylococcus aureus (07 Jul 2019 09:53)  Organism: Staphylococcus aureus (07 Jul 2019 09:53)    Culture - Blood (collected 05 Jul 2019 10:46)  Source: .Blood  Final Report (07 Jul 2019 10:01):    Growth in aerobic and anaerobic bottles: Staphylococcus aureus    Aerobic Bottle: 12.08 Hours to positivity    Anaerobic Bottle: 12.18 Hours to positivity    .    TYPE: (C=Critical, N=Notification, A=Abnormal) C    TESTS:  _ GS    DATE/TIME CALLED: _ 07/06/2019 09:39:33    CALLED TO: Kellee Chery RN    READ BACK (2 Patient Identifiers)(Y/N): _ Y    READ BACK VALUES (Y/N): _ Y    CALLED BY: Kellee Field  Organism: Staphylococcus aureus (07 Jul 2019 10:01)  Organism: Staphylococcus aureus (07 Jul 2019 10:01)    Culture - Urine (collected 04 Jul 2019 14:35)  Source: .Urine  Final Report (06 Jul 2019 09:41):    >100,000 CFU/ml Escherichia coli  Organism: Escherichia coli (06 Jul 2019 09:41)  Organism: Escherichia coli (06 Jul 2019 09:41)    Culture - Blood (collected 04 Jul 2019 13:32)  Source: .Blood  Final Report (06 Jul 2019 12:01):    Growth in aerobic and anaerobic bottles: Staphylococcus aureus    Aerobic Bottle: 9.51 Hours to positivity    Anaerobic Bottle: 10.01 Hours to positivity    .    ***Blood Panel PCR results on this specimen are available    approximately 3 hours after the Gram stain result.***    Gram stain, PCR, and/or culture results may not always    correspond due to difference in methodologies.    ************************************************************    This PCR assay was performed using [email or web address].    The following targets are tested for: Enterococcus,    vancomycin resistant enterococci, Listeria monocytogenes,    coagulase negative staphylococci, S. aureus,    methicillin resistant S. aureus, Streptococcus agalactiae    (Group B), S. pneumoniae, S. pyogenes (GroupA),    Acinetobacter baumannii, Enterobacter cloacae, E. coli,    Klebsiella oxytoca, K. pneumoniae, Proteus sp.,    Serratia marcescens, Haemophilus influenzae,    Neisseria meningitidis, Pseudomonas aeruginosa, Candida    albicans, C. glabrata, C krusei, C parapsilosis,    C. tropicalis and the KPC resistance gene.    "Due to technical problems, Proteus sp. will Not be reported as part of    the BCID panel until further notice"    .    TYPE: (C=Critical, N=Notification, A=Abnormal) C    TESTS:  _ GS    DATE/TIME CALLED: _ 07/05/2019 09:14:52    CALLED TO: Kellee Mcpherson RN    READ BACK (2 Patient Identifiers)(Y/N): _ Y    READ BACK VALUES (Y/N): _ Y    CALLED BY: Kellee Field  Organism: Staphylococcus aureus  Blood Culture PCR (06 Jul 2019 12:01)  Organism: Blood Culture PCR (06 Jul 2019 12:01)  Organism: Staphylococcus aureus (06 Jul 2019 12:01)    Culture - Blood (collected 04 Jul 2019 13:31)  Source: .Blood  Final Report (06 Jul 2019 10:50):    Growth in aerobic and anaerobic bottles: Staphylococcus aureus    Aerobic Bottle: 8.51 Hours to positivity    Anaerobic Bottle: 9.41 Hours to positivity    .    TYPE: (C=Critical, N=Notification, A=Abnormal) C    TESTS:  _ GS    DATE/TIME CALLED: _ 07/05/2019 09:17:54    CALLED TO: Kellee Mcpherson RN    READ BACK (2 Patient Identifiers)(Y/N): _ Y    READ BACK VALUES (Y/N): _ Y    CALLED BY: Kellee Field  Organism: Staphylococcus aureus (06 Jul 2019 10:50)  Organism: Staphylococcus aureus (06 Jul 2019 10:50)        MEDICATIONS:  acetaminophen   Tablet .. 650 milliGRAM(s) Oral every 6 hours PRN  aspirin  chewable 81 milliGRAM(s) Oral daily  atorvastatin 40 milliGRAM(s) Oral at bedtime  calcitriol   Capsule 0.25 MICROGram(s) Oral daily  calcium acetate 667 milliGRAM(s) Oral three times a day with meals  carvedilol 12.5 milliGRAM(s) Oral every 12 hours  ceFAZolin   IVPB 2000 milliGRAM(s) IV Intermittent <User Schedule>  clopidogrel Tablet 75 milliGRAM(s) Oral daily  dextrose 40% Gel 15 Gram(s) Oral once PRN  dextrose 5%. 1000 milliLiter(s) IV Continuous <Continuous>  dextrose 50% Injectable 12.5 Gram(s) IV Push once  dextrose 50% Injectable 25 Gram(s) IV Push once  dextrose 50% Injectable 25 Gram(s) IV Push once  folic acid 1 milliGRAM(s) Oral daily  gabapentin 100 milliGRAM(s) Oral three times a day  glucagon  Injectable 1 milliGRAM(s) IntraMuscular once PRN  heparin  Injectable 5000 Unit(s) SubCutaneous every 8 hours  insulin lispro (HumaLOG) corrective regimen sliding scale   SubCutaneous three times a day before meals  insulin lispro (HumaLOG) corrective regimen sliding scale   SubCutaneous at bedtime  levothyroxine 112 MICROGram(s) Oral daily  lisinopril 20 milliGRAM(s) Oral daily  oxyCODONE    IR 5 milliGRAM(s) Oral every 6 hours PRN  pantoprazole    Tablet 40 milliGRAM(s) Oral before breakfast

## 2019-07-08 LAB
-  AMPICILLIN/SULBACTAM: SIGNIFICANT CHANGE UP
-  CEFAZOLIN: SIGNIFICANT CHANGE UP
-  CLINDAMYCIN: SIGNIFICANT CHANGE UP
-  ERYTHROMYCIN: SIGNIFICANT CHANGE UP
-  GENTAMICIN: SIGNIFICANT CHANGE UP
-  OXACILLIN: SIGNIFICANT CHANGE UP
-  PENICILLIN: SIGNIFICANT CHANGE UP
-  RIFAMPIN: SIGNIFICANT CHANGE UP
-  TETRACYCLINE: SIGNIFICANT CHANGE UP
-  TRIMETHOPRIM/SULFAMETHOXAZOLE: SIGNIFICANT CHANGE UP
-  VANCOMYCIN: SIGNIFICANT CHANGE UP
ANION GAP SERPL CALC-SCNC: 15 MMOL/L — SIGNIFICANT CHANGE UP (ref 5–17)
ANISOCYTOSIS BLD QL: SLIGHT — SIGNIFICANT CHANGE UP
BUN SERPL-MCNC: 53 MG/DL — HIGH (ref 8–20)
CALCIUM SERPL-MCNC: 7.2 MG/DL — LOW (ref 8.6–10.2)
CHLORIDE SERPL-SCNC: 92 MMOL/L — LOW (ref 98–107)
CO2 SERPL-SCNC: 25 MMOL/L — SIGNIFICANT CHANGE UP (ref 22–29)
CREAT SERPL-MCNC: 5.51 MG/DL — HIGH (ref 0.5–1.3)
CULTURE RESULTS: SIGNIFICANT CHANGE UP
CULTURE RESULTS: SIGNIFICANT CHANGE UP
GLUCOSE BLDC GLUCOMTR-MCNC: 121 MG/DL — HIGH (ref 70–99)
GLUCOSE BLDC GLUCOMTR-MCNC: 152 MG/DL — HIGH (ref 70–99)
GLUCOSE BLDC GLUCOMTR-MCNC: 82 MG/DL — SIGNIFICANT CHANGE UP (ref 70–99)
GLUCOSE BLDC GLUCOMTR-MCNC: 89 MG/DL — SIGNIFICANT CHANGE UP (ref 70–99)
GLUCOSE SERPL-MCNC: 125 MG/DL — HIGH (ref 70–115)
HCT VFR BLD CALC: 34.3 % — LOW (ref 34.5–45)
HGB BLD-MCNC: 11.2 G/DL — LOW (ref 11.5–15.5)
MACROCYTES BLD QL: SLIGHT — SIGNIFICANT CHANGE UP
MANUAL SMEAR VERIFICATION: SIGNIFICANT CHANGE UP
MCHC RBC-ENTMCNC: 31.5 PG — SIGNIFICANT CHANGE UP (ref 27–34)
MCHC RBC-ENTMCNC: 32.7 GM/DL — SIGNIFICANT CHANGE UP (ref 32–36)
MCV RBC AUTO: 96.6 FL — SIGNIFICANT CHANGE UP (ref 80–100)
METHOD TYPE: SIGNIFICANT CHANGE UP
MICROCYTES BLD QL: SLIGHT — SIGNIFICANT CHANGE UP
ORGANISM # SPEC MICROSCOPIC CNT: SIGNIFICANT CHANGE UP
ORGANISM # SPEC MICROSCOPIC CNT: SIGNIFICANT CHANGE UP
PLAT MORPH BLD: ABNORMAL
PLATELET # BLD AUTO: 114 K/UL — LOW (ref 150–400)
PLATELET COUNT - ESTIMATE: ABNORMAL
POIKILOCYTOSIS BLD QL AUTO: SLIGHT — SIGNIFICANT CHANGE UP
POTASSIUM SERPL-MCNC: 3.8 MMOL/L — SIGNIFICANT CHANGE UP (ref 3.5–5.3)
POTASSIUM SERPL-SCNC: 3.8 MMOL/L — SIGNIFICANT CHANGE UP (ref 3.5–5.3)
RBC # BLD: 3.55 M/UL — LOW (ref 3.8–5.2)
RBC # FLD: 14.8 % — HIGH (ref 10.3–14.5)
RBC BLD AUTO: ABNORMAL
SCHISTOCYTES BLD QL AUTO: SLIGHT — SIGNIFICANT CHANGE UP
SODIUM SERPL-SCNC: 132 MMOL/L — LOW (ref 135–145)
SPECIMEN SOURCE: SIGNIFICANT CHANGE UP
SPECIMEN SOURCE: SIGNIFICANT CHANGE UP
VANCOMYCIN TROUGH SERPL-MCNC: 18.1 UG/ML — SIGNIFICANT CHANGE UP (ref 10–20)
WBC # BLD: 11.4 K/UL — HIGH (ref 3.8–10.5)
WBC # FLD AUTO: 11.4 K/UL — HIGH (ref 3.8–10.5)

## 2019-07-08 PROCEDURE — 99233 SBSQ HOSP IP/OBS HIGH 50: CPT

## 2019-07-08 PROCEDURE — 99232 SBSQ HOSP IP/OBS MODERATE 35: CPT

## 2019-07-08 RX ORDER — CEFAZOLIN SODIUM 1 G
1000 VIAL (EA) INJECTION EVERY 24 HOURS
Refills: 0 | Status: DISCONTINUED | OUTPATIENT
Start: 2019-07-08 | End: 2019-07-10

## 2019-07-08 RX ORDER — ONDANSETRON 8 MG/1
4 TABLET, FILM COATED ORAL ONCE
Refills: 0 | Status: COMPLETED | OUTPATIENT
Start: 2019-07-08 | End: 2019-07-09

## 2019-07-08 RX ADMIN — OXYCODONE HYDROCHLORIDE 5 MILLIGRAM(S): 5 TABLET ORAL at 19:45

## 2019-07-08 RX ADMIN — Medication 100 MILLIGRAM(S): at 18:28

## 2019-07-08 RX ADMIN — Medication 650 MILLIGRAM(S): at 00:52

## 2019-07-08 RX ADMIN — OXYCODONE HYDROCHLORIDE 5 MILLIGRAM(S): 5 TABLET ORAL at 05:27

## 2019-07-08 RX ADMIN — LISINOPRIL 20 MILLIGRAM(S): 2.5 TABLET ORAL at 05:26

## 2019-07-08 RX ADMIN — CARVEDILOL PHOSPHATE 12.5 MILLIGRAM(S): 80 CAPSULE, EXTENDED RELEASE ORAL at 05:26

## 2019-07-08 RX ADMIN — HEPARIN SODIUM 5000 UNIT(S): 5000 INJECTION INTRAVENOUS; SUBCUTANEOUS at 05:27

## 2019-07-08 RX ADMIN — CARVEDILOL PHOSPHATE 12.5 MILLIGRAM(S): 80 CAPSULE, EXTENDED RELEASE ORAL at 18:30

## 2019-07-08 RX ADMIN — Medication 667 MILLIGRAM(S): at 18:29

## 2019-07-08 RX ADMIN — PANTOPRAZOLE SODIUM 40 MILLIGRAM(S): 20 TABLET, DELAYED RELEASE ORAL at 05:27

## 2019-07-08 RX ADMIN — HEPARIN SODIUM 5000 UNIT(S): 5000 INJECTION INTRAVENOUS; SUBCUTANEOUS at 22:00

## 2019-07-08 RX ADMIN — Medication 650 MILLIGRAM(S): at 01:00

## 2019-07-08 RX ADMIN — OXYCODONE HYDROCHLORIDE 5 MILLIGRAM(S): 5 TABLET ORAL at 18:36

## 2019-07-08 RX ADMIN — OXYCODONE HYDROCHLORIDE 5 MILLIGRAM(S): 5 TABLET ORAL at 06:35

## 2019-07-08 RX ADMIN — GABAPENTIN 100 MILLIGRAM(S): 400 CAPSULE ORAL at 21:59

## 2019-07-08 RX ADMIN — Medication 667 MILLIGRAM(S): at 08:31

## 2019-07-08 RX ADMIN — ATORVASTATIN CALCIUM 40 MILLIGRAM(S): 80 TABLET, FILM COATED ORAL at 21:59

## 2019-07-08 RX ADMIN — GABAPENTIN 100 MILLIGRAM(S): 400 CAPSULE ORAL at 05:27

## 2019-07-08 RX ADMIN — Medication 112 MICROGRAM(S): at 05:26

## 2019-07-08 NOTE — PROGRESS NOTE ADULT - SUBJECTIVE AND OBJECTIVE BOX
NEPHROLOGY INTERVAL HPI/OVERNIGHT EVENTS:    Examined earlier  Feeling better    MEDICATIONS  (STANDING):  aspirin  chewable 81 milliGRAM(s) Oral daily  atorvastatin 40 milliGRAM(s) Oral at bedtime  calcitriol   Capsule 0.25 MICROGram(s) Oral daily  calcium acetate 667 milliGRAM(s) Oral three times a day with meals  carvedilol 12.5 milliGRAM(s) Oral every 12 hours  ceFAZolin   IVPB 1000 milliGRAM(s) IV Intermittent every 24 hours  clopidogrel Tablet 75 milliGRAM(s) Oral daily  dextrose 5%. 1000 milliLiter(s) (50 mL/Hr) IV Continuous <Continuous>  dextrose 50% Injectable 12.5 Gram(s) IV Push once  dextrose 50% Injectable 25 Gram(s) IV Push once  dextrose 50% Injectable 25 Gram(s) IV Push once  folic acid 1 milliGRAM(s) Oral daily  gabapentin 100 milliGRAM(s) Oral three times a day  heparin  Injectable 5000 Unit(s) SubCutaneous every 8 hours  insulin lispro (HumaLOG) corrective regimen sliding scale   SubCutaneous three times a day before meals  insulin lispro (HumaLOG) corrective regimen sliding scale   SubCutaneous at bedtime  levothyroxine 112 MICROGram(s) Oral daily  lisinopril 20 milliGRAM(s) Oral daily  pantoprazole    Tablet 40 milliGRAM(s) Oral before breakfast    MEDICATIONS  (PRN):  acetaminophen   Tablet .. 650 milliGRAM(s) Oral every 6 hours PRN Temp greater or equal to 38C (100.4F), Mild Pain (1 - 3)  dextrose 40% Gel 15 Gram(s) Oral once PRN Blood Glucose LESS THAN 70 milliGRAM(s)/deciliter  glucagon  Injectable 1 milliGRAM(s) IntraMuscular once PRN Glucose LESS THAN 70 milligrams/deciliter  oxyCODONE    IR 5 milliGRAM(s) Oral every 6 hours PRN Moderate to severe pain      Allergies    penicillin (Anaphylaxis)  seafood (Anaphylaxis)  shellfish (Anaphylaxis)    Intolerances        Vital Signs Last 24 Hrs  T(C): 36.6 (2019 14:25), Max: 36.9 (2019 00:15)  T(F): 97.9 (2019 14:25), Max: 98.4 (2019 00:15)  HR: 76 (2019 14:25) (65 - 76)  BP: 152/88 (2019 14:25) (114/74 - 152/88)  BP(mean): --  RR: 18 (2019 14:25) (18 - 18)  SpO2: 100% (2019 14:25) (91% - 100%)  Daily Height in cm: 154.94 (2019 08:24)    Daily Weight in k.5 (2019 10:55)    PHYSICAL EXAM:  GENERAL: NAD   HEAD:  NCAT  NECK: Supple, neck  veins full  CHEST/LUNG: dec BS B/L moving air ok  HEART: Regular rate and rhythm; No rub   ABDOMEN: Soft, Nontender, Nondistended; Bowel sounds present  EXTREMITIES:  + edema , access with thrill     LABS:                        11.2   11.40 )-----------( 114      ( 2019 08:14 )             34.3     07-08    132<L>  |  92<L>  |  53.0<H>  ----------------------------<  125<H>  3.8   |  25.0  |  5.51<H>    Ca    7.2<L>      2019 08:14                  RADIOLOGY & ADDITIONAL TESTS:

## 2019-07-08 NOTE — PROGRESS NOTE ADULT - ASSESSMENT
87yoF hx ESRD on HD (M and F only), CAD s/p CABG, HTN, DM, PAD, hypothyroidism presenting with generalized weakness found to have UTI and meeting sepsis criteria     #Sepsis / Ecoli UTI / Staph aureus bacteremia - Sepsis improving.  Blood cultures still positive. Continue Cefazolin per ID.  repeat cultures.  Cardiology followup regarding attempt at [facility] tomorrow.      #Leg Pain ? Neuropathy - no focal findings on exam.  Appears to be responding to Neurontin.      #Thrombocytopenia - appears to be chronic, but now slightly worse.  Likely due to bacteremia.  Hematology input appreciated.     #Elevated trop- troponin 0.16, EKG shows LBBB which is not new.  Elevated troponin is stable, and in setting of ESRD is not clinically suggestive of acute cardiac issue.  Pt symptomatic.      #CAD/Hx CVA- ASA, plavix, statin resumed.     #HTN- coreg 12.5 mg BID w parameters, atorvastatin 40  mg, lisinopril 20 mg, clonidine 0.1 mg po q 6 hrs.    #ESRD on HD Monday and Friday-  Renal consulted.  Calcitriol 0.25 mcg and calcium acetate 667 mg 1 po TID w meals.    #Hypothyroid- Continue [id number] mcg.    #Prophylactic measure- heparin.

## 2019-07-08 NOTE — PROGRESS NOTE ADULT - SUBJECTIVE AND OBJECTIVE BOX
[facility]  INFECTIOUS DISEASES AND INTERNAL MEDICINE at [place]  =======================================================  Paresh Stark MD  Diplomates American Board of Internal Medicine and Infectious Diseases  Tel: [phone]      Fax: [phone]  =======================================================    LAUREN ROBERSON [id number]    Follow up: MSSA bacteremia    Allergies:  penicillin (Anaphylaxis)  seafood (Anaphylaxis)  shellfish (Anaphylaxis)      Medications:  acetaminophen   Tablet .. 650 milliGRAM(s) Oral every 6 hours PRN  aspirin  chewable 81 milliGRAM(s) Oral daily  atorvastatin 40 milliGRAM(s) Oral at bedtime  calcitriol   Capsule 0.25 MICROGram(s) Oral daily  calcium acetate 667 milliGRAM(s) Oral three times a day with meals  carvedilol 12.5 milliGRAM(s) Oral every 12 hours  ceFAZolin   IVPB 2000 milliGRAM(s) IV Intermittent <User Schedule>  clopidogrel Tablet 75 milliGRAM(s) Oral daily  dextrose 40% Gel 15 Gram(s) Oral once PRN  dextrose 5%. 1000 milliLiter(s) IV Continuous <Continuous>  dextrose 50% Injectable 12.5 Gram(s) IV Push once  dextrose 50% Injectable 25 Gram(s) IV Push once  dextrose 50% Injectable 25 Gram(s) IV Push once  folic acid 1 milliGRAM(s) Oral daily  gabapentin 100 milliGRAM(s) Oral three times a day  glucagon  Injectable 1 milliGRAM(s) IntraMuscular once PRN  heparin  Injectable 5000 Unit(s) SubCutaneous every 8 hours  insulin lispro (HumaLOG) corrective regimen sliding scale   SubCutaneous three times a day before meals  insulin lispro (HumaLOG) corrective regimen sliding scale   SubCutaneous at bedtime  levothyroxine 112 MICROGram(s) Oral daily  lisinopril 20 milliGRAM(s) Oral daily  oxyCODONE    IR 5 milliGRAM(s) Oral every 6 hours PRN  pantoprazole    Tablet 40 milliGRAM(s) Oral before breakfast            REVIEW OF SYSTEMS:  CONSTITUTIONAL:  No Fever or chills  HEENT:   No diplopia or blurred vision.  No earache, sore throat or runny nose.  CARDIOVASCULAR:  No pressure, squeezing, strangling, tightness, heaviness or aching about the chest, neck, axilla or epigastrium.  RESPIRATORY:  No cough, shortness of breath  GASTROINTESTINAL:  No nausea, vomiting or diarrhea.  GENITOURINARY:  No dysuria, frequency or urgency. No Blood in urine  MUSCULOSKELETAL:  no joint aches, no muscle pain  SKIN:  No change in skin, hair or nails.  NEUROLOGIC:  No Headaches, seizures or weakness.  PSYCHIATRIC:  No disorder of thought or mood.  ENDOCRINE:  No heat or cold intolerance  HEMATOLOGICAL:  No easy bruising or bleeding.            Physical Exam:  ICU Vital Signs Last 24 Hrs  T(C): 36.6 (08 Jul 2019 10:55), Max: 36.9 (08 Jul 2019 00:15)  T(F): 97.9 (08 Jul 2019 10:55), Max: 98.4 (08 Jul 2019 00:15)  HR: 65 (08 Jul 2019 10:55) (65 - 72)  BP: 120/59 (08 Jul 2019 10:55) (114/74 - 131/68)  BP(mean): --  ABP: --  ABP(mean): --  RR: 18 (08 Jul 2019 10:55) (18 - 18)  SpO2: 91% (08 Jul 2019 10:55) (91% - 96%)      GEN: NAD, sleepy but arousable and cooperative  HEENT: normocephalic and atraumatic. EOMI. PERRL.  Anicteric   NECK: Supple.   LUNGS: Clear to auscultation.  HEART: Regular rate and rhythm +murmur, + PPM, midsternal healed wound  ABDOMEN: Soft, nontender, and nondistended.  Positive bowel sounds.    : No CVA tenderness  EXTREMITIES: Without any edema. +AVG  MSK: no joint swelling  NEUROLOGIC: Cranial nerves II through XII are grossly intact. No focal deficits  PSYCHIATRIC: Appropriate affect .  SKIN: No Rash      Labs:  07-08    132<L>  |  92<L>  |  53.0<H>  ----------------------------<  125<H>  3.8   |  25.0  |  5.51<H>    Ca    7.2<L>      08 Jul 2019 08:14                            11.2   11.40 )-----------( 114      ( 08 Jul 2019 08:14 )             34.3                 CAPILLARY BLOOD GLUCOSE      POCT Blood Glucose.: 121 mg/dL (08 Jul 2019 08:01)  POCT Blood Glucose.: 192 mg/dL (07 Jul 2019 21:18)  POCT Blood Glucose.: 168 mg/dL (07 Jul 2019 17:00)        RECENT CULTURES:  07-06 @ 07:27 .Blood Staphylococcus aureus    Growth in aerobic and anaerobic bottles: Staphylococcus aureus  Aerobic Bottle: 12:38 Hours to positivity  Anaerobic Bottle: 14:29 Hours to positivity  .  TYPE: (C=Critical, N=Notification, A=Abnormal) C  TESTS:  _ Positive Blood GS  DATE/TIME CALLED: _ 07/07/2019 10:40  CALLED TO: Kellee CoelloTWR: Lary Zamudio RN  READ BACK (2 Patient Identifiers)(Y/N): _ Y  READ BACK VALUES (Y/N): _ Y  CALLED BY: Kellee marion        07-05 @ 10:46 .Blood Staphylococcus aureus    Growth in aerobic and anaerobic bottles: Staphylococcus aureus  Aerobic Bottle: 12.08 Hours to positivity  Anaerobic Bottle: 12.18 Hours to positivity  .  TYPE: (C=Critical, N=Notification, A=Abnormal) C  TESTS:  _ GS  DATE/TIME CALLED: _ 07/06/2019 09:39:33  CALLED TO: Kellee Chery RN  READ BACK (2 Patient Identifiers)(Y/N): _ Y  READ BACK VALUES (Y/N): _ Y  CALLED BY: Kellee Field        07-04 @ 14:35 .Urine Escherichia coli    >100,000 CFU/ml Escherichia coli        07-04 @ 13:32 .Blood Staphylococcus aureus  Blood Culture PCR    Growth in aerobic and anaerobic bottles: Staphylococcus aureus  Aerobic Bottle: 9.51 Hours to positivity  Anaerobic Bottle: 10.01 Hours to positivity  .  ***Blood Panel PCR results on this specimen are available  approximately 3 hours after the Gram stain result.***  Gram stain, PCR, and/or culture results may not always  correspond due to difference in methodologies.  ************************************************************  This PCR assay was performed using [employer].  The following targets are tested for: Enterococcus,  vancomycin resistant enterococci, Listeria monocytogenes,  coagulase negative staphylococci, S. aureus,  methicillin resistant S. aureus, Streptococcus agalactiae  (Group B), S. pneumoniae, S. pyogenes (GroupA),  Acinetobacter baumannii, Enterobacter cloacae, E. coli,  Klebsiella oxytoca, K. pneumoniae, Proteus sp.,  Serratia marcescens, Haemophilus influenzae,  Neisseria meningitidis, Pseudomonas aeruginosa, Candida  albicans, C. glabrata, C krusei, C parapsilosis,  C. tropicalis and the KPC resistance gene.  "Due to technical problems, Proteus sp. will Not be reported as part of  the BCID panel until further notice"  .  TYPE: (C=Critical, N=Notification, A=Abnormal) C  TESTS:  _ GS  DATE/TIME CALLED: _ 07/05/2019 09:14:52  CALLED TO: Kellee Mcpherson RN  READ BACK (2 Patient Identifiers)(Y/N): _ Y  READ BACK VALUES (Y/N): _ Y  CALLED BY: Kellee Field        07-04 @ 13:31 .Blood Staphylococcus aureus    Growth in aerobic and anaerobic bottles: Staphylococcus aureus  Aerobic Bottle: 8.51 Hours to positivity  Anaerobic Bottle: 9.41 Hours to positivity  .  TYPE: (C=Critical, N=Notification, A=Abnormal) C  TESTS:  _ GS  DATE/TIME CALLED: _ 07/05/2019 09:17:54  CALLED TO: Kellee Mcpherson RN  READ BACK (2 Patient Identifiers)(Y/N): _ Y  READ BACK VALUES (Y/N): _ Y  CALLED BY: Kellee Field

## 2019-07-08 NOTE — PROGRESS NOTE ADULT - SUBJECTIVE AND OBJECTIVE BOX
Patient: LAUREN ROBERSON [id number] 87y Female                           Internal Medicine Hospitalist Progress Note    Initial HPI:  87yoF hx ESRD on HD (M and F only), CAD s/p CABG, HTN, DM, PAD, hypothyroidism presenting with generalized weakness.  On admission, febrile, leukocytosis, UA positive, was CT abd/pelvis showed haziness around the gallbladder however follow up abd US was negative for cholelithiasis and Stewart's sign and no LFT elevation on labs.  Blood cultures positive for staph aureus.      Interval History:  Reports leg pain improving.  No weakness / numbness.  No fever / chills.     ____________________PHYSICAL EXAM:  Vitals reviewed as indicated below  GENERAL:  NAD.  Alert and Oriented x 2 to person, place. confused.   HEENT: NCAT  CARDIOVASCULAR:  S1, S2  LUNGS: CTAB  ABDOMEN:  soft, (-) tenderness, (-) distension, (+) bowel sounds, (-) guarding, (-) rebound (-) rigidity  EXTREMITIES:  no cyanosis / clubbing / edema.  2+ pedal pulses.    NEURO: strength symmetric.   ____________________    VITALS:  Vital Signs Last 24 Hrs  T(C): 37 (2019 17:24), Max: 37 (2019 17:24)  T(F): 98.6 (2019 17:24), Max: 98.6 (2019 17:24)  HR: 77 (2019 17:24) (65 - 77)  BP: 158/64 (2019 17:24) (114/74 - 158/64)  BP(mean): --  RR: 19 (2019 17:24) (18 - 19)  SpO2: 94% (2019 17:24) (91% - 100%) Daily Height in cm: 154.94 (2019 08:24)    Daily Weight in k.5 (2019 10:55)  CAPILLARY BLOOD GLUCOSE      POCT Blood Glucose.: 89 mg/dL (2019 17:46)  POCT Blood Glucose.: 82 mg/dL (2019 13:20)  POCT Blood Glucose.: 121 mg/dL (2019 08:01)  POCT Blood Glucose.: 192 mg/dL (2019 21:18)    I&O's Summary      LABS:                        11.2   11.40 )-----------( 114      ( 2019 08:14 )             34.3     07-08    132<L>  |  92<L>  |  53.0<H>  ----------------------------<  125<H>  3.8   |  25.0  |  5.51<H>    Ca    7.2<L>      2019 08:14                  Culture - Blood (collected 2019 07:27)  Source: .Blood  Final Report (2019 09:39):    Growth in aerobic bottle: Staphylococcus aureus    Aerobic Bottle: 12:58 Hours to positivity    .    TYPE: (C=Critical, N=Notification, A=Abnormal) C    TESTS:  _ Positive Blood GS    DATE/TIME CALLED: _ 2019 10:40    CALLED TO: _ 6TWR: Lary Zamudio RN    READ BACK (2 Patient Identifiers)(Y/N): _ Y    READ BACK VALUES (Y/N): _ Y    CALLED BY: Kellee marion  Organism: Staphylococcus aureus (2019 09:39)  Organism: Staphylococcus aureus (2019 09:39)    Culture - Blood (collected 2019 07:27)  Source: .Blood  Final Report (2019 09:41):    Growth in aerobic and anaerobic bottles: Staphylococcus aureus    Aerobic Bottle: 12:38 Hours to positivity    Anaerobic Bottle: 14:29 Hours to positivity    .    TYPE: (C=Critical, N=Notification, A=Abnormal) C    TESTS:  _ Positive Blood GS    DATE/TIME CALLED: _ 2019 10:40    CALLED TO: _ 6TWR: Lary Zamudio RN    READ BACK (2 Patient Identifiers)(Y/N): _ Y    READ BACK VALUES (Y/N): _ Y    CALLED BY: Kellee marion        MEDICATIONS:  acetaminophen   Tablet .. 650 milliGRAM(s) Oral every 6 hours PRN  aspirin  chewable 81 milliGRAM(s) Oral daily  atorvastatin 40 milliGRAM(s) Oral at bedtime  calcitriol   Capsule 0.25 MICROGram(s) Oral daily  calcium acetate 667 milliGRAM(s) Oral three times a day with meals  carvedilol 12.5 milliGRAM(s) Oral every 12 hours  ceFAZolin   IVPB 1000 milliGRAM(s) IV Intermittent every 24 hours  clopidogrel Tablet 75 milliGRAM(s) Oral daily  dextrose 40% Gel 15 Gram(s) Oral once PRN  dextrose 5%. 1000 milliLiter(s) IV Continuous <Continuous>  dextrose 50% Injectable 12.5 Gram(s) IV Push once  dextrose 50% Injectable 25 Gram(s) IV Push once  dextrose 50% Injectable 25 Gram(s) IV Push once  folic acid 1 milliGRAM(s) Oral daily  gabapentin 100 milliGRAM(s) Oral three times a day  glucagon  Injectable 1 milliGRAM(s) IntraMuscular once PRN  heparin  Injectable 5000 Unit(s) SubCutaneous every 8 hours  insulin lispro (HumaLOG) corrective regimen sliding scale   SubCutaneous three times a day before meals  insulin lispro (HumaLOG) corrective regimen sliding scale   SubCutaneous at bedtime  levothyroxine 112 MICROGram(s) Oral daily  lisinopril 20 milliGRAM(s) Oral daily  oxyCODONE    IR 5 milliGRAM(s) Oral every 6 hours PRN  pantoprazole    Tablet 40 milliGRAM(s) Oral before breakfast

## 2019-07-08 NOTE — PROGRESS NOTE ADULT - ASSESSMENT
ESRD - HD today on M/F schedule    Anemia - Hgb stable . no need forESA     RO - cont Phoslo with meals and Rocaltrol    Gram (+) bacteremia , Gram (-) UTI ---> Multiple CT scans noted , S/P Vanco x 1 now on  ancef  ID follow up noted    will follow

## 2019-07-08 NOTE — PROGRESS NOTE ADULT - ASSESSMENT
87yoF hx ESRD on HD (M and F only), CAD s/p CABG, HTN, DM, PAD, hypothyroidism presenting with generalized weakness, dysuria. Reports pain at graft site for 1 month.  In Ed temp 102.6, leukocytosis to 14. Blood cx 4/4 Staph aureus. Imaging with no focus (no pneumonia, abscess, OM)    Overall Staph aureus bacteremia, listed anaphylaxis to penicillin, UTI GNR in urine, ESRD on HD M/F only via AVG. Concern for AVG infection, endocarditis    Vancomycin 7/4, 7/5  Azactam 7/4-7/6  Cefazolin 7/6-->    Recommend:  - Blood cultures + 4/4 Staph aureus. Repeat blood cultures tomorrow  - Despite listed penicillin allergy, tolerating cefazolin-c/w cefazolin 1 g IV daily AD  - SANFORD for endocarditis/lead vegetations  - USG AVG   - Trend Fever  - Trend WBC count

## 2019-07-09 LAB
GLUCOSE BLDC GLUCOMTR-MCNC: 105 MG/DL — HIGH (ref 70–99)
GLUCOSE BLDC GLUCOMTR-MCNC: 118 MG/DL — HIGH (ref 70–99)
GLUCOSE BLDC GLUCOMTR-MCNC: 126 MG/DL — HIGH (ref 70–99)
GLUCOSE BLDC GLUCOMTR-MCNC: 148 MG/DL — HIGH (ref 70–99)

## 2019-07-09 PROCEDURE — 93325 DOPPLER ECHO COLOR FLOW MAPG: CPT | Mod: 26

## 2019-07-09 PROCEDURE — 99232 SBSQ HOSP IP/OBS MODERATE 35: CPT

## 2019-07-09 PROCEDURE — 76376 3D RENDER W/INTRP POSTPROCES: CPT | Mod: 26

## 2019-07-09 PROCEDURE — 93312 ECHO TRANSESOPHAGEAL: CPT | Mod: 26

## 2019-07-09 PROCEDURE — 70450 CT HEAD/BRAIN W/O DYE: CPT | Mod: 26

## 2019-07-09 PROCEDURE — 93320 DOPPLER ECHO COMPLETE: CPT | Mod: 26

## 2019-07-09 RX ORDER — SACCHAROMYCES BOULARDII 250 MG
250 POWDER IN PACKET (EA) ORAL
Refills: 0 | Status: DISCONTINUED | OUTPATIENT
Start: 2019-07-09 | End: 2019-07-24

## 2019-07-09 RX ADMIN — HEPARIN SODIUM 5000 UNIT(S): 5000 INJECTION INTRAVENOUS; SUBCUTANEOUS at 05:33

## 2019-07-09 RX ADMIN — Medication 667 MILLIGRAM(S): at 09:49

## 2019-07-09 RX ADMIN — OXYCODONE HYDROCHLORIDE 5 MILLIGRAM(S): 5 TABLET ORAL at 17:45

## 2019-07-09 RX ADMIN — PANTOPRAZOLE SODIUM 40 MILLIGRAM(S): 20 TABLET, DELAYED RELEASE ORAL at 05:34

## 2019-07-09 RX ADMIN — ATORVASTATIN CALCIUM 40 MILLIGRAM(S): 80 TABLET, FILM COATED ORAL at 22:42

## 2019-07-09 RX ADMIN — Medication 667 MILLIGRAM(S): at 17:15

## 2019-07-09 RX ADMIN — LISINOPRIL 20 MILLIGRAM(S): 2.5 TABLET ORAL at 09:50

## 2019-07-09 RX ADMIN — CARVEDILOL PHOSPHATE 12.5 MILLIGRAM(S): 80 CAPSULE, EXTENDED RELEASE ORAL at 17:15

## 2019-07-09 RX ADMIN — GABAPENTIN 100 MILLIGRAM(S): 400 CAPSULE ORAL at 05:35

## 2019-07-09 RX ADMIN — Medication 250 MILLIGRAM(S): at 17:15

## 2019-07-09 RX ADMIN — HEPARIN SODIUM 5000 UNIT(S): 5000 INJECTION INTRAVENOUS; SUBCUTANEOUS at 22:42

## 2019-07-09 RX ADMIN — GABAPENTIN 100 MILLIGRAM(S): 400 CAPSULE ORAL at 22:42

## 2019-07-09 RX ADMIN — CARVEDILOL PHOSPHATE 12.5 MILLIGRAM(S): 80 CAPSULE, EXTENDED RELEASE ORAL at 09:49

## 2019-07-09 RX ADMIN — Medication 112 MICROGRAM(S): at 05:33

## 2019-07-09 RX ADMIN — OXYCODONE HYDROCHLORIDE 5 MILLIGRAM(S): 5 TABLET ORAL at 17:15

## 2019-07-09 NOTE — PROGRESS NOTE ADULT - ASSESSMENT
ESRD - HD yest on M/F schedule  Tolerating ok    Anemia - Hgb stable . no need forESA     RO - cont Phoslo with meals and Rocaltrol    Gram (+) bacteremia , Gram (-) UTI ---> Multiple CT scans noted  ID follow up noted on abx    will follow

## 2019-07-09 NOTE — PROGRESS NOTE ADULT - ASSESSMENT
A/P: 87yoF hx ESRD on HD (M and F only), CAD s/p CABG, HTN, DM, PAD, hypothyroidism presenting with generalized weakness found to have UTI and meeting sepsis criteria.   Sepsis / Ecoli UTI / Staph aureus bacteremia - Sepsis improving.  Following repeat cultures.  Continue Cefazolin per ID.  Plan for SANFORD today for lead vegitation/endocarditis.  -NPO for SANFORD

## 2019-07-09 NOTE — PROGRESS NOTE ADULT - SUBJECTIVE AND OBJECTIVE BOX
Cardiology NP preprocedure note:    -For SANFORD    TELE: paced 65 bpm    MEDICATIONS  (STANDING):  aspirin  chewable 81 milliGRAM(s) Oral daily  atorvastatin 40 milliGRAM(s) Oral at bedtime  calcitriol   Capsule 0.25 MICROGram(s) Oral daily  calcium acetate 667 milliGRAM(s) Oral three times a day with meals  carvedilol 12.5 milliGRAM(s) Oral every 12 hours  ceFAZolin   IVPB 1000 milliGRAM(s) IV Intermittent every 24 hours  clopidogrel Tablet 75 milliGRAM(s) Oral daily  dextrose 5%. 1000 milliLiter(s) (50 mL/Hr) IV Continuous <Continuous>  dextrose 50% Injectable 12.5 Gram(s) IV Push once  dextrose 50% Injectable 25 Gram(s) IV Push once  dextrose 50% Injectable 25 Gram(s) IV Push once  folic acid 1 milliGRAM(s) Oral daily  gabapentin 100 milliGRAM(s) Oral three times a day  heparin  Injectable 5000 Unit(s) SubCutaneous every 8 hours  insulin lispro (HumaLOG) corrective regimen sliding scale   SubCutaneous three times a day before meals  insulin lispro (HumaLOG) corrective regimen sliding scale   SubCutaneous at bedtime  levothyroxine 112 MICROGram(s) Oral daily  lisinopril 20 milliGRAM(s) Oral daily  pantoprazole    Tablet 40 milliGRAM(s) Oral before breakfast  saccharomyces boulardii 250 milliGRAM(s) Oral two times a day    MEDICATIONS  (PRN):  acetaminophen   Tablet .. 650 milliGRAM(s) Oral every 6 hours PRN Temp greater or equal to 38C (100.4F), Mild Pain (1 - 3)  dextrose 40% Gel 15 Gram(s) Oral once PRN Blood Glucose LESS THAN 70 milliGRAM(s)/deciliter  glucagon  Injectable 1 milliGRAM(s) IntraMuscular once PRN Glucose LESS THAN 70 milligrams/deciliter  oxyCODONE    IR 5 milliGRAM(s) Oral every 6 hours PRN Moderate to severe pain      Allergies  penicillin (Anaphylaxis)  seafood (Anaphylaxis)  shellfish (Anaphylaxis)      PAST MEDICAL & SURGICAL HISTORY:  Left bundle branch block  Osteoporosis  Diabetic neuropathy  Peripheral arterial disease  Spinal stenosis  Coronary artery disease  Chronic renal failure  Pacemaker: Medtronic 2011  Hypothyroid  Hyperlipemia  Hypertension  Diabetes  S/P dialysis catheter insertion: R. arm  S/P CABG x 3  Artificial cardiac pacemaker      Vital Signs Last 24 Hrs  T(C): 36.1 (09 Jul 2019 12:15), Max: 37 (08 Jul 2019 17:24)  T(F): 97 (09 Jul 2019 12:15), Max: 98.6 (08 Jul 2019 17:24)  HR: 65 (09 Jul 2019 12:15) (65 - 77)  BP: 145/62 (09 Jul 2019 12:15) (95/63 - 158/64)  BP(mean): --  RR: 16 (09 Jul 2019 12:15) (16 - 19)  SpO2: 94% (09 Jul 2019 12:15) (93% - 100%)    Physical Exam:  Constitutional: NAD, AOx2; confused  Cardiovascular: +S1S2 RRR  Pulmonary: CTA b/l, unlabored  GI: soft NTND +BS  Extremities: no pedal edema, +distal pulses b/l  Neuro: non focal, LOONEY x4  ASA 3   Mallampati 2    LABS:                        11.2   11.40 )-----------( 114      ( 08 Jul 2019 08:14 )             34.3     07-08    132<L>  |  92<L>  |  53.0<H>  ----------------------------<  125<H>  3.8   |  25.0  |  5.51<H>    Ca    7.2<L>      08 Jul 2019 08:14

## 2019-07-09 NOTE — PROGRESS NOTE ADULT - ASSESSMENT
A/P: 87yoF hx ESRD on HD (M and F only), CAD s/p CABG, HTN, DM, PAD, hypothyroidism presenting with generalized weakness found to have UTI and meeting sepsis criteria.   Sepsis / Ecoli UTI / Staph aureus bacteremia - Sepsis improving.  Following repeat cultures.  Continue Cefazolin per ID.  Now s/p SANFORD today that is negative for lead vegitation/endocarditis (prelim report--official report to follow).  -NPO, vital signs, and activity post procedure as ordered  -Additional plan as per Attending MD

## 2019-07-09 NOTE — PROGRESS NOTE ADULT - SUBJECTIVE AND OBJECTIVE BOX
Cardiology NP post procedure note:     -s/p SANFORD    TELE: NSR 65 bpm LBBB    MEDICATIONS  (STANDING):  aspirin  chewable 81 milliGRAM(s) Oral daily  atorvastatin 40 milliGRAM(s) Oral at bedtime  calcitriol   Capsule 0.25 MICROGram(s) Oral daily  calcium acetate 667 milliGRAM(s) Oral three times a day with meals  carvedilol 12.5 milliGRAM(s) Oral every 12 hours  ceFAZolin   IVPB 1000 milliGRAM(s) IV Intermittent every 24 hours  clopidogrel Tablet 75 milliGRAM(s) Oral daily  dextrose 5%. 1000 milliLiter(s) (50 mL/Hr) IV Continuous <Continuous>  dextrose 50% Injectable 12.5 Gram(s) IV Push once  dextrose 50% Injectable 25 Gram(s) IV Push once  dextrose 50% Injectable 25 Gram(s) IV Push once  folic acid 1 milliGRAM(s) Oral daily  gabapentin 100 milliGRAM(s) Oral three times a day  heparin  Injectable 5000 Unit(s) SubCutaneous every 8 hours  insulin lispro (HumaLOG) corrective regimen sliding scale   SubCutaneous three times a day before meals  insulin lispro (HumaLOG) corrective regimen sliding scale   SubCutaneous at bedtime  levothyroxine 112 MICROGram(s) Oral daily  lisinopril 20 milliGRAM(s) Oral daily  pantoprazole    Tablet 40 milliGRAM(s) Oral before breakfast  saccharomyces boulardii 250 milliGRAM(s) Oral two times a day    MEDICATIONS  (PRN):  acetaminophen   Tablet .. 650 milliGRAM(s) Oral every 6 hours PRN Temp greater or equal to 38C (100.4F), Mild Pain (1 - 3)  dextrose 40% Gel 15 Gram(s) Oral once PRN Blood Glucose LESS THAN 70 milliGRAM(s)/deciliter  glucagon  Injectable 1 milliGRAM(s) IntraMuscular once PRN Glucose LESS THAN 70 milligrams/deciliter  oxyCODONE    IR 5 milliGRAM(s) Oral every 6 hours PRN Moderate to severe pain      Allergies:  penicillin (Anaphylaxis)  seafood (Anaphylaxis)  shellfish (Anaphylaxis)      PAST MEDICAL & SURGICAL HISTORY:  Left bundle branch block  Osteoporosis  Diabetic neuropathy  Peripheral arterial disease  Spinal stenosis  Coronary artery disease  Chronic renal failure  Pacemaker: Medtronic 2011  Hypothyroid  Hyperlipemia  Hypertension  Diabetes  S/P dialysis catheter insertion: R. arm  S/P CABG x 3  Artificial cardiac pacemaker      Vital Signs Last 24 Hrs  T(C): 36.1 (09 Jul 2019 12:15), Max: 37 (08 Jul 2019 17:24)  T(F): 97 (09 Jul 2019 12:15), Max: 98.6 (08 Jul 2019 17:24)  HR: 65 (09 Jul 2019 12:15) (65 - 77)  BP: 145/62 (09 Jul 2019 12:15) (95/63 - 158/64)  BP(mean): --  RR: 16 (09 Jul 2019 12:15) (16 - 19)  SpO2: 94% (09 Jul 2019 12:15) (93% - 100%)    Physical Exam:  Constitutional: NAD, AO x 3  Cardiovascular: +S1S2 RRR  Pulmonary: CTA b/l, unlabored  GI: soft NTND +BS  Extremities: no pedal edema, +distal pulses b/l  Neuro: non focal, LOONEY x4    LABS:                        11.2   11.40 )-----------( 114      ( 08 Jul 2019 08:14 )             34.3     07-08    132<L>  |  92<L>  |  53.0<H>  ----------------------------<  125<H>  3.8   |  25.0  |  5.51<H>    Ca    7.2<L>      08 Jul 2019 08:14

## 2019-07-09 NOTE — PROGRESS NOTE ADULT - ASSESSMENT
87yoF hx ESRD on HD (M and F only), CAD s/p CABG, HTN, DM, PAD, hypothyroidism presenting with generalized weakness found to have UTI and meeting sepsis criteria     #Sepsis / Ecoli UTI / Staph aureus bacteremia - Sepsis improving.  Following repeat cultures.  Continue Cefazolin per ID.  Plan for SANFORD today.  #Metabolic Encephalopathy - multifactorial, due to sepsis / ESRD,etc. Will check CT Head.    #Leg Pain, Neuropathy - per son, findings are chronic.  Continue Neurontin.  #Thrombocytopenia - appears to be chronic, but now slightly worse.  Likely due to bacteremia.  Hematology input appreciated.   #Elevated trop- troponin 0.16, EKG shows LBBB which is not new.  Elevated troponin is stable, and in setting of ESRD is not clinically suggestive of acute cardiac issue.  Pt symptomatic.    #CAD/Hx CVA- ASA, plavix, statin resumed.   #HTN- coreg 12.5 mg BID w parameters, atorvastatin 40  mg, lisinopril 20 mg, clonidine 0.1 mg po q 6 hrs.  #ESRD on HD Monday and Friday-  Renal consulted.  Calcitriol 0.25 mcg and calcium acetate 667 mg 1 po TID w meals.  #Hypothyroid- Continue [id number] mcg.  #Prophylactic measure- heparin.     Discussed with jose Clifford [phone]

## 2019-07-09 NOTE — PROGRESS NOTE ADULT - SUBJECTIVE AND OBJECTIVE BOX
Patient: LAUREN ROBERSON [id number] 87y Female                           Internal Medicine Hospitalist Progress Note    Initial HPI:  87yoF hx ESRD on HD (M and F only), CAD s/p CABG, HTN, DM, PAD, hypothyroidism presenting with generalized weakness.  On admission, febrile, leukocytosis, UA positive, was CT abd/pelvis showed haziness around the gallbladder however follow up abd US was negative for cholelithiasis and Stewart's sign and no LFT elevation on labs.  Blood cultures positive for staph aureus.      Interval History:  Seen with son Darrin at bedside.  States pt is more confused, requires repeated prompting to respond.  Pt denies weakness / numbness.  Reports pain controlled.  No additional complaints.    ____________________PHYSICAL EXAM:  Vitals reviewed as indicated below  GENERAL:  NAD.  Alert and Oriented x 2 to person, place. confused.   HEENT: NCAT  CARDIOVASCULAR:  S1, S2  LUNGS: CTAB  ABDOMEN:  soft, (-) tenderness, (-) distension, (+) bowel sounds, (-) guarding, (-) rebound (-) rigidity  EXTREMITIES:  no cyanosis / clubbing / edema.  2+ pedal pulses.    NEURO: strength symmetric.   ____________________    VITALS:  Vital Signs Last 24 Hrs  T(C): 36.7 (2019 08:55), Max: 37 (2019 17:24)  T(F): 98 (2019 08:55), Max: 98.6 (2019 17:24)  HR: 67 (2019 08:55) (65 - 77)  BP: 117/57 (2019 08:55) (95/63 - 158/64)  BP(mean): --  RR: 18 (2019 08:55) (18 - 19)  SpO2: 95% (2019 08:55) (91% - 100%) Daily     Daily Weight in k.7 (2019 14:25)  CAPILLARY BLOOD GLUCOSE      POCT Blood Glucose.: 105 mg/dL (2019 08:02)  POCT Blood Glucose.: 152 mg/dL (2019 21:53)  POCT Blood Glucose.: 89 mg/dL (2019 17:46)  POCT Blood Glucose.: 82 mg/dL (2019 13:20)    I&O's Summary      LABS:                        11.2   11.40 )-----------( 114      ( 2019 08:14 )             34.3     07-08    132<L>  |  92<L>  |  53.0<H>  ----------------------------<  125<H>  3.8   |  25.0  |  5.51<H>    Ca    7.2<L>      2019 08:14                    MEDICATIONS:  acetaminophen   Tablet .. 650 milliGRAM(s) Oral every 6 hours PRN  aspirin  chewable 81 milliGRAM(s) Oral daily  atorvastatin 40 milliGRAM(s) Oral at bedtime  calcitriol   Capsule 0.25 MICROGram(s) Oral daily  calcium acetate 667 milliGRAM(s) Oral three times a day with meals  carvedilol 12.5 milliGRAM(s) Oral every 12 hours  ceFAZolin   IVPB 1000 milliGRAM(s) IV Intermittent every 24 hours  clopidogrel Tablet 75 milliGRAM(s) Oral daily  dextrose 40% Gel 15 Gram(s) Oral once PRN  dextrose 5%. 1000 milliLiter(s) IV Continuous <Continuous>  dextrose 50% Injectable 12.5 Gram(s) IV Push once  dextrose 50% Injectable 25 Gram(s) IV Push once  dextrose 50% Injectable 25 Gram(s) IV Push once  folic acid 1 milliGRAM(s) Oral daily  gabapentin 100 milliGRAM(s) Oral three times a day  glucagon  Injectable 1 milliGRAM(s) IntraMuscular once PRN  heparin  Injectable 5000 Unit(s) SubCutaneous every 8 hours  insulin lispro (HumaLOG) corrective regimen sliding scale   SubCutaneous three times a day before meals  insulin lispro (HumaLOG) corrective regimen sliding scale   SubCutaneous at bedtime  levothyroxine 112 MICROGram(s) Oral daily  lisinopril 20 milliGRAM(s) Oral daily  oxyCODONE    IR 5 milliGRAM(s) Oral every 6 hours PRN  pantoprazole    Tablet 40 milliGRAM(s) Oral before breakfast

## 2019-07-09 NOTE — PROGRESS NOTE ADULT - SUBJECTIVE AND OBJECTIVE BOX
NEPHROLOGY INTERVAL HPI/OVERNIGHT EVENTS:    Examined earlier  Clinically same    MEDICATIONS  (STANDING):  aspirin  chewable 81 milliGRAM(s) Oral daily  atorvastatin 40 milliGRAM(s) Oral at bedtime  calcitriol   Capsule 0.25 MICROGram(s) Oral daily  calcium acetate 667 milliGRAM(s) Oral three times a day with meals  carvedilol 12.5 milliGRAM(s) Oral every 12 hours  ceFAZolin   IVPB 1000 milliGRAM(s) IV Intermittent every 24 hours  clopidogrel Tablet 75 milliGRAM(s) Oral daily  dextrose 5%. 1000 milliLiter(s) (50 mL/Hr) IV Continuous <Continuous>  dextrose 50% Injectable 12.5 Gram(s) IV Push once  dextrose 50% Injectable 25 Gram(s) IV Push once  dextrose 50% Injectable 25 Gram(s) IV Push once  folic acid 1 milliGRAM(s) Oral daily  gabapentin 100 milliGRAM(s) Oral three times a day  heparin  Injectable 5000 Unit(s) SubCutaneous every 8 hours  insulin lispro (HumaLOG) corrective regimen sliding scale   SubCutaneous three times a day before meals  insulin lispro (HumaLOG) corrective regimen sliding scale   SubCutaneous at bedtime  levothyroxine 112 MICROGram(s) Oral daily  lisinopril 20 milliGRAM(s) Oral daily  pantoprazole    Tablet 40 milliGRAM(s) Oral before breakfast  saccharomyces boulardii 250 milliGRAM(s) Oral two times a day    MEDICATIONS  (PRN):  acetaminophen   Tablet .. 650 milliGRAM(s) Oral every 6 hours PRN Temp greater or equal to 38C (100.4F), Mild Pain (1 - 3)  dextrose 40% Gel 15 Gram(s) Oral once PRN Blood Glucose LESS THAN 70 milliGRAM(s)/deciliter  glucagon  Injectable 1 milliGRAM(s) IntraMuscular once PRN Glucose LESS THAN 70 milligrams/deciliter  oxyCODONE    IR 5 milliGRAM(s) Oral every 6 hours PRN Moderate to severe pain      Allergies    penicillin (Anaphylaxis)  seafood (Anaphylaxis)  shellfish (Anaphylaxis)    Intolerances        Vital Signs Last 24 Hrs  T(C): 36.7 (2019 08:55), Max: 37 (2019 17:24)  T(F): 98 (2019 08:55), Max: 98.6 (2019 17:24)  HR: 67 (2019 08:55) (67 - 77)  BP: 117/57 (2019 08:55) (95/63 - 158/64)  BP(mean): --  RR: 18 (2019 08:55) (18 - 19)  SpO2: 95% (2019 08:55) (93% - 100%)  Daily     Daily Weight in k.7 (2019 14:25)    PHYSICAL EXAM:  GENERAL: NAD   HEAD:  NCAT  NECK: Supple, neck  veins full  CHEST/LUNG: dec BS B/L moving air ok  HEART: Regular rate and rhythm; No rub   ABDOMEN: Soft, Nontender, Nondistended; Bowel sounds present  EXTREMITIES:  + edema , access with thrill     LABS:                        11.2   11.40 )-----------( 114      ( 2019 08:14 )             34.3     07-08    132<L>  |  92<L>  |  53.0<H>  ----------------------------<  125<H>  3.8   |  25.0  |  5.51<H>    Ca    7.2<L>      2019 08:14                  RADIOLOGY & ADDITIONAL TESTS:

## 2019-07-10 LAB
-  AMPICILLIN/SULBACTAM: SIGNIFICANT CHANGE UP
-  CEFAZOLIN: SIGNIFICANT CHANGE UP
-  CLINDAMYCIN: SIGNIFICANT CHANGE UP
-  ERYTHROMYCIN: SIGNIFICANT CHANGE UP
-  GENTAMICIN: SIGNIFICANT CHANGE UP
-  OXACILLIN: SIGNIFICANT CHANGE UP
-  PENICILLIN: SIGNIFICANT CHANGE UP
-  RIFAMPIN: SIGNIFICANT CHANGE UP
-  TETRACYCLINE: SIGNIFICANT CHANGE UP
-  TRIMETHOPRIM/SULFAMETHOXAZOLE: SIGNIFICANT CHANGE UP
-  VANCOMYCIN: SIGNIFICANT CHANGE UP
GLUCOSE BLDC GLUCOMTR-MCNC: 121 MG/DL — HIGH (ref 70–99)
GLUCOSE BLDC GLUCOMTR-MCNC: 134 MG/DL — HIGH (ref 70–99)
GLUCOSE BLDC GLUCOMTR-MCNC: 136 MG/DL — HIGH (ref 70–99)
GLUCOSE BLDC GLUCOMTR-MCNC: 147 MG/DL — HIGH (ref 70–99)
METHOD TYPE: SIGNIFICANT CHANGE UP

## 2019-07-10 PROCEDURE — 99232 SBSQ HOSP IP/OBS MODERATE 35: CPT

## 2019-07-10 PROCEDURE — 99233 SBSQ HOSP IP/OBS HIGH 50: CPT

## 2019-07-10 RX ORDER — CEFAZOLIN SODIUM 1 G
VIAL (EA) INJECTION
Refills: 0 | Status: DISCONTINUED | OUTPATIENT
Start: 2019-07-10 | End: 2019-07-24

## 2019-07-10 RX ORDER — CEFAZOLIN SODIUM 1 G
VIAL (EA) INJECTION
Refills: 0 | Status: DISCONTINUED | OUTPATIENT
Start: 2019-07-10 | End: 2019-07-10

## 2019-07-10 RX ORDER — CHLORHEXIDINE GLUCONATE 213 G/1000ML
1 SOLUTION TOPICAL DAILY
Refills: 0 | Status: DISCONTINUED | OUTPATIENT
Start: 2019-07-10 | End: 2019-07-10

## 2019-07-10 RX ORDER — CEFAZOLIN SODIUM 1 G
1000 VIAL (EA) INJECTION ONCE
Refills: 0 | Status: COMPLETED | OUTPATIENT
Start: 2019-07-10 | End: 2019-07-10

## 2019-07-10 RX ORDER — CHLORHEXIDINE GLUCONATE 213 G/1000ML
1 SOLUTION TOPICAL
Refills: 0 | Status: DISCONTINUED | OUTPATIENT
Start: 2019-07-10 | End: 2019-07-24

## 2019-07-10 RX ORDER — CEFAZOLIN SODIUM 1 G
1000 VIAL (EA) INJECTION
Refills: 0 | Status: DISCONTINUED | OUTPATIENT
Start: 2019-07-10 | End: 2019-07-10

## 2019-07-10 RX ORDER — CEFAZOLIN SODIUM 1 G
1000 VIAL (EA) INJECTION EVERY 24 HOURS
Refills: 0 | Status: DISCONTINUED | OUTPATIENT
Start: 2019-07-11 | End: 2019-07-24

## 2019-07-10 RX ADMIN — Medication 250 MILLIGRAM(S): at 18:08

## 2019-07-10 RX ADMIN — CARVEDILOL PHOSPHATE 12.5 MILLIGRAM(S): 80 CAPSULE, EXTENDED RELEASE ORAL at 05:26

## 2019-07-10 RX ADMIN — CLOPIDOGREL BISULFATE 75 MILLIGRAM(S): 75 TABLET, FILM COATED ORAL at 14:41

## 2019-07-10 RX ADMIN — Medication 81 MILLIGRAM(S): at 14:40

## 2019-07-10 RX ADMIN — Medication 100 MILLIGRAM(S): at 13:30

## 2019-07-10 RX ADMIN — HEPARIN SODIUM 5000 UNIT(S): 5000 INJECTION INTRAVENOUS; SUBCUTANEOUS at 05:26

## 2019-07-10 RX ADMIN — ATORVASTATIN CALCIUM 40 MILLIGRAM(S): 80 TABLET, FILM COATED ORAL at 22:11

## 2019-07-10 RX ADMIN — HEPARIN SODIUM 5000 UNIT(S): 5000 INJECTION INTRAVENOUS; SUBCUTANEOUS at 22:14

## 2019-07-10 RX ADMIN — GABAPENTIN 100 MILLIGRAM(S): 400 CAPSULE ORAL at 22:12

## 2019-07-10 RX ADMIN — LISINOPRIL 20 MILLIGRAM(S): 2.5 TABLET ORAL at 05:26

## 2019-07-10 RX ADMIN — OXYCODONE HYDROCHLORIDE 5 MILLIGRAM(S): 5 TABLET ORAL at 23:00

## 2019-07-10 RX ADMIN — CALCITRIOL 0.25 MICROGRAM(S): 0.5 CAPSULE ORAL at 14:40

## 2019-07-10 RX ADMIN — Medication 112 MICROGRAM(S): at 05:26

## 2019-07-10 RX ADMIN — PANTOPRAZOLE SODIUM 40 MILLIGRAM(S): 20 TABLET, DELAYED RELEASE ORAL at 05:27

## 2019-07-10 RX ADMIN — Medication 650 MILLIGRAM(S): at 10:00

## 2019-07-10 RX ADMIN — Medication 250 MILLIGRAM(S): at 05:26

## 2019-07-10 RX ADMIN — Medication 667 MILLIGRAM(S): at 16:57

## 2019-07-10 RX ADMIN — GABAPENTIN 100 MILLIGRAM(S): 400 CAPSULE ORAL at 14:41

## 2019-07-10 RX ADMIN — HEPARIN SODIUM 5000 UNIT(S): 5000 INJECTION INTRAVENOUS; SUBCUTANEOUS at 14:41

## 2019-07-10 RX ADMIN — Medication 1 MILLIGRAM(S): at 14:41

## 2019-07-10 RX ADMIN — GABAPENTIN 100 MILLIGRAM(S): 400 CAPSULE ORAL at 05:26

## 2019-07-10 RX ADMIN — OXYCODONE HYDROCHLORIDE 5 MILLIGRAM(S): 5 TABLET ORAL at 22:12

## 2019-07-10 RX ADMIN — Medication 650 MILLIGRAM(S): at 09:07

## 2019-07-10 RX ADMIN — OXYCODONE HYDROCHLORIDE 5 MILLIGRAM(S): 5 TABLET ORAL at 01:33

## 2019-07-10 RX ADMIN — OXYCODONE HYDROCHLORIDE 5 MILLIGRAM(S): 5 TABLET ORAL at 00:37

## 2019-07-10 RX ADMIN — CARVEDILOL PHOSPHATE 12.5 MILLIGRAM(S): 80 CAPSULE, EXTENDED RELEASE ORAL at 16:57

## 2019-07-10 RX ADMIN — Medication 667 MILLIGRAM(S): at 14:40

## 2019-07-10 NOTE — PROGRESS NOTE ADULT - ASSESSMENT
87yoF hx ESRD on HD (M and F only), CAD s/p CABG, HTN, DM, PAD, hypothyroidism presenting with generalized weakness found to have UTI and meeting sepsis criteria     #Sepsis / Ecoli UTI / Staph aureus bacteremia - Blood cultures continue to be positive.  Continue Cefazolin per ID - dosage reviewed with pharmacy.  Indium scan as ordered.  D/w ID Dr. Stark  #Metabolic Encephalopathy - multifactorial, due to sepsis / ESRD,etc.  CT head showing chronic infarcts and severe microvascular disease.  On ASA, Statin.   #Leg Pain, Neuropathy - per son, findings are chronic.  Continue Neurontin.  #Thrombocytopenia - appears to be chronic, but now slightly worse.  Likely due to bacteremia.  Hematology input appreciated.   #Elevated trop- troponin 0.16, EKG shows LBBB which is not new.  Elevated troponin is stable, and in setting of ESRD is not clinically suggestive of acute cardiac issue.  Pt symptomatic.    #CAD/Hx CVA- ASA, plavix, statin resumed.   #HTN- coreg 12.5 mg BID w parameters, atorvastatin 40  mg, lisinopril 20 mg, clonidine 0.1 mg po q 6 hrs.  #ESRD on HD Monday and Friday-  Renal consulted.  Calcitriol 0.25 mcg and calcium acetate 667 mg 1 po TID w meals.  #Hypothyroid- Continue [id number] mcg.  #Prophylactic measure- heparin.

## 2019-07-10 NOTE — PROGRESS NOTE ADULT - ASSESSMENT
ESRD - HD on M/F schedule  Tolerating ok    Anemia - Hgb stable . no need for MILLIE     RO - cont Phoslo with meals and Rocaltrol    Gram (+) bacteremia staph   ID follow up noted on abx- can atempt to  escalte to HD TID for cefazolin regimen    will follow

## 2019-07-10 NOTE — PROGRESS NOTE ADULT - SUBJECTIVE AND OBJECTIVE BOX
Patient: LAUREN ROBERSON [id number] 87y Female                           Internal Medicine Hospitalist Progress Note    Initial HPI:  87yoF hx ESRD on HD (M and F only), CAD s/p CABG, HTN, DM, PAD, hypothyroidism presenting with generalized weakness.  On admission, febrile, leukocytosis, UA positive, was CT abd/pelvis showed haziness around the gallbladder however follow up abd US was negative for cholelithiasis and Stewart's sign and no LFT elevation on labs.  Blood cultures positive for staph aureus.  SANFORD negative for endocarditis.  Blood cultures have remained positive.      Interval History:  Pt remains confused.  Pt denies weakness / numbness.  Reports pain controlled.  No additional complaints.    ____________________PHYSICAL EXAM:  Vitals reviewed as indicated below  GENERAL:  NAD.  Alert and Oriented x 2 to person, place. confused.   HEENT: NCAT  CARDIOVASCULAR:  S1, S2  LUNGS: CTAB  ABDOMEN:  soft, (-) tenderness, (-) distension, (+) bowel sounds, (-) guarding, (-) rebound (-) rigidity  EXTREMITIES:  no cyanosis / clubbing / edema.  2+ pedal pulses.    NEURO: strength symmetric.   ____________________    VITALS:  Vital Signs Last 24 Hrs  T(C): 36.3 (10 Jul 2019 15:39), Max: 36.9 (10 Jul 2019 10:17)  T(F): 97.3 (10 Jul 2019 15:39), Max: 98.5 (10 Jul 2019 10:17)  HR: 70 (10 Jul 2019 15:39) (64 - 71)  BP: 133/75 (10 Jul 2019 15:39) (115/72 - 145/50)  BP(mean): --  RR: 19 (10 Jul 2019 15:39) (16 - 19)  SpO2: 96% (10 Jul 2019 15:39) (94% - 96%) Daily     Daily Weight in k.3 (10 Jul 2019 10:00)  CAPILLARY BLOOD GLUCOSE      POCT Blood Glucose.: 134 mg/dL (10 Jul 2019 11:54)  POCT Blood Glucose.: 136 mg/dL (10 Jul 2019 09:06)  POCT Blood Glucose.: 148 mg/dL (2019 22:36)    I&O's Summary      LABS:                      Culture - Blood (collected 2019 09:41)  Source: .Blood  Preliminary Report (10 Jul 2019 09:54):    Growth in aerobic bottle: Gram Positive Cocci in Clusters    Aerobic Bottle: 17:20 Hours to positivity    Anaerobic Bottle: No growth to date    .    TYPE: (C=Critical, N=Notification, A=Abnormal) C    TESTS:  _ BLD     DATE/TIME CALLED: _ 07/10/2019 09:54:05    CALLED TO: Kellee BLANCO RN    READ BACK (2 Patient Identifiers)(Y/N): _ Y    READ BACK VALUES (Y/N): _ Y    CALLED BY: Kellee DANIEL    Culture - Blood (collected 2019 20:25)  Source: .Blood  Preliminary Report (10 Jul 2019 09:53):    Growth in anaerobic bottle: Gram Positive Cocci in Clusters    Anaerobic Bottle: 2 days;05:44 Hours to positivity    Aerobic Bottle: No growth to date    .    TYPE: (C=Critical, N=Notification, A=Abnormal) C    TESTS:  _ BLD     DATE/TIME CALLED: _ 07/10/2019 09:51:08    CALLED TO: Kellee BLANCO RN    READ BACK (2 Patient Identifiers)(Y/N): _ Y    READ BACK VALUES (Y/N): _ Y    CALLED BY: Kellee DANIEL        MEDICATIONS:  acetaminophen   Tablet .. 650 milliGRAM(s) Oral every 6 hours PRN  aspirin  chewable 81 milliGRAM(s) Oral daily  atorvastatin 40 milliGRAM(s) Oral at bedtime  calcitriol   Capsule 0.25 MICROGram(s) Oral daily  calcium acetate 667 milliGRAM(s) Oral three times a day with meals  carvedilol 12.5 milliGRAM(s) Oral every 12 hours  ceFAZolin   IVPB      chlorhexidine 2% Cloths 1 Application(s) Topical <User Schedule>  clopidogrel Tablet 75 milliGRAM(s) Oral daily  dextrose 40% Gel 15 Gram(s) Oral once PRN  dextrose 5%. 1000 milliLiter(s) IV Continuous <Continuous>  dextrose 50% Injectable 12.5 Gram(s) IV Push once  dextrose 50% Injectable 25 Gram(s) IV Push once  dextrose 50% Injectable 25 Gram(s) IV Push once  folic acid 1 milliGRAM(s) Oral daily  gabapentin 100 milliGRAM(s) Oral three times a day  glucagon  Injectable 1 milliGRAM(s) IntraMuscular once PRN  heparin  Injectable 5000 Unit(s) SubCutaneous every 8 hours  insulin lispro (HumaLOG) corrective regimen sliding scale   SubCutaneous three times a day before meals  insulin lispro (HumaLOG) corrective regimen sliding scale   SubCutaneous at bedtime  levothyroxine 112 MICROGram(s) Oral daily  lisinopril 20 milliGRAM(s) Oral daily  oxyCODONE    IR 5 milliGRAM(s) Oral every 6 hours PRN  pantoprazole    Tablet 40 milliGRAM(s) Oral before breakfast  saccharomyces boulardii 250 milliGRAM(s) Oral two times a day

## 2019-07-10 NOTE — PROGRESS NOTE ADULT - SUBJECTIVE AND OBJECTIVE BOX
[facility]  INFECTIOUS DISEASES AND INTERNAL MEDICINE at [place]  =======================================================  Paresh Stark MD  Diplomates American Board of Internal Medicine and Infectious Diseases  Tel: [phone]      Fax: [phone]  =======================================================    LAUREN ROBERSON [id number]    Follow up: MSSA bacteremia    Allergies:  penicillin (Anaphylaxis)  seafood (Anaphylaxis)  shellfish (Anaphylaxis)      Medications:  acetaminophen   Tablet .. 650 milliGRAM(s) Oral every 6 hours PRN  aspirin  chewable 81 milliGRAM(s) Oral daily  atorvastatin 40 milliGRAM(s) Oral at bedtime  calcitriol   Capsule 0.25 MICROGram(s) Oral daily  calcium acetate 667 milliGRAM(s) Oral three times a day with meals  carvedilol 12.5 milliGRAM(s) Oral every 12 hours  ceFAZolin   IVPB      chlorhexidine 2% Cloths 1 Application(s) Topical <User Schedule>  clopidogrel Tablet 75 milliGRAM(s) Oral daily  dextrose 40% Gel 15 Gram(s) Oral once PRN  dextrose 5%. 1000 milliLiter(s) IV Continuous <Continuous>  dextrose 50% Injectable 12.5 Gram(s) IV Push once  dextrose 50% Injectable 25 Gram(s) IV Push once  dextrose 50% Injectable 25 Gram(s) IV Push once  folic acid 1 milliGRAM(s) Oral daily  gabapentin 100 milliGRAM(s) Oral three times a day  glucagon  Injectable 1 milliGRAM(s) IntraMuscular once PRN  heparin  Injectable 5000 Unit(s) SubCutaneous every 8 hours  insulin lispro (HumaLOG) corrective regimen sliding scale   SubCutaneous three times a day before meals  insulin lispro (HumaLOG) corrective regimen sliding scale   SubCutaneous at bedtime  levothyroxine 112 MICROGram(s) Oral daily  lisinopril 20 milliGRAM(s) Oral daily  oxyCODONE    IR 5 milliGRAM(s) Oral every 6 hours PRN  pantoprazole    Tablet 40 milliGRAM(s) Oral before breakfast  saccharomyces boulardii 250 milliGRAM(s) Oral two times a day            REVIEW OF SYSTEMS:  CONSTITUTIONAL:  No Fever or chills  HEENT:   No diplopia or blurred vision.  No earache, sore throat or runny nose.  CARDIOVASCULAR:  No pressure, squeezing, strangling, tightness, heaviness or aching about the chest, neck, axilla or epigastrium.  RESPIRATORY:  No cough, shortness of breath  GASTROINTESTINAL:  No nausea, vomiting or diarrhea.  GENITOURINARY:  No dysuria, frequency or urgency. No Blood in urine  MUSCULOSKELETAL:  no joint aches, no muscle pain  SKIN:  No change in skin, hair or nails.  NEUROLOGIC:  No Headaches, seizures or weakness.  PSYCHIATRIC:  No disorder of thought or mood.  ENDOCRINE:  No heat or cold intolerance  HEMATOLOGICAL:  No easy bruising or bleeding.            Physical Exam:  ICU Vital Signs Last 24 Hrs  T(C): 36.3 (10 Jul 2019 15:39), Max: 36.9 (10 Jul 2019 10:17)  T(F): 97.3 (10 Jul 2019 15:39), Max: 98.5 (10 Jul 2019 10:17)  HR: 70 (10 Jul 2019 15:39) (64 - 71)  BP: 133/75 (10 Jul 2019 15:39) (115/72 - 145/50)  BP(mean): --  ABP: --  ABP(mean): --  RR: 19 (10 Jul 2019 15:39) (16 - 19)  SpO2: 96% (10 Jul 2019 15:39) (94% - 96%)      GEN: NAD, drowsy  HEENT: normocephalic and atraumatic. EOMI. PERRL.  Anicteric   NECK: Supple.   LUNGS: Clear to auscultation. +PPM  HEART: Regular rate and rhythm without murmur.  ABDOMEN: Soft, nontender, and nondistended.  Positive bowel sounds.    : No CVA tenderness  EXTREMITIES: Without any edema. +r AVG  MSK: no joint swelling  NEUROLOGIC: Cranial nerves II through XII are grossly intact. No focal deficits  PSYCHIATRIC: Appropriate affect .  SKIN: No Rash      Labs:                      CAPILLARY BLOOD GLUCOSE      POCT Blood Glucose.: 134 mg/dL (10 Jul 2019 11:54)  POCT Blood Glucose.: 136 mg/dL (10 Jul 2019 09:06)  POCT Blood Glucose.: 148 mg/dL (09 Jul 2019 22:36)  POCT Blood Glucose.: 126 mg/dL (09 Jul 2019 16:36)        RECENT CULTURES:  07-09 @ 09:41 .Blood     Growth in aerobic bottle: Gram Positive Cocci in Clusters  Aerobic Bottle: 17:20 Hours to positivity  Anaerobic Bottle: No growth to date  .  TYPE: (C=Critical, N=Notification, A=Abnormal) C  TESTS:  _ BLD GS  DATE/TIME CALLED: _ 07/10/2019 09:54:05  CALLED TO: Kellee BLANCO RN  READ BACK (2 Patient Identifiers)(Y/N): _ Y  READ BACK VALUES (Y/N): _ Y  CALLED BY: Kellee DANIEL        07-07 @ 20:25 .Blood     Growth in anaerobic bottle: Gram Positive Cocci in Clusters  Anaerobic Bottle: 2 days;05:44 Hours to positivity  Aerobic Bottle: No growth to date  .  TYPE: (C=Critical, N=Notification, A=Abnormal) C  TESTS:  _ BLD GS  DATE/TIME CALLED: _ 07/10/2019 09:51:08  CALLED TO: Kellee BLANCO RN  READ BACK (2 Patient Identifiers)(Y/N): _ Y  READ BACK VALUES (Y/N): _ Y  CALLED BY: Kellee DANIEL        07-07 @ 12:47 .Blood Staphylococcus aureus    Growth in aerobic bottle: Staphylococcus aureus  Growth in anaerobic bottle: Gram Positive Cocci in Clusters  Aerobic Bottle: 2 days; 01:03 Hours to positivity  Anaerobic Bottle: 2 days;22:09 Hours to positivity  .  TYPE: (C=Critical, N=Notification, A=Abnormal) C  TESTS:  _ BLD GS  DATE/TIME CALLED: _ 07/09/2019 14:36:17  CALLED TO: Kellee VILLELA RN  READ BACK (2 Patient Identifiers)(Y/N): _ Y  READ BACK VALUES (Y/N): _ Y  CALLED BY: Kellee DANIEL        07-06 @ 07:27 .Blood Staphylococcus aureus    Growth in aerobic and anaerobic bottles: Staphylococcus aureus  Aerobic Bottle: 12:38 Hours to positivity  Anaerobic Bottle: 14:29 Hours to positivity  .  TYPE: (C=Critical, N=Notification, A=Abnormal) C  TESTS:  _ Positive Blood GS  DATE/TIME CALLED: _ 07/07/2019 10:40  CALLED TO: _ [id number]: Lary Zamudio RN  READ BACK (2 Patient Identifiers)(Y/N): _ Y  READ BACK VALUES (Y/N): _ Y  CALLED BY: Kellee marion        07-05 @ 10:46 .Blood Staphylococcus aureus    Growth in aerobic and anaerobic bottles: Staphylococcus aureus  Aerobic Bottle: 12.08 Hours to positivity  Anaerobic Bottle: 12.18 Hours to positivity  .  TYPE: (C=Critical, N=Notification, A=Abnormal) C  TESTS:  _ GS  DATE/TIME CALLED: _ 07/06/2019 09:39:33  CALLED TO: _ Lary Chery RN  READ BACK (2 Patient Identifiers)(Y/N): _ Y  READ BACK VALUES (Y/N): _ Y  CALLED BY: Kellee Field        07-04 @ 14:35 .Urine Escherichia coli    >100,000 CFU/ml Escherichia coli        07-04 @ 13:32 .Blood Staphylococcus aureus  Blood Culture PCR    Growth in aerobic and anaerobic bottles: Staphylococcus aureus  Aerobic Bottle: 9.51 Hours to positivity  Anaerobic Bottle: 10.01 Hours to positivity  .  ***Blood Panel PCR results on this specimen are available  approximately 3 hours after the Gram stain result.***  Gram stain, PCR, and/or culture results may not always  correspond due to difference in methodologies.  ************************************************************  This PCR assay was performed using [employer].  The following targets are tested for: Enterococcus,  vancomycin resistant enterococci, Listeria monocytogenes,  coagulase negative staphylococci, S. aureus,  methicillin resistant S. aureus, Streptococcus agalactiae  (Group B), S. pneumoniae, S. pyogenes (GroupA),  Acinetobacter baumannii, Enterobacter cloacae, E. coli,  Klebsiella oxytoca, K. pneumoniae, Proteus sp.,  Serratia marcescens, Haemophilus influenzae,  Neisseria meningitidis, Pseudomonas aeruginosa, Candida  albicans, C. glabrata, C krusei, C parapsilosis,  C. tropicalis and the KPC resistance gene.  "Due to technical problems, Proteus sp. will Not be reported as part of  the BCID panel until further notice"  .  TYPE: (C=Critical, N=Notification, A=Abnormal) C  TESTS:  _ GS  DATE/TIME CALLED: _ 07/05/2019 09:14:52  CALLED TO: Kellee Mcpherson RN  READ BACK (2 Patient Identifiers)(Y/N): _ Y  READ BACK VALUES (Y/N): _ Y  CALLED BY: Kellee Field        07-04 @ 13:31 .Blood Staphylococcus aureus    Growth in aerobic and anaerobic bottles: Staphylococcus aureus  Aerobic Bottle: 8.51 Hours to positivity  Anaerobic Bottle: 9.41 Hours to positivity  .  TYPE: (C=Critical, N=Notification, A=Abnormal) C  TESTS:  _ GS  DATE/TIME CALLED: _ 07/05/2019 09:17:54  CALLED TO: Kellee Mcpherson RN  READ BACK (2 Patient Identifiers)(Y/N): _ Y  READ BACK VALUES (Y/N): _ Y  CALLED BY: Kellee Field

## 2019-07-10 NOTE — PROGRESS NOTE ADULT - ASSESSMENT
87yoF hx ESRD on HD (M and F only), CAD s/p CABG, HTN, DM, PAD, hypothyroidism presenting with generalized weakness, dysuria. Reports pain at graft site for 1 month.  In Ed temp 102.6, leukocytosis to 14. Blood cx 4/4 Staph aureus. Imaging with no focus (no pneumonia, abscess, OM)    Overall Staph aureus bacteremia, listed anaphylaxis to penicillin, UTI GNR in urine, ESRD on HD M/F only via AVG. Unclear source    Vancomycin 7/4, 7/5  Azactam 7/4-7/6  Cefazolin 7/6-->    Recommend:  - Blood cultures + 4/4 Staph aureus  - Despite listed penicillin allergy, tolerating cefazolin-c/w cefazolin 1 g IV every day and after dialysis on her dialysis days  - SANFORD for endocarditis/lead vegetations (-)  - USG AVG (-)  - NM WBC scan  - Trend Fever  - Trend WBC count    Discussed with Dr Mcginnis, Dr Garcia    Will follow

## 2019-07-10 NOTE — PROGRESS NOTE ADULT - SUBJECTIVE AND OBJECTIVE BOX
NEPHROLOGY INTERVAL HPI/OVERNIGHT EVENTS:    Examined  Clinically same    MEDICATIONS  (STANDING):  aspirin  chewable 81 milliGRAM(s) Oral daily  atorvastatin 40 milliGRAM(s) Oral at bedtime  calcitriol   Capsule 0.25 MICROGram(s) Oral daily  calcium acetate 667 milliGRAM(s) Oral three times a day with meals  carvedilol 12.5 milliGRAM(s) Oral every 12 hours  ceFAZolin   IVPB      chlorhexidine 2% Cloths 1 Application(s) Topical <User Schedule>  clopidogrel Tablet 75 milliGRAM(s) Oral daily  dextrose 5%. 1000 milliLiter(s) (50 mL/Hr) IV Continuous <Continuous>  dextrose 50% Injectable 12.5 Gram(s) IV Push once  dextrose 50% Injectable 25 Gram(s) IV Push once  dextrose 50% Injectable 25 Gram(s) IV Push once  folic acid 1 milliGRAM(s) Oral daily  gabapentin 100 milliGRAM(s) Oral three times a day  heparin  Injectable 5000 Unit(s) SubCutaneous every 8 hours  insulin lispro (HumaLOG) corrective regimen sliding scale   SubCutaneous three times a day before meals  insulin lispro (HumaLOG) corrective regimen sliding scale   SubCutaneous at bedtime  levothyroxine 112 MICROGram(s) Oral daily  lisinopril 20 milliGRAM(s) Oral daily  pantoprazole    Tablet 40 milliGRAM(s) Oral before breakfast  saccharomyces boulardii 250 milliGRAM(s) Oral two times a day    MEDICATIONS  (PRN):  acetaminophen   Tablet .. 650 milliGRAM(s) Oral every 6 hours PRN Temp greater or equal to 38C (100.4F), Mild Pain (1 - 3)  dextrose 40% Gel 15 Gram(s) Oral once PRN Blood Glucose LESS THAN 70 milliGRAM(s)/deciliter  glucagon  Injectable 1 milliGRAM(s) IntraMuscular once PRN Glucose LESS THAN 70 milligrams/deciliter  oxyCODONE    IR 5 milliGRAM(s) Oral every 6 hours PRN Moderate to severe pain      Allergies    penicillin (Anaphylaxis)  seafood (Anaphylaxis)  shellfish (Anaphylaxis)    Intolerances        Vital Signs Last 24 Hrs  T(C): 36.3 (10 Jul 2019 15:39), Max: 36.9 (10 Jul 2019 10:17)  T(F): 97.3 (10 Jul 2019 15:39), Max: 98.5 (10 Jul 2019 10:17)  HR: 60 (10 Jul 2019 16:58) (60 - 71)  BP: 133/75 (10 Jul 2019 15:39) (115/72 - 145/50)  BP(mean): --  RR: 19 (10 Jul 2019 15:39) (16 - 19)  SpO2: 96% (10 Jul 2019 15:39) (94% - 96%)  Daily     Daily Weight in k.3 (10 Jul 2019 10:00)    PHYSICAL EXAM:  GENERAL: NAD   HEAD:  NCAT  NECK: Supple, neck  veins full  CHEST/LUNG: dec BS B/L moving air ok  HEART: Regular rate and rhythm; No rub   ABDOMEN: Soft, Nontender, Nondistended; Bowel sounds present  EXTREMITIES:  + edema , access with thrill     LABS:                      RADIOLOGY & ADDITIONAL TESTS:

## 2019-07-11 LAB
-  AMPICILLIN/SULBACTAM: SIGNIFICANT CHANGE UP
-  AMPICILLIN/SULBACTAM: SIGNIFICANT CHANGE UP
-  CEFAZOLIN: SIGNIFICANT CHANGE UP
-  CEFAZOLIN: SIGNIFICANT CHANGE UP
-  CLINDAMYCIN: SIGNIFICANT CHANGE UP
-  CLINDAMYCIN: SIGNIFICANT CHANGE UP
-  ERYTHROMYCIN: SIGNIFICANT CHANGE UP
-  ERYTHROMYCIN: SIGNIFICANT CHANGE UP
-  GENTAMICIN: SIGNIFICANT CHANGE UP
-  GENTAMICIN: SIGNIFICANT CHANGE UP
-  OXACILLIN: SIGNIFICANT CHANGE UP
-  OXACILLIN: SIGNIFICANT CHANGE UP
-  PENICILLIN: SIGNIFICANT CHANGE UP
-  PENICILLIN: SIGNIFICANT CHANGE UP
-  RIFAMPIN: SIGNIFICANT CHANGE UP
-  RIFAMPIN: SIGNIFICANT CHANGE UP
-  TETRACYCLINE: SIGNIFICANT CHANGE UP
-  TETRACYCLINE: SIGNIFICANT CHANGE UP
-  TRIMETHOPRIM/SULFAMETHOXAZOLE: SIGNIFICANT CHANGE UP
-  TRIMETHOPRIM/SULFAMETHOXAZOLE: SIGNIFICANT CHANGE UP
-  VANCOMYCIN: SIGNIFICANT CHANGE UP
-  VANCOMYCIN: SIGNIFICANT CHANGE UP
ANION GAP SERPL CALC-SCNC: 18 MMOL/L — HIGH (ref 5–17)
BUN SERPL-MCNC: 48 MG/DL — HIGH (ref 8–20)
CALCIUM SERPL-MCNC: 8.1 MG/DL — LOW (ref 8.6–10.2)
CHLORIDE SERPL-SCNC: 97 MMOL/L — LOW (ref 98–107)
CO2 SERPL-SCNC: 23 MMOL/L — SIGNIFICANT CHANGE UP (ref 22–29)
CREAT SERPL-MCNC: 5.35 MG/DL — HIGH (ref 0.5–1.3)
CULTURE RESULTS: SIGNIFICANT CHANGE UP
GLUCOSE BLDC GLUCOMTR-MCNC: 102 MG/DL — HIGH (ref 70–99)
GLUCOSE BLDC GLUCOMTR-MCNC: 131 MG/DL — HIGH (ref 70–99)
GLUCOSE BLDC GLUCOMTR-MCNC: 150 MG/DL — HIGH (ref 70–99)
GLUCOSE BLDC GLUCOMTR-MCNC: 158 MG/DL — HIGH (ref 70–99)
GLUCOSE SERPL-MCNC: 138 MG/DL — HIGH (ref 70–115)
HCT VFR BLD CALC: 34.9 % — SIGNIFICANT CHANGE UP (ref 34.5–45)
HGB BLD-MCNC: 11.6 G/DL — SIGNIFICANT CHANGE UP (ref 11.5–15.5)
MCHC RBC-ENTMCNC: 31.6 PG — SIGNIFICANT CHANGE UP (ref 27–34)
MCHC RBC-ENTMCNC: 33.2 GM/DL — SIGNIFICANT CHANGE UP (ref 32–36)
MCV RBC AUTO: 95.1 FL — SIGNIFICANT CHANGE UP (ref 80–100)
METHOD TYPE: SIGNIFICANT CHANGE UP
METHOD TYPE: SIGNIFICANT CHANGE UP
ORGANISM # SPEC MICROSCOPIC CNT: SIGNIFICANT CHANGE UP
PLATELET # BLD AUTO: 182 K/UL — SIGNIFICANT CHANGE UP (ref 150–400)
POTASSIUM SERPL-MCNC: 4.1 MMOL/L — SIGNIFICANT CHANGE UP (ref 3.5–5.3)
POTASSIUM SERPL-SCNC: 4.1 MMOL/L — SIGNIFICANT CHANGE UP (ref 3.5–5.3)
RBC # BLD: 3.67 M/UL — LOW (ref 3.8–5.2)
RBC # FLD: 15.6 % — HIGH (ref 10.3–14.5)
SODIUM SERPL-SCNC: 138 MMOL/L — SIGNIFICANT CHANGE UP (ref 135–145)
SPECIMEN SOURCE: SIGNIFICANT CHANGE UP
WBC # BLD: 14.82 K/UL — HIGH (ref 3.8–10.5)
WBC # FLD AUTO: 14.82 K/UL — HIGH (ref 3.8–10.5)

## 2019-07-11 PROCEDURE — 99232 SBSQ HOSP IP/OBS MODERATE 35: CPT

## 2019-07-11 RX ADMIN — Medication 667 MILLIGRAM(S): at 12:43

## 2019-07-11 RX ADMIN — CHLORHEXIDINE GLUCONATE 1 APPLICATION(S): 213 SOLUTION TOPICAL at 06:06

## 2019-07-11 RX ADMIN — Medication 1 MILLIGRAM(S): at 12:43

## 2019-07-11 RX ADMIN — ATORVASTATIN CALCIUM 40 MILLIGRAM(S): 80 TABLET, FILM COATED ORAL at 22:49

## 2019-07-11 RX ADMIN — CARVEDILOL PHOSPHATE 12.5 MILLIGRAM(S): 80 CAPSULE, EXTENDED RELEASE ORAL at 06:06

## 2019-07-11 RX ADMIN — PANTOPRAZOLE SODIUM 40 MILLIGRAM(S): 20 TABLET, DELAYED RELEASE ORAL at 06:06

## 2019-07-11 RX ADMIN — CLOPIDOGREL BISULFATE 75 MILLIGRAM(S): 75 TABLET, FILM COATED ORAL at 12:44

## 2019-07-11 RX ADMIN — GABAPENTIN 100 MILLIGRAM(S): 400 CAPSULE ORAL at 22:49

## 2019-07-11 RX ADMIN — LISINOPRIL 20 MILLIGRAM(S): 2.5 TABLET ORAL at 06:06

## 2019-07-11 RX ADMIN — Medication 667 MILLIGRAM(S): at 18:37

## 2019-07-11 RX ADMIN — GABAPENTIN 100 MILLIGRAM(S): 400 CAPSULE ORAL at 13:14

## 2019-07-11 RX ADMIN — Medication 250 MILLIGRAM(S): at 06:06

## 2019-07-11 RX ADMIN — Medication 100 MILLIGRAM(S): at 13:13

## 2019-07-11 RX ADMIN — HEPARIN SODIUM 5000 UNIT(S): 5000 INJECTION INTRAVENOUS; SUBCUTANEOUS at 13:13

## 2019-07-11 RX ADMIN — OXYCODONE HYDROCHLORIDE 5 MILLIGRAM(S): 5 TABLET ORAL at 12:45

## 2019-07-11 RX ADMIN — HEPARIN SODIUM 5000 UNIT(S): 5000 INJECTION INTRAVENOUS; SUBCUTANEOUS at 22:49

## 2019-07-11 RX ADMIN — Medication 2: at 16:19

## 2019-07-11 RX ADMIN — GABAPENTIN 100 MILLIGRAM(S): 400 CAPSULE ORAL at 06:06

## 2019-07-11 RX ADMIN — HEPARIN SODIUM 5000 UNIT(S): 5000 INJECTION INTRAVENOUS; SUBCUTANEOUS at 06:06

## 2019-07-11 RX ADMIN — CALCITRIOL 0.25 MICROGRAM(S): 0.5 CAPSULE ORAL at 12:43

## 2019-07-11 RX ADMIN — Medication 112 MICROGRAM(S): at 06:06

## 2019-07-11 RX ADMIN — OXYCODONE HYDROCHLORIDE 5 MILLIGRAM(S): 5 TABLET ORAL at 13:30

## 2019-07-11 RX ADMIN — Medication 81 MILLIGRAM(S): at 12:43

## 2019-07-11 RX ADMIN — Medication 250 MILLIGRAM(S): at 18:37

## 2019-07-11 NOTE — PROGRESS NOTE ADULT - SUBJECTIVE AND OBJECTIVE BOX
NEPHROLOGY INTERVAL HPI/OVERNIGHT EVENTS:  pt essentially the same  no acute distress  no cp, sob, n/v/d    MEDICATIONS  (STANDING):  aspirin  chewable 81 milliGRAM(s) Oral daily  atorvastatin 40 milliGRAM(s) Oral at bedtime  calcitriol   Capsule 0.25 MICROGram(s) Oral daily  calcium acetate 667 milliGRAM(s) Oral three times a day with meals  carvedilol 12.5 milliGRAM(s) Oral every 12 hours  ceFAZolin   IVPB      ceFAZolin   IVPB 1000 milliGRAM(s) IV Intermittent every 24 hours  chlorhexidine 2% Cloths 1 Application(s) Topical <User Schedule>  clopidogrel Tablet 75 milliGRAM(s) Oral daily  dextrose 5%. 1000 milliLiter(s) (50 mL/Hr) IV Continuous <Continuous>  dextrose 50% Injectable 12.5 Gram(s) IV Push once  dextrose 50% Injectable 25 Gram(s) IV Push once  dextrose 50% Injectable 25 Gram(s) IV Push once  folic acid 1 milliGRAM(s) Oral daily  gabapentin 100 milliGRAM(s) Oral three times a day  heparin  Injectable 5000 Unit(s) SubCutaneous every 8 hours  insulin lispro (HumaLOG) corrective regimen sliding scale   SubCutaneous three times a day before meals  insulin lispro (HumaLOG) corrective regimen sliding scale   SubCutaneous at bedtime  levothyroxine 112 MICROGram(s) Oral daily  lisinopril 20 milliGRAM(s) Oral daily  pantoprazole    Tablet 40 milliGRAM(s) Oral before breakfast  saccharomyces boulardii 250 milliGRAM(s) Oral two times a day    MEDICATIONS  (PRN):  acetaminophen   Tablet .. 650 milliGRAM(s) Oral every 6 hours PRN Temp greater or equal to 38C (100.4F), Mild Pain (1 - 3)  dextrose 40% Gel 15 Gram(s) Oral once PRN Blood Glucose LESS THAN 70 milliGRAM(s)/deciliter  glucagon  Injectable 1 milliGRAM(s) IntraMuscular once PRN Glucose LESS THAN 70 milligrams/deciliter  oxyCODONE    IR 5 milliGRAM(s) Oral every 6 hours PRN Moderate to severe pain      Allergies    penicillin (Anaphylaxis)  seafood (Anaphylaxis)  shellfish (Anaphylaxis)          Vital Signs Last 24 Hrs  T(C): 36.5 (11 Jul 2019 10:50), Max: 37.2 (11 Jul 2019 07:15)  T(F): 97.7 (11 Jul 2019 10:50), Max: 98.9 (11 Jul 2019 07:15)  HR: 67 (11 Jul 2019 10:50) (60 - 71)  BP: 155/59 (11 Jul 2019 10:50) (129/68 - 155/59)  BP(mean): --  RR: 18 (11 Jul 2019 10:50) (16 - 19)  SpO2: 100% (11 Jul 2019 10:50) (95% - 100%)    PHYSICAL EXAM:  Appears chronically ill   HEAD:  NCAT  NECK: Supple, no jvd  CHEST/LUNG: diminished BS at bases  HEART: Regular rate and rhythm; No rub   ABDOMEN: Soft, Nontender, Nondistended; Bowel sounds present  EXTREMITIES:  + edema less  LABS:                        11.6   14.82 )-----------( 182      ( 11 Jul 2019 08:15 )             34.9     07-11    138  |  97<L>  |  48.0<H>  ----------------------------<  138<H>  4.1   |  23.0  |  5.35<H>    Ca    8.1<L>      11 Jul 2019 08:15              RADIOLOGY & ADDITIONAL TESTS:

## 2019-07-11 NOTE — PROGRESS NOTE ADULT - SUBJECTIVE AND OBJECTIVE BOX
Assumed pt care at 1900. Bedside report received from ROBERTO CARLOS Sanchez. Pt A&Ox4, HODGES, and follows commands. No c/o pain, SOB, or N/V/D. Medication regimen and POC discussed with pt. [place] fall precautions in place. No needs at this time.    Patient: LAUREN ROBERSON [id number] 87y Female                           Internal Medicine Hospitalist Progress Note    Initial HPI:  87yoF hx ESRD on HD (M and F only), CAD s/p CABG, HTN, DM, PAD, hypothyroidism presenting with generalized weakness.  On admission, febrile, leukocytosis, UA positive, was CT abd/pelvis showed haziness around the gallbladder however follow up abd US was negative for cholelithiasis and Stewart's sign and no LFT elevation on labs.  Blood cultures positive for staph aureus.  SANFORD negative for endocarditis.  Blood cultures have remained positive.    She has been confused.  CT head showed no acute infarct.      Interval History:  Pt slightly less confused today.  Seen at HD.  Pt denies weakness / numbness.  c/o mild pain R sided, chronic.  No additional complaints.    ____________________PHYSICAL EXAM:  Vitals reviewed as indicated below  GENERAL:  NAD.  Alert and Oriented x 2 to person, place. confused.   HEENT: NCAT  CARDIOVASCULAR:  S1, S2  LUNGS: CTAB  ABDOMEN:  soft, (-) tenderness, (-) distension, (+) bowel sounds, (-) guarding, (-) rebound (-) rigidity  EXTREMITIES:  no cyanosis / clubbing / edema.  2+ pedal pulses.    NEURO: strength symmetric.   ____________________    VITALS:  Vital Signs Last 24 Hrs  T(C): 37.2 (2019 07:15), Max: 37.2 (2019 07:15)  T(F): 98.9 (2019 07:15), Max: 98.9 (2019 07:15)  HR: 71 (2019 07:15) (60 - 71)  BP: 129/68 (2019 07:15) (129/68 - 140/59)  BP(mean): --  RR: 18 (2019 07:15) (16 - 19)  SpO2: 95% (2019 07:15) (95% - 98%) Daily     Daily Weight in k.6 (2019 07:15)  CAPILLARY BLOOD GLUCOSE      POCT Blood Glucose.: 102 mg/dL (2019 08:22)  POCT Blood Glucose.: 147 mg/dL (10 Jul 2019 22:10)  POCT Blood Glucose.: 121 mg/dL (10 Jul 2019 16:53)  POCT Blood Glucose.: 134 mg/dL (10 Jul 2019 11:54)    I&O's Summary      LABS:                        11.6   14.82 )-----------( 182      ( 2019 08:15 )             34.9     07-11    138  |  97<L>  |  48.0<H>  ----------------------------<  138<H>  4.1   |  23.0  |  5.35<H>    Ca    8.1<L>      2019 08:15                  Culture - Blood (collected 2019 09:41)  Source: .Blood  Preliminary Report (10 Jul 2019 09:54):    Growth in aerobic bottle: Gram Positive Cocci in Clusters    Aerobic Bottle: 17:20 Hours to positivity    Anaerobic Bottle: No growth to date    .    TYPE: (C=Critical, N=Notification, A=Abnormal) C    TESTS:  _ BLD     DATE/TIME CALLED: _ 07/10/2019 09:54:05    CALLED TO: Kellee BLANCO RN    READ BACK (2 Patient Identifiers)(Y/N): _ Y    READ BACK VALUES (Y/N): _ Y    CALLED BY: Kellee DANIEL        MEDICATIONS:  acetaminophen   Tablet .. 650 milliGRAM(s) Oral every 6 hours PRN  aspirin  chewable 81 milliGRAM(s) Oral daily  atorvastatin 40 milliGRAM(s) Oral at bedtime  calcitriol   Capsule 0.25 MICROGram(s) Oral daily  calcium acetate 667 milliGRAM(s) Oral three times a day with meals  carvedilol 12.5 milliGRAM(s) Oral every 12 hours  ceFAZolin   IVPB      ceFAZolin   IVPB 1000 milliGRAM(s) IV Intermittent every 24 hours  chlorhexidine 2% Cloths 1 Application(s) Topical <User Schedule>  clopidogrel Tablet 75 milliGRAM(s) Oral daily  dextrose 40% Gel 15 Gram(s) Oral once PRN  dextrose 5%. 1000 milliLiter(s) IV Continuous <Continuous>  dextrose 50% Injectable 12.5 Gram(s) IV Push once  dextrose 50% Injectable 25 Gram(s) IV Push once  dextrose 50% Injectable 25 Gram(s) IV Push once  folic acid 1 milliGRAM(s) Oral daily  gabapentin 100 milliGRAM(s) Oral three times a day  glucagon  Injectable 1 milliGRAM(s) IntraMuscular once PRN  heparin  Injectable 5000 Unit(s) SubCutaneous every 8 hours  insulin lispro (HumaLOG) corrective regimen sliding scale   SubCutaneous three times a day before meals  insulin lispro (HumaLOG) corrective regimen sliding scale   SubCutaneous at bedtime  levothyroxine 112 MICROGram(s) Oral daily  lisinopril 20 milliGRAM(s) Oral daily  oxyCODONE    IR 5 milliGRAM(s) Oral every 6 hours PRN  pantoprazole    Tablet 40 milliGRAM(s) Oral before breakfast  saccharomyces boulardii 250 milliGRAM(s) Oral two times a day

## 2019-07-11 NOTE — PROGRESS NOTE ADULT - ASSESSMENT
87yoF hx ESRD on HD (M and F only), CAD s/p CABG, HTN, DM, PAD, hypothyroidism presenting with generalized weakness found to have UTI and meeting sepsis criteria     #Sepsis / Ecoli UTI / Staph aureus bacteremia - Blood cultures continue to be positive.  Dosage of Cefazolin adjusted 7/10.   [place] scan as ordered - per radiology cannot be done until Monday.  ID followup.    #Metabolic Encephalopathy - multifactorial, due to sepsis / ESRD, etc.  CT head showing chronic infarcts and severe microvascular disease.  On ASA, Statin.   #Leg Pain, Neuropathy - per son, findings are chronic.  Continue Neurontin.  #Thrombocytopenia - Plt improved.  Likely due to bacteremia.  Hematology input appreciated.   #Elevated trop- troponin 0.16, EKG shows LBBB which is not new.  Elevated troponin is stable, and in setting of ESRD is not clinically suggestive of acute cardiac issue.  Pt symptomatic.    #CAD/Hx CVA- ASA, plavix, statin resumed.   #HTN- coreg 12.5 mg BID w parameters, atorvastatin 40  mg, lisinopril 20 mg, clonidine 0.1 mg po q 6 hrs.  #ESRD on HD Monday and Friday-  Renal consulted.  Calcitriol 0.25 mcg and calcium acetate 667 mg 1 po TID w meals.  #Hypothyroid- Continue [id number] mcg.  #Prophylactic measure- heparin.

## 2019-07-11 NOTE — PROGRESS NOTE ADULT - ASSESSMENT
ESRD - HD today  - UF as tolerates    Anemia - no need for MILLIE as yet  - trend H/H     RO - cont Phoslo  - cont Rocaltrol    Bacteremia: staph   - cont antibiotics

## 2019-07-12 LAB
-  AMPICILLIN/SULBACTAM: SIGNIFICANT CHANGE UP
-  CEFAZOLIN: SIGNIFICANT CHANGE UP
-  CLINDAMYCIN: SIGNIFICANT CHANGE UP
-  ERYTHROMYCIN: SIGNIFICANT CHANGE UP
-  GENTAMICIN: SIGNIFICANT CHANGE UP
-  OXACILLIN: SIGNIFICANT CHANGE UP
-  PENICILLIN: SIGNIFICANT CHANGE UP
-  RIFAMPIN: SIGNIFICANT CHANGE UP
-  TETRACYCLINE: SIGNIFICANT CHANGE UP
-  TRIMETHOPRIM/SULFAMETHOXAZOLE: SIGNIFICANT CHANGE UP
-  VANCOMYCIN: SIGNIFICANT CHANGE UP
ANION GAP SERPL CALC-SCNC: 15 MMOL/L — SIGNIFICANT CHANGE UP (ref 5–17)
BUN SERPL-MCNC: 22 MG/DL — HIGH (ref 8–20)
CALCIUM SERPL-MCNC: 8 MG/DL — LOW (ref 8.6–10.2)
CHLORIDE SERPL-SCNC: 95 MMOL/L — LOW (ref 98–107)
CO2 SERPL-SCNC: 26 MMOL/L — SIGNIFICANT CHANGE UP (ref 22–29)
CREAT SERPL-MCNC: 3.32 MG/DL — HIGH (ref 0.5–1.3)
GLUCOSE BLDC GLUCOMTR-MCNC: 121 MG/DL — HIGH (ref 70–99)
GLUCOSE BLDC GLUCOMTR-MCNC: 140 MG/DL — HIGH (ref 70–99)
GLUCOSE BLDC GLUCOMTR-MCNC: 156 MG/DL — HIGH (ref 70–99)
GLUCOSE BLDC GLUCOMTR-MCNC: 177 MG/DL — HIGH (ref 70–99)
GLUCOSE SERPL-MCNC: 131 MG/DL — HIGH (ref 70–115)
HCT VFR BLD CALC: 34.5 % — SIGNIFICANT CHANGE UP (ref 34.5–45)
HGB BLD-MCNC: 11.3 G/DL — LOW (ref 11.5–15.5)
MCHC RBC-ENTMCNC: 31.7 PG — SIGNIFICANT CHANGE UP (ref 27–34)
MCHC RBC-ENTMCNC: 32.8 GM/DL — SIGNIFICANT CHANGE UP (ref 32–36)
MCV RBC AUTO: 96.9 FL — SIGNIFICANT CHANGE UP (ref 80–100)
METHOD TYPE: SIGNIFICANT CHANGE UP
PLATELET # BLD AUTO: 183 K/UL — SIGNIFICANT CHANGE UP (ref 150–400)
POTASSIUM SERPL-MCNC: 3.9 MMOL/L — SIGNIFICANT CHANGE UP (ref 3.5–5.3)
POTASSIUM SERPL-SCNC: 3.9 MMOL/L — SIGNIFICANT CHANGE UP (ref 3.5–5.3)
RBC # BLD: 3.56 M/UL — LOW (ref 3.8–5.2)
RBC # FLD: 15.3 % — HIGH (ref 10.3–14.5)
SODIUM SERPL-SCNC: 136 MMOL/L — SIGNIFICANT CHANGE UP (ref 135–145)
WBC # BLD: 14.85 K/UL — HIGH (ref 3.8–10.5)
WBC # FLD AUTO: 14.85 K/UL — HIGH (ref 3.8–10.5)

## 2019-07-12 PROCEDURE — 71045 X-RAY EXAM CHEST 1 VIEW: CPT | Mod: 26

## 2019-07-12 PROCEDURE — 99232 SBSQ HOSP IP/OBS MODERATE 35: CPT

## 2019-07-12 PROCEDURE — 99233 SBSQ HOSP IP/OBS HIGH 50: CPT

## 2019-07-12 RX ORDER — CARVEDILOL PHOSPHATE 80 MG/1
6.25 CAPSULE, EXTENDED RELEASE ORAL EVERY 12 HOURS
Refills: 0 | Status: DISCONTINUED | OUTPATIENT
Start: 2019-07-12 | End: 2019-07-12

## 2019-07-12 RX ORDER — SODIUM CHLORIDE 9 MG/ML
250 INJECTION INTRAMUSCULAR; INTRAVENOUS; SUBCUTANEOUS ONCE
Refills: 0 | Status: COMPLETED | OUTPATIENT
Start: 2019-07-12 | End: 2019-07-12

## 2019-07-12 RX ORDER — LIDOCAINE 4 G/100G
1 CREAM TOPICAL DAILY
Refills: 0 | Status: DISCONTINUED | OUTPATIENT
Start: 2019-07-12 | End: 2019-07-24

## 2019-07-12 RX ORDER — SODIUM CHLORIDE 9 MG/ML
1000 INJECTION INTRAMUSCULAR; INTRAVENOUS; SUBCUTANEOUS
Refills: 0 | Status: DISCONTINUED | OUTPATIENT
Start: 2019-07-12 | End: 2019-07-13

## 2019-07-12 RX ADMIN — Medication 81 MILLIGRAM(S): at 12:08

## 2019-07-12 RX ADMIN — Medication 667 MILLIGRAM(S): at 09:35

## 2019-07-12 RX ADMIN — PANTOPRAZOLE SODIUM 40 MILLIGRAM(S): 20 TABLET, DELAYED RELEASE ORAL at 06:20

## 2019-07-12 RX ADMIN — Medication 1 MILLIGRAM(S): at 12:10

## 2019-07-12 RX ADMIN — CARVEDILOL PHOSPHATE 12.5 MILLIGRAM(S): 80 CAPSULE, EXTENDED RELEASE ORAL at 06:20

## 2019-07-12 RX ADMIN — Medication 2: at 12:11

## 2019-07-12 RX ADMIN — OXYCODONE HYDROCHLORIDE 5 MILLIGRAM(S): 5 TABLET ORAL at 19:40

## 2019-07-12 RX ADMIN — Medication 667 MILLIGRAM(S): at 17:31

## 2019-07-12 RX ADMIN — Medication 250 MILLIGRAM(S): at 06:19

## 2019-07-12 RX ADMIN — SODIUM CHLORIDE 100 MILLILITER(S): 9 INJECTION INTRAMUSCULAR; INTRAVENOUS; SUBCUTANEOUS at 23:51

## 2019-07-12 RX ADMIN — Medication 650 MILLIGRAM(S): at 12:09

## 2019-07-12 RX ADMIN — LIDOCAINE 1 PATCH: 4 CREAM TOPICAL at 12:10

## 2019-07-12 RX ADMIN — SODIUM CHLORIDE 100 MILLILITER(S): 9 INJECTION INTRAMUSCULAR; INTRAVENOUS; SUBCUTANEOUS at 11:28

## 2019-07-12 RX ADMIN — LIDOCAINE 1 PATCH: 4 CREAM TOPICAL at 20:24

## 2019-07-12 RX ADMIN — CHLORHEXIDINE GLUCONATE 1 APPLICATION(S): 213 SOLUTION TOPICAL at 06:20

## 2019-07-12 RX ADMIN — CALCITRIOL 0.25 MICROGRAM(S): 0.5 CAPSULE ORAL at 12:23

## 2019-07-12 RX ADMIN — GABAPENTIN 100 MILLIGRAM(S): 400 CAPSULE ORAL at 23:53

## 2019-07-12 RX ADMIN — SODIUM CHLORIDE 250 MILLILITER(S): 9 INJECTION INTRAMUSCULAR; INTRAVENOUS; SUBCUTANEOUS at 13:40

## 2019-07-12 RX ADMIN — GABAPENTIN 100 MILLIGRAM(S): 400 CAPSULE ORAL at 13:38

## 2019-07-12 RX ADMIN — Medication 250 MILLIGRAM(S): at 17:32

## 2019-07-12 RX ADMIN — CLOPIDOGREL BISULFATE 75 MILLIGRAM(S): 75 TABLET, FILM COATED ORAL at 12:08

## 2019-07-12 RX ADMIN — Medication 100 MILLIGRAM(S): at 11:28

## 2019-07-12 RX ADMIN — OXYCODONE HYDROCHLORIDE 5 MILLIGRAM(S): 5 TABLET ORAL at 18:47

## 2019-07-12 RX ADMIN — HEPARIN SODIUM 5000 UNIT(S): 5000 INJECTION INTRAVENOUS; SUBCUTANEOUS at 13:38

## 2019-07-12 RX ADMIN — HEPARIN SODIUM 5000 UNIT(S): 5000 INJECTION INTRAVENOUS; SUBCUTANEOUS at 06:20

## 2019-07-12 RX ADMIN — Medication 650 MILLIGRAM(S): at 13:00

## 2019-07-12 RX ADMIN — GABAPENTIN 100 MILLIGRAM(S): 400 CAPSULE ORAL at 06:20

## 2019-07-12 RX ADMIN — HEPARIN SODIUM 5000 UNIT(S): 5000 INJECTION INTRAVENOUS; SUBCUTANEOUS at 23:52

## 2019-07-12 RX ADMIN — Medication 112 MICROGRAM(S): at 06:20

## 2019-07-12 RX ADMIN — ATORVASTATIN CALCIUM 40 MILLIGRAM(S): 80 TABLET, FILM COATED ORAL at 23:52

## 2019-07-12 RX ADMIN — OXYCODONE HYDROCHLORIDE 5 MILLIGRAM(S): 5 TABLET ORAL at 03:10

## 2019-07-12 RX ADMIN — LISINOPRIL 20 MILLIGRAM(S): 2.5 TABLET ORAL at 06:20

## 2019-07-12 RX ADMIN — Medication 667 MILLIGRAM(S): at 12:10

## 2019-07-12 NOTE — PROGRESS NOTE ADULT - SUBJECTIVE AND OBJECTIVE BOX
NEPHROLOGY INTERVAL HPI/OVERNIGHT EVENTS:  pt lethargic this AM   no acute distress   Pt's son at bedside earlier  Tolerated HD yesterday    MEDICATIONS  (STANDING):  aspirin  chewable 81 milliGRAM(s) Oral daily  atorvastatin 40 milliGRAM(s) Oral at bedtime  calcitriol   Capsule 0.25 MICROGram(s) Oral daily  calcium acetate 667 milliGRAM(s) Oral three times a day with meals  carvedilol 12.5 milliGRAM(s) Oral every 12 hours  ceFAZolin   IVPB      ceFAZolin   IVPB 1000 milliGRAM(s) IV Intermittent every 24 hours  chlorhexidine 2% Cloths 1 Application(s) Topical <User Schedule>  clopidogrel Tablet 75 milliGRAM(s) Oral daily  dextrose 5%. 1000 milliLiter(s) (50 mL/Hr) IV Continuous <Continuous>  dextrose 50% Injectable 12.5 Gram(s) IV Push once  dextrose 50% Injectable 25 Gram(s) IV Push once  dextrose 50% Injectable 25 Gram(s) IV Push once  folic acid 1 milliGRAM(s) Oral daily  gabapentin 100 milliGRAM(s) Oral three times a day  heparin  Injectable 5000 Unit(s) SubCutaneous every 8 hours  insulin lispro (HumaLOG) corrective regimen sliding scale   SubCutaneous three times a day before meals  insulin lispro (HumaLOG) corrective regimen sliding scale   SubCutaneous at bedtime  levothyroxine 112 MICROGram(s) Oral daily  lisinopril 20 milliGRAM(s) Oral daily  pantoprazole    Tablet 40 milliGRAM(s) Oral before breakfast  saccharomyces boulardii 250 milliGRAM(s) Oral two times a day    MEDICATIONS  (PRN):  acetaminophen   Tablet .. 650 milliGRAM(s) Oral every 6 hours PRN Temp greater or equal to 38C (100.4F), Mild Pain (1 - 3)  dextrose 40% Gel 15 Gram(s) Oral once PRN Blood Glucose LESS THAN 70 milliGRAM(s)/deciliter  glucagon  Injectable 1 milliGRAM(s) IntraMuscular once PRN Glucose LESS THAN 70 milligrams/deciliter  oxyCODONE    IR 5 milliGRAM(s) Oral every 6 hours PRN Moderate to severe pain      Allergies    penicillin (Anaphylaxis)  seafood (Anaphylaxis)  shellfish (Anaphylaxis)          Vital Signs Last 24 Hrs  T(C): 37.2 (12 Jul 2019 08:32), Max: 37.2 (12 Jul 2019 08:32)  T(F): 99 (12 Jul 2019 08:32), Max: 99 (12 Jul 2019 08:32)  HR: 63 (12 Jul 2019 09:20) (53 - 71)  BP: 85/40 (12 Jul 2019 09:20) (85/40 - 155/59)  BP(mean): --  RR: 16 (12 Jul 2019 09:20) (16 - 18)  SpO2: 95% (12 Jul 2019 09:20) (93% - 100%)    PHYSICAL EXAM:  Elderly, debilitated  NECK: Supple, no jvd  CHEST/LUNG: diminished BS at bases  HEART: Regular rate and rhythm; No rub   ABDOMEN: Soft, Nontender, Nondistended; Bowel sounds present  EXTREMITIES:  + edema less  Neuro: lethargic and slow to respond    LABS:                        11.6   14.82 )-----------( 182      ( 11 Jul 2019 08:15 )             34.9     07-11    138  |  97<L>  |  48.0<H>  ----------------------------<  138<H>  4.1   |  23.0  |  5.35<H>    Ca    8.1<L>      11 Jul 2019 08:15              RADIOLOGY & ADDITIONAL TESTS:

## 2019-07-12 NOTE — CHART NOTE - NSCHARTNOTEFT_GEN_A_CORE
Upon Nutritional Assessment by the Registered Dietitian your patient was determined to meet criteria / has evidence of the following diagnosis/diagnoses:          [x ]  Mild Protein Calorie Malnutrition  CHRONIC        [ ]  Moderate Protein Calorie Malnutrition        [ ] Severe Protein Calorie Malnutrition        [ ] Unspecified Protein Calorie Malnutrition        [ ] Underweight / BMI <19        [ ] Morbid Obesity / BMI > 40      Findings as based on:  •  Comprehensive nutrition assessment and consultation  •  Calorie counts (nutrient intake analysis)  •  Food acceptance and intake status from observations by staff  •  Follow up  •  Patient education  •  Intervention secondary to interdisciplinary rounds  •   concerns      Treatment:    The following diet has been recommended:  Add Nepro BID      PROVIDER Section:     By signing this assessment you are acknowledging and agree with the diagnosis/diagnoses assigned by the Registered Dietitian    Comments:

## 2019-07-12 NOTE — PROGRESS NOTE ADULT - SUBJECTIVE AND OBJECTIVE BOX
[facility]  INFECTIOUS DISEASES AND INTERNAL MEDICINE at [place]  =======================================================  Paresh Stark MD  Diplomates American Board of Internal Medicine and Infectious Diseases  Tel: [phone]      Fax: [phone]  =======================================================    LAUREN ROBERSON [id number]    Follow up: Mssa bacteremia    Allergies:  penicillin (Anaphylaxis)  seafood (Anaphylaxis)  shellfish (Anaphylaxis)      Medications:  acetaminophen   Tablet .. 650 milliGRAM(s) Oral every 6 hours PRN  aspirin  chewable 81 milliGRAM(s) Oral daily  atorvastatin 40 milliGRAM(s) Oral at bedtime  calcitriol   Capsule 0.25 MICROGram(s) Oral daily  calcium acetate 667 milliGRAM(s) Oral three times a day with meals  ceFAZolin   IVPB      ceFAZolin   IVPB 1000 milliGRAM(s) IV Intermittent every 24 hours  chlorhexidine 2% Cloths 1 Application(s) Topical <User Schedule>  clopidogrel Tablet 75 milliGRAM(s) Oral daily  dextrose 40% Gel 15 Gram(s) Oral once PRN  dextrose 5%. 1000 milliLiter(s) IV Continuous <Continuous>  dextrose 50% Injectable 12.5 Gram(s) IV Push once  dextrose 50% Injectable 25 Gram(s) IV Push once  dextrose 50% Injectable 25 Gram(s) IV Push once  folic acid 1 milliGRAM(s) Oral daily  gabapentin 100 milliGRAM(s) Oral three times a day  glucagon  Injectable 1 milliGRAM(s) IntraMuscular once PRN  heparin  Injectable 5000 Unit(s) SubCutaneous every 8 hours  insulin lispro (HumaLOG) corrective regimen sliding scale   SubCutaneous three times a day before meals  insulin lispro (HumaLOG) corrective regimen sliding scale   SubCutaneous at bedtime  levothyroxine 112 MICROGram(s) Oral daily  lidocaine   Patch 1 Patch Transdermal daily  oxyCODONE    IR 5 milliGRAM(s) Oral every 6 hours PRN  pantoprazole    Tablet 40 milliGRAM(s) Oral before breakfast  saccharomyces boulardii 250 milliGRAM(s) Oral two times a day  sodium chloride 0.9%. 1000 milliLiter(s) IV Continuous <Continuous>            REVIEW OF SYSTEMS:  CONSTITUTIONAL:  No Fever or chills  HEENT:   No diplopia or blurred vision.  No earache, sore throat or runny nose.  CARDIOVASCULAR:  No pressure, squeezing, strangling, tightness, heaviness or aching about the chest, neck, axilla or epigastrium.  RESPIRATORY:  No cough, shortness of breath  GASTROINTESTINAL:  No nausea, vomiting or diarrhea.  GENITOURINARY:  No dysuria, frequency or urgency. No Blood in urine  MUSCULOSKELETAL:  no joint aches, no muscle pain  SKIN:  No change in skin, hair or nails.  NEUROLOGIC:  No Headaches, seizures or weakness.  PSYCHIATRIC:  No disorder of thought or mood.  ENDOCRINE:  No heat or cold intolerance  HEMATOLOGICAL:  No easy bruising or bleeding.            Physical Exam:  ICU Vital Signs Last 24 Hrs  T(C): 37.2 (12 Jul 2019 08:32), Max: 37.2 (12 Jul 2019 08:32)  T(F): 99 (12 Jul 2019 08:32), Max: 99 (12 Jul 2019 08:32)  HR: 63 (12 Jul 2019 09:20) (53 - 71)  BP: 85/40 (12 Jul 2019 09:20) (85/40 - 138/66)  BP(mean): --  ABP: --  ABP(mean): --  RR: 16 (12 Jul 2019 09:20) (16 - 18)  SpO2: 95% (12 Jul 2019 09:20) (93% - 95%)      GEN: NAD, pleasant  HEENT: normocephalic and atraumatic. EOMI. PERRL.  Anicteric   NECK: Supple.   LUNGS: Clear to auscultation.  HEART: Regular rate and rhythm without murmur. +PPM  ABDOMEN: Soft, nontender, and nondistended.  Positive bowel sounds.    : No CVA tenderness  EXTREMITIES: Without any edema.  MSK: no joint swelling  NEUROLOGIC: Cranial nerves II through XII are grossly intact. No focal deficits  PSYCHIATRIC: Appropriate affect .  SKIN: No Rash RUE AVG      Labs:  07-12    136  |  95<L>  |  22.0<H>  ----------------------------<  131<H>  3.9   |  26.0  |  3.32<H>    Ca    8.0<L>      12 Jul 2019 09:50                            11.3   14.85 )-----------( 183      ( 12 Jul 2019 09:50 )             34.5                 CAPILLARY BLOOD GLUCOSE      POCT Blood Glucose.: 121 mg/dL (12 Jul 2019 08:47)  POCT Blood Glucose.: 150 mg/dL (11 Jul 2019 20:44)  POCT Blood Glucose.: 158 mg/dL (11 Jul 2019 16:18)  POCT Blood Glucose.: 131 mg/dL (11 Jul 2019 12:47)        RECENT CULTURES:  07-09 @ 09:41 .Blood Staphylococcus aureus    Growth in aerobic bottle: Staphylococcus aureus  Aerobic Bottle: 17:20 Hours to positivity  .  TYPE: (C=Critical, N=Notification, A=Abnormal) C  [facility]  [facility]  DATE/TIME CALLED: _ 07/10/2019 09:54:05  CALLED TO: Kellee BLANCO RN  READ BACK (2 Patient Identifiers)(Y/N): _ Y  READ BACK VALUES (Y/N): _ Y  CALLED BY: Kellee DANIEL        07-07 @ 20:25 .Blood Staphylococcus aureus    Growth in aerobic and anaerobic bottles: Staphylococcus aureus  Anaerobic Bottle: 2 days;05:44 Hours to positivity  Aerobic Bottle: 2 Days; 22.24 Hours to positivity  .  TYPE: (C=Critical, N=Notification, A=Abnormal) C  TESTS:  _ BLD   DATE/TIME CALLED: _ 07/10/2019 09:51:08  CALLED TO: Kellee BLANCO RN  READ BACK (2 Patient Identifiers)(Y/N): _ Y  READ BACK VALUES (Y/N): _ Y  CALLED BY: Kellee DANIEL        07-07 @ 12:47 .Blood Staphylococcus aureus    Growth in aerobic and anaerobic bottles: Staphylococcus aureus  Aerobic Bottle: 2 days; 01:03 Hours to positivity  Anaerobic Bottle: 2 days;22:09 Hours to positivity  .  TYPE: (C=Critical, N=Notification, A=Abnormal) C  TESTS:  _ BLD   DATE/TIME CALLED: _ 07/09/2019 14:36:17  CALLED TO: _ JOLIE VILLELARN  READ BACK (2 Patient Identifiers)(Y/N): _ Y  READ BACK VALUES (Y/N): _ Y  CALLED BY: Kellee DANIEL        07-06 @ 07:27 .Blood Staphylococcus aureus    Growth in aerobic and anaerobic bottles: Staphylococcus aureus  Aerobic Bottle: 12:38 Hours to positivity  Anaerobic Bottle: 14:29 Hours to positivity  .  TYPE: (C=Critical, N=Notification, A=Abnormal) C  TESTS:  _ Positive Blood GS  DATE/TIME CALLED: _ 07/07/2019 10:40  CALLED TO: _ ModeTWR: Lary Zamudio RN  READ BACK (2 Patient Identifiers)(Y/N): _ Y  READ BACK VALUES (Y/N): _ Y  CALLED BY: Kellee marion        07-05 @ 10:46 .Blood Staphylococcus aureus    Growth in aerobic and anaerobic bottles: Staphylococcus aureus  Aerobic Bottle: 12.08 Hours to positivity  Anaerobic Bottle: 12.18 Hours to positivity  .  TYPE: (C=Critical, N=Notification, A=Abnormal) C  TESTS:  _ GS  DATE/TIME CALLED: _ 07/06/2019 09:39:33  CALLED TO: _ Lary Chery RN  READ BACK (2 Patient Identifiers)(Y/N): _ Y  READ BACK VALUES (Y/N): _ Y  CALLED BY: Kellee Field        07-04 @ 14:35 .Urine Escherichia coli    >100,000 CFU/ml Escherichia coli        07-04 @ 13:32 .Blood Staphylococcus aureus  Blood Culture PCR    Growth in aerobic and anaerobic bottles: Staphylococcus aureus  Aerobic Bottle: 9.51 Hours to positivity  Anaerobic Bottle: 10.01 Hours to positivity  .  ***Blood Panel PCR results on this specimen are available  approximately 3 hours after the Gram stain result.***  Gram stain, PCR, and/or culture results may not always  correspond due to difference in methodologies.  ************************************************************  This PCR assay was performed using [email or web address].  The following targets are tested for: Enterococcus,  vancomycin resistant enterococci, Listeria monocytogenes,  coagulase negative staphylococci, S. aureus,  methicillin resistant S. aureus, Streptococcus agalactiae  (Group B), S. pneumoniae, S. pyogenes (GroupA),  Acinetobacter baumannii, Enterobacter cloacae, E. coli,  Klebsiella oxytoca, K. pneumoniae, Proteus sp.,  Serratia marcescens, Haemophilus influenzae,  Neisseria meningitidis, Pseudomonas aeruginosa, Candida  albicans, C. glabrata, C krusei, C parapsilosis,  C. tropicalis and the KPC resistance gene.  "Due to technical problems, Proteus sp. will Not be reported as part of  the BCID panel until further notice"  .  TYPE: (C=Critical, N=Notification, A=Abnormal) C  TESTS:  _ GS  DATE/TIME CALLED: _ 07/05/2019 09:14:52  CALLED TO: Kellee Mcpherson RN  READ BACK (2 Patient Identifiers)(Y/N): _ Y  READ BACK VALUES (Y/N): _ Y  CALLED BY: Kellee Field        07-04 @ 13:31 .Blood Staphylococcus aureus    Growth in aerobic and anaerobic bottles: Staphylococcus aureus  Aerobic Bottle: 8.51 Hours to positivity  Anaerobic Bottle: 9.41 Hours to positivity  .  TYPE: (C=Critical, N=Notification, A=Abnormal) C  TESTS:  _ GS  DATE/TIME CALLED: _ 07/05/2019 09:17:54  CALLED TO: Kellee Mcpherson RN  READ BACK (2 Patient Identifiers)(Y/N): _ Y  READ BACK VALUES (Y/N): _ Y  CALLED BY: Kellee Field

## 2019-07-12 NOTE — DIETITIAN INITIAL EVALUATION ADULT. - ETIOLOGY
related to inadequate energy intake in setting of swallowing difficulties, weakness, ESRD on hemodialysis, bacteriemia

## 2019-07-12 NOTE — PROGRESS NOTE ADULT - ASSESSMENT
87yoF hx ESRD on HD (M and F only), CAD s/p CABG, HTN, DM, PAD, hypothyroidism presenting with generalized weakness, dysuria. Reports pain at graft site for 1 month.  In Ed temp 102.6, leukocytosis to 14. Blood cx 4/4 Staph aureus. Imaging with no focus (no pneumonia, abscess, OM)    Overall Staph aureus bacteremia, listed anaphylaxis to penicillin, UTI E. coli pansensitive, ESRD on HD M/F only via AVG. Unclear source    Vancomycin 7/4, 7/5  Azactam 7/4-7/6  Cefazolin 7/6-->    Recommend:  - Blood cultures + 4/4 Staph aureus  - Despite listed penicillin allergy, tolerating cefazolin-c/w cefazolin 1 g IV every day (even on non HD days) and after dialysis on her dialysis days  - SANFORD for endocarditis/lead vegetations (-)  - USG AVG (-)  - NM WBC scan pending  - f/u BCX tomorrow x 2  - Trend Fever  - Trend WBC count      Will follow 87yoF hx ESRD on HD (M and F only), CAD s/p CABG, HTN, DM, PAD, hypothyroidism presenting with generalized weakness, dysuria. Reports pain at graft site for 1 month.  In Ed temp 102.6, leukocytosis to 14. Blood cx 4/4 Staph aureus. Imaging with no focus (no pneumonia, abscess, OM)    Overall Staph aureus bacteremia, listed anaphylaxis to penicillin, UTI E. coli pansensitive, ESRD on HD M/F only via AVG. Unclear source    Vancomycin 7/4, 7/5  Azactam 7/4-7/6  Cefazolin 7/6-->    Recommend:  - Blood cultures + 4/4 Staph aureus  - Despite listed penicillin allergy, tolerating cefazolin- c/w cefazolin 1 g IV every day (even on non HD days) and after dialysis on her dialysis days  - SANFORD for endocarditis/lead vegetations (-)  - USG AVG (-)  - NM WBC scan pending for source control  - f/u BCX tomorrow x 2  - Trend Fever  - Trend WBC count      Will follow

## 2019-07-12 NOTE — PROGRESS NOTE ADULT - ASSESSMENT
ESRD - HD tomorrow  - UF as tolerates    Anemia - no need for MILLIE   - trend H/H     RO - cont Phoslo  - cont Rocaltrol    Bacteremia: staph   SANFORD (-)  - cont antibiotics

## 2019-07-12 NOTE — PROGRESS NOTE ADULT - SUBJECTIVE AND OBJECTIVE BOX
Patient: LAUREN ROBERSON [id number] 87y Female                           Internal Medicine Hospitalist Progress Note    Initial HPI:  87yoF hx ESRD on HD (M and F only), CAD s/p CABG, HTN, DM, PAD, hypothyroidism presenting with generalized weakness.  On admission, febrile, leukocytosis, UA positive, was CT abd/pelvis showed haziness around the gallbladder however follow up abd US was negative for cholelithiasis and Stewart's sign and no LFT elevation on labs.  Blood cultures positive for staph aureus.  SANFORD negative for endocarditis.  Blood cultures have remained positive.    She has been confused.  CT head showed no acute infarct.      Interval History:  Pt slightly less confused today.  Seen with .  Pt denies weakness / numbness.  Denies pain.  No additional complaints.    ____________________PHYSICAL EXAM:  Vitals reviewed as indicated below  GENERAL:  NAD.  Alert and Oriented x 2 to person, place. confused.   HEENT: NCAT  CARDIOVASCULAR:  S1, S2  LUNGS: CTAB  ABDOMEN:  soft, (-) tenderness, (-) distension, (+) bowel sounds, (-) guarding, (-) rebound (-) rigidity  EXTREMITIES:  no cyanosis / clubbing / edema.  2+ pedal pulses.    NEURO: strength symmetric.   ____________________    VITALS:  Vital Signs Last 24 Hrs  T(C): 37.2 (12 Jul 2019 08:32), Max: 37.2 (12 Jul 2019 08:32)  T(F): 99 (12 Jul 2019 08:32), Max: 99 (12 Jul 2019 08:32)  HR: 63 (12 Jul 2019 09:20) (53 - 71)  BP: 85/40 (12 Jul 2019 09:20) (85/40 - 138/66)  BP(mean): --  RR: 16 (12 Jul 2019 09:20) (16 - 18)  SpO2: 95% (12 Jul 2019 09:20) (93% - 95%) Daily     Daily   CAPILLARY BLOOD GLUCOSE      POCT Blood Glucose.: 121 mg/dL (12 Jul 2019 08:47)  POCT Blood Glucose.: 150 mg/dL (11 Jul 2019 20:44)  POCT Blood Glucose.: 158 mg/dL (11 Jul 2019 16:18)  POCT Blood Glucose.: 131 mg/dL (11 Jul 2019 12:47)    I&O's Summary      LABS:                        11.3   14.85 )-----------( 183      ( 12 Jul 2019 09:50 )             34.5     07-12    136  |  95<L>  |  22.0<H>  ----------------------------<  131<H>  3.9   |  26.0  |  3.32<H>    Ca    8.0<L>      12 Jul 2019 09:50                    MEDICATIONS:  acetaminophen   Tablet .. 650 milliGRAM(s) Oral every 6 hours PRN  aspirin  chewable 81 milliGRAM(s) Oral daily  atorvastatin 40 milliGRAM(s) Oral at bedtime  calcitriol   Capsule 0.25 MICROGram(s) Oral daily  calcium acetate 667 milliGRAM(s) Oral three times a day with meals  ceFAZolin   IVPB      ceFAZolin   IVPB 1000 milliGRAM(s) IV Intermittent every 24 hours  chlorhexidine 2% Cloths 1 Application(s) Topical <User Schedule>  clopidogrel Tablet 75 milliGRAM(s) Oral daily  dextrose 40% Gel 15 Gram(s) Oral once PRN  dextrose 5%. 1000 milliLiter(s) IV Continuous <Continuous>  dextrose 50% Injectable 12.5 Gram(s) IV Push once  dextrose 50% Injectable 25 Gram(s) IV Push once  dextrose 50% Injectable 25 Gram(s) IV Push once  folic acid 1 milliGRAM(s) Oral daily  gabapentin 100 milliGRAM(s) Oral three times a day  glucagon  Injectable 1 milliGRAM(s) IntraMuscular once PRN  heparin  Injectable 5000 Unit(s) SubCutaneous every 8 hours  insulin lispro (HumaLOG) corrective regimen sliding scale   SubCutaneous three times a day before meals  insulin lispro (HumaLOG) corrective regimen sliding scale   SubCutaneous at bedtime  levothyroxine 112 MICROGram(s) Oral daily  lidocaine   Patch 1 Patch Transdermal daily  oxyCODONE    IR 5 milliGRAM(s) Oral every 6 hours PRN  pantoprazole    Tablet 40 milliGRAM(s) Oral before breakfast  saccharomyces boulardii 250 milliGRAM(s) Oral two times a day  sodium chloride 0.9%. 1000 milliLiter(s) IV Continuous <Continuous> Patient: LAUREN ROBERSON [id number] 87y Female                           Internal Medicine Hospitalist Progress Note    Initial HPI:  87yoF hx ESRD on HD (M and F only), CAD s/p CABG, HTN, DM, PAD, hypothyroidism presenting with generalized weakness.  On admission, febrile, leukocytosis, UA positive, was CT abd/pelvis showed haziness around the gallbladder however follow up abd US was negative for cholelithiasis and Stewart's sign and no LFT elevation on labs.  Blood cultures positive for staph aureus.  SANFORD negative for endocarditis.  Blood cultures have remained positive.    She has been confused.  CT head showed no acute infarct.      Interval History:  Pt slightly less confused today.  Seen with .  Pt denies weakness / numbness.  Denies pain.  No additional complaints.  Per RN requiring O2 at 2-3L /min via NC.     ____________________PHYSICAL EXAM:  Vitals reviewed as indicated below  GENERAL:  NAD.  Alert and Oriented x 2 to person, place. confused.   HEENT: NCAT  CARDIOVASCULAR:  S1, S2  LUNGS: CTAB  ABDOMEN:  soft, (-) tenderness, (-) distension, (+) bowel sounds, (-) guarding, (-) rebound (-) rigidity  EXTREMITIES:  no cyanosis / clubbing / edema.  2+ pedal pulses.    NEURO: strength symmetric.   ____________________    VITALS:  Vital Signs Last 24 Hrs  T(C): 37.2 (12 Jul 2019 08:32), Max: 37.2 (12 Jul 2019 08:32)  T(F): 99 (12 Jul 2019 08:32), Max: 99 (12 Jul 2019 08:32)  HR: 63 (12 Jul 2019 09:20) (53 - 71)  BP: 85/40 (12 Jul 2019 09:20) (85/40 - 138/66)  BP(mean): --  RR: 16 (12 Jul 2019 09:20) (16 - 18)  SpO2: 95% (12 Jul 2019 09:20) (93% - 95%) Daily     Daily   CAPILLARY BLOOD GLUCOSE      POCT Blood Glucose.: 121 mg/dL (12 Jul 2019 08:47)  POCT Blood Glucose.: 150 mg/dL (11 Jul 2019 20:44)  POCT Blood Glucose.: 158 mg/dL (11 Jul 2019 16:18)  POCT Blood Glucose.: 131 mg/dL (11 Jul 2019 12:47)    I&O's Summary      LABS:                        11.3   14.85 )-----------( 183      ( 12 Jul 2019 09:50 )             34.5     07-12    136  |  95<L>  |  22.0<H>  ----------------------------<  131<H>  3.9   |  26.0  |  3.32<H>    Ca    8.0<L>      12 Jul 2019 09:50                    MEDICATIONS:  acetaminophen   Tablet .. 650 milliGRAM(s) Oral every 6 hours PRN  aspirin  chewable 81 milliGRAM(s) Oral daily  atorvastatin 40 milliGRAM(s) Oral at bedtime  calcitriol   Capsule 0.25 MICROGram(s) Oral daily  calcium acetate 667 milliGRAM(s) Oral three times a day with meals  ceFAZolin   IVPB      ceFAZolin   IVPB 1000 milliGRAM(s) IV Intermittent every 24 hours  chlorhexidine 2% Cloths 1 Application(s) Topical <User Schedule>  clopidogrel Tablet 75 milliGRAM(s) Oral daily  dextrose 40% Gel 15 Gram(s) Oral once PRN  dextrose 5%. 1000 milliLiter(s) IV Continuous <Continuous>  dextrose 50% Injectable 12.5 Gram(s) IV Push once  dextrose 50% Injectable 25 Gram(s) IV Push once  dextrose 50% Injectable 25 Gram(s) IV Push once  folic acid 1 milliGRAM(s) Oral daily  gabapentin 100 milliGRAM(s) Oral three times a day  glucagon  Injectable 1 milliGRAM(s) IntraMuscular once PRN  heparin  Injectable 5000 Unit(s) SubCutaneous every 8 hours  insulin lispro (HumaLOG) corrective regimen sliding scale   SubCutaneous three times a day before meals  insulin lispro (HumaLOG) corrective regimen sliding scale   SubCutaneous at bedtime  levothyroxine 112 MICROGram(s) Oral daily  lidocaine   Patch 1 Patch Transdermal daily  oxyCODONE    IR 5 milliGRAM(s) Oral every 6 hours PRN  pantoprazole    Tablet 40 milliGRAM(s) Oral before breakfast  saccharomyces boulardii 250 milliGRAM(s) Oral two times a day  sodium chloride 0.9%. 1000 milliLiter(s) IV Continuous <Continuous>

## 2019-07-12 NOTE — DIETITIAN INITIAL EVALUATION ADULT. - PERTINENT LABORATORY DATA
07-12 Na136 mmol/L Glu 131 mg/dL<H> K+ 3.9 mmol/L Cr  3.32 mg/dL<H> BUN 22.0 mg/dL<H> Phos n/a   Alb n/a   PAB n/a

## 2019-07-12 NOTE — PROGRESS NOTE ADULT - ASSESSMENT
87yoF hx ESRD on HD (M and F only), CAD s/p CABG, HTN, DM, PAD, hypothyroidism presenting with generalized weakness found to have UTI and meeting sepsis criteria     #Sepsis / Ecoli UTI / Staph aureus bacteremia - Blood cultures continue to be positive.  Dosage of Cefazolin adjusted 7/10.   Indium scan as ordered - per radiology cannot be done until Monday.  Discussed with son Darrin in agreement.  He will consent to the procedure.  ID followup.    #Metabolic Encephalopathy - multifactorial, due to sepsis / ESRD, etc.  CT head showing chronic infarcts and severe microvascular disease.  On ASA, Statin.   #Leg Pain, Neuropathy - per son, findings are chronic.  Continue Neurontin.  #Thrombocytopenia - Plt improved.  Likely due to bacteremia.  Hematology input appreciated.   #Elevated trop- troponin 0.16, EKG shows LBBB which is not new.  Elevated troponin is stable, and in setting of ESRD is not clinically suggestive of acute cardiac issue.  Pt symptomatic.    #CAD/Hx CVA- ASA, plavix, statin resumed.   #HTN- coreg 12.5 mg BID w parameters, atorvastatin 40  mg, lisinopril 20 mg, clonidine 0.1 mg po q 6 hrs.  #ESRD on HD Monday and Friday-  Renal consulted.  Calcitriol 0.25 mcg and calcium acetate 667 mg 1 po TID w meals.  #Hypothyroid- Continue [id number] mcg.  #Prophylactic measure- heparin. 87yoF hx ESRD on HD (M and F only), CAD s/p CABG, HTN, DM, PAD, hypothyroidism presenting with generalized weakness found to have UTI and meeting sepsis criteria     #Sepsis / Ecoli UTI / Staph aureus bacteremia - Blood cultures continue to be positive.  Dosage of Cefazolin adjusted 7/10.   Indium scan as ordered - per radiology cannot be done until Monday.  Discussed with son Darrin in agreement.  He will consent to the procedure.  ID followup.    #Metabolic Encephalopathy - multifactorial, due to sepsis / ESRD, etc.  CT head showing chronic infarcts and severe microvascular disease.  On ASA, Statin.   #Leg Pain, Neuropathy - per son, findings are chronic.  Continue Neurontin.  #Thrombocytopenia - Plt improved.  Likely due to bacteremia.  Hematology input appreciated.   #Elevated trop- troponin 0.16, EKG shows LBBB which is not new.  Elevated troponin is stable, and in setting of ESRD is not clinically suggestive of acute cardiac issue.  Pt symptomatic.    #CAD/Hx CVA- ASA, plavix, statin resumed.   #HTN- BP now low, possibly due in part to diminished po intake.  Per son, she took approximately 75% of meals.  Stop antihypertensives and observe.    #ESRD on HD Monday and Friday-  Renal consulted.  Calcitriol 0.25 mcg and calcium acetate 667 mg 1 po TID w meals.  #Hypothyroid- Continue [id number] mcg.  #Prophylactic measure- heparin. 87yoF hx ESRD on HD (M and F only), CAD s/p CABG, HTN, DM, PAD, hypothyroidism presenting with generalized weakness found to have UTI and meeting sepsis criteria     #Sepsis / Ecoli UTI / Staph aureus bacteremia - Blood cultures continue to be positive.  Dosage of Cefazolin adjusted 7/10.   Indium scan as ordered - per radiology cannot be done until Monday.  Discussed with son Darrin in agreement.  He will consent to the procedure.  ID followup.    #Hypoxia - unclear etiology.  Monitor SaO2.  Check CXR.   #Metabolic Encephalopathy - multifactorial, due to sepsis / ESRD, etc.  CT head showing chronic infarcts and severe microvascular disease.  On ASA, Statin.   #Leg Pain, Neuropathy - per son, findings are chronic.  Continue Neurontin.  #Thrombocytopenia - Plt improved.  Likely due to bacteremia.  Hematology input appreciated.   #Elevated trop- troponin 0.16, EKG shows LBBB which is not new.  Elevated troponin is stable, and in setting of ESRD is not clinically suggestive of acute cardiac issue.  Pt symptomatic.    #CAD/Hx CVA- ASA, plavix, statin resumed.   #HTN- BP now low, possibly due in part to diminished po intake.  Per son, she took approximately 75% of meals.  Stop antihypertensives and observe.    #ESRD on HD Monday and Friday-  Renal consulted.  Calcitriol 0.25 mcg and calcium acetate 667 mg 1 po TID w meals.  #Hypothyroid- Continue [id number] mcg.  #Prophylactic measure- heparin.

## 2019-07-12 NOTE — DIETITIAN INITIAL EVALUATION ADULT. - OTHER INFO
87yoF hx ESRD on HD (M and F only), CAD s/p CABG, HTN, DM, PAD, hypothyroidism presenting with generalized weakness. Pt appeared confused, could not obtain interview information. Spoke with RN pt is tolerating dysphagia; 1 pureed diet well. As per RN pt is a total assistance feed. pt consumed 100% of breakfast, 50% of lunch. RN states pt has a IAD on backside and +2 bilateral edema in hands. As per EMR pt wt was 132lbs in Feb 2018. Questionable current wt of pt.

## 2019-07-13 LAB
ANION GAP SERPL CALC-SCNC: 13 MMOL/L — SIGNIFICANT CHANGE UP (ref 5–17)
BUN SERPL-MCNC: 31 MG/DL — HIGH (ref 8–20)
CALCIUM SERPL-MCNC: 7.6 MG/DL — LOW (ref 8.6–10.2)
CHLORIDE SERPL-SCNC: 99 MMOL/L — SIGNIFICANT CHANGE UP (ref 98–107)
CO2 SERPL-SCNC: 24 MMOL/L — SIGNIFICANT CHANGE UP (ref 22–29)
CREAT SERPL-MCNC: 4.23 MG/DL — HIGH (ref 0.5–1.3)
GLUCOSE BLDC GLUCOMTR-MCNC: 114 MG/DL — HIGH (ref 70–99)
GLUCOSE BLDC GLUCOMTR-MCNC: 122 MG/DL — HIGH (ref 70–99)
GLUCOSE BLDC GLUCOMTR-MCNC: 180 MG/DL — HIGH (ref 70–99)
GLUCOSE BLDC GLUCOMTR-MCNC: 181 MG/DL — HIGH (ref 70–99)
GLUCOSE SERPL-MCNC: 141 MG/DL — HIGH (ref 70–115)
HCT VFR BLD CALC: 32.4 % — LOW (ref 34.5–45)
HGB BLD-MCNC: 10.2 G/DL — LOW (ref 11.5–15.5)
MCHC RBC-ENTMCNC: 30.6 PG — SIGNIFICANT CHANGE UP (ref 27–34)
MCHC RBC-ENTMCNC: 31.5 GM/DL — LOW (ref 32–36)
MCV RBC AUTO: 97.3 FL — SIGNIFICANT CHANGE UP (ref 80–100)
PLATELET # BLD AUTO: 177 K/UL — SIGNIFICANT CHANGE UP (ref 150–400)
POTASSIUM SERPL-MCNC: 4.3 MMOL/L — SIGNIFICANT CHANGE UP (ref 3.5–5.3)
POTASSIUM SERPL-SCNC: 4.3 MMOL/L — SIGNIFICANT CHANGE UP (ref 3.5–5.3)
RBC # BLD: 3.33 M/UL — LOW (ref 3.8–5.2)
RBC # FLD: 15.4 % — HIGH (ref 10.3–14.5)
SODIUM SERPL-SCNC: 136 MMOL/L — SIGNIFICANT CHANGE UP (ref 135–145)
WBC # BLD: 13.79 K/UL — HIGH (ref 3.8–10.5)
WBC # FLD AUTO: 13.79 K/UL — HIGH (ref 3.8–10.5)

## 2019-07-13 PROCEDURE — 99233 SBSQ HOSP IP/OBS HIGH 50: CPT

## 2019-07-13 RX ADMIN — GABAPENTIN 100 MILLIGRAM(S): 400 CAPSULE ORAL at 06:01

## 2019-07-13 RX ADMIN — OXYCODONE HYDROCHLORIDE 5 MILLIGRAM(S): 5 TABLET ORAL at 13:23

## 2019-07-13 RX ADMIN — ATORVASTATIN CALCIUM 40 MILLIGRAM(S): 80 TABLET, FILM COATED ORAL at 22:04

## 2019-07-13 RX ADMIN — HEPARIN SODIUM 5000 UNIT(S): 5000 INJECTION INTRAVENOUS; SUBCUTANEOUS at 06:01

## 2019-07-13 RX ADMIN — CALCITRIOL 0.25 MICROGRAM(S): 0.5 CAPSULE ORAL at 12:26

## 2019-07-13 RX ADMIN — Medication 2: at 12:27

## 2019-07-13 RX ADMIN — Medication 2: at 17:26

## 2019-07-13 RX ADMIN — Medication 667 MILLIGRAM(S): at 17:25

## 2019-07-13 RX ADMIN — CHLORHEXIDINE GLUCONATE 1 APPLICATION(S): 213 SOLUTION TOPICAL at 06:01

## 2019-07-13 RX ADMIN — Medication 667 MILLIGRAM(S): at 08:55

## 2019-07-13 RX ADMIN — OXYCODONE HYDROCHLORIDE 5 MILLIGRAM(S): 5 TABLET ORAL at 14:23

## 2019-07-13 RX ADMIN — Medication 667 MILLIGRAM(S): at 12:26

## 2019-07-13 RX ADMIN — Medication 81 MILLIGRAM(S): at 12:26

## 2019-07-13 RX ADMIN — CLOPIDOGREL BISULFATE 75 MILLIGRAM(S): 75 TABLET, FILM COATED ORAL at 12:26

## 2019-07-13 RX ADMIN — Medication 100 MILLIGRAM(S): at 12:26

## 2019-07-13 RX ADMIN — PANTOPRAZOLE SODIUM 40 MILLIGRAM(S): 20 TABLET, DELAYED RELEASE ORAL at 06:00

## 2019-07-13 RX ADMIN — HEPARIN SODIUM 5000 UNIT(S): 5000 INJECTION INTRAVENOUS; SUBCUTANEOUS at 13:24

## 2019-07-13 RX ADMIN — Medication 112 MICROGRAM(S): at 06:01

## 2019-07-13 RX ADMIN — GABAPENTIN 100 MILLIGRAM(S): 400 CAPSULE ORAL at 13:23

## 2019-07-13 RX ADMIN — OXYCODONE HYDROCHLORIDE 5 MILLIGRAM(S): 5 TABLET ORAL at 02:43

## 2019-07-13 RX ADMIN — Medication 1 MILLIGRAM(S): at 12:26

## 2019-07-13 RX ADMIN — HEPARIN SODIUM 5000 UNIT(S): 5000 INJECTION INTRAVENOUS; SUBCUTANEOUS at 22:04

## 2019-07-13 RX ADMIN — LIDOCAINE 1 PATCH: 4 CREAM TOPICAL at 00:02

## 2019-07-13 RX ADMIN — Medication 250 MILLIGRAM(S): at 06:01

## 2019-07-13 RX ADMIN — Medication 250 MILLIGRAM(S): at 17:25

## 2019-07-13 RX ADMIN — LIDOCAINE 1 PATCH: 4 CREAM TOPICAL at 12:27

## 2019-07-13 RX ADMIN — GABAPENTIN 100 MILLIGRAM(S): 400 CAPSULE ORAL at 22:04

## 2019-07-13 NOTE — PROGRESS NOTE ADULT - ASSESSMENT
ESRD - HD today  - UF as tolerates    Anemia - still no need for MILLIE   - follow H/H     RO - cont Phoslo  - cont Rocaltrol  - follow Ca+ and phos    Bacteremia: staph   SANFORD (-)  - cont antibiotics as per ID

## 2019-07-13 NOTE — PROGRESS NOTE ADULT - ASSESSMENT
87yoF hx ESRD on HD (M and F only), CAD s/p CABG, HTN, DM, PAD, hypothyroidism presenting with generalized weakness found to have UTI and meeting sepsis criteria     A/P    #Sepsis / Staph aureus bacteremia - Blood cultures continue to be positive source unknown  ID consult appreciated.  Dosage of Cefazolin adjusted 7/10.   Indium scan as ordered - per radiology cannot be done until Monday.  Discussed with son Darrin in agreement.  He will consent to the procedure.  ID followup.    SANFORD for endocarditis/lead vegetations (-)    #Metabolic Encephalopathy - multifactorial, due to sepsis / ESRD, etc.  CT head showing chronic infarcts and severe microvascular disease.  On ASA, Statin.     #Leg Pain, Neuropathy - per son, findings are chronic.  Continue Neurontin.    #Thrombocytopenia - Plt improved.  Likely due to bacteremia.  Hematology input appreciated.     #CAD/Hx CVA- ASA, plavix, statin resumed.     #HTN- Stable, monitor BP     #ESRD on HD Monday and Friday-  Renal consulted.  Calcitriol 0.25 mcg and calcium acetate 667 mg 1 po TID w meals.    #Hypothyroid- Continue [id number] mcg.    #Prophylactic measure- heparin.

## 2019-07-13 NOTE — PROGRESS NOTE ADULT - SUBJECTIVE AND OBJECTIVE BOX
Internal Medicine Hospitalist - Dr. Giovanni ROBERSON    [id number]    87y      Female    Patient is a 87y old  Female who presents with a chief complaint of weakness, sepsis 2/2 UTI (13 Jul 2019 09:38)    INTERVAL HPI/ OVERNIGHT EVENTS: Patient is seen and examined, had Hd today, afebrile, denied abd. pain, nausea and vomiting     REVIEW OF SYSTEMS:    Denied fever, chills, abd. pain, nausea, vomiting, chest pain, SOB, headache, dizziness    PHYSICAL EXAM:    Vital Signs Last 24 Hrs  T(C): 37.2 (13 Jul 2019 16:30), Max: 37.2 (13 Jul 2019 16:30)  T(F): 99 (13 Jul 2019 16:30), Max: 99 (13 Jul 2019 16:30)  HR: 69 (13 Jul 2019 16:30) (63 - 114)  BP: 147/58 (13 Jul 2019 16:30) (101/46 - 170/60)  BP(mean): --  RR: 18 (13 Jul 2019 16:30) (16 - 18)  SpO2: 99% (13 Jul 2019 11:00) (92% - 100%)    GENERAL: NAD  CHEST/LUNG: CTA b/l   HEART: S1S2+ audible  ABDOMEN: Soft, Nontender, Nondistended; Bowel sounds present  EXTREMITIES:  no edema  CNS: Awake  Psychiatry: normal mood    LABS:                        10.2   13.79 )-----------( 177      ( 13 Jul 2019 09:18 )             32.4     07-13    136  |  99  |  31.0<H>  ----------------------------<  141<H>  4.3   |  24.0  |  4.23<H>    Ca    7.6<L>      13 Jul 2019 09:18              MEDICATIONS  (STANDING):  aspirin  chewable 81 milliGRAM(s) Oral daily  atorvastatin 40 milliGRAM(s) Oral at bedtime  calcitriol   Capsule 0.25 MICROGram(s) Oral daily  calcium acetate 667 milliGRAM(s) Oral three times a day with meals  ceFAZolin   IVPB      ceFAZolin   IVPB 1000 milliGRAM(s) IV Intermittent every 24 hours  chlorhexidine 2% Cloths 1 Application(s) Topical <User Schedule>  clopidogrel Tablet 75 milliGRAM(s) Oral daily  dextrose 5%. 1000 milliLiter(s) (50 mL/Hr) IV Continuous <Continuous>  dextrose 50% Injectable 12.5 Gram(s) IV Push once  dextrose 50% Injectable 25 Gram(s) IV Push once  dextrose 50% Injectable 25 Gram(s) IV Push once  folic acid 1 milliGRAM(s) Oral daily  gabapentin 100 milliGRAM(s) Oral three times a day  heparin  Injectable 5000 Unit(s) SubCutaneous every 8 hours  insulin lispro (HumaLOG) corrective regimen sliding scale   SubCutaneous three times a day before meals  insulin lispro (HumaLOG) corrective regimen sliding scale   SubCutaneous at bedtime  levothyroxine 112 MICROGram(s) Oral daily  lidocaine   Patch 1 Patch Transdermal daily  pantoprazole    Tablet 40 milliGRAM(s) Oral before breakfast  saccharomyces boulardii 250 milliGRAM(s) Oral two times a day    MEDICATIONS  (PRN):  acetaminophen   Tablet .. 650 milliGRAM(s) Oral every 6 hours PRN Temp greater or equal to 38C (100.4F), Mild Pain (1 - 3)  dextrose 40% Gel 15 Gram(s) Oral once PRN Blood Glucose LESS THAN 70 milliGRAM(s)/deciliter  glucagon  Injectable 1 milliGRAM(s) IntraMuscular once PRN Glucose LESS THAN 70 milligrams/deciliter  oxyCODONE    IR 5 milliGRAM(s) Oral every 6 hours PRN Moderate to severe pain      RADIOLOGY & ADDITIONAL TEST

## 2019-07-13 NOTE — PROGRESS NOTE ADULT - SUBJECTIVE AND OBJECTIVE BOX
NEPHROLOGY INTERVAL HPI/OVERNIGHT EVENTS:  pt seen at HD  Lethargic, arousable at HD    MEDICATIONS  (STANDING):  aspirin  chewable 81 milliGRAM(s) Oral daily  atorvastatin 40 milliGRAM(s) Oral at bedtime  calcitriol   Capsule 0.25 MICROGram(s) Oral daily  calcium acetate 667 milliGRAM(s) Oral three times a day with meals  ceFAZolin   IVPB      ceFAZolin   IVPB 1000 milliGRAM(s) IV Intermittent every 24 hours  chlorhexidine 2% Cloths 1 Application(s) Topical <User Schedule>  clopidogrel Tablet 75 milliGRAM(s) Oral daily  dextrose 5%. 1000 milliLiter(s) (50 mL/Hr) IV Continuous <Continuous>  dextrose 50% Injectable 12.5 Gram(s) IV Push once  dextrose 50% Injectable 25 Gram(s) IV Push once  dextrose 50% Injectable 25 Gram(s) IV Push once  folic acid 1 milliGRAM(s) Oral daily  gabapentin 100 milliGRAM(s) Oral three times a day  heparin  Injectable 5000 Unit(s) SubCutaneous every 8 hours  insulin lispro (HumaLOG) corrective regimen sliding scale   SubCutaneous three times a day before meals  insulin lispro (HumaLOG) corrective regimen sliding scale   SubCutaneous at bedtime  levothyroxine 112 MICROGram(s) Oral daily  lidocaine   Patch 1 Patch Transdermal daily  pantoprazole    Tablet 40 milliGRAM(s) Oral before breakfast  saccharomyces boulardii 250 milliGRAM(s) Oral two times a day  sodium chloride 0.9%. 1000 milliLiter(s) (100 mL/Hr) IV Continuous <Continuous>    MEDICATIONS  (PRN):  acetaminophen   Tablet .. 650 milliGRAM(s) Oral every 6 hours PRN Temp greater or equal to 38C (100.4F), Mild Pain (1 - 3)  dextrose 40% Gel 15 Gram(s) Oral once PRN Blood Glucose LESS THAN 70 milliGRAM(s)/deciliter  glucagon  Injectable 1 milliGRAM(s) IntraMuscular once PRN Glucose LESS THAN 70 milligrams/deciliter  oxyCODONE    IR 5 milliGRAM(s) Oral every 6 hours PRN Moderate to severe pain      Allergies    penicillin (Anaphylaxis)  seafood (Anaphylaxis)  shellfish (Anaphylaxis)          Vital Signs Last 24 Hrs  T(C): 36.8 (13 Jul 2019 08:50), Max: 36.9 (12 Jul 2019 17:30)  T(F): 98.3 (13 Jul 2019 08:50), Max: 98.4 (12 Jul 2019 17:30)  HR: 114 (13 Jul 2019 08:50) (63 - 114)  BP: 108/60 (13 Jul 2019 08:50) (80/38 - 148/65)  BP(mean): --  RR: 18 (13 Jul 2019 08:50) (16 - 18)  SpO2: 99% (13 Jul 2019 08:50) (92% - 100%)    PHYSICAL EXAM:  Appears chronically ill  NECK: Supple, no jvd  CHEST/LUNG: diminished BS at bases  HEART: Regular rate and rhythm; No rub   ABDOMEN: Soft, Nontender, Nondistended; Bowel sounds present  EXTREMITIES:  + edema less  Neuro: lethargic and slow to respond    LABS:                        10.2   13.79 )-----------( 177      ( 13 Jul 2019 09:18 )             32.4     07-12    136  |  95<L>  |  22.0<H>  ----------------------------<  131<H>  3.9   |  26.0  |  3.32<H>    Ca    8.0<L>      12 Jul 2019 09:50              RADIOLOGY & ADDITIONAL TESTS:

## 2019-07-14 LAB
-  AMPICILLIN/SULBACTAM: SIGNIFICANT CHANGE UP
-  CEFAZOLIN: SIGNIFICANT CHANGE UP
-  CLINDAMYCIN: SIGNIFICANT CHANGE UP
-  ERYTHROMYCIN: SIGNIFICANT CHANGE UP
-  GENTAMICIN: SIGNIFICANT CHANGE UP
-  OXACILLIN: SIGNIFICANT CHANGE UP
-  PENICILLIN: SIGNIFICANT CHANGE UP
-  RIFAMPIN: SIGNIFICANT CHANGE UP
-  TETRACYCLINE: SIGNIFICANT CHANGE UP
-  TRIMETHOPRIM/SULFAMETHOXAZOLE: SIGNIFICANT CHANGE UP
-  VANCOMYCIN: SIGNIFICANT CHANGE UP
ANION GAP SERPL CALC-SCNC: 9 MMOL/L — SIGNIFICANT CHANGE UP (ref 5–17)
BUN SERPL-MCNC: 20 MG/DL — SIGNIFICANT CHANGE UP (ref 8–20)
CALCIUM SERPL-MCNC: 7.7 MG/DL — LOW (ref 8.6–10.2)
CHLORIDE SERPL-SCNC: 97 MMOL/L — LOW (ref 98–107)
CO2 SERPL-SCNC: 30 MMOL/L — HIGH (ref 22–29)
CREAT SERPL-MCNC: 2.98 MG/DL — HIGH (ref 0.5–1.3)
CULTURE RESULTS: SIGNIFICANT CHANGE UP
GLUCOSE BLDC GLUCOMTR-MCNC: 117 MG/DL — HIGH (ref 70–99)
GLUCOSE BLDC GLUCOMTR-MCNC: 124 MG/DL — HIGH (ref 70–99)
GLUCOSE BLDC GLUCOMTR-MCNC: 126 MG/DL — HIGH (ref 70–99)
GLUCOSE BLDC GLUCOMTR-MCNC: 149 MG/DL — HIGH (ref 70–99)
GLUCOSE SERPL-MCNC: 112 MG/DL — SIGNIFICANT CHANGE UP (ref 70–115)
HCT VFR BLD CALC: 31.4 % — LOW (ref 34.5–45)
HGB BLD-MCNC: 10 G/DL — LOW (ref 11.5–15.5)
MCHC RBC-ENTMCNC: 31.3 PG — SIGNIFICANT CHANGE UP (ref 27–34)
MCHC RBC-ENTMCNC: 31.8 GM/DL — LOW (ref 32–36)
MCV RBC AUTO: 98.4 FL — SIGNIFICANT CHANGE UP (ref 80–100)
METHOD TYPE: SIGNIFICANT CHANGE UP
ORGANISM # SPEC MICROSCOPIC CNT: SIGNIFICANT CHANGE UP
PLATELET # BLD AUTO: 184 K/UL — SIGNIFICANT CHANGE UP (ref 150–400)
POTASSIUM SERPL-MCNC: 4.4 MMOL/L — SIGNIFICANT CHANGE UP (ref 3.5–5.3)
POTASSIUM SERPL-SCNC: 4.4 MMOL/L — SIGNIFICANT CHANGE UP (ref 3.5–5.3)
RBC # BLD: 3.19 M/UL — LOW (ref 3.8–5.2)
RBC # FLD: 15.5 % — HIGH (ref 10.3–14.5)
SODIUM SERPL-SCNC: 136 MMOL/L — SIGNIFICANT CHANGE UP (ref 135–145)
SPECIMEN SOURCE: SIGNIFICANT CHANGE UP
WBC # BLD: 14.82 K/UL — HIGH (ref 3.8–10.5)
WBC # FLD AUTO: 14.82 K/UL — HIGH (ref 3.8–10.5)

## 2019-07-14 PROCEDURE — 99232 SBSQ HOSP IP/OBS MODERATE 35: CPT

## 2019-07-14 RX ADMIN — GABAPENTIN 100 MILLIGRAM(S): 400 CAPSULE ORAL at 05:22

## 2019-07-14 RX ADMIN — PANTOPRAZOLE SODIUM 40 MILLIGRAM(S): 20 TABLET, DELAYED RELEASE ORAL at 08:34

## 2019-07-14 RX ADMIN — Medication 667 MILLIGRAM(S): at 08:34

## 2019-07-14 RX ADMIN — Medication 650 MILLIGRAM(S): at 09:44

## 2019-07-14 RX ADMIN — Medication 650 MILLIGRAM(S): at 08:44

## 2019-07-14 RX ADMIN — OXYCODONE HYDROCHLORIDE 5 MILLIGRAM(S): 5 TABLET ORAL at 05:36

## 2019-07-14 RX ADMIN — CALCITRIOL 0.25 MICROGRAM(S): 0.5 CAPSULE ORAL at 11:59

## 2019-07-14 RX ADMIN — Medication 250 MILLIGRAM(S): at 05:23

## 2019-07-14 RX ADMIN — GABAPENTIN 100 MILLIGRAM(S): 400 CAPSULE ORAL at 22:50

## 2019-07-14 RX ADMIN — HEPARIN SODIUM 5000 UNIT(S): 5000 INJECTION INTRAVENOUS; SUBCUTANEOUS at 13:46

## 2019-07-14 RX ADMIN — Medication 81 MILLIGRAM(S): at 11:59

## 2019-07-14 RX ADMIN — Medication 250 MILLIGRAM(S): at 17:19

## 2019-07-14 RX ADMIN — ATORVASTATIN CALCIUM 40 MILLIGRAM(S): 80 TABLET, FILM COATED ORAL at 22:50

## 2019-07-14 RX ADMIN — HEPARIN SODIUM 5000 UNIT(S): 5000 INJECTION INTRAVENOUS; SUBCUTANEOUS at 05:22

## 2019-07-14 RX ADMIN — Medication 112 MICROGRAM(S): at 05:22

## 2019-07-14 RX ADMIN — Medication 100 MILLIGRAM(S): at 10:03

## 2019-07-14 RX ADMIN — CLOPIDOGREL BISULFATE 75 MILLIGRAM(S): 75 TABLET, FILM COATED ORAL at 11:59

## 2019-07-14 RX ADMIN — GABAPENTIN 100 MILLIGRAM(S): 400 CAPSULE ORAL at 13:46

## 2019-07-14 RX ADMIN — Medication 1 MILLIGRAM(S): at 11:59

## 2019-07-14 RX ADMIN — CHLORHEXIDINE GLUCONATE 1 APPLICATION(S): 213 SOLUTION TOPICAL at 05:21

## 2019-07-14 RX ADMIN — LIDOCAINE 1 PATCH: 4 CREAM TOPICAL at 11:59

## 2019-07-14 RX ADMIN — Medication 667 MILLIGRAM(S): at 17:19

## 2019-07-14 RX ADMIN — HEPARIN SODIUM 5000 UNIT(S): 5000 INJECTION INTRAVENOUS; SUBCUTANEOUS at 22:50

## 2019-07-14 RX ADMIN — Medication 667 MILLIGRAM(S): at 11:59

## 2019-07-14 NOTE — PROVIDER CONTACT NOTE (CRITICAL VALUE NOTIFICATION) - SITUATION
MD made aware, ID ordered abx
Attending notified of critical result as listed above; pt already on abx regimen and MD to revisit weather change in order set necessary.
md annita aware pt has positive blood cx gram pos cocci

## 2019-07-14 NOTE — PROGRESS NOTE ADULT - ASSESSMENT
87yoF hx ESRD on HD (M and F only), CAD s/p CABG, HTN, DM, PAD, hypothyroidism presenting with generalized weakness found to have UTI and meeting sepsis criteria, blood c/s positive for MSSA, source unknown, pending indium scan on Monday    A/P    #Sepsis / Staph aureus bacteremia - Blood cultures continue to be positive source unknown  ID consult appreciated.  Dosage of Cefazolin adjusted 7/10.   Indium scan as ordered - per radiology cannot be done until Monday.    ID on board  SANFORD for endocarditis/lead vegetations (-)    #Metabolic Encephalopathy - multifactorial, due to sepsis / ESRD, etc.  CT head showing chronic infarcts and severe microvascular disease.  On ASA, Statin.     #Leg Pain, Neuropathy - per son, findings are chronic.  Continue Neurontin.    #Thrombocytopenia - Plt improved.  Likely due to bacteremia.  Hematology input appreciated.     #CAD/Hx CVA- ASA, plavix, statin resumed.     #HTN- Stable, monitor BP     #ESRD on HD Monday and Friday-  Renal consulted.  Calcitriol 0.25 mcg and calcium acetate 667 mg 1 po TID w meals.    #Hypothyroid- Continue [id number] mcg.    #Prophylactic measure- heparin.

## 2019-07-14 NOTE — PROGRESS NOTE ADULT - SUBJECTIVE AND OBJECTIVE BOX
Internal Medicine Hospitalist - Dr. Giovanni ROBERSON    [id number]    87y      Female    Patient is a 87y old  Female who presents with a chief complaint of weakness, sepsis 2/2 UTI (14 Jul 2019 08:50)    INTERVAL HPI/ OVERNIGHT EVENTS: Patient is seen and examined, awake, sleeping, afebrile    REVIEW OF SYSTEMS:    limited    PHYSICAL EXAM:    Vital Signs Last 24 Hrs  T(C): 36.3 (14 Jul 2019 07:43), Max: 37.2 (13 Jul 2019 16:30)  T(F): 97.4 (14 Jul 2019 07:43), Max: 99 (13 Jul 2019 16:30)  HR: 54 (14 Jul 2019 07:43) (54 - 72)  BP: 141/65 (14 Jul 2019 07:43) (141/65 - 170/72)  BP(mean): --  RR: 100 (14 Jul 2019 07:43) (18 - 100)  SpO2: 95% (13 Jul 2019 22:30) (95% - 97%)    GENERAL: NAD  CHEST/LUNG: CTA b/l   HEART: S1S2+ audible  ABDOMEN: Soft, Nontender, Nondistended; Bowel sounds present  EXTREMITIES:  no edema  CNS: Awake  Psychiatry: normal mood    LABS:                        10.0   14.82 )-----------( 184      ( 14 Jul 2019 07:57 )             31.4     07-14    136  |  97<L>  |  20.0  ----------------------------<  112  4.4   |  30.0<H>  |  2.98<H>    Ca    7.7<L>      14 Jul 2019 07:57              MEDICATIONS  (STANDING):  aspirin  chewable 81 milliGRAM(s) Oral daily  atorvastatin 40 milliGRAM(s) Oral at bedtime  calcitriol   Capsule 0.25 MICROGram(s) Oral daily  calcium acetate 667 milliGRAM(s) Oral three times a day with meals  ceFAZolin   IVPB      ceFAZolin   IVPB 1000 milliGRAM(s) IV Intermittent every 24 hours  chlorhexidine 2% Cloths 1 Application(s) Topical <User Schedule>  clopidogrel Tablet 75 milliGRAM(s) Oral daily  dextrose 5%. 1000 milliLiter(s) (50 mL/Hr) IV Continuous <Continuous>  dextrose 50% Injectable 12.5 Gram(s) IV Push once  dextrose 50% Injectable 25 Gram(s) IV Push once  dextrose 50% Injectable 25 Gram(s) IV Push once  folic acid 1 milliGRAM(s) Oral daily  gabapentin 100 milliGRAM(s) Oral three times a day  heparin  Injectable 5000 Unit(s) SubCutaneous every 8 hours  insulin lispro (HumaLOG) corrective regimen sliding scale   SubCutaneous three times a day before meals  insulin lispro (HumaLOG) corrective regimen sliding scale   SubCutaneous at bedtime  levothyroxine 112 MICROGram(s) Oral daily  lidocaine   Patch 1 Patch Transdermal daily  pantoprazole    Tablet 40 milliGRAM(s) Oral before breakfast  saccharomyces boulardii 250 milliGRAM(s) Oral two times a day    MEDICATIONS  (PRN):  acetaminophen   Tablet .. 650 milliGRAM(s) Oral every 6 hours PRN Temp greater or equal to 38C (100.4F), Mild Pain (1 - 3)  dextrose 40% Gel 15 Gram(s) Oral once PRN Blood Glucose LESS THAN 70 milliGRAM(s)/deciliter  glucagon  Injectable 1 milliGRAM(s) IntraMuscular once PRN Glucose LESS THAN 70 milligrams/deciliter      RADIOLOGY & ADDITIONAL TEST

## 2019-07-14 NOTE — PROGRESS NOTE ADULT - SUBJECTIVE AND OBJECTIVE BOX
NEPHROLOGY INTERVAL HPI/OVERNIGHT EVENTS:  pt resting this AM  no acute distress  more arousable    MEDICATIONS  (STANDING):  aspirin  chewable 81 milliGRAM(s) Oral daily  atorvastatin 40 milliGRAM(s) Oral at bedtime  calcitriol   Capsule 0.25 MICROGram(s) Oral daily  calcium acetate 667 milliGRAM(s) Oral three times a day with meals  ceFAZolin   IVPB      ceFAZolin   IVPB 1000 milliGRAM(s) IV Intermittent every 24 hours  chlorhexidine 2% Cloths 1 Application(s) Topical <User Schedule>  clopidogrel Tablet 75 milliGRAM(s) Oral daily  dextrose 5%. 1000 milliLiter(s) (50 mL/Hr) IV Continuous <Continuous>  dextrose 50% Injectable 12.5 Gram(s) IV Push once  dextrose 50% Injectable 25 Gram(s) IV Push once  dextrose 50% Injectable 25 Gram(s) IV Push once  folic acid 1 milliGRAM(s) Oral daily  gabapentin 100 milliGRAM(s) Oral three times a day  heparin  Injectable 5000 Unit(s) SubCutaneous every 8 hours  insulin lispro (HumaLOG) corrective regimen sliding scale   SubCutaneous three times a day before meals  insulin lispro (HumaLOG) corrective regimen sliding scale   SubCutaneous at bedtime  levothyroxine 112 MICROGram(s) Oral daily  lidocaine   Patch 1 Patch Transdermal daily  pantoprazole    Tablet 40 milliGRAM(s) Oral before breakfast  saccharomyces boulardii 250 milliGRAM(s) Oral two times a day    MEDICATIONS  (PRN):  acetaminophen   Tablet .. 650 milliGRAM(s) Oral every 6 hours PRN Temp greater or equal to 38C (100.4F), Mild Pain (1 - 3)  dextrose 40% Gel 15 Gram(s) Oral once PRN Blood Glucose LESS THAN 70 milliGRAM(s)/deciliter  glucagon  Injectable 1 milliGRAM(s) IntraMuscular once PRN Glucose LESS THAN 70 milligrams/deciliter      Allergies    penicillin (Anaphylaxis)  seafood (Anaphylaxis)  shellfish (Anaphylaxis)          Vital Signs Last 24 Hrs  T(C): 36.3 (14 Jul 2019 07:43), Max: 37.2 (13 Jul 2019 16:30)  T(F): 97.4 (14 Jul 2019 07:43), Max: 99 (13 Jul 2019 16:30)  HR: 54 (14 Jul 2019 07:43) (54 - 72)  BP: 141/65 (14 Jul 2019 07:43) (141/65 - 170/72)  BP(mean): --  RR: 100 (14 Jul 2019 07:43) (18 - 100)  SpO2: 95% (13 Jul 2019 22:30) (95% - 99%)    PHYSICAL EXAM:  Appears chronically ill  NECK: Supple, no jvd  CHEST/LUNG: diminished BS at bases  HEART: Regular rate and rhythm; No rub   ABDOMEN: Soft, Nontender, Nondistended; Bowel sounds present  EXTREMITIES:  + edema less  Neuro: lethargic and slow to respond  LABS:                        10.0   14.82 )-----------( 184      ( 14 Jul 2019 07:57 )             31.4     07-13    136  |  99  |  31.0<H>  ----------------------------<  141<H>  4.3   |  24.0  |  4.23<H>    Ca    7.6<L>      13 Jul 2019 09:18              RADIOLOGY & ADDITIONAL TESTS:

## 2019-07-14 NOTE — PROGRESS NOTE ADULT - ASSESSMENT
ESRD - HD 2x week  - UF as tolerates    Anemia - add MILLIE as needed to maintain Hgb >10.0  - follow H/H     RO - cont Phoslo  - cont Rocaltrol  - follow Ca+ and phos    Bacteremia: staph   Recurrent bacteremia  SANFORD (-)  - cont antibiotics as per ID  - for Indium scan this week

## 2019-07-15 LAB
-  AMPICILLIN/SULBACTAM: SIGNIFICANT CHANGE UP
-  AMPICILLIN/SULBACTAM: SIGNIFICANT CHANGE UP
-  CEFAZOLIN: SIGNIFICANT CHANGE UP
-  CEFAZOLIN: SIGNIFICANT CHANGE UP
-  CLINDAMYCIN: SIGNIFICANT CHANGE UP
-  CLINDAMYCIN: SIGNIFICANT CHANGE UP
-  ERYTHROMYCIN: SIGNIFICANT CHANGE UP
-  ERYTHROMYCIN: SIGNIFICANT CHANGE UP
-  GENTAMICIN: SIGNIFICANT CHANGE UP
-  GENTAMICIN: SIGNIFICANT CHANGE UP
-  OXACILLIN: SIGNIFICANT CHANGE UP
-  OXACILLIN: SIGNIFICANT CHANGE UP
-  PENICILLIN: SIGNIFICANT CHANGE UP
-  PENICILLIN: SIGNIFICANT CHANGE UP
-  RIFAMPIN: SIGNIFICANT CHANGE UP
-  RIFAMPIN: SIGNIFICANT CHANGE UP
-  TETRACYCLINE: SIGNIFICANT CHANGE UP
-  TETRACYCLINE: SIGNIFICANT CHANGE UP
-  TRIMETHOPRIM/SULFAMETHOXAZOLE: SIGNIFICANT CHANGE UP
-  TRIMETHOPRIM/SULFAMETHOXAZOLE: SIGNIFICANT CHANGE UP
-  VANCOMYCIN: SIGNIFICANT CHANGE UP
-  VANCOMYCIN: SIGNIFICANT CHANGE UP
ANION GAP SERPL CALC-SCNC: 12 MMOL/L — SIGNIFICANT CHANGE UP (ref 5–17)
BUN SERPL-MCNC: 36 MG/DL — HIGH (ref 8–20)
CALCIUM SERPL-MCNC: 8.1 MG/DL — LOW (ref 8.6–10.2)
CHLORIDE SERPL-SCNC: 94 MMOL/L — LOW (ref 98–107)
CO2 SERPL-SCNC: 24 MMOL/L — SIGNIFICANT CHANGE UP (ref 22–29)
CREAT SERPL-MCNC: 4.06 MG/DL — HIGH (ref 0.5–1.3)
CULTURE RESULTS: SIGNIFICANT CHANGE UP
CULTURE RESULTS: SIGNIFICANT CHANGE UP
GLUCOSE BLDC GLUCOMTR-MCNC: 139 MG/DL — HIGH (ref 70–99)
GLUCOSE BLDC GLUCOMTR-MCNC: 158 MG/DL — HIGH (ref 70–99)
GLUCOSE BLDC GLUCOMTR-MCNC: 170 MG/DL — HIGH (ref 70–99)
GLUCOSE SERPL-MCNC: 170 MG/DL — HIGH (ref 70–115)
HCT VFR BLD CALC: 26.2 % — LOW (ref 34.5–45)
HCT VFR BLD CALC: 31.5 % — LOW (ref 34.5–45)
HGB BLD-MCNC: 10.2 G/DL — LOW (ref 11.5–15.5)
HGB BLD-MCNC: 7.9 G/DL — LOW (ref 11.5–15.5)
MCHC RBC-ENTMCNC: 30.2 GM/DL — LOW (ref 32–36)
MCHC RBC-ENTMCNC: 31.5 PG — SIGNIFICANT CHANGE UP (ref 27–34)
MCHC RBC-ENTMCNC: 31.7 PG — SIGNIFICANT CHANGE UP (ref 27–34)
MCHC RBC-ENTMCNC: 32.4 GM/DL — SIGNIFICANT CHANGE UP (ref 32–36)
MCV RBC AUTO: 104.4 FL — HIGH (ref 80–100)
MCV RBC AUTO: 97.8 FL — SIGNIFICANT CHANGE UP (ref 80–100)
METHOD TYPE: SIGNIFICANT CHANGE UP
METHOD TYPE: SIGNIFICANT CHANGE UP
ORGANISM # SPEC MICROSCOPIC CNT: SIGNIFICANT CHANGE UP
PLATELET # BLD AUTO: 165 K/UL — SIGNIFICANT CHANGE UP (ref 150–400)
PLATELET # BLD AUTO: 213 K/UL — SIGNIFICANT CHANGE UP (ref 150–400)
POTASSIUM SERPL-MCNC: 5 MMOL/L — SIGNIFICANT CHANGE UP (ref 3.5–5.3)
POTASSIUM SERPL-SCNC: 5 MMOL/L — SIGNIFICANT CHANGE UP (ref 3.5–5.3)
RBC # BLD: 2.51 M/UL — LOW (ref 3.8–5.2)
RBC # BLD: 3.22 M/UL — LOW (ref 3.8–5.2)
RBC # FLD: 15.4 % — HIGH (ref 10.3–14.5)
RBC # FLD: 16.4 % — HIGH (ref 10.3–14.5)
SODIUM SERPL-SCNC: 130 MMOL/L — LOW (ref 135–145)
SPECIMEN SOURCE: SIGNIFICANT CHANGE UP
SPECIMEN SOURCE: SIGNIFICANT CHANGE UP
WBC # BLD: 10.78 K/UL — HIGH (ref 3.8–10.5)
WBC # BLD: 13.94 K/UL — HIGH (ref 3.8–10.5)
WBC # FLD AUTO: 10.78 K/UL — HIGH (ref 3.8–10.5)
WBC # FLD AUTO: 13.94 K/UL — HIGH (ref 3.8–10.5)

## 2019-07-15 PROCEDURE — 99232 SBSQ HOSP IP/OBS MODERATE 35: CPT

## 2019-07-15 RX ADMIN — GABAPENTIN 100 MILLIGRAM(S): 400 CAPSULE ORAL at 13:07

## 2019-07-15 RX ADMIN — LIDOCAINE 1 PATCH: 4 CREAM TOPICAL at 01:32

## 2019-07-15 RX ADMIN — HEPARIN SODIUM 5000 UNIT(S): 5000 INJECTION INTRAVENOUS; SUBCUTANEOUS at 13:07

## 2019-07-15 RX ADMIN — GABAPENTIN 100 MILLIGRAM(S): 400 CAPSULE ORAL at 21:23

## 2019-07-15 RX ADMIN — LIDOCAINE 1 PATCH: 4 CREAM TOPICAL at 12:09

## 2019-07-15 RX ADMIN — Medication 650 MILLIGRAM(S): at 14:07

## 2019-07-15 RX ADMIN — GABAPENTIN 100 MILLIGRAM(S): 400 CAPSULE ORAL at 06:50

## 2019-07-15 RX ADMIN — Medication 112 MICROGRAM(S): at 06:50

## 2019-07-15 RX ADMIN — HEPARIN SODIUM 5000 UNIT(S): 5000 INJECTION INTRAVENOUS; SUBCUTANEOUS at 21:23

## 2019-07-15 RX ADMIN — Medication 667 MILLIGRAM(S): at 12:09

## 2019-07-15 RX ADMIN — Medication 100 MILLIGRAM(S): at 12:58

## 2019-07-15 RX ADMIN — Medication 250 MILLIGRAM(S): at 16:40

## 2019-07-15 RX ADMIN — Medication 81 MILLIGRAM(S): at 12:09

## 2019-07-15 RX ADMIN — LIDOCAINE 1 PATCH: 4 CREAM TOPICAL at 21:27

## 2019-07-15 RX ADMIN — Medication 667 MILLIGRAM(S): at 16:40

## 2019-07-15 RX ADMIN — CALCITRIOL 0.25 MICROGRAM(S): 0.5 CAPSULE ORAL at 13:07

## 2019-07-15 RX ADMIN — Medication 2: at 16:40

## 2019-07-15 RX ADMIN — Medication 650 MILLIGRAM(S): at 13:07

## 2019-07-15 RX ADMIN — CLOPIDOGREL BISULFATE 75 MILLIGRAM(S): 75 TABLET, FILM COATED ORAL at 12:09

## 2019-07-15 RX ADMIN — CHLORHEXIDINE GLUCONATE 1 APPLICATION(S): 213 SOLUTION TOPICAL at 06:49

## 2019-07-15 RX ADMIN — ATORVASTATIN CALCIUM 40 MILLIGRAM(S): 80 TABLET, FILM COATED ORAL at 21:23

## 2019-07-15 RX ADMIN — HEPARIN SODIUM 5000 UNIT(S): 5000 INJECTION INTRAVENOUS; SUBCUTANEOUS at 05:37

## 2019-07-15 RX ADMIN — LIDOCAINE 1 PATCH: 4 CREAM TOPICAL at 01:33

## 2019-07-15 RX ADMIN — Medication 1 MILLIGRAM(S): at 12:09

## 2019-07-15 NOTE — PROGRESS NOTE ADULT - ASSESSMENT
87yoF hx ESRD on HD (M and F only), CAD s/p CABG, HTN, DM, PAD, hypothyroidism presenting with generalized weakness found to have UTI and meeting sepsis criteria, blood c/s positive for MSSA, source unknown, s/p WBC scan today    A/P    #Sepsis / Staph aureus bacteremia - Blood cultures continue to be positive source unknown  ID consult appreciated.  Dosage of Cefazolin adjusted 7/10.   s/p indium scan today  ID on board  SANFORD for endocarditis/lead vegetations (-)    #Metabolic Encephalopathy - multifactorial, due to sepsis / ESRD, etc.  CT head showing chronic infarcts and severe microvascular disease.  On ASA, Statin.     #Leg Pain, Neuropathy - per son, findings are chronic.  Continue Neurontin.    #Thrombocytopenia - Plt improved.  Likely due to bacteremia.  Hematology input appreciated.     #CAD/Hx CVA- ASA, plavix, statin resumed.     #HTN- Stable, monitor BP     #ESRD on HD Monday and Friday-  Renal consulted.  Calcitriol 0.25 mcg and calcium acetate 667 mg 1 po TID w meals.    #Hypothyroid- Continue [id number] mcg.    #Prophylactic measure- heparin.

## 2019-07-15 NOTE — PROGRESS NOTE ADULT - SUBJECTIVE AND OBJECTIVE BOX
Internal Medicine Hospitalist - Dr. Giovanni ROBERSON    [id number]    87y      Female    Patient is a 87y old  Female who presents with a chief complaint of weakness, sepsis 2/2 UTI (14 Jul 2019 11:37)    INTERVAL HPI/ OVERNIGHT EVENTS: Patient is seen and examined, afebrile, denied abd. pain, nausea and vomiting.     REVIEW OF SYSTEMS:    Denied fever, chills, abd. pain, nausea, vomiting, chest pain, SOB    PHYSICAL EXAM:    Vital Signs Last 24 Hrs  T(C): 36.9 (15 Jul 2019 08:24), Max: 36.9 (15 Jul 2019 08:24)  T(F): 98.4 (15 Jul 2019 08:24), Max: 98.4 (15 Jul 2019 08:24)  HR: 70 (15 Jul 2019 08:24) (68 - 71)  BP: 151/70 (15 Jul 2019 08:24) (148/62 - 154/63)  BP(mean): --  RR: 18 (15 Jul 2019 08:24) (13 - 19)  SpO2: 97% (15 Jul 2019 08:24) (97% - 98%)    GENERAL: NAD  CHEST/LUNG: CTA b/l   HEART: S1S2+ audible  ABDOMEN: Soft, Nontender, Nondistended; Bowel sounds present  EXTREMITIES:  RUE dressing  CNS: Awake  Psychiatry: normal mood    LABS:                        10.0   14.82 )-----------( 184      ( 14 Jul 2019 07:57 )             31.4     07-14    136  |  97<L>  |  20.0  ----------------------------<  112  4.4   |  30.0<H>  |  2.98<H>    Ca    7.7<L>      14 Jul 2019 07:57              MEDICATIONS  (STANDING):  aspirin  chewable 81 milliGRAM(s) Oral daily  atorvastatin 40 milliGRAM(s) Oral at bedtime  calcitriol   Capsule 0.25 MICROGram(s) Oral daily  calcium acetate 667 milliGRAM(s) Oral three times a day with meals  ceFAZolin   IVPB      ceFAZolin   IVPB 1000 milliGRAM(s) IV Intermittent every 24 hours  chlorhexidine 2% Cloths 1 Application(s) Topical <User Schedule>  clopidogrel Tablet 75 milliGRAM(s) Oral daily  dextrose 5%. 1000 milliLiter(s) (50 mL/Hr) IV Continuous <Continuous>  dextrose 50% Injectable 12.5 Gram(s) IV Push once  dextrose 50% Injectable 25 Gram(s) IV Push once  dextrose 50% Injectable 25 Gram(s) IV Push once  folic acid 1 milliGRAM(s) Oral daily  gabapentin 100 milliGRAM(s) Oral three times a day  heparin  Injectable 5000 Unit(s) SubCutaneous every 8 hours  insulin lispro (HumaLOG) corrective regimen sliding scale   SubCutaneous three times a day before meals  insulin lispro (HumaLOG) corrective regimen sliding scale   SubCutaneous at bedtime  levothyroxine 112 MICROGram(s) Oral daily  lidocaine   Patch 1 Patch Transdermal daily  pantoprazole    Tablet 40 milliGRAM(s) Oral before breakfast  saccharomyces boulardii 250 milliGRAM(s) Oral two times a day    MEDICATIONS  (PRN):  acetaminophen   Tablet .. 650 milliGRAM(s) Oral every 6 hours PRN Temp greater or equal to 38C (100.4F), Mild Pain (1 - 3)  dextrose 40% Gel 15 Gram(s) Oral once PRN Blood Glucose LESS THAN 70 milliGRAM(s)/deciliter  glucagon  Injectable 1 milliGRAM(s) IntraMuscular once PRN Glucose LESS THAN 70 milligrams/deciliter      RADIOLOGY & ADDITIONAL TEST

## 2019-07-15 NOTE — PROGRESS NOTE ADULT - SUBJECTIVE AND OBJECTIVE BOX
[facility]  INFECTIOUS DISEASES AND INTERNAL MEDICINE at [place]  =======================================================  Paresh Stark MD  Diplomates American Board of Internal Medicine and Infectious Diseases  Tel: [phone]      Fax: [phone]  =======================================================    LAUREN ROBERSON [id number]    Follow up: MSSA bacteremia. Still with positive cultures    Allergies:  penicillin (Anaphylaxis)  seafood (Anaphylaxis)  shellfish (Anaphylaxis)      Medications:  acetaminophen   Tablet .. 650 milliGRAM(s) Oral every 6 hours PRN  aspirin  chewable 81 milliGRAM(s) Oral daily  atorvastatin 40 milliGRAM(s) Oral at bedtime  calcitriol   Capsule 0.25 MICROGram(s) Oral daily  calcium acetate 667 milliGRAM(s) Oral three times a day with meals  ceFAZolin   IVPB      ceFAZolin   IVPB 1000 milliGRAM(s) IV Intermittent every 24 hours  chlorhexidine 2% Cloths 1 Application(s) Topical <User Schedule>  clopidogrel Tablet 75 milliGRAM(s) Oral daily  dextrose 40% Gel 15 Gram(s) Oral once PRN  dextrose 5%. 1000 milliLiter(s) IV Continuous <Continuous>  dextrose 50% Injectable 12.5 Gram(s) IV Push once  dextrose 50% Injectable 25 Gram(s) IV Push once  dextrose 50% Injectable 25 Gram(s) IV Push once  folic acid 1 milliGRAM(s) Oral daily  gabapentin 100 milliGRAM(s) Oral three times a day  glucagon  Injectable 1 milliGRAM(s) IntraMuscular once PRN  heparin  Injectable 5000 Unit(s) SubCutaneous every 8 hours  insulin lispro (HumaLOG) corrective regimen sliding scale   SubCutaneous three times a day before meals  insulin lispro (HumaLOG) corrective regimen sliding scale   SubCutaneous at bedtime  levothyroxine 112 MICROGram(s) Oral daily  lidocaine   Patch 1 Patch Transdermal daily  pantoprazole    Tablet 40 milliGRAM(s) Oral before breakfast  saccharomyces boulardii 250 milliGRAM(s) Oral two times a day            REVIEW OF SYSTEMS:  CONSTITUTIONAL:  No Fever or chills  HEENT:   No diplopia or blurred vision.  No earache, sore throat or runny nose.  CARDIOVASCULAR:  No pressure, squeezing, strangling, tightness, heaviness or aching about the chest, neck, axilla or epigastrium.  RESPIRATORY:  No cough, shortness of breath  GASTROINTESTINAL:  No nausea, vomiting or diarrhea.  GENITOURINARY:  No dysuria, frequency or urgency. No Blood in urine  MUSCULOSKELETAL:  no joint aches, no muscle pain  SKIN:  No change in skin, hair or nails.  NEUROLOGIC:  No Headaches, seizures or weakness.  PSYCHIATRIC:  No disorder of thought or mood.  ENDOCRINE:  No heat or cold intolerance  HEMATOLOGICAL:  No easy bruising or bleeding.            Physical Exam:  ICU Vital Signs Last 24 Hrs  T(C): 36.3 (15 Jul 2019 17:43), Max: 36.9 (15 Jul 2019 08:24)  T(F): 97.4 (15 Jul 2019 17:43), Max: 98.4 (15 Jul 2019 08:24)  HR: 80 (15 Jul 2019 17:43) (70 - 80)  BP: 150/78 (15 Jul 2019 17:43) (125/65 - 154/63)  BP(mean): --  ABP: --  ABP(mean): --  RR: 18 (15 Jul 2019 17:43) (13 - 18)  SpO2: 97% (15 Jul 2019 17:43) (97% - 99%)      GEN: NAD, pleasant  HEENT: normocephalic and atraumatic. EOMI. PERRL.  Anicteric   NECK: Supple.   LUNGS: Clear to auscultation.  HEART: Regular rate and rhythm + murmur. +PPM  ABDOMEN: Soft, nontender, and nondistended.  Positive bowel sounds.    : No CVA tenderness  EXTREMITIES: Without any edema.  MSK: no joint swelling  NEUROLOGIC: Cranial nerves II through XII are grossly intact. No focal deficits  PSYCHIATRIC: Appropriate affect .  SKIN: AVG RUE      Labs:  07-15    x   |  x   |  x   ----------------------------<  x   x    |  x   |  4.06<H>    Ca    8.1<L>      15 Jul 2019 18:18                            10.2   13.94 )-----------( 213      ( 15 Jul 2019 18:18 )             31.5                 CAPILLARY BLOOD GLUCOSE      POCT Blood Glucose.: 170 mg/dL (15 Jul 2019 16:36)  POCT Blood Glucose.: 139 mg/dL (15 Jul 2019 11:45)  POCT Blood Glucose.: 124 mg/dL (14 Jul 2019 22:50)        RECENT CULTURES:  07-13 @ 10:36 .Blood Staphylococcus aureus    Growth in aerobic and anaerobic bottles: Staphylococcus aureus  Aerobic Bottle: 15:06 Hours to positivity  Anaerobic Bottle: 19:45 Hours to positivity  ,  TYPE: (C=Critical, N=Notification, A=Abnormal) c  TESTS:  _   DATE/TIME CALLED: _ 07/14/2019 09:52:46  CALLED TO: Kellee bryant rn  READ BACK (2 Patient Identifiers)(Y/N): _ y  READ BACK VALUES (Y/N): _ y  CALLED BY: Kellee wetzel        07-09 @ 09:41 .Blood Staphylococcus aureus    Growth in aerobic bottle: Staphylococcus aureus  Aerobic Bottle: 17:20 Hours to positivity  .  TYPE: (C=Critical, N=Notification, A=Abnormal) C  TESTS:  _ BLD   DATE/TIME CALLED: _ 07/10/2019 09:54:05  CALLED TO: Kellee BLANCO RN  READ BACK (2 Patient Identifiers)(Y/N): _ Y  READ BACK VALUES (Y/N): _ Y  CALLED BY: Kellee DANIEL        07-07 @ 20:25 .Blood Staphylococcus aureus    Growth in aerobic and anaerobic bottles: Staphylococcus aureus  Anaerobic Bottle: 2 days;05:44 Hours to positivity  Aerobic Bottle: 2 Days; 22.24 Hours to positivity  .  TYPE: (C=Critical, N=Notification, A=Abnormal) C  TESTS:  _ BLD   DATE/TIME CALLED: _ 07/10/2019 09:51:08  CALLED TO: Kellee BLANCO RN  READ BACK (2 Patient Identifiers)(Y/N): _ Y  READ BACK VALUES (Y/N): _ Y  CALLED BY: Kellee DANIEL        07-07 @ 12:47 .Blood Staphylococcus aureus    Growth in aerobic and anaerobic bottles: Staphylococcus aureus  Aerobic Bottle: 2 days; 01:03 Hours to positivity  Anaerobic Bottle: 2 days;22:09 Hours to positivity  .  TYPE: (C=Critical, N=Notification, A=Abnormal) C  TESTS:  _ BLD   DATE/TIME CALLED: _ 07/09/2019 14:36:17  CALLED TO: _ JOLIE VILLELA RN  READ BACK (2 Patient Identifiers)(Y/N): _ Y  READ BACK VALUES (Y/N): _ Y  CALLED BY: Kellee DANIEL        07-06 @ 07:27 .Blood Staphylococcus aureus    Growth in aerobic and anaerobic bottles: Staphylococcus aureus  Aerobic Bottle: 12:38 Hours to positivity  Anaerobic Bottle: 14:29 Hours to positivity  .  TYPE: (C=Critical, N=Notification, A=Abnormal) C  TESTS:  _ Positive Blood   DATE/TIME CALLED: _ 07/07/2019 10:40  CALLED TO: _ ADWR: Lary Zamudio RN  READ BACK (2 Patient Identifiers)(Y/N): _ Y  READ BACK VALUES (Y/N): _ Y  CALLED BY: Kellee marion        07-05 @ 10:46 .Blood Staphylococcus aureus    Growth in aerobic and anaerobic bottles: Staphylococcus aureus  Aerobic Bottle: 12.08 Hours to positivity  Anaerobic Bottle: 12.18 Hours to positivity  .  TYPE: (C=Critical, N=Notification, A=Abnormal) C  TESTS:  _   DATE/TIME CALLED: _ 07/06/2019 09:39:33  CALLED TO: _ Lary Chery RN  READ BACK (2 Patient Identifiers)(Y/N): _ Y  READ BACK VALUES (Y/N): _ Y  CALLED BY: Kellee Field        07-04 @ 14:35 .Urine Escherichia coli    >100,000 CFU/ml Escherichia coli        07-04 @ 13:32 .Blood Staphylococcus aureus  Blood Culture PCR    Growth in aerobic and anaerobic bottles: Staphylococcus aureus  Aerobic Bottle: 9.51 Hours to positivity  Anaerobic Bottle: 10.01 Hours to positivity  .  ***Blood Panel PCR results on this specimen are available  approximately 3 hours after the Gram stain result.***  Gram stain, PCR, and/or culture results may not always  correspond due to difference in methodologies.  ************************************************************  This PCR assay was performed using [employer].  The following targets are tested for: Enterococcus,  vancomycin resistant enterococci, Listeria monocytogenes,  coagulase negative staphylococci, S. aureus,  methicillin resistant S. aureus, Streptococcus agalactiae  (Group B), S. pneumoniae, S. pyogenes (GroupA),  Acinetobacter baumannii, Enterobacter cloacae, E. coli,  Klebsiella oxytoca, K. pneumoniae, Proteus sp.,  Serratia marcescens, Haemophilus influenzae,  Neisseria meningitidis, Pseudomonas aeruginosa, Candida  albicans, C. glabrata, C krusei, C parapsilosis,  C. tropicalis and the KPC resistance gene.  "Due to technical problems, Proteus sp. will Not be reported as part of  the BCID panel until further notice"  .  TYPE: (C=Critical, N=Notification, A=Abnormal) C  TESTS:  _   DATE/TIME CALLED: _ 07/05/2019 09:14:52  CALLED TO: Kellee Mcpherson RN  READ BACK (2 Patient Identifiers)(Y/N): _ Y  READ BACK VALUES (Y/N): _ Y  CALLED BY: Kellee Field        07-04 @ 13:31 .Blood Staphylococcus aureus    Growth in aerobic and anaerobic bottles: Staphylococcus aureus  Aerobic Bottle: 8.51 Hours to positivity  Anaerobic Bottle: 9.41 Hours to positivity  .  TYPE: (C=Critical, N=Notification, A=Abnormal) C  TESTS:  _ GS  DATE/TIME CALLED: _ 07/05/2019 09:17:54  CALLED TO: Kellee Mcpherson RN  READ BACK (2 Patient Identifiers)(Y/N): _ Y  READ BACK VALUES (Y/N): _ Y  CALLED BY: Kellee Field

## 2019-07-15 NOTE — PROGRESS NOTE ADULT - SUBJECTIVE AND OBJECTIVE BOX
Patient seen and examined    I&O's Summary    no c/o    REVIEW OF SYSTEMS:    CONSTITUTIONAL: No F/C  RESPIRATORY: No cough,  No SOB  CARDIOVASCULAR: No CP/palpitations,    GASTROINTESTINAL: No abdominal pain  or NVD   GENITOURINARY: No UTI sx  NEUROLOGICAL: No headaches, NO wk/numbness  MUSCULOSKELETAL:   EXTREMITIES : no swelling,    Vital Signs Last 24 Hrs  T(C): 36.3 (15 Jul 2019 17:43), Max: 36.9 (15 Jul 2019 08:24)  T(F): 97.4 (15 Jul 2019 17:43), Max: 98.4 (15 Jul 2019 08:24)  HR: 80 (15 Jul 2019 17:43) (70 - 80)  BP: 150/78 (15 Jul 2019 17:43) (125/65 - 154/63)  BP(mean): --  RR: 18 (15 Jul 2019 17:43) (13 - 18)  SpO2: 97% (15 Jul 2019 17:43) (97% - 99%)    PHYSICAL EXAM:    GENERAL: NAD,   EYES:  conjunctiva and sclera clear  NECK: Supple, No JVD/Bruit  NERVOUS SYSTEM:  A/O x3,   CHEST:  No rales or rhonchi  HEART:  RRR, No murmur  ABDOMEN: Soft, NT/ND BS+  EXTREMITIES:  No Edema; AVF area NT  SKIN: No rashes    LABS:                          10.2   13.94 )-----------( 213      ( 15 Jul 2019 18:18 )             31.5     07-15    130<L>  |  94<L>  |  36.0<H>  ----------------------------<  170<H>  5.0   |  24.0  |  4.06<H>    Ca    8.1<L>      15 Jul 2019 18:18        MEDICATIONS  (STANDING):  acetaminophen   Tablet .. PRN  aspirin  chewable  atorvastatin  calcitriol   Capsule  calcium acetate  ceFAZolin   IVPB  ceFAZolin   IVPB  chlorhexidine 2% Cloths  clopidogrel Tablet  dextrose 40% Gel PRN  dextrose 5%.  dextrose 50% Injectable  dextrose 50% Injectable  dextrose 50% Injectable  folic acid  gabapentin  glucagon  Injectable PRN  heparin  Injectable  insulin lispro (HumaLOG) corrective regimen sliding scale  insulin lispro (HumaLOG) corrective regimen sliding scale  levothyroxine  lidocaine   Patch  pantoprazole    Tablet  saccharomyces boulardii

## 2019-07-15 NOTE — CHART NOTE - NSCHARTNOTEFT_GEN_A_CORE
Source: Patient [ ]  Family [ ]   other [x] Pt sleeping not easily awoken to voice    Current Diet: Diet, Dysphagia 1 Pureed-Thin Liquids:   For patients receiving Renal Replacement - No Protein Restr, No Conc K, No Conc Phos, Low  Sodium (RENAL) (07-11-19 @ 16:51)    PO intake:  < 50% [x]   50-75%  [ ]   %  [ ]  other :    Source for PO intake [ ] Patient [ ] family [ ] chart [x] staff [ ] other    Current Weight:   (7/13)   128.7 lbs  (7/11)   122.5 lbs  (7/10)   128.5 lbs  (7/8)    122.7 lbs  (7/5)    127.6 lbs    % Weight Change: Question accuracy of weights, Weight fluctuations likely in setting of HD, will continue to monitor     Pertinent Medications: MEDICATIONS  (STANDING):  aspirin  chewable 81 milliGRAM(s) Oral daily  atorvastatin 40 milliGRAM(s) Oral at bedtime  calcitriol   Capsule 0.25 MICROGram(s) Oral daily  calcium acetate 667 milliGRAM(s) Oral three times a day with meals  ceFAZolin   IVPB      ceFAZolin   IVPB 1000 milliGRAM(s) IV Intermittent every 24 hours  chlorhexidine 2% Cloths 1 Application(s) Topical <User Schedule>  clopidogrel Tablet 75 milliGRAM(s) Oral daily  dextrose 5%. 1000 milliLiter(s) (50 mL/Hr) IV Continuous <Continuous>  dextrose 50% Injectable 12.5 Gram(s) IV Push once  dextrose 50% Injectable 25 Gram(s) IV Push once  dextrose 50% Injectable 25 Gram(s) IV Push once  folic acid 1 milliGRAM(s) Oral daily  gabapentin 100 milliGRAM(s) Oral three times a day  heparin  Injectable 5000 Unit(s) SubCutaneous every 8 hours  insulin lispro (HumaLOG) corrective regimen sliding scale   SubCutaneous three times a day before meals  insulin lispro (HumaLOG) corrective regimen sliding scale   SubCutaneous at bedtime  levothyroxine 112 MICROGram(s) Oral daily  lidocaine   Patch 1 Patch Transdermal daily  pantoprazole    Tablet 40 milliGRAM(s) Oral before breakfast  saccharomyces boulardii 250 milliGRAM(s) Oral two times a day    MEDICATIONS  (PRN):  acetaminophen   Tablet .. 650 milliGRAM(s) Oral every 6 hours PRN Temp greater or equal to 38C (100.4F), Mild Pain (1 - 3)  dextrose 40% Gel 15 Gram(s) Oral once PRN Blood Glucose LESS THAN 70 milliGRAM(s)/deciliter  glucagon  Injectable 1 milliGRAM(s) IntraMuscular once PRN Glucose LESS THAN 70 milligrams/deciliter    Pertinent Labs: CBC Full  -  ( 14 Jul 2019 07:57 )  WBC Count : 14.82 K/uL  RBC Count : 3.19 M/uL  Hemoglobin : 10.0 g/dL  Hematocrit : 31.4 %  Platelet Count - Automated : 184 K/uL  Mean Cell Volume : 98.4 fl  Mean Cell Hemoglobin : 31.3 pg  Mean Cell Hemoglobin Concentration : 31.8 gm/dL    Skin: IAD; no edema currently documented    Nutrition focused physical exam conducted previously- found signs of malnutrition [ ]absent [x]present    Subcutaneous fat loss: [ ] Orbital fat pads region, [ ]Buccal fat region, [ ]Triceps region,  [ ]Ribs region    Muscle wasting: [x]Temples region, [ ]Clavicle region, [ ]Shoulder region, [ ]Scapula region, [ ]Interosseous region,  [ ]thigh region, [ ]Calf region    Estimated Needs:   [x] no change since previous assessment, unclear accuracy of weights, will continue to monitor  [ ] recalculated:     Current Nutrition Diagnosis: Pt remains at high nutrition risk secondary to malnutrition (mild chronic) related to inadequate energy intake in setting of texture modified diet consistency secondary to swallowing difficulties, increased nutrient needs due to ESRD on HD, +bacteremia as evidenced by moderate fluid accumulation (2+ B/L hands), likely <75% of nutrient needs >1 mo, moderate muscle wasting (temples), generalized weakness, increased protein losses associated with HD. Pt continues with suboptimal PO intake despite total assistance and encouragement, tolerating pureed diet. Unclear accuracy of weights with frequent fluctuations since admission with edema seemingly resolving, will continue to monitor for trend.     Recommendations:   1) Add Nepro BID   2) Rx: Nephro-king daily   3) Monitor weights daily for trend     Monitoring and Evaluation:   [x] PO intake [x] Tolerance to diet prescription [X] Weights  [X] Follow up per protocol [X] Labs:

## 2019-07-15 NOTE — PROGRESS NOTE ADULT - ASSESSMENT
ESRD - HD 2x week, held today as undergoing Indium scan  - UF as tolerates    Anemia - add MILLIE as needed to maintain Hgb >10.0  - follow H/H     RO - cont Phoslo  - cont Rocaltrol  - follow Ca+ and phos    Bacteremia:/sepsis with  staph  Aureus   Recurrent bacteremia  SANFORD (-)  - cont antibiotics as per ID  - for Indium scan reading am  Bishop Kefzol easily

## 2019-07-15 NOTE — PROGRESS NOTE ADULT - ASSESSMENT
87yoF hx ESRD on HD (M and F only), CAD s/p CABG, HTN, DM, PAD, hypothyroidism presenting with generalized weakness, dysuria. Reports pain at graft site for 1 month.  In Ed temp 102.6, leukocytosis to 14. Blood cx 4/4 Staph aureus. Imaging with no focus (no pneumonia, abscess, OM)    Overall Staph aureus bacteremia, listed anaphylaxis to penicillin, UTI E. coli pansensitive, ESRD on HD M/F only via AVG. Unclear source    Vancomycin 7/4, 7/5  Azactam 7/4-7/6  Cefazolin 7/6-->    Recommend:  - Blood cultures + 4/4 Staph aureus  - Despite listed penicillin allergy, tolerating cefazolin- c/w cefazolin 1 g IV every day (even on non HD days) and after dialysis on her dialysis days  - SANFORD for endocarditis/lead vegetations (-)  - USG AVG (-)  - NM WBC scan pending for source control  - Trend Fever  - Trend WBC count      Will follow

## 2019-07-16 LAB
ANION GAP SERPL CALC-SCNC: 16 MMOL/L — SIGNIFICANT CHANGE UP (ref 5–17)
BUN SERPL-MCNC: 47 MG/DL — HIGH (ref 8–20)
CALCIUM SERPL-MCNC: 8 MG/DL — LOW (ref 8.6–10.2)
CHLORIDE SERPL-SCNC: 94 MMOL/L — LOW (ref 98–107)
CO2 SERPL-SCNC: 22 MMOL/L — SIGNIFICANT CHANGE UP (ref 22–29)
CREAT SERPL-MCNC: 4.62 MG/DL — HIGH (ref 0.5–1.3)
GLUCOSE BLDC GLUCOMTR-MCNC: 117 MG/DL — HIGH (ref 70–99)
GLUCOSE BLDC GLUCOMTR-MCNC: 120 MG/DL — HIGH (ref 70–99)
GLUCOSE BLDC GLUCOMTR-MCNC: 134 MG/DL — HIGH (ref 70–99)
GLUCOSE BLDC GLUCOMTR-MCNC: 159 MG/DL — HIGH (ref 70–99)
GLUCOSE SERPL-MCNC: 150 MG/DL — HIGH (ref 70–115)
HCT VFR BLD CALC: 27.5 % — LOW (ref 34.5–45)
HGB BLD-MCNC: 9.1 G/DL — LOW (ref 11.5–15.5)
MCHC RBC-ENTMCNC: 31.7 PG — SIGNIFICANT CHANGE UP (ref 27–34)
MCHC RBC-ENTMCNC: 33.1 GM/DL — SIGNIFICANT CHANGE UP (ref 32–36)
MCV RBC AUTO: 95.8 FL — SIGNIFICANT CHANGE UP (ref 80–100)
PLATELET # BLD AUTO: 247 K/UL — SIGNIFICANT CHANGE UP (ref 150–400)
POTASSIUM SERPL-MCNC: 5.1 MMOL/L — SIGNIFICANT CHANGE UP (ref 3.5–5.3)
POTASSIUM SERPL-SCNC: 5.1 MMOL/L — SIGNIFICANT CHANGE UP (ref 3.5–5.3)
RBC # BLD: 2.87 M/UL — LOW (ref 3.8–5.2)
RBC # FLD: 15.2 % — HIGH (ref 10.3–14.5)
SODIUM SERPL-SCNC: 132 MMOL/L — LOW (ref 135–145)
TYPE + AB SCN PNL BLD: SIGNIFICANT CHANGE UP
WBC # BLD: 15.36 K/UL — HIGH (ref 3.8–10.5)
WBC # FLD AUTO: 15.36 K/UL — HIGH (ref 3.8–10.5)

## 2019-07-16 PROCEDURE — 99232 SBSQ HOSP IP/OBS MODERATE 35: CPT

## 2019-07-16 PROCEDURE — 78103 BONE MARROW IMAGING MULT: CPT | Mod: 26

## 2019-07-16 PROCEDURE — 78806: CPT | Mod: 26

## 2019-07-16 RX ADMIN — CHLORHEXIDINE GLUCONATE 1 APPLICATION(S): 213 SOLUTION TOPICAL at 05:32

## 2019-07-16 RX ADMIN — GABAPENTIN 100 MILLIGRAM(S): 400 CAPSULE ORAL at 05:32

## 2019-07-16 RX ADMIN — PANTOPRAZOLE SODIUM 40 MILLIGRAM(S): 20 TABLET, DELAYED RELEASE ORAL at 05:35

## 2019-07-16 RX ADMIN — HEPARIN SODIUM 5000 UNIT(S): 5000 INJECTION INTRAVENOUS; SUBCUTANEOUS at 05:32

## 2019-07-16 RX ADMIN — CALCITRIOL 0.25 MICROGRAM(S): 0.5 CAPSULE ORAL at 12:44

## 2019-07-16 RX ADMIN — Medication 1 MILLIGRAM(S): at 12:44

## 2019-07-16 RX ADMIN — ATORVASTATIN CALCIUM 40 MILLIGRAM(S): 80 TABLET, FILM COATED ORAL at 22:56

## 2019-07-16 RX ADMIN — Medication 112 MICROGRAM(S): at 05:36

## 2019-07-16 RX ADMIN — Medication 100 MILLIGRAM(S): at 22:59

## 2019-07-16 RX ADMIN — GABAPENTIN 100 MILLIGRAM(S): 400 CAPSULE ORAL at 22:56

## 2019-07-16 RX ADMIN — Medication 81 MILLIGRAM(S): at 12:44

## 2019-07-16 RX ADMIN — Medication 667 MILLIGRAM(S): at 12:44

## 2019-07-16 RX ADMIN — LIDOCAINE 1 PATCH: 4 CREAM TOPICAL at 12:00

## 2019-07-16 RX ADMIN — CLOPIDOGREL BISULFATE 75 MILLIGRAM(S): 75 TABLET, FILM COATED ORAL at 12:44

## 2019-07-16 RX ADMIN — LIDOCAINE 1 PATCH: 4 CREAM TOPICAL at 20:16

## 2019-07-16 RX ADMIN — Medication 250 MILLIGRAM(S): at 05:32

## 2019-07-16 NOTE — PROGRESS NOTE ADULT - ASSESSMENT
87yoF hx ESRD on HD (M and F only), CAD s/p CABG, HTN, DM, PAD, hypothyroidism presenting with generalized weakness, dysuria. Reports pain at graft site for 1 month.  In Ed temp 102.6, leukocytosis to 14. Blood cx 4/4 Staph aureus. Imaging with no focus (no pneumonia, abscess, OM)    Overall Staph aureus bacteremia, listed anaphylaxis to penicillin, UTI E. coli pansensitive, ESRD on HD M/F only via AVG. Unclear source    Vancomycin 7/4, 7/5  Azactam 7/4-7/6  Cefazolin 7/6-->    Recommend:  - Blood cultures + 4/4 Staph aureus. BCX 7/14 NGTD  - Despite listed penicillin allergy, tolerating cefazolin- c/w cefazolin 1 g IV every day (even on non HD days) and after dialysis on her dialysis days  - NM scan concerning for AVG site infection  - Vascular consult for AVG excision eval  - Renal f/u re HD access  - If BCX from 7/14 remain negative can place temporary HD catheter  - SANFORD for endocarditis/lead vegetations (-)  - Trend Fever  - Trend WBC count    Discussed with Dr Davila  Will follow

## 2019-07-16 NOTE — PROGRESS NOTE ADULT - SUBJECTIVE AND OBJECTIVE BOX
[facility]  INFECTIOUS DISEASES AND INTERNAL MEDICINE at [place]  =======================================================  Paresh Stark MD  Diplomates American Board of Internal Medicine and Infectious Diseases  Tel: [phone]      Fax: [phone]  =======================================================    LAUREN ROBERSON [id number]    Follow up: MSSA bacteremia. afebrile. indium scan abnormal for AVG infection    Allergies:  penicillin (Anaphylaxis)  seafood (Anaphylaxis)  shellfish (Anaphylaxis)      Medications:  acetaminophen   Tablet .. 650 milliGRAM(s) Oral every 6 hours PRN  aspirin  chewable 81 milliGRAM(s) Oral daily  atorvastatin 40 milliGRAM(s) Oral at bedtime  calcitriol   Capsule 0.25 MICROGram(s) Oral daily  calcium acetate 667 milliGRAM(s) Oral three times a day with meals  ceFAZolin   IVPB      ceFAZolin   IVPB 1000 milliGRAM(s) IV Intermittent every 24 hours  chlorhexidine 2% Cloths 1 Application(s) Topical <User Schedule>  clopidogrel Tablet 75 milliGRAM(s) Oral daily  dextrose 40% Gel 15 Gram(s) Oral once PRN  dextrose 5%. 1000 milliLiter(s) IV Continuous <Continuous>  dextrose 50% Injectable 12.5 Gram(s) IV Push once  dextrose 50% Injectable 25 Gram(s) IV Push once  dextrose 50% Injectable 25 Gram(s) IV Push once  folic acid 1 milliGRAM(s) Oral daily  gabapentin 100 milliGRAM(s) Oral three times a day  glucagon  Injectable 1 milliGRAM(s) IntraMuscular once PRN  heparin  Injectable 5000 Unit(s) SubCutaneous every 8 hours  insulin lispro (HumaLOG) corrective regimen sliding scale   SubCutaneous three times a day before meals  insulin lispro (HumaLOG) corrective regimen sliding scale   SubCutaneous at bedtime  levothyroxine 112 MICROGram(s) Oral daily  lidocaine   Patch 1 Patch Transdermal daily  pantoprazole    Tablet 40 milliGRAM(s) Oral before breakfast  saccharomyces boulardii 250 milliGRAM(s) Oral two times a day            REVIEW OF SYSTEMS:  CONSTITUTIONAL:  No Fever or chills  HEENT:   No diplopia or blurred vision.  No earache, sore throat or runny nose.  CARDIOVASCULAR:  No pressure, squeezing, strangling, tightness, heaviness or aching about the chest, neck, axilla or epigastrium.  RESPIRATORY:  No cough, shortness of breath  GASTROINTESTINAL:  No nausea, vomiting or diarrhea.  GENITOURINARY:  No dysuria, frequency or urgency. No Blood in urine  MUSCULOSKELETAL:  no joint aches, no muscle pain  SKIN:  No change in skin, hair or nails.  NEUROLOGIC:  No Headaches, seizures or weakness.  PSYCHIATRIC:  No disorder of thought or mood.  ENDOCRINE:  No heat or cold intolerance  HEMATOLOGICAL:  No easy bruising or bleeding.            Physical Exam:  ICU Vital Signs Last 24 Hrs  T(C): 36.2 (16 Jul 2019 00:53), Max: 36.4 (15 Jul 2019 14:36)  T(F): 97.1 (16 Jul 2019 00:53), Max: 97.6 (15 Jul 2019 14:36)  HR: 66 (16 Jul 2019 11:59) (66 - 80)  BP: 165/71 (16 Jul 2019 11:59) (125/65 - 165/71)  BP(mean): --  ABP: --  ABP(mean): --  RR: 18 (16 Jul 2019 11:59) (18 - 18)  SpO2: 95% (16 Jul 2019 11:59) (95% - 99%)      GEN: NAD, pleasant  HEENT: normocephalic and atraumatic. EOMI. PERRL.  Anicteric   NECK: Supple.   LUNGS: Clear to auscultation.  HEART: Regular rate and rhythm without murmur. +PPM, midline scar intact  ABDOMEN: Soft, nontender, and nondistended.  Positive bowel sounds.    : No CVA tenderness  EXTREMITIES: Without any edema.  MSK: no joint swelling  NEUROLOGIC: Cranial nerves II through XII are grossly intact. No focal deficits  PSYCHIATRIC: Appropriate affect .  SKIN: No Rash RUE AVG intact      Labs:  07-15    130<L>  |  94<L>  |  36.0<H>  ----------------------------<  170<H>  5.0   |  24.0  |  4.06<H>    Ca    8.1<L>      15 Jul 2019 18:18                            10.2   13.94 )-----------( 213      ( 15 Jul 2019 18:18 )             31.5                 CAPILLARY BLOOD GLUCOSE      POCT Blood Glucose.: 120 mg/dL (16 Jul 2019 11:54)  POCT Blood Glucose.: 117 mg/dL (16 Jul 2019 07:21)  POCT Blood Glucose.: 158 mg/dL (15 Jul 2019 21:24)  POCT Blood Glucose.: 170 mg/dL (15 Jul 2019 16:36)        RECENT CULTURES:  07-14 @ 11:53 .Blood     No growth at 48 hours        07-13 @ 10:36 .Blood Staphylococcus aureus    Growth in aerobic and anaerobic bottles: Staphylococcus aureus  Aerobic Bottle: 15:06 Hours to positivity  Anaerobic Bottle: 19:45 Hours to positivity  ,  TYPE: (C=Critical, N=Notification, A=Abnormal) c  TESTS:  _   DATE/TIME CALLED: _ 07/14/2019 09:52:46  CALLED TO: eKllee bryant rn  READ BACK (2 Patient Identifiers)(Y/N): _ y  READ BACK VALUES (Y/N): _ y  CALLED BY: Kellee wetzel        07-09 @ 09:41 .Blood Staphylococcus aureus    Growth in aerobic bottle: Staphylococcus aureus  Aerobic Bottle: 17:20 Hours to positivity  .  TYPE: (C=Critical, N=Notification, A=Abnormal) C  TESTS:  _ BLD GS  DATE/TIME CALLED: _ 07/10/2019 09:54:05  CALLED TO: _ AYANA BLANCO RN  READ BACK (2 Patient Identifiers)(Y/N): _ Y  READ BACK VALUES (Y/N): _ Y  CALLED BY: Kellee DANIEL        07-07 @ 20:25 .Blood Staphylococcus aureus    Growth in aerobic and anaerobic bottles: Staphylococcus aureus  Anaerobic Bottle: 2 days;05:44 Hours to positivity  Aerobic Bottle: 2 Days; 22.24 Hours to positivity  .  TYPE: (C=Critical, N=Notification, A=Abnormal) C  TESTS:  _ BLD   DATE/TIME CALLED: _ 07/10/2019 09:51:08  CALLED TO: _ DAVID BLANCO RN  READ BACK (2 Patient Identifiers)(Y/N): _ Y  READ BACK VALUES (Y/N): _ Y  CALLED BY: Kellee DANIEL        07-07 @ 12:47 .Blood Staphylococcus aureus    Growth in aerobic and anaerobic bottles: Staphylococcus aureus  Aerobic Bottle: 2 days; 01:03 Hours to positivity  Anaerobic Bottle: 2 days;22:09 Hours to positivity  .  TYPE: (C=Critical, N=Notification, A=Abnormal) C  TESTS:  _ BLD GS  DATE/TIME CALLED: _ 07/09/2019 14:36:17  CALLED TO: _ JOLIE VILLELARN  READ BACK (2 Patient Identifiers)(Y/N): _ Y  READ BACK VALUES (Y/N): _ Y  CALLED BY: Kellee DANIEL        07-06 @ 07:27 .Blood Staphylococcus aureus    Growth in aerobic and anaerobic bottles: Staphylococcus aureus  Aerobic Bottle: 12:38 Hours to positivity  Anaerobic Bottle: 14:29 Hours to positivity  .  TYPE: (C=Critical, N=Notification, A=Abnormal) C  TESTS:  _ Positive Blood GS  DATE/TIME CALLED: _ 07/07/2019 10:40  CALLED TO: _ ModeTWR: Lary Zamudio RN  READ BACK (2 Patient Identifiers)(Y/N): _ Y  READ BACK VALUES (Y/N): _ Y  CALLED BY: Kellee marion        07-05 @ 10:46 .Blood Staphylococcus aureus    Growth in aerobic and anaerobic bottles: Staphylococcus aureus  Aerobic Bottle: 12.08 Hours to positivity  Anaerobic Bottle: 12.18 Hours to positivity  .  TYPE: (C=Critical, N=Notification, A=Abnormal) C  TESTS:  _ GS  DATE/TIME CALLED: _ 07/06/2019 09:39:33  CALLED TO: _ Lary Chery RN  READ BACK (2 Patient Identifiers)(Y/N): _ Y  READ BACK VALUES (Y/N): _ Y  CALLED BY: Kellee Field        07-04 @ 14:35 .Urine Escherichia coli    >100,000 CFU/ml Escherichia coli        07-04 @ 13:32 .Blood Staphylococcus aureus  Blood Culture PCR    Growth in aerobic and anaerobic bottles: Staphylococcus aureus  Aerobic Bottle: 9.51 Hours to positivity  Anaerobic Bottle: 10.01 Hours to positivity  .  ***Blood Panel PCR results on this specimen are available  approximately 3 hours after the Gram stain result.***  Gram stain, PCR, and/or culture results may not always  correspond due to difference in methodologies.  ************************************************************  This PCR assay was performed using [employer].  The following targets are tested for: Enterococcus,  vancomycin resistant enterococci, Listeria monocytogenes,  coagulase negative staphylococci, S. aureus,  methicillin resistant S. aureus, Streptococcus agalactiae  (Group B), S. pneumoniae, S. pyogenes (GroupA),  Acinetobacter baumannii, Enterobacter cloacae, E. coli,  Klebsiella oxytoca, K. pneumoniae, Proteus sp.,  Serratia marcescens, Haemophilus influenzae,  Neisseria meningitidis, Pseudomonas aeruginosa, Candida  albicans, C. glabrata, C krusei, C parapsilosis,  C. tropicalis and the KPC resistance gene.  "Due to technical problems, Proteus sp. will Not be reported as part of  the BCID panel until further notice"  .  TYPE: (C=Critical, N=Notification, A=Abnormal) C  TESTS:  _ GS  DATE/TIME CALLED: _ 07/05/2019 09:14:52  CALLED TO: Kellee Mcpherson RN  READ BACK (2 Patient Identifiers)(Y/N): _ Y  READ BACK VALUES (Y/N): _ Y  CALLED BY: Kellee Field        07-04 @ 13:31 .Blood Staphylococcus aureus    Growth in aerobic and anaerobic bottles: Staphylococcus aureus  Aerobic Bottle: 8.51 Hours to positivity  Anaerobic Bottle: 9.41 Hours to positivity  .  TYPE: (C=Critical, N=Notification, A=Abnormal) C  TESTS:  _ GS  DATE/TIME CALLED: _ 07/05/2019 09:17:54  CALLED TO: Kellee Mcpherson RN  READ BACK (2 Patient Identifiers)(Y/N): _ Y  READ BACK VALUES (Y/N): _ Y  CALLED BY: Kellee Field

## 2019-07-16 NOTE — CHART NOTE - NSCHARTNOTEFT_GEN_A_CORE
Vascular Consult for suspected infected AVG..    Patient's chart reviewed.    Patient is known to Dr. Aparicio, s/p AVG creation 8/3/2017    Dr. Aparicio is private based, not covered by the In house Vascular surgical service    Will notify primary team to consult Dr. aparicio.    If there any issues, Please recall the inhouse vascular service if needed.    thank you

## 2019-07-16 NOTE — CONSULT NOTE ADULT - SUBJECTIVE AND OBJECTIVE BOX
Vascular Surgery    HPI: 87y Female that was 30 minutes into her hemodialysis when she developed hypotension, HD was stopped, and developed uncontrollable bleeding from access site. Vascular surgery was consulted. On arrival patient was GCS 15 but slightly lethargic. Communications was difficult. Patient denied chest pain, sob, and visual changes/disturbances.     ROS: 10-system review is otherwise negative except HPI above.      PAST MEDICAL & SURGICAL HISTORY:  Left bundle branch block  Osteoporosis  Diabetic neuropathy  Peripheral arterial disease  Spinal stenosis  Coronary artery disease  Chronic renal failure  Pacemaker: Medtronic 2011  Hypothyroid  Hyperlipemia  Hypertension  Diabetes  S/P dialysis catheter insertion: R. arm  S/P CABG x 3  Artificial cardiac pacemaker  FAMILY HISTORY:  No pertinent family history in first degree relatives  SOCIAL HISTORY:  No toxic habits  ALLERGIES: NKA penicillin (Anaphylaxis)  seafood (Anaphylaxis)  shellfish (Anaphylaxis)  HOME MEDICATIONS:   aspirin 81 mg oral tablet: 1 tab(s) orally once a day (05 Jul 2019 08:17)  calcitriol 0.25 mcg oral capsule: 1 cap(s) orally once a day (05 Jul 2019 08:17)  calcium acetate 667 mg oral tablet: 2 tab(s) orally 3 times a day (05 Jul 2019 08:17)  clopidogrel 75 mg oral tablet: 1 tab(s) orally once a day (05 Jul 2019 08:17)  fludrocortisone 0.1 mg oral tablet: 1 tab(s) orally every other day  (04 Jul 2019 21:53)  folic acid 0.4 mg oral tablet: 1 tab(s) orally once a day (05 Jul 2019 08:17)  Percocet 5/325 oral tablet: 1 tab(s) orally every 6 hours (05 Jul 2019 08:17)  Vitamin D2 50,000 intl units (1.25 mg) oral capsule: 1 cap(s) orally once a week (04 Jul 2019 21:53)  --------------------------------------------------------------------------------------------  PHYSICAL EXAM:  General: NAD, Lying in bed comfortably  Neuro: A+Ox3  HEENT: NC/AT, EOMI, PERRLA, MMM  Neck: Soft, supple  Cardio: RRR  Resp: Non labored breathing   Thorax: No chest wall tenderness  GI/Abd: Soft, NT/ND, no rebound/guarding, no masses palpated  Vascular: All 4 extremities warm. RUE with bleeding AVF with graft that was controlled with a figure of 8 stitch. No signs of hematoma. Palpable 2+ radial pulses, intact sensation and motor function in extremity and digits.   Skin: Intact, no breakdown  Pelvis: stable  Musculoskeletal: All 4 extremities moving spontaneously, no limitations, no spinal tenderness or stepoffs  --------------------------------------------------------------------------------------------    LABS                 9.1    15.36  )----------(  247       ( 16 Jul 2019 16:16 )               27.5      132    |  94     |  47.0   ----------------------------<  150        ( 16 Jul 2019 16:16 )  5.1     |  22.0   |  4.62     Ca    8.0        ( 16 Jul 2019 16:16 )      CAPILLARY BLOOD GLUCOSE  --------------------------------------------------------------------------------------------  IMAGING  No imaging obtained.

## 2019-07-16 NOTE — PROGRESS NOTE ADULT - SUBJECTIVE AND OBJECTIVE BOX
Internal Medicine Hospitalist - Dr. Giovanni ROBERSON    [id number]    87y      Female    Patient is a 87y old  Female who presents with a chief complaint of weakness, sepsis 2/2 UTI (16 Jul 2019 14:33)    INTERVAL HPI/ OVERNIGHT EVENTS: Patient is seen and examined, more awake today, afebrile, denied chest pain or SOB    REVIEW OF SYSTEMS:    Denied fever, chills, abd. pain, nausea, vomiting, chest pain, SOB, headache, dizziness    PHYSICAL EXAM:    Vital Signs Last 24 Hrs  T(C): 36.4 (16 Jul 2019 16:10), Max: 36.4 (16 Jul 2019 16:10)  T(F): 97.5 (16 Jul 2019 16:10), Max: 97.5 (16 Jul 2019 16:10)  HR: 76 (16 Jul 2019 16:10) (66 - 80)  BP: 172/87 (16 Jul 2019 16:10) (141/63 - 172/87)  BP(mean): --  RR: 18 (16 Jul 2019 16:10) (18 - 18)  SpO2: 99% (16 Jul 2019 16:10) (95% - 99%)    GENERAL: NAD  CHEST/LUNG: CTA b/l   HEART: S1S2+ audible  ABDOMEN: Soft, Nontender, Nondistended; Bowel sounds present  EXTREMITIES: R arm dressing  CNS: Awake  Psychiatry: normal mood    LABS:                        10.2   13.94 )-----------( 213      ( 15 Jul 2019 18:18 )             31.5     07-15    130<L>  |  94<L>  |  36.0<H>  ----------------------------<  170<H>  5.0   |  24.0  |  4.06<H>    Ca    8.1<L>      15 Jul 2019 18:18              MEDICATIONS  (STANDING):  aspirin  chewable 81 milliGRAM(s) Oral daily  atorvastatin 40 milliGRAM(s) Oral at bedtime  calcitriol   Capsule 0.25 MICROGram(s) Oral daily  calcium acetate 667 milliGRAM(s) Oral three times a day with meals  ceFAZolin   IVPB      ceFAZolin   IVPB 1000 milliGRAM(s) IV Intermittent every 24 hours  chlorhexidine 2% Cloths 1 Application(s) Topical <User Schedule>  clopidogrel Tablet 75 milliGRAM(s) Oral daily  dextrose 5%. 1000 milliLiter(s) (50 mL/Hr) IV Continuous <Continuous>  dextrose 50% Injectable 12.5 Gram(s) IV Push once  dextrose 50% Injectable 25 Gram(s) IV Push once  dextrose 50% Injectable 25 Gram(s) IV Push once  folic acid 1 milliGRAM(s) Oral daily  gabapentin 100 milliGRAM(s) Oral three times a day  heparin  Injectable 5000 Unit(s) SubCutaneous every 8 hours  insulin lispro (HumaLOG) corrective regimen sliding scale   SubCutaneous three times a day before meals  insulin lispro (HumaLOG) corrective regimen sliding scale   SubCutaneous at bedtime  levothyroxine 112 MICROGram(s) Oral daily  lidocaine   Patch 1 Patch Transdermal daily  pantoprazole    Tablet 40 milliGRAM(s) Oral before breakfast  saccharomyces boulardii 250 milliGRAM(s) Oral two times a day    MEDICATIONS  (PRN):  acetaminophen   Tablet .. 650 milliGRAM(s) Oral every 6 hours PRN Temp greater or equal to 38C (100.4F), Mild Pain (1 - 3)  dextrose 40% Gel 15 Gram(s) Oral once PRN Blood Glucose LESS THAN 70 milliGRAM(s)/deciliter  glucagon  Injectable 1 milliGRAM(s) IntraMuscular once PRN Glucose LESS THAN 70 milligrams/deciliter      RADIOLOGY & ADDITIONAL TEST

## 2019-07-16 NOTE — PROGRESS NOTE ADULT - ASSESSMENT
87yoF hx ESRD on HD (M and F only), CAD s/p CABG, HTN, DM, PAD, hypothyroidism presenting with generalized weakness found to have UTI and meeting sepsis criteria, blood c/s positive for MSSA, source unknown, s/p indium scan positive for R AV graft infection, vascular surgery consult requested    A/P    #Sepsis / Staph aureus bacteremia due to R arm AV graft infection  vascular surgery consult requested   repeat blood c/s negative so far  ID consult appreciated - c/w Cefazolin   s/p indium scan positive for R AV graft infection,  SANFORD for endocarditis/lead vegetations (-)    #Metabolic Encephalopathy - multifactorial, due to sepsis / ESRD, etc.  CT head showing chronic infarcts and severe microvascular disease.  On ASA, Statin.     #Leg Pain, Neuropathy - per son, findings are chronic.  Continue Neurontin.    #Thrombocytopenia - Plt improved.  Likely due to bacteremia.  Hematology input appreciated.     #CAD/Hx CVA- ASA, plavix, statin resumed.     #HTN- Stable, monitor BP     #ESRD on HD Monday and Friday-  Renal consulted.  Calcitriol 0.25 mcg and calcium acetate 667 mg 1 po TID w meals.    #Hypothyroid- Continue [id number] mcg.    #Prophylactic measure- heparin.

## 2019-07-16 NOTE — PROGRESS NOTE ADULT - SUBJECTIVE AND OBJECTIVE BOX
Patient seen and examined    no new c/o ; Ate lunch well  REVIEW OF SYSTEMS:    CONSTITUTIONAL: No F/C; appears tired and weak  RESPIRATORY: No cough,  No SOB  CARDIOVASCULAR: No CP/palpitations,    GASTROINTESTINAL: No abdominal pain  or NVD   GENITOURINARY: No UTI sx  NEUROLOGICAL: No headaches, NO wk/numbness  MUSCULOSKELETAL: YONIS AVG area NT  EXTREMITIES : no swelling,    Vital Signs Last 24 Hrs  T(C): 36.7 (16 Jul 2019 16:45), Max: 36.7 (16 Jul 2019 16:00)  T(F): 98.1 (16 Jul 2019 16:45), Max: 98.1 (16 Jul 2019 16:00)  HR: 73 (16 Jul 2019 16:45) (66 - 76)  BP: 129/65 (16 Jul 2019 16:45) (129/65 - 172/87)  BP(mean): --  RR: 18 (16 Jul 2019 16:10) (18 - 18)  SpO2: 99% (16 Jul 2019 16:10) (95% - 99%)    PHYSICAL EXAM:    GENERAL: NAD,   EYES:  conjunctiva and sclera clear  NECK: Supple, No JVD/Bruit  NERVOUS SYSTEM:  A/O x3,   CHEST:  No rales or rhonchi  HEART:  RRR, No murmur  ABDOMEN: Soft, NT/ND BS+  EXTREMITIES:  No Edema;; AVG -good bruit; NT  SKIN: No rashes    LABS:                          9.1    15.36 )-----------( 247      ( 16 Jul 2019 16:16 )             27.5     07-16    132<L>  |  94<L>  |  47.0<H>  ----------------------------<  150<H>  5.1   |  22.0  |  4.62<H>    Ca    8.0<L>      16 Jul 2019 16:16    Indium scan suggestive of R AVG infection    MEDICATIONS  (STANDING):  acetaminophen   Tablet .. PRN  aspirin  chewable  atorvastatin  calcitriol   Capsule  calcium acetate  ceFAZolin   IVPB  ceFAZolin   IVPB  chlorhexidine 2% Cloths  clopidogrel Tablet  dextrose 40% Gel PRN  dextrose 5%.  dextrose 50% Injectable  dextrose 50% Injectable  dextrose 50% Injectable  folic acid  gabapentin  glucagon  Injectable PRN  insulin lispro (HumaLOG) corrective regimen sliding scale  insulin lispro (HumaLOG) corrective regimen sliding scale  levothyroxine  lidocaine   Patch  pantoprazole    Tablet  saccharomyces boulardii

## 2019-07-16 NOTE — PROGRESS NOTE ADULT - SUBJECTIVE AND OBJECTIVE BOX
Pt seen and examined.  Positive blood cultures with no definitive source.  Persistent leukocytosis despite IV abx's.  Nuclear medicine scan with sig uptake right antecubital fossa in setting of functioning AV graft.  Minimal erythema noted in area without significant fluctuance.  Options d/w patients son.  Recommend explantation of AV graft with placement of temporary Jeffrey catheter in the groin with eventual right jugular HD permacath (Pt with left sided pacemaker).  Plan surgery tomorrow afternoon pending medical clearance.

## 2019-07-16 NOTE — PROGRESS NOTE ADULT - ASSESSMENT
ESRD - HD 2x week, held today awaiting    Anemia - add MILLIE as needed to maintain Hgb >10.0  - follow H/H     RO - cont Phoslo  - cont Rocaltrol  - follow Ca+ and phos    Bacteremia:/sepsis with  staph  Aureus   Recurrent bacteremia but last BC from 7/14 - NGSF  SANFORD (-)  [facility] rao  d/w Dr. Davila ; ID wants to remove AVg and use PC for HD  Dr. davila d/w Scott, pt/s son who is in agreement with plans  Vascular surgery contacted already

## 2019-07-17 LAB
ALBUMIN SERPL ELPH-MCNC: 2.3 G/DL — LOW (ref 3.3–5.2)
ALP SERPL-CCNC: 162 U/L — HIGH (ref 40–120)
ALT FLD-CCNC: <5 U/L — SIGNIFICANT CHANGE UP
ANION GAP SERPL CALC-SCNC: 13 MMOL/L — SIGNIFICANT CHANGE UP (ref 5–17)
AST SERPL-CCNC: 17 U/L — SIGNIFICANT CHANGE UP
BILIRUB SERPL-MCNC: 0.3 MG/DL — LOW (ref 0.4–2)
BUN SERPL-MCNC: 44 MG/DL — HIGH (ref 8–20)
CALCIUM SERPL-MCNC: 7.5 MG/DL — LOW (ref 8.6–10.2)
CHLORIDE SERPL-SCNC: 94 MMOL/L — LOW (ref 98–107)
CO2 SERPL-SCNC: 26 MMOL/L — SIGNIFICANT CHANGE UP (ref 22–29)
CREAT SERPL-MCNC: 4.54 MG/DL — HIGH (ref 0.5–1.3)
GLUCOSE BLDC GLUCOMTR-MCNC: 114 MG/DL — HIGH (ref 70–99)
GLUCOSE BLDC GLUCOMTR-MCNC: 118 MG/DL — HIGH (ref 70–99)
GLUCOSE BLDC GLUCOMTR-MCNC: 122 MG/DL — HIGH (ref 70–99)
GLUCOSE BLDC GLUCOMTR-MCNC: 123 MG/DL — HIGH (ref 70–99)
GLUCOSE BLDC GLUCOMTR-MCNC: 125 MG/DL — HIGH (ref 70–99)
GLUCOSE BLDC GLUCOMTR-MCNC: 179 MG/DL — HIGH (ref 70–99)
GLUCOSE BLDC GLUCOMTR-MCNC: 202 MG/DL — HIGH (ref 70–99)
GLUCOSE SERPL-MCNC: 120 MG/DL — HIGH (ref 70–115)
HCT VFR BLD CALC: 21.5 % — LOW (ref 34.5–45)
HGB BLD-MCNC: 6.9 G/DL — CRITICAL LOW (ref 11.5–15.5)
LACTATE BLDV-MCNC: 1 MMOL/L — SIGNIFICANT CHANGE UP (ref 0.5–2)
MCHC RBC-ENTMCNC: 31.5 PG — SIGNIFICANT CHANGE UP (ref 27–34)
MCHC RBC-ENTMCNC: 32.1 GM/DL — SIGNIFICANT CHANGE UP (ref 32–36)
MCV RBC AUTO: 98.2 FL — SIGNIFICANT CHANGE UP (ref 80–100)
PLATELET # BLD AUTO: 236 K/UL — SIGNIFICANT CHANGE UP (ref 150–400)
POTASSIUM SERPL-MCNC: 5 MMOL/L — SIGNIFICANT CHANGE UP (ref 3.5–5.3)
POTASSIUM SERPL-SCNC: 5 MMOL/L — SIGNIFICANT CHANGE UP (ref 3.5–5.3)
PROT SERPL-MCNC: 5.4 G/DL — LOW (ref 6.6–8.7)
RBC # BLD: 2.19 M/UL — LOW (ref 3.8–5.2)
RBC # FLD: 15.7 % — HIGH (ref 10.3–14.5)
SODIUM SERPL-SCNC: 133 MMOL/L — LOW (ref 135–145)
TROPONIN T SERPL-MCNC: 0.32 NG/ML — HIGH (ref 0–0.06)
WBC # BLD: 14.45 K/UL — HIGH (ref 3.8–10.5)
WBC # FLD AUTO: 14.45 K/UL — HIGH (ref 3.8–10.5)

## 2019-07-17 PROCEDURE — 99497 ADVNCD CARE PLAN 30 MIN: CPT

## 2019-07-17 PROCEDURE — 70450 CT HEAD/BRAIN W/O DYE: CPT | Mod: 26

## 2019-07-17 PROCEDURE — 93010 ELECTROCARDIOGRAM REPORT: CPT

## 2019-07-17 PROCEDURE — 99233 SBSQ HOSP IP/OBS HIGH 50: CPT

## 2019-07-17 PROCEDURE — 71045 X-RAY EXAM CHEST 1 VIEW: CPT | Mod: 26

## 2019-07-17 RX ORDER — ERYTHROPOIETIN 10000 [IU]/ML
10000 INJECTION, SOLUTION INTRAVENOUS; SUBCUTANEOUS
Refills: 0 | Status: DISCONTINUED | OUTPATIENT
Start: 2019-07-17 | End: 2019-07-24

## 2019-07-17 RX ADMIN — GABAPENTIN 100 MILLIGRAM(S): 400 CAPSULE ORAL at 06:18

## 2019-07-17 RX ADMIN — Medication 250 MILLIGRAM(S): at 06:18

## 2019-07-17 RX ADMIN — Medication 667 MILLIGRAM(S): at 17:53

## 2019-07-17 RX ADMIN — LIDOCAINE 1 PATCH: 4 CREAM TOPICAL at 19:06

## 2019-07-17 RX ADMIN — CALCITRIOL 0.25 MICROGRAM(S): 0.5 CAPSULE ORAL at 17:54

## 2019-07-17 RX ADMIN — Medication 112 MICROGRAM(S): at 06:18

## 2019-07-17 RX ADMIN — ATORVASTATIN CALCIUM 40 MILLIGRAM(S): 80 TABLET, FILM COATED ORAL at 21:40

## 2019-07-17 RX ADMIN — Medication 1 MILLIGRAM(S): at 17:54

## 2019-07-17 RX ADMIN — PANTOPRAZOLE SODIUM 40 MILLIGRAM(S): 20 TABLET, DELAYED RELEASE ORAL at 06:18

## 2019-07-17 RX ADMIN — CHLORHEXIDINE GLUCONATE 1 APPLICATION(S): 213 SOLUTION TOPICAL at 06:18

## 2019-07-17 RX ADMIN — Medication 81 MILLIGRAM(S): at 17:38

## 2019-07-17 RX ADMIN — Medication 100 MILLIGRAM(S): at 14:33

## 2019-07-17 RX ADMIN — LIDOCAINE 1 PATCH: 4 CREAM TOPICAL at 14:33

## 2019-07-17 RX ADMIN — Medication 250 MILLIGRAM(S): at 17:54

## 2019-07-17 RX ADMIN — CLOPIDOGREL BISULFATE 75 MILLIGRAM(S): 75 TABLET, FILM COATED ORAL at 17:55

## 2019-07-17 NOTE — PROGRESS NOTE ADULT - ASSESSMENT
87yoF hx ESRD on HD (M and F only), CAD s/p CABG, HTN, DM, PAD, hypothyroidism presenting with generalized weakness found to have UTI and meeting sepsis criteria, blood c/s positive for MSSA, source unknown, s/p indium scan positive for R AV graft infection, vascular surgery consult requested. Pt is s/p RRT on 07/17 due to AMS, afebrile, normal vitals,   repeat CT head no acute finding, noted to have Hg of 6.9, will transfuse 1 unit prbc, upgrade to tele.     A/P    #Sepsis / Staph aureus bacteremia due to R arm AV graft infection  vascular surgery consult appreciated  repeat blood c/s negative so far  ID consult appreciated - c/w Cefazolin   s/p indium scan positive for R AV graft infection,  SANFORD for endocarditis/lead vegetations (-)    #Metabolic Encephalopathy - lethargic today - reason unknown   repeat  CT head no acute finding, On ASA, Statin.   upgrade to tele  hold gabapentin    #CAd with elevated troponin - T 0.32 - elevated in past, ESRd on HD  serial troponin, upgrade to tele  cardiology on board  s/p SANFORD LVEF 30-35%    #Leg Pain, Neuropathy - per son, findings are chronic.    #Thrombocytopenia - Plt improved.  Likely due to bacteremia.  Hematology input appreciated.     #CAD/Hx CVA- ASA, plavix, statin resumed.     #HTN- Stable, monitor BP     #ESRD on HD Monday and Friday-  Renal consulted.  Calcitriol 0.25 mcg and calcium acetate 667 mg 1 po TID w meals.    #Hypothyroid- Continue [id number] mcg.    #Prophylactic measure- heparin. 87yoF hx ESRD on HD (M and F only), CAD s/p CABG, HTN, DM, PAD, hypothyroidism presenting with generalized weakness found to have UTI and meeting sepsis criteria, blood c/s positive for MSSA, source unknown, s/p indium scan positive for R AV graft infection, vascular surgery consult requested. Pt is s/p RRT on 07/17 due to AMS, afebrile, normal vitals,   repeat CT head no acute finding, noted to have Hg of 6.9, will transfuse 1 unit prbc, upgrade to tele.     A/P    #Sepsis / Staph aureus bacteremia due to R arm AV graft infection  vascular surgery consult appreciated  repeat blood c/s negative so far  ID consult appreciated - c/w Cefazolin   s/p indium scan positive for R AV graft infection,  SANFORD for endocarditis/lead vegetations (-)    #Metabolic Encephalopathy - lethargic today - reason unknown   repeat  CT head no acute finding, On ASA, Statin.   upgrade to tele  hold gabapentin    #CAd with elevated troponin - T 0.32 - elevated in past, ESRd on HD  serial troponin, upgrade to tele  cardiology on board  s/p SANFORD LVEF 30-35%    #Chronic anemia - Hg 6.9 - HD RN report bleeding from AV graft yesterday   will transfuse 1 unit prbc   f/uc bc    #Leg Pain, Neuropathy - per son, findings are chronic.    #Thrombocytopenia - Plt improved.  Likely due to bacteremia.  Hematology input appreciated.     #CAD/Hx CVA- ASA, plavix, statin resumed.     #HTN- Stable, monitor BP     #ESRD on HD Monday and Friday-  Renal consulted.  Calcitriol 0.25 mcg and calcium acetate 667 mg 1 po TID w meals.    #Hypothyroid- Continue [id number] mcg.    #Prophylactic measure- heparin. 87yoF hx ESRD on HD (M and F only), CAD s/p CABG, HTN, DM, PAD, hypothyroidism presenting with generalized weakness found to have UTI and meeting sepsis criteria, blood c/s positive for MSSA, source unknown, s/p indium scan positive for R AV graft infection, vascular surgery consult requested. Pt is s/p RRT on 07/17 due to AMS, afebrile, normal vitals,   repeat CT head no acute finding, noted to have Hg of 6.9, will transfuse 1 unit prbc, upgrade to tele.     A/P    #Sepsis / Staph aureus bacteremia due to R arm AV graft infection  vascular surgery consult appreciated  repeat blood c/s negative so far  ID consult appreciated - c/w Cefazolin   s/p indium scan positive for R AV graft infection,  SANFORD for endocarditis/lead vegetations (-)    #Metabolic Encephalopathy - lethargic today - reason unknown   repeat  CT head no acute finding, On ASA, Statin.   upgrade to tele  hold gabapentin    #CAd with elevated troponin - T 0.32 - elevated in past, ESRd on HD  serial troponin, upgrade to tele  cardiology on board  s/p SANFORD LVEF 30-35%    #Chronic anemia - Hg 6.9 - HD RN report bleeding from AV graft yesterday   will transfuse 1 unit prbc   f/uc bc    #Leg Pain, Neuropathy - per son, findings are chronic.    #Thrombocytopenia - Plt improved.  Likely due to bacteremia.  Hematology input appreciated.     #CAD/Hx CVA- ASA, plavix, statin resumed.     #HTN- Stable, monitor BP     #ESRD on HD Monday and Friday-  Renal consulted.  Calcitriol 0.25 mcg and calcium acetate 667 mg 1 po TID w meals.    #Hypothyroid- Continue [id number] mcg.    #Prophylactic measure- heparin.     GOC - discussed with son Darrin Hooker, updated about pt condition, lethargy, also discuss GOC, wishes full code, MOLST form completed. time spent 32 minutes

## 2019-07-17 NOTE — PROGRESS NOTE ADULT - SUBJECTIVE AND OBJECTIVE BOX
CARDIOLOGY PROGRESS NOTE   ([facility])                                                                                                        Subjective:     Vitals:  T(C): 36.4 (07-17-19 @ 07:39), Max: 37.2 (07-17-19 @ 00:20)  HR: 73 (07-17-19 @ 07:39) (66 - 83)  BP: 118/52 (07-17-19 @ 07:39) (118/52 - 172/87)  RR: 18 (07-17-19 @ 07:39) (18 - 18)  SpO2: 99% (07-17-19 @ 07:39) (95% - 100%)  Wt(kg): --  I&O's Summary      Weight (kg): 57.8 (07-16 @ 16:10)    PHYSICAL EXAM:  Appearance: Normal	  HEENT:   Atraumatic  Cardiovascular: Normal S1 S2, No JVD, No murmurs, No edema  Respiratory: Lungs clear to auscultation	  Gastrointestinal:  Soft, Non-tender, + BS	  Skin: No rashes, No ecchymoses, No cyanosis  Neurologic: Alert and awake, able to move extremities  Extremities: No edema    TELEMETRY: 	    ECG:  	      DIAGNOSTIC TESTING:  [ ] Echocardiogram:  [ ]  Catheterization:  [ ] Stress Test:    OTHER: 	      CURRENT MEDICATIONS:    ceFAZolin   IVPB      ceFAZolin   IVPB 1000 milliGRAM(s) IV Intermittent every 24 hours      acetaminophen   Tablet .. 650 milliGRAM(s) Oral every 6 hours PRN    pantoprazole    Tablet 40 milliGRAM(s) Oral before breakfast    atorvastatin 40 milliGRAM(s) Oral at bedtime  dextrose 40% Gel 15 Gram(s) Oral once PRN  dextrose 50% Injectable 12.5 Gram(s) IV Push once  dextrose 50% Injectable 25 Gram(s) IV Push once  dextrose 50% Injectable 25 Gram(s) IV Push once  glucagon  Injectable 1 milliGRAM(s) IntraMuscular once PRN  insulin lispro (HumaLOG) corrective regimen sliding scale   SubCutaneous three times a day before meals  insulin lispro (HumaLOG) corrective regimen sliding scale   SubCutaneous at bedtime  levothyroxine 112 MICROGram(s) Oral daily    aspirin  chewable 81 milliGRAM(s) Oral daily  calcitriol   Capsule 0.25 MICROGram(s) Oral daily  calcium acetate 667 milliGRAM(s) Oral three times a day with meals  chlorhexidine 2% Cloths 1 Application(s) Topical <User Schedule>  clopidogrel Tablet 75 milliGRAM(s) Oral daily  dextrose 5%. 1000 milliLiter(s) IV Continuous <Continuous>  folic acid 1 milliGRAM(s) Oral daily  lidocaine   Patch 1 Patch Transdermal daily      LABS:	 	    CARDIAC MARKERS:  Troponin I:   CPK total =  CK MB=   CKMB index=                           9.1    15.36 )-----------( 247      ( 16 Jul 2019 16:16 )             27.5     07-16    132<L>  |  94<L>  |  47.0<H>  ----------------------------<  150<H>  5.1   |  22.0  |  4.62<H>    Ca    8.0<L>      16 Jul 2019 16:16      proBNP:   Lipid Profile:   HgA1c:   TSH: CARDIOLOGY PROGRESS NOTE   ([facility])                                                                                                        Subjective: not responding to verbal stimuli. Open eyes. Son at bed site, not responding to his voice.    Vitals:  T(C): 36.4 (07-17-19 @ 07:39), Max: 37.2 (07-17-19 @ 00:20)  HR: 73 (07-17-19 @ 07:39) (66 - 83)  BP: 118/52 (07-17-19 @ 07:39) (118/52 - 172/87)  RR: 18 (07-17-19 @ 07:39) (18 - 18)  SpO2: 99% (07-17-19 @ 07:39) (95% - 100%)  Wt(kg): --  I&O's Summary      Weight (kg): 57.8 (07-16 @ 16:10)    PHYSICAL EXAM:  Appearance: thin	  HEENT:   Atraumatic  Cardiovascular: Normal S1 S2, No JVD, 1/6 SM LSB  Respiratory: Lungs clear to auscultation	  Gastrointestinal:  Soft, Non-tender, + BS	  Skin: No rashes, No ecchymoses, No cyanosis  Neurologic: unresponsive  Extremities: No edema        CURRENT MEDICATIONS:    ceFAZolin   IVPB      ceFAZolin   IVPB 1000 milliGRAM(s) IV Intermittent every 24 hours      acetaminophen   Tablet .. 650 milliGRAM(s) Oral every 6 hours PRN    pantoprazole    Tablet 40 milliGRAM(s) Oral before breakfast    atorvastatin 40 milliGRAM(s) Oral at bedtime  dextrose 40% Gel 15 Gram(s) Oral once PRN  dextrose 50% Injectable 12.5 Gram(s) IV Push once  dextrose 50% Injectable 25 Gram(s) IV Push once  dextrose 50% Injectable 25 Gram(s) IV Push once  glucagon  Injectable 1 milliGRAM(s) IntraMuscular once PRN  insulin lispro (HumaLOG) corrective regimen sliding scale   SubCutaneous three times a day before meals  insulin lispro (HumaLOG) corrective regimen sliding scale   SubCutaneous at bedtime  levothyroxine 112 MICROGram(s) Oral daily    aspirin  chewable 81 milliGRAM(s) Oral daily  calcitriol   Capsule 0.25 MICROGram(s) Oral daily  calcium acetate 667 milliGRAM(s) Oral three times a day with meals  chlorhexidine 2% Cloths 1 Application(s) Topical <User Schedule>  clopidogrel Tablet 75 milliGRAM(s) Oral daily  dextrose 5%. 1000 milliLiter(s) IV Continuous <Continuous>  folic acid 1 milliGRAM(s) Oral daily  lidocaine   Patch 1 Patch Transdermal daily      LABS:	 	                            9.1    15.36 )-----------( 247      ( 16 Jul 2019 16:16 )             27.5     07-16    132<L>  |  94<L>  |  47.0<H>  ----------------------------<  150<H>  5.1   |  22.0  |  4.62<H>    Ca    8.0<L>      16 Jul 2019 16:16

## 2019-07-17 NOTE — PROGRESS NOTE ADULT - SUBJECTIVE AND OBJECTIVE BOX
Pt seen and examined. And chart reviewed. Pt was scheduled today for RUE AVG explantation and temporary catheter insertion, however case had to be cancelled due to AMS. Pt noted to have a drop in H/H and elevated troponins. Currently lethargic but arousable.    Vital Signs Last 24 Hrs  T(C): 36.3 (17 Jul 2019 16:53), Max: 37.2 (17 Jul 2019 00:20)  T(F): 97.4 (17 Jul 2019 16:53), Max: 99 (17 Jul 2019 00:20)  HR: 74 (17 Jul 2019 16:53) (66 - 83)  BP: 101/46 (17 Jul 2019 16:53) (101/46 - 169/77)  BP(mean): --  RR: 18 (17 Jul 2019 16:53) (17 - 18)  SpO2: 94% (17 Jul 2019 16:53) (94% - 100%)    PE:  Lethargic  RUE AVG with palpable thrill and audible bruit, no significant erythema or fluctuance                           6.9    14.45 )-----------( 236      ( 17 Jul 2019 09:19 )             21.5     Repeat blood cultures are negative x 48 hrs    A/P ? graft infection with initial cultures +ve for MSSA  - given that there are no external signs of infection, negative repeat blood cultures x 48 hrs and pt is afebrile as well as recent AMS, elevated troponins, and lethargy would hold off on any surgical intervention at this time  - If develops fever, chills signs of graft infection may need explantation and temporary catheter insertion.   - Will follow along  - Discussed with Dr. Davila

## 2019-07-17 NOTE — RAPID RESPONSE TEAM SUMMARY - NSSITUATIONBACKGROUNDRRT_GEN_ALL_CORE
87yoF hx ESRD on HD (M and F only), CAD s/p CABG, HTN, DM, PAD, hypothyroidism presenting with generalized weakness found to have UTI and meeting sepsis criteria, blood c/s positive for MSSA, source unknown, s/p indium scan positive for R AV graft infection. Repeat BC (-) and pt was to have explant of R AV graft today. Nursing found patient to be lethargic this AM, MD notified but pt subsequently became less responsive.

## 2019-07-17 NOTE — CHART NOTE - NSCHARTNOTEFT_GEN_A_CORE
Rapid response follow up.    Patient remains unresponsive to verbal stim, min responsive to painful stim.    Vital Signs Last 24 Hrs  T(C): 37 (17 Jul 2019 09:53), Max: 37.2 (17 Jul 2019 00:20)  T(F): 98.6 (17 Jul 2019 09:53), Max: 99 (17 Jul 2019 00:20)  HR: 66 (17 Jul 2019 09:53) (66 - 83)  BP: 118/52 (17 Jul 2019 07:39) (118/52 - 172/87)  BP(mean): --  RR: 17 (17 Jul 2019 09:53) (17 - 18)  SpO2: 100% (17 Jul 2019 09:53) (95% - 100%)    GENERAL: Pt in bed, HOB elevated, shallow resp effort, eyes closed, not responding to voice  ENMT: PERRL  NECK: soft, Supple, No JVD,   CHEST/LUNG: Clear to auscultation, dimin bases, shallow resp effort.   HEART: S1S2+, Regular rate and rhythm  ABDOMEN: Soft, tender (pt winces with palp over RLQ, LLQ)  Nondistended; Bowel sounds present.  EXT: cool w thready periph pulses, cap refill <3sec, no pedal edema  SKIN: warm, dry. Scatt areas ecchymosis  NEURO: AAOX0, not responsive to voice, min response to painful stim, not following commands    MEDICATIONS  (STANDING):  aspirin  chewable 81 milliGRAM(s) Oral daily  atorvastatin 40 milliGRAM(s) Oral at bedtime  calcitriol   Capsule 0.25 MICROGram(s) Oral daily  calcium acetate 667 milliGRAM(s) Oral three times a day with meals  ceFAZolin   IVPB      ceFAZolin   IVPB 1000 milliGRAM(s) IV Intermittent every 24 hours  chlorhexidine 2% Cloths 1 Application(s) Topical <User Schedule>  clopidogrel Tablet 75 milliGRAM(s) Oral daily  dextrose 5%. 1000 milliLiter(s) (50 mL/Hr) IV Continuous <Continuous>  dextrose 50% Injectable 12.5 Gram(s) IV Push once  dextrose 50% Injectable 25 Gram(s) IV Push once  dextrose 50% Injectable 25 Gram(s) IV Push once  folic acid 1 milliGRAM(s) Oral daily  insulin lispro (HumaLOG) corrective regimen sliding scale   SubCutaneous three times a day before meals  insulin lispro (HumaLOG) corrective regimen sliding scale   SubCutaneous at bedtime  levothyroxine 112 MICROGram(s) Oral daily  lidocaine   Patch 1 Patch Transdermal daily  pantoprazole    Tablet 40 milliGRAM(s) Oral before breakfast  saccharomyces boulardii 250 milliGRAM(s) Oral two times a day    MEDICATIONS  (PRN):  acetaminophen   Tablet .. 650 milliGRAM(s) Oral every 6 hours PRN Temp greater or equal to 38C (100.4F), Mild Pain (1 - 3)  dextrose 40% Gel 15 Gram(s) Oral once PRN Blood Glucose LESS THAN 70 milliGRAM(s)/deciliter  glucagon  Injectable 1 milliGRAM(s) IntraMuscular once PRN Glucose LESS THAN 70 milligrams/deciliter                          6.9    14.45 )-----------( 236      ( 17 Jul 2019 09:19 )             21.5   07-17    133<L>  |  94<L>  |  44.0<H>  ----------------------------<  120<H>  5.0   |  26.0  |  4.54<H>    Ca    7.5<L>      17 Jul 2019 09:19    TPro  5.4<L>  /  Alb  2.3<L>  /  TBili  0.3<L>  /  DBili  x   /  AST  17  /  ALT  <5  /  AlkPhos  162<H>  07-17    < from: CT Head No Cont (07.17.19 @ 09:20) >    FINDINGS:    There is no acute intracranial hemorrhage or mass effect. There are areas   of hypodensity in the bilateral hemispheric white matter suggesting   chronic white matter microvascular ischemic change. There is cerebral   volume loss. Chronic right pontine and cerebellar lacunar infarcts.    < end of copied text >    CXR: pending      A/P    87yoF hx ESRD on HD (M and F only), CAD s/p CABG, HTN, DM, PAD, hypothyroidism presenting with generalized weakness found to have UTI and meeting sepsis criteria, blood c/s positive for MSSA, source unknown, s/p indium scan positive for R AV graft infection. Repeat BC (-) and pt was to have explant of R AV graft today. Nursing found patient to be lethargic this AM, MD notified but pt subsequently became less responsive.    #acute worsening of mental status in setting of Metabolic Encephalopathy - multifactorial, due to sepsis / ESRD, etc.    - CT head unremarkable for acute changes  - sx began after adding gabapentin, gabapentin d/c  - Transfer to [address] [place] [address] for monitoring    #anemia   - PRBC odered  - Dr Davila spoke with son by phone for consent to transfuse    #ESRD on HD Monday and Friday-  Renal following Calcitriol 0.25 mcg and calcium acetate 667 mg 1 po TID w meals.    #Sepsis/Staph aureus bacteremia/ infected RUE AV graft  - explantation planned for today postponed due to MS change

## 2019-07-17 NOTE — RAPID RESPONSE TEAM SUMMARY - NSOTHERINTERVENTIONSRRT_GEN_ALL_CORE
CT head  CXR  CBC,CMP, Lactate, VBG  ABG attempted LUE and L femoral- unsuccessful CT head - no acute findings per radiology  CXR-   CBC, CMP  Blood Gas Venous - Lactate: 1.0: (07.17.19 @ 09:20)  VBG  ABG attempted LUE and L femoral- unsuccessful  EKG NSR w LBBB    Present: Parisa EASLEY, Giovanni EASLEY, Magdalena FINN

## 2019-07-17 NOTE — PROGRESS NOTE ADULT - ASSESSMENT
87yoF hx ESRD on HD (M and F only), CAD s/p CABG, HTN, DM, PAD, hypothyroidism presenting with generalized weakness.  On admission, febrile, leukocytosis, UA positive, with  Blood cultures positive for staph aureus.  SANFORD done, no vegetation. s/p indium scan positive for R AV graft infection, vascular surgery consult requested.       Preoperative Cardiovascular examination:  Pt has change in mental status today and RRT was called.    Sepsis :  Staph aureus bacteremia due to R arm AV graft infection  SANFORD negative for vegetation  explantation planned for today postponed due to MS change.    AMS:   Unresponsive today  Pt is s/p RRT on 07/17 due to AMS    CAD s/p CABG:  with elevated troponin - T 0.32 - elevated in past, in setting of ESRD on HD  Cath 2/2018- LIMA to LAD patent, SVG to OM patent, SVG to RPDA patent   s/p SANFORD LVEF 30-35%    ESRD on HD

## 2019-07-17 NOTE — PROGRESS NOTE ADULT - ASSESSMENT
ESRD - HD tomorrow   Neurontin held for altered MS ( this has happened to her before according to son )     Anemia - Team transfusing today   Epogen with HD   CBC in am     RO - Binders     Resolved sepsis   Blood Cx (-) 7-14   Ancef ongoing   SANFORD wa s(-) Veg   Awaits exploration of arm access and Permacath with Vascular surgery

## 2019-07-17 NOTE — RAPID RESPONSE TEAM SUMMARY - NSADDTLFINDINGSRRT_GEN_ALL_CORE
PHYSICAL EXAM:  0848 141/64, P 76, R14, T97.4 SpO2 99% on 2L NC, Glu 125    GENERAL: Pt in bed, HOB elevated, shallow resp effort, eyes closed, not responding to voice  ENMT: PERRL  NECK: soft, Supple, No JVD,   CHEST/LUNG: Clear to auscultation, dimin bases, shallow resp effort.   HEART: S1S2+, Regular rate and rhythm  ABDOMEN: Soft, Nontender, Nondistended; Bowel sounds present.  MUSCULOSKELETAL: moving x4 voluntarily  EXT: cool w thready periph pulses, cap refill <3sec, no pedal edema  SKIN: warm, dry. Scatt areas ecchymosis  NEURO: AAOX0, not responsive to voice, min response to painful stim, not following commands    Stat head CT obtained, after head CT became sl more alert opening eyes spont and following commands intermittently

## 2019-07-17 NOTE — PROGRESS NOTE ADULT - SUBJECTIVE AND OBJECTIVE BOX
Internal Medicine Hospitalist - Dr. Giovanni ROBERSON    [id number]    87y      Female    Patient is a 87y old  Female who presents with a chief complaint of weakness, sepsis 2/2 UTI (17 Jul 2019 09:09)    INTERVAL HPI/ OVERNIGHT EVENTS: Patient is seen and examined, pt is s/p RRT today due to AMS, open eyes on verbal stimuli, afebrile,     REVIEW OF SYSTEMS:    unable to obtain    PHYSICAL EXAM:    Vital Signs Last 24 Hrs  T(C): 37 (17 Jul 2019 09:53), Max: 37.2 (17 Jul 2019 00:20)  T(F): 98.6 (17 Jul 2019 09:53), Max: 99 (17 Jul 2019 00:20)  HR: 70 (17 Jul 2019 10:58) (66 - 83)  BP: 146/56 (17 Jul 2019 10:58) (118/52 - 172/87)  BP(mean): --  RR: 17 (17 Jul 2019 10:58) (17 - 18)  SpO2: 100% (17 Jul 2019 10:58) (95% - 100%)    GENERAL: lethargic  Neck: supple  CHEST/LUNG: positive air entry   HEART: S1S2+ audible  ABDOMEN: Soft, Nontender, Nondistended; Bowel sounds present  EXTREMITIES:  trace edema  CNS: lethargic, open eyes on verbal stimuli  Psychiatry: unable to assess    LABS:                        6.9    14.45 )-----------( 236      ( 17 Jul 2019 09:19 )             21.5     07-17    133<L>  |  94<L>  |  44.0<H>  ----------------------------<  120<H>  5.0   |  26.0  |  4.54<H>    Ca    7.5<L>      17 Jul 2019 09:19    TPro  5.4<L>  /  Alb  2.3<L>  /  TBili  0.3<L>  /  DBili  x   /  AST  17  /  ALT  <5  /  AlkPhos  162<H>  07-17            MEDICATIONS  (STANDING):  aspirin  chewable 81 milliGRAM(s) Oral daily  atorvastatin 40 milliGRAM(s) Oral at bedtime  calcitriol   Capsule 0.25 MICROGram(s) Oral daily  calcium acetate 667 milliGRAM(s) Oral three times a day with meals  ceFAZolin   IVPB      ceFAZolin   IVPB 1000 milliGRAM(s) IV Intermittent every 24 hours  chlorhexidine 2% Cloths 1 Application(s) Topical <User Schedule>  clopidogrel Tablet 75 milliGRAM(s) Oral daily  dextrose 5%. 1000 milliLiter(s) (50 mL/Hr) IV Continuous <Continuous>  dextrose 50% Injectable 12.5 Gram(s) IV Push once  dextrose 50% Injectable 25 Gram(s) IV Push once  dextrose 50% Injectable 25 Gram(s) IV Push once  folic acid 1 milliGRAM(s) Oral daily  insulin lispro (HumaLOG) corrective regimen sliding scale   SubCutaneous three times a day before meals  insulin lispro (HumaLOG) corrective regimen sliding scale   SubCutaneous at bedtime  levothyroxine 112 MICROGram(s) Oral daily  lidocaine   Patch 1 Patch Transdermal daily  pantoprazole    Tablet 40 milliGRAM(s) Oral before breakfast  saccharomyces boulardii 250 milliGRAM(s) Oral two times a day    MEDICATIONS  (PRN):  acetaminophen   Tablet .. 650 milliGRAM(s) Oral every 6 hours PRN Temp greater or equal to 38C (100.4F), Mild Pain (1 - 3)  dextrose 40% Gel 15 Gram(s) Oral once PRN Blood Glucose LESS THAN 70 milliGRAM(s)/deciliter  glucagon  Injectable 1 milliGRAM(s) IntraMuscular once PRN Glucose LESS THAN 70 milligrams/deciliter      RADIOLOGY & ADDITIONAL TEST    < from: CT Head No Cont (07.17.19 @ 09:20) >  IMPRESSION: No acute intracranial hemorrhage or mass effect.     < end of copied text >

## 2019-07-17 NOTE — PROGRESS NOTE ADULT - SUBJECTIVE AND OBJECTIVE BOX
NEPHROLOGY INTERVAL HPI/OVERNIGHT EVENTS:    Seen earlier   Events noted   Neurontin held   CT head no acute changes   Moved to tele   To get transfused   OR re . exploration  AVG deferred   Blood Cx 7-14 negative     MEDICATIONS  (STANDING):  aspirin  chewable 81 milliGRAM(s) Oral daily  atorvastatin 40 milliGRAM(s) Oral at bedtime  calcitriol   Capsule 0.25 MICROGram(s) Oral daily  calcium acetate 667 milliGRAM(s) Oral three times a day with meals  ceFAZolin   IVPB      ceFAZolin   IVPB 1000 milliGRAM(s) IV Intermittent every 24 hours  chlorhexidine 2% Cloths 1 Application(s) Topical <User Schedule>  clopidogrel Tablet 75 milliGRAM(s) Oral daily  dextrose 5%. 1000 milliLiter(s) (50 mL/Hr) IV Continuous <Continuous>  dextrose 50% Injectable 12.5 Gram(s) IV Push once  dextrose 50% Injectable 25 Gram(s) IV Push once  dextrose 50% Injectable 25 Gram(s) IV Push once  folic acid 1 milliGRAM(s) Oral daily  insulin lispro (HumaLOG) corrective regimen sliding scale   SubCutaneous three times a day before meals  insulin lispro (HumaLOG) corrective regimen sliding scale   SubCutaneous at bedtime  levothyroxine 112 MICROGram(s) Oral daily  lidocaine   Patch 1 Patch Transdermal daily  pantoprazole    Tablet 40 milliGRAM(s) Oral before breakfast  saccharomyces boulardii 250 milliGRAM(s) Oral two times a day    MEDICATIONS  (PRN):  acetaminophen   Tablet .. 650 milliGRAM(s) Oral every 6 hours PRN Temp greater or equal to 38C (100.4F), Mild Pain (1 - 3)  dextrose 40% Gel 15 Gram(s) Oral once PRN Blood Glucose LESS THAN 70 milliGRAM(s)/deciliter  glucagon  Injectable 1 milliGRAM(s) IntraMuscular once PRN Glucose LESS THAN 70 milligrams/deciliter      Allergies    penicillin (Anaphylaxis)  seafood (Anaphylaxis)  shellfish (Anaphylaxis)    Intolerances        Vital Signs Last 24 Hrs  T(C): 36.4 (2019 12:09), Max: 37.2 (2019 00:20)  T(F): 97.6 (2019 12:09), Max: 99 (2019 00:20)  HR: 73 (2019 12:09) (66 - 83)  BP: 169/77 (2019 12:09) (118/52 - 172/87)  BP(mean): --  RR: 18 (2019 12:09) (17 - 18)  SpO2: 100% (2019 12:09) (98% - 100%)  Daily     Daily Weight in k.8 (2019 16:10)  I&O's Detail    I&O's Summary      PHYSICAL EXAM:  Appears chronically ill  NECK: Supple, no jvd  CHEST/LUNG: diminished BS at bases  HEART: Regular rate and rhythm; No rub   ABDOMEN: Soft, Nontender, Nondistended; Bowel sounds present  EXTREMITIES:  + edema less  Neuro: lethargic and slow to respond    LABS:                        6.9    14.45 )-----------( 236      ( 2019 09:19 )             21.5     07-17    133<L>  |  94<L>  |  44.0<H>  ----------------------------<  120<H>  5.0   |  26.0  |  4.54<H>    Ca    7.5<L>      2019 09:19    TPro  5.4<L>  /  Alb  2.3<L>  /  TBili  0.3<L>  /  DBili  x   /  AST  17  /  ALT  <5  /  AlkPhos  162<H>  0717                RADIOLOGY & ADDITIONAL TESTS:

## 2019-07-18 LAB
ANION GAP SERPL CALC-SCNC: 16 MMOL/L — SIGNIFICANT CHANGE UP (ref 5–17)
BUN SERPL-MCNC: 55 MG/DL — HIGH (ref 8–20)
CALCIUM SERPL-MCNC: 7.5 MG/DL — LOW (ref 8.6–10.2)
CHLORIDE SERPL-SCNC: 96 MMOL/L — LOW (ref 98–107)
CO2 SERPL-SCNC: 23 MMOL/L — SIGNIFICANT CHANGE UP (ref 22–29)
CREAT SERPL-MCNC: 5.14 MG/DL — HIGH (ref 0.5–1.3)
GLUCOSE BLDC GLUCOMTR-MCNC: 102 MG/DL — HIGH (ref 70–99)
GLUCOSE BLDC GLUCOMTR-MCNC: 118 MG/DL — HIGH (ref 70–99)
GLUCOSE BLDC GLUCOMTR-MCNC: 126 MG/DL — HIGH (ref 70–99)
GLUCOSE BLDC GLUCOMTR-MCNC: 136 MG/DL — HIGH (ref 70–99)
GLUCOSE SERPL-MCNC: 114 MG/DL — SIGNIFICANT CHANGE UP (ref 70–115)
HCT VFR BLD CALC: 27.9 % — LOW (ref 34.5–45)
HGB BLD-MCNC: 9 G/DL — LOW (ref 11.5–15.5)
MCHC RBC-ENTMCNC: 28.4 PG — SIGNIFICANT CHANGE UP (ref 27–34)
MCHC RBC-ENTMCNC: 32.3 GM/DL — SIGNIFICANT CHANGE UP (ref 32–36)
MCV RBC AUTO: 88 FL — SIGNIFICANT CHANGE UP (ref 80–100)
PLATELET # BLD AUTO: 261 K/UL — SIGNIFICANT CHANGE UP (ref 150–400)
POTASSIUM SERPL-MCNC: 5.2 MMOL/L — SIGNIFICANT CHANGE UP (ref 3.5–5.3)
POTASSIUM SERPL-SCNC: 5.2 MMOL/L — SIGNIFICANT CHANGE UP (ref 3.5–5.3)
RBC # BLD: 3.17 M/UL — LOW (ref 3.8–5.2)
RBC # FLD: 23.7 % — HIGH (ref 10.3–14.5)
SODIUM SERPL-SCNC: 135 MMOL/L — SIGNIFICANT CHANGE UP (ref 135–145)
TROPONIN T SERPL-MCNC: 0.3 NG/ML — HIGH (ref 0–0.06)
WBC # BLD: 12.48 K/UL — HIGH (ref 3.8–10.5)
WBC # FLD AUTO: 12.48 K/UL — HIGH (ref 3.8–10.5)

## 2019-07-18 PROCEDURE — 99233 SBSQ HOSP IP/OBS HIGH 50: CPT

## 2019-07-18 PROCEDURE — 99232 SBSQ HOSP IP/OBS MODERATE 35: CPT

## 2019-07-18 PROCEDURE — 93010 ELECTROCARDIOGRAM REPORT: CPT

## 2019-07-18 PROCEDURE — 93308 TTE F-UP OR LMTD: CPT | Mod: 26

## 2019-07-18 RX ORDER — OXYCODONE HYDROCHLORIDE 5 MG/1
5 TABLET ORAL EVERY 6 HOURS
Refills: 0 | Status: DISCONTINUED | OUTPATIENT
Start: 2019-07-18 | End: 2019-07-19

## 2019-07-18 RX ADMIN — Medication 650 MILLIGRAM(S): at 14:14

## 2019-07-18 RX ADMIN — LIDOCAINE 1 PATCH: 4 CREAM TOPICAL at 22:33

## 2019-07-18 RX ADMIN — Medication 650 MILLIGRAM(S): at 21:30

## 2019-07-18 RX ADMIN — Medication 667 MILLIGRAM(S): at 16:44

## 2019-07-18 RX ADMIN — CHLORHEXIDINE GLUCONATE 1 APPLICATION(S): 213 SOLUTION TOPICAL at 08:53

## 2019-07-18 RX ADMIN — CALCITRIOL 0.25 MICROGRAM(S): 0.5 CAPSULE ORAL at 16:43

## 2019-07-18 RX ADMIN — Medication 250 MILLIGRAM(S): at 06:10

## 2019-07-18 RX ADMIN — LIDOCAINE 1 PATCH: 4 CREAM TOPICAL at 08:55

## 2019-07-18 RX ADMIN — Medication 100 MILLIGRAM(S): at 16:43

## 2019-07-18 RX ADMIN — ATORVASTATIN CALCIUM 40 MILLIGRAM(S): 80 TABLET, FILM COATED ORAL at 20:42

## 2019-07-18 RX ADMIN — Medication 81 MILLIGRAM(S): at 16:43

## 2019-07-18 RX ADMIN — OXYCODONE HYDROCHLORIDE 5 MILLIGRAM(S): 5 TABLET ORAL at 17:31

## 2019-07-18 RX ADMIN — Medication 650 MILLIGRAM(S): at 15:15

## 2019-07-18 RX ADMIN — Medication 1 MILLIGRAM(S): at 16:43

## 2019-07-18 RX ADMIN — ERYTHROPOIETIN 10000 UNIT(S): 10000 INJECTION, SOLUTION INTRAVENOUS; SUBCUTANEOUS at 14:26

## 2019-07-18 RX ADMIN — Medication 650 MILLIGRAM(S): at 08:52

## 2019-07-18 RX ADMIN — CLOPIDOGREL BISULFATE 75 MILLIGRAM(S): 75 TABLET, FILM COATED ORAL at 16:42

## 2019-07-18 RX ADMIN — Medication 667 MILLIGRAM(S): at 08:52

## 2019-07-18 RX ADMIN — LIDOCAINE 1 PATCH: 4 CREAM TOPICAL at 02:00

## 2019-07-18 RX ADMIN — PANTOPRAZOLE SODIUM 40 MILLIGRAM(S): 20 TABLET, DELAYED RELEASE ORAL at 06:10

## 2019-07-18 RX ADMIN — Medication 112 MICROGRAM(S): at 06:10

## 2019-07-18 RX ADMIN — OXYCODONE HYDROCHLORIDE 5 MILLIGRAM(S): 5 TABLET ORAL at 18:01

## 2019-07-18 RX ADMIN — Medication 650 MILLIGRAM(S): at 09:28

## 2019-07-18 RX ADMIN — Medication 650 MILLIGRAM(S): at 20:42

## 2019-07-18 RX ADMIN — Medication 250 MILLIGRAM(S): at 16:45

## 2019-07-18 RX ADMIN — LIDOCAINE 1 PATCH: 4 CREAM TOPICAL at 19:00

## 2019-07-18 NOTE — PROGRESS NOTE ADULT - ASSESSMENT
87yoF hx ESRD on HD (M and F only), CAD s/p CABG, HTN, DM, PAD, hypothyroidism presenting with generalized weakness.  On admission, febrile, leukocytosis, UA positive, with  Blood cultures positive for staph aureus.  SANFORD done, no vegetation. s/p indium scan positive for R AV graft infection, vascular surgery consult requested.       Preoperative Cardiovascular examination:  7/17: Pt had change in mental status and  s/p RRT.  Pt is alert, awake today. NAD. Telemetry:   Vascular Surgery input noted: " If signs of worsening on non-resolving infection will need AVG explant"  Pt doesn't have absolute cardiac contraindication for the planned procedure/surgery.    Sepsis :  Staph aureus bacteremia due to R arm AV graft infection  SANFORD negative for vegetation  explantation planned which was postponed due to MS change.    CAD s/p CABG:  with elevated troponin - T 0.32 - elevated in past, in setting of ESRD on HD  Cath 2/2018- LIMA to LAD patent, SVG to OM patent, SVG to RPDA patent   SANFORD 7/9/2019  LVEF 30-35.Ml-mod TR. No evidence of endocarditis.  A FU TTE planned, EKG planned    Echo 3/2019  LVEF 60-65. ml MR. Ml TR. DD 2.     NST 7/2017  Normal Study    ESRD on HD

## 2019-07-18 NOTE — PROGRESS NOTE ADULT - ASSESSMENT
ESRD - HD today   Neurontin held for altered MS ( this has happened to her before according to son )     Anemia - Team transfused7-17  Epogen with HD   CBC in am better     RO - Binders     Resolved sepsis   Blood Cx (-) 7-14   Ancef ongoing as per ID   SANFORD nunez s(-) Veg   Vascular follow up re. AVG noted , follow for now

## 2019-07-18 NOTE — PROGRESS NOTE ADULT - ASSESSMENT
87yoF hx ESRD on HD (M and F only), CAD s/p CABG, HTN, DM, PAD, hypothyroidism presenting with generalized weakness, dysuria. Reports pain at graft site for 1 month.  In Ed temp 102.6, leukocytosis to 14. Blood cx 4/4 Staph aureus. Imaging with no focus (no pneumonia, abscess, OM)    Overall Staph aureus bacteremia 2/2 AVG infection, listed anaphylaxis to penicillin, UTI E. coli pansensitive, ESRD on HD M/F only via AVG    Vancomycin 7/4, 7/5  Azactam 7/4-7/6  Cefazolin 7/6-->    Recommend:  - Blood cultures + 4/4 Staph aureus. BCX 7/14 NGTD  - Despite listed penicillin allergy, tolerating cefazolin- c/w cefazolin 1 g IV every day (even on non HD days) and after dialysis on her dialysis days  - NM scan suspicious for AVG infection. In the absence of any other source I believe this is the source of her MSSA bacteremia and AVG explant would assist in source control. If not removed she will remain at risk for recurrence of infection in addition to adverse effects of long term antibiotics  - Recommend removal and temporary HD catheter placement-per vascular recommend conservative medical management for now  - Renal f/u RE HD schedule-she is currently on twice weekly HD and outpatient dosing for cefazolin with HD will require that she be on thrice weekly schedule. We can then dose cefazolin 2g AD on Monday, 2 g AD on Wed, 3 g AD on Fri to complete 6 weeks through 8/25/19  - SANFORD for endocarditis/lead vegetations (-)  - Trend Fever  - Trend WBC count    Discussed with Dr Davila and Dr Smith  Will follow

## 2019-07-18 NOTE — PROGRESS NOTE ADULT - ASSESSMENT
87yoF hx ESRD on HD (M and F only), CAD s/p CABG, HTN, DM, PAD, hypothyroidism presenting with generalized weakness found to have UTI and meeting sepsis criteria, blood c/s positive for MSSA, source unknown, s/p indium scan positive for R AV graft infection, vascular surgery consult requested. Pt is s/p RRT on 07/17 due to AMS, afebrile, normal vitals,   repeat CT head no acute finding, noted to have Hg of 6.9, s/p 1 unit prbc, H&h improved    A/P    #Sepsis / Staph aureus bacteremia due to R arm AV graft infection  vascular surgery consult appreciated -  WBC resolving,  Afebrile, BCx negative  Will need suppressive abx long term if we continue the conservative route  If signs of worsening on non-resolving infection will need AVG explant  OK to use AVG for HD  repeat blood c/s negative so far  ID consult appreciated -  We can then dose cefazolin 2g AD on Monday, 2 g AD on Wed, 3 g AD on Fri to complete 6 weeks through 8/25/19  s/p indium scan positive for R AV graft infection,  SANFORD for endocarditis/lead vegetations (-)    #Metabolic Encephalopathy - improved today, stop Gabapentin  repeat  CT head no acute finding, On ASA, Statin.     #CAd with elevated troponin - T flat   cardiology input appreciated - recommend repeat TTE  s/p SANFORD LVEF 30-35%  c/w home medication    #Chronic anemia - Hg 6.9 - HD RN report bleeding from AV graft yesterday   s/p 1 unit prbc - h&h improved  f/uc bc    #Leg Pain, Neuropathy - per son, findings are chronic.  stop Neurontin as pt was sleepy yesterday - per son she is very sensitive to gabapentin, she was on oxycodone 5mg in past - tolerated    #Thrombocytopenia - Plt improved.  Likely due to bacteremia.  Hematology input appreciated.     #CAD/Hx CVA- ASA, plavix, statin resumed.     #HTN- Stable, monitor BP     #ESRD on HD Monday and Friday-  Renal consulted.  Calcitriol 0.25 mcg and calcium acetate 667 mg 1 po TID w meals.    #Hypothyroid- Continue [id number] mcg.    #Prophylactic measure- heparin.     GOC - discussed with son, updated about pt current condition,  wishes full code, MOL[facility] form completed

## 2019-07-18 NOTE — PROGRESS NOTE ADULT - SUBJECTIVE AND OBJECTIVE BOX
[facility]  INFECTIOUS DISEASES AND INTERNAL MEDICINE at [place]  =======================================================  Paresh Stark MD  Diplomates American Board of Internal Medicine and Infectious Diseases  Tel: [phone]      Fax: [phone]  =======================================================    LAUREN ROBERSON [id number]    Follow up: MSSA bacteremia 2/2 AVG infection. More alert today c/o foot pain and rectal pain. Had RRT for AMS, found to have low Hb and was transfused    Allergies:  penicillin (Anaphylaxis)  seafood (Anaphylaxis)  shellfish (Anaphylaxis)      Medications:  acetaminophen   Tablet .. 650 milliGRAM(s) Oral every 6 hours PRN  aspirin  chewable 81 milliGRAM(s) Oral daily  atorvastatin 40 milliGRAM(s) Oral at bedtime  calcitriol   Capsule 0.25 MICROGram(s) Oral daily  calcium acetate 667 milliGRAM(s) Oral three times a day with meals  ceFAZolin   IVPB      ceFAZolin   IVPB 1000 milliGRAM(s) IV Intermittent every 24 hours  chlorhexidine 2% Cloths 1 Application(s) Topical <User Schedule>  clopidogrel Tablet 75 milliGRAM(s) Oral daily  dextrose 40% Gel 15 Gram(s) Oral once PRN  dextrose 5%. 1000 milliLiter(s) IV Continuous <Continuous>  dextrose 50% Injectable 12.5 Gram(s) IV Push once  dextrose 50% Injectable 25 Gram(s) IV Push once  dextrose 50% Injectable 25 Gram(s) IV Push once  epoetin barb Injectable 63683 Unit(s) IV Push <User Schedule>  folic acid 1 milliGRAM(s) Oral daily  glucagon  Injectable 1 milliGRAM(s) IntraMuscular once PRN  insulin lispro (HumaLOG) corrective regimen sliding scale   SubCutaneous three times a day before meals  insulin lispro (HumaLOG) corrective regimen sliding scale   SubCutaneous at bedtime  levothyroxine 112 MICROGram(s) Oral daily  lidocaine   Patch 1 Patch Transdermal daily  pantoprazole    Tablet 40 milliGRAM(s) Oral before breakfast  saccharomyces boulardii 250 milliGRAM(s) Oral two times a day            REVIEW OF SYSTEMS:  CONSTITUTIONAL:  No Fever or chills  HEENT:   No diplopia or blurred vision.  No earache, sore throat or runny nose.  CARDIOVASCULAR:  No pressure, squeezing, strangling, tightness, heaviness or aching about the chest, neck, axilla or epigastrium.  RESPIRATORY:  No cough, shortness of breath  GASTROINTESTINAL:  No nausea, vomiting or diarrhea.  GENITOURINARY:  No dysuria, frequency or urgency. No Blood in urine  MUSCULOSKELETAL:  no joint aches, no muscle pain  SKIN:  No change in skin, hair or nails.  NEUROLOGIC:  No Headaches, seizures or weakness.  PSYCHIATRIC:  No disorder of thought or mood.  ENDOCRINE:  No heat or cold intolerance  HEMATOLOGICAL:  No easy bruising or bleeding.            Physical Exam:  ICU Vital Signs Last 24 Hrs  T(C): 36.4 (18 Jul 2019 11:57), Max: 36.4 (17 Jul 2019 15:05)  T(F): 97.6 (18 Jul 2019 11:57), Max: 97.6 (17 Jul 2019 15:05)  HR: 63 (18 Jul 2019 11:57) (63 - 77)  BP: 176/67 (18 Jul 2019 11:57) (101/46 - 176/68)  BP(mean): --  ABP: --  ABP(mean): --  RR: 18 (18 Jul 2019 11:57) (18 - 20)  SpO2: 100% (18 Jul 2019 11:57) (94% - 100%)      GEN: NAD, pleasant axox2  HEENT: normocephalic and atraumatic. EOMI. PERRL.  Anicteric   NECK: Supple.   LUNGS: Clear to auscultation.  HEART: Regular rate and rhythm without murmur. +PPM  ABDOMEN: Soft, nontender, and nondistended.  Positive bowel sounds.    : No CVA tenderness  EXTREMITIES: Without any edema. RUE AVG nontender no erythema  MSK: no joint swelling  NEUROLOGIC: Cranial nerves II through XII are grossly intact. No focal deficits  PSYCHIATRIC: Appropriate affect .  SKIN: No Rash      Labs:  07-18    135  |  96<L>  |  55.0<H>  ----------------------------<  114  5.2   |  23.0  |  5.14<H>    Ca    7.5<L>      18 Jul 2019 06:45    TPro  5.4<L>  /  Alb  2.3<L>  /  TBili  0.3<L>  /  DBili  x   /  AST  17  /  ALT  <5  /  AlkPhos  162<H>  07-17                          9.0    12.48 )-----------( 261      ( 18 Jul 2019 06:45 )             27.9           LIVER FUNCTIONS - ( 17 Jul 2019 09:19 )  Alb: 2.3 g/dL / Pro: 5.4 g/dL / ALK PHOS: 162 U/L / ALT: <5 U/L / AST: 17 U/L / GGT: x           CARDIAC MARKERS ( 18 Jul 2019 06:45 )  x     / 0.30 ng/mL / x     / x     / x      CARDIAC MARKERS ( 17 Jul 2019 09:19 )  x     / 0.32 ng/mL / x     / x     / x          CAPILLARY BLOOD GLUCOSE      POCT Blood Glucose.: 126 mg/dL (18 Jul 2019 08:19)  POCT Blood Glucose.: 202 mg/dL (17 Jul 2019 21:39)  POCT Blood Glucose.: 179 mg/dL (17 Jul 2019 21:10)  POCT Blood Glucose.: 114 mg/dL (17 Jul 2019 17:06)        RECENT CULTURES:  07-14 @ 11:53 .Blood     No growth at 48 hours        07-13 @ 10:36 .Blood Staphylococcus aureus    Growth in aerobic and anaerobic bottles: Staphylococcus aureus  Aerobic Bottle: 15:06 Hours to positivity  Anaerobic Bottle: 19:45 Hours to positivity  ,  TYPE: (C=Critical, N=Notification, A=Abnormal) c  TESTS:  _ gs  DATE/TIME CALLED: _ 07/14/2019 09:52:46  CALLED TO: Kellee bryant rn  READ BACK (2 Patient Identifiers)(Y/N): _ y  READ BACK VALUES (Y/N): _ y  CALLED BY: Kellee wetzel        07-09 @ 09:41 .Blood Staphylococcus aureus    Growth in aerobic bottle: Staphylococcus aureus  Aerobic Bottle: 17:20 Hours to positivity  .  TYPE: (C=Critical, N=Notification, A=Abnormal) C  TESTS:  _ BLD GS  DATE/TIME CALLED: _ 07/10/2019 09:54:05  CALLED TO: Kellee BLANCO RN  READ BACK (2 Patient Identifiers)(Y/N): _ Y  READ BACK VALUES (Y/N): _ Y  CALLED BY: Kellee DANIEL        07-07 @ 20:25 .Blood Staphylococcus aureus    Growth in aerobic and anaerobic bottles: Staphylococcus aureus  Anaerobic Bottle: 2 days;05:44 Hours to positivity  Aerobic Bottle: 2 Days; 22.24 Hours to positivity  .  TYPE: (C=Critical, N=Notification, A=Abnormal) C  TESTS:  _ BLD GS  DATE/TIME CALLED: _ 07/10/2019 09:51:08  CALLED TO: _ DAVID BLANCO RN  READ BACK (2 Patient Identifiers)(Y/N): _ Y  READ BACK VALUES (Y/N): _ Y  CALLED BY: Kellee DANIEL        07-07 @ 12:47 .Blood Staphylococcus aureus    Growth in aerobic and anaerobic bottles: Staphylococcus aureus  Aerobic Bottle: 2 days; 01:03 Hours to positivity  Anaerobic Bottle: 2 days;22:09 Hours to positivity  .  TYPE: (C=Critical, N=Notification, A=Abnormal) C  TESTS:  _ BLD GS  DATE/TIME CALLED: _ 07/09/2019 14:36:17  CALLED TO: _ JOLIE VILLELA RN  READ BACK (2 Patient Identifiers)(Y/N): _ Y  READ BACK VALUES (Y/N): _ Y  CALLED BY: Kellee DANIEL        07-06 @ 07:27 .Blood Staphylococcus aureus    Growth in aerobic and anaerobic bottles: Staphylococcus aureus  Aerobic Bottle: 12:38 Hours to positivity  Anaerobic Bottle: 14:29 Hours to positivity  .  TYPE: (C=Critical, N=Notification, A=Abnormal) C  TESTS:  _ Positive Blood GS  DATE/TIME CALLED: _ 07/07/2019 10:40  CALLED TO: _ ModeTWR: Lary Zamudio RN  READ BACK (2 Patient Identifiers)(Y/N): _ Y  READ BACK VALUES (Y/N): _ Y  CALLED BY: Kellee marion        07-05 @ 10:46 .Blood Staphylococcus aureus    Growth in aerobic and anaerobic bottles: Staphylococcus aureus  Aerobic Bottle: 12.08 Hours to positivity  Anaerobic Bottle: 12.18 Hours to positivity  .  TYPE: (C=Critical, N=Notification, A=Abnormal) C  TESTS:  _ GS  DATE/TIME CALLED: _ 07/06/2019 09:39:33  CALLED TO: Kellee Chery RN  READ BACK (2 Patient Identifiers)(Y/N): _ Y  READ BACK VALUES (Y/N): _ Y  CALLED BY: Kellee Field        07-04 @ 14:35 .Urine Escherichia coli    >100,000 CFU/ml Escherichia coli        07-04 @ 13:32 .Blood Staphylococcus aureus  Blood Culture PCR    Growth in aerobic and anaerobic bottles: Staphylococcus aureus  Aerobic Bottle: 9.51 Hours to positivity  Anaerobic Bottle: 10.01 Hours to positivity  .  ***Blood Panel PCR results on this specimen are available  approximately 3 hours after the Gram stain result.***  Gram stain, PCR, and/or culture results may not always  correspond due to difference in methodologies.  ************************************************************  This PCR assay was performed using [employer].  The following targets are tested for: Enterococcus,  vancomycin resistant enterococci, Listeria monocytogenes,  coagulase negative staphylococci, S. aureus,  methicillin resistant S. aureus, Streptococcus agalactiae  (Group B), S. pneumoniae, S. pyogenes (GroupA),  Acinetobacter baumannii, Enterobacter cloacae, E. coli,  Klebsiella oxytoca, K. pneumoniae, Proteus sp.,  Serratia marcescens, Haemophilus influenzae,  Neisseria meningitidis, Pseudomonas aeruginosa, Candida  albicans, C. glabrata, C krusei, C parapsilosis,  C. tropicalis and the KPC resistance gene.  "Due to technical problems, Proteus sp. will Not be reported as part of  the BCID panel until further notice"  .  TYPE: (C=Critical, N=Notification, A=Abnormal) C  TESTS:  _ GS  DATE/TIME CALLED: _ 07/05/2019 09:14:52  CALLED TO: Kellee Mcpherson RN  READ BACK (2 Patient Identifiers)(Y/N): _ Y  READ BACK VALUES (Y/N): _ Y  CALLED BY: Kellee Field        07-04 @ 13:31 .Blood Staphylococcus aureus    Growth in aerobic and anaerobic bottles: Staphylococcus aureus  Aerobic Bottle: 8.51 Hours to positivity  Anaerobic Bottle: 9.41 Hours to positivity  .  TYPE: (C=Critical, N=Notification, A=Abnormal) C  TESTS:  _ GS  DATE/TIME CALLED: _ 07/05/2019 09:17:54  CALLED TO: Kellee Mcpherson RN  READ BACK (2 Patient Identifiers)(Y/N): _ Y  READ BACK VALUES (Y/N): _ Y  CALLED BY: _ Emir      < from: NM Inflammatory Loc Wholebody, WBC (07.16.19 @ 11:20) >  IMPRESSION: Abnormal combined Indium-111 labeled leukocyte study and   marrow scan.    Findings suspicious for right AV graft infection.    < end of copied text > [facility]  INFECTIOUS DISEASES AND INTERNAL MEDICINE at [place]  =======================================================  Paresh Stark MD  Diplomates American Board of Internal Medicine and Infectious Diseases  Tel: [phone]      Fax: [phone]  =======================================================    LAUREN ROBERSON [id number]    Follow up: MSSA bacteremia 2/2 AVG infection. More alert today c/o foot pain and rectal pain. Had RRT for AMS, found to have low Hb and was transfused. AVG explant was planned for yesterday but cancelled due to AMS and RRT    Allergies:  penicillin (Anaphylaxis)  seafood (Anaphylaxis)  shellfish (Anaphylaxis)      Medications:  acetaminophen   Tablet .. 650 milliGRAM(s) Oral every 6 hours PRN  aspirin  chewable 81 milliGRAM(s) Oral daily  atorvastatin 40 milliGRAM(s) Oral at bedtime  calcitriol   Capsule 0.25 MICROGram(s) Oral daily  calcium acetate 667 milliGRAM(s) Oral three times a day with meals  ceFAZolin   IVPB      ceFAZolin   IVPB 1000 milliGRAM(s) IV Intermittent every 24 hours  chlorhexidine 2% Cloths 1 Application(s) Topical <User Schedule>  clopidogrel Tablet 75 milliGRAM(s) Oral daily  dextrose 40% Gel 15 Gram(s) Oral once PRN  dextrose 5%. 1000 milliLiter(s) IV Continuous <Continuous>  dextrose 50% Injectable 12.5 Gram(s) IV Push once  dextrose 50% Injectable 25 Gram(s) IV Push once  dextrose 50% Injectable 25 Gram(s) IV Push once  epoetin barb Injectable 27318 Unit(s) IV Push <User Schedule>  folic acid 1 milliGRAM(s) Oral daily  glucagon  Injectable 1 milliGRAM(s) IntraMuscular once PRN  insulin lispro (HumaLOG) corrective regimen sliding scale   SubCutaneous three times a day before meals  insulin lispro (HumaLOG) corrective regimen sliding scale   SubCutaneous at bedtime  levothyroxine 112 MICROGram(s) Oral daily  lidocaine   Patch 1 Patch Transdermal daily  pantoprazole    Tablet 40 milliGRAM(s) Oral before breakfast  saccharomyces boulardii 250 milliGRAM(s) Oral two times a day            REVIEW OF SYSTEMS:  CONSTITUTIONAL:  No Fever or chills  HEENT:   No diplopia or blurred vision.  No earache, sore throat or runny nose.  CARDIOVASCULAR:  No pressure, squeezing, strangling, tightness, heaviness or aching about the chest, neck, axilla or epigastrium.  RESPIRATORY:  No cough, shortness of breath  GASTROINTESTINAL:  No nausea, vomiting or diarrhea.  GENITOURINARY:  No dysuria, frequency or urgency. No Blood in urine  MUSCULOSKELETAL:  no joint aches, no muscle pain  SKIN:  No change in skin, hair or nails.  NEUROLOGIC:  No Headaches, seizures or weakness.  PSYCHIATRIC:  No disorder of thought or mood.  ENDOCRINE:  No heat or cold intolerance  HEMATOLOGICAL:  No easy bruising or bleeding.            Physical Exam:  ICU Vital Signs Last 24 Hrs  T(C): 36.4 (18 Jul 2019 11:57), Max: 36.4 (17 Jul 2019 15:05)  T(F): 97.6 (18 Jul 2019 11:57), Max: 97.6 (17 Jul 2019 15:05)  HR: 63 (18 Jul 2019 11:57) (63 - 77)  BP: 176/67 (18 Jul 2019 11:57) (101/46 - 176/68)  BP(mean): --  ABP: --  ABP(mean): --  RR: 18 (18 Jul 2019 11:57) (18 - 20)  SpO2: 100% (18 Jul 2019 11:57) (94% - 100%)      GEN: NAD, pleasant axox2  HEENT: normocephalic and atraumatic. EOMI. PERRL.  Anicteric   NECK: Supple.   LUNGS: Clear to auscultation.  HEART: Regular rate and rhythm without murmur. +PPM  ABDOMEN: Soft, nontender, and nondistended.  Positive bowel sounds.    : No CVA tenderness  EXTREMITIES: Without any edema. RUE AVG nontender no erythema  MSK: no joint swelling  NEUROLOGIC: Cranial nerves II through XII are grossly intact. No focal deficits  PSYCHIATRIC: Appropriate affect .  SKIN: No Rash      Labs:  07-18    135  |  96<L>  |  55.0<H>  ----------------------------<  114  5.2   |  23.0  |  5.14<H>    Ca    7.5<L>      18 Jul 2019 06:45    TPro  5.4<L>  /  Alb  2.3<L>  /  TBili  0.3<L>  /  DBili  x   /  AST  17  /  ALT  <5  /  AlkPhos  162<H>  07-17                          9.0    12.48 )-----------( 261      ( 18 Jul 2019 06:45 )             27.9           LIVER FUNCTIONS - ( 17 Jul 2019 09:19 )  Alb: 2.3 g/dL / Pro: 5.4 g/dL / ALK PHOS: 162 U/L / ALT: <5 U/L / AST: 17 U/L / GGT: x           CARDIAC MARKERS ( 18 Jul 2019 06:45 )  x     / 0.30 ng/mL / x     / x     / x      CARDIAC MARKERS ( 17 Jul 2019 09:19 )  x     / 0.32 ng/mL / x     / x     / x          CAPILLARY BLOOD GLUCOSE      POCT Blood Glucose.: 126 mg/dL (18 Jul 2019 08:19)  POCT Blood Glucose.: 202 mg/dL (17 Jul 2019 21:39)  POCT Blood Glucose.: 179 mg/dL (17 Jul 2019 21:10)  POCT Blood Glucose.: 114 mg/dL (17 Jul 2019 17:06)        RECENT CULTURES:  07-14 @ 11:53 .Blood     No growth at 48 hours        07-13 @ 10:36 .Blood Staphylococcus aureus    Growth in aerobic and anaerobic bottles: Staphylococcus aureus  Aerobic Bottle: 15:06 Hours to positivity  Anaerobic Bottle: 19:45 Hours to positivity  ,  TYPE: (C=Critical, N=Notification, A=Abnormal) c  TESTS:  _ gs  DATE/TIME CALLED: _ 07/14/2019 09:52:46  CALLED TO: Kellee bryant rn  READ BACK (2 Patient Identifiers)(Y/N): _ y  READ BACK VALUES (Y/N): _ y  CALLED BY: Kellee wetzel        07-09 @ 09:41 .Blood Staphylococcus aureus    Growth in aerobic bottle: Staphylococcus aureus  Aerobic Bottle: 17:20 Hours to positivity  .  TYPE: (C=Critical, N=Notification, A=Abnormal) C  TESTS:  _ BLD GS  DATE/TIME CALLED: _ 07/10/2019 09:54:05  CALLED TO: Kellee BLANCO RN  READ BACK (2 Patient Identifiers)(Y/N): _ Y  READ BACK VALUES (Y/N): _ Y  CALLED BY: Kellee DANIEL        07-07 @ 20:25 .Blood Staphylococcus aureus    Growth in aerobic and anaerobic bottles: Staphylococcus aureus  Anaerobic Bottle: 2 days;05:44 Hours to positivity  Aerobic Bottle: 2 Days; 22.24 Hours to positivity  .  TYPE: (C=Critical, N=Notification, A=Abnormal) C  TESTS:  _ BLD GS  DATE/TIME CALLED: _ 07/10/2019 09:51:08  CALLED TO: Kellee BLANCO RN  READ BACK (2 Patient Identifiers)(Y/N): _ Y  READ BACK VALUES (Y/N): _ Y  CALLED BY: Kellee DANIEL        07-07 @ 12:47 .Blood Staphylococcus aureus    Growth in aerobic and anaerobic bottles: Staphylococcus aureus  Aerobic Bottle: 2 days; 01:03 Hours to positivity  Anaerobic Bottle: 2 days;22:09 Hours to positivity  .  TYPE: (C=Critical, N=Notification, A=Abnormal) C  TESTS:  _ BLD GS  DATE/TIME CALLED: _ 07/09/2019 14:36:17  CALLED TO: Kellee VILLELA RN  READ BACK (2 Patient Identifiers)(Y/N): _ Y  READ BACK VALUES (Y/N): _ Y  CALLED BY: Kellee DANIEL        07-06 @ 07:27 .Blood Staphylococcus aureus    Growth in aerobic and anaerobic bottles: Staphylococcus aureus  Aerobic Bottle: 12:38 Hours to positivity  Anaerobic Bottle: 14:29 Hours to positivity  .  TYPE: (C=Critical, N=Notification, A=Abnormal) C  TESTS:  _ Positive Blood GS  DATE/TIME CALLED: _ 07/07/2019 10:40  CALLED TO: _ JOSHUA: Lary Zamudio RN  READ BACK (2 Patient Identifiers)(Y/N): _ Y  READ BACK VALUES (Y/N): _ Y  CALLED BY: Kellee marion        07-05 @ 10:46 .Blood Staphylococcus aureus    Growth in aerobic and anaerobic bottles: Staphylococcus aureus  Aerobic Bottle: 12.08 Hours to positivity  Anaerobic Bottle: 12.18 Hours to positivity  .  TYPE: (C=Critical, N=Notification, A=Abnormal) C  TESTS:  _ GS  DATE/TIME CALLED: _ 07/06/2019 09:39:33  CALLED TO: Kellee Chery RN  READ BACK (2 Patient Identifiers)(Y/N): _ Y  READ BACK VALUES (Y/N): _ Y  CALLED BY: Kellee Field        07-04 @ 14:35 .Urine Escherichia coli    >100,000 CFU/ml Escherichia coli        07-04 @ 13:32 .Blood Staphylococcus aureus  Blood Culture PCR    Growth in aerobic and anaerobic bottles: Staphylococcus aureus  Aerobic Bottle: 9.51 Hours to positivity  Anaerobic Bottle: 10.01 Hours to positivity  .  ***Blood Panel PCR results on this specimen are available  approximately 3 hours after the Gram stain result.***  Gram stain, PCR, and/or culture results may not always  correspond due to difference in methodologies.  ************************************************************  This PCR assay was performed using [employer].  The following targets are tested for: Enterococcus,  vancomycin resistant enterococci, Listeria monocytogenes,  coagulase negative staphylococci, S. aureus,  methicillin resistant S. aureus, Streptococcus agalactiae  (Group B), S. pneumoniae, S. pyogenes (GroupA),  Acinetobacter baumannii, Enterobacter cloacae, E. coli,  Klebsiella oxytoca, K. pneumoniae, Proteus sp.,  Serratia marcescens, Haemophilus influenzae,  Neisseria meningitidis, Pseudomonas aeruginosa, Candida  albicans, C. glabrata, C krusei, C parapsilosis,  C. tropicalis and the KPC resistance gene.  "Due to technical problems, Proteus sp. will Not be reported as part of  the BCID panel until further notice"  .  TYPE: (C=Critical, N=Notification, A=Abnormal) C  TESTS:  _ GS  DATE/TIME CALLED: _ 07/05/2019 09:14:52  CALLED TO: Kellee Mcpherson RN  READ BACK (2 Patient Identifiers)(Y/N): _ Y  READ BACK VALUES (Y/N): _ Y  CALLED BY: Kellee Field        07-04 @ 13:31 .Blood Staphylococcus aureus    Growth in aerobic and anaerobic bottles: Staphylococcus aureus  Aerobic Bottle: 8.51 Hours to positivity  Anaerobic Bottle: 9.41 Hours to positivity  .  TYPE: (C=Critical, N=Notification, A=Abnormal) C  TESTS:  _ GS  DATE/TIME CALLED: _ 07/05/2019 09:17:54  CALLED TO: Kellee Mcpherson RN  READ BACK (2 Patient Identifiers)(Y/N): _ Y  READ BACK VALUES (Y/N): _ Y  CALLED BY: Kellee Field      < from: NM Inflammatory Loc Wholebody, WBC (07.16.19 @ 11:20) >  IMPRESSION: Abnormal combined Indium-111 labeled leukocyte study and   marrow scan.    Findings suspicious for right AV graft infection.    < end of copied text >

## 2019-07-18 NOTE — PROGRESS NOTE ADULT - SUBJECTIVE AND OBJECTIVE BOX
CARDIOLOGY PROGRESS NOTE   ([facility])                                                                                                        Subjective: alert, awake nad, denied cp,     Vitals:  T(C): 36.4 (07-18-19 @ 06:02), Max: 37 (07-17-19 @ 09:53)  HR: 69 (07-18-19 @ 06:02) (66 - 77)  BP: 164/72 (07-18-19 @ 06:02) (101/46 - 176/68)  RR: 18 (07-18-19 @ 06:02) (17 - 20)  SpO2: 99% (07-18-19 @ 06:02) (94% - 100%)  Wt(kg): --  I&O's Summary      Weight (kg): 57.6 (07-17 @ 12:09)    PHYSICAL EXAM:  Appearance: Normal	  HEENT:   Atraumatic  Cardiovascular: Normal S1 S2, No JVD, No murmurs, No edema  Respiratory: Lungs clear to auscultation	  Gastrointestinal:  Soft, Non-tender, + BS	  Skin: No rashes, No ecchymoses, No cyanosis  Neurologic: Alert and awake, able to move extremities  Extremities: No edema    TELEMETRY: 	SR        CURRENT MEDICATIONS:    ceFAZolin   IVPB      ceFAZolin   IVPB 1000 milliGRAM(s) IV Intermittent every 24 hours      acetaminophen   Tablet .. 650 milliGRAM(s) Oral every 6 hours PRN    pantoprazole    Tablet 40 milliGRAM(s) Oral before breakfast    atorvastatin 40 milliGRAM(s) Oral at bedtime  dextrose 40% Gel 15 Gram(s) Oral once PRN  dextrose 50% Injectable 12.5 Gram(s) IV Push once  dextrose 50% Injectable 25 Gram(s) IV Push once  dextrose 50% Injectable 25 Gram(s) IV Push once  glucagon  Injectable 1 milliGRAM(s) IntraMuscular once PRN  insulin lispro (HumaLOG) corrective regimen sliding scale   SubCutaneous three times a day before meals  insulin lispro (HumaLOG) corrective regimen sliding scale   SubCutaneous at bedtime  levothyroxine 112 MICROGram(s) Oral daily    aspirin  chewable 81 milliGRAM(s) Oral daily  calcitriol   Capsule 0.25 MICROGram(s) Oral daily  calcium acetate 667 milliGRAM(s) Oral three times a day with meals  chlorhexidine 2% Cloths 1 Application(s) Topical <User Schedule>  clopidogrel Tablet 75 milliGRAM(s) Oral daily  dextrose 5%. 1000 milliLiter(s) IV Continuous <Continuous>  epoetin barb Injectable 34992 Unit(s) IV Push <User Schedule>  folic acid 1 milliGRAM(s) Oral daily  lidocaine   Patch 1 Patch Transdermal daily      LABS:	 	                            9.0    12.48 )-----------( 261      ( 18 Jul 2019 06:45 )             27.9     07-18    135  |  96<L>  |  55.0<H>  ----------------------------<  114  5.2   |  23.0  |  5.14<H>    Ca    7.5<L>      18 Jul 2019 06:45    TPro  5.4<L>  /  Alb  2.3<L>  /  TBili  0.3<L>  /  DBili  x   /  AST  17  /  ALT  <5  /  AlkPhos  162<H>  07-17

## 2019-07-18 NOTE — PROGRESS NOTE ADULT - SUBJECTIVE AND OBJECTIVE BOX
NEPHROLOGY INTERVAL HPI/OVERNIGHT EVENTS:    seen earlier   more alert off Neurontin   Vascular and ID  follow up noted     MEDICATIONS  (STANDING):  aspirin  chewable 81 milliGRAM(s) Oral daily  atorvastatin 40 milliGRAM(s) Oral at bedtime  calcitriol   Capsule 0.25 MICROGram(s) Oral daily  calcium acetate 667 milliGRAM(s) Oral three times a day with meals  ceFAZolin   IVPB      ceFAZolin   IVPB 1000 milliGRAM(s) IV Intermittent every 24 hours  chlorhexidine 2% Cloths 1 Application(s) Topical <User Schedule>  clopidogrel Tablet 75 milliGRAM(s) Oral daily  dextrose 5%. 1000 milliLiter(s) (50 mL/Hr) IV Continuous <Continuous>  dextrose 50% Injectable 12.5 Gram(s) IV Push once  dextrose 50% Injectable 25 Gram(s) IV Push once  dextrose 50% Injectable 25 Gram(s) IV Push once  epoetin barb Injectable 76356 Unit(s) IV Push <User Schedule>  folic acid 1 milliGRAM(s) Oral daily  insulin lispro (HumaLOG) corrective regimen sliding scale   SubCutaneous three times a day before meals  insulin lispro (HumaLOG) corrective regimen sliding scale   SubCutaneous at bedtime  levothyroxine 112 MICROGram(s) Oral daily  lidocaine   Patch 1 Patch Transdermal daily  pantoprazole    Tablet 40 milliGRAM(s) Oral before breakfast  saccharomyces boulardii 250 milliGRAM(s) Oral two times a day    MEDICATIONS  (PRN):  acetaminophen   Tablet .. 650 milliGRAM(s) Oral every 6 hours PRN Temp greater or equal to 38C (100.4F), Mild Pain (1 - 3)  dextrose 40% Gel 15 Gram(s) Oral once PRN Blood Glucose LESS THAN 70 milliGRAM(s)/deciliter  glucagon  Injectable 1 milliGRAM(s) IntraMuscular once PRN Glucose LESS THAN 70 milligrams/deciliter  oxyCODONE    IR 5 milliGRAM(s) Oral every 6 hours PRN Severe Pain (7 - 10)      Allergies    penicillin (Anaphylaxis)  seafood (Anaphylaxis)  shellfish (Anaphylaxis)    Intolerances        Vital Signs Last 24 Hrs  T(C): 36.4 (2019 15:45), Max: 36.4 (2019 18:03)  T(F): 97.6 (2019 15:45), Max: 97.6 (2019 21:35)  HR: 64 (2019 15:45) (63 - 77)  BP: 152/56 (2019 15:45) (101/46 - 176/68)  BP(mean): --  RR: 18 (2019 15:45) (18 - 20)  SpO2: 100% (2019 15:45) (94% - 100%)  Daily     Daily Weight in k.1 (2019 15:45)  I&O's Detail    2019 07:  -  2019 16:31  --------------------------------------------------------  IN:    Oral Fluid: 120 mL  Total IN: 120 mL    OUT:    Other: 400 mL  Total OUT: 400 mL    Total NET: -280 mL        I&O's Summary    2019 07:01  -  2019 16:31  --------------------------------------------------------  IN: 120 mL / OUT: 400 mL / NET: -280 mL        PHYSICAL EXAM:    Appears chronically ill  NECK: Supple, no jvd  CHEST/LUNG: diminished BS at bases  HEART: Regular rate and rhythm; No rub   ABDOMEN: Soft, Nontender, Nondistended; Bowel sounds present  EXTREMITIES:  + edema less      LABS:                        9.0    12.48 )-----------( 261      ( 2019 06:45 )             27.9     07-18    135  |  96<L>  |  55.0<H>  ----------------------------<  114  5.2   |  23.0  |  5.14<H>    Ca    7.5<L>      2019 06:45    TPro  5.4<L>  /  Alb  2.3<L>  /  TBili  0.3<L>  /  DBili  x   /  AST  17  /  ALT  <5  /  AlkPhos  162<H>  17                RADIOLOGY & ADDITIONAL TESTS:

## 2019-07-18 NOTE — PROGRESS NOTE ADULT - SUBJECTIVE AND OBJECTIVE BOX
Pt seen and examined. No acute events. Pt still sleepy.     Vital Signs Last 24 Hrs  T(C): 36.4 (18 Jul 2019 06:02), Max: 37 (17 Jul 2019 09:53)  T(F): 97.6 (18 Jul 2019 06:02), Max: 98.6 (17 Jul 2019 09:53)  HR: 69 (18 Jul 2019 06:02) (66 - 77)  BP: 164/72 (18 Jul 2019 06:02) (101/46 - 176/68)  BP(mean): --  RR: 18 (18 Jul 2019 06:02) (17 - 20)  SpO2: 99% (18 Jul 2019 06:02) (94% - 100%)    RUE AVF with minimal erythema over the proximal porion, no fluctuance    A/P Stable  - WBC resolving  - Afebrile  - BCx negative  - Will need suppressive abx long term if we continue the conservative route  - If signs of worsening on non-resolving infection will need AVG explant  - OK to use AVG for HD  - Will follow

## 2019-07-18 NOTE — PROGRESS NOTE ADULT - SUBJECTIVE AND OBJECTIVE BOX
Internal Medicine Hospitalist - Dr. Giovanni ROBERSON    [id number]    87y      Female    Patient is a 87y old  Female who presents with a chief complaint of weakness, sepsis 2/2 UTI (18 Jul 2019 12:20)    INTERVAL HPI/ OVERNIGHT EVENTS: Patient is seen and examined, much more awake today, report back pain, denied chest pain, SOB, abd. pain, nausea and vomiting    REVIEW OF SYSTEMS:    Denied fever, chills, abd. pain, nausea, vomiting, chest pain, SOB, headache, dizziness    PHYSICAL EXAM:    Vital Signs Last 24 Hrs  T(C): 36.4 (18 Jul 2019 11:57), Max: 36.4 (17 Jul 2019 15:05)  T(F): 97.6 (18 Jul 2019 11:57), Max: 97.6 (17 Jul 2019 15:05)  HR: 63 (18 Jul 2019 11:57) (63 - 77)  BP: 176/67 (18 Jul 2019 11:57) (101/46 - 176/68)  BP(mean): --  RR: 18 (18 Jul 2019 11:57) (18 - 20)  SpO2: 100% (18 Jul 2019 11:57) (94% - 100%)    GENERAL: NAD  CHEST/LUNG: CTA b/l   HEART: S1S2+ audible  ABDOMEN: Soft, Nontender, Nondistended; Bowel sounds present  EXTREMITIES:  no edema  CNS: Awake, oriented to person and place  Psychiatry: normal mood    LABS:                        9.0    12.48 )-----------( 261      ( 18 Jul 2019 06:45 )             27.9     07-18    135  |  96<L>  |  55.0<H>  ----------------------------<  114  5.2   |  23.0  |  5.14<H>    Ca    7.5<L>      18 Jul 2019 06:45    TPro  5.4<L>  /  Alb  2.3<L>  /  TBili  0.3<L>  /  DBili  x   /  AST  17  /  ALT  <5  /  AlkPhos  162<H>  07-17            MEDICATIONS  (STANDING):  aspirin  chewable 81 milliGRAM(s) Oral daily  atorvastatin 40 milliGRAM(s) Oral at bedtime  calcitriol   Capsule 0.25 MICROGram(s) Oral daily  calcium acetate 667 milliGRAM(s) Oral three times a day with meals  ceFAZolin   IVPB      ceFAZolin   IVPB 1000 milliGRAM(s) IV Intermittent every 24 hours  chlorhexidine 2% Cloths 1 Application(s) Topical <User Schedule>  clopidogrel Tablet 75 milliGRAM(s) Oral daily  dextrose 5%. 1000 milliLiter(s) (50 mL/Hr) IV Continuous <Continuous>  dextrose 50% Injectable 12.5 Gram(s) IV Push once  dextrose 50% Injectable 25 Gram(s) IV Push once  dextrose 50% Injectable 25 Gram(s) IV Push once  epoetin barb Injectable 09962 Unit(s) IV Push <User Schedule>  folic acid 1 milliGRAM(s) Oral daily  insulin lispro (HumaLOG) corrective regimen sliding scale   SubCutaneous three times a day before meals  insulin lispro (HumaLOG) corrective regimen sliding scale   SubCutaneous at bedtime  levothyroxine 112 MICROGram(s) Oral daily  lidocaine   Patch 1 Patch Transdermal daily  pantoprazole    Tablet 40 milliGRAM(s) Oral before breakfast  saccharomyces boulardii 250 milliGRAM(s) Oral two times a day    MEDICATIONS  (PRN):  acetaminophen   Tablet .. 650 milliGRAM(s) Oral every 6 hours PRN Temp greater or equal to 38C (100.4F), Mild Pain (1 - 3)  dextrose 40% Gel 15 Gram(s) Oral once PRN Blood Glucose LESS THAN 70 milliGRAM(s)/deciliter  glucagon  Injectable 1 milliGRAM(s) IntraMuscular once PRN Glucose LESS THAN 70 milligrams/deciliter      RADIOLOGY & ADDITIONAL TEST

## 2019-07-19 ENCOUNTER — TRANSCRIPTION ENCOUNTER (OUTPATIENT)
Age: 84
End: 2019-07-19

## 2019-07-19 DIAGNOSIS — I20.9 ANGINA PECTORIS, UNSPECIFIED: ICD-10-CM

## 2019-07-19 LAB
CK SERPL-CCNC: 24 U/L — LOW (ref 25–170)
CULTURE RESULTS: SIGNIFICANT CHANGE UP
CULTURE RESULTS: SIGNIFICANT CHANGE UP
GLUCOSE BLDC GLUCOMTR-MCNC: 120 MG/DL — HIGH (ref 70–99)
GLUCOSE BLDC GLUCOMTR-MCNC: 121 MG/DL — HIGH (ref 70–99)
GLUCOSE BLDC GLUCOMTR-MCNC: 126 MG/DL — HIGH (ref 70–99)
GLUCOSE BLDC GLUCOMTR-MCNC: 130 MG/DL — HIGH (ref 70–99)
SPECIMEN SOURCE: SIGNIFICANT CHANGE UP
SPECIMEN SOURCE: SIGNIFICANT CHANGE UP
TROPONIN T SERPL-MCNC: 0.24 NG/ML — HIGH (ref 0–0.06)

## 2019-07-19 PROCEDURE — 99233 SBSQ HOSP IP/OBS HIGH 50: CPT

## 2019-07-19 PROCEDURE — 93010 ELECTROCARDIOGRAM REPORT: CPT

## 2019-07-19 PROCEDURE — 99232 SBSQ HOSP IP/OBS MODERATE 35: CPT

## 2019-07-19 RX ORDER — CARVEDILOL PHOSPHATE 80 MG/1
3.12 CAPSULE, EXTENDED RELEASE ORAL ONCE
Refills: 0 | Status: COMPLETED | OUTPATIENT
Start: 2019-07-19 | End: 2019-07-19

## 2019-07-19 RX ORDER — CARVEDILOL PHOSPHATE 80 MG/1
3.12 CAPSULE, EXTENDED RELEASE ORAL EVERY 12 HOURS
Refills: 0 | Status: DISCONTINUED | OUTPATIENT
Start: 2019-07-19 | End: 2019-07-20

## 2019-07-19 RX ORDER — LACTULOSE 10 G/15ML
20 SOLUTION ORAL ONCE
Refills: 0 | Status: COMPLETED | OUTPATIENT
Start: 2019-07-19 | End: 2019-07-19

## 2019-07-19 RX ORDER — SENNA PLUS 8.6 MG/1
2 TABLET ORAL AT BEDTIME
Refills: 0 | Status: DISCONTINUED | OUTPATIENT
Start: 2019-07-19 | End: 2019-07-24

## 2019-07-19 RX ORDER — DOCUSATE SODIUM 100 MG
100 CAPSULE ORAL THREE TIMES A DAY
Refills: 0 | Status: DISCONTINUED | OUTPATIENT
Start: 2019-07-19 | End: 2019-07-24

## 2019-07-19 RX ORDER — OXYCODONE HYDROCHLORIDE 5 MG/1
2.5 TABLET ORAL EVERY 6 HOURS
Refills: 0 | Status: DISCONTINUED | OUTPATIENT
Start: 2019-07-19 | End: 2019-07-21

## 2019-07-19 RX ORDER — POLYETHYLENE GLYCOL 3350 17 G/17G
17 POWDER, FOR SOLUTION ORAL DAILY
Refills: 0 | Status: DISCONTINUED | OUTPATIENT
Start: 2019-07-19 | End: 2019-07-24

## 2019-07-19 RX ADMIN — CALCITRIOL 0.25 MICROGRAM(S): 0.5 CAPSULE ORAL at 15:04

## 2019-07-19 RX ADMIN — Medication 667 MILLIGRAM(S): at 08:22

## 2019-07-19 RX ADMIN — Medication 667 MILLIGRAM(S): at 12:40

## 2019-07-19 RX ADMIN — Medication 650 MILLIGRAM(S): at 22:01

## 2019-07-19 RX ADMIN — Medication 100 MILLIGRAM(S): at 12:39

## 2019-07-19 RX ADMIN — PANTOPRAZOLE SODIUM 40 MILLIGRAM(S): 20 TABLET, DELAYED RELEASE ORAL at 05:41

## 2019-07-19 RX ADMIN — Medication 100 MILLIGRAM(S): at 15:04

## 2019-07-19 RX ADMIN — SENNA PLUS 2 TABLET(S): 8.6 TABLET ORAL at 21:51

## 2019-07-19 RX ADMIN — LIDOCAINE 1 PATCH: 4 CREAM TOPICAL at 19:00

## 2019-07-19 RX ADMIN — Medication 0.1 MILLIGRAM(S): at 17:26

## 2019-07-19 RX ADMIN — Medication 0.1 MILLIGRAM(S): at 12:40

## 2019-07-19 RX ADMIN — Medication 650 MILLIGRAM(S): at 20:30

## 2019-07-19 RX ADMIN — Medication 650 MILLIGRAM(S): at 07:16

## 2019-07-19 RX ADMIN — Medication 0.1 MILLIGRAM(S): at 05:41

## 2019-07-19 RX ADMIN — POLYETHYLENE GLYCOL 3350 17 GRAM(S): 17 POWDER, FOR SOLUTION ORAL at 12:40

## 2019-07-19 RX ADMIN — CLOPIDOGREL BISULFATE 75 MILLIGRAM(S): 75 TABLET, FILM COATED ORAL at 12:40

## 2019-07-19 RX ADMIN — OXYCODONE HYDROCHLORIDE 2.5 MILLIGRAM(S): 5 TABLET ORAL at 20:10

## 2019-07-19 RX ADMIN — ATORVASTATIN CALCIUM 40 MILLIGRAM(S): 80 TABLET, FILM COATED ORAL at 21:51

## 2019-07-19 RX ADMIN — Medication 1 MILLIGRAM(S): at 12:40

## 2019-07-19 RX ADMIN — LIDOCAINE 1 PATCH: 4 CREAM TOPICAL at 12:39

## 2019-07-19 RX ADMIN — Medication 250 MILLIGRAM(S): at 05:41

## 2019-07-19 RX ADMIN — OXYCODONE HYDROCHLORIDE 2.5 MILLIGRAM(S): 5 TABLET ORAL at 20:30

## 2019-07-19 RX ADMIN — Medication 650 MILLIGRAM(S): at 06:09

## 2019-07-19 RX ADMIN — Medication 81 MILLIGRAM(S): at 12:40

## 2019-07-19 RX ADMIN — CHLORHEXIDINE GLUCONATE 1 APPLICATION(S): 213 SOLUTION TOPICAL at 06:47

## 2019-07-19 RX ADMIN — Medication 112 MICROGRAM(S): at 05:41

## 2019-07-19 RX ADMIN — CARVEDILOL PHOSPHATE 3.12 MILLIGRAM(S): 80 CAPSULE, EXTENDED RELEASE ORAL at 21:50

## 2019-07-19 RX ADMIN — Medication 667 MILLIGRAM(S): at 17:25

## 2019-07-19 RX ADMIN — LACTULOSE 20 GRAM(S): 10 SOLUTION ORAL at 21:51

## 2019-07-19 RX ADMIN — Medication 250 MILLIGRAM(S): at 17:25

## 2019-07-19 RX ADMIN — Medication 100 MILLIGRAM(S): at 21:51

## 2019-07-19 RX ADMIN — Medication 0.1 MILLIGRAM(S): at 21:51

## 2019-07-19 NOTE — PROGRESS NOTE ADULT - ASSESSMENT
87yoF hx ESRD on HD (M and F only), CAD s/p CABG, HTN, DM, PAD, hypothyroidism presenting with generalized weakness found to have UTI and meeting sepsis criteria, blood c/s positive for MSSA, source unknown, s/p indium scan positive for R AV graft infection, vascular surgery consult requested. Pt is s/p RRT on 07/17 due to AMS, afebrile, normal vitals,   repeat CT head no acute finding, noted to have Hg of 6.9, s/p 1 unit prbc, H&h improved    A/P    #Sepsis / Staph aureus bacteremia due to R arm AV graft infection  vascular surgery consult appreciated -  WBC resolving,  Afebrile, BCx negative  Will need suppressive abx long term if we continue the conservative route  If signs of worsening on non-resolving infection will need AVG explant  OK to use AVG for HD  repeat blood c/s negative so far  ID consult appreciated -  We can then dose cefazolin 2g AD on tuesday, 2 g AD on thurs, 3 g AD on Saturday to complete 6 weeks through 8/25/19  s/p indium scan positive for R AV graft infection,  SANFORD for endocarditis/lead vegetations (-)    #Metabolic Encephalopathy - improved today, stop Gabapentin  repeat  CT head no acute finding, On ASA, Statin.     #CAd with elevated troponin - T flat   cardiology input appreciated - recommend repeat TTE  s/p SANFORD LVEF 30-35%  c/w home medication    #Chronic anemia - Hg 6.9 - HD RN report bleeding from AV graft yesterday   s/p 1 unit prbc - h&h improved  f/uc bc    #Leg Pain, Neuropathy - per son, findings are chronic.  stop Neurontin as pt was sleepy - per son she is very sensitive to gabapentin, okay with oxycodone 2.5mg - tolerated in past    #constipation - start miralex, colace and opal      #Thrombocytopenia - Plt improved.  Likely due to bacteremia.  Hematology input appreciated.     #CAD/Hx CVA- ASA, plavix, statin resumed.     #HTN- Stable, monitor BP     #ESRD on HD - renal on board    #Hypothyroid- Continue [id number] mcg.    #Prophylactic measure- heparin.     GOC - discussed with son, updated about pt current condition,  wishes full code, MOLSt form completed      disposition - pt will need MARK - Cc/Sw working on insurance auth

## 2019-07-19 NOTE — DISCHARGE NOTE PROVIDER - NSDCFUSCHEDAPPT_GEN_ALL_CORE_FT
LAUREN ROBERSON ; 10/18/2019 ; [facility] [address] LAUREN ROBERSON ; 10/18/2019 ; [facility] [address] LAUREN ROBERSON ; 10/18/2019 ; [facility] [address] LAUREN ROBERSON ; 10/18/2019 ; [facility] [address]

## 2019-07-19 NOTE — DISCHARGE NOTE PROVIDER - CARE PROVIDER_API CALL
Walt Garcia (DO)  Internal Medicine  [address] [facility], [place] [address]  Phone: [phone]  Fax: [phone]  Follow Up Time:     Moi Pearson)  Cardiovascular Disease  [address] [facility], [address]  [place] [address]  Phone: [phone]  Fax: [phone]  Follow Up Time:     Goyo Smith)  Surgery; Vascular Surgery  [address] [place] [address]  Phone: [phone]  Fax: [phone]  Follow Up Time:

## 2019-07-19 NOTE — DISCHARGE NOTE PROVIDER - NSDCCPCAREPLAN_GEN_ALL_CORE_FT
PRINCIPAL DISCHARGE DIAGNOSIS  Diagnosis: Sepsis, due to unspecified organism  Assessment and Plan of Treatment: due to infected AV graft      SECONDARY DISCHARGE DIAGNOSES  Diagnosis: Metabolic encephalopathy  Assessment and Plan of Treatment: back to baseline    Diagnosis: ESRD on dialysis  Assessment and Plan of Treatment: on HD  f/u nephrology    Diagnosis: CAD (coronary artery disease)  Assessment and Plan of Treatment: c/w home medication  f/u cardiology PRINCIPAL DISCHARGE DIAGNOSIS  Diagnosis: Acute metabolic encephalopathy  Assessment and Plan of Treatment: Continue with hospice care, comfort care.      SECONDARY DISCHARGE DIAGNOSES  Diagnosis: Type 2 diabetes mellitus  Assessment and Plan of Treatment:     Diagnosis: CAD (coronary artery disease)  Assessment and Plan of Treatment: Continue with comfort measures    Diagnosis: ESRD on dialysis  Assessment and Plan of Treatment: Continue with comfort measures    Diagnosis: Functional quadriplegia  Assessment and Plan of Treatment: Continue with comfort measures.    Diagnosis: Bacteremia  Assessment and Plan of Treatment: MSSA secondary to graft infection. Still with refractory fevers and functionals/ nutritionally declining. Continue with comfort measures.

## 2019-07-19 NOTE — DISCHARGE NOTE PROVIDER - HOSPITAL COURSE
87yoF hx ESRD on HD (M and F only), CAD s/p CABG, HTN, DM, PAD, hypothyroidism presenting with generalized weakness found to have UTI and meeting sepsis criteria, blood c/s positive for MSSA, source unknown, s/p indium scan positive for R AV graft infection, vascular surgery consult requested. Pt is s/p RRT on 07/17 due to AMS, afebrile, normal vitals,   repeat CT head no acute finding, noted to have Hg of 6.9, s/p 1 unit prbc, H&h improved        A/P        #Sepsis / Staph aureus bacteremia due to R arm AV graft infection    vascular surgery consult appreciated -  WBC resolving,  Afebrile, BCx negative    Will need suppressive abx long term if we continue the conservative route    If signs of worsening on non-resolving infection will need AVG explant    OK to use AVG for HD    repeat blood c/s negative so far    ID consult appreciated -  We can then dose cefazolin 2g AD on tuesday, 2 g AD on thurs, 3 g AD on Saturday to complete 6 weeks through 8/25/19    s/p indium scan positive for R AV graft infection,    SANFORD for endocarditis/lead vegetations (-)        #Metabolic Encephalopathy - improved today, stop Gabapentin    repeat  CT head no acute finding, On ASA, Statin.         #CAd with elevated troponin - T flat     cardiology input appreciated - recommend repeat TTE    s/p SANFORD LVEF 30-35%    c/w home medication        #Chronic anemia - Hg 6.9 - HD RN report bleeding from AV graft yesterday     s/p 1 unit prbc - h&h improved    f/uc bc        #Leg Pain, Neuropathy - per son, findings are chronic.    stop Neurontin as pt was sleepy - per son she is very sensitive to gabapentin, okay with oxycodone 2.5mg - tolerated in past        #constipation - start miralex, colace and opal            #Thrombocytopenia - Plt improved.  Likely due to bacteremia.  Hematology input appreciated.         #CAD/Hx CVA- ASA, plavix, statin resumed.         #HTN- Stable, monitor BP         #ESRD on HD - renal on board        #Hypothyroid- Continue [id number] mcg.        #Prophylactic measure- heparin.         GOC - discussed with son, updated about pt current condition,  wishes full code, MOLSt form completed

## 2019-07-19 NOTE — PROGRESS NOTE ADULT - SUBJECTIVE AND OBJECTIVE BOX
Patient is a 87y old  Female who presents with a chief complaint of weakness, sepsis 2/2 UTI (18 Jul 2019 16:30)      BRIEF HOSPITAL COURSE: 86 y/o pmhx of ESRD on HD (M, F), CAD s/p CABG, HTN, DM, PAD, hypothyroidism who presented on 7/4 with generalized weakness, fever, leukocytosis, UA + and blood cultures positive for MRSA. TTE negative for endocarditis. Indium scan + for R AV graft infection. Plan to treat medically unless fails to improve then will need extraction. Chronic pain on percocet.    Events last 24 hours: called to bedside for new onset chest pain. States no radiation. denies shortness of breath.     PAST MEDICAL & SURGICAL HISTORY:  Left bundle branch block  Osteoporosis  Diabetic neuropathy  Peripheral arterial disease  Spinal stenosis  Coronary artery disease  Chronic renal failure  Pacemaker: Remotemedical 2011  Hypothyroid  Hyperlipemia  Hypertension  Diabetes  S/P dialysis catheter insertion: R. arm  S/P CABG x 3  Artificial cardiac pacemaker      Review of Systems:  CONSTITUTIONAL: No fever, chills, or fatigue  EYES: No eye pain, visual disturbances, or discharge  ENMT:  No difficulty hearing, tinnitus, vertigo; No sinus or throat pain  NECK: No pain or stiffness  RESPIRATORY: No cough, wheezing, chills or hemoptysis; No shortness of breath  CARDIOVASCULAR: No chest pain, palpitations, dizziness, or leg swelling  GASTROINTESTINAL: No abdominal or epigastric pain. No nausea, vomiting, or hematemesis; No diarrhea or constipation. No melena or hematochezia.  GENITOURINARY: No dysuria, frequency, hematuria, or incontinence  NEUROLOGICAL: No headaches, memory loss, loss of strength, numbness, or tremors  SKIN: No itching, burning, rashes, or lesions   MUSCULOSKELETAL: No joint pain or swelling; No muscle, back, or extremity pain  PSYCHIATRIC: No depression, anxiety, mood swings, or difficulty sleeping      Medications:  ceFAZolin   IVPB      ceFAZolin   IVPB 1000 milliGRAM(s) IV Intermittent every 24 hours    cloNIDine 0.1 milliGRAM(s) Oral every 6 hours      acetaminophen   Tablet .. 650 milliGRAM(s) Oral every 6 hours PRN  oxyCODONE    IR 5 milliGRAM(s) Oral every 6 hours PRN      aspirin  chewable 81 milliGRAM(s) Oral daily  clopidogrel Tablet 75 milliGRAM(s) Oral daily    pantoprazole    Tablet 40 milliGRAM(s) Oral before breakfast      atorvastatin 40 milliGRAM(s) Oral at bedtime  dextrose 40% Gel 15 Gram(s) Oral once PRN  dextrose 50% Injectable 12.5 Gram(s) IV Push once  dextrose 50% Injectable 25 Gram(s) IV Push once  dextrose 50% Injectable 25 Gram(s) IV Push once  glucagon  Injectable 1 milliGRAM(s) IntraMuscular once PRN  insulin lispro (HumaLOG) corrective regimen sliding scale   SubCutaneous three times a day before meals  insulin lispro (HumaLOG) corrective regimen sliding scale   SubCutaneous at bedtime  levothyroxine 112 MICROGram(s) Oral daily    calcitriol   Capsule 0.25 MICROGram(s) Oral daily  calcium acetate 667 milliGRAM(s) Oral three times a day with meals  dextrose 5%. 1000 milliLiter(s) IV Continuous <Continuous>  folic acid 1 milliGRAM(s) Oral daily    epoetin barb Injectable 57480 Unit(s) IV Push <User Schedule>    chlorhexidine 2% Cloths 1 Application(s) Topical <User Schedule>  lidocaine   Patch 1 Patch Transdermal daily    saccharomyces boulardii 250 milliGRAM(s) Oral two times a day          ICU Vital Signs Last 24 Hrs  T(C): 36.5 (19 Jul 2019 04:40), Max: 36.5 (19 Jul 2019 04:40)  T(F): 97.7 (19 Jul 2019 04:40), Max: 97.7 (19 Jul 2019 04:40)  HR: 68 (19 Jul 2019 04:40) (63 - 73)  BP: 165/69 (19 Jul 2019 04:40) (122/60 - 176/67)  BP(mean): --  ABP: --  ABP(mean): --  RR: 20 (19 Jul 2019 04:40) (18 - 20)  SpO2: 100% (19 Jul 2019 04:40) (99% - 100%)          I&O's Detail    18 Jul 2019 07:01  -  19 Jul 2019 05:24  --------------------------------------------------------  IN:    Oral Fluid: 120 mL  Total IN: 120 mL    OUT:    Other: 400 mL  Total OUT: 400 mL    Total NET: -280 mL            LABS:                        9.0    12.48 )-----------( 261      ( 18 Jul 2019 06:45 )             27.9     07-18    135  |  96<L>  |  55.0<H>  ----------------------------<  114  5.2   |  23.0  |  5.14<H>    Ca    7.5<L>      18 Jul 2019 06:45    TPro  5.4<L>  /  Alb  2.3<L>  /  TBili  0.3<L>  /  DBili  x   /  AST  17  /  ALT  <5  /  AlkPhos  162<H>  07-17      CARDIAC MARKERS ( 18 Jul 2019 06:45 )  x     / 0.30 ng/mL / x     / x     / x      CARDIAC MARKERS ( 17 Jul 2019 09:19 )  x     / 0.32 ng/mL / x     / x     / x          CAPILLARY BLOOD GLUCOSE      POCT Blood Glucose.: 136 mg/dL (18 Jul 2019 22:01)        CULTURES:  Culture Results:   No growth at 48 hours (07-14 @ 11:53)  Culture Results:   No growth at 48 hours (07-14 @ 11:53)  Culture Results:   Growth in aerobic and anaerobic bottles: Staphylococcus aureus  Aerobic Bottle: 15:06 Hours to positivity  Anaerobic Bottle: 19:45 Hours to positivity  ,  TYPE: (C=Critical, N=Notification, A=Abnormal) c  TESTS:  _ gs  DATE/TIME CALLED: _ 07/14/2019 09:52:46  CALLED TO: Kellee bryant rn  READ BACK (2 Patient Identifiers)(Y/N): _ y  READ BACK VALUES (Y/N): _ y  CALLED BY: Kellee wetzel (07-13 @ 10:36)  Culture Results:   Growth in aerobic and anaerobic bottles: Staphylococcus aureus  Aerobic Bottle: 15:35 Hours to positivity  Anaerobic Bottle: 20:26 Hours to positivity  ,  TYPE: (C=Critical, N=Notification, A=Abnormal) c  TESTS:  _ gs  DATE/TIME CALLED: _ 07/14/2019 09:50:59  CALLED TO: Kellee bryant rn  READ BACK (2 Patient Identifiers)(Y/N): _ y  READ BACK VALUES (Y/N): _ y  CALLED BY: Kellee wetzel (07-13 @ 10:36)      Physical Examination:    General: No acute distress.      HEENT: Pupils equal, reactive to light.  Symmetric.    PULM: Clear to auscultation bilaterally, no significant sputum production    NECK: Supple, no lymphadenopathy, trachea midline    CVS: Regular rate and rhythm, no murmurs, rubs, or gallops    ABD: Soft, nondistended, nontender, normoactive bowel sounds, no masses    EXT: No edema, nontender    SKIN: Warm and well perfused, no rashes noted.    NEURO: Alert, oriented, interactive, nonfocal    DEVICES:     RADIOLOGY: ***    CRITICAL CARE TIME SPENT: ***

## 2019-07-19 NOTE — PROGRESS NOTE ADULT - SUBJECTIVE AND OBJECTIVE BOX
Called by MICKEY Soni about patient c/o low back and coccyx pain.  Hip fully mobile but right side with ecchymotic area from previous Heparin injection. Left side no open wounds or injuries.  Coccyx area with stage II ulcer.  Likely source of pain originating from this region.    Patient's BP: 180/75  HR:67 Afebrile  General: patient in mild distress and moaning likely due to pain  Respiratory: Lungs clear to auscultation  Cardiovascular: Normal S1 S2, No JVD, No murmurs, No edema  Gastrointestinal:  Soft, Non-tender, + BS	  Skin: No rashes, No ecchymoses, No cyanosis  Neurologic: Alert and awake, able to move extremities with some discomfort/pain  Extremities: No edema Called by MICKEY Soni about patient c/o low back and coccyx pain.  Hip fully mobile but right side with ecchymotic area from previous Heparin injection. Left side ecchymotic as well, but no open wounds or injuries.  Coccyx and sacral areas with ecchymosis and early stage II ulcer. No bleeding or oozing noted.  Likely source of pain originating from this region.    Patient's BP: 180/75  HR:67 Afebrile  General: patient in mild distress and moaning likely due to pain  Respiratory: Lungs clear to auscultation  Cardiovascular: Normal S1 S2, No JVD, No murmurs, No edema  Gastrointestinal:  Soft, Non-tender, + BS	  Skin: Sacrum + ecchymoses and early ulceration,   Neurologic: Alert and awake, able to move extremities with some discomfort/pain  Extremities: No edema    A/P: 88 yo F with early sacral ulcer stage II, with moderate pain at site and hips, likely leading to increased blood pressure readings this evening  May give Oxy 2.3 mg oral x1 and follow mental status closely  If no change in BP start Coreg 3.125 mg dose tonight , continue Clonidine regular dose  Frequent turning of patient in bed  DVT prophylaxis

## 2019-07-19 NOTE — PROGRESS NOTE ADULT - ASSESSMENT
ESRD - HD tomorrow TTS schedule  Neurontin held for altered MS ( this has happened to her before according to son )     Anemia - Team transfused7-17  Epogen with HD   CBC in am better     RO - Binders     Resolved sepsis   Blood Cx (-) 7-14   Abx ongoing as per ID - plan is cefazolin 2g AD on Monday, 2 g AD on Wed, 3 g AD on Fri to complete 6 weeks through 8/25/19  SANFORD watters(-) Veg   Vascular follow up re. AVG noted , follow for now

## 2019-07-19 NOTE — PROGRESS NOTE ADULT - ASSESSMENT
86 y/o pmhx of ESRD on HD (M, F), CAD s/p CABG, HTN, DM, PAD, hypothyroidism who presented on 7/4 with generalized weakness, fever, leukocytosis, UA + and blood cultures positive for MRSA. TTE negative for endocarditis. Indium scan + for R AV graft infection. Plan to treat medically unless fails to improve then will need extraction. Chronic pain on percocet.

## 2019-07-19 NOTE — PROGRESS NOTE ADULT - SUBJECTIVE AND OBJECTIVE BOX
NEPHROLOGY INTERVAL HPI/OVERNIGHT EVENTS:    Examined earlier  Confused  Frail    MEDICATIONS  (STANDING):  aspirin  chewable 81 milliGRAM(s) Oral daily  atorvastatin 40 milliGRAM(s) Oral at bedtime  calcitriol   Capsule 0.25 MICROGram(s) Oral daily  calcium acetate 667 milliGRAM(s) Oral three times a day with meals  ceFAZolin   IVPB      ceFAZolin   IVPB 1000 milliGRAM(s) IV Intermittent every 24 hours  chlorhexidine 2% Cloths 1 Application(s) Topical <User Schedule>  cloNIDine 0.1 milliGRAM(s) Oral every 6 hours  clopidogrel Tablet 75 milliGRAM(s) Oral daily  dextrose 5%. 1000 milliLiter(s) (50 mL/Hr) IV Continuous <Continuous>  dextrose 50% Injectable 12.5 Gram(s) IV Push once  dextrose 50% Injectable 25 Gram(s) IV Push once  dextrose 50% Injectable 25 Gram(s) IV Push once  docusate sodium 100 milliGRAM(s) Oral three times a day  epoetin barb Injectable 39830 Unit(s) IV Push <User Schedule>  folic acid 1 milliGRAM(s) Oral daily  insulin lispro (HumaLOG) corrective regimen sliding scale   SubCutaneous three times a day before meals  insulin lispro (HumaLOG) corrective regimen sliding scale   SubCutaneous at bedtime  lactulose Syrup 20 Gram(s) Oral once  levothyroxine 112 MICROGram(s) Oral daily  lidocaine   Patch 1 Patch Transdermal daily  pantoprazole    Tablet 40 milliGRAM(s) Oral before breakfast  polyethylene glycol 3350 17 Gram(s) Oral daily  saccharomyces boulardii 250 milliGRAM(s) Oral two times a day  senna 2 Tablet(s) Oral at bedtime    MEDICATIONS  (PRN):  acetaminophen   Tablet .. 650 milliGRAM(s) Oral every 6 hours PRN Temp greater or equal to 38C (100.4F), Mild Pain (1 - 3)  dextrose 40% Gel 15 Gram(s) Oral once PRN Blood Glucose LESS THAN 70 milliGRAM(s)/deciliter  glucagon  Injectable 1 milliGRAM(s) IntraMuscular once PRN Glucose LESS THAN 70 milligrams/deciliter  oxyCODONE    IR 2.5 milliGRAM(s) Oral every 6 hours PRN Severe Pain (7 - 10)      Allergies    penicillin (Anaphylaxis)  seafood (Anaphylaxis)  shellfish (Anaphylaxis)    Intolerances        Vital Signs Last 24 Hrs  T(C): 36.6 (2019 15:01), Max: 36.6 (2019 15:01)  T(F): 97.9 (2019 15:), Max: 97.9 (2019 15:)  HR: 63 (2019 15:) (59 - 73)  BP: 146/77 (2019 10:05) (122/60 - 165/69)  BP(mean): --  RR: 18 (2019 15:) (16 - 20)  SpO2: 100% (2019 15:) (98% - 100%)  Daily     Daily Weight in k.2 (2019 05:50)    PHYSICAL EXAM:  Appears chronically ill  NECK: Supple, no jvd  CHEST/LUNG: diminished BS at bases  HEART: Regular rate and rhythm; No rub   ABDOMEN: Soft, Nontender, Nondistended; Bowel sounds present  EXTREMITIES:  + edema less      LABS:                        9.0    12.48 )-----------( 261      ( 2019 06:45 )             27.9     07-18    135  |  96<L>  |  55.0<H>  ----------------------------<  114  5.2   |  23.0  |  5.14<H>    Ca    7.5<L>      2019 06:45                  RADIOLOGY & ADDITIONAL TESTS:

## 2019-07-19 NOTE — DISCHARGE NOTE PROVIDER - PROVIDER TOKENS
PROVIDER:[TOKEN:[[id number]]],PROVIDER:[TOKEN:[[id number]]],PROVIDER:[TOKEN:[[id number]]]

## 2019-07-19 NOTE — PROGRESS NOTE ADULT - SUBJECTIVE AND OBJECTIVE BOX
CARDIOLOGY PROGRESS NOTE   ([facility])                                                                                                        Subjective: no new c/o, nad    Vitals:  T(C): 36.6 (07-19-19 @ 15:01), Max: 36.6 (07-19-19 @ 15:01)  HR: 64 (07-19-19 @ 17:28) (59 - 69)  BP: 110/51 (07-19-19 @ 17:28) (110/51 - 165/69)  RR: 19 (07-19-19 @ 17:28) (16 - 20)  SpO2: 100% (07-19-19 @ 17:28) (98% - 100%)  Wt(kg): --  I&O's Summary    18 Jul 2019 07:01  -  19 Jul 2019 07:00  --------------------------------------------------------  IN: 120 mL / OUT: 402 mL / NET: -282 mL          PHYSICAL EXAM:  Appearance: Normal	  HEENT:   Atraumatic  Cardiovascular: Normal S1 S2, No JVD, No murmurs, No edema  Respiratory: Lungs clear to auscultation	  Gastrointestinal:  Soft, Non-tender, + BS	  Skin: No rashes, No ecchymoses, No cyanosis  Neurologic: Alert and awake, able to move extremities  Extremities: No edema      	      CURRENT MEDICATIONS:  cloNIDine 0.1 milliGRAM(s) Oral every 6 hours    ceFAZolin   IVPB      ceFAZolin   IVPB 1000 milliGRAM(s) IV Intermittent every 24 hours      acetaminophen   Tablet .. 650 milliGRAM(s) Oral every 6 hours PRN  oxyCODONE    IR 2.5 milliGRAM(s) Oral every 6 hours PRN    docusate sodium 100 milliGRAM(s) Oral three times a day  lactulose Syrup 20 Gram(s) Oral once  pantoprazole    Tablet 40 milliGRAM(s) Oral before breakfast  polyethylene glycol 3350 17 Gram(s) Oral daily  senna 2 Tablet(s) Oral at bedtime    atorvastatin 40 milliGRAM(s) Oral at bedtime  dextrose 40% Gel 15 Gram(s) Oral once PRN  dextrose 50% Injectable 12.5 Gram(s) IV Push once  dextrose 50% Injectable 25 Gram(s) IV Push once  dextrose 50% Injectable 25 Gram(s) IV Push once  glucagon  Injectable 1 milliGRAM(s) IntraMuscular once PRN  insulin lispro (HumaLOG) corrective regimen sliding scale   SubCutaneous three times a day before meals  insulin lispro (HumaLOG) corrective regimen sliding scale   SubCutaneous at bedtime  levothyroxine 112 MICROGram(s) Oral daily    aspirin  chewable 81 milliGRAM(s) Oral daily  calcitriol   Capsule 0.25 MICROGram(s) Oral daily  calcium acetate 667 milliGRAM(s) Oral three times a day with meals  chlorhexidine 2% Cloths 1 Application(s) Topical <User Schedule>  clopidogrel Tablet 75 milliGRAM(s) Oral daily  dextrose 5%. 1000 milliLiter(s) IV Continuous <Continuous>  epoetin barb Injectable 22103 Unit(s) IV Push <User Schedule>  folic acid 1 milliGRAM(s) Oral daily  lidocaine   Patch 1 Patch Transdermal daily      LABS:	 	                            9.0    12.48 )-----------( 261      ( 18 Jul 2019 06:45 )             27.9     07-18    135  |  96<L>  |  55.0<H>  ----------------------------<  114  5.2   |  23.0  |  5.14<H>    Ca    7.5<L>      18 Jul 2019 06:45

## 2019-07-19 NOTE — PROGRESS NOTE ADULT - ASSESSMENT
87yoF hx ESRD on HD (M and F only), CAD s/p CABG, HTN, DM, PAD, hypothyroidism presenting with generalized weakness.  On admission, febrile, leukocytosis, UA positive, with  Blood cultures positive for staph aureus.  SANFORD done, no vegetation. s/p indium scan positive for R AV graft infection, vascular surgery consult requested.       Preoperative Cardiovascular examination:  Vascular Surgery input noted: " If signs of worsening on non-resolving infection will need AVG explant"  Pt doesn't have absolute cardiac contraindication for the planned procedure/surgery.    Sepsis :  Staph aureus bacteremia due to R arm AV graft infection  SANFORD negative for vegetation      CAD s/p CABG:  with elevated troponin - T 0.32 - elevated in past, in setting of ESRD on HD  Cath 2/2018- LIMA to LAD patent, SVG to OM patent, SVG to RPDA patent   SANFORD 7/9/2019: LVEF 30-35.Ml-mod TR. No evidence of endocarditis.   Echo Limited 7/18: LVEF 35-40%.  Ischemia MCCARTHY d/w the son Darrin: I called  spoke on the phone: Family unwilling stress testing, cath, invasive etc. Medication only wanted.  Start small dose carvedilol. reduce clonidine dose. Check BP, will advance as tolerated.  Not on ACEI (ESRD), would start if BP tolerates and ig f OK by nephrology.    ESRD on HD   Cont HD

## 2019-07-19 NOTE — PROGRESS NOTE ADULT - ASSESSMENT
87yoF hx ESRD on HD (M and F only), CAD s/p CABG, HTN, DM, PAD, hypothyroidism presenting with generalized weakness, dysuria. Reports pain at graft site for 1 month.  In Ed temp 102.6, leukocytosis to 14. Blood cx 4/4 Staph aureus. Imaging with no focus (no pneumonia, abscess, OM)  No fever but last WBC 12-not checked today    Overall Staph aureus bacteremia 2/2 AVG infection, listed anaphylaxis to penicillin, UTI E. coli pansensitive, ESRD on HD M/F only via AVG    Vancomycin 7/4, 7/5  Azactam 7/4-7/6  Cefazolin 7/6-->    Recommend:  - Blood cultures + 4/4 Staph aureus. BCX 7/14 NGTD  - Despite listed penicillin allergy, tolerating cefazolin- c/w cefazolin 1 g IV every day (even on non HD days) and after dialysis on her dialysis days  - NM scan suspicious for AVG infection. In the absence of any other source I believe this is the source of her MSSA bacteremia and AVG explant would assist in source control. If not removed she will remain at risk for recurrence of infection in addition to adverse effects of long term antibiotics  - Recommend removal and temporary HD catheter placement-per vascular recommend conservative medical management for now  - Renal f/u RE HD schedule-she is currently on twice weekly HD and outpatient dosing for cefazolin with HD will require that she be on thrice weekly schedule. We can then dose cefazolin 2g AD on Monday, 2 g AD on Wed, 3 g AD on Fri to complete 6 weeks through 8/25/19  - SANFORD for endocarditis/lead vegetations (-)  - Trend Fever  - Trend WBC count      Will follow

## 2019-07-19 NOTE — PROGRESS NOTE ADULT - SUBJECTIVE AND OBJECTIVE BOX
Internal Medicine Hospitalist - Dr. Giovanni ROBERSON    [id number]    87y      Female    Patient is a 87y old  Female who presents with a chief complaint of weakness, sepsis 2/2 UTI (19 Jul 2019 05:24)    INTERVAL HPI/ OVERNIGHT EVENTS: Patient is seen and examined, report constipation and abd. discomfort, denied nausea and vomiting    REVIEW OF SYSTEMS:    Denied fever, chills, nausea, vomiting, chest pain, SOB, headache, dizziness    PHYSICAL EXAM:    Vital Signs Last 24 Hrs  T(C): 36.5 (19 Jul 2019 05:52), Max: 36.5 (19 Jul 2019 04:40)  T(F): 97.7 (19 Jul 2019 05:52), Max: 97.7 (19 Jul 2019 04:40)  HR: 67 (19 Jul 2019 10:05) (59 - 73)  BP: 146/77 (19 Jul 2019 10:05) (122/60 - 174/70)  BP(mean): --  RR: 20 (19 Jul 2019 08:00) (18 - 20)  SpO2: 98% (19 Jul 2019 10:05) (98% - 100%)    GENERAL: NAD  CHEST/LUNG: CTA b/l   HEART: S1S2+ audible  ABDOMEN: Soft, sensitive, Nondistended; Bowel sounds present  EXTREMITIES:  no edema  CNS: Awake  Psychiatry: normal mood    LABS:                        9.0    12.48 )-----------( 261      ( 18 Jul 2019 06:45 )             27.9     07-18    135  |  96<L>  |  55.0<H>  ----------------------------<  114  5.2   |  23.0  |  5.14<H>    Ca    7.5<L>      18 Jul 2019 06:45              MEDICATIONS  (STANDING):  aspirin  chewable 81 milliGRAM(s) Oral daily  atorvastatin 40 milliGRAM(s) Oral at bedtime  calcitriol   Capsule 0.25 MICROGram(s) Oral daily  calcium acetate 667 milliGRAM(s) Oral three times a day with meals  ceFAZolin   IVPB      ceFAZolin   IVPB 1000 milliGRAM(s) IV Intermittent every 24 hours  chlorhexidine 2% Cloths 1 Application(s) Topical <User Schedule>  cloNIDine 0.1 milliGRAM(s) Oral every 6 hours  clopidogrel Tablet 75 milliGRAM(s) Oral daily  dextrose 5%. 1000 milliLiter(s) (50 mL/Hr) IV Continuous <Continuous>  dextrose 50% Injectable 12.5 Gram(s) IV Push once  dextrose 50% Injectable 25 Gram(s) IV Push once  dextrose 50% Injectable 25 Gram(s) IV Push once  docusate sodium 100 milliGRAM(s) Oral three times a day  epoetin barb Injectable 06917 Unit(s) IV Push <User Schedule>  folic acid 1 milliGRAM(s) Oral daily  insulin lispro (HumaLOG) corrective regimen sliding scale   SubCutaneous three times a day before meals  insulin lispro (HumaLOG) corrective regimen sliding scale   SubCutaneous at bedtime  levothyroxine 112 MICROGram(s) Oral daily  lidocaine   Patch 1 Patch Transdermal daily  pantoprazole    Tablet 40 milliGRAM(s) Oral before breakfast  polyethylene glycol 3350 17 Gram(s) Oral daily  saccharomyces boulardii 250 milliGRAM(s) Oral two times a day  senna 2 Tablet(s) Oral at bedtime    MEDICATIONS  (PRN):  acetaminophen   Tablet .. 650 milliGRAM(s) Oral every 6 hours PRN Temp greater or equal to 38C (100.4F), Mild Pain (1 - 3)  dextrose 40% Gel 15 Gram(s) Oral once PRN Blood Glucose LESS THAN 70 milliGRAM(s)/deciliter  glucagon  Injectable 1 milliGRAM(s) IntraMuscular once PRN Glucose LESS THAN 70 milligrams/deciliter  oxyCODONE    IR 2.5 milliGRAM(s) Oral every 6 hours PRN Severe Pain (7 - 10)      RADIOLOGY & ADDITIONAL TEST

## 2019-07-19 NOTE — PROGRESS NOTE ADULT - SUBJECTIVE AND OBJECTIVE BOX
[facility]  INFECTIOUS DISEASES AND INTERNAL MEDICINE at [place]  =======================================================  Paresh Stark MD  Diplomates American Board of Internal Medicine and Infectious Diseases  Tel: [phone]      Fax: [phone]  =======================================================    LAUREN ROBERSON [id number]    Follow up: MSSA bacteremia     Allergies:  penicillin (Anaphylaxis)  seafood (Anaphylaxis)  shellfish (Anaphylaxis)      Medications:  acetaminophen   Tablet .. 650 milliGRAM(s) Oral every 6 hours PRN  aspirin  chewable 81 milliGRAM(s) Oral daily  atorvastatin 40 milliGRAM(s) Oral at bedtime  calcitriol   Capsule 0.25 MICROGram(s) Oral daily  calcium acetate 667 milliGRAM(s) Oral three times a day with meals  ceFAZolin   IVPB      ceFAZolin   IVPB 1000 milliGRAM(s) IV Intermittent every 24 hours  chlorhexidine 2% Cloths 1 Application(s) Topical <User Schedule>  cloNIDine 0.1 milliGRAM(s) Oral every 6 hours  clopidogrel Tablet 75 milliGRAM(s) Oral daily  dextrose 40% Gel 15 Gram(s) Oral once PRN  dextrose 5%. 1000 milliLiter(s) IV Continuous <Continuous>  dextrose 50% Injectable 12.5 Gram(s) IV Push once  dextrose 50% Injectable 25 Gram(s) IV Push once  dextrose 50% Injectable 25 Gram(s) IV Push once  docusate sodium 100 milliGRAM(s) Oral three times a day  epoetin barb Injectable 60010 Unit(s) IV Push <User Schedule>  folic acid 1 milliGRAM(s) Oral daily  glucagon  Injectable 1 milliGRAM(s) IntraMuscular once PRN  insulin lispro (HumaLOG) corrective regimen sliding scale   SubCutaneous three times a day before meals  insulin lispro (HumaLOG) corrective regimen sliding scale   SubCutaneous at bedtime  lactulose Syrup 20 Gram(s) Oral once  levothyroxine 112 MICROGram(s) Oral daily  lidocaine   Patch 1 Patch Transdermal daily  oxyCODONE    IR 2.5 milliGRAM(s) Oral every 6 hours PRN  pantoprazole    Tablet 40 milliGRAM(s) Oral before breakfast  polyethylene glycol 3350 17 Gram(s) Oral daily  saccharomyces boulardii 250 milliGRAM(s) Oral two times a day  senna 2 Tablet(s) Oral at bedtime            REVIEW OF SYSTEMS:  CONSTITUTIONAL:  No Fever or chills  HEENT:   No diplopia or blurred vision.  No earache, sore throat or runny nose.  CARDIOVASCULAR:  No pressure, squeezing, strangling, tightness, heaviness or aching about the chest, neck, axilla or epigastrium.  RESPIRATORY:  No cough, shortness of breath  GASTROINTESTINAL:  No nausea, vomiting or diarrhea.  GENITOURINARY:  No dysuria, frequency or urgency. No Blood in urine  MUSCULOSKELETAL:  no joint aches, no muscle pain  SKIN:  No change in skin, hair or nails.  NEUROLOGIC:  No Headaches, seizures or weakness.  PSYCHIATRIC:  No disorder of thought or mood.  ENDOCRINE:  No heat or cold intolerance  HEMATOLOGICAL:  No easy bruising or bleeding.            Physical Exam:  ICU Vital Signs Last 24 Hrs  T(C): 36.5 (19 Jul 2019 05:52), Max: 36.5 (19 Jul 2019 04:40)  T(F): 97.7 (19 Jul 2019 05:52), Max: 97.7 (19 Jul 2019 04:40)  HR: 69 (19 Jul 2019 15:00) (59 - 73)  BP: 146/77 (19 Jul 2019 10:05) (122/60 - 174/70)  BP(mean): --  ABP: --  ABP(mean): --  RR: 16 (19 Jul 2019 15:00) (16 - 20)  SpO2: 99% (19 Jul 2019 15:00) (98% - 100%)      GEN: NAD, pleasant  HEENT: normocephalic and atraumatic. EOMI. PERRL.  Anicteric   NECK: Supple.   LUNGS: Clear to auscultation.  HEART: Regular rate and rhythm without murmur. PPM intact  ABDOMEN: Soft, nontender, and nondistended.  Positive bowel sounds.    : No CVA tenderness  EXTREMITIES: Without any edema.  MSK: no joint swelling  NEUROLOGIC: Cranial nerves II through XII are grossly intact. No focal deficits  PSYCHIATRIC: Appropriate affect .  SKIN: No Rash RUE AVG intact      Labs:  07-18    135  |  96<L>  |  55.0<H>  ----------------------------<  114  5.2   |  23.0  |  5.14<H>    Ca    7.5<L>      18 Jul 2019 06:45                            9.0    12.48 )-----------( 261      ( 18 Jul 2019 06:45 )             27.9             CARDIAC MARKERS ( 19 Jul 2019 05:01 )  x     / 0.24 ng/mL / 24 U/L / x     / x      CARDIAC MARKERS ( 18 Jul 2019 06:45 )  x     / 0.30 ng/mL / x     / x     / x          CAPILLARY BLOOD GLUCOSE      POCT Blood Glucose.: 126 mg/dL (19 Jul 2019 12:08)  POCT Blood Glucose.: 121 mg/dL (19 Jul 2019 08:23)  POCT Blood Glucose.: 136 mg/dL (18 Jul 2019 22:01)  POCT Blood Glucose.: 118 mg/dL (18 Jul 2019 16:50)        RECENT CULTURES:  07-14 @ 11:53 .Blood     No growth at 5 days.        07-13 @ 10:36 .Blood Staphylococcus aureus    Growth in aerobic and anaerobic bottles: Staphylococcus aureus  Aerobic Bottle: 15:06 Hours to positivity  Anaerobic Bottle: 19:45 Hours to positivity  ,  TYPE: (C=Critical, N=Notification, A=Abnormal) c  TESTS:  _   DATE/TIME CALLED: _ 07/14/2019 09:52:46  CALLED TO: Kellee bryant rn  READ BACK (2 Patient Identifiers)(Y/N): _ y  READ BACK VALUES (Y/N): _ y  CALLED BY: Kellee wetzel        07-09 @ 09:41 .Blood Staphylococcus aureus    Growth in aerobic bottle: Staphylococcus aureus  Aerobic Bottle: 17:20 Hours to positivity  .  TYPE: (C=Critical, N=Notification, A=Abnormal) C  TESTS:  _ BLD   DATE/TIME CALLED: _ 07/10/2019 09:54:05  CALLED TO: _ AYANA BLANCO RN  READ BACK (2 Patient Identifiers)(Y/N): _ Y  READ BACK VALUES (Y/N): _ Y  CALLED BY: Kellee DANIEL        07-07 @ 20:25 .Blood Staphylococcus aureus    Growth in aerobic and anaerobic bottles: Staphylococcus aureus  Anaerobic Bottle: 2 days;05:44 Hours to positivity  Aerobic Bottle: 2 Days; 22.24 Hours to positivity  .  TYPE: (C=Critical, N=Notification, A=Abnormal) C  TESTS:  _ BLD GS  DATE/TIME CALLED: _ 07/10/2019 09:51:08  CALLED TO: _ DAVID BLANCO RN  READ BACK (2 Patient Identifiers)(Y/N): _ Y  READ BACK VALUES (Y/N): _ Y  CALLED BY: Kellee DANIEL        07-07 @ 12:47 .Blood Staphylococcus aureus    Growth in aerobic and anaerobic bottles: Staphylococcus aureus  Aerobic Bottle: 2 days; 01:03 Hours to positivity  Anaerobic Bottle: 2 days;22:09 Hours to positivity  .  TYPE: (C=Critical, N=Notification, A=Abnormal) C  TESTS:  _ BLD GS  DATE/TIME CALLED: _ 07/09/2019 14:36:17  CALLED TO: _ JOLIE VILLELA RN  READ BACK (2 Patient Identifiers)(Y/N): _ Y  READ BACK VALUES (Y/N): _ Y  CALLED BY: Kellee DANIEL        07-06 @ 07:27 .Blood Staphylococcus aureus    Growth in aerobic and anaerobic bottles: Staphylococcus aureus  Aerobic Bottle: 12:38 Hours to positivity  Anaerobic Bottle: 14:29 Hours to positivity  .  TYPE: (C=Critical, N=Notification, A=Abnormal) C  TESTS:  _ Positive Blood GS  DATE/TIME CALLED: _ 07/07/2019 10:40  CALLED TO: _ ModeTWR: Lary Zamudio RN  READ BACK (2 Patient Identifiers)(Y/N): _ Y  READ BACK VALUES (Y/N): _ Y  CALLED BY: Kellee marion

## 2019-07-19 NOTE — PROGRESS NOTE ADULT - PROBLEM SELECTOR PLAN 1
EKG obtained shows LBBB, which she has a history of and has had on previous EKGs during this admission. no signs of ischemia on EKG. Trops pending. If pain persists will trial nitroglycerin sub lingual. possible chronic pain. trop may be mildly elevated due to chronic renal disease. continue asa/plavix.

## 2019-07-20 LAB
ALBUMIN SERPL ELPH-MCNC: 2.2 G/DL — LOW (ref 3.3–5.2)
ALP SERPL-CCNC: 180 U/L — HIGH (ref 40–120)
ALT FLD-CCNC: <5 U/L — SIGNIFICANT CHANGE UP
ANION GAP SERPL CALC-SCNC: 12 MMOL/L — SIGNIFICANT CHANGE UP (ref 5–17)
AST SERPL-CCNC: 22 U/L — SIGNIFICANT CHANGE UP
BILIRUB SERPL-MCNC: 0.3 MG/DL — LOW (ref 0.4–2)
BUN SERPL-MCNC: 37 MG/DL — HIGH (ref 8–20)
CALCIUM SERPL-MCNC: 7.7 MG/DL — LOW (ref 8.6–10.2)
CHLORIDE SERPL-SCNC: 94 MMOL/L — LOW (ref 98–107)
CO2 SERPL-SCNC: 24 MMOL/L — SIGNIFICANT CHANGE UP (ref 22–29)
CREAT SERPL-MCNC: 3.9 MG/DL — HIGH (ref 0.5–1.3)
GLUCOSE BLDC GLUCOMTR-MCNC: 108 MG/DL — HIGH (ref 70–99)
GLUCOSE BLDC GLUCOMTR-MCNC: 145 MG/DL — HIGH (ref 70–99)
GLUCOSE BLDC GLUCOMTR-MCNC: 156 MG/DL — HIGH (ref 70–99)
GLUCOSE BLDC GLUCOMTR-MCNC: 99 MG/DL — SIGNIFICANT CHANGE UP (ref 70–99)
GLUCOSE SERPL-MCNC: 147 MG/DL — HIGH (ref 70–115)
HCT VFR BLD CALC: 26 % — LOW (ref 34.5–45)
HGB BLD-MCNC: 8.2 G/DL — LOW (ref 11.5–15.5)
MCHC RBC-ENTMCNC: 29 PG — SIGNIFICANT CHANGE UP (ref 27–34)
MCHC RBC-ENTMCNC: 31.5 GM/DL — LOW (ref 32–36)
MCV RBC AUTO: 91.9 FL — SIGNIFICANT CHANGE UP (ref 80–100)
PLATELET # BLD AUTO: 294 K/UL — SIGNIFICANT CHANGE UP (ref 150–400)
POTASSIUM SERPL-MCNC: 4.2 MMOL/L — SIGNIFICANT CHANGE UP (ref 3.5–5.3)
POTASSIUM SERPL-SCNC: 4.2 MMOL/L — SIGNIFICANT CHANGE UP (ref 3.5–5.3)
PROT SERPL-MCNC: 5.1 G/DL — LOW (ref 6.6–8.7)
RBC # BLD: 2.83 M/UL — LOW (ref 3.8–5.2)
RBC # FLD: 23.3 % — HIGH (ref 10.3–14.5)
SODIUM SERPL-SCNC: 130 MMOL/L — LOW (ref 135–145)
WBC # BLD: 9.65 K/UL — SIGNIFICANT CHANGE UP (ref 3.8–10.5)
WBC # FLD AUTO: 9.65 K/UL — SIGNIFICANT CHANGE UP (ref 3.8–10.5)

## 2019-07-20 PROCEDURE — 99232 SBSQ HOSP IP/OBS MODERATE 35: CPT

## 2019-07-20 PROCEDURE — 99233 SBSQ HOSP IP/OBS HIGH 50: CPT

## 2019-07-20 PROCEDURE — 93971 EXTREMITY STUDY: CPT | Mod: 26,LT

## 2019-07-20 PROCEDURE — 93010 ELECTROCARDIOGRAM REPORT: CPT

## 2019-07-20 RX ORDER — CARVEDILOL PHOSPHATE 80 MG/1
6.25 CAPSULE, EXTENDED RELEASE ORAL EVERY 12 HOURS
Refills: 0 | Status: DISCONTINUED | OUTPATIENT
Start: 2019-07-20 | End: 2019-07-24

## 2019-07-20 RX ORDER — ACETAMINOPHEN 500 MG
650 TABLET ORAL ONCE
Refills: 0 | Status: COMPLETED | OUTPATIENT
Start: 2019-07-20 | End: 2019-07-20

## 2019-07-20 RX ORDER — CARVEDILOL PHOSPHATE 80 MG/1
3.12 CAPSULE, EXTENDED RELEASE ORAL ONCE
Refills: 0 | Status: COMPLETED | OUTPATIENT
Start: 2019-07-20 | End: 2019-07-20

## 2019-07-20 RX ORDER — LOSARTAN POTASSIUM 100 MG/1
50 TABLET, FILM COATED ORAL DAILY
Refills: 0 | Status: DISCONTINUED | OUTPATIENT
Start: 2019-07-20 | End: 2019-07-24

## 2019-07-20 RX ADMIN — Medication 100 MILLIGRAM(S): at 22:26

## 2019-07-20 RX ADMIN — LIDOCAINE 1 PATCH: 4 CREAM TOPICAL at 17:30

## 2019-07-20 RX ADMIN — PANTOPRAZOLE SODIUM 40 MILLIGRAM(S): 20 TABLET, DELAYED RELEASE ORAL at 08:48

## 2019-07-20 RX ADMIN — OXYCODONE HYDROCHLORIDE 2.5 MILLIGRAM(S): 5 TABLET ORAL at 23:27

## 2019-07-20 RX ADMIN — ATORVASTATIN CALCIUM 40 MILLIGRAM(S): 80 TABLET, FILM COATED ORAL at 22:26

## 2019-07-20 RX ADMIN — OXYCODONE HYDROCHLORIDE 2.5 MILLIGRAM(S): 5 TABLET ORAL at 15:36

## 2019-07-20 RX ADMIN — CHLORHEXIDINE GLUCONATE 1 APPLICATION(S): 213 SOLUTION TOPICAL at 10:02

## 2019-07-20 RX ADMIN — CARVEDILOL PHOSPHATE 3.12 MILLIGRAM(S): 80 CAPSULE, EXTENDED RELEASE ORAL at 18:43

## 2019-07-20 RX ADMIN — Medication 650 MILLIGRAM(S): at 06:39

## 2019-07-20 RX ADMIN — Medication 112 MICROGRAM(S): at 05:54

## 2019-07-20 RX ADMIN — OXYCODONE HYDROCHLORIDE 2.5 MILLIGRAM(S): 5 TABLET ORAL at 22:49

## 2019-07-20 RX ADMIN — SENNA PLUS 2 TABLET(S): 8.6 TABLET ORAL at 22:26

## 2019-07-20 RX ADMIN — Medication 100 MILLIGRAM(S): at 05:54

## 2019-07-20 RX ADMIN — CARVEDILOL PHOSPHATE 3.12 MILLIGRAM(S): 80 CAPSULE, EXTENDED RELEASE ORAL at 05:54

## 2019-07-20 RX ADMIN — Medication 81 MILLIGRAM(S): at 17:31

## 2019-07-20 RX ADMIN — LIDOCAINE 1 PATCH: 4 CREAM TOPICAL at 19:45

## 2019-07-20 RX ADMIN — CLOPIDOGREL BISULFATE 75 MILLIGRAM(S): 75 TABLET, FILM COATED ORAL at 17:31

## 2019-07-20 RX ADMIN — CALCITRIOL 0.25 MICROGRAM(S): 0.5 CAPSULE ORAL at 17:31

## 2019-07-20 RX ADMIN — Medication 0.1 MILLIGRAM(S): at 05:54

## 2019-07-20 RX ADMIN — OXYCODONE HYDROCHLORIDE 2.5 MILLIGRAM(S): 5 TABLET ORAL at 08:46

## 2019-07-20 RX ADMIN — Medication 650 MILLIGRAM(S): at 18:43

## 2019-07-20 RX ADMIN — CARVEDILOL PHOSPHATE 3.12 MILLIGRAM(S): 80 CAPSULE, EXTENDED RELEASE ORAL at 17:32

## 2019-07-20 RX ADMIN — Medication 1 MILLIGRAM(S): at 17:31

## 2019-07-20 RX ADMIN — Medication 667 MILLIGRAM(S): at 13:05

## 2019-07-20 RX ADMIN — Medication 650 MILLIGRAM(S): at 19:45

## 2019-07-20 RX ADMIN — LIDOCAINE 1 PATCH: 4 CREAM TOPICAL at 00:50

## 2019-07-20 RX ADMIN — Medication 667 MILLIGRAM(S): at 08:48

## 2019-07-20 RX ADMIN — Medication 100 MILLIGRAM(S): at 17:50

## 2019-07-20 RX ADMIN — Medication 0.1 MILLIGRAM(S): at 22:26

## 2019-07-20 RX ADMIN — Medication 650 MILLIGRAM(S): at 05:54

## 2019-07-20 RX ADMIN — Medication 250 MILLIGRAM(S): at 17:32

## 2019-07-20 RX ADMIN — Medication 250 MILLIGRAM(S): at 05:54

## 2019-07-20 RX ADMIN — ERYTHROPOIETIN 10000 UNIT(S): 10000 INJECTION, SOLUTION INTRAVENOUS; SUBCUTANEOUS at 12:31

## 2019-07-20 RX ADMIN — OXYCODONE HYDROCHLORIDE 2.5 MILLIGRAM(S): 5 TABLET ORAL at 09:20

## 2019-07-20 RX ADMIN — OXYCODONE HYDROCHLORIDE 2.5 MILLIGRAM(S): 5 TABLET ORAL at 17:01

## 2019-07-20 RX ADMIN — Medication 650 MILLIGRAM(S): at 13:07

## 2019-07-20 RX ADMIN — Medication 667 MILLIGRAM(S): at 17:31

## 2019-07-20 NOTE — PROGRESS NOTE ADULT - ASSESSMENT
87yoF hx ESRD on HD (M and F only), CAD s/p CABG, HTN, DM, PAD, hypothyroidism presenting with generalized weakness found to have UTI and meeting sepsis criteria, blood c/s positive for MSSA, source unknown, s/p indium scan positive for R AV graft infection, vascular surgery consult requested. Pt is s/p RRT on 07/17 due to AMS, afebrile, normal vitals,   repeat CT head no acute finding, noted to have Hg of 6.9, s/p 1 unit prbc, H&h improved    #Sepsis / Staph aureus bacteremia due to R arm AV graft infection  vascular surgery consult appreciated -  WBC resolving,  Afebrile, BCx negative  Will need suppressive abx long term if we continue the conservative route  If signs of worsening on non-resolving infection will need AVG explant  OK to use AVG for HD  repeat blood c/s negative so far  ID consult appreciated -  We can then dose cefazolin 2g AD on tuesday, 2 g AD on thurs, 3 g AD on Saturday to complete 6 weeks through 8/25/19  s/p indium scan positive for R AV graft infection,  SANFORD for endocarditis/lead vegetations (-)    #Metabolic Encephalopathy - improved today, stop Gabapentin  repeat  CT head no acute finding, On ASA, Statin.     #CAd with elevated troponin - T flat   cardiology input appreciated - recommend repeat TTE  s/p SANFORD LVEF 30-35%  c/w home medication    #Chronic anemia - Hg 6.9 - HD RN report bleeding from AV graft yesterday   s/p 1 unit prbc - h&h improved  f/uc bc    #Leg Pain, Neuropathy - per son, findings are chronic.  stop Neurontin as pt was sleepy - per son she is very sensitive to gabapentin, okay with oxycodone 2.5mg - tolerated in past    #constipation - start miralex, colace and opal      #Thrombocytopenia - Plt improved.  Likely due to bacteremia.  Hematology input appreciated.     #CAD/Hx CVA- ASA, plavix, statin resumed.     #HTN- Stable, monitor BP     #ESRD on HD - renal on board    #Hypothyroid- Continue [id number] mcg.    GOC - discussed with son, updated about pt current condition,  wishes full code, MOLSt form completed      disposition - pt will need [facility] - Cc/Sw working on insurance auth for transfer to [facility]

## 2019-07-20 NOTE — PROGRESS NOTE ADULT - SUBJECTIVE AND OBJECTIVE BOX
NEPHROLOGY INTERVAL HPI/OVERNIGHT EVENTS:    Examined  C/o pain RN giving meds  plans for HD today    MEDICATIONS  (STANDING):  aspirin  chewable 81 milliGRAM(s) Oral daily  atorvastatin 40 milliGRAM(s) Oral at bedtime  calcitriol   Capsule 0.25 MICROGram(s) Oral daily  calcium acetate 667 milliGRAM(s) Oral three times a day with meals  carvedilol 3.125 milliGRAM(s) Oral every 12 hours  ceFAZolin   IVPB      ceFAZolin   IVPB 1000 milliGRAM(s) IV Intermittent every 24 hours  chlorhexidine 2% Cloths 1 Application(s) Topical <User Schedule>  cloNIDine 0.1 milliGRAM(s) Oral every 8 hours  clopidogrel Tablet 75 milliGRAM(s) Oral daily  dextrose 5%. 1000 milliLiter(s) (50 mL/Hr) IV Continuous <Continuous>  dextrose 50% Injectable 12.5 Gram(s) IV Push once  dextrose 50% Injectable 25 Gram(s) IV Push once  dextrose 50% Injectable 25 Gram(s) IV Push once  docusate sodium 100 milliGRAM(s) Oral three times a day  epoetin barb Injectable 89293 Unit(s) IV Push <User Schedule>  folic acid 1 milliGRAM(s) Oral daily  insulin lispro (HumaLOG) corrective regimen sliding scale   SubCutaneous three times a day before meals  insulin lispro (HumaLOG) corrective regimen sliding scale   SubCutaneous at bedtime  levothyroxine 112 MICROGram(s) Oral daily  lidocaine   Patch 1 Patch Transdermal daily  pantoprazole    Tablet 40 milliGRAM(s) Oral before breakfast  polyethylene glycol 3350 17 Gram(s) Oral daily  saccharomyces boulardii 250 milliGRAM(s) Oral two times a day  senna 2 Tablet(s) Oral at bedtime    MEDICATIONS  (PRN):  acetaminophen   Tablet .. 650 milliGRAM(s) Oral every 6 hours PRN Temp greater or equal to 38C (100.4F), Mild Pain (1 - 3)  dextrose 40% Gel 15 Gram(s) Oral once PRN Blood Glucose LESS THAN 70 milliGRAM(s)/deciliter  glucagon  Injectable 1 milliGRAM(s) IntraMuscular once PRN Glucose LESS THAN 70 milligrams/deciliter  oxyCODONE    IR 2.5 milliGRAM(s) Oral every 6 hours PRN Severe Pain (7 - 10)      Allergies    penicillin (Anaphylaxis)  seafood (Anaphylaxis)  shellfish (Anaphylaxis)    Intolerances        Vital Signs Last 24 Hrs  T(C): 36.6 (20 Jul 2019 08:55), Max: 36.6 (19 Jul 2019 15:01)  T(F): 97.8 (20 Jul 2019 08:55), Max: 97.9 (19 Jul 2019 15:01)  HR: 65 (20 Jul 2019 08:55) (59 - 69)  BP: 112/63 (20 Jul 2019 08:55) (110/51 - 182/76)  BP(mean): --  RR: 20 (20 Jul 2019 08:55) (16 - 20)  SpO2: 94% (20 Jul 2019 08:55) (94% - 100%)  Daily     Daily     PHYSICAL EXAM:  Appears chronically ill  NECK: Supple, no jvd  CHEST/LUNG: diminished BS at bases  HEART: Regular rate and rhythm; No rub   ABDOMEN: Soft, Nontender, Nondistended; Bowel sounds present  EXTREMITIES:  + edema less    LABS:                      RADIOLOGY & ADDITIONAL TESTS:

## 2019-07-20 NOTE — PROGRESS NOTE ADULT - SUBJECTIVE AND OBJECTIVE BOX
[place] [facility]                                                       [facility]                                                        Office: [address], [place] [address]                                                       Telephone: [phone]. Fax:[phone]                                                                             PROGRESS NOTE   Reason for follow up:  weakness sepsis                            Overnight: No new events.   Update:   Patient with left arm swelling - will add venous doppler stat, also bp elevated witll increase coreg and add losartan.    Subjective: "mucho pain in all my joints______"   Complains of:  pain in joints  Review of symptoms: Cardiac:  No chest pain. No dyspnea. No palpitations.  Respiratory :no cough. No dyspnea  Gastrointestinal: No diarrhea. No abdominal pain. No bleeding.     Past medical history: No updates.   Chronic conditions: HEALTH ISSUES - PROBLEM Dx:  Angina pectoris: Angina pectoris  ESRD, Dialysis, htn        T(C): 36.4 (07-20-19 @ 17:24), Max: 36.6 (07-20-19 @ 08:55)  HR: 79 (07-20-19 @ 17:24) (59 - 79)  BP: 169/70 (07-20-19 @ 17:46) (112/63 - 182/76)  RR: 18 (07-20-19 @ 17:24) (18 - 20)  SpO2: 99% (07-20-19 @ 17:24) (94% - 100%)  I&O's Summary  20 Jul 2019 07:01  -  20 Jul 2019 17:49  --------------------------------------------------------  IN: 0 mL / OUT: 800 mL / NET: -800 mL  Weight (kg): 57.6 (07-17 @ 12:09)    PHYSICAL EXAM:  Appearance: Thin frail elderly female, appearing comfortable.    HEENT:  Head and neck: Atraumatic. Normocephalic.  Normal oral mucosa, PERRL, Neck is supple. No JVD, No carotid bruit.   Neurologic: A & O x 2,  no focal deficits. EOMI , Cranial nerves are intact.  Lymphatic: No cervical lymphadenopathy  Cardiovascular: Normal S1 S2, No murmur, rubs/gallops. No JVD, No edema  Respiratory: Lungs clear to auscultation, diminished at bases.    Gastrointestinal:  Soft, Non-tender, + BS  Lower Extremities: No edema  Psychiatry: Patient is calm. No agitation. Mood & affect appropriate  Skin: No rashes/ ecchymoses/cyanosis/ulcers visualized on the face, hands or feet.  Left arm with noted left arm swelling.      CURRENT MEDICATIONS:  carvedilol 6.25 milliGRAM(s) Oral every 12 hours  cloNIDine 0.1 milliGRAM(s) Oral every 8 hours  losartan 50 milliGRAM(s) Oral daily  ceFAZolin   IVPB  ceFAZolin   IVPB  docusate sodium  pantoprazole    Tablet  polyethylene glycol 3350  senna  atorvastatin  insulin lispro (HumaLOG) corrective regimen sliding scale  insulin lispro (HumaLOG) corrective regimen sliding scale  levothyroxine  aspirin  chewable  calcitriol   Capsule  calcium acetate  chlorhexidine 2% Cloths  clopidogrel Tablet  dextrose 5%.  epoetin barb Injectable  folic acid  lidocaine   Patch    LABS:	 	  CARDIAC MARKERS ( 19 Jul 2019 05:01 )  x     / 0.24 ng/mL / 24 U/L / x     / x      p-BNP 19 Jul 2019 05:01: x    , CARDIAC MARKERS ( 18 Jul 2019 06:45 )  x     / 0.30 ng/mL / x     / x     / x      p-BNP 18 Jul 2019 06:45: x                            8.2    9.65  )-----------( 294      ( 20 Jul 2019 15:19 )             26.0   07-20    130<L>  |  94<L>  |  37.0<H>  ----------------------------<  147<H>  4.2   |  24.0  |  3.90<H>  Ca    7.7<L>      20 Jul 2019 15:19  TPro  5.1<L>  /  Alb  2.2<L>  /  TBili  0.3<L>  /  DBili  x   /  AST  22  /  ALT  <5  /  AlkPhos  180<H>  07-20  Tele:   SR 78, no alarms.  PVC noted.

## 2019-07-20 NOTE — PROGRESS NOTE ADULT - SUBJECTIVE AND OBJECTIVE BOX
CC: ESRD on HD.  Weakness, fever , leukocytosis.  MSSA bacteremia.  Dysuria  HPI:  87yoF hx ESRD on HD (M and F only), CAD s/p CABG, HTN, DM, PAD, hypothyroidism presenting with generalized weakness.  Pt is poor historian despite use of   She reports generalized weakness for past few days associated with generalized, abdominal pain that she is unable to describe associated with nausea, some episodes of vomiting.  Reports difficulty with urination and ?dysuria but believes her urinary symptoms are due to not drinking enough fluids recently. Unable to explain why she is only on HD twice per week, but says last HD session was on 7/1.  Denies fevers, chest pain, dyspnea, endorses chronic pain in face and all extremities which is chronic for her.  On admission, febrile, leukocytosis, UA positive, was pan scanned by ED and CT abd/pelvis showed haziness around the gallbladder however follow up abd US was negative for cholelithiasis and Stewart's sign and no LFT elevation on labs. (04 Jul 2019 22:54)    REVIEW OF SYSTEMS:    Patient denied fever, chills, abdominal pain, nausea, vomiting, cough, shortness of breath, chest pain or palpitations    Vital Signs Last 24 Hrs  T(C): 36.3 (20 Jul 2019 14:35), Max: 36.6 (20 Jul 2019 08:55)  T(F): 97.3 (20 Jul 2019 14:35), Max: 97.8 (20 Jul 2019 08:55)  HR: 65 (20 Jul 2019 14:35) (59 - 67)  BP: 158/53 (20 Jul 2019 14:35) (110/51 - 182/76)  BP(mean): --  RR: 18 (20 Jul 2019 14:35) (18 - 20)  SpO2: 100% (20 Jul 2019 14:35) (94% - 100%)I&O's Summary    20 Jul 2019 07:01  -  20 Jul 2019 17:18  --------------------------------------------------------  IN: 0 mL / OUT: 800 mL / NET: -800 mL      PHYSICAL EXAM:  GENERAL: NAD,   HEENT: PERRL, +EOMI, anicteric, no [place]  NECK: Supple, No JVD   CHEST/LUNG: CTA bilaterally; Normal effort  HEART: S1S2 Normal intensity, no murmurs, gallops or rubs noted  ABDOMEN: Soft, BS Normoactive, NT, ND, no HSM noted  EXTREMITIES:  2+ radial and DP pulses noted, no clubbing, cyanosis, or edema noted, Limited mobility   SKIN: No rashes or lesions noted  NEURO: Lethargy, no focal deficits noted, CN II-XII intact  PSYCH: Depressed mood and affect; insight/judgement appropriate  LABS:                        8.2    9.65  )-----------( 294      ( 20 Jul 2019 15:19 )             26.0     07-20    130<L>  |  94<L>  |  37.0<H>  ----------------------------<  147<H>  4.2   |  24.0  |  3.90<H>    Ca    7.7<L>      20 Jul 2019 15:19    TPro  5.1<L>  /  Alb  2.2<L>  /  TBili  0.3<L>  /  DBili  x   /  AST  22  /  ALT  <5  /  AlkPhos  180<H>  07-20        RADIOLOGY & ADDITIONAL TESTS:    MEDICATIONS:  MEDICATIONS  (STANDING):  aspirin  chewable 81 milliGRAM(s) Oral daily  atorvastatin 40 milliGRAM(s) Oral at bedtime  calcitriol   Capsule 0.25 MICROGram(s) Oral daily  calcium acetate 667 milliGRAM(s) Oral three times a day with meals  carvedilol 3.125 milliGRAM(s) Oral every 12 hours  ceFAZolin   IVPB      ceFAZolin   IVPB 1000 milliGRAM(s) IV Intermittent every 24 hours  chlorhexidine 2% Cloths 1 Application(s) Topical <User Schedule>  cloNIDine 0.1 milliGRAM(s) Oral every 8 hours  clopidogrel Tablet 75 milliGRAM(s) Oral daily  dextrose 5%. 1000 milliLiter(s) (50 mL/Hr) IV Continuous <Continuous>  dextrose 50% Injectable 12.5 Gram(s) IV Push once  dextrose 50% Injectable 25 Gram(s) IV Push once  dextrose 50% Injectable 25 Gram(s) IV Push once  docusate sodium 100 milliGRAM(s) Oral three times a day  epoetin barb Injectable 46961 Unit(s) IV Push <User Schedule>  folic acid 1 milliGRAM(s) Oral daily  insulin lispro (HumaLOG) corrective regimen sliding scale   SubCutaneous three times a day before meals  insulin lispro (HumaLOG) corrective regimen sliding scale   SubCutaneous at bedtime  levothyroxine 112 MICROGram(s) Oral daily  lidocaine   Patch 1 Patch Transdermal daily  pantoprazole    Tablet 40 milliGRAM(s) Oral before breakfast  polyethylene glycol 3350 17 Gram(s) Oral daily  saccharomyces boulardii 250 milliGRAM(s) Oral two times a day  senna 2 Tablet(s) Oral at bedtime    MEDICATIONS  (PRN):  acetaminophen   Tablet .. 650 milliGRAM(s) Oral every 6 hours PRN Temp greater or equal to 38C (100.4F), Mild Pain (1 - 3)  dextrose 40% Gel 15 Gram(s) Oral once PRN Blood Glucose LESS THAN 70 milliGRAM(s)/deciliter  glucagon  Injectable 1 milliGRAM(s) IntraMuscular once PRN Glucose LESS THAN 70 milligrams/deciliter  oxyCODONE    IR 2.5 milliGRAM(s) Oral every 6 hours PRN Severe Pain (7 - 10)

## 2019-07-20 NOTE — CHART NOTE - NSCHARTNOTEFT_GEN_A_CORE
Source: Patient     Current Diet: Puree, thins, Renal     Patient reports fair appetite -- observed breakfast tray 50% PO     PO intake:  50%    Current Weight:   (7/19) 65kg  (7/11) 55kg  (7/4) 57kg     % Weight Change     Pertinent Medications: MEDICATIONS  (STANDING):  aspirin  chewable 81 milliGRAM(s) Oral daily  atorvastatin 40 milliGRAM(s) Oral at bedtime  calcitriol   Capsule 0.25 MICROGram(s) Oral daily  calcium acetate 667 milliGRAM(s) Oral three times a day with meals  carvedilol 3.125 milliGRAM(s) Oral every 12 hours  ceFAZolin   IVPB      ceFAZolin   IVPB 1000 milliGRAM(s) IV Intermittent every 24 hours  chlorhexidine 2% Cloths 1 Application(s) Topical <User Schedule>  cloNIDine 0.1 milliGRAM(s) Oral every 8 hours  clopidogrel Tablet 75 milliGRAM(s) Oral daily  dextrose 5%. 1000 milliLiter(s) (50 mL/Hr) IV Continuous <Continuous>  dextrose 50% Injectable 12.5 Gram(s) IV Push once  dextrose 50% Injectable 25 Gram(s) IV Push once  dextrose 50% Injectable 25 Gram(s) IV Push once  docusate sodium 100 milliGRAM(s) Oral three times a day  epoetin barb Injectable 49242 Unit(s) IV Push <User Schedule>  folic acid 1 milliGRAM(s) Oral daily  insulin lispro (HumaLOG) corrective regimen sliding scale   SubCutaneous three times a day before meals  insulin lispro (HumaLOG) corrective regimen sliding scale   SubCutaneous at bedtime  levothyroxine 112 MICROGram(s) Oral daily  lidocaine   Patch 1 Patch Transdermal daily  pantoprazole    Tablet 40 milliGRAM(s) Oral before breakfast  polyethylene glycol 3350 17 Gram(s) Oral daily  saccharomyces boulardii 250 milliGRAM(s) Oral two times a day  senna 2 Tablet(s) Oral at bedtime    MEDICATIONS  (PRN):  acetaminophen   Tablet .. 650 milliGRAM(s) Oral every 6 hours PRN Temp greater or equal to 38C (100.4F), Mild Pain (1 - 3)  dextrose 40% Gel 15 Gram(s) Oral once PRN Blood Glucose LESS THAN 70 milliGRAM(s)/deciliter  glucagon  Injectable 1 milliGRAM(s) IntraMuscular once PRN Glucose LESS THAN 70 milligrams/deciliter  oxyCODONE    IR 2.5 milliGRAM(s) Oral every 6 hours PRN Severe Pain (7 - 10)    Pertinent Labs: no new labs     Skin: intact      Nutrition focused physical exam conducted previously- found signs of malnutrition [ ]absent [x]present    Subcutaneous fat loss: [ ] Orbital fat pads region, [ ]Buccal fat region, [ ]Triceps region,  [ ]Ribs region    Muscle wasting: [x]Temples region, [ ]Clavicle region, [ ]Shoulder region, [ ]Scapula region, [ ]Interosseous region,  [ ]thigh region, [ ]Calf region    Estimated Needs:   [x] no change since previous assessment, unclear accuracy of weights, will continue to monitor  [ ] recalculated:     Current Nutrition Diagnosis: Pt remains at high nutrition risk secondary to malnutrition (mild chronic) related to inadequate energy intake in setting of texture modified diet consistency secondary to swallowing difficulties, increased nutrient needs due to ESRD on HD, +bacteremia as evidenced by moderate fluid accumulation (2+ B/L hands), likely <75% of nutrient needs >1 mo, moderate muscle wasting (temples), generalized weakness, increased protein losses associated with HD. Pt continues with suboptimal PO intake despite total assistance and encouragement, tolerating pureed diet. Unclear accuracy of weights with frequent fluctuations since admission with edema seemingly resolving, will continue to monitor for trend.     Recommendations:   1) Add Nepro BID   2) Rx: Nephro-king daily   3) Monitor weights daily for trend     Monitoring and Evaluation:   [x] PO intake [x] Tolerance to diet prescription [X] Weights  [X] Follow up per protocol [X] Labs:

## 2019-07-20 NOTE — PROGRESS NOTE ADULT - ASSESSMENT
87yoF hx ESRD on HD (M and F only), CAD s/p CABG, HTN, DM, PAD, hypothyroidism presenting with generalized weakness.  On admission, febrile, leukocytosis, UA positive, with  Blood cultures positive for staph aureus.  SANFORD done, no vegetation. s/p indium scan positive for R AV graft infection, vascular surgery consult requested.   Patient with dialysis this am, elevated bp, and swollen left arm.      1 Problem/Plan  ·	CAD s/p CABG:  with elevated troponin - T 0.32 - elevated in past, in setting of ESRD on HD  Cath 2/2018- LIMA to LAD patent, SVG to OM patent, SVG to RPDA patent   SANFORD 7/9/2019: LVEF 30-35.Ml-mod TR. No evidence of endocarditis.   Echo Limited 7/18: LVEF 35-40%.  Ischemia Family requesting medications only, ct Dapt: Plavix 75mg po daily and Aspirin 81mg po daily     2 Problem/Plan  ·	HTN  Increase coreg,   add losartan    3 Problem/Plan  ·	Swelling of left arm  ultrasound left arm stat    ESRD on HD   Cont HD

## 2019-07-20 NOTE — PROGRESS NOTE ADULT - ASSESSMENT
ESRD - HD today on TTS schedule  Neurontin held for altered MS ( this has happened to her before according to son )     Anemia - Team transfused7-17  Epogen with HD   CBC in am better     RO - Binders     Resolved sepsis   Blood Cx (-) 7-14   Abx ongoing as per ID - plan is cefazolin 2g AD on Monday, 2 g AD on Wed, 3 g AD on Fri to complete 6 weeks through 8/25/19- I notified her HD center  SANFORD watters(-) Veg   Vascular follow up re. AVG noted , follow for now     Will follow

## 2019-07-20 NOTE — CHART NOTE - NSCHARTNOTEFT_GEN_A_CORE
Surgery resident called by HD nurse for bleeding LUE graft that has not stopped following 2 hrs of direct manual compression.  Pulsatile bleeding from IV puncture site.   Discussed w/ Dr Sosa instructed to placed stitch.  3-0 nylon figure of 8 stich placed bleeding stopped.   Please fu w/ primary vascular surgeon.

## 2019-07-21 LAB
ANION GAP SERPL CALC-SCNC: 13 MMOL/L — SIGNIFICANT CHANGE UP (ref 5–17)
BUN SERPL-MCNC: 22 MG/DL — HIGH (ref 8–20)
CALCIUM SERPL-MCNC: 7.4 MG/DL — LOW (ref 8.6–10.2)
CHLORIDE SERPL-SCNC: 96 MMOL/L — LOW (ref 98–107)
CO2 SERPL-SCNC: 28 MMOL/L — SIGNIFICANT CHANGE UP (ref 22–29)
CREAT SERPL-MCNC: 3.3 MG/DL — HIGH (ref 0.5–1.3)
GLUCOSE BLDC GLUCOMTR-MCNC: 106 MG/DL — HIGH (ref 70–99)
GLUCOSE BLDC GLUCOMTR-MCNC: 119 MG/DL — HIGH (ref 70–99)
GLUCOSE BLDC GLUCOMTR-MCNC: 167 MG/DL — HIGH (ref 70–99)
GLUCOSE BLDC GLUCOMTR-MCNC: 98 MG/DL — SIGNIFICANT CHANGE UP (ref 70–99)
GLUCOSE SERPL-MCNC: 113 MG/DL — SIGNIFICANT CHANGE UP (ref 70–115)
HCT VFR BLD CALC: 23.5 % — LOW (ref 34.5–45)
HGB BLD-MCNC: 7.3 G/DL — LOW (ref 11.5–15.5)
MCHC RBC-ENTMCNC: 28.2 PG — SIGNIFICANT CHANGE UP (ref 27–34)
MCHC RBC-ENTMCNC: 31.1 GM/DL — LOW (ref 32–36)
MCV RBC AUTO: 90.7 FL — SIGNIFICANT CHANGE UP (ref 80–100)
PLATELET # BLD AUTO: 294 K/UL — SIGNIFICANT CHANGE UP (ref 150–400)
POTASSIUM SERPL-MCNC: 3.8 MMOL/L — SIGNIFICANT CHANGE UP (ref 3.5–5.3)
POTASSIUM SERPL-SCNC: 3.8 MMOL/L — SIGNIFICANT CHANGE UP (ref 3.5–5.3)
RBC # BLD: 2.59 M/UL — LOW (ref 3.8–5.2)
RBC # FLD: 23.1 % — HIGH (ref 10.3–14.5)
SODIUM SERPL-SCNC: 137 MMOL/L — SIGNIFICANT CHANGE UP (ref 135–145)
WBC # BLD: 10.24 K/UL — SIGNIFICANT CHANGE UP (ref 3.8–10.5)
WBC # FLD AUTO: 10.24 K/UL — SIGNIFICANT CHANGE UP (ref 3.8–10.5)

## 2019-07-21 PROCEDURE — 99233 SBSQ HOSP IP/OBS HIGH 50: CPT

## 2019-07-21 PROCEDURE — 99232 SBSQ HOSP IP/OBS MODERATE 35: CPT

## 2019-07-21 RX ORDER — OXYCODONE AND ACETAMINOPHEN 5; 325 MG/1; MG/1
1 TABLET ORAL EVERY 6 HOURS
Refills: 0 | Status: DISCONTINUED | OUTPATIENT
Start: 2019-07-21 | End: 2019-07-24

## 2019-07-21 RX ADMIN — LIDOCAINE 1 PATCH: 4 CREAM TOPICAL at 19:50

## 2019-07-21 RX ADMIN — OXYCODONE AND ACETAMINOPHEN 1 TABLET(S): 5; 325 TABLET ORAL at 20:11

## 2019-07-21 RX ADMIN — Medication 0.1 MILLIGRAM(S): at 21:26

## 2019-07-21 RX ADMIN — Medication 81 MILLIGRAM(S): at 12:22

## 2019-07-21 RX ADMIN — Medication 650 MILLIGRAM(S): at 04:26

## 2019-07-21 RX ADMIN — LIDOCAINE 1 PATCH: 4 CREAM TOPICAL at 05:26

## 2019-07-21 RX ADMIN — Medication 100 MILLIGRAM(S): at 12:22

## 2019-07-21 RX ADMIN — Medication 250 MILLIGRAM(S): at 05:28

## 2019-07-21 RX ADMIN — Medication 1 MILLIGRAM(S): at 12:23

## 2019-07-21 RX ADMIN — Medication 667 MILLIGRAM(S): at 12:23

## 2019-07-21 RX ADMIN — LOSARTAN POTASSIUM 50 MILLIGRAM(S): 100 TABLET, FILM COATED ORAL at 05:28

## 2019-07-21 RX ADMIN — OXYCODONE HYDROCHLORIDE 2.5 MILLIGRAM(S): 5 TABLET ORAL at 13:00

## 2019-07-21 RX ADMIN — Medication 100 MILLIGRAM(S): at 05:28

## 2019-07-21 RX ADMIN — CHLORHEXIDINE GLUCONATE 1 APPLICATION(S): 213 SOLUTION TOPICAL at 05:26

## 2019-07-21 RX ADMIN — CARVEDILOL PHOSPHATE 6.25 MILLIGRAM(S): 80 CAPSULE, EXTENDED RELEASE ORAL at 17:20

## 2019-07-21 RX ADMIN — PANTOPRAZOLE SODIUM 40 MILLIGRAM(S): 20 TABLET, DELAYED RELEASE ORAL at 05:31

## 2019-07-21 RX ADMIN — ATORVASTATIN CALCIUM 40 MILLIGRAM(S): 80 TABLET, FILM COATED ORAL at 21:26

## 2019-07-21 RX ADMIN — SENNA PLUS 2 TABLET(S): 8.6 TABLET ORAL at 21:26

## 2019-07-21 RX ADMIN — Medication 0.1 MILLIGRAM(S): at 05:29

## 2019-07-21 RX ADMIN — OXYCODONE AND ACETAMINOPHEN 1 TABLET(S): 5; 325 TABLET ORAL at 21:00

## 2019-07-21 RX ADMIN — CARVEDILOL PHOSPHATE 6.25 MILLIGRAM(S): 80 CAPSULE, EXTENDED RELEASE ORAL at 05:29

## 2019-07-21 RX ADMIN — OXYCODONE HYDROCHLORIDE 2.5 MILLIGRAM(S): 5 TABLET ORAL at 12:21

## 2019-07-21 RX ADMIN — Medication 112 MICROGRAM(S): at 05:28

## 2019-07-21 RX ADMIN — Medication 667 MILLIGRAM(S): at 17:19

## 2019-07-21 RX ADMIN — Medication 250 MILLIGRAM(S): at 17:19

## 2019-07-21 RX ADMIN — CLOPIDOGREL BISULFATE 75 MILLIGRAM(S): 75 TABLET, FILM COATED ORAL at 12:23

## 2019-07-21 RX ADMIN — LIDOCAINE 1 PATCH: 4 CREAM TOPICAL at 12:23

## 2019-07-21 RX ADMIN — Medication 100 MILLIGRAM(S): at 21:26

## 2019-07-21 RX ADMIN — CALCITRIOL 0.25 MICROGRAM(S): 0.5 CAPSULE ORAL at 12:23

## 2019-07-21 RX ADMIN — POLYETHYLENE GLYCOL 3350 17 GRAM(S): 17 POWDER, FOR SOLUTION ORAL at 12:23

## 2019-07-21 RX ADMIN — Medication 650 MILLIGRAM(S): at 05:03

## 2019-07-21 NOTE — PROGRESS NOTE ADULT - ASSESSMENT
87yoF hx ESRD on HD (M and F only), CAD s/p CABG, HTN, DM, PAD, hypothyroidism presenting with generalized weakness.  On admission, febrile, leukocytosis, UA positive, with  Blood cultures positive for staph aureus.  SANFORD done, no vegetation. s/p indium scan positive for R AV graft infection, vascular surgery consult requested.  Patient stable this am, htn 135/ 65,  Patient with complaints of back and joint pain that is currenlty being treated with IR.  Linda reports relief of pain with current plan.  Patient also recenlty constipated treated with senna and RN states 2 large BM today.      1 Problem/Plan  ·	CAD s/p CABG:  with elevated troponin - T 0.32 - elevated in past, in setting of ESRD on HD  Cath 2/2018- LIMA to LAD patent, SVG to OM patent, SVG to RPDA patent   SANFORD 7/9/2019: LVEF 30-35.Ml-mod TR. No evidence of endocarditis.   Echo Limited 7/18: LVEF 35-40%.  Ischemia Family requesting medications only, ct Dapt: Plavix 75mg po daily and Aspirin 81mg po daily     2 Problem/Plan  ·	HTN stable  ct coreg,   ct losartan    3 Problem/Plan  ·	Swelling of left arm - resolved  ultrasound left arm negative for clot    ESRD on HD   Cont HD 87yoF hx ESRD on HD (M and F only), CAD s/p CABG, HTN, DM, PAD, hypothyroidism presenting with generalized weakness.  On admission, febrile, leukocytosis, UA positive, with  Blood cultures positive for staph aureus.  SANFORD done, no vegetation. s/p indium scan positive for R AV graft infection, vascular surgery consult requested.  Patient stable this am, htn 135/ 65,  Patient with complaints of back and joint pain that is currenlty being treated with IR.  Linda reports relief of pain with current plan.  Patient also recenlty constipated treated with senna and RN states 2 large BM today.      1 Problem/Plan  ·	CAD s/p CABG:  with elevated troponin - T 0.32 - elevated in past, in setting of ESRD on HD  Cath 2/2018- LIMA to LAD patent, SVG to OM patent, SVG to RPDA patent   SANFORD 7/9/2019: LVEF 30-35.Ml-mod TR. No evidence of endocarditis.   Echo Limited 7/18: LVEF 35-40%.  Ischemia Family requesting medications only, ct Dapt: Plavix 75mg po daily and Aspirin 81mg po daily     2 Problem/Plan  ·	HTN stable  ct coreg,   ct losartan    3 Problem/Plan  ·	Swelling of left arm - resolved  ultrasound left arm negative for clot    ESRD on HD   Cont HD  Anemia - transfuse 1 unit PRBC's at dialysis tomorrow - FABIENNE mayen

## 2019-07-21 NOTE — PROGRESS NOTE ADULT - ASSESSMENT
87yoF hx ESRD on HD (M and F only), CAD s/p CABG, HTN, DM, PAD, hypothyroidism presenting with generalized weakness found to have UTI and meeting sepsis criteria, blood c/s positive for MSSA, source unknown, s/p indium scan positive for R AV graft infection, vascular surgery consult requested. Pt is s/p RRT on 07/17 due to AMS, afebrile, normal vitals,   repeat CT head no acute finding, noted to have Hg of 6.9, s/p 1 unit prbc, H&h improved    #Sepsis / Staph aureus bacteremia due to R arm AV graft infection  vascular surgery consult appreciated -  WBC resolving,  Afebrile, BCx negative  If signs of worsening on non-resolving infection will need AVG explant  OK to use AVG for HD  repeat blood c/s negative so far  ID consult appreciated -  We can then dose cefazolin 2g AD on tuesday, 2 g AD on thurs, 3 g AD on Saturday to complete 6 weeks through 8/25/19  s/p indium scan positive for R AV graft infection,  SANFORD for endocarditis/lead vegetations (-)    #Metabolic Encephalopathy - improved today, stop Gabapentin  repeat  CT head no acute finding, On ASA, Statin.     #CAd with elevated troponin - T flat   cardiology input appreciated - recommend repeat TTE  s/p SANFORD LVEF 30-35%  c/w home medication    #Chronic anemia - Hg 6.9 - HD RN report bleeding from AV graft yesterday   s/p 1 unit prbc - h&h improved  f/uc bc    #Low back pains- Right hip pains.  Osteoarthritis is a possibility   stop Neurontin as pt was sleepy - per son she is very sensitive to gabapentin, okay with oxycodone 2.5mg - tolerated in past    #constipation - start miralex,  and opal      #Thrombocytopenia - Plt improved.  Likely due to bacteremia.  Hematology input appreciated.     #CAD/Hx CVA- ASA, plavix, statin resumed.     #HTN- Stable, monitor BP     #ESRD on HD - HD . Nephrology is following.      #Hypothyroid- Continue [id number] mcg.    GOC -  son updated about pt current condition,  wishes full code, MOLSt form completed      disposition - pt for  [facility] - Cc/Sw working on insurance auth for transfer to [facility]

## 2019-07-21 NOTE — PROGRESS NOTE ADULT - SUBJECTIVE AND OBJECTIVE BOX
[place] [facility] Practice                                                        Office: [address] [place] [address]                                                       Telephone: [phone]. Fax:[phone]                                                                             PROGRESS NOTE   Reason for follow up:  Weakness and Sepsis                            Overnight: No new events.   Update:   Patient stabe overnight - negative doppler for swollen arm, htn 135/65  Subjective: "My back hurts"   Complains of:  Lower back pain and joint pain seen by MD ANI and treatment plan in place.  Oxytocin IR with relief as per Nurse Ortega  Review of symptoms: Cardiac:  No chest pain. No dyspnea. No palpitations.  Respiratory: no cough. No dyspnea  Gastrointestinal: No diarrhea. No abdominal pain. No bleeding.     Past medical history: No updates.   Chronic conditions:  Chronic conditions: HEALTH ISSUES - PROBLEM Dx:  Angina pectoris: Angina pectoris  ESRD, Dialysis, htn  	  Vitals:  T(C): 36.5 (07-21-19 @ 12:41), Max: 36.8 (07-20-19 @ 21:39)  HR: 66 (07-21-19 @ 12:41) (63 - 79)  BP: 135/65 (07-21-19 @ 12:41) (115/48 - 169/70)  RR: 19 (07-21-19 @ 12:41) (17 - 19)  SpO2: 99% (07-21-19 @ 12:41) (98% - 100%)  I&O's Summary  20 Jul 2019 07:01  -  21 Jul 2019 07:00  --------------------------------------------------------  IN: 0 mL / OUT: 800 mL / NET: -800 mL  Weight (kg): 134.1 (07-21 @ 04:58)    PHYSICAL EXAM:  Appearance: Thin frail elderly male in nad.    HEENT:  Head and neck: Atraumatic. Normocephalic.  Normal oral mucosa, PERRL, Neck is supple. No JVD, No carotid bruit.   Neurologic: A & O x 2, no focal deficits. EOMI , Cranial nerves are intact.  Lymphatic: No cervical lymphadenopathy  Cardiovascular: Normal S1 S2, No murmur, rubs/gallops. No JVD, No edema  Respiratory: Lungs clear to auscultation, diminished at bases.    Gastrointestinal:  Soft, Non-tender, + BS  Lower Extremities: No edema  Psychiatry: Patient is calm. No agitation. Mood & affect appropriate  Skin: No rashes/ ecchymoses/cyanosis/ulcers visualized on the face, hands or feet.    CURRENT MEDICATIONS:  carvedilol 6.25 milliGRAM(s) Oral every 12 hours  cloNIDine 0.1 milliGRAM(s) Oral every 8 hours  losartan 50 milliGRAM(s) Oral daily  ceFAZolin   IVPB  ceFAZolin   IVPB  docusate sodium  pantoprazole    Tablet  polyethylene glycol 3350  senna  atorvastatin  insulin lispro (HumaLOG) corrective regimen sliding scale  levothyroxine  aspirin  chewable  calcitriol   Capsule  calcium acetate  chlorhexidine 2% Cloths  clopidogrel Tablet  dextrose 5%.  epoetin barb Injectable  folic acid  lidocaine   Patch    LABS:	 	  CARDIAC MARKERS ( 19 Jul 2019 05:01 )  x     / 0.24 ng/mL / 24 U/L / x     / x      p-BNP 19 Jul 2019 05:01: x                              7.3    10.24 )-----------( 294      ( 21 Jul 2019 08:36 )             23.5   07-21  137  |  96<L>  |  22.0<H>  ----------------------------<  113  3.8   |  28.0  |  3.30<H>  Ca    7.4<L>      21 Jul 2019 08:36  TPro  5.1<L>  /  Alb  2.2<L>  /  TBili  0.3<L>  /  DBili  x   /  AST  22  /  ALT  <5  /  AlkPhos  180<H>  07-20      TELEMETRY:   Atrial paced and SR underlying 70

## 2019-07-21 NOTE — PROGRESS NOTE ADULT - ATTENDING COMMENTS
pt with MSSA bacteremia, HFrEF who is being medically managed.  Pt is on HF meds. Tolerating it  She may have AV graft infection which needs removal of graft and using temporary access for HD.  I reviewed the above and agree with a/p.

## 2019-07-21 NOTE — PROGRESS NOTE ADULT - ASSESSMENT
87yoF hx ESRD on HD (M and F only), AVG right side 8/3/17   CAD s/p CABG, HTN, DM, PAD, hypothyroidism presenting with generalized weakness, dysuria.   pain at graft site for 1 month.  fever in .6  leukocytosis to 14.   Blood cx 4/4 Staph aureus.   Imaging with no focus (no pneumonia, abscess, OM)  Overall Staph aureus bacteremia 2/2 AVG infection,     Recommend:  - Blood cultures + 4/4 Staph aureus. BCX 7/14 NGTD  - Despite listed penicillin allergy, tolerating cefazolin  c/w cefazolin 1 g IV every day (even on non HD days) and after dialysis on her dialysis days  - NM scan suspicious for AVG infection. In the absence of any other source I believe this is the source of her MSSA bacteremia and AVG explant would assist in source control. If not removed she will remain at risk for recurrence of infection in addition to adverse effects of long term antibiotics  - Recommend removal and temporary HD catheter placement-per vascular recommend conservative medical management for now  - Renal f/u RE HD schedule-she is currently on twice weekly HD and outpatient dosing for cefazolin with HD will require that she be on thrice weekly schedule. We can then dose cefazolin 2g AD on Monday, 2 g AD on Wed, 3 g AD on Fri to complete 6 weeks through 8/25/19  - SANFORD for endocarditis/lead vegetations (-)  - Trend Fever  - Trend WBC count      Will follow

## 2019-07-21 NOTE — PROGRESS NOTE ADULT - SUBJECTIVE AND OBJECTIVE BOX
NEPHROLOGY INTERVAL HPI/OVERNIGHT EVENTS:    Examined  Clinically same  c/o whole body pain    MEDICATIONS  (STANDING):  aspirin  chewable 81 milliGRAM(s) Oral daily  atorvastatin 40 milliGRAM(s) Oral at bedtime  calcitriol   Capsule 0.25 MICROGram(s) Oral daily  calcium acetate 667 milliGRAM(s) Oral three times a day with meals  carvedilol 6.25 milliGRAM(s) Oral every 12 hours  ceFAZolin   IVPB      ceFAZolin   IVPB 1000 milliGRAM(s) IV Intermittent every 24 hours  chlorhexidine 2% Cloths 1 Application(s) Topical <User Schedule>  cloNIDine 0.1 milliGRAM(s) Oral every 8 hours  clopidogrel Tablet 75 milliGRAM(s) Oral daily  dextrose 5%. 1000 milliLiter(s) (50 mL/Hr) IV Continuous <Continuous>  dextrose 50% Injectable 12.5 Gram(s) IV Push once  dextrose 50% Injectable 25 Gram(s) IV Push once  dextrose 50% Injectable 25 Gram(s) IV Push once  docusate sodium 100 milliGRAM(s) Oral three times a day  epoetin barb Injectable 94957 Unit(s) IV Push <User Schedule>  folic acid 1 milliGRAM(s) Oral daily  insulin lispro (HumaLOG) corrective regimen sliding scale   SubCutaneous three times a day before meals  insulin lispro (HumaLOG) corrective regimen sliding scale   SubCutaneous at bedtime  levothyroxine 112 MICROGram(s) Oral daily  lidocaine   Patch 1 Patch Transdermal daily  losartan 50 milliGRAM(s) Oral daily  pantoprazole    Tablet 40 milliGRAM(s) Oral before breakfast  polyethylene glycol 3350 17 Gram(s) Oral daily  saccharomyces boulardii 250 milliGRAM(s) Oral two times a day  senna 2 Tablet(s) Oral at bedtime    MEDICATIONS  (PRN):  acetaminophen   Tablet .. 650 milliGRAM(s) Oral every 6 hours PRN Temp greater or equal to 38C (100.4F), Mild Pain (1 - 3)  dextrose 40% Gel 15 Gram(s) Oral once PRN Blood Glucose LESS THAN 70 milliGRAM(s)/deciliter  glucagon  Injectable 1 milliGRAM(s) IntraMuscular once PRN Glucose LESS THAN 70 milligrams/deciliter  oxyCODONE    IR 2.5 milliGRAM(s) Oral every 6 hours PRN Severe Pain (7 - 10)      Allergies    penicillin (Anaphylaxis)  seafood (Anaphylaxis)  shellfish (Anaphylaxis)    Intolerances    Send Luke HERNANDEZ- RD OK (Unknown)      Vital Signs Last 24 Hrs  T(C): 36.4 (2019 09:09), Max: 36.8 (2019 21:39)  T(F): 97.5 (2019 09:09), Max: 98.2 (2019 21:39)  HR: 66 (2019 09:09) (63 - 79)  BP: 154/84 (2019 09:09) (115/48 - 169/70)  BP(mean): --  RR: 17 (2019 09:09) (17 - 18)  SpO2: 100% (2019 09:09) (98% - 100%)  Daily     Daily Weight in k.1 (2019 04:58)    PHYSICAL EXAM:  Appears chronically ill  NECK: Supple, no jvd  CHEST/LUNG: diminished BS at bases  HEART: Regular rate and rhythm; No rub   ABDOMEN: Soft, Nontender, Nondistended; Bowel sounds present  EXTREMITIES:  + edema less    LABS:                        7.3    10.24 )-----------( 294      ( 2019 08:36 )             23.5     07-21    137  |  96<L>  |  22.0<H>  ----------------------------<  113  3.8   |  28.0  |  3.30<H>    Ca    7.4<L>      2019 08:36    TPro  5.1<L>  /  Alb  2.2<L>  /  TBili  0.3<L>  /  DBili  x   /  AST  22  /  ALT  <5  /  AlkPhos  180<H>  0720                RADIOLOGY & ADDITIONAL TESTS:

## 2019-07-21 NOTE — PROGRESS NOTE ADULT - SUBJECTIVE AND OBJECTIVE BOX
[facility]  INFECTIOUS DISEASES AND INTERNAL MEDICINE at [place]  =======================================================  Paresh Stark MD  Diplomates American Board of Internal Medicine and Infectious Diseases  Tel: [phone]      Fax: [phone]  =======================================================    LAUREN ROBERSON [id number]    Follow up: MSSA bacteremia   cultures have been negative since 7/14/19  prior records reviewed - AVG in RIGHT side on on 8/3/17      Allergies:  penicillin (Anaphylaxis)  seafood (Anaphylaxis)  shellfish (Anaphylaxis)    Antibiotics:  ceFAZolin   IVPB      ceFAZolin   IVPB 1000 milliGRAM(s) IV Intermittent every 24 hours       REVIEW OF SYSTEMS:  as above      Physical Exam:  T(F): 97.7 (21 Jul 2019 12:41), Max: 98.2 (20 Jul 2019 21:39)  HR: 66 (21 Jul 2019 12:41)  BP: 135/65 (21 Jul 2019 12:41)  RR: 19 (21 Jul 2019 12:41)  SpO2: 99% (21 Jul 2019 12:41) (98% - 100%)  temp max in last 48H T(F): , Max: 98.2 (07-20-19 @ 21:39)    GEN: NAD, pleasant  HEENT: normocephalic and atraumatic. EOMI. PERRL.  Anicteric   NECK: Supple.   LUNGS: Clear to auscultation.  HEART: Regular rate and rhythm without murmur. PPM intact  ABDOMEN: Soft, nontender, and nondistended.  Positive bowel sounds.    : No CVA tenderness  EXTREMITIES: Without any edema.  MSK: no joint swelling  NEUROLOGIC: Cranial nerves II through XII are grossly intact. No focal deficits  PSYCHIATRIC: Appropriate affect .  SKIN: No Rash RUE AVG intact    =============  Labs:                        7.3    10.24 )-----------( 294      ( 21 Jul 2019 08:36 )             23.5       WBC Count: 10.24 K/uL (07-21-19 @ 08:36)  WBC Count: 9.65 K/uL (07-20-19 @ 15:19)  WBC Count: 12.48 K/uL (07-18-19 @ 06:45)  WBC Count: 14.45 K/uL (07-17-19 @ 09:19)  WBC Count: 15.36 K/uL (07-16-19 @ 16:16)      07-21    137  |  96<L>  |  22.0<H>  ----------------------------<  113  3.8   |  28.0  |  3.30<H>    Ca    7.4<L>      21 Jul 2019 08:36    TPro  5.1<L>  /  Alb  2.2<L>  /  TBili  0.3<L>  /  DBili  x   /  AST  22  /  ALT  <5  /  AlkPhos  180<H>  07-20      Culture - Blood (collected 07-14-19 @ 11:53)  Source: .Blood  Final Report (07-19-19 @ 13:00):    No growth at 5 days.    Culture - Blood (collected 07-14-19 @ 11:53)  Source: .Blood  Final Report (07-19-19 @ 13:00):    No growth at 5 days.    Culture - Blood (collected 07-13-19 @ 10:36)  Source: .Blood  Final Report (07-15-19 @ 09:30):    Growth in aerobic and anaerobic bottles: Staphylococcus aureus    Aerobic Bottle: 15:35 Hours to positivity    Anaerobic Bottle: 20:26 Hours to positivity    ,    TYPE: (C=Critical, N=Notification, A=Abnormal) c    TESTS:  _ gs    DATE/TIME CALLED: _ 07/14/2019 09:50:59    CALLED TO: Kellee bryant rn    READ BACK (2 Patient Identifiers)(Y/N): _ y    READ BACK VALUES (Y/N): _ y    CALLED BY: Kellee wetzel  Organism: Staphylococcus aureus (07-15-19 @ 09:30)  Organism: Staphylococcus aureus (07-15-19 @ 09:30)    Sensitivities:      -  Ampicillin/Sulbactam: S <=8/4      -  Cefazolin: S <=4      -  Clindamycin: S <=0.5      -  Erythromycin: S <=0.5      -  Gentamicin: S <=4 Should not be used as monotherapy      -  Oxacillin: S 1      -  Penicillin: R >8      -  RIF- Rifampin: S <=1 Should not be used as monotherapy      -  Tetra/Doxy: S <=4      -  Trimethoprim/Sulfamethoxazole: S <=0.5/9.5      -  Vancomycin: S 2      Method Type: ALTHEA    Culture - Blood (collected 07-13-19 @ 10:36)  Source: .Blood  Final Report (07-15-19 @ 09:44):    Growth in aerobic and anaerobic bottles: Staphylococcus aureus    Aerobic Bottle: 15:06 Hours to positivity    Anaerobic Bottle: 19:45 Hours to positivity    ,    TYPE: (C=Critical, N=Notification, A=Abnormal) c    TESTS:  _     DATE/TIME CALLED: _ 07/14/2019 09:52:46    CALLED TO: Kellee bryant rn    READ BACK (2 Patient Identifiers)(Y/N): _ y    READ BACK VALUES (Y/N): _ y    CALLED BY: Kellee wetzel  Organism: Staphylococcus aureus (07-15-19 @ 09:44)  Organism: Staphylococcus aureus (07-15-19 @ 09:44)    Sensitivities:      -  Ampicillin/Sulbactam: R >16/8      -  Cefazolin: S <=4      -  Clindamycin: S <=0.5      -  Erythromycin: S <=0.5      -  Gentamicin: S <=4 Should not be used as monotherapy      -  Oxacillin: S 1      -  Penicillin: R >8      -  RIF- Rifampin: S <=1 Should not be used as monotherapy      -  Tetra/Doxy: S <=4      -  Trimethoprim/Sulfamethoxazole: S <=0.5/9.5      -  Vancomycin: S 2      Method Type: ALTHEA    Culture - Blood (collected 07-09-19 @ 09:41)  Source: .Blood  Final Report (07-14-19 @ 11:10):    Growth in aerobic and anaerobic bottles: Staphylococcus aureus    Aerobic Bottle: 1 day;22:22 Hours to positivity    Anaerobic Bottle: 3 days 14:17 Hours to positivity    .    TYPE: (C=Critical, N=Notification, A=Abnormal) C    TESTS:  _ BLD GS    DATE/TIME CALLED: _ 07/11/2019 12:23:48    CALLED TO: Kellee CAMPOS RN    READ BACK (2 Patient Identifiers)(Y/N): _ Y    READ BACK VALUES (Y/N): _ Y    CALLED BY: Kellee NGO    ,    TYPE: (C=Critical, N=Notification, A=Abnormal) c    TESTS:  _ gs    DATE/TIME CALLED: _ 07/13/2019 09:43:04    CALLED TO: Kellee everett rn    READ BACK (2 Patient Identifiers)(Y/N): _ y    READ BACK VALUES (Y/N): _ y    CALLED BY: Kellee wetzel  Organism: Staphylococcus aureus (07-14-19 @ 11:10)  Organism: Staphylococcus aureus (07-14-19 @ 11:10)    Sensitivities:      -  Ampicillin/Sulbactam: S <=8/4      -  Cefazolin: S <=4      -  Clindamycin: S <=0.5      -  Erythromycin: S <=0.5      -  Gentamicin: S <=4 Should not be used as monotherapy      -  Oxacillin: S <=0.25      -  Penicillin: R >8      -  RIF- Rifampin: S <=1 Should not be used as monotherapy      -  Tetra/Doxy: S <=4      -  Trimethoprim/Sulfamethoxazole: S <=0.5/9.5      -  Vancomycin: S 0.5      Method Type: ALTHEA    Culture - Blood (collected 07-09-19 @ 09:41)  Source: .Blood  Final Report (07-11-19 @ 15:01):    Growth in aerobic bottle: Staphylococcus aureus    Aerobic Bottle: 17:20 Hours to positivity    .    TYPE: (C=Critical, N=Notification, A=Abnormal) C    TESTS:  _ BLD GS    DATE/TIME CALLED: _ 07/10/2019 09:54:05    CALLED TO: Kellee BLANCO RN    READ BACK (2 Patient Identifiers)(Y/N): _ Y    READ BACK VALUES (Y/N): _ Y    CALLED BY: Kellee DANIEL  Organism: Staphylococcus aureus (07-11-19 @ 15:01)  Organism: Staphylococcus aureus (07-11-19 @ 15:01)    Sensitivities:      -  Ampicillin/Sulbactam: S <=8/4      -  Cefazolin: S <=4      -  Clindamycin: S <=0.5      -  Erythromycin: S <=0.5      -  Gentamicin: S <=4 Should not be used as monotherapy      -  Oxacillin: S 0.5      -  Penicillin: R >8      -  RIF- Rifampin: S <=1 Should not be used as monotherapy      -  Tetra/Doxy: S <=4      -  Trimethoprim/Sulfamethoxazole: S <=0.5/9.5      -  Vancomycin: S 1      Method Type: ALTHEA    Culture - Blood (collected 07-07-19 @ 20:25)  Source: .Blood  Final Report (07-11-19 @ 10:38):    Growth in aerobic and anaerobic bottles: Staphylococcus aureus    Anaerobic Bottle: 2 days;05:44 Hours to positivity    Aerobic Bottle: 2 Days; 22.24 Hours to positivity    .    TYPE: (C=Critical, N=Notification, A=Abnormal) C    TESTS:  _ BLD     DATE/TIME CALLED: _ 07/10/2019 09:51:08    CALLED TO: Kellee BLANCO RN    READ BACK (2 Patient Identifiers)(Y/N): _ Y    READ BACK VALUES (Y/N): _ Y    CALLED BY: Kellee DANIEL  Organism: Staphylococcus aureus (07-11-19 @ 10:38)  Organism: Staphylococcus aureus (07-11-19 @ 10:38)    Sensitivities:      -  Ampicillin/Sulbactam: S <=8/4      -  Cefazolin: S <=4      -  Clindamycin: S <=0.5      -  Erythromycin: S <=0.5      -  Gentamicin: S <=4 Should not be used as monotherapy      -  Oxacillin: S 0.5      -  Penicillin: R >8      -  RIF- Rifampin: S <=1 Should not be used as monotherapy      -  Tetra/Doxy: S <=4      -  Trimethoprim/Sulfamethoxazole: S <=0.5/9.5      -  Vancomycin: S 0.5      Method Type: ALTHEA

## 2019-07-21 NOTE — PROGRESS NOTE ADULT - ASSESSMENT
ESRD - HD on TTS schedule    Anemia - transfused7-17  h/h downtrending may need another transfusion  Epogen with HD     RO - Binders     Resolved sepsis   Blood Cx (-) 7-14   Abx ongoing as per ID - plan is cefazolin 2g AD on Monday, 2 g AD on Wed, 3 g AD on Fri to complete 6 weeks through 8/25/19- I notified her HD center [place]  SANFORD wa s(-) Veg   Vascular follow up re. AVG noted , follow for now     Will follow

## 2019-07-21 NOTE — PROGRESS NOTE ADULT - SUBJECTIVE AND OBJECTIVE BOX
CC: Weakness. ESRD on HD.  Low back pains.  Right hip pains.  SOB. R AVG infection. MSSA bacteremia.    HPI:  87yoF hx ESRD on HD (M and F only), CAD s/p CABG, HTN, DM, PAD, hypothyroidism presenting with generalized weakness.  Pt is poor historian despite use of   She reports generalized weakness for past few days associated with generalized, abdominal pain that she is unable to describe associated with nausea, some episodes of vomiting.  Reports difficulty with urination and ?dysuria but believes her urinary symptoms are due to not drinking enough fluids recently. Unable to explain why she is only on HD twice per week, but says last HD session was on 7/1.  Denies fevers, chest pain, dyspnea, endorses chronic pain in face and all extremities which is chronic for her.  On admission, febrile, leukocytosis, UA positive, was pan scanned by ED and CT abd/pelvis showed haziness around the gallbladder however follow up abd US was negative for cholelithiasis and Stewart's sign and no LFT elevation on labs. (04 Jul 2019 22:54)    REVIEW OF SYSTEMS:    Patient denied fever, chills, abdominal pain, nausea, vomiting, cough, shortness of breath, chest pain or palpitations    Vital Signs Last 24 Hrs  T(C): 36.5 (21 Jul 2019 12:41), Max: 36.8 (20 Jul 2019 21:39)  T(F): 97.7 (21 Jul 2019 12:41), Max: 98.2 (20 Jul 2019 21:39)  HR: 66 (21 Jul 2019 12:41) (63 - 74)  BP: 135/65 (21 Jul 2019 12:41) (115/48 - 169/70)  BP(mean): --  RR: 19 (21 Jul 2019 12:41) (17 - 19)  SpO2: 99% (21 Jul 2019 12:41) (98% - 100%)I&O's Summary    20 Jul 2019 07:01  -  21 Jul 2019 07:00  --------------------------------------------------------  IN: 0 mL / OUT: 800 mL / NET: -800 mL      PHYSICAL EXAM:  GENERAL: ill looking   HEENT: PERRL, +EOMI, anicteric, no [place]  NECK: Supple, No JVD   CHEST/LUNG: CTA bilaterally; Normal effort  HEART: S1S2 Normal intensity, no murmurs, gallops or rubs noted  ABDOMEN: Soft, BS Normoactive, NT, ND, no HSM noted  EXTREMITIES:  2+ radial and DP pulses noted, no clubbing, cyanosis, Right upper ext edema noted, Limited mobility   SKIN: No rashes or lesions noted  NEURO: A&Ox3, no focal deficits noted, CN II-XII intact  PSYCH: Depressed mood and affect; insight/judgement appropriate  LABS:                        7.3    10.24 )-----------( 294      ( 21 Jul 2019 08:36 )             23.5     07-21    137  |  96<L>  |  22.0<H>  ----------------------------<  113  3.8   |  28.0  |  3.30<H>    Ca    7.4<L>      21 Jul 2019 08:36    TPro  5.1<L>  /  Alb  2.2<L>  /  TBili  0.3<L>  /  DBili  x   /  AST  22  /  ALT  <5  /  AlkPhos  180<H>  07-20        RADIOLOGY & ADDITIONAL TESTS:    MEDICATIONS:  MEDICATIONS  (STANDING):  aspirin  chewable 81 milliGRAM(s) Oral daily  atorvastatin 40 milliGRAM(s) Oral at bedtime  calcitriol   Capsule 0.25 MICROGram(s) Oral daily  calcium acetate 667 milliGRAM(s) Oral three times a day with meals  carvedilol 6.25 milliGRAM(s) Oral every 12 hours  ceFAZolin   IVPB      ceFAZolin   IVPB 1000 milliGRAM(s) IV Intermittent every 24 hours  chlorhexidine 2% Cloths 1 Application(s) Topical <User Schedule>  cloNIDine 0.1 milliGRAM(s) Oral every 8 hours  clopidogrel Tablet 75 milliGRAM(s) Oral daily  dextrose 5%. 1000 milliLiter(s) (50 mL/Hr) IV Continuous <Continuous>  dextrose 50% Injectable 12.5 Gram(s) IV Push once  dextrose 50% Injectable 25 Gram(s) IV Push once  dextrose 50% Injectable 25 Gram(s) IV Push once  docusate sodium 100 milliGRAM(s) Oral three times a day  epoetin barb Injectable 38188 Unit(s) IV Push <User Schedule>  folic acid 1 milliGRAM(s) Oral daily  insulin lispro (HumaLOG) corrective regimen sliding scale   SubCutaneous three times a day before meals  insulin lispro (HumaLOG) corrective regimen sliding scale   SubCutaneous at bedtime  levothyroxine 112 MICROGram(s) Oral daily  lidocaine   Patch 1 Patch Transdermal daily  losartan 50 milliGRAM(s) Oral daily  pantoprazole    Tablet 40 milliGRAM(s) Oral before breakfast  polyethylene glycol 3350 17 Gram(s) Oral daily  saccharomyces boulardii 250 milliGRAM(s) Oral two times a day  senna 2 Tablet(s) Oral at bedtime    MEDICATIONS  (PRN):  acetaminophen   Tablet .. 650 milliGRAM(s) Oral every 6 hours PRN Temp greater or equal to 38C (100.4F), Mild Pain (1 - 3)  dextrose 40% Gel 15 Gram(s) Oral once PRN Blood Glucose LESS THAN 70 milliGRAM(s)/deciliter  glucagon  Injectable 1 milliGRAM(s) IntraMuscular once PRN Glucose LESS THAN 70 milligrams/deciliter  oxyCODONE    IR 2.5 milliGRAM(s) Oral every 6 hours PRN Severe Pain (7 - 10)

## 2019-07-22 LAB
ANION GAP SERPL CALC-SCNC: 13 MMOL/L — SIGNIFICANT CHANGE UP (ref 5–17)
BLD GP AB SCN SERPL QL: SIGNIFICANT CHANGE UP
BUN SERPL-MCNC: 30 MG/DL — HIGH (ref 8–20)
CALCIUM SERPL-MCNC: 7.8 MG/DL — LOW (ref 8.6–10.2)
CHLORIDE SERPL-SCNC: 96 MMOL/L — LOW (ref 98–107)
CO2 SERPL-SCNC: 22 MMOL/L — SIGNIFICANT CHANGE UP (ref 22–29)
CREAT SERPL-MCNC: 3.81 MG/DL — HIGH (ref 0.5–1.3)
GLUCOSE BLDC GLUCOMTR-MCNC: 101 MG/DL — HIGH (ref 70–99)
GLUCOSE BLDC GLUCOMTR-MCNC: 103 MG/DL — HIGH (ref 70–99)
GLUCOSE BLDC GLUCOMTR-MCNC: 165 MG/DL — HIGH (ref 70–99)
GLUCOSE BLDC GLUCOMTR-MCNC: 93 MG/DL — SIGNIFICANT CHANGE UP (ref 70–99)
GLUCOSE SERPL-MCNC: 94 MG/DL — SIGNIFICANT CHANGE UP (ref 70–115)
HCT VFR BLD CALC: 26.2 % — LOW (ref 34.5–45)
HGB BLD-MCNC: 7.8 G/DL — LOW (ref 11.5–15.5)
MCHC RBC-ENTMCNC: 29.1 PG — SIGNIFICANT CHANGE UP (ref 27–34)
MCHC RBC-ENTMCNC: 29.8 GM/DL — LOW (ref 32–36)
MCV RBC AUTO: 97.8 FL — SIGNIFICANT CHANGE UP (ref 80–100)
PLATELET # BLD AUTO: 319 K/UL — SIGNIFICANT CHANGE UP (ref 150–400)
POTASSIUM SERPL-MCNC: 4.6 MMOL/L — SIGNIFICANT CHANGE UP (ref 3.5–5.3)
POTASSIUM SERPL-SCNC: 4.6 MMOL/L — SIGNIFICANT CHANGE UP (ref 3.5–5.3)
RBC # BLD: 2.68 M/UL — LOW (ref 3.8–5.2)
RBC # FLD: 24.7 % — HIGH (ref 10.3–14.5)
SODIUM SERPL-SCNC: 131 MMOL/L — LOW (ref 135–145)
WBC # BLD: 9.79 K/UL — SIGNIFICANT CHANGE UP (ref 3.8–10.5)
WBC # FLD AUTO: 9.79 K/UL — SIGNIFICANT CHANGE UP (ref 3.8–10.5)

## 2019-07-22 PROCEDURE — 99232 SBSQ HOSP IP/OBS MODERATE 35: CPT

## 2019-07-22 RX ADMIN — Medication 250 MILLIGRAM(S): at 17:44

## 2019-07-22 RX ADMIN — SENNA PLUS 2 TABLET(S): 8.6 TABLET ORAL at 23:04

## 2019-07-22 RX ADMIN — Medication 81 MILLIGRAM(S): at 09:16

## 2019-07-22 RX ADMIN — Medication 112 MICROGRAM(S): at 05:59

## 2019-07-22 RX ADMIN — OXYCODONE AND ACETAMINOPHEN 1 TABLET(S): 5; 325 TABLET ORAL at 16:01

## 2019-07-22 RX ADMIN — Medication 250 MILLIGRAM(S): at 05:59

## 2019-07-22 RX ADMIN — LIDOCAINE 1 PATCH: 4 CREAM TOPICAL at 21:15

## 2019-07-22 RX ADMIN — Medication 100 MILLIGRAM(S): at 05:59

## 2019-07-22 RX ADMIN — ATORVASTATIN CALCIUM 40 MILLIGRAM(S): 80 TABLET, FILM COATED ORAL at 23:05

## 2019-07-22 RX ADMIN — Medication 2: at 17:44

## 2019-07-22 RX ADMIN — CHLORHEXIDINE GLUCONATE 1 APPLICATION(S): 213 SOLUTION TOPICAL at 06:01

## 2019-07-22 RX ADMIN — Medication 667 MILLIGRAM(S): at 12:55

## 2019-07-22 RX ADMIN — Medication 667 MILLIGRAM(S): at 09:16

## 2019-07-22 RX ADMIN — Medication 100 MILLIGRAM(S): at 14:00

## 2019-07-22 RX ADMIN — LOSARTAN POTASSIUM 50 MILLIGRAM(S): 100 TABLET, FILM COATED ORAL at 05:59

## 2019-07-22 RX ADMIN — OXYCODONE AND ACETAMINOPHEN 1 TABLET(S): 5; 325 TABLET ORAL at 02:17

## 2019-07-22 RX ADMIN — CARVEDILOL PHOSPHATE 6.25 MILLIGRAM(S): 80 CAPSULE, EXTENDED RELEASE ORAL at 17:45

## 2019-07-22 RX ADMIN — CARVEDILOL PHOSPHATE 6.25 MILLIGRAM(S): 80 CAPSULE, EXTENDED RELEASE ORAL at 05:59

## 2019-07-22 RX ADMIN — OXYCODONE AND ACETAMINOPHEN 1 TABLET(S): 5; 325 TABLET ORAL at 23:34

## 2019-07-22 RX ADMIN — OXYCODONE AND ACETAMINOPHEN 1 TABLET(S): 5; 325 TABLET ORAL at 23:04

## 2019-07-22 RX ADMIN — Medication 1 MILLIGRAM(S): at 09:16

## 2019-07-22 RX ADMIN — Medication 667 MILLIGRAM(S): at 17:44

## 2019-07-22 RX ADMIN — OXYCODONE AND ACETAMINOPHEN 1 TABLET(S): 5; 325 TABLET ORAL at 10:14

## 2019-07-22 RX ADMIN — Medication 0.1 MILLIGRAM(S): at 14:00

## 2019-07-22 RX ADMIN — LIDOCAINE 1 PATCH: 4 CREAM TOPICAL at 19:41

## 2019-07-22 RX ADMIN — Medication 0.1 MILLIGRAM(S): at 05:59

## 2019-07-22 RX ADMIN — OXYCODONE AND ACETAMINOPHEN 1 TABLET(S): 5; 325 TABLET ORAL at 09:14

## 2019-07-22 RX ADMIN — CALCITRIOL 0.25 MICROGRAM(S): 0.5 CAPSULE ORAL at 09:16

## 2019-07-22 RX ADMIN — Medication 100 MILLIGRAM(S): at 23:04

## 2019-07-22 RX ADMIN — LIDOCAINE 1 PATCH: 4 CREAM TOPICAL at 09:15

## 2019-07-22 RX ADMIN — PANTOPRAZOLE SODIUM 40 MILLIGRAM(S): 20 TABLET, DELAYED RELEASE ORAL at 05:59

## 2019-07-22 RX ADMIN — CLOPIDOGREL BISULFATE 75 MILLIGRAM(S): 75 TABLET, FILM COATED ORAL at 09:18

## 2019-07-22 RX ADMIN — LIDOCAINE 1 PATCH: 4 CREAM TOPICAL at 00:54

## 2019-07-22 RX ADMIN — Medication 0.1 MILLIGRAM(S): at 23:04

## 2019-07-22 RX ADMIN — OXYCODONE AND ACETAMINOPHEN 1 TABLET(S): 5; 325 TABLET ORAL at 02:55

## 2019-07-22 NOTE — PROGRESS NOTE ADULT - ASSESSMENT
87yoF hx ESRD on HD (M and F only), CAD s/p CABG, HTN, DM, PAD, hypothyroidism presenting with generalized weakness.  On admission, febrile, leukocytosis, UA positive, with  Blood cultures positive for staph aureus.  SANFORD done, no vegetation. s/p indium scan positive for R AV graft infection, vascular surgery consult requested.       Preoperative Cardiovascular examination:  Vascular Surgery input noted: " If signs of worsening on non-resolving infection will need AVG explant"  Pt doesn't have absolute cardiac contraindication for the planned procedure/surgery.    Sepsis :  Staph aureus bacteremia due to R arm AV graft infection  SANFORD negative for vegetation    CAD s/p CABG:  with elevated troponin - T 0.32 - elevated in past, in setting of ESRD on HD  Cath 2/2018- LIMA to LAD patent, SVG to OM patent, SVG to RPDA patent   Ischemia MCCARTHY d/w the son Darrin: I called  spoke on the phone: Family unwilling stress testing, cath, invasive etc. Medication only wanted.    HFrEF  SANFORD 7/9/2019: LVEF 30-35.Ml-mod TR. No evidence of endocarditis.   Echo Limited 7/18: LVEF 35-40%.    Tolerated  carvedilol.   Tolerated   Losartan.  Volume control with HD    ESRD on HD   Cont HD    c/o coccyx pain:   Coccyx and sacral areas with ecchymosis and early stage II ulcer.     CURRENT MEDICATIONS:  carvedilol 6.25 milliGRAM(s) Oral every 12 hours  cloNIDine 0.1 milliGRAM(s) Oral every 8 hours  losartan 50 milliGRAM(s) Oral daily  atorvastatin 40 milliGRAM(s) Oral at bedtime  aspirin  chewable 81 milliGRAM(s) Oral daily  clopidogrel Tablet 75 milliGRAM(s) Oral daily        will s/o   Pls call with any question

## 2019-07-22 NOTE — PROGRESS NOTE ADULT - ASSESSMENT
87yoF hx ESRD on HD (M and F only), AVG right side 8/3/17   CAD s/p CABG, HTN, DM, PAD, hypothyroidism presenting with generalized weakness, dysuria.   pain at graft site for 1 month.  fever in .6  leukocytosis to 14.   Blood cx 4/4 Staph aureus.   Imaging with no focus (no pneumonia, abscess, OM). NM scan suspicious for AVG infection.  Overall Staph aureus bacteremia 2/2 AVG infection    Recommend:  - Blood cultures + 4/4 Staph aureus. BCX 7/14 NGTD  - Despite listed penicillin allergy, tolerating cefazolin  c/w cefazolin 1 g IV every day (even on non HD days) and after dialysis on her dialysis days  - NM scan suspicious for AVG infection. In the absence of any other source I believe this is the source of her MSSA bacteremia and AVG explant would assist in source control. If not removed she will remain at risk for recurrence of infection in addition to adverse effects of long term antibiotics  - Recommend removal and temporary HD catheter placement-per vascular recommend conservative medical management for now  - Renal f/u RE HD schedule-she is currently on twice weekly HD and outpatient dosing for cefazolin with HD will require that she be on thrice weekly schedule. We can then dose cefazolin 2g AD on Monday, 2 g AD on Wed, 3 g AD on Fri to complete 6 weeks through 8/25/19  - SANFORD for endocarditis/lead vegetations (-)  - Trend Fever  - Trend WBC count  -Discussed with jose Roger at bedside      Will follow

## 2019-07-22 NOTE — PROGRESS NOTE ADULT - SUBJECTIVE AND OBJECTIVE BOX
[facility]  INFECTIOUS DISEASES AND INTERNAL MEDICINE at [place]  =======================================================  Paresh Stark MD  Diplomates American Board of Internal Medicine and Infectious Diseases  Tel: [phone]      Fax: [phone]  =======================================================    LAUREN ROBERSON [id number]    Follow up: MSSA bacteremia. c/o pain at rectal area at site of bed sore    Allergies:  penicillin (Anaphylaxis)  seafood (Anaphylaxis)  Send Nepro TID- RD OK (Unknown)  shellfish (Anaphylaxis)      Medications:  acetaminophen   Tablet .. 650 milliGRAM(s) Oral every 6 hours PRN  aspirin  chewable 81 milliGRAM(s) Oral daily  atorvastatin 40 milliGRAM(s) Oral at bedtime  calcitriol   Capsule 0.25 MICROGram(s) Oral daily  calcium acetate 667 milliGRAM(s) Oral three times a day with meals  carvedilol 6.25 milliGRAM(s) Oral every 12 hours  ceFAZolin   IVPB      ceFAZolin   IVPB 1000 milliGRAM(s) IV Intermittent every 24 hours  chlorhexidine 2% Cloths 1 Application(s) Topical <User Schedule>  cloNIDine 0.1 milliGRAM(s) Oral every 8 hours  clopidogrel Tablet 75 milliGRAM(s) Oral daily  dextrose 40% Gel 15 Gram(s) Oral once PRN  dextrose 5%. 1000 milliLiter(s) IV Continuous <Continuous>  dextrose 50% Injectable 12.5 Gram(s) IV Push once  dextrose 50% Injectable 25 Gram(s) IV Push once  dextrose 50% Injectable 25 Gram(s) IV Push once  docusate sodium 100 milliGRAM(s) Oral three times a day  epoetin barb Injectable 03512 Unit(s) IV Push <User Schedule>  folic acid 1 milliGRAM(s) Oral daily  glucagon  Injectable 1 milliGRAM(s) IntraMuscular once PRN  insulin lispro (HumaLOG) corrective regimen sliding scale   SubCutaneous three times a day before meals  insulin lispro (HumaLOG) corrective regimen sliding scale   SubCutaneous at bedtime  levothyroxine 112 MICROGram(s) Oral daily  lidocaine   Patch 1 Patch Transdermal daily  losartan 50 milliGRAM(s) Oral daily  oxyCODONE    5 mG/acetaminophen 325 mG 1 Tablet(s) Oral every 6 hours PRN  pantoprazole    Tablet 40 milliGRAM(s) Oral before breakfast  polyethylene glycol 3350 17 Gram(s) Oral daily  saccharomyces boulardii 250 milliGRAM(s) Oral two times a day  senna 2 Tablet(s) Oral at bedtime            REVIEW OF SYSTEMS:  CONSTITUTIONAL:  No Fever or chills  HEENT:   No diplopia or blurred vision.  No earache, sore throat or runny nose.  CARDIOVASCULAR:  No pressure, squeezing, strangling, tightness, heaviness or aching about the chest, neck, axilla or epigastrium.  RESPIRATORY:  No cough, shortness of breath  GASTROINTESTINAL:  No nausea, vomiting or diarrhea.  GENITOURINARY:  No dysuria, frequency or urgency. No Blood in urine  MUSCULOSKELETAL:  no joint aches, no muscle pain  SKIN:  No change in skin, hair or nails.  NEUROLOGIC:  No Headaches, seizures or weakness.  PSYCHIATRIC:  No disorder of thought or mood.  ENDOCRINE:  No heat or cold intolerance  HEMATOLOGICAL:  No easy bruising or bleeding.            Physical Exam:  ICU Vital Signs Last 24 Hrs  T(C): 36.7 (22 Jul 2019 16:20), Max: 36.8 (22 Jul 2019 05:57)  T(F): 98 (22 Jul 2019 16:20), Max: 98.2 (22 Jul 2019 05:57)  HR: 68 (22 Jul 2019 17:43) (65 - 77)  BP: 132/72 (22 Jul 2019 17:43) (122/75 - 160/76)  BP(mean): --  ABP: --  ABP(mean): --  RR: 20 (22 Jul 2019 17:43) (18 - 20)  SpO2: 100% (22 Jul 2019 17:43) (98% - 100%)      GEN: NAD, pleasant  HEENT: normocephalic and atraumatic. EOMI. PERRL.  Anicteric   NECK: Supple.   LUNGS: Clear to auscultation.  HEART: Regular rate and rhythm without murmur. +PPM intact  ABDOMEN: Soft, nontender, and nondistended.  Positive bowel sounds.    : No CVA tenderness  EXTREMITIES: Without any edema.  MSK: no joint swelling  NEUROLOGIC: Cranial nerves II through XII are grossly intact. No focal deficits  PSYCHIATRIC: Appropriate affect .  SKIN: No Rash RUE AVG warm      Labs:  07-22    131<L>  |  96<L>  |  30.0<H>  ----------------------------<  94  4.6   |  22.0  |  3.81<H>    Ca    7.8<L>      22 Jul 2019 06:30                            7.8    9.79  )-----------( 319      ( 22 Jul 2019 06:30 )             26.2                 CAPILLARY BLOOD GLUCOSE      POCT Blood Glucose.: 165 mg/dL (22 Jul 2019 16:53)  POCT Blood Glucose.: 103 mg/dL (22 Jul 2019 12:27)  POCT Blood Glucose.: 93 mg/dL (22 Jul 2019 08:07)  POCT Blood Glucose.: 167 mg/dL (21 Jul 2019 21:02)        RECENT CULTURES:  07-14 @ 11:53 .Blood     No growth at 5 days.        07-13 @ 10:36 .Blood Staphylococcus aureus    Growth in aerobic and anaerobic bottles: Staphylococcus aureus  Aerobic Bottle: 15:06 Hours to positivity  Anaerobic Bottle: 19:45 Hours to positivity  ,  TYPE: (C=Critical, N=Notification, A=Abnormal) c  TESTS:  _   DATE/TIME CALLED: _ 07/14/2019 09:52:46  CALLED TO: Kellee bryant rn  READ BACK (2 Patient Identifiers)(Y/N): _ y  READ BACK VALUES (Y/N): _ y  CALLED BY: Kellee wetzel        07-09 @ 09:41 .Blood Staphylococcus aureus    Growth in aerobic bottle: Staphylococcus aureus  Aerobic Bottle: 17:20 Hours to positivity  .  TYPE: (C=Critical, N=Notification, A=Abnormal) C  TESTS:  _ BLD   DATE/TIME CALLED: _ 07/10/2019 09:54:05  CALLED TO: Kellee BLANCO RN  READ BACK (2 Patient Identifiers)(Y/N): _ Y  READ BACK VALUES (Y/N): _ Y  [facility]

## 2019-07-22 NOTE — PROGRESS NOTE ADULT - SUBJECTIVE AND OBJECTIVE BOX
NEPHROLOGY INTERVAL HPI/OVERNIGHT EVENTS:    Examined   Chronic pain    MEDICATIONS  (STANDING):  aspirin  chewable 81 milliGRAM(s) Oral daily  atorvastatin 40 milliGRAM(s) Oral at bedtime  calcitriol   Capsule 0.25 MICROGram(s) Oral daily  calcium acetate 667 milliGRAM(s) Oral three times a day with meals  carvedilol 6.25 milliGRAM(s) Oral every 12 hours  ceFAZolin   IVPB      ceFAZolin   IVPB 1000 milliGRAM(s) IV Intermittent every 24 hours  chlorhexidine 2% Cloths 1 Application(s) Topical <User Schedule>  cloNIDine 0.1 milliGRAM(s) Oral every 8 hours  clopidogrel Tablet 75 milliGRAM(s) Oral daily  dextrose 5%. 1000 milliLiter(s) (50 mL/Hr) IV Continuous <Continuous>  dextrose 50% Injectable 12.5 Gram(s) IV Push once  dextrose 50% Injectable 25 Gram(s) IV Push once  dextrose 50% Injectable 25 Gram(s) IV Push once  docusate sodium 100 milliGRAM(s) Oral three times a day  epoetin barb Injectable 13037 Unit(s) IV Push <User Schedule>  folic acid 1 milliGRAM(s) Oral daily  insulin lispro (HumaLOG) corrective regimen sliding scale   SubCutaneous three times a day before meals  insulin lispro (HumaLOG) corrective regimen sliding scale   SubCutaneous at bedtime  levothyroxine 112 MICROGram(s) Oral daily  lidocaine   Patch 1 Patch Transdermal daily  losartan 50 milliGRAM(s) Oral daily  pantoprazole    Tablet 40 milliGRAM(s) Oral before breakfast  polyethylene glycol 3350 17 Gram(s) Oral daily  saccharomyces boulardii 250 milliGRAM(s) Oral two times a day  senna 2 Tablet(s) Oral at bedtime    MEDICATIONS  (PRN):  acetaminophen   Tablet .. 650 milliGRAM(s) Oral every 6 hours PRN Temp greater or equal to 38C (100.4F), Mild Pain (1 - 3)  dextrose 40% Gel 15 Gram(s) Oral once PRN Blood Glucose LESS THAN 70 milliGRAM(s)/deciliter  glucagon  Injectable 1 milliGRAM(s) IntraMuscular once PRN Glucose LESS THAN 70 milligrams/deciliter  oxyCODONE    5 mG/acetaminophen 325 mG 1 Tablet(s) Oral every 6 hours PRN Severe Pain (7 - 10)      Allergies    penicillin (Anaphylaxis)  seafood (Anaphylaxis)  shellfish (Anaphylaxis)    Intolerances    Send Nepro TID- RD OK (Unknown)      Vital Signs Last 24 Hrs  T(C): 36.5 (22 Jul 2019 09:40), Max: 36.8 (22 Jul 2019 05:57)  T(F): 97.7 (22 Jul 2019 09:40), Max: 98.2 (22 Jul 2019 05:57)  HR: 70 (22 Jul 2019 13:58) (67 - 77)  BP: 132/56 (22 Jul 2019 13:58) (122/75 - 160/76)  BP(mean): --  RR: 20 (22 Jul 2019 13:58) (18 - 20)  SpO2: 98% (22 Jul 2019 13:58) (98% - 100%)  Daily     Daily     PHYSICAL EXAM:  Appears chronically ill  NECK: Supple, no jvd  CHEST/LUNG: diminished BS at bases  HEART: Regular rate and rhythm; No rub   ABDOMEN: Soft, Nontender, Nondistended; Bowel sounds present  EXTREMITIES:  + edema less    LABS:                        7.8    9.79  )-----------( 319      ( 22 Jul 2019 06:30 )             26.2     07-22    131<L>  |  96<L>  |  30.0<H>  ----------------------------<  94  4.6   |  22.0  |  3.81<H>    Ca    7.8<L>      22 Jul 2019 06:30    TPro  5.1<L>  /  Alb  2.2<L>  /  TBili  0.3<L>  /  DBili  x   /  AST  22  /  ALT  <5  /  AlkPhos  180<H>  07-20                RADIOLOGY & ADDITIONAL TESTS:

## 2019-07-22 NOTE — PROGRESS NOTE ADULT - SUBJECTIVE AND OBJECTIVE BOX
LAUREN ROBERSON Patient is a 87y old  Female who presents with a chief complaint of weakness, sepsis 2/2 UTI (22 Jul 2019 14:33)     HPI:  87yoF hx ESRD on HD (M and F only), CAD s/p CABG, HTN, DM, PAD, hypothyroidism presenting with generalized weakness.  Pt is poor historian despite use of   She reports generalized weakness for past few days associated with generalized, abdominal pain that she is unable to describe associated with nausea, some episodes of vomiting.  Reports difficulty with urination and ?dysuria but believes her urinary symptoms are due to not drinking enough fluids recently. Unable to explain why she is only on HD twice per week, but says last HD session was on 7/1.  Denies fevers, chest pain, dyspnea, endorses chronic pain in face and all extremities which is chronic for her.  On admission, febrile, leukocytosis, UA positive, was pan scanned by ED and CT abd/pelvis showed haziness around the gallbladder however follow up abd US was negative for cholelithiasis and Stewart's sign and no LFT elevation on labs. (04 Jul 2019 22:54)    The patient was seen and evaluated   The patient is in no acute distress.      I&O's Summary    22 Jul 2019 07:01  -  22 Jul 2019 15:51  --------------------------------------------------------  IN: 240 mL / OUT: 0 mL / NET: 240 mL      Allergies    penicillin (Anaphylaxis)  seafood (Anaphylaxis)  shellfish (Anaphylaxis)    Intolerances    Send Nepro TID- RD OK (Unknown)    HEALTH ISSUES - PROBLEM Dx:  Angina pectoris: Angina pectoris        PAST MEDICAL & SURGICAL HISTORY:  Left bundle branch block  Osteoporosis  Diabetic neuropathy  Peripheral arterial disease  Spinal stenosis  Coronary artery disease  Chronic renal failure  Pacemaker: Medtronic 2011  Hypothyroid  Hyperlipemia  Hypertension  Diabetes  S/P dialysis catheter insertion: R. arm  S/P CABG x 3  Artificial cardiac pacemaker          Vital Signs Last 24 Hrs  T(C): 36.5 (22 Jul 2019 09:40), Max: 36.8 (22 Jul 2019 05:57)  T(F): 97.7 (22 Jul 2019 09:40), Max: 98.2 (22 Jul 2019 05:57)  HR: 70 (22 Jul 2019 13:58) (67 - 77)  BP: 132/56 (22 Jul 2019 13:58) (122/75 - 160/76)  BP(mean): --  RR: 20 (22 Jul 2019 13:58) (18 - 20)  SpO2: 98% (22 Jul 2019 13:58) (98% - 100%)T(C): 36.5 (07-22-19 @ 09:40), Max: 36.8 (07-22-19 @ 05:57)  HR: 70 (07-22-19 @ 13:58) (67 - 77)  BP: 132/56 (07-22-19 @ 13:58) (122/75 - 160/76)  RR: 20 (07-22-19 @ 13:58) (18 - 20)  SpO2: 98% (07-22-19 @ 13:58) (98% - 100%)  Wt(kg): --    PHYSICAL EXAM:  Appearance: Normal	  HEENT:   Atraumatic  Cardiovascular: Normal S1 S2, No JVD,   Respiratory: Lungs clear to auscultation	  Gastrointestinal:  Soft, Non-tender, + BS	  Skin: No rashes, No ecchymoses, No cyanosis  Neurologic: Alert and awake, able to move extremities  Extremities: No edema      acetaminophen   Tablet .. 650 milliGRAM(s) Oral every 6 hours PRN  aspirin  chewable 81 milliGRAM(s) Oral daily  atorvastatin 40 milliGRAM(s) Oral at bedtime  calcitriol   Capsule 0.25 MICROGram(s) Oral daily  calcium acetate 667 milliGRAM(s) Oral three times a day with meals  carvedilol 6.25 milliGRAM(s) Oral every 12 hours  ceFAZolin   IVPB      ceFAZolin   IVPB 1000 milliGRAM(s) IV Intermittent every 24 hours  chlorhexidine 2% Cloths 1 Application(s) Topical <User Schedule>  cloNIDine 0.1 milliGRAM(s) Oral every 8 hours  clopidogrel Tablet 75 milliGRAM(s) Oral daily  dextrose 40% Gel 15 Gram(s) Oral once PRN  dextrose 5%. 1000 milliLiter(s) IV Continuous <Continuous>  dextrose 50% Injectable 12.5 Gram(s) IV Push once  dextrose 50% Injectable 25 Gram(s) IV Push once  dextrose 50% Injectable 25 Gram(s) IV Push once  docusate sodium 100 milliGRAM(s) Oral three times a day  epoetin barb Injectable 30470 Unit(s) IV Push <User Schedule>  folic acid 1 milliGRAM(s) Oral daily  glucagon  Injectable 1 milliGRAM(s) IntraMuscular once PRN  insulin lispro (HumaLOG) corrective regimen sliding scale   SubCutaneous three times a day before meals  insulin lispro (HumaLOG) corrective regimen sliding scale   SubCutaneous at bedtime  levothyroxine 112 MICROGram(s) Oral daily  lidocaine   Patch 1 Patch Transdermal daily  losartan 50 milliGRAM(s) Oral daily  oxyCODONE    5 mG/acetaminophen 325 mG 1 Tablet(s) Oral every 6 hours PRN  pantoprazole    Tablet 40 milliGRAM(s) Oral before breakfast  polyethylene glycol 3350 17 Gram(s) Oral daily  saccharomyces boulardii 250 milliGRAM(s) Oral two times a day  senna 2 Tablet(s) Oral at bedtime      LABS:                          7.8    9.79  )-----------( 319      ( 22 Jul 2019 06:30 )             26.2     07-22    131<L>  |  96<L>  |  30.0<H>  ----------------------------<  94  4.6   |  22.0  |  3.81<H>    Ca    7.8<L>      22 Jul 2019 06:30                CAPILLARY BLOOD GLUCOSE      POCT Blood Glucose.: 103 mg/dL (22 Jul 2019 12:27)  POCT Blood Glucose.: 93 mg/dL (22 Jul 2019 08:07)  POCT Blood Glucose.: 167 mg/dL (21 Jul 2019 21:02)  POCT Blood Glucose.: 98 mg/dL (21 Jul 2019 17:03)      RADIOLOGY & ADDITIONAL TESTS:      Consultant notes reviewed    Case discussed with consultant/provider/ family /patient

## 2019-07-22 NOTE — PROGRESS NOTE ADULT - ASSESSMENT
ESRD - HD jones on TTS schedule    Anemia - transfused 7-17  h/h better today  Epogen with HD     RO - Binders     Resolved sepsis   Blood Cx (-) 7-14   Abx ongoing as per ID - plan is cefazolin 2g AD on Monday, 2 g AD on Wed, 3 g AD on Fri to complete 6 weeks through 8/25/19- I notified her HD center [place]  SANFORD wa s(-) Veg   Vascular follow up re. AVG noted , follow for now     Will follow

## 2019-07-22 NOTE — PROGRESS NOTE ADULT - SUBJECTIVE AND OBJECTIVE BOX
CARDIOLOGY PROGRESS NOTE   ([facility])                                                                                                        Subjective: laying in bed, nad, c/o coccyx pain,   son in attendance    Vitals:  T(C): 36.8 (07-22-19 @ 05:57), Max: 36.8 (07-22-19 @ 05:57)  HR: 68 (07-22-19 @ 05:57) (66 - 77)  BP: 160/76 (07-22-19 @ 05:57) (122/75 - 160/76)  RR: 18 (07-22-19 @ 05:57) (18 - 19)  SpO2: 98% (07-22-19 @ 05:57) (98% - 99%)  Wt(kg): --  I&O's Summary        PHYSICAL EXAM:  Appearance: Normal	  HEENT:   Atraumatic  Cardiovascular: Normal S1 S2, No JVD,   Respiratory: Lungs clear to auscultation	  Gastrointestinal:  Soft, Non-tender, + BS	  Skin: No rashes, No ecchymoses, No cyanosis  Neurologic: Alert and awake, able to move extremities  Extremities: No edema      CURRENT MEDICATIONS:  carvedilol 6.25 milliGRAM(s) Oral every 12 hours  cloNIDine 0.1 milliGRAM(s) Oral every 8 hours  losartan 50 milliGRAM(s) Oral daily    ceFAZolin   IVPB      ceFAZolin   IVPB 1000 milliGRAM(s) IV Intermittent every 24 hours      acetaminophen   Tablet .. 650 milliGRAM(s) Oral every 6 hours PRN  oxyCODONE    5 mG/acetaminophen 325 mG 1 Tablet(s) Oral every 6 hours PRN    docusate sodium 100 milliGRAM(s) Oral three times a day  pantoprazole    Tablet 40 milliGRAM(s) Oral before breakfast  polyethylene glycol 3350 17 Gram(s) Oral daily  senna 2 Tablet(s) Oral at bedtime    atorvastatin 40 milliGRAM(s) Oral at bedtime  dextrose 40% Gel 15 Gram(s) Oral once PRN  dextrose 50% Injectable 12.5 Gram(s) IV Push once  dextrose 50% Injectable 25 Gram(s) IV Push once  dextrose 50% Injectable 25 Gram(s) IV Push once  glucagon  Injectable 1 milliGRAM(s) IntraMuscular once PRN  insulin lispro (HumaLOG) corrective regimen sliding scale   SubCutaneous three times a day before meals  insulin lispro (HumaLOG) corrective regimen sliding scale   SubCutaneous at bedtime  levothyroxine 112 MICROGram(s) Oral daily    aspirin  chewable 81 milliGRAM(s) Oral daily  calcitriol   Capsule 0.25 MICROGram(s) Oral daily  calcium acetate 667 milliGRAM(s) Oral three times a day with meals  chlorhexidine 2% Cloths 1 Application(s) Topical <User Schedule>  clopidogrel Tablet 75 milliGRAM(s) Oral daily  dextrose 5%. 1000 milliLiter(s) IV Continuous <Continuous>  epoetin barb Injectable 54122 Unit(s) IV Push <User Schedule>  folic acid 1 milliGRAM(s) Oral daily  lidocaine   Patch 1 Patch Transdermal daily      LABS:	 	                              7.8    9.79  )-----------( 319      ( 22 Jul 2019 06:30 )             26.2     07-22    131<L>  |  96<L>  |  30.0<H>  ----------------------------<  94  4.6   |  22.0  |  3.81<H>    Ca    7.8<L>      22 Jul 2019 06:30    TPro  5.1<L>  /  Alb  2.2<L>  /  TBili  0.3<L>  /  DBili  x   /  AST  22  /  ALT  <5  /  AlkPhos  180<H>  07-20    proBNP:   Lipid Profile:   HgA1c:   TSH:

## 2019-07-22 NOTE — PROGRESS NOTE ADULT - ASSESSMENT
87yoF hx ESRD on HD (M and F only), CAD s/p CABG, HTN, DM, PAD, hypothyroidism presenting with generalized weakness.  On admission, febrile, leukocytosis, UA positive, with  Blood cultures positive for staph aureus.  SANFORD done, no vegetation. s/p indium scan positive for R AV graft infection  Sepsis :  Staph aureus bacteremia due to R arm AV graft infection  SANFORD negative for vegetation    CAD s/p CABG:  with elevated troponin - T 0.32 - elevated in past, in setting of ESRD on HD  Cath 2/2018- LIMA to LAD patent, SVG to OM patent, SVG to RPDA patent   cardiology has discussed with jose Clifford and they are unwilling for stress testing, cath, invasive - only want Medication   HFrEF  SANFORD 7/9/2019: LVEF 30-35.Ml-mod TR. No evidence of endocarditis.   Echo Limited 7/18: LVEF 35-40%.    cont  carvedilol.      Losartan.  Volume control with HD    ESRD on HD   Cont HD    c/o coccyx pain:   Coccyx and sacral areas with ecchymosis and early stage II ulcer.

## 2019-07-22 NOTE — PROGRESS NOTE ADULT - SUBJECTIVE AND OBJECTIVE BOX
Pt seen and examined. Afebrile. WBC normalized. Cultures negative.    Vital Signs Last 24 Hrs  T(C): 36.8 (22 Jul 2019 05:57), Max: 36.8 (22 Jul 2019 05:57)  T(F): 98.2 (22 Jul 2019 05:57), Max: 98.2 (22 Jul 2019 05:57)  HR: 68 (22 Jul 2019 05:57) (66 - 77)  BP: 160/76 (22 Jul 2019 05:57) (122/75 - 160/76)  BP(mean): --  RR: 18 (22 Jul 2019 05:57) (17 - 19)  SpO2: 98% (22 Jul 2019 05:57) (98% - 100%)    PE: RUE AVG without clinical signs of infection, no erythema, pulsatile    Plan:  - long term antibiotics   - repeat blood cultures  - will discuss with ID  - may need fistulogram this admission given that the AVG is pulsatile.

## 2019-07-23 LAB
ANION GAP SERPL CALC-SCNC: 12 MMOL/L — SIGNIFICANT CHANGE UP (ref 5–17)
BUN SERPL-MCNC: 39 MG/DL — HIGH (ref 8–20)
CALCIUM SERPL-MCNC: 7.9 MG/DL — LOW (ref 8.6–10.2)
CHLORIDE SERPL-SCNC: 94 MMOL/L — LOW (ref 98–107)
CO2 SERPL-SCNC: 25 MMOL/L — SIGNIFICANT CHANGE UP (ref 22–29)
CREAT SERPL-MCNC: 4.38 MG/DL — HIGH (ref 0.5–1.3)
GLUCOSE BLDC GLUCOMTR-MCNC: 101 MG/DL — HIGH (ref 70–99)
GLUCOSE BLDC GLUCOMTR-MCNC: 112 MG/DL — HIGH (ref 70–99)
GLUCOSE BLDC GLUCOMTR-MCNC: 86 MG/DL — SIGNIFICANT CHANGE UP (ref 70–99)
GLUCOSE BLDC GLUCOMTR-MCNC: 95 MG/DL — SIGNIFICANT CHANGE UP (ref 70–99)
GLUCOSE SERPL-MCNC: 82 MG/DL — SIGNIFICANT CHANGE UP (ref 70–115)
HCT VFR BLD CALC: 24.6 % — LOW (ref 34.5–45)
HGB BLD-MCNC: 7.9 G/DL — LOW (ref 11.5–15.5)
MCHC RBC-ENTMCNC: 29.3 PG — SIGNIFICANT CHANGE UP (ref 27–34)
MCHC RBC-ENTMCNC: 32.1 GM/DL — SIGNIFICANT CHANGE UP (ref 32–36)
MCV RBC AUTO: 91.1 FL — SIGNIFICANT CHANGE UP (ref 80–100)
PLATELET # BLD AUTO: 293 K/UL — SIGNIFICANT CHANGE UP (ref 150–400)
POTASSIUM SERPL-MCNC: 4.3 MMOL/L — SIGNIFICANT CHANGE UP (ref 3.5–5.3)
POTASSIUM SERPL-SCNC: 4.3 MMOL/L — SIGNIFICANT CHANGE UP (ref 3.5–5.3)
RBC # BLD: 2.7 M/UL — LOW (ref 3.8–5.2)
RBC # FLD: 23.9 % — HIGH (ref 10.3–14.5)
SODIUM SERPL-SCNC: 131 MMOL/L — LOW (ref 135–145)
WBC # BLD: 7.51 K/UL — SIGNIFICANT CHANGE UP (ref 3.8–10.5)
WBC # FLD AUTO: 7.51 K/UL — SIGNIFICANT CHANGE UP (ref 3.8–10.5)

## 2019-07-23 PROCEDURE — 99233 SBSQ HOSP IP/OBS HIGH 50: CPT

## 2019-07-23 PROCEDURE — 99232 SBSQ HOSP IP/OBS MODERATE 35: CPT

## 2019-07-23 PROCEDURE — 72220 X-RAY EXAM SACRUM TAILBONE: CPT | Mod: 26

## 2019-07-23 RX ADMIN — LOSARTAN POTASSIUM 50 MILLIGRAM(S): 100 TABLET, FILM COATED ORAL at 05:41

## 2019-07-23 RX ADMIN — Medication 100 MILLIGRAM(S): at 05:40

## 2019-07-23 RX ADMIN — Medication 0.1 MILLIGRAM(S): at 13:35

## 2019-07-23 RX ADMIN — OXYCODONE AND ACETAMINOPHEN 1 TABLET(S): 5; 325 TABLET ORAL at 18:40

## 2019-07-23 RX ADMIN — OXYCODONE AND ACETAMINOPHEN 1 TABLET(S): 5; 325 TABLET ORAL at 05:41

## 2019-07-23 RX ADMIN — Medication 1 MILLIGRAM(S): at 12:14

## 2019-07-23 RX ADMIN — OXYCODONE AND ACETAMINOPHEN 1 TABLET(S): 5; 325 TABLET ORAL at 10:23

## 2019-07-23 RX ADMIN — OXYCODONE AND ACETAMINOPHEN 1 TABLET(S): 5; 325 TABLET ORAL at 06:11

## 2019-07-23 RX ADMIN — CALCITRIOL 0.25 MICROGRAM(S): 0.5 CAPSULE ORAL at 12:07

## 2019-07-23 RX ADMIN — Medication 667 MILLIGRAM(S): at 17:37

## 2019-07-23 RX ADMIN — Medication 667 MILLIGRAM(S): at 12:07

## 2019-07-23 RX ADMIN — Medication 112 MICROGRAM(S): at 05:41

## 2019-07-23 RX ADMIN — Medication 100 MILLIGRAM(S): at 13:36

## 2019-07-23 RX ADMIN — Medication 0.1 MILLIGRAM(S): at 22:23

## 2019-07-23 RX ADMIN — PANTOPRAZOLE SODIUM 40 MILLIGRAM(S): 20 TABLET, DELAYED RELEASE ORAL at 05:40

## 2019-07-23 RX ADMIN — Medication 0.1 MILLIGRAM(S): at 05:40

## 2019-07-23 RX ADMIN — Medication 100 MILLIGRAM(S): at 22:23

## 2019-07-23 RX ADMIN — LIDOCAINE 1 PATCH: 4 CREAM TOPICAL at 12:07

## 2019-07-23 RX ADMIN — Medication 250 MILLIGRAM(S): at 17:37

## 2019-07-23 RX ADMIN — SENNA PLUS 2 TABLET(S): 8.6 TABLET ORAL at 22:23

## 2019-07-23 RX ADMIN — CLOPIDOGREL BISULFATE 75 MILLIGRAM(S): 75 TABLET, FILM COATED ORAL at 12:07

## 2019-07-23 RX ADMIN — Medication 81 MILLIGRAM(S): at 12:06

## 2019-07-23 RX ADMIN — CHLORHEXIDINE GLUCONATE 1 APPLICATION(S): 213 SOLUTION TOPICAL at 05:50

## 2019-07-23 RX ADMIN — CARVEDILOL PHOSPHATE 6.25 MILLIGRAM(S): 80 CAPSULE, EXTENDED RELEASE ORAL at 05:40

## 2019-07-23 RX ADMIN — ERYTHROPOIETIN 10000 UNIT(S): 10000 INJECTION, SOLUTION INTRAVENOUS; SUBCUTANEOUS at 09:42

## 2019-07-23 RX ADMIN — OXYCODONE AND ACETAMINOPHEN 1 TABLET(S): 5; 325 TABLET ORAL at 11:23

## 2019-07-23 RX ADMIN — CARVEDILOL PHOSPHATE 6.25 MILLIGRAM(S): 80 CAPSULE, EXTENDED RELEASE ORAL at 19:00

## 2019-07-23 RX ADMIN — Medication 100 MILLIGRAM(S): at 12:06

## 2019-07-23 RX ADMIN — ATORVASTATIN CALCIUM 40 MILLIGRAM(S): 80 TABLET, FILM COATED ORAL at 22:23

## 2019-07-23 RX ADMIN — POLYETHYLENE GLYCOL 3350 17 GRAM(S): 17 POWDER, FOR SOLUTION ORAL at 12:14

## 2019-07-23 RX ADMIN — Medication 250 MILLIGRAM(S): at 05:41

## 2019-07-23 NOTE — PROGRESS NOTE ADULT - ASSESSMENT
87yoF hx ESRD on HD (M and F only), AVG right side 8/3/17   CAD s/p CABG, HTN, DM, PAD, hypothyroidism presenting with generalized weakness, dysuria.   pain at graft site for 1 month.  fever in .6 now afebrile  leukocytosis to 14 now normal  Blood cx 4/4 Staph aureus.   Imaging with no focus (no pneumonia, abscess, OM). NM scan suspicious for AVG infection.  Overall Staph aureus bacteremia 2/2 AVG infection    Recommend:  - Blood cultures + 4/4 Staph aureus. BCX 7/14 NGTD  - Despite listed penicillin allergy, tolerating cefazolin  c/w cefazolin 1 g IV every day (even on non HD days) and after dialysis on her dialysis days  - NM scan suspicious for AVG infection. In the absence of any other source I believe this is the source of her MSSA bacteremia and AVG explant would assist in source control. If not removed she will remain at risk for recurrence of infection in addition to adverse effects of long term antibiotics  - Recommend removal and temporary HD catheter placement-per vascular recommend conservative medical management for now  - Renal f/u RE HD schedule-she is currently on twice weekly HD and outpatient dosing for cefazolin with HD will require that she be on thrice weekly schedule. We can then dose cefazolin 2g AD on Monday, 2 g AD on Wed, 3 g AD on Fri to complete 6 weeks through 8/25/19  - SANFORD for endocarditis/lead vegetations (-)  - Trend Fever  - Trend WBC count        Will follow 87yoF hx ESRD on HD (M and F only), AVG right side 8/3/17   CAD s/p CABG, HTN, DM, PAD, hypothyroidism presenting with generalized weakness, dysuria.   pain at graft site for 1 month.  fever in .6 now afebrile  leukocytosis to 14 now normal  Blood cx 4/4 Staph aureus.   Imaging with no focus (no pneumonia, abscess, OM). NM scan suspicious for AVG infection.  Overall Staph aureus bacteremia 2/2 AVG infection    Recommend:  - Blood cultures + 4/4 Staph aureus. BCX 7/14 NGTD  - Despite listed penicillin allergy, tolerating cefazolin  c/w cefazolin 1 g IV every day (even on non HD days) and after dialysis on her dialysis days  - NM scan suspicious for AVG infection. In the absence of any other source I believe this is the source of her MSSA bacteremia and AVG explant would assist in source control. If not removed she will remain at risk for recurrence of infection in addition to adverse effects of long term antibiotics  - Recommend removal and temporary HD catheter placement-per vascular recommend conservative medical management for now  - Renal f/u RE HD schedule-she is currently on twice weekly HD and outpatient dosing for cefazolin with HD will require that she be on thrice weekly schedule. We can then dose cefazolin 2g AD on Monday, 2 g AD on Wed, 3 g AD on Fri to complete 6 weeks through 8/25/19. If not a surgical candidate per surgery-will need chronic oral antimicrobial suppression after she completes the IV course  - SANFORD for endocarditis/lead vegetations (-)  - Trend Fever  - Trend WBC count      Will follow

## 2019-07-23 NOTE — PROGRESS NOTE ADULT - SUBJECTIVE AND OBJECTIVE BOX
Pt seen and examined. Pt had a low grade fever yesterday and cultures were sent. At this point ID feels that the AVG needs to be removed and a temporary HD catheter placed until she is cleared by ID for a tunneled catheter insertion.     Vital Signs Last 24 Hrs  T(C): 36.9 (23 Jul 2019 16:26), Max: 36.9 (23 Jul 2019 07:05)  T(F): 98.5 (23 Jul 2019 16:26), Max: 98.5 (23 Jul 2019 15:15)  HR: 70 (23 Jul 2019 16:26) (62 - 70)  BP: 143/64 (23 Jul 2019 16:26) (131/57 - 143/64)  BP(mean): --  RR: 18 (23 Jul 2019 16:26) (18 - 20)  SpO2: 98% (23 Jul 2019 16:26) (95% - 100%)    RUE AVG palpable thrill. No significant erythema.     Plan:  Will schedule for AVG explantation tomorrow and Jeffrey catheter insertion  Pt will need PC insertion once cleared by ID likely early next week   NPO after MN  Discussed with Dr. Berrios and Dr. Garcia  HD in the AM tomorrow.

## 2019-07-23 NOTE — PROGRESS NOTE ADULT - SUBJECTIVE AND OBJECTIVE BOX
[facility]  INFECTIOUS DISEASES AND INTERNAL MEDICINE at [place]  =======================================================  Paresh Stark MD  Diplomates American Board of Internal Medicine and Infectious Diseases  Tel: [phone]      Fax: [phone]  =======================================================    LAUREN ROBERSON [id number]    Follow up: MSSa bacteremia. afebrile    Allergies:  penicillin (Anaphylaxis)  seafood (Anaphylaxis)  Send Nepro TID- RD OK (Unknown)  shellfish (Anaphylaxis)      Medications:  acetaminophen   Tablet .. 650 milliGRAM(s) Oral every 6 hours PRN  aspirin  chewable 81 milliGRAM(s) Oral daily  atorvastatin 40 milliGRAM(s) Oral at bedtime  calcitriol   Capsule 0.25 MICROGram(s) Oral daily  calcium acetate 667 milliGRAM(s) Oral three times a day with meals  carvedilol 6.25 milliGRAM(s) Oral every 12 hours  ceFAZolin   IVPB      ceFAZolin   IVPB 1000 milliGRAM(s) IV Intermittent every 24 hours  chlorhexidine 2% Cloths 1 Application(s) Topical <User Schedule>  cloNIDine 0.1 milliGRAM(s) Oral every 8 hours  clopidogrel Tablet 75 milliGRAM(s) Oral daily  dextrose 40% Gel 15 Gram(s) Oral once PRN  dextrose 5%. 1000 milliLiter(s) IV Continuous <Continuous>  dextrose 50% Injectable 12.5 Gram(s) IV Push once  dextrose 50% Injectable 25 Gram(s) IV Push once  dextrose 50% Injectable 25 Gram(s) IV Push once  docusate sodium 100 milliGRAM(s) Oral three times a day  epoetin barb Injectable 60198 Unit(s) IV Push <User Schedule>  folic acid 1 milliGRAM(s) Oral daily  glucagon  Injectable 1 milliGRAM(s) IntraMuscular once PRN  insulin lispro (HumaLOG) corrective regimen sliding scale   SubCutaneous three times a day before meals  insulin lispro (HumaLOG) corrective regimen sliding scale   SubCutaneous at bedtime  levothyroxine 112 MICROGram(s) Oral daily  lidocaine   Patch 1 Patch Transdermal daily  losartan 50 milliGRAM(s) Oral daily  oxyCODONE    5 mG/acetaminophen 325 mG 1 Tablet(s) Oral every 6 hours PRN  pantoprazole    Tablet 40 milliGRAM(s) Oral before breakfast  polyethylene glycol 3350 17 Gram(s) Oral daily  saccharomyces boulardii 250 milliGRAM(s) Oral two times a day  senna 2 Tablet(s) Oral at bedtime            REVIEW OF SYSTEMS:  CONSTITUTIONAL:  No Fever or chills  HEENT:   No diplopia or blurred vision.  No earache, sore throat or runny nose.  CARDIOVASCULAR:  No pressure, squeezing, strangling, tightness, heaviness or aching about the chest, neck, axilla or epigastrium.  RESPIRATORY:  No cough, shortness of breath  GASTROINTESTINAL:  No nausea, vomiting or diarrhea.  GENITOURINARY:  No dysuria, frequency or urgency. No Blood in urine  MUSCULOSKELETAL:  no joint aches, no muscle pain  SKIN:  No change in skin, hair or nails.  NEUROLOGIC:  No Headaches, seizures or weakness.  PSYCHIATRIC:  No disorder of thought or mood.  ENDOCRINE:  No heat or cold intolerance  HEMATOLOGICAL:  No easy bruising or bleeding.            Physical Exam:  ICU Vital Signs Last 24 Hrs  T(C): 36.9 (23 Jul 2019 10:45), Max: 36.9 (23 Jul 2019 07:05)  T(F): 98.4 (23 Jul 2019 10:45), Max: 98.4 (23 Jul 2019 07:05)  HR: 63 (23 Jul 2019 10:45) (62 - 70)  BP: 131/57 (23 Jul 2019 10:45) (131/57 - 136/63)  BP(mean): --  ABP: --  ABP(mean): --  RR: 18 (23 Jul 2019 10:45) (18 - 20)  SpO2: 97% (23 Jul 2019 10:45) (95% - 100%)      GEN: NAD, pleasant  HEENT: normocephalic and atraumatic. EOMI. PERRL.  Anicteric   NECK: Supple.   LUNGS: Clear to auscultation.  HEART: Regular rate and rhythm without murmur. + PPM  ABDOMEN: Soft, nontender, and nondistended.  Positive bowel sounds.    : No CVA tenderness  EXTREMITIES: Without any edema.  MSK: no joint swelling  NEUROLOGIC: Cranial nerves II through XII are grossly intact. No focal deficits  PSYCHIATRIC: Appropriate affect .  SKIN: No Rash, RUe AVG no erythema +warmth, nontender      Labs:  07-23    131<L>  |  94<L>  |  39.0<H>  ----------------------------<  82  4.3   |  25.0  |  4.38<H>    Ca    7.9<L>      23 Jul 2019 08:07                            7.9    7.51  )-----------( 293      ( 23 Jul 2019 08:07 )             24.6                 CAPILLARY BLOOD GLUCOSE      POCT Blood Glucose.: 95 mg/dL (23 Jul 2019 12:12)  POCT Blood Glucose.: 86 mg/dL (23 Jul 2019 07:58)  POCT Blood Glucose.: 101 mg/dL (22 Jul 2019 21:37)  POCT Blood Glucose.: 165 mg/dL (22 Jul 2019 16:53)        RECENT CULTURES:  07-14 @ 11:53 .Blood     No growth at 5 days.        07-13 @ 10:36 .Blood Staphylococcus aureus    Growth in aerobic and anaerobic bottles: Staphylococcus aureus  Aerobic Bottle: 15:06 Hours to positivity  Anaerobic Bottle: 19:45 Hours to positivity  ,  TYPE: (C=Critical, N=Notification, A=Abnormal) c  TESTS:  _   DATE/TIME CALLED: _ 07/14/2019 09:52:46  CALLED TO: Kellee bryant rn  READ BACK (2 Patient Identifiers)(Y/N): _ y  READ BACK VALUES (Y/N): _ y  CALLED BY: Kellee wetzel

## 2019-07-23 NOTE — PROGRESS NOTE ADULT - ASSESSMENT
ESRD - HD today on TTS schedule    Anemia - transfused 7-17  h/h better today  Epogen with HD     RO - Binders     NM scan noted will likely need AVG removed and temp HD access will d/w Dr Kramer   Blood Cx (-) 7-14   Abx ongoing as per ID - plan is cefazolin 2g AD on Monday, 2 g AD on Wed, 3 g AD on Fri to complete 6 weeks through 8/25/19- I notified her HD center [place]  SANFORD wa s(-) Veg   Vascular follow up re. AVG noted    Will follow ESRD - HD today on TTS schedule    Anemia - transfused 7-17  h/h better today  Epogen with HD     RO - Binders   Does not look toxic afebrile no leukocytosis  NM scan noted MAY need AVG removed and temp HD access will d/w Dr Kramer   Blood Cx (-) 7-14   Abx ongoing as per ID - plan is cefazolin 2g AD on Monday, 2 g AD on Wed, 3 g AD on Fri to complete 6 weeks through 8/25/19- I notified her HD center [place]  SANFORD wa s(-) Veg   Vascular follow up re. AVG noted    Will follow

## 2019-07-23 NOTE — PROGRESS NOTE ADULT - SUBJECTIVE AND OBJECTIVE BOX
NEPHROLOGY INTERVAL HPI/OVERNIGHT EVENTS:    Examined  + body pain not specific    MEDICATIONS  (STANDING):  aspirin  chewable 81 milliGRAM(s) Oral daily  atorvastatin 40 milliGRAM(s) Oral at bedtime  calcitriol   Capsule 0.25 MICROGram(s) Oral daily  calcium acetate 667 milliGRAM(s) Oral three times a day with meals  carvedilol 6.25 milliGRAM(s) Oral every 12 hours  ceFAZolin   IVPB      ceFAZolin   IVPB 1000 milliGRAM(s) IV Intermittent every 24 hours  chlorhexidine 2% Cloths 1 Application(s) Topical <User Schedule>  cloNIDine 0.1 milliGRAM(s) Oral every 8 hours  clopidogrel Tablet 75 milliGRAM(s) Oral daily  dextrose 5%. 1000 milliLiter(s) (50 mL/Hr) IV Continuous <Continuous>  dextrose 50% Injectable 12.5 Gram(s) IV Push once  dextrose 50% Injectable 25 Gram(s) IV Push once  dextrose 50% Injectable 25 Gram(s) IV Push once  docusate sodium 100 milliGRAM(s) Oral three times a day  epoetin barb Injectable 38592 Unit(s) IV Push <User Schedule>  folic acid 1 milliGRAM(s) Oral daily  insulin lispro (HumaLOG) corrective regimen sliding scale   SubCutaneous three times a day before meals  insulin lispro (HumaLOG) corrective regimen sliding scale   SubCutaneous at bedtime  levothyroxine 112 MICROGram(s) Oral daily  lidocaine   Patch 1 Patch Transdermal daily  losartan 50 milliGRAM(s) Oral daily  pantoprazole    Tablet 40 milliGRAM(s) Oral before breakfast  polyethylene glycol 3350 17 Gram(s) Oral daily  saccharomyces boulardii 250 milliGRAM(s) Oral two times a day  senna 2 Tablet(s) Oral at bedtime    MEDICATIONS  (PRN):  acetaminophen   Tablet .. 650 milliGRAM(s) Oral every 6 hours PRN Temp greater or equal to 38C (100.4F), Mild Pain (1 - 3)  dextrose 40% Gel 15 Gram(s) Oral once PRN Blood Glucose LESS THAN 70 milliGRAM(s)/deciliter  glucagon  Injectable 1 milliGRAM(s) IntraMuscular once PRN Glucose LESS THAN 70 milligrams/deciliter  oxyCODONE    5 mG/acetaminophen 325 mG 1 Tablet(s) Oral every 6 hours PRN Severe Pain (7 - 10)      Allergies    penicillin (Anaphylaxis)  seafood (Anaphylaxis)  shellfish (Anaphylaxis)    Intolerances    Send Luke JHAVERI RD OK (Unknown)      Vital Signs Last 24 Hrs  T(C): 36.9 (2019 11:00), Max: 36.9 (2019 07:05)  T(F): 98.4 (2019 11:00), Max: 98.4 (2019 07:05)  HR: 68 (2019 13:30) (62 - 68)  BP: 132/64 (2019 13:30) (131/57 - 136/63)  BP(mean): --  RR: 18 (2019 13:30) (18 - 20)  SpO2: 97% (2019 13:30) (95% - 100%)  Daily     Daily Weight in k.3 (2019 07:05)    PHYSICAL EXAM:  Appears chronically ill  NECK: Supple, no jvd  CHEST/LUNG: diminished BS at bases  HEART: Regular rate and rhythm; No rub   ABDOMEN: Soft, Nontender, Nondistended; Bowel sounds present  EXTREMITIES:  + edema less    LABS:                        7.9    7.51  )-----------( 293      ( 2019 08:07 )             24.6     07-23    131<L>  |  94<L>  |  39.0<H>  ----------------------------<  82  4.3   |  25.0  |  4.38<H>    Ca    7.9<L>      2019 08:07                  RADIOLOGY & ADDITIONAL TESTS:

## 2019-07-23 NOTE — PROGRESS NOTE ADULT - SUBJECTIVE AND OBJECTIVE BOX
Dr. Berrios Hospitalist Progress Note  LAUREN ROBERSON [id number]    Patient is a 87y old  Female who presents with a chief complaint of weakness, sepsis 2/2 UTI (23 Jul 2019 16:59)    Interval: seen at bedside. not communicating well even with . apparently denied complaints except pain over the coccyx when RN was trying to sit her up. . as per RN, patient communicates well when family members are here. seen by wound care for sacral wound. xray ordered. for AVF explantation tomorrow afternoon. needs medical clearance.     HPI:  87yoF hx ESRD on HD (M and F only), CAD s/p CABG, HTN, DM, PAD, hypothyroidism presenting with generalized weakness.  Pt is poor historian despite use of   She reports generalized weakness for past few days associated with generalized, abdominal pain that she is unable to describe associated with nausea, some episodes of vomiting.  Reports difficulty with urination and ?dysuria but believes her urinary symptoms are due to not drinking enough fluids recently. Unable to explain why she is only on HD twice per week, but says last HD session was on 7/1.  Denies fevers, chest pain, dyspnea, endorses chronic pain in face and all extremities which is chronic for her.  On admission, febrile, leukocytosis, UA positive, was pan scanned by ED and CT abd/pelvis showed haziness around the gallbladder however follow up abd US was negative for cholelithiasis and Stewart's sign and no LFT elevation on labs. (04 Jul 2019 22:54)      ROS:  limited as patient was not answering qs. however, denied chest pain/SOB/palpitation/leg or calf pain/abd pain/pain over the AVF.    T(C): 36.9 (07-23-19 @ 16:26), Max: 36.9 (07-23-19 @ 07:05)  HR: 70 (07-23-19 @ 16:26) (62 - 70)  BP: 143/64 (07-23-19 @ 16:26) (131/57 - 143/64)  RR: 18 (07-23-19 @ 16:26) (18 - 20)  SpO2: 98% (07-23-19 @ 16:26) (95% - 100%)    07-22-19 @ 07:01  -  07-23-19 @ 07:00  --------------------------------------------------------  IN: 620 mL / OUT: 0 mL / NET: 620 mL    07-23-19 @ 07:01  -  07-23-19 @ 17:42  --------------------------------------------------------  IN: 0 mL / OUT: 600 mL / NET: -600 mL    CAPILLARY BLOOD GLUCOSE      POCT Blood Glucose.: 95 mg/dL (23 Jul 2019 12:12)  POCT Blood Glucose.: 86 mg/dL (23 Jul 2019 07:58)  POCT Blood Glucose.: 101 mg/dL (22 Jul 2019 21:37)      Physical Exam:  GENERAL: Not in distress. Alert    HEENT:  Normocephalic and atraumatic. PEARLA,  NECK: Supple.   CARDIOVASCULAR: RRR S1, S2. No murmur/rubs/gallop  LUNGS: BLAE+, no rales, no wheezing, no rhonchi.    ABDOMEN: ND. Soft,  NT, no guarding / rebound / rigidity. BS normoactive. No CVA tenderness.    EXTREMITIES: no cyanosis, no clubbing, no edema. no leg or calf TP   SKIN: no rash. Incontinence associated dermatitis not  ulcer [ RN reported, as per wound care]  NEUROLOGIC: unable to assess approriately as patient is not answering all qs. however she knows she is in hospital. strength is symmetric, sensation intact, speech fluent.    PSYCHIATRIC: Calm.  No agitation.    Labs                        7.9    7.51  )-----------( 293      ( 23 Jul 2019 08:07 )             24.6     07-23    131<L>  |  94<L>  |  39.0<H>  ----------------------------<  82  4.3   |  25.0  |  4.38<H>    Ca    7.9<L>      23 Jul 2019 08:07       MEDICATIONS  (STANDING):  aspirin  chewable 81 milliGRAM(s) Oral daily  atorvastatin 40 milliGRAM(s) Oral at bedtime  calcitriol   Capsule 0.25 MICROGram(s) Oral daily  calcium acetate 667 milliGRAM(s) Oral three times a day with meals  carvedilol 6.25 milliGRAM(s) Oral every 12 hours  ceFAZolin   IVPB      ceFAZolin   IVPB 1000 milliGRAM(s) IV Intermittent every 24 hours  chlorhexidine 2% Cloths 1 Application(s) Topical <User Schedule>  cloNIDine 0.1 milliGRAM(s) Oral every 8 hours  clopidogrel Tablet 75 milliGRAM(s) Oral daily  dextrose 5%. 1000 milliLiter(s) (50 mL/Hr) IV Continuous <Continuous>  dextrose 50% Injectable 12.5 Gram(s) IV Push once  dextrose 50% Injectable 25 Gram(s) IV Push once  dextrose 50% Injectable 25 Gram(s) IV Push once  docusate sodium 100 milliGRAM(s) Oral three times a day  epoetin barb Injectable 50382 Unit(s) IV Push <User Schedule>  folic acid 1 milliGRAM(s) Oral daily  insulin lispro (HumaLOG) corrective regimen sliding scale   SubCutaneous three times a day before meals  insulin lispro (HumaLOG) corrective regimen sliding scale   SubCutaneous at bedtime  levothyroxine 112 MICROGram(s) Oral daily  lidocaine   Patch 1 Patch Transdermal daily  losartan 50 milliGRAM(s) Oral daily  pantoprazole    Tablet 40 milliGRAM(s) Oral before breakfast  polyethylene glycol 3350 17 Gram(s) Oral daily  saccharomyces boulardii 250 milliGRAM(s) Oral two times a day  senna 2 Tablet(s) Oral at bedtime    MEDICATIONS  (PRN):  acetaminophen   Tablet .. 650 milliGRAM(s) Oral every 6 hours PRN Temp greater or equal to 38C (100.4F), Mild Pain (1 - 3)  dextrose 40% Gel 15 Gram(s) Oral once PRN Blood Glucose LESS THAN 70 milliGRAM(s)/deciliter  glucagon  Injectable 1 milliGRAM(s) IntraMuscular once PRN Glucose LESS THAN 70 milligrams/deciliter  oxyCODONE    5 mG/acetaminophen 325 mG 1 Tablet(s) Oral every 6 hours PRN Severe Pain (7 - 10)

## 2019-07-23 NOTE — PROGRESS NOTE ADULT - ASSESSMENT
87yoF hx ESRD on HD (M and F only), CAD s/p CABG, HTN, DM, PAD, hypothyroidism presenting with generalized weakness.  On admission, febrile, leukocytosis, UA positive, with  Blood cultures positive for staph aureus.  SANFORD done, no vegetation. s/p indium scan positive for R AV graft infection  Sepsis :  Staph aureus bacteremia due to R arm AV graft infection. however vascular not convinced as AVF looks clean. however as per ID recommendation vascular decided to do AVF explantation tomorrow with Jeffrey catheter insertion. Pt will need PC insertion once cleared by ID likely early next week  SANFORD negative for vegetation    CAD s/p CABG:  with elevated troponin - T 0.32 - elevated in past, in setting of ESRD on HD  Cath 2/2018- LIMA to LAD patent, SVG to OM patent, SVG to RPDA patent   cardiology has discussed with jose Clifford and they are unwilling for stress testing, cath, invasive - only want Medication   HFrEF  SANFORD 7/9/2019: LVEF 30-35.Ml-mod TR. No evidence of endocarditis.   Echo Limited 7/18: LVEF 35-40%.    cont  carvedilol.      Losartan.  Volume control with HD    ESRD on HD   Cont HD    c/o coccyx pain:   Coccyx and sacral areas with ecchymosis and early stage II ulcer.     pre-op: chart reviewed. seen by cardiology on july 22. suggested no absolute cardiac contraindications for the planned procedure. patient was evaluated at bedside. 87yoF hx ESRD on HD (M and F only), CAD s/p CABG, HTN, DM, PAD, hypothyroidism presenting with generalized weakness.  On admission, febrile, leukocytosis, UA positive, with  Blood cultures positive for staph aureus.  SANFORD done, no vegetation. s/p indium scan positive for R AV graft infection  Sepsis :  Staph aureus bacteremia due to R arm AV graft infection. however vascular not convinced as AVF looks clean. however as per ID recommendation vascular decided to do AVF explantation tomorrow with Jeffrey catheter insertion. Pt will need PC insertion once cleared by ID likely early next week  SANFORD negative for vegetation    CAD s/p CABG:  with elevated troponin - T 0.32 - elevated in past, in setting of ESRD on HD  Cath 2/2018- LIMA to LAD patent, SVG to OM patent, SVG to RPDA patent   cardiology has discussed with jose Clifford and they are unwilling for stress testing, cath, invasive - only want Medication   HFrEF  SANFORD 7/9/2019: LVEF 30-35.Ml-mod TR. No evidence of endocarditis.   Echo Limited 7/18: LVEF 35-40%.    cont  carvedilol.      Losartan.  Volume control with HD    ESRD on HD   Cont HD    c/o coccyx pain: as pe wond care patient has Incontinence associated dermatitis, not decubitus ulcer. f/u xray to r/o bony involvement. incontinence management     pre-op: chart reviewed. seen by cardiology on July 22. suggested no absolute cardiac contraindications for the planned procedure. patient was evaluated at bedside. patient is not answering questions. will try to communicate with her tomorrow am once family is here. as per Dr. Smith OR is scheduled in afternoon. however, as per ID, risk of sepsis/worsening infection is more if AVF explantation/removal is not done/new HD access not placed. either way, patient can undergo planned procedure with higher than average risk for her comorbidities. needs ID clearance for PC insertion.

## 2019-07-24 ENCOUNTER — RESULT REVIEW (OUTPATIENT)
Age: 84
End: 2019-07-24

## 2019-07-24 LAB
GLUCOSE BLDC GLUCOMTR-MCNC: 107 MG/DL — HIGH (ref 70–99)
GLUCOSE BLDC GLUCOMTR-MCNC: 118 MG/DL — HIGH (ref 70–99)
GLUCOSE BLDC GLUCOMTR-MCNC: 123 MG/DL — HIGH (ref 70–99)
GLUCOSE BLDC GLUCOMTR-MCNC: 136 MG/DL — HIGH (ref 70–99)
GLUCOSE BLDC GLUCOMTR-MCNC: 84 MG/DL — SIGNIFICANT CHANGE UP (ref 70–99)
GLUCOSE BLDC GLUCOMTR-MCNC: 91 MG/DL — SIGNIFICANT CHANGE UP (ref 70–99)

## 2019-07-24 PROCEDURE — 99232 SBSQ HOSP IP/OBS MODERATE 35: CPT

## 2019-07-24 PROCEDURE — 88300 SURGICAL PATH GROSS: CPT | Mod: 26

## 2019-07-24 PROCEDURE — 71045 X-RAY EXAM CHEST 1 VIEW: CPT | Mod: 26

## 2019-07-24 RX ORDER — SACCHAROMYCES BOULARDII 250 MG
250 POWDER IN PACKET (EA) ORAL
Refills: 0 | Status: DISCONTINUED | OUTPATIENT
Start: 2019-07-24 | End: 2019-07-30

## 2019-07-24 RX ORDER — SODIUM CHLORIDE 9 MG/ML
1000 INJECTION, SOLUTION INTRAVENOUS
Refills: 0 | Status: DISCONTINUED | OUTPATIENT
Start: 2019-07-24 | End: 2019-07-24

## 2019-07-24 RX ORDER — DEXTROSE 50 % IN WATER 50 %
25 SYRINGE (ML) INTRAVENOUS ONCE
Refills: 0 | Status: DISCONTINUED | OUTPATIENT
Start: 2019-07-24 | End: 2019-07-30

## 2019-07-24 RX ORDER — LIDOCAINE 4 G/100G
1 CREAM TOPICAL DAILY
Refills: 0 | Status: DISCONTINUED | OUTPATIENT
Start: 2019-07-24 | End: 2019-07-30

## 2019-07-24 RX ORDER — CALCIUM ACETATE 667 MG
667 TABLET ORAL
Refills: 0 | Status: DISCONTINUED | OUTPATIENT
Start: 2019-07-24 | End: 2019-07-30

## 2019-07-24 RX ORDER — OXYCODONE AND ACETAMINOPHEN 5; 325 MG/1; MG/1
1 TABLET ORAL EVERY 6 HOURS
Refills: 0 | Status: DISCONTINUED | OUTPATIENT
Start: 2019-07-24 | End: 2019-07-30

## 2019-07-24 RX ORDER — DEXTROSE 50 % IN WATER 50 %
12.5 SYRINGE (ML) INTRAVENOUS ONCE
Refills: 0 | Status: DISCONTINUED | OUTPATIENT
Start: 2019-07-24 | End: 2019-07-30

## 2019-07-24 RX ORDER — SODIUM CHLORIDE 9 MG/ML
1000 INJECTION, SOLUTION INTRAVENOUS
Refills: 0 | Status: DISCONTINUED | OUTPATIENT
Start: 2019-07-24 | End: 2019-07-30

## 2019-07-24 RX ORDER — CLOPIDOGREL BISULFATE 75 MG/1
75 TABLET, FILM COATED ORAL DAILY
Refills: 0 | Status: DISCONTINUED | OUTPATIENT
Start: 2019-07-25 | End: 2019-07-25

## 2019-07-24 RX ORDER — DEXTROSE 50 % IN WATER 50 %
15 SYRINGE (ML) INTRAVENOUS ONCE
Refills: 0 | Status: DISCONTINUED | OUTPATIENT
Start: 2019-07-24 | End: 2019-07-30

## 2019-07-24 RX ORDER — DESMOPRESSIN ACETATE 0.1 MG/1
18 TABLET ORAL ONCE
Refills: 0 | Status: DISCONTINUED | OUTPATIENT
Start: 2019-07-24 | End: 2019-07-24

## 2019-07-24 RX ORDER — ONDANSETRON 8 MG/1
4 TABLET, FILM COATED ORAL ONCE
Refills: 0 | Status: DISCONTINUED | OUTPATIENT
Start: 2019-07-24 | End: 2019-07-25

## 2019-07-24 RX ORDER — GLUCAGON INJECTION, SOLUTION 0.5 MG/.1ML
1 INJECTION, SOLUTION SUBCUTANEOUS ONCE
Refills: 0 | Status: DISCONTINUED | OUTPATIENT
Start: 2019-07-24 | End: 2019-07-30

## 2019-07-24 RX ORDER — DEXTROSE 50 % IN WATER 50 %
12.5 SYRINGE (ML) INTRAVENOUS ONCE
Refills: 0 | Status: COMPLETED | OUTPATIENT
Start: 2019-07-24 | End: 2019-07-24

## 2019-07-24 RX ORDER — DESMOPRESSIN ACETATE 0.1 MG/1
20 TABLET ORAL ONCE
Refills: 0 | Status: COMPLETED | OUTPATIENT
Start: 2019-07-24 | End: 2019-07-24

## 2019-07-24 RX ORDER — ERYTHROPOIETIN 10000 [IU]/ML
10000 INJECTION, SOLUTION INTRAVENOUS; SUBCUTANEOUS
Refills: 0 | Status: DISCONTINUED | OUTPATIENT
Start: 2019-07-24 | End: 2019-07-30

## 2019-07-24 RX ORDER — CHLORHEXIDINE GLUCONATE 213 G/1000ML
1 SOLUTION TOPICAL
Refills: 0 | Status: DISCONTINUED | OUTPATIENT
Start: 2019-07-24 | End: 2019-07-30

## 2019-07-24 RX ORDER — LEVOTHYROXINE SODIUM 125 MCG
112 TABLET ORAL DAILY
Refills: 0 | Status: DISCONTINUED | OUTPATIENT
Start: 2019-07-24 | End: 2019-07-30

## 2019-07-24 RX ORDER — INSULIN LISPRO 100/ML
VIAL (ML) SUBCUTANEOUS
Refills: 0 | Status: DISCONTINUED | OUTPATIENT
Start: 2019-07-24 | End: 2019-07-30

## 2019-07-24 RX ORDER — PANTOPRAZOLE SODIUM 20 MG/1
40 TABLET, DELAYED RELEASE ORAL
Refills: 0 | Status: DISCONTINUED | OUTPATIENT
Start: 2019-07-24 | End: 2019-07-30

## 2019-07-24 RX ORDER — ASPIRIN/CALCIUM CARB/MAGNESIUM 324 MG
81 TABLET ORAL DAILY
Refills: 0 | Status: DISCONTINUED | OUTPATIENT
Start: 2019-07-24 | End: 2019-07-25

## 2019-07-24 RX ORDER — LOSARTAN POTASSIUM 100 MG/1
50 TABLET, FILM COATED ORAL DAILY
Refills: 0 | Status: DISCONTINUED | OUTPATIENT
Start: 2019-07-24 | End: 2019-07-30

## 2019-07-24 RX ORDER — ATORVASTATIN CALCIUM 80 MG/1
40 TABLET, FILM COATED ORAL AT BEDTIME
Refills: 0 | Status: DISCONTINUED | OUTPATIENT
Start: 2019-07-24 | End: 2019-07-30

## 2019-07-24 RX ORDER — CARVEDILOL PHOSPHATE 80 MG/1
6.25 CAPSULE, EXTENDED RELEASE ORAL EVERY 12 HOURS
Refills: 0 | Status: DISCONTINUED | OUTPATIENT
Start: 2019-07-24 | End: 2019-07-30

## 2019-07-24 RX ORDER — FOLIC ACID 0.8 MG
1 TABLET ORAL DAILY
Refills: 0 | Status: DISCONTINUED | OUTPATIENT
Start: 2019-07-24 | End: 2019-07-30

## 2019-07-24 RX ORDER — SODIUM CHLORIDE 9 MG/ML
1000 INJECTION, SOLUTION INTRAVENOUS
Refills: 0 | Status: DISCONTINUED | OUTPATIENT
Start: 2019-07-24 | End: 2019-07-25

## 2019-07-24 RX ORDER — FENTANYL CITRATE 50 UG/ML
25 INJECTION INTRAVENOUS
Refills: 0 | Status: DISCONTINUED | OUTPATIENT
Start: 2019-07-24 | End: 2019-07-25

## 2019-07-24 RX ORDER — CALCITRIOL 0.5 UG/1
0.25 CAPSULE ORAL DAILY
Refills: 0 | Status: DISCONTINUED | OUTPATIENT
Start: 2019-07-24 | End: 2019-07-30

## 2019-07-24 RX ORDER — INSULIN LISPRO 100/ML
VIAL (ML) SUBCUTANEOUS AT BEDTIME
Refills: 0 | Status: DISCONTINUED | OUTPATIENT
Start: 2019-07-24 | End: 2019-07-30

## 2019-07-24 RX ORDER — ACETAMINOPHEN 500 MG
650 TABLET ORAL EVERY 6 HOURS
Refills: 0 | Status: DISCONTINUED | OUTPATIENT
Start: 2019-07-24 | End: 2019-07-30

## 2019-07-24 RX ADMIN — DESMOPRESSIN ACETATE 220 MICROGRAM(S): 0.1 TABLET ORAL at 22:57

## 2019-07-24 RX ADMIN — LOSARTAN POTASSIUM 50 MILLIGRAM(S): 100 TABLET, FILM COATED ORAL at 05:28

## 2019-07-24 RX ADMIN — PANTOPRAZOLE SODIUM 40 MILLIGRAM(S): 20 TABLET, DELAYED RELEASE ORAL at 05:29

## 2019-07-24 RX ADMIN — Medication 0.1 MILLIGRAM(S): at 05:28

## 2019-07-24 RX ADMIN — Medication 100 MILLIGRAM(S): at 05:28

## 2019-07-24 RX ADMIN — Medication 112 MICROGRAM(S): at 05:28

## 2019-07-24 RX ADMIN — Medication 0.1 MILLIGRAM(S): at 16:55

## 2019-07-24 RX ADMIN — OXYCODONE AND ACETAMINOPHEN 1 TABLET(S): 5; 325 TABLET ORAL at 15:31

## 2019-07-24 RX ADMIN — CHLORHEXIDINE GLUCONATE 1 APPLICATION(S): 213 SOLUTION TOPICAL at 05:27

## 2019-07-24 RX ADMIN — OXYCODONE AND ACETAMINOPHEN 1 TABLET(S): 5; 325 TABLET ORAL at 16:16

## 2019-07-24 RX ADMIN — CARVEDILOL PHOSPHATE 6.25 MILLIGRAM(S): 80 CAPSULE, EXTENDED RELEASE ORAL at 17:10

## 2019-07-24 RX ADMIN — Medication 100 MILLIGRAM(S): at 14:57

## 2019-07-24 RX ADMIN — Medication 667 MILLIGRAM(S): at 05:29

## 2019-07-24 RX ADMIN — SODIUM CHLORIDE 75 MILLILITER(S): 9 INJECTION, SOLUTION INTRAVENOUS at 15:31

## 2019-07-24 RX ADMIN — CARVEDILOL PHOSPHATE 6.25 MILLIGRAM(S): 80 CAPSULE, EXTENDED RELEASE ORAL at 05:28

## 2019-07-24 NOTE — PROGRESS NOTE ADULT - SUBJECTIVE AND OBJECTIVE BOX
Dr. Berrios Hospitalist Progress Note  LAUREN ROBERSON [id number]    Patient is a 87y old  Female who presents with a chief complaint of weakness, sepsis 2/2 UTI (23 Jul 2019 16:59)    Interval: seen at bedside. again answering questions selectively, even with bedside  however better than yesterday. denied complaints. does not report pain over the coccyx. spoke to Son Miriam. reported patient has been confused and not talking normal since admission. He was tole that it could be related to infection/sepsis. at baseline she is AAO*3. 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 however slowly getting her vision back. she follows with Dr. Aniket alan. son also reported athat he is aware of the AVF procedure this afternoon. vascular team spoke to him after admission and yesterday. they will call him for verbal consent over phone before the procedure.     HPI:  87yoF hx ESRD on HD (M and F only), CAD s/p CABG, HTN, DM, PAD, hypothyroidism presenting with generalized weakness.  Pt is poor historian despite use of   She reports generalized weakness for past few days associated with generalized, abdominal pain that she is unable to describe associated with nausea, some episodes of vomiting.  Reports difficulty with urination and ?dysuria but believes her urinary symptoms are due to not drinking enough fluids recently. Unable to explain why she is only on HD twice per week, but says last HD session was on 7/1.  Denies fevers, chest pain, dyspnea, endorses chronic pain in face and all extremities which is chronic for her.  On admission, febrile, leukocytosis, UA positive, was pan scanned by ED and CT abd/pelvis showed haziness around the gallbladder however follow up abd US was negative for cholelithiasis and Stewart's sign and no LFT elevation on labs. (04 Jul 2019 22:54)      ROS:  limited as patient was not answering all qs. however, denied chest pain/SOB/palpitation/leg or calf pain/abd pain/pain over the AVF.    Vital Signs Last 24 Hrs  T(C): 36.9 (24 Jul 2019 09:57), Max: 36.9 (23 Jul 2019 15:15)  T(F): 98.4 (24 Jul 2019 09:57), Max: 98.5 (23 Jul 2019 15:15)  HR: 79 (24 Jul 2019 09:57) (68 - 79)  BP: 117/60 (24 Jul 2019 09:57) (117/60 - 143/64)  BP(mean): --  RR: 18 (24 Jul 2019 09:57) (18 - 18)  SpO2: 95% (24 Jul 2019 09:57) (95% - 98%)    CAPILLARY BLOOD GLUCOSE      POCT Blood Glucose.: 123 mg/dL (24 Jul 2019 12:08)  POCT Blood Glucose.: 84 mg/dL (24 Jul 2019 11:13)  POCT Blood Glucose.: 91 mg/dL (24 Jul 2019 07:31)  POCT Blood Glucose.: 112 mg/dL (23 Jul 2019 22:19)  POCT Blood Glucose.: 101 mg/dL (23 Jul 2019 17:39)  POCT Blood Glucose.: 95 mg/dL (23 Jul 2019 12:12)      Physical Exam:  GENERAL: Not in distress. sleepy but arousable, alert while talking  HEENT:  Normocephalic and atraumatic. MM dry  NECK: Supple.   CARDIOVASCULAR: RRR S1, S2. No murmur/rubs/gallop  LUNGS: BLAE+, no rales, no wheezing, no rhonchi.    ABDOMEN: ND. Soft,  NT, no guarding / rebound / rigidity. BS normoactive. No CVA tenderness.    EXTREMITIES: no cyanosis, no clubbing, no edema. no leg or calf TP   SKIN: no rash. Incontinence associated dermatitis not  ulcer [ RN reported, as per wound care]  NEUROLOGIC: unable to assess appropriately as patient is not answering all qs. however she knows she is in hospital. strength is symmetric, sensation intact, speech fluent.    PSYCHIATRIC: Calm.  No agitation.     Labs: no labs today                        7.9    7.51  )-----------( 293      ( 23 Jul 2019 08:07 )             24.6     07-23    131<L>  |  94<L>  |  39.0<H>  ----------------------------<  82  4.3   |  25.0  |  4.38<H>    Ca    7.9<L>      23 Jul 2019 08:07     MEDICATIONS  (STANDING):  aspirin  chewable 81 milliGRAM(s) Oral daily  atorvastatin 40 milliGRAM(s) Oral at bedtime  calcitriol   Capsule 0.25 MICROGram(s) Oral daily  calcium acetate 667 milliGRAM(s) Oral three times a day with meals  carvedilol 6.25 milliGRAM(s) Oral every 12 hours  ceFAZolin   IVPB      ceFAZolin   IVPB 1000 milliGRAM(s) IV Intermittent every 24 hours  chlorhexidine 2% Cloths 1 Application(s) Topical <User Schedule>  cloNIDine 0.1 milliGRAM(s) Oral every 8 hours  clopidogrel Tablet 75 milliGRAM(s) Oral daily  dextrose 5% + lactated ringers. 1000 milliLiter(s) (75 mL/Hr) IV Continuous <Continuous>  dextrose 5%. 1000 milliLiter(s) (50 mL/Hr) IV Continuous <Continuous>  dextrose 50% Injectable 12.5 Gram(s) IV Push once  dextrose 50% Injectable 25 Gram(s) IV Push once  dextrose 50% Injectable 25 Gram(s) IV Push once  dextrose 50% Injectable 12.5 Gram(s) IV Push once  docusate sodium 100 milliGRAM(s) Oral three times a day  epoetin barb Injectable 79575 Unit(s) IV Push <User Schedule>  folic acid 1 milliGRAM(s) Oral daily  insulin lispro (HumaLOG) corrective regimen sliding scale   SubCutaneous three times a day before meals  insulin lispro (HumaLOG) corrective regimen sliding scale   SubCutaneous at bedtime  levothyroxine 112 MICROGram(s) Oral daily  lidocaine   Patch 1 Patch Transdermal daily  losartan 50 milliGRAM(s) Oral daily  pantoprazole    Tablet 40 milliGRAM(s) Oral before breakfast  polyethylene glycol 3350 17 Gram(s) Oral daily  saccharomyces boulardii 250 milliGRAM(s) Oral two times a day  senna 2 Tablet(s) Oral at bedtime    MEDICATIONS  (PRN):  acetaminophen   Tablet .. 650 milliGRAM(s) Oral every 6 hours PRN Temp greater or equal to 38C (100.4F), Mild Pain (1 - 3)  dextrose 40% Gel 15 Gram(s) Oral once PRN Blood Glucose LESS THAN 70 milliGRAM(s)/deciliter  glucagon  Injectable 1 milliGRAM(s) IntraMuscular once PRN Glucose LESS THAN 70 milligrams/deciliter  oxyCODONE    5 mG/acetaminophen 325 mG 1 Tablet(s) Oral every 6 hours PRN Severe Pain (7 - 10)

## 2019-07-24 NOTE — PROGRESS NOTE ADULT - ASSESSMENT
ESRD - HD today on TTS schedule and am    Anemia - transfused 7-17  h/h better today  Epogen with HD     RO - Binders     SA sepsis 4/4 - jamaal Kefzol  Does not look toxic afebrile no leukocytosis  NM scan noted MAY need AVG removed and temp HD access will d/w Dr Kramer   Blood Cx (-) 7-14   SANFORD wa s(-) Veg   Vascular follow up re. AVG noted, Afeb today    Will follow

## 2019-07-24 NOTE — PROGRESS NOTE ADULT - SUBJECTIVE AND OBJECTIVE BOX
[facility]  INFECTIOUS DISEASES AND INTERNAL MEDICINE at [place]  =======================================================  Paresh Stark MD  Diplomates American Board of Internal Medicine and Infectious Diseases  Tel: [phone]      Fax: [phone]  =======================================================    LAUREN ROBERSON [id number]    Follow up: MSSA bacteremia. sleepy but arousable. Denies any pain fever or chills    Allergies:  penicillin (Anaphylaxis)  seafood (Anaphylaxis)  Send Nepro TID- RD OK (Unknown)  shellfish (Anaphylaxis)      Medications:  acetaminophen   Tablet .. 650 milliGRAM(s) Oral every 6 hours PRN  aspirin  chewable 81 milliGRAM(s) Oral daily  atorvastatin 40 milliGRAM(s) Oral at bedtime  calcitriol   Capsule 0.25 MICROGram(s) Oral daily  calcium acetate 667 milliGRAM(s) Oral three times a day with meals  carvedilol 6.25 milliGRAM(s) Oral every 12 hours  ceFAZolin   IVPB      ceFAZolin   IVPB 1000 milliGRAM(s) IV Intermittent every 24 hours  chlorhexidine 2% Cloths 1 Application(s) Topical <User Schedule>  cloNIDine 0.1 milliGRAM(s) Oral every 8 hours  clopidogrel Tablet 75 milliGRAM(s) Oral daily  dextrose 40% Gel 15 Gram(s) Oral once PRN  dextrose 5% + lactated ringers. 1000 milliLiter(s) IV Continuous <Continuous>  dextrose 5%. 1000 milliLiter(s) IV Continuous <Continuous>  dextrose 50% Injectable 12.5 Gram(s) IV Push once  dextrose 50% Injectable 25 Gram(s) IV Push once  dextrose 50% Injectable 25 Gram(s) IV Push once  dextrose 50% Injectable 12.5 Gram(s) IV Push once  docusate sodium 100 milliGRAM(s) Oral three times a day  epoetin barb Injectable 09992 Unit(s) IV Push <User Schedule>  folic acid 1 milliGRAM(s) Oral daily  glucagon  Injectable 1 milliGRAM(s) IntraMuscular once PRN  insulin lispro (HumaLOG) corrective regimen sliding scale   SubCutaneous three times a day before meals  insulin lispro (HumaLOG) corrective regimen sliding scale   SubCutaneous at bedtime  levothyroxine 112 MICROGram(s) Oral daily  lidocaine   Patch 1 Patch Transdermal daily  losartan 50 milliGRAM(s) Oral daily  oxyCODONE    5 mG/acetaminophen 325 mG 1 Tablet(s) Oral every 6 hours PRN  pantoprazole    Tablet 40 milliGRAM(s) Oral before breakfast  polyethylene glycol 3350 17 Gram(s) Oral daily  saccharomyces boulardii 250 milliGRAM(s) Oral two times a day  senna 2 Tablet(s) Oral at bedtime            REVIEW OF SYSTEMS:  CONSTITUTIONAL:  No Fever or chills  HEENT:   No diplopia or blurred vision.  No earache, sore throat or runny nose.  CARDIOVASCULAR:  No pressure, squeezing, strangling, tightness, heaviness or aching about the chest, neck, axilla or epigastrium.  RESPIRATORY:  No cough, shortness of breath  GASTROINTESTINAL:  No nausea, vomiting or diarrhea.  GENITOURINARY:  No dysuria, frequency or urgency. No Blood in urine  MUSCULOSKELETAL:  no joint aches, no muscle pain  SKIN:  No change in skin, hair or nails.  NEUROLOGIC:  No Headaches, seizures or weakness.  PSYCHIATRIC:  No disorder of thought or mood.  ENDOCRINE:  No heat or cold intolerance  HEMATOLOGICAL:  No easy bruising or bleeding.            Physical Exam:  ICU Vital Signs Last 24 Hrs  T(C): 36.9 (24 Jul 2019 09:57), Max: 36.9 (23 Jul 2019 15:15)  T(F): 98.4 (24 Jul 2019 09:57), Max: 98.5 (23 Jul 2019 15:15)  HR: 79 (24 Jul 2019 09:57) (70 - 79)  BP: 117/60 (24 Jul 2019 09:57) (117/60 - 143/64)  BP(mean): --  ABP: --  ABP(mean): --  RR: 18 (24 Jul 2019 09:57) (18 - 18)  SpO2: 95% (24 Jul 2019 09:57) (95% - 98%)      GEN: NAD, drowsy but arousable  HEENT: normocephalic and atraumatic. EOMI. PERRL.  Anicteric   NECK: Supple.   LUNGS: Clear to auscultation.  HEART: Regular rate and rhythm without murmur. left chest PPM  ABDOMEN: Soft, nontender, and nondistended.  Positive bowel sounds.    : No CVA tenderness  EXTREMITIES: Without any edema. RUe AVG  MSK: no joint swelling  NEUROLOGIC: Cranial nerves II through XII are grossly intact. No focal deficits  PSYCHIATRIC: Appropriate affect .  SKIN: No Rash      Labs:  07-23    131<L>  |  94<L>  |  39.0<H>  ----------------------------<  82  4.3   |  25.0  |  4.38<H>    Ca    7.9<L>      23 Jul 2019 08:07                            7.9    7.51  )-----------( 293      ( 23 Jul 2019 08:07 )             24.6                 CAPILLARY BLOOD GLUCOSE      POCT Blood Glucose.: 118 mg/dL (24 Jul 2019 13:50)  POCT Blood Glucose.: 123 mg/dL (24 Jul 2019 12:08)  POCT Blood Glucose.: 84 mg/dL (24 Jul 2019 11:13)  POCT Blood Glucose.: 91 mg/dL (24 Jul 2019 07:31)  POCT Blood Glucose.: 112 mg/dL (23 Jul 2019 22:19)  POCT Blood Glucose.: 101 mg/dL (23 Jul 2019 17:39)        RECENT CULTURES:  07-14 @ 11:53 .Blood     No growth at 5 days.        07-13 @ 10:36 .Blood Staphylococcus aureus    Growth in aerobic and anaerobic bottles: Staphylococcus aureus  Aerobic Bottle: 15:06 Hours to positivity  Anaerobic Bottle: 19:45 Hours to positivity  ,  TYPE: (C=Critical, N=Notification, A=Abnormal) c  TESTS:  _ gs  DATE/TIME CALLED: _ 07/14/2019 09:52:46  CALLED TO: Kellee bryant rn  READ BACK (2 Patient Identifiers)(Y/N): _ y  READ BACK VALUES (Y/N): _ y  CALLED BY: Kellee wetzel

## 2019-07-24 NOTE — PROGRESS NOTE ADULT - ASSESSMENT
87yoF hx ESRD on HD (M and F only), AVG right side 8/3/17   CAD s/p CABG, HTN, DM, PAD, hypothyroidism presenting with generalized weakness, dysuria.   pain at graft site for 1 month.  fever in .6 now afebrile  leukocytosis to 14 now normal  Blood cx 4/4 Staph aureus.   Imaging with no focus (no pneumonia, abscess, OM). NM scan suspicious for AVG infection.  Overall Staph aureus bacteremia 2/2 AVG infection    Recommend:  - Blood cultures + 4/4 Staph aureus. BCX 7/14 NGTD  - Despite listed penicillin allergy, tolerating cefazolin  - NM scan suspicious for AVG infection. Plan for AVG explant today  - Last BCX 7/14 NGTD. No ID contraindication for permacath placement  - c/w cefazolin 1 g IV every day (even on non HD days) and after dialysis on her dialysis days   - Outpatient dosing for cefazolin with HD will require that she be on thrice weekly schedule. We can then dose cefazolin 2g AD on Monday, 2 g AD on Wed, 3 g AD on Fri   - SANFORD for endocarditis/lead vegetations (-)  - Trend Fever  - Trend WBC count    D/w Dr Sood  Will follow

## 2019-07-24 NOTE — PROGRESS NOTE ADULT - ASSESSMENT
87yoF hx ESRD on HD (M and F only), CAD s/p CABG, HTN, DM, PAD, hypothyroidism presenting with generalized weakness.  On admission, febrile, leukocytosis, UA positive, with  Blood cultures positive for staph aureus.  SANFORD done, no vegetation. s/p indium scan positive for R AV graft infection  Sepsis :  Staph aureus bacteremia due to R arm AV graft infection. however vascular not convinced as AVF looks clean. however as per ID recommendation vascular decided to do AVF explantation today with Jeffrey catheter insertion. Pt will need PC insertion once cleared by ID likely early next week  SANFORD negative for vegetation    AMS since admission as per son: likely related to infection/sepsis/dehydration. MS same as yesterday. CT head showed mod-severe microvascular changes. 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 follows with Dr. Marcial. possible underlying vascular dementia. needs dementia w/u OP. neuro eval if no improvement in MS after infection is better. fall and aspiration precautions. rn yesterday reported patient more conversational with family members. son will call me after he comes to hospital tomorrow.     CAD s/p CABG:  with elevated troponin - T 0.32 - elevated in past, in setting of ESRD on HD  Cath 2/2018- LIMA to LAD patent, SVG to OM patent, SVG to RPDA patent   cardiology has discussed with jo-ann An and they are unwilling for stress testing, cath, invasive - only want Medication   HFrEF  SANFORD 7/9/2019: LVEF 30-35.Ml-mod TR. No evidence of endocarditis.   Echo Limited 7/18: LVEF 35-40%.    cont  carvedilol.      Losartan.  Volume control with HD    ESRD on HD   Cont HD    c/o coccyx pain: as pe wond care patient has Incontinence associated dermatitis, not decubitus ulcer. f/u xray to r/o bony involvement. incontinence management     pre-op: chart reviewed. seen by cardiology on July 22. suggested no absolute cardiac contraindications for the planned procedure. patient was evaluated at bedside. patient is not answering questions.  as per ID, risk of sepsis/worsening infection is more if AVF explantation/removal is not done/new HD access not placed.  patient can undergo planned procedure with higher than average risk for her comorbidities. needs ID clearance for PC insertion. consent needs to be taken from Jo-Ann an as patient is confused.     GOC: palliative eval 87yoF hx ESRD on HD (M and F only), CAD s/p CABG, HTN, DM, PAD, hypothyroidism presenting with generalized weakness.  On admission, febrile, leukocytosis, UA positive, with  Blood cultures positive for staph aureus.  SANFORD done, no vegetation. s/p indium scan positive for R AV graft infection  Sepsis :  Staph aureus bacteremia due to R arm AV graft infection. however vascular not convinced as AVF looks clean. however as per ID recommendation vascular decided to do AVF explantation today with Jeffrey catheter insertion. Pt will need PC insertion once cleared by ID likely early next week  SANFORD negative for vegetation    AMS since admission as per son: likely related to infection/sepsis/dehydration. MS same as yesterday. CT head showed mod-severe microvascular changes. 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 follows with Dr. Marcial. possible underlying vascular dementia. needs dementia w/u OP. neuro eval if no improvement in MS after infection is better. fall and aspiration precautions. rn yesterday reported patient more conversational with family members. son will call me after he comes to hospital tomorrow. patient is NPO and BS lower side and dehydrated. will start D5LR@75 mls/hr for 12 hrs. FSBS stat and Q 6hrs while in NPO    CAD s/p CABG:  with elevated troponin - T 0.32 - elevated in past, in setting of ESRD on HD  Cath 2/2018- LIMA to LAD patent, SVG to OM patent, SVG to RPDA patent   cardiology has discussed with jo-ann An and they are unwilling for stress testing, cath, invasive - only want Medication   HFrEF  SANFORD 7/9/2019: LVEF 30-35.Ml-mod TR. No evidence of endocarditis.   Echo Limited 7/18: LVEF 35-40%.    cont  carvedilol.   Losartan.  Volume control with HD    ESRD on HD   Cont HD    c/o coccyx pain: as per wound care patient has Incontinence associated dermatitis, not decubitus ulcer.  xray--> no bony involvement. incontinence management     pre-op: chart reviewed. seen by cardiology on July 22. suggested no absolute cardiac contraindications for the planned procedure. patient was evaluated at bedside. patient is not answering questions.  as per ID, risk of sepsis/worsening infection is more if AVF explantation/removal is not done/new HD access not placed.  patient can undergo planned procedure with higher than average risk for her comorbidities. needs ID clearance for PC insertion. consent needs to be taken from Jo-Ann an as patient is confused.

## 2019-07-24 NOTE — PROGRESS NOTE ADULT - SUBJECTIVE AND OBJECTIVE BOX
Patient seen and examined    I&O's Summary    23 Jul 2019 07:01  -  24 Jul 2019 07:00  --------------------------------------------------------  IN: 0 mL / OUT: 600 mL / NET: -600 mL    24 Jul 2019 07:01  -  24 Jul 2019 19:20  --------------------------------------------------------  IN: 225 mL / OUT: 0 mL / NET: 225 mL        REVIEW OF SYSTEMS:    CONSTITUTIONAL: No F/C  RESPIRATORY: No cough,  No SOB  CARDIOVASCULAR: No CP/palpitations,    GASTROINTESTINAL: No abdominal pain  or NVD   GENITOURINARY: No UTI sx  NEUROLOGICAL: No headaches, NO wk/numbness  MUSCULOSKELETAL:   EXTREMITIES : no swelling,    Vital Signs Last 24 Hrs  T(C): 36.9 (24 Jul 2019 09:57), Max: 36.9 (24 Jul 2019 09:57)  T(F): 98.4 (24 Jul 2019 09:57), Max: 98.4 (24 Jul 2019 09:57)  HR: 59 (24 Jul 2019 16:54) (59 - 79)  BP: 153/59 (24 Jul 2019 16:54) (117/60 - 153/59)  BP(mean): --  RR: 17 (24 Jul 2019 16:54) (17 - 18)  SpO2: 96% (24 Jul 2019 16:54) (95% - 98%)    PHYSICAL EXAM:    GENERAL: NAD,   EYES:  conjunctiva and sclera clear  NECK: Supple, No JVD/Bruit  NERVOUS SYSTEM:  A/O x3,   CHEST:  No rales or rhonchi  HEART:  RRR, No murmur  ABDOMEN: Soft, NT/ND BS+  EXTREMITIES:  No Edema;  SKIN: No rashes    LABS:                          7.9    7.51  )-----------( 293      ( 23 Jul 2019 08:07 )             24.6     07-23    131<L>  |  94<L>  |  39.0<H>  ----------------------------<  82  4.3   |  25.0  |  4.38<H>    Ca    7.9<L>      23 Jul 2019 08:07        MEDICATIONS  (STANDING):  acetaminophen   Tablet .. PRN  aspirin  chewable  atorvastatin  calcitriol   Capsule  calcium acetate  carvedilol  ceFAZolin   IVPB  ceFAZolin   IVPB  chlorhexidine 2% Cloths  cloNIDine  clopidogrel Tablet  dextrose 40% Gel PRN  dextrose 5% + lactated ringers.  dextrose 5%.  dextrose 50% Injectable  dextrose 50% Injectable  dextrose 50% Injectable  dextrose 50% Injectable  docusate sodium  epoetin barb Injectable  folic acid  glucagon  Injectable PRN  insulin lispro (HumaLOG) corrective regimen sliding scale  insulin lispro (HumaLOG) corrective regimen sliding scale  levothyroxine  lidocaine   Patch  losartan  oxyCODONE    5 mG/acetaminophen 325 mG PRN  pantoprazole    Tablet  polyethylene glycol 3350  saccharomyces boulardii  senna

## 2019-07-25 LAB
ANION GAP SERPL CALC-SCNC: 12 MMOL/L — SIGNIFICANT CHANGE UP (ref 5–17)
BLD GP AB SCN SERPL QL: SIGNIFICANT CHANGE UP
BUN SERPL-MCNC: 29 MG/DL — HIGH (ref 8–20)
CALCIUM SERPL-MCNC: 7.4 MG/DL — LOW (ref 8.6–10.2)
CHLORIDE SERPL-SCNC: 98 MMOL/L — SIGNIFICANT CHANGE UP (ref 98–107)
CO2 SERPL-SCNC: 25 MMOL/L — SIGNIFICANT CHANGE UP (ref 22–29)
CREAT SERPL-MCNC: 3.7 MG/DL — HIGH (ref 0.5–1.3)
FOLATE SERPL-MCNC: >20 NG/ML — SIGNIFICANT CHANGE UP
GLUCOSE BLDC GLUCOMTR-MCNC: 129 MG/DL — HIGH (ref 70–99)
GLUCOSE BLDC GLUCOMTR-MCNC: 130 MG/DL — HIGH (ref 70–99)
GLUCOSE BLDC GLUCOMTR-MCNC: 144 MG/DL — HIGH (ref 70–99)
GLUCOSE BLDC GLUCOMTR-MCNC: 212 MG/DL — HIGH (ref 70–99)
GLUCOSE SERPL-MCNC: 130 MG/DL — HIGH (ref 70–115)
GRAM STN FLD: SIGNIFICANT CHANGE UP
HBV SURFACE AG SER-ACNC: SIGNIFICANT CHANGE UP
HCT VFR BLD CALC: 23.1 % — LOW (ref 34.5–45)
HCT VFR BLD CALC: 35.1 % — SIGNIFICANT CHANGE UP (ref 34.5–45)
HCV AB S/CO SERPL IA: 0.08 S/CO — SIGNIFICANT CHANGE UP (ref 0–0.99)
HCV AB SERPL-IMP: SIGNIFICANT CHANGE UP
HGB BLD-MCNC: 11.3 G/DL — LOW (ref 11.5–15.5)
HGB BLD-MCNC: 7.1 G/DL — LOW (ref 11.5–15.5)
MAGNESIUM SERPL-MCNC: 1.7 MG/DL — SIGNIFICANT CHANGE UP (ref 1.6–2.6)
MCHC RBC-ENTMCNC: 28.6 PG — SIGNIFICANT CHANGE UP (ref 27–34)
MCHC RBC-ENTMCNC: 28.9 PG — SIGNIFICANT CHANGE UP (ref 27–34)
MCHC RBC-ENTMCNC: 30.7 GM/DL — LOW (ref 32–36)
MCHC RBC-ENTMCNC: 32.2 GM/DL — SIGNIFICANT CHANGE UP (ref 32–36)
MCV RBC AUTO: 89.8 FL — SIGNIFICANT CHANGE UP (ref 80–100)
MCV RBC AUTO: 93.1 FL — SIGNIFICANT CHANGE UP (ref 80–100)
NIGHT BLUE STAIN TISS: SIGNIFICANT CHANGE UP
PHOSPHATE SERPL-MCNC: 4.3 MG/DL — SIGNIFICANT CHANGE UP (ref 2.4–4.7)
PLATELET # BLD AUTO: 226 K/UL — SIGNIFICANT CHANGE UP (ref 150–400)
PLATELET # BLD AUTO: 248 K/UL — SIGNIFICANT CHANGE UP (ref 150–400)
POTASSIUM SERPL-MCNC: 4 MMOL/L — SIGNIFICANT CHANGE UP (ref 3.5–5.3)
POTASSIUM SERPL-SCNC: 4 MMOL/L — SIGNIFICANT CHANGE UP (ref 3.5–5.3)
RBC # BLD: 2.48 M/UL — LOW (ref 3.8–5.2)
RBC # BLD: 3.91 M/UL — SIGNIFICANT CHANGE UP (ref 3.8–5.2)
RBC # FLD: 20.3 % — HIGH (ref 10.3–14.5)
RBC # FLD: 23.9 % — HIGH (ref 10.3–14.5)
SODIUM SERPL-SCNC: 135 MMOL/L — SIGNIFICANT CHANGE UP (ref 135–145)
SPECIMEN SOURCE: SIGNIFICANT CHANGE UP
SPECIMEN SOURCE: SIGNIFICANT CHANGE UP
VIT B12 SERPL-MCNC: 1643 PG/ML — HIGH (ref 232–1245)
WBC # BLD: 6.38 K/UL — SIGNIFICANT CHANGE UP (ref 3.8–10.5)
WBC # BLD: 8.12 K/UL — SIGNIFICANT CHANGE UP (ref 3.8–10.5)
WBC # FLD AUTO: 6.38 K/UL — SIGNIFICANT CHANGE UP (ref 3.8–10.5)
WBC # FLD AUTO: 8.12 K/UL — SIGNIFICANT CHANGE UP (ref 3.8–10.5)

## 2019-07-25 PROCEDURE — 99233 SBSQ HOSP IP/OBS HIGH 50: CPT

## 2019-07-25 RX ORDER — CEFAZOLIN SODIUM 1 G
1000 VIAL (EA) INJECTION EVERY 24 HOURS
Refills: 0 | Status: DISCONTINUED | OUTPATIENT
Start: 2019-07-25 | End: 2019-07-27

## 2019-07-25 RX ADMIN — Medication 667 MILLIGRAM(S): at 17:52

## 2019-07-25 RX ADMIN — Medication 650 MILLIGRAM(S): at 20:19

## 2019-07-25 RX ADMIN — PANTOPRAZOLE SODIUM 40 MILLIGRAM(S): 20 TABLET, DELAYED RELEASE ORAL at 05:18

## 2019-07-25 RX ADMIN — Medication 250 MILLIGRAM(S): at 05:18

## 2019-07-25 RX ADMIN — CARVEDILOL PHOSPHATE 6.25 MILLIGRAM(S): 80 CAPSULE, EXTENDED RELEASE ORAL at 17:53

## 2019-07-25 RX ADMIN — OXYCODONE AND ACETAMINOPHEN 1 TABLET(S): 5; 325 TABLET ORAL at 02:44

## 2019-07-25 RX ADMIN — LOSARTAN POTASSIUM 50 MILLIGRAM(S): 100 TABLET, FILM COATED ORAL at 05:18

## 2019-07-25 RX ADMIN — CHLORHEXIDINE GLUCONATE 1 APPLICATION(S): 213 SOLUTION TOPICAL at 05:18

## 2019-07-25 RX ADMIN — OXYCODONE AND ACETAMINOPHEN 1 TABLET(S): 5; 325 TABLET ORAL at 08:10

## 2019-07-25 RX ADMIN — CARVEDILOL PHOSPHATE 6.25 MILLIGRAM(S): 80 CAPSULE, EXTENDED RELEASE ORAL at 05:18

## 2019-07-25 RX ADMIN — Medication 112 MICROGRAM(S): at 05:18

## 2019-07-25 RX ADMIN — LIDOCAINE 1 PATCH: 4 CREAM TOPICAL at 17:52

## 2019-07-25 RX ADMIN — ATORVASTATIN CALCIUM 40 MILLIGRAM(S): 80 TABLET, FILM COATED ORAL at 23:53

## 2019-07-25 RX ADMIN — OXYCODONE AND ACETAMINOPHEN 1 TABLET(S): 5; 325 TABLET ORAL at 01:45

## 2019-07-25 RX ADMIN — OXYCODONE AND ACETAMINOPHEN 1 TABLET(S): 5; 325 TABLET ORAL at 00:00

## 2019-07-25 RX ADMIN — Medication 100 MILLIGRAM(S): at 17:54

## 2019-07-25 RX ADMIN — LIDOCAINE 1 PATCH: 4 CREAM TOPICAL at 19:35

## 2019-07-25 RX ADMIN — Medication 250 MILLIGRAM(S): at 17:52

## 2019-07-25 RX ADMIN — OXYCODONE AND ACETAMINOPHEN 1 TABLET(S): 5; 325 TABLET ORAL at 23:56

## 2019-07-25 RX ADMIN — Medication 667 MILLIGRAM(S): at 08:11

## 2019-07-25 RX ADMIN — ERYTHROPOIETIN 10000 UNIT(S): 10000 INJECTION, SOLUTION INTRAVENOUS; SUBCUTANEOUS at 15:42

## 2019-07-25 RX ADMIN — OXYCODONE AND ACETAMINOPHEN 1 TABLET(S): 5; 325 TABLET ORAL at 16:19

## 2019-07-25 NOTE — PROGRESS NOTE ADULT - ASSESSMENT
87yoF hx ESRD on HD (M and F only), CAD s/p CABG, HTN, DM, PAD, hypothyroidism presenting with generalized weakness.  On admission, febrile, leukocytosis, UA positive, with  Blood cultures positive for staph aureus.  SANFORD done, no vegetation. s/p indium scan positive for R AV graft infection  Sepsis :  Staph aureus bacteremia due to R arm AV graft infection.   s/p AVG resection and Jeffrey cath placed yesterday  Pt will need PC insertion once cleared by ID likely early next week  SANFORD negative for vegetation    bleeding from AVG. stopped. hb dropped. s/p 2 units PRBC today. monitor cbc. asa and plavix on hold. not clear why needs DAPT. will resume asa once able. discuss with cardio    AMS since admission as per son: likely related to infection/sepsis/dehydration. MS better. seems communicates better when family memebers present. also has hearing issue. CT head showed mod-severe microvascular changes. 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 follows with Dr. Marcial. possible underlying vascular dementia. needs dementia w/u OP. neuro eval if no improvement in MS after infection is better. fall and aspiration precautions.     CAD s/p CABG:  with elevated troponin - T 0.32 - elevated in past, in setting of ESRD on HD  Cath 2/2018- LIMA to LAD patent, SVG to OM patent, SVG to RPDA patent   cardiology has discussed with son Darrin and they are unwilling for stress testing, cath, invasive - only want Medication   HFrEF  SANFORD 7/9/2019: LVEF 30-35.Ml-mod TR. No evidence of endocarditis.   Echo Limited 7/18: LVEF 35-40%.    cont  carvedilol.   Losartan.  Volume control with HD    ESRD on HD   Cont HD    c/o coccyx pain: as per wound care patient has Incontinence associated dermatitis, not decubitus ulcer.  xray--> no bony involvement. incontinence management . wound care cx appreciated    DVT-P: SCD for now

## 2019-07-25 NOTE — ADVANCED PRACTICE NURSE CONSULT - RECOMMEDATIONS
Follow Nutritional suggestions as per Dietary consult.    Recommendation for Incontinence Associate Dermatitis:  apply Critic Aid Clear to area per shift and as needed    Continue low air loss bed therapy, continue to turn & reposition q2h with Z-skyler fluidized positioning device, Z-Skyler Heel boots to bilateral feet and single breathable pad, continue measures to decrease friction/shear/pressure.     Plan discussed with patient’s primary nurse, Heri Munoz.  Please call wound care service line at [phone], if further assistance is needed.

## 2019-07-25 NOTE — PROGRESS NOTE ADULT - SUBJECTIVE AND OBJECTIVE BOX
Hospital Day #    POD # 1 s/p placement of right neck Giuliana catheter with sonogram, and excision right AVG     IV: HERMES      I&O's Summary    24 Jul 2019 07:01  -  25 Jul 2019 07:00  --------------------------------------------------------  IN: 275 mL / OUT: 0 mL / NET: 275 mL        diet: Dysphagia 1 Pureed-thin liquid       Patient: afebrile, VSS awake and alert responsive in bed mild discomfort ( speaks [place], understands and response to some english.    T(C): 37 (07-25-19 @ 07:45), Max: 37.6 (07-25-19 @ 00:15)  HR: 65 (07-25-19 @ 07:45) (54 - 79)  BP: 103/58 (07-25-19 @ 07:45) (90/45 - 153/59)  RR: 19 (07-25-19 @ 07:45) (12 - 19)  SpO2: 95% (07-25-19 @ 07:45) (92% - 100%)  Wt(kg): --    Neck:  right neck, giuliana catheter in place dressing dry and intact , minimal discomfort surgical site, no difficulty swallowing, good neck ROM   chest: denies SOB or chest pain or discomfort   CXR : verbal report from radiology catheter in good position - written report pending     Abdomen: soft benign , +BS, + BM  output: - chronic dialysis patient   Extremities:  Right upper extremity dressing in place , noted some blood staining - ace bandage changed ( will change dressing fully when returns from dialysis.  limited motion elbow due to dressing , mild swelling distal arm ( asked nurse to replace present wrist ID bands due to swelling- preventative change. -  sdtable at present, good motion wrist and fingers, sensory and vascular grossly intact.  ++ radial pulse appreciated.  good skin color , and cap. refill.                  xrays: preliminary review and discussion with radiologist- catheter right neck good positin ( written report pending )     PAST MEDICAL & SURGICAL HISTORY:  Left bundle branch block  Osteoporosis  Diabetic neuropathy  Peripheral arterial disease  Spinal stenosis  Coronary artery disease  Chronic renal failure  Pacemaker: Medtronic 2011  Hypothyroid  Hyperlipemia  Hypertension  Diabetes  S/P dialysis catheter insertion: R. arm  S/P CABG x 3  Artificial cardiac pacemaker          Impression: stable POD # 1 , tolerated procedure well, mild swelling right arm post op - vascular and nuero- grossly intact RUE.  Some blood stain appreciated on dressing ( expected - secondary to surgery ) .  Right Quintion catheter in place - dressing dry , no swelling neck appreciated.   Discussed with Dr. Smith present situation and continued care.  Plan: continue present care and management- going at present for dialysis, will change dressing when returns from dialysis- and will follow , continue general care with medicine, nephrology and infectious disease.  plan - placement tunneled catheter ( PC ) near future.

## 2019-07-25 NOTE — PROGRESS NOTE ADULT - ASSESSMENT
ESRD -Patient stable  Jamaal HD easily via PC    Anemia -hB LOWER, WATCH  Epogen with HD     RO - Binders     SA sepsis 4/4 - jamaal Kefzol  Does not look toxic afebrile no leukocytosis  AVG removed   Blood Cx (-) 7-14   SANFORD wa s(-) Veg   Vascular follow up re. AVG noted, Afeb today    Will follow

## 2019-07-25 NOTE — ADVANCED PRACTICE NURSE CONSULT - ASSESSMENT
Alert but disoriented, bedbound, diverting colostomy and urethral catheter in place.  Skin warm, dry with adequate turgor, scattered areas of hyperpigmentation and hypopigmentation, scattered areas of ecchymosis on bilateral upper extremities, blanchable erythema to bilateral heels.  Patient is noted to have a have severe incontinence associated dermatitis, with diffuse denuded areas with no drainage.  No erythema, warmth or induration noted to periwound.

## 2019-07-25 NOTE — CHART NOTE - NSCHARTNOTEFT_GEN_A_CORE
Called to bedside by Dr Berrios after pt found in puddle of blood  Pt had dressing change this am and found with saturated dressing and saturated bedsheets  Pt awake and alert c/o pain in RUE  158/66   HR 68  Dressing removed and noted with focal areas of continuous oozing  R hand WWP; + radial; motor intact  Pressure applied x 20 minutes  New DSD applied; ACE for gentle compression.  Vascular PA to follow up

## 2019-07-25 NOTE — PROGRESS NOTE ADULT - SUBJECTIVE AND OBJECTIVE BOX
Dr. Berrios Hospitalist Progress Note  LAUREN ROBERSON [id number]    Patient is a 87y old  Female who presents with a chief complaint of weakness, sepsis 2/2 UTI (23 Jul 2019 16:59)    Interval: seen at bedside with son Miriam. patient communicated very well. still confused. answered almost all qs when asked by Son. denied complaints. bleeding noticed from AVF. called vascular PA who came to bedside immediately, place pressure bandage and ACE wrap. bleeding stopped. hb dropped. s/p 2 units PRBC during HD. s/p AVG resection and Jeffrey cath placed yesterday    HPI:  87yoF hx ESRD on HD (M and F only), CAD s/p CABG, HTN, DM, PAD, hypothyroidism presenting with generalized weakness.  Pt is poor historian despite use of   She reports generalized weakness for past few days associated with generalized, abdominal pain that she is unable to describe associated with nausea, some episodes of vomiting.  Reports difficulty with urination and ?dysuria but believes her urinary symptoms are due to not drinking enough fluids recently. Unable to explain why she is only on HD twice per week, but says last HD session was on 7/1.  Denies fevers, chest pain, dyspnea, endorses chronic pain in face and all extremities which is chronic for her.  On admission, febrile, leukocytosis, UA positive, was pan scanned by ED and CT abd/pelvis showed haziness around the gallbladder however follow up abd US was negative for cholelithiasis and Stewart's sign and no LFT elevation on labs. (04 Jul 2019 22:54)      ROS:   denied chest pain/SOB/palpitation/leg or calf pain/abd pain/pain over the AVF. denied nausea/vomiting/diarrhoea/constipaiton. no headqaches. gen weakness+    Vital Signs Last 24 Hrs  T(C): 36.6 (25 Jul 2019 16:00), Max: 37.6 (25 Jul 2019 00:15)  T(F): 97.8 (25 Jul 2019 16:00), Max: 99.6 (25 Jul 2019 00:15)  HR: 86 (25 Jul 2019 16:00) (54 - 86)  BP: 166/99 (25 Jul 2019 16:00) (90/45 - 166/99)  BP(mean): 3 (24 Jul 2019 19:55) (3 - 3)  RR: 18 (25 Jul 2019 16:00) (12 - 19)  SpO2: 96% (25 Jul 2019 16:00) (92% - 100%)    CAPILLARY BLOOD GLUCOSE      POCT Blood Glucose.: 144 mg/dL (25 Jul 2019 16:40)  POCT Blood Glucose.: 129 mg/dL (25 Jul 2019 13:34)  POCT Blood Glucose.: 130 mg/dL (25 Jul 2019 07:32)  POCT Blood Glucose.: 107 mg/dL (24 Jul 2019 23:03)    I&O's Detail    24 Jul 2019 07:01  -  25 Jul 2019 07:00  --------------------------------------------------------  IN:    dextrose 5% + lactated ringers.: 225 mL    IV PiggyBack: 50 mL  Total IN: 275 mL    OUT:  Total OUT: 0 mL    Total NET: 275 mL      25 Jul 2019 07:01  -  25 Jul 2019 19:07  --------------------------------------------------------  IN:  Total IN: 0 mL    OUT:    Other: 500 mL  Total OUT: 500 mL    Total NET: -500 mL      Physical Exam:  GENERAL: Not in distress. looks weak. alert and communicating better  HEENT:  Normocephalic and atraumatic. MMM  NECK: Supple.   CARDIOVASCULAR: RRR S1, S2. No murmur/rubs/gallop  LUNGS: BLAE+, no rales, no wheezing, no rhonchi.    ABDOMEN: ND. Soft,  NT, no guarding / rebound / rigidity. BS normoactive. No CVA tenderness.    EXTREMITIES: no cyanosis, no clubbing, no edema. no leg or calf TP   SKIN: no rash. Incontinence associated dermatitis not  ulcer [ RN reported, as per wound care]  NEUROLOGIC: AAO*2 strength is symmetric, sensation intact, speech fluent.    PSYCHIATRIC: Calm.  No agitation.     Labs:                          7.1    6.38  )-----------( 248      ( 25 Jul 2019 08:20 )             23.1       07-25    135  |  98  |  29.0<H>  ----------------------------<  130<H>  4.0   |  25.0  |  3.70<H>    Ca    7.4<L>      25 Jul 2019 08:20  Phos  4.3     07-25  Mg     1.7     07-25      MEDICATIONS  (STANDING):  atorvastatin 40 milliGRAM(s) Oral at bedtime  calcitriol   Capsule 0.25 MICROGram(s) Oral daily  calcium acetate 667 milliGRAM(s) Oral three times a day with meals  carvedilol 6.25 milliGRAM(s) Oral every 12 hours  ceFAZolin   IVPB 1000 milliGRAM(s) IV Intermittent every 24 hours  chlorhexidine 2% Cloths 1 Application(s) Topical <User Schedule>  dextrose 5%. 1000 milliLiter(s) (50 mL/Hr) IV Continuous <Continuous>  dextrose 50% Injectable 12.5 Gram(s) IV Push once  dextrose 50% Injectable 12.5 Gram(s) IV Push once  dextrose 50% Injectable 25 Gram(s) IV Push once  dextrose 50% Injectable 25 Gram(s) IV Push once  epoetin barb Injectable 74818 Unit(s) IV Push <User Schedule>  folic acid 1 milliGRAM(s) Oral daily  insulin lispro (HumaLOG) corrective regimen sliding scale   SubCutaneous three times a day before meals  insulin lispro (HumaLOG) corrective regimen sliding scale   SubCutaneous at bedtime  levothyroxine 112 MICROGram(s) Oral daily  lidocaine   Patch 1 Patch Transdermal daily  losartan 50 milliGRAM(s) Oral daily  pantoprazole    Tablet 40 milliGRAM(s) Oral before breakfast  saccharomyces boulardii 250 milliGRAM(s) Oral two times a day    MEDICATIONS  (PRN):  acetaminophen   Tablet .. 650 milliGRAM(s) Oral every 6 hours PRN Temp greater or equal to 38C (100.4F), Mild Pain (1 - 3)  dextrose 40% Gel 15 Gram(s) Oral once PRN Blood Glucose LESS THAN 70 milliGRAM(s)/deciliter  glucagon  Injectable 1 milliGRAM(s) IntraMuscular once PRN Glucose LESS THAN 70 milligrams/deciliter  oxyCODONE    5 mG/acetaminophen 325 mG 1 Tablet(s) Oral every 6 hours PRN Severe Pain (7 - 10)

## 2019-07-25 NOTE — ADVANCED PRACTICE NURSE CONSULT - REASON FOR CONSULT
Late entry - patient seen on 7/23/19    Patient seen on skin care rounds after wound care referral received for assessment of skin impairment and recommendations of topical management.  Chart reviewed:   BMI (25.6), Go (9), Serum albumin (2.2 g/dL) and Total Protein (5.1 g/dL).  Patient H/O ESRD on HD (M and F only), CAD s/p CABG, HTN, DM, PAD, hypothyroidism presented to [facility] with generalized weakness.

## 2019-07-25 NOTE — PROGRESS NOTE ADULT - SUBJECTIVE AND OBJECTIVE BOX
Patient was seen and evaluated on dialysis.   s/p r arm AVG removal and PC  No c/o CP SOB NV but c/o pain arm  no F/C  no swelling feet; R arm in ACE dressing    T(C): 37 (07-25-19 @ 07:45), Max: 37.6 (07-25-19 @ 00:15)  HR: 68 (07-25-19 @ 11:00) (54 - 71)  BP: 156/78 (07-25-19 @ 11:00) (90/45 - 156/78)  Wt(kg): --  PE ;  mildly in distress, appears frail  lungs - CTA  CV gr 1 murmer,  No gallop or rub  Abd : soft, NT BS +, No masses  Ext- No edema  Neuro : Grossly intact, moving extremities                             7.1    6.38  )-----------( 248      ( 25 Jul 2019 08:20 )             23.1        07-25    135  |  98  |  29.0<H>  ----------------------------<  130<H>  4.0   |  25.0  |  3.70<H>    Ca    7.4<L>      25 Jul 2019 08:20  Phos  4.3     07-25  Mg     1.7     07-25        MEDICATIONS  (STANDING):  acetaminophen   Tablet .. PRN  atorvastatin  calcitriol   Capsule  calcium acetate  carvedilol  ceFAZolin   IVPB  chlorhexidine 2% Cloths  dextrose 40% Gel PRN  dextrose 5%.  dextrose 50% Injectable  dextrose 50% Injectable  dextrose 50% Injectable  dextrose 50% Injectable  epoetin barb Injectable  folic acid  glucagon  Injectable PRN  insulin lispro (HumaLOG) corrective regimen sliding scale  insulin lispro (HumaLOG) corrective regimen sliding scale  levothyroxine  lidocaine   Patch  losartan  oxyCODONE    5 mG/acetaminophen 325 mG PRN  pantoprazole    Tablet  saccharomyces boulardii        Continue

## 2019-07-25 NOTE — PROVIDER CONTACT NOTE (OTHER) - REASON
Chart Reviewed. Events noted for Surgical intervention. PT requests updated MD orders for PT Evaluation.

## 2019-07-26 ENCOUNTER — APPOINTMENT (OUTPATIENT)
Dept: CARDIOLOGY | Facility: CLINIC | Age: 84
End: 2019-07-26

## 2019-07-26 LAB
GLUCOSE BLDC GLUCOMTR-MCNC: 107 MG/DL — HIGH (ref 70–99)
GLUCOSE BLDC GLUCOMTR-MCNC: 113 MG/DL — HIGH (ref 70–99)
GLUCOSE BLDC GLUCOMTR-MCNC: 131 MG/DL — HIGH (ref 70–99)
GLUCOSE BLDC GLUCOMTR-MCNC: 187 MG/DL — HIGH (ref 70–99)
HBV SURFACE AB SER-ACNC: <3 MIU/ML — LOW

## 2019-07-26 PROCEDURE — 99232 SBSQ HOSP IP/OBS MODERATE 35: CPT

## 2019-07-26 RX ORDER — MORPHINE SULFATE 50 MG/1
2 CAPSULE, EXTENDED RELEASE ORAL ONCE
Refills: 0 | Status: DISCONTINUED | OUTPATIENT
Start: 2019-07-26 | End: 2019-07-26

## 2019-07-26 RX ADMIN — OXYCODONE AND ACETAMINOPHEN 1 TABLET(S): 5; 325 TABLET ORAL at 20:04

## 2019-07-26 RX ADMIN — ATORVASTATIN CALCIUM 40 MILLIGRAM(S): 80 TABLET, FILM COATED ORAL at 21:53

## 2019-07-26 RX ADMIN — CALCITRIOL 0.25 MICROGRAM(S): 0.5 CAPSULE ORAL at 12:15

## 2019-07-26 RX ADMIN — Medication 667 MILLIGRAM(S): at 12:15

## 2019-07-26 RX ADMIN — MORPHINE SULFATE 2 MILLIGRAM(S): 50 CAPSULE, EXTENDED RELEASE ORAL at 10:28

## 2019-07-26 RX ADMIN — CHLORHEXIDINE GLUCONATE 1 APPLICATION(S): 213 SOLUTION TOPICAL at 06:39

## 2019-07-26 RX ADMIN — LIDOCAINE 1 PATCH: 4 CREAM TOPICAL at 21:54

## 2019-07-26 RX ADMIN — Medication 250 MILLIGRAM(S): at 06:02

## 2019-07-26 RX ADMIN — CARVEDILOL PHOSPHATE 6.25 MILLIGRAM(S): 80 CAPSULE, EXTENDED RELEASE ORAL at 06:02

## 2019-07-26 RX ADMIN — OXYCODONE AND ACETAMINOPHEN 1 TABLET(S): 5; 325 TABLET ORAL at 14:04

## 2019-07-26 RX ADMIN — Medication 667 MILLIGRAM(S): at 10:22

## 2019-07-26 RX ADMIN — Medication 112 MICROGRAM(S): at 06:02

## 2019-07-26 RX ADMIN — LIDOCAINE 1 PATCH: 4 CREAM TOPICAL at 12:21

## 2019-07-26 RX ADMIN — CARVEDILOL PHOSPHATE 6.25 MILLIGRAM(S): 80 CAPSULE, EXTENDED RELEASE ORAL at 21:52

## 2019-07-26 RX ADMIN — Medication 2: at 17:39

## 2019-07-26 RX ADMIN — OXYCODONE AND ACETAMINOPHEN 1 TABLET(S): 5; 325 TABLET ORAL at 06:02

## 2019-07-26 RX ADMIN — Medication 650 MILLIGRAM(S): at 02:36

## 2019-07-26 RX ADMIN — PANTOPRAZOLE SODIUM 40 MILLIGRAM(S): 20 TABLET, DELAYED RELEASE ORAL at 06:03

## 2019-07-26 RX ADMIN — LOSARTAN POTASSIUM 50 MILLIGRAM(S): 100 TABLET, FILM COATED ORAL at 06:02

## 2019-07-26 RX ADMIN — Medication 100 MILLIGRAM(S): at 17:39

## 2019-07-26 RX ADMIN — Medication 667 MILLIGRAM(S): at 17:40

## 2019-07-26 RX ADMIN — Medication 250 MILLIGRAM(S): at 17:40

## 2019-07-26 RX ADMIN — Medication 1 MILLIGRAM(S): at 12:21

## 2019-07-26 NOTE — PROGRESS NOTE ADULT - SUBJECTIVE AND OBJECTIVE BOX
Dr. Berrios Hospitalist Progress Note  LAUREN ROBERSON [id number]    Patient is a 87y old  Female who presents with a chief complaint of weakness, sepsis 2/2 UTI (23 Jul 2019 16:59)    Interval: seen at bedside with family. . patient communicated very well. still confused. answered almost all qs when asked by Son. denied complaints except cocyx pain. no beeding noticed from AVF. no event overnight. PT eval pending    HPI:  87yoF hx ESRD on HD (M and F only), CAD s/p CABG, HTN, DM, PAD, hypothyroidism presenting with generalized weakness.  Pt is poor historian despite use of   She reports generalized weakness for past few days associated with generalized, abdominal pain that she is unable to describe associated with nausea, some episodes of vomiting.  Reports difficulty with urination and ?dysuria but believes her urinary symptoms are due to not drinking enough fluids recently. Unable to explain why she is only on HD twice per week, but says last HD session was on 7/1.  Denies fevers, chest pain, dyspnea, endorses chronic pain in face and all extremities which is chronic for her.  On admission, febrile, leukocytosis, UA positive, was pan scanned by ED and CT abd/pelvis showed haziness around the gallbladder however follow up abd US was negative for cholelithiasis and Stewart's sign and no LFT elevation on labs. (04 Jul 2019 22:54)      ROS:   denied chest pain/SOB/palpitation/leg or calf pain/abd pain/pain over the AVF. denied nausea/vomiting/diarrhoea/constipaiton. no headqaches. gen weakness+    Vital Signs Last 24 Hrs  T(C): 36.6 (26 Jul 2019 15:21), Max: 37.2 (26 Jul 2019 00:21)  T(F): 97.8 (26 Jul 2019 15:21), Max: 98.9 (26 Jul 2019 00:21)  HR: 76 (26 Jul 2019 15:21) (65 - 76)  BP: 118/60 (26 Jul 2019 15:25) (107/47 - 154/66)  BP(mean): --  RR: 17 (26 Jul 2019 15:21) (16 - 18)  SpO2: 100% (26 Jul 2019 15:21) (95% - 100%)    CAPILLARY BLOOD GLUCOSE      POCT Blood Glucose.: 107 mg/dL (26 Jul 2019 20:58)  POCT Blood Glucose.: 187 mg/dL (26 Jul 2019 17:08)  POCT Blood Glucose.: 113 mg/dL (26 Jul 2019 11:45)  POCT Blood Glucose.: 131 mg/dL (26 Jul 2019 08:16)  POCT Blood Glucose.: 212 mg/dL (25 Jul 2019 23:52)      Physical Exam:  GENERAL: Not in distress. looks better. alert and communicating better  HEENT:  Normocephalic and atraumatic. MMM  NECK: Supple.   CARDIOVASCULAR: RRR S1, S2. No murmur/rubs/gallop  LUNGS: BLAE+, no rales, no wheezing, no rhonchi.    ABDOMEN: ND. Soft,  NT, no guarding / rebound / rigidity. BS normoactive. No CVA tenderness.    EXTREMITIES: no cyanosis, no clubbing, no edema. no leg or calf TP   SKIN: no rash. Incontinence associated dermatitis not  ulcer  NEUROLOGIC: AAO*2 strength is symmetric, sensation intact, speech fluent.    PSYCHIATRIC: Calm.  No agitation.     Labs:                          11.3   8.12  )-----------( 226      ( 25 Jul 2019 21:00 )             35.1     07-25    135  |  98  |  29.0<H>  ----------------------------<  130<H>  4.0   |  25.0  |  3.70<H>    Ca    7.4<L>      25 Jul 2019 08:20  Phos  4.3     07-25  Mg     1.7     07-25      MEDICATIONS  (STANDING):  atorvastatin 40 milliGRAM(s) Oral at bedtime  calcitriol   Capsule 0.25 MICROGram(s) Oral daily  calcium acetate 667 milliGRAM(s) Oral three times a day with meals  carvedilol 6.25 milliGRAM(s) Oral every 12 hours  ceFAZolin   IVPB 1000 milliGRAM(s) IV Intermittent every 24 hours  chlorhexidine 2% Cloths 1 Application(s) Topical <User Schedule>  dextrose 5%. 1000 milliLiter(s) (50 mL/Hr) IV Continuous <Continuous>  dextrose 50% Injectable 12.5 Gram(s) IV Push once  dextrose 50% Injectable 12.5 Gram(s) IV Push once  dextrose 50% Injectable 25 Gram(s) IV Push once  dextrose 50% Injectable 25 Gram(s) IV Push once  epoetin barb Injectable 45137 Unit(s) IV Push <User Schedule>  folic acid 1 milliGRAM(s) Oral daily  insulin lispro (HumaLOG) corrective regimen sliding scale   SubCutaneous three times a day before meals  insulin lispro (HumaLOG) corrective regimen sliding scale   SubCutaneous at bedtime  levothyroxine 112 MICROGram(s) Oral daily  lidocaine   Patch 1 Patch Transdermal daily  losartan 50 milliGRAM(s) Oral daily  pantoprazole    Tablet 40 milliGRAM(s) Oral before breakfast  saccharomyces boulardii 250 milliGRAM(s) Oral two times a day    MEDICATIONS  (PRN):  acetaminophen   Tablet .. 650 milliGRAM(s) Oral every 6 hours PRN Temp greater or equal to 38C (100.4F), Mild Pain (1 - 3)  dextrose 40% Gel 15 Gram(s) Oral once PRN Blood Glucose LESS THAN 70 milliGRAM(s)/deciliter  glucagon  Injectable 1 milliGRAM(s) IntraMuscular once PRN Glucose LESS THAN 70 milligrams/deciliter  oxyCODONE    5 mG/acetaminophen 325 mG 1 Tablet(s) Oral every 6 hours PRN Severe Pain (7 - 10)

## 2019-07-26 NOTE — PROGRESS NOTE ADULT - ASSESSMENT
87yoF hx ESRD on HD (M and F only), CAD s/p CABG, HTN, DM, PAD, hypothyroidism presenting with generalized weakness.  On admission, febrile, leukocytosis, UA positive, with  Blood cultures positive for staph aureus.  SANFORD done, no vegetation. s/p indium scan positive for R AV graft infection  Sepsis :  Staph aureus bacteremia due to R arm AV graft infection.   s/p AVG resection and Jeffrey cath placed july 23  Pt will need PC insertion once cleared by ID likely early next week  SANFORD negative for vegetation    bleeding from AVG on july 25. stopped. hb dropped. s/p 2 units PRBC today. monitor cbc. asa and plavix on hold. not clear why needs DAPT. will resume asa . discuss for now. discuss with cardio regarding plavix. CABG long time back    AMS since admission as per son: likely related to infection/sepsis/dehydration. MS better. seems communicates better when family members present. also has hearing issue. CT head showed mod-severe microvascular changes. 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 follows with Dr. Marcial. possible underlying vascular dementia. needs dementia w/u OP. neuro eval if no improvement in MS after infection is better. fall and aspiration precautions.     CAD s/p CABG:  with elevated troponin - T 0.32 - elevated in past, in setting of ESRD on HD  Cath 2/2018- LIMA to LAD patent, SVG to OM patent, SVG to RPDA patent   cardiology has discussed with son Darrin and they are unwilling for stress testing, cath, invasive - only want Medication   HFrEF  SANFORD 7/9/2019: LVEF 30-35.Ml-mod TR. No evidence of endocarditis.   Echo Limited 7/18: LVEF 35-40%.    cont  carvedilol.   Losartan.  Volume control with HD    ESRD on HD   Cont HD    c/o coccyx pain: as per wound care patient has Incontinence associated dermatitis, not decubitus ulcer.  xray--> no bony involvement. incontinence management . wound care cx appreciated    DVT-P: SCD for now. mobility with pt. OOB to chair with assistance 3 times a day.

## 2019-07-26 NOTE — PROGRESS NOTE ADULT - ASSESSMENT
87yoF hx ESRD on HD (M and F only), AVG right side 8/3/17   CAD s/p CABG, HTN, DM, PAD, hypothyroidism presenting with generalized weakness, dysuria.   pain at graft site for 1 month.  fever in .6 now afebrile  leukocytosis to 14 now normal  Blood cx 4/4 Staph aureus.   Imaging with no focus (no pneumonia, abscess, OM). NM scan suspicious for AVG infection.  Overall Staph aureus bacteremia 2/2 AVG infection    Recommend:  - Blood cultures + 4/4 Staph aureus. BCX 7/14 NGTD  - Despite listed penicillin allergy, tolerating cefazolin  - NM scan suspicious for AVG infection. AVG removed. Pus noted in OR. f/u CX  - Last BCX 7/14 NGTD. Can repeat blood cx prior to permacath  - c/w cefazolin 1 g IV every day (even on non HD days) and after dialysis on her dialysis days   - Outpatient dosing for cefazolin with HD will require that she be on thrice weekly schedule. We can then dose cefazolin 2g AD on Monday, 2 g AD on Wed, 3 g AD on Fri   - SANFORD for endocarditis/lead vegetations (-)  - Trend Fever  - Trend WBC count    Will follow 87yoF hx ESRD on HD (M and F only), AVG right side 8/3/17   CAD s/p CABG, HTN, DM, PAD, hypothyroidism presenting with generalized weakness, dysuria.   pain at graft site for 1 month.  fever in .6 now afebrile  leukocytosis to 14 now normal  Blood cx 4/4 Staph aureus.   Imaging with no focus (no pneumonia, abscess, OM). NM scan suspicious for AVG infection.  Overall Staph aureus bacteremia 2/2 AVG infection    Recommend:  - Blood cultures + 4/4 Staph aureus. BCX 7/14 NGTD  - Despite listed penicillin allergy, tolerating cefazolin  - NM scan suspicious for AVG infection. AVG removed. Pus noted in OR. f/u CX and path  - Last BCX 7/14 NGTD. Can repeat blood cx prior to permacath  - c/w cefazolin 1 g IV every day (even on non HD days) and after dialysis on her dialysis days   - Outpatient dosing for cefazolin with HD will require that she be on thrice weekly schedule. We can then dose cefazolin 2g AD on Monday, 2 g AD on Wed, 3 g AD on Fri   - SANFORD for endocarditis/lead vegetations (-)  - Trend Fever  - Trend WBC count    Will follow

## 2019-07-26 NOTE — PROGRESS NOTE ADULT - SUBJECTIVE AND OBJECTIVE BOX
Pt seen and examined. POD # 2 s/p explantation of the infected graft and insertion of temporary HD catheter. No acute events. S/p PRBC.    Vital Signs Last 24 Hrs  T(C): 36.4 (26 Jul 2019 07:39), Max: 37.2 (26 Jul 2019 00:21)  T(F): 97.6 (26 Jul 2019 07:39), Max: 98.9 (26 Jul 2019 00:21)  HR: 65 (26 Jul 2019 07:39) (65 - 86)  BP: 107/47 (26 Jul 2019 07:39) (107/47 - 166/99)  BP(mean): --  RR: 18 (26 Jul 2019 07:39) (16 - 18)  SpO2: 98% (26 Jul 2019 07:39) (95% - 98%)    PE RUE ecchymosis and oozing  No erythema    A/P Stable  - F/U Cx  - Scheduled for permacath insertion for Tuesday assuming stable and cleared by ID  - Daily dressing changes

## 2019-07-26 NOTE — PROGRESS NOTE ADULT - SUBJECTIVE AND OBJECTIVE BOX
NEPHROLOGY INTERVAL HPI/OVERNIGHT EVENTS: No new events.    MEDICATIONS  (STANDING):  atorvastatin 40 milliGRAM(s) Oral at bedtime  calcitriol   Capsule 0.25 MICROGram(s) Oral daily  calcium acetate 667 milliGRAM(s) Oral three times a day with meals  carvedilol 6.25 milliGRAM(s) Oral every 12 hours  ceFAZolin   IVPB 1000 milliGRAM(s) IV Intermittent every 24 hours  chlorhexidine 2% Cloths 1 Application(s) Topical <User Schedule>  dextrose 5%. 1000 milliLiter(s) (50 mL/Hr) IV Continuous <Continuous>  dextrose 50% Injectable 12.5 Gram(s) IV Push once  dextrose 50% Injectable 12.5 Gram(s) IV Push once  dextrose 50% Injectable 25 Gram(s) IV Push once  dextrose 50% Injectable 25 Gram(s) IV Push once  epoetin barb Injectable 73483 Unit(s) IV Push <User Schedule>  folic acid 1 milliGRAM(s) Oral daily  insulin lispro (HumaLOG) corrective regimen sliding scale   SubCutaneous three times a day before meals  insulin lispro (HumaLOG) corrective regimen sliding scale   SubCutaneous at bedtime  levothyroxine 112 MICROGram(s) Oral daily  lidocaine   Patch 1 Patch Transdermal daily  losartan 50 milliGRAM(s) Oral daily  morphine  - Injectable 2 milliGRAM(s) IV Push once  pantoprazole    Tablet 40 milliGRAM(s) Oral before breakfast  saccharomyces boulardii 250 milliGRAM(s) Oral two times a day    MEDICATIONS  (PRN):  acetaminophen   Tablet .. 650 milliGRAM(s) Oral every 6 hours PRN Temp greater or equal to 38C (100.4F), Mild Pain (1 - 3)  dextrose 40% Gel 15 Gram(s) Oral once PRN Blood Glucose LESS THAN 70 milliGRAM(s)/deciliter  glucagon  Injectable 1 milliGRAM(s) IntraMuscular once PRN Glucose LESS THAN 70 milligrams/deciliter  oxyCODONE    5 mG/acetaminophen 325 mG 1 Tablet(s) Oral every 6 hours PRN Severe Pain (7 - 10)      Allergies    penicillin (Anaphylaxis)  seafood (Anaphylaxis)  shellfish (Anaphylaxis)          Vital Signs Last 24 Hrs  T(C): 36.4 (2019 07:39), Max: 37.2 (2019 00:21)  T(F): 97.6 (2019 07:39), Max: 98.9 (2019 00:21)  HR: 65 (2019 07:39) (65 - 86)  BP: 107/47 (2019 07:39) (107/47 - 166/99)  BP(mean): --  RR: 18 (2019 07:39) (16 - 18)  SpO2: 98% (2019 07:39) (95% - 98%)  Daily     Daily Weight in k.5 (2019 16:00)    PHYSICAL EXAM:    GENERAL: Appears acutely ill in bed  HEAD: wnl   EYES:   ENMT:   NECK: right central line site clean  NERVOUS SYSTEM: awake, verbal   CHEST/LUNG: no 02, no wheezes  HEART: no rub  ABDOMEN: soft, NT  EXTREMITIES: upper right arm wrapped   LYMPH:   SKIN: no rash    LABS:                        11.3   8.12  )-----------( 226      ( 2019 21:00 )             35.1     07-25    135  |  98  |  29.0<H>  ----------------------------<  130<H>  4.0   |  25.0  |  3.70<H>    Ca    7.4<L>      2019 08:20  Phos  4.3     07-25  Mg     1.7     07-25                  RADIOLOGY & ADDITIONAL TESTS:

## 2019-07-26 NOTE — PROGRESS NOTE ADULT - SUBJECTIVE AND OBJECTIVE BOX
[facility]  INFECTIOUS DISEASES AND INTERNAL MEDICINE at [place]  =======================================================  Paresh Stark MD  Diplomates American Board of Internal Medicine and Infectious Diseases  Tel: [phone]      Fax: [phone]  =======================================================    LAUREN ROBERSON [id number]    Follow up: MSSA bactermeia. AVG removed, giuliana in place. Plan for permacath next week. Bleeding from AVG site now controlled    Allergies:  penicillin (Anaphylaxis)  seafood (Anaphylaxis)  Send Nepro TID- RD OK (Unknown)  shellfish (Anaphylaxis)      Medications:  acetaminophen   Tablet .. 650 milliGRAM(s) Oral every 6 hours PRN  atorvastatin 40 milliGRAM(s) Oral at bedtime  calcitriol   Capsule 0.25 MICROGram(s) Oral daily  calcium acetate 667 milliGRAM(s) Oral three times a day with meals  carvedilol 6.25 milliGRAM(s) Oral every 12 hours  ceFAZolin   IVPB 1000 milliGRAM(s) IV Intermittent every 24 hours  chlorhexidine 2% Cloths 1 Application(s) Topical <User Schedule>  dextrose 40% Gel 15 Gram(s) Oral once PRN  dextrose 5%. 1000 milliLiter(s) IV Continuous <Continuous>  dextrose 50% Injectable 12.5 Gram(s) IV Push once  dextrose 50% Injectable 12.5 Gram(s) IV Push once  dextrose 50% Injectable 25 Gram(s) IV Push once  dextrose 50% Injectable 25 Gram(s) IV Push once  epoetin barb Injectable 00684 Unit(s) IV Push <User Schedule>  folic acid 1 milliGRAM(s) Oral daily  glucagon  Injectable 1 milliGRAM(s) IntraMuscular once PRN  insulin lispro (HumaLOG) corrective regimen sliding scale   SubCutaneous three times a day before meals  insulin lispro (HumaLOG) corrective regimen sliding scale   SubCutaneous at bedtime  levothyroxine 112 MICROGram(s) Oral daily  lidocaine   Patch 1 Patch Transdermal daily  losartan 50 milliGRAM(s) Oral daily  oxyCODONE    5 mG/acetaminophen 325 mG 1 Tablet(s) Oral every 6 hours PRN  pantoprazole    Tablet 40 milliGRAM(s) Oral before breakfast  saccharomyces boulardii 250 milliGRAM(s) Oral two times a day            REVIEW OF SYSTEMS:  CONSTITUTIONAL:  No Fever or chills  HEENT:   No diplopia or blurred vision.  No earache, sore throat or runny nose.  CARDIOVASCULAR:  No pressure, squeezing, strangling, tightness, heaviness or aching about the chest, neck, axilla or epigastrium.  RESPIRATORY:  No cough, shortness of breath  GASTROINTESTINAL:  No nausea, vomiting or diarrhea.  GENITOURINARY:  No dysuria, frequency or urgency. No Blood in urine  MUSCULOSKELETAL:  no joint aches, no muscle pain  SKIN:  No change in skin, hair or nails.  NEUROLOGIC:  No Headaches, seizures or weakness.  PSYCHIATRIC:  No disorder of thought or mood.  ENDOCRINE:  No heat or cold intolerance  HEMATOLOGICAL:  No easy bruising or bleeding.            Physical Exam:  ICU Vital Signs Last 24 Hrs  T(C): 36.6 (26 Jul 2019 15:21), Max: 37.2 (26 Jul 2019 00:21)  T(F): 97.8 (26 Jul 2019 15:21), Max: 98.9 (26 Jul 2019 00:21)  HR: 76 (26 Jul 2019 15:21) (65 - 76)  BP: 107/47 (26 Jul 2019 07:39) (107/47 - 154/66)  BP(mean): --  ABP: --  ABP(mean): --  RR: 17 (26 Jul 2019 15:21) (16 - 18)  SpO2: 100% (26 Jul 2019 15:21) (95% - 100%)      GEN: NAD, pleasant  HEENT: normocephalic and atraumatic. EOMI. PERRL.  Anicteric   NECK: Supple.   LUNGS: Clear to auscultation.  HEART: Regular rate and rhythm without murmur. + PPM  ABDOMEN: Soft, nontender, and nondistended.  Positive bowel sounds.    : No CVA tenderness  EXTREMITIES: Without any edema.  MSK: no joint swelling  NEUROLOGIC: Cranial nerves II through XII are grossly intact. No focal deficits  PSYCHIATRIC: Appropriate affect .  SKIN: No Rash RUE arm ACE wrap intact      Labs:  07-25    135  |  98  |  29.0<H>  ----------------------------<  130<H>  4.0   |  25.0  |  3.70<H>    Ca    7.4<L>      25 Jul 2019 08:20  Phos  4.3     07-25  Mg     1.7     07-25                            11.3   8.12  )-----------( 226      ( 25 Jul 2019 21:00 )             35.1                 CAPILLARY BLOOD GLUCOSE      POCT Blood Glucose.: 113 mg/dL (26 Jul 2019 11:45)  POCT Blood Glucose.: 131 mg/dL (26 Jul 2019 08:16)  POCT Blood Glucose.: 212 mg/dL (25 Jul 2019 23:52)  POCT Blood Glucose.: 144 mg/dL (25 Jul 2019 16:40)        RECENT CULTURES:  07-25 @ 17:04 .Other Tissue R Upper Arm Graft           07-25 @ 11:10 .Tissue Tissue R Upper Arm Graft     Moderate Gram Negative Rods Identification and susceptibility to follow.  Culture in progress    No WBC's or organisms seen      07-25 @ 11:02 .Surgical Swab Swabs R Upper Arm     No growth at 1 day.  Culture in progress        07-14 @ 11:53 .Blood     No growth at 5 days.        07-13 @ 10:36 .Blood Staphylococcus aureus    Growth in aerobic and anaerobic bottles: Staphylococcus aureus  Aerobic Bottle: 15:06 Hours to positivity  Anaerobic Bottle: 19:45 Hours to positivity  ,  TYPE: (C=Critical, N=Notification, A=Abnormal) c  TESTS:  _ gs  DATE/TIME CALLED: _ 07/14/2019 09:52:46  CALLED TO: Kellee bryant rn  READ BACK (2 Patient Identifiers)(Y/N): _ y  READ BACK VALUES (Y/N): _ y  CALLED BY: Kellee wetzel

## 2019-07-27 LAB
-  AMIKACIN: SIGNIFICANT CHANGE UP
-  AMPICILLIN/SULBACTAM: SIGNIFICANT CHANGE UP
-  AMPICILLIN: SIGNIFICANT CHANGE UP
-  AZTREONAM: SIGNIFICANT CHANGE UP
-  CEFAZOLIN: SIGNIFICANT CHANGE UP
-  CEFEPIME: SIGNIFICANT CHANGE UP
-  CEFOXITIN: SIGNIFICANT CHANGE UP
-  CEFTRIAXONE: SIGNIFICANT CHANGE UP
-  CIPROFLOXACIN: SIGNIFICANT CHANGE UP
-  ERTAPENEM: SIGNIFICANT CHANGE UP
-  GENTAMICIN: SIGNIFICANT CHANGE UP
-  IMIPENEM: SIGNIFICANT CHANGE UP
-  LEVOFLOXACIN: SIGNIFICANT CHANGE UP
-  MEROPENEM: SIGNIFICANT CHANGE UP
-  PIPERACILLIN/TAZOBACTAM: SIGNIFICANT CHANGE UP
-  TOBRAMYCIN: SIGNIFICANT CHANGE UP
-  TRIMETHOPRIM/SULFAMETHOXAZOLE: SIGNIFICANT CHANGE UP
ANION GAP SERPL CALC-SCNC: 11 MMOL/L — SIGNIFICANT CHANGE UP (ref 5–17)
BUN SERPL-MCNC: 17 MG/DL — SIGNIFICANT CHANGE UP (ref 8–20)
CALCIUM SERPL-MCNC: 7.7 MG/DL — LOW (ref 8.6–10.2)
CHLORIDE SERPL-SCNC: 100 MMOL/L — SIGNIFICANT CHANGE UP (ref 98–107)
CO2 SERPL-SCNC: 27 MMOL/L — SIGNIFICANT CHANGE UP (ref 22–29)
CREAT SERPL-MCNC: 2.78 MG/DL — HIGH (ref 0.5–1.3)
GLUCOSE BLDC GLUCOMTR-MCNC: 158 MG/DL — HIGH (ref 70–99)
GLUCOSE BLDC GLUCOMTR-MCNC: 166 MG/DL — HIGH (ref 70–99)
GLUCOSE BLDC GLUCOMTR-MCNC: 175 MG/DL — HIGH (ref 70–99)
GLUCOSE BLDC GLUCOMTR-MCNC: 86 MG/DL — SIGNIFICANT CHANGE UP (ref 70–99)
GLUCOSE SERPL-MCNC: 91 MG/DL — SIGNIFICANT CHANGE UP (ref 70–115)
GRAM STN FLD: SIGNIFICANT CHANGE UP
HCT VFR BLD CALC: 29.8 % — LOW (ref 34.5–45)
HGB BLD-MCNC: 9.5 G/DL — LOW (ref 11.5–15.5)
MCHC RBC-ENTMCNC: 29.1 PG — SIGNIFICANT CHANGE UP (ref 27–34)
MCHC RBC-ENTMCNC: 31.9 GM/DL — LOW (ref 32–36)
MCV RBC AUTO: 91.4 FL — SIGNIFICANT CHANGE UP (ref 80–100)
METHOD TYPE: SIGNIFICANT CHANGE UP
PHOSPHATE SERPL-MCNC: 2.1 MG/DL — LOW (ref 2.4–4.7)
PLATELET # BLD AUTO: 175 K/UL — SIGNIFICANT CHANGE UP (ref 150–400)
POTASSIUM SERPL-MCNC: 3.1 MMOL/L — LOW (ref 3.5–5.3)
POTASSIUM SERPL-SCNC: 3.1 MMOL/L — LOW (ref 3.5–5.3)
RBC # BLD: 3.26 M/UL — LOW (ref 3.8–5.2)
RBC # FLD: 21.2 % — HIGH (ref 10.3–14.5)
SODIUM SERPL-SCNC: 138 MMOL/L — SIGNIFICANT CHANGE UP (ref 135–145)
WBC # BLD: 5.1 K/UL — SIGNIFICANT CHANGE UP (ref 3.8–10.5)
WBC # FLD AUTO: 5.1 K/UL — SIGNIFICANT CHANGE UP (ref 3.8–10.5)

## 2019-07-27 PROCEDURE — 99232 SBSQ HOSP IP/OBS MODERATE 35: CPT

## 2019-07-27 PROCEDURE — 99233 SBSQ HOSP IP/OBS HIGH 50: CPT

## 2019-07-27 RX ORDER — AMLODIPINE BESYLATE 2.5 MG/1
5 TABLET ORAL DAILY
Refills: 0 | Status: DISCONTINUED | OUTPATIENT
Start: 2019-07-27 | End: 2019-07-27

## 2019-07-27 RX ORDER — AMLODIPINE BESYLATE 2.5 MG/1
10 TABLET ORAL DAILY
Refills: 0 | Status: DISCONTINUED | OUTPATIENT
Start: 2019-07-27 | End: 2019-07-30

## 2019-07-27 RX ORDER — HYDRALAZINE HCL 50 MG
5 TABLET ORAL EVERY 6 HOURS
Refills: 0 | Status: DISCONTINUED | OUTPATIENT
Start: 2019-07-27 | End: 2019-07-30

## 2019-07-27 RX ORDER — HEPARIN SODIUM 5000 [USP'U]/ML
5000 INJECTION INTRAVENOUS; SUBCUTANEOUS EVERY 12 HOURS
Refills: 0 | Status: DISCONTINUED | OUTPATIENT
Start: 2019-07-27 | End: 2019-07-28

## 2019-07-27 RX ORDER — CEFEPIME 1 G/1
1000 INJECTION, POWDER, FOR SOLUTION INTRAMUSCULAR; INTRAVENOUS EVERY 24 HOURS
Refills: 0 | Status: DISCONTINUED | OUTPATIENT
Start: 2019-07-27 | End: 2019-07-30

## 2019-07-27 RX ADMIN — ATORVASTATIN CALCIUM 40 MILLIGRAM(S): 80 TABLET, FILM COATED ORAL at 23:24

## 2019-07-27 RX ADMIN — CEFEPIME 100 MILLIGRAM(S): 1 INJECTION, POWDER, FOR SOLUTION INTRAMUSCULAR; INTRAVENOUS at 14:30

## 2019-07-27 RX ADMIN — Medication 667 MILLIGRAM(S): at 11:59

## 2019-07-27 RX ADMIN — Medication 667 MILLIGRAM(S): at 17:58

## 2019-07-27 RX ADMIN — OXYCODONE AND ACETAMINOPHEN 1 TABLET(S): 5; 325 TABLET ORAL at 00:00

## 2019-07-27 RX ADMIN — Medication 2: at 17:57

## 2019-07-27 RX ADMIN — LIDOCAINE 1 PATCH: 4 CREAM TOPICAL at 00:00

## 2019-07-27 RX ADMIN — Medication 650 MILLIGRAM(S): at 11:26

## 2019-07-27 RX ADMIN — Medication 250 MILLIGRAM(S): at 17:58

## 2019-07-27 RX ADMIN — OXYCODONE AND ACETAMINOPHEN 1 TABLET(S): 5; 325 TABLET ORAL at 23:23

## 2019-07-27 RX ADMIN — Medication 1 MILLIGRAM(S): at 11:59

## 2019-07-27 RX ADMIN — Medication 250 MILLIGRAM(S): at 06:33

## 2019-07-27 RX ADMIN — CARVEDILOL PHOSPHATE 6.25 MILLIGRAM(S): 80 CAPSULE, EXTENDED RELEASE ORAL at 17:58

## 2019-07-27 RX ADMIN — LIDOCAINE 1 PATCH: 4 CREAM TOPICAL at 00:39

## 2019-07-27 RX ADMIN — Medication 667 MILLIGRAM(S): at 09:33

## 2019-07-27 RX ADMIN — PANTOPRAZOLE SODIUM 40 MILLIGRAM(S): 20 TABLET, DELAYED RELEASE ORAL at 06:32

## 2019-07-27 RX ADMIN — LIDOCAINE 1 PATCH: 4 CREAM TOPICAL at 21:19

## 2019-07-27 RX ADMIN — ERYTHROPOIETIN 10000 UNIT(S): 10000 INJECTION, SOLUTION INTRAVENOUS; SUBCUTANEOUS at 09:11

## 2019-07-27 RX ADMIN — CARVEDILOL PHOSPHATE 6.25 MILLIGRAM(S): 80 CAPSULE, EXTENDED RELEASE ORAL at 06:33

## 2019-07-27 RX ADMIN — AMLODIPINE BESYLATE 5 MILLIGRAM(S): 2.5 TABLET ORAL at 11:57

## 2019-07-27 RX ADMIN — CHLORHEXIDINE GLUCONATE 1 APPLICATION(S): 213 SOLUTION TOPICAL at 06:33

## 2019-07-27 RX ADMIN — LOSARTAN POTASSIUM 50 MILLIGRAM(S): 100 TABLET, FILM COATED ORAL at 06:32

## 2019-07-27 RX ADMIN — Medication 650 MILLIGRAM(S): at 08:50

## 2019-07-27 RX ADMIN — OXYCODONE AND ACETAMINOPHEN 1 TABLET(S): 5; 325 TABLET ORAL at 16:27

## 2019-07-27 RX ADMIN — OXYCODONE AND ACETAMINOPHEN 1 TABLET(S): 5; 325 TABLET ORAL at 03:49

## 2019-07-27 RX ADMIN — Medication 112 MICROGRAM(S): at 06:32

## 2019-07-27 RX ADMIN — CALCITRIOL 0.25 MICROGRAM(S): 0.5 CAPSULE ORAL at 11:59

## 2019-07-27 RX ADMIN — LIDOCAINE 1 PATCH: 4 CREAM TOPICAL at 11:58

## 2019-07-27 NOTE — PROGRESS NOTE ADULT - ASSESSMENT
ESRD, s/p explant infected graft, CAD, HTN, DM, PAD.  Stable.  For conversion to permacath on Tuesday.  Continue HD per nephrology, will follow up on tissue culture, abx per ID ESRD, s/p explant infected graft, right arm, CAD, HTN, DM, PAD.  Stable.  For conversion to permacath on Tuesday.  Continue HD per nephrology, will follow up on tissue culture, abx per ID

## 2019-07-27 NOTE — PROGRESS NOTE ADULT - ASSESSMENT
87yoF hx ESRD on HD (M and F only), CAD s/p CABG, HTN, DM, PAD, hypothyroidism presenting with generalized weakness.  On admission, febrile, leukocytosis, UA positive, with  Blood cultures positive for staph aureus.  ASNFORD done, no vegetation. s/p indium scan positive for R AV graft infection, Tissue culture of YONIS shows gram - rods  Sepsis :  Staph aureus bacteremia due to R arm AV graft infection.   s/p AVG resection and Jefrfey cath placed july 23  Pt will need PC insertion once cleared by ID likely early next week  SANFORD negative for vegetation  c/w cefepime      bleeding from AVG on july 25. stopped. hb dropped. s/p 2 units PRBC 7/26. monitor cbc. asa and plavix on hold. not clear why needs DAPT. resume asa . iscuss with cardio regarding plavix. CABG long time back    AMS since admission as per son: likely related to infection/sepsis/dehydration. MS getting better. seems communicates better when family members present. also has hearing issue. CT head showed mod-severe microvascular changes. 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 follows with Dr. Marcial. possible underlying vascular dementia. needs dementia w/u OP. fall and aspiration precautions.     CAD s/p CABG:  with elevated troponin - T 0.32 - elevated in past, in setting of ESRD on HD  Cath 2/2018- LIMA to LAD patent, SVG to OM patent, SVG to RPDA patent   cardiology has discussed with son Darrin and they are unwilling for stress testing, cath, invasive - only want Medication   HFrEF  SANFORD 7/9/2019: LVEF 30-35.Ml-mod TR. No evidence of endocarditis.   Echo Limited 7/18: LVEF 35-40%.    cont  carvedilol.   Losartan.  Volume control with HD    ESRD on HD   Cont HD    c/o coccyx pain: as per wound care patient has Incontinence associated dermatitis, not decubitus ulcer.  xray--> no bony involvement. incontinence management . wound care cx appreciated. follow recomendation    DVT-P: resume heparin.  mobility with pt. OOB to chair with assistance 3 times a day.

## 2019-07-27 NOTE — PROGRESS NOTE ADULT - SUBJECTIVE AND OBJECTIVE BOX
pt s/p explant of graft, doing well, tolerating dialysis via RIJ giuliana      AVSS      Gen NAD  Abd soft  Extr ace wrap around right upper arm, stable, no strike thru, hand perfused      Tissue culture of JESUS shows gram - rods    MSSA bacteremia pt s/p explant of graft, doing well, tolerating dialysis via RIJ giuliana      AVSS      Gen NAD  Abd soft  Extr ace wrap around right upper arm, stable, no strike thru, hand perfused      Tissue culture of YONIS shows gram - rods    MSSA bacteremia

## 2019-07-27 NOTE — PROGRESS NOTE ADULT - SUBJECTIVE AND OBJECTIVE BOX
NEPHROLOGY INTERVAL HPI/OVERNIGHT EVENTS: No new events.    MEDICATIONS  (STANDING):  atorvastatin 40 milliGRAM(s) Oral at bedtime  calcitriol   Capsule 0.25 MICROGram(s) Oral daily  calcium acetate 667 milliGRAM(s) Oral three times a day with meals  carvedilol 6.25 milliGRAM(s) Oral every 12 hours  ceFAZolin   IVPB 1000 milliGRAM(s) IV Intermittent every 24 hours  chlorhexidine 2% Cloths 1 Application(s) Topical <User Schedule>  dextrose 5%. 1000 milliLiter(s) (50 mL/Hr) IV Continuous <Continuous>  dextrose 50% Injectable 12.5 Gram(s) IV Push once  dextrose 50% Injectable 12.5 Gram(s) IV Push once  dextrose 50% Injectable 25 Gram(s) IV Push once  dextrose 50% Injectable 25 Gram(s) IV Push once  epoetin barb Injectable 91138 Unit(s) IV Push <User Schedule>  folic acid 1 milliGRAM(s) Oral daily  insulin lispro (HumaLOG) corrective regimen sliding scale   SubCutaneous three times a day before meals  insulin lispro (HumaLOG) corrective regimen sliding scale   SubCutaneous at bedtime  levothyroxine 112 MICROGram(s) Oral daily  lidocaine   Patch 1 Patch Transdermal daily  losartan 50 milliGRAM(s) Oral daily  morphine  - Injectable 2 milliGRAM(s) IV Push once  pantoprazole    Tablet 40 milliGRAM(s) Oral before breakfast  saccharomyces boulardii 250 milliGRAM(s) Oral two times a day    MEDICATIONS  (PRN):  acetaminophen   Tablet .. 650 milliGRAM(s) Oral every 6 hours PRN Temp greater or equal to 38C (100.4F), Mild Pain (1 - 3)  dextrose 40% Gel 15 Gram(s) Oral once PRN Blood Glucose LESS THAN 70 milliGRAM(s)/deciliter  glucagon  Injectable 1 milliGRAM(s) IntraMuscular once PRN Glucose LESS THAN 70 milligrams/deciliter  oxyCODONE    5 mG/acetaminophen 325 mG 1 Tablet(s) Oral every 6 hours PRN Severe Pain (7 - 10)      Allergies    penicillin (Anaphylaxis)  seafood (Anaphylaxis)  shellfish (Anaphylaxis)          Vital Signs Last 24 Hrs  T(C): 36.8 (2019 07:25), Max: 37.1 (2019 00:00)  T(F): 98.3 (2019 07:25), Max: 98.8 (2019 00:00)  HR: 67 (2019 07:25) (67 - 80)  BP: 90/57 (2019 07:25) (90/57 - 191/75)  BP(mean): --  RR: 18 (2019 07:25) (17 - 18)  SpO2: 97% (2019 07:25) (97% - 100%)  T(C): 36.4 (2019 07:39), Max: 37.2 (2019 00:21)  T(F): 97.6 (2019 07:39), Max: 98.9 (2019 00:21)  HR: 65 (2019 07:39) (65 - 86)  BP: 107/47 (2019 07:39) (107/47 - 166/99)  BP(mean): --  RR: 18 (2019 07:39) (16 - 18)  SpO2: 98% (2019 07:39) (95% - 98%)  Daily     Daily Weight in k.5 (2019 16:00)    PHYSICAL EXAM:    GENERAL: Complains of  back pain.  HEAD: wnl   EYES:   ENMT:   NECK: right central line site clean  NERVOUS SYSTEM: awake, verbal   CHEST/LUNG: no 02, no wheezes  HEART: no rub  ABDOMEN: soft, NT  EXTREMITIES: upper right arm wrapped   LYMPH:   SKIN: no rash    LABS:        138  |  100  |  17.0  ----------------------------<  91  3.1<L>   |  27.0  |  2.78<H>    Ca    7.7<L>      2019 08:21  Phos  2.1                                 11.3   8.12  )-----------( 226      ( 2019 21:00 )             35.1     07-25    135  |  98  |  29.0<H>  ----------------------------<  130<H>  4.0   |  25.0  |  3.70<H>    Ca    7.4<L>      2019 08:20  Phos  4.3     07-25  Mg     1.7                       RADIOLOGY & ADDITIONAL TESTS:

## 2019-07-27 NOTE — CHART NOTE - NSCHARTNOTEFT_GEN_A_CORE
Source: EMR, RN - -Pt very lethargic today     Current Diet: Puree, thins, Renal     PO intake:   50%- total feed     Current Weight:   (7/27) 60kg  (7/18) 61kg  (7/5) 57kg    % Weight Change -- noted 2+ generalized edema     Pertinent Medications: MEDICATIONS  (STANDING):  amLODIPine   Tablet 5 milliGRAM(s) Oral daily  atorvastatin 40 milliGRAM(s) Oral at bedtime  calcitriol   Capsule 0.25 MICROGram(s) Oral daily  calcium acetate 667 milliGRAM(s) Oral three times a day with meals  carvedilol 6.25 milliGRAM(s) Oral every 12 hours  ceFAZolin   IVPB 1000 milliGRAM(s) IV Intermittent every 24 hours  chlorhexidine 2% Cloths 1 Application(s) Topical <User Schedule>  dextrose 5%. 1000 milliLiter(s) (50 mL/Hr) IV Continuous <Continuous>  dextrose 50% Injectable 12.5 Gram(s) IV Push once  dextrose 50% Injectable 12.5 Gram(s) IV Push once  dextrose 50% Injectable 25 Gram(s) IV Push once  dextrose 50% Injectable 25 Gram(s) IV Push once  epoetin barb Injectable 82748 Unit(s) IV Push <User Schedule>  folic acid 1 milliGRAM(s) Oral daily  insulin lispro (HumaLOG) corrective regimen sliding scale   SubCutaneous three times a day before meals  insulin lispro (HumaLOG) corrective regimen sliding scale   SubCutaneous at bedtime  levothyroxine 112 MICROGram(s) Oral daily  lidocaine   Patch 1 Patch Transdermal daily  losartan 50 milliGRAM(s) Oral daily  pantoprazole    Tablet 40 milliGRAM(s) Oral before breakfast  saccharomyces boulardii 250 milliGRAM(s) Oral two times a day    MEDICATIONS  (PRN):  acetaminophen   Tablet .. 650 milliGRAM(s) Oral every 6 hours PRN Temp greater or equal to 38C (100.4F), Mild Pain (1 - 3)  dextrose 40% Gel 15 Gram(s) Oral once PRN Blood Glucose LESS THAN 70 milliGRAM(s)/deciliter  glucagon  Injectable 1 milliGRAM(s) IntraMuscular once PRN Glucose LESS THAN 70 milligrams/deciliter  oxyCODONE    5 mG/acetaminophen 325 mG 1 Tablet(s) Oral every 6 hours PRN Severe Pain (7 - 10)    Pertinent Labs: CBC Full  -  ( 27 Jul 2019 08:21 )  WBC Count : 5.10 K/uL  RBC Count : 3.26 M/uL  Hemoglobin : 9.5 g/dL  Hematocrit : 29.8 %  Platelet Count - Automated : 175 K/uL  Mean Cell Volume : 91.4 fl  Mean Cell Hemoglobin : 29.1 pg  Mean Cell Hemoglobin Concentration : 31.9 gm/dL  K 3.1, Cr 2.78, Phos 2.1    Skin: intact      Nutrition focused physical exam conducted previously- found signs of malnutrition [ ]absent [x]present    Subcutaneous fat loss: [ ] Orbital fat pads region, [ ]Buccal fat region, [ ]Triceps region,  [ ]Ribs region    Muscle wasting: [x]Temples region, [ ]Clavicle region, [ ]Shoulder region, [ ]Scapula region, [ ]Interosseous region,  [ ]thigh region, [ ]Calf region    Estimated Needs:   [x] no change since previous assessment, unclear accuracy of weights, will continue to monitor  [ ] recalculated:     Current Nutrition Diagnosis: Pt remains at high nutrition risk secondary to malnutrition (mild chronic) related to inadequate energy intake in setting of texture modified diet consistency secondary to swallowing difficulties, increased nutrient needs due to ESRD on HD, +bacteremia as evidenced by moderate fluid accumulation (2+ B/L hands), likely <75% of nutrient needs >1 mo, moderate muscle wasting (temples), generalized weakness, increased protein losses associated with HD. Pt continues with suboptimal PO intake despite total assistance and encouragement, tolerating pureed diet.    Recommendations:   1) Add Nepro TID   2) Rx: Nephro-king daily   3) Monitor weights daily for trend     Monitoring and Evaluation:   [x] PO intake [x] Tolerance to diet prescription [X] Weights  [X] Follow up per protocol [X] Labs:

## 2019-07-27 NOTE — PROGRESS NOTE ADULT - SUBJECTIVE AND OBJECTIVE BOX
Dr. Berrios Hospitalist Progress Note  LAUREN ROBERSON [id number]    Patient is a 87y old  Female who presents with a chief complaint of weakness, sepsis 2/2 UTI (23 Jul 2019 16:59)    Interval: seen at bedside with family. . patient communicated very well. cofused. nswered almost all qs when asked by Son. denied complaints except coccyx pain. no bleeding noticed from AVF. no event overnight. PT eval pending. For PC on Tuesday    HPI:  87yoF hx ESRD on HD (M and F only), CAD s/p CABG, HTN, DM, PAD, hypothyroidism presenting with generalized weakness.  Pt is poor historian despite use of   She reports generalized weakness for past few days associated with generalized, abdominal pain that she is unable to describe associated with nausea, some episodes of vomiting.  Reports difficulty with urination and ?dysuria but believes her urinary symptoms are due to not drinking enough fluids recently. Unable to explain why she is only on HD twice per week, but says last HD session was on 7/1.  Denies fevers, chest pain, dyspnea, endorses chronic pain in face and all extremities which is chronic for her.  On admission, febrile, leukocytosis, UA positive, was pan scanned by ED and CT abd/pelvis showed haziness around the gallbladder however follow up abd US was negative for cholelithiasis and Stewart's sign and no LFT elevation on labs. (04 Jul 2019 22:54)      ROS:   denied chest pain/SOB/palpitation/leg or calf pain/abd pain/pain over the AVF. denied nausea/vomiting/diarrhoea/constipaiton. no headqaches. gen weakness+    Vital Signs Last 24 Hrs  T(C): 36.9 (27 Jul 2019 15:10), Max: 37.1 (27 Jul 2019 00:00)  T(F): 98.4 (27 Jul 2019 15:10), Max: 98.8 (27 Jul 2019 00:00)  HR: 82 (27 Jul 2019 15:10) (67 - 82)  BP: 170/79 (27 Jul 2019 15:10) (90/57 - 191/75)  BP(mean): --  RR: 18 (27 Jul 2019 15:10) (18 - 18)  SpO2: 97% (27 Jul 2019 15:10) (97% - 98%)    CAPILLARY BLOOD GLUCOSE      POCT Blood Glucose.: 175 mg/dL (27 Jul 2019 17:12)  POCT Blood Glucose.: 166 mg/dL (27 Jul 2019 11:39)  POCT Blood Glucose.: 86 mg/dL (27 Jul 2019 08:34)  POCT Blood Glucose.: 107 mg/dL (26 Jul 2019 20:58)    I&O's Detail    27 Jul 2019 07:01  -  27 Jul 2019 18:34  --------------------------------------------------------  IN:  Total IN: 0 mL    OUT:    Other: 500 mL  Total OUT: 500 mL    Total NET: -500 mL      Physical Exam:  GENERAL: Not in distress. looks better. alert and communicating better  HEENT:  Normocephalic and atraumatic. MMM  NECK: Supple.   CARDIOVASCULAR: RRR S1, S2. No murmur/rubs/gallop  LUNGS: BLAE+, no rales, no wheezing, no rhonchi.    ABDOMEN: ND. Soft,  NT, no guarding / rebound / rigidity. BS normoactive. No CVA tenderness.    EXTREMITIES: no edema. no leg or calf TP   SKIN: no rash. Incontinence associated dermatitis not  ulcer  NEUROLOGIC: AAO*2 strength is symmetric, sensation intact, speech fluent.    PSYCHIATRIC: Calm.  No agitation.     Labs:                          9.5    5.10  )-----------( 175      ( 27 Jul 2019 08:21 )             29.8       07-27    138  |  100  |  17.0  ----------------------------<  91  3.1<L>   |  27.0  |  2.78<H>    Ca    7.7<L>      27 Jul 2019 08:21  Phos  2.1     07-27        MEDICATIONS  (STANDING):  amLODIPine   Tablet 10 milliGRAM(s) Oral daily  atorvastatin 40 milliGRAM(s) Oral at bedtime  calcitriol   Capsule 0.25 MICROGram(s) Oral daily  calcium acetate 667 milliGRAM(s) Oral three times a day with meals  carvedilol 6.25 milliGRAM(s) Oral every 12 hours  cefepime   IVPB 1000 milliGRAM(s) IV Intermittent every 24 hours  chlorhexidine 2% Cloths 1 Application(s) Topical <User Schedule>  dextrose 5%. 1000 milliLiter(s) (50 mL/Hr) IV Continuous <Continuous>  dextrose 50% Injectable 12.5 Gram(s) IV Push once  dextrose 50% Injectable 12.5 Gram(s) IV Push once  dextrose 50% Injectable 25 Gram(s) IV Push once  dextrose 50% Injectable 25 Gram(s) IV Push once  epoetin barb Injectable 68706 Unit(s) IV Push <User Schedule>  folic acid 1 milliGRAM(s) Oral daily  heparin  Injectable 5000 Unit(s) SubCutaneous every 12 hours  insulin lispro (HumaLOG) corrective regimen sliding scale   SubCutaneous three times a day before meals  insulin lispro (HumaLOG) corrective regimen sliding scale   SubCutaneous at bedtime  levothyroxine 112 MICROGram(s) Oral daily  lidocaine   Patch 1 Patch Transdermal daily  losartan 50 milliGRAM(s) Oral daily  Nephro-king 1 Tablet(s) Oral daily  pantoprazole    Tablet 40 milliGRAM(s) Oral before breakfast  saccharomyces boulardii 250 milliGRAM(s) Oral two times a day    MEDICATIONS  (PRN):  acetaminophen   Tablet .. 650 milliGRAM(s) Oral every 6 hours PRN Temp greater or equal to 38C (100.4F), Mild Pain (1 - 3)  dextrose 40% Gel 15 Gram(s) Oral once PRN Blood Glucose LESS THAN 70 milliGRAM(s)/deciliter  glucagon  Injectable 1 milliGRAM(s) IntraMuscular once PRN Glucose LESS THAN 70 milligrams/deciliter  hydrALAZINE Injectable 5 milliGRAM(s) IV Push every 6 hours PRN BP above 160/100  oxyCODONE    5 mG/acetaminophen 325 mG 1 Tablet(s) Oral every 6 hours PRN Severe Pain (7 - 10)

## 2019-07-27 NOTE — PROGRESS NOTE ADULT - SUBJECTIVE AND OBJECTIVE BOX
[facility]  INFECTIOUS DISEASES AND INTERNAL MEDICINE at [place]  =======================================================  Paresh Stark MD  Diplomates American Board of Internal Medicine and Infectious Diseases  Tel: [phone]      Fax: [phone]  =======================================================    LAUREN ROBERSON [id number]    Follow up: AVG infection. c/o pain at rectal area    Allergies:  penicillin (Anaphylaxis)  seafood (Anaphylaxis)  Send Nepro TID- RD OK (Unknown)  shellfish (Anaphylaxis)      Medications:  acetaminophen   Tablet .. 650 milliGRAM(s) Oral every 6 hours PRN  amLODIPine   Tablet 5 milliGRAM(s) Oral daily  atorvastatin 40 milliGRAM(s) Oral at bedtime  calcitriol   Capsule 0.25 MICROGram(s) Oral daily  calcium acetate 667 milliGRAM(s) Oral three times a day with meals  carvedilol 6.25 milliGRAM(s) Oral every 12 hours  cefepime   IVPB 1000 milliGRAM(s) IV Intermittent every 24 hours  chlorhexidine 2% Cloths 1 Application(s) Topical <User Schedule>  dextrose 40% Gel 15 Gram(s) Oral once PRN  dextrose 5%. 1000 milliLiter(s) IV Continuous <Continuous>  dextrose 50% Injectable 12.5 Gram(s) IV Push once  dextrose 50% Injectable 12.5 Gram(s) IV Push once  dextrose 50% Injectable 25 Gram(s) IV Push once  dextrose 50% Injectable 25 Gram(s) IV Push once  epoetin barb Injectable 77484 Unit(s) IV Push <User Schedule>  folic acid 1 milliGRAM(s) Oral daily  glucagon  Injectable 1 milliGRAM(s) IntraMuscular once PRN  insulin lispro (HumaLOG) corrective regimen sliding scale   SubCutaneous three times a day before meals  insulin lispro (HumaLOG) corrective regimen sliding scale   SubCutaneous at bedtime  levothyroxine 112 MICROGram(s) Oral daily  lidocaine   Patch 1 Patch Transdermal daily  losartan 50 milliGRAM(s) Oral daily  oxyCODONE    5 mG/acetaminophen 325 mG 1 Tablet(s) Oral every 6 hours PRN  pantoprazole    Tablet 40 milliGRAM(s) Oral before breakfast  saccharomyces boulardii 250 milliGRAM(s) Oral two times a day            REVIEW OF SYSTEMS:  CONSTITUTIONAL:  No Fever or chills  HEENT:   No diplopia or blurred vision.  No earache, sore throat or runny nose.  CARDIOVASCULAR:  No pressure, squeezing, strangling, tightness, heaviness or aching about the chest, neck, axilla or epigastrium.  RESPIRATORY:  No cough, shortness of breath  GASTROINTESTINAL:  No nausea, vomiting or diarrhea.  GENITOURINARY:  No dysuria, frequency or urgency. No Blood in urine  MUSCULOSKELETAL:  no joint aches, no muscle pain  SKIN:  No change in skin, hair or nails.  NEUROLOGIC:  No Headaches, seizures or weakness.  PSYCHIATRIC:  No disorder of thought or mood.  ENDOCRINE:  No heat or cold intolerance  HEMATOLOGICAL:  No easy bruising or bleeding.            Physical Exam:  ICU Vital Signs Last 24 Hrs  T(C): 36.7 (27 Jul 2019 10:53), Max: 37.1 (27 Jul 2019 00:00)  T(F): 98.1 (27 Jul 2019 10:53), Max: 98.8 (27 Jul 2019 00:00)  HR: 76 (27 Jul 2019 10:53) (67 - 80)  BP: 130/65 (27 Jul 2019 11:15) (90/57 - 191/75)  BP(mean): --  ABP: --  ABP(mean): --  RR: 18 (27 Jul 2019 10:53) (17 - 18)  SpO2: 97% (27 Jul 2019 10:53) (97% - 100%)      GEN: NAD, pleasant  HEENT: normocephalic and atraumatic. EOMI. PERRL.  Anicteric   NECK: Supple.   LUNGS: Clear to auscultation.  HEART: Regular rate and rhythm without murmur. +PPM  ABDOMEN: Soft, nontender, and nondistended.  Positive bowel sounds.    : No CVA tenderness  EXTREMITIES: Without any edema.  MSK: no joint swelling  NEUROLOGIC: Cranial nerves II through XII are grossly intact. No focal deficits  PSYCHIATRIC: Appropriate affect .  SKIN: No Rash RUE dressing soaked with blood at top, not actively oozing      Labs:  07-27    138  |  100  |  17.0  ----------------------------<  91  3.1<L>   |  27.0  |  2.78<H>    Ca    7.7<L>      27 Jul 2019 08:21  Phos  2.1     07-27                            9.5    5.10  )-----------( 175      ( 27 Jul 2019 08:21 )             29.8                 CAPILLARY BLOOD GLUCOSE      POCT Blood Glucose.: 166 mg/dL (27 Jul 2019 11:39)  POCT Blood Glucose.: 86 mg/dL (27 Jul 2019 08:34)  POCT Blood Glucose.: 107 mg/dL (26 Jul 2019 20:58)  POCT Blood Glucose.: 187 mg/dL (26 Jul 2019 17:08)        RECENT CULTURES:  07-25 @ 17:04 .Other Tissue R Upper Arm Graft           07-25 @ 11:10 .Tissue Tissue R Upper Arm Graft Klebsiella pneumoniae    Moderate Klebsiella pneumoniae  Culture in progress    No WBC's or organisms seen      07-25 @ 11:02 .Surgical Swab Swabs R Upper Arm     No growth at 2 days.  Culture in progress    No WBC's or organisms seen      07-14 @ 11:53 .Blood     No growth at 5 days.

## 2019-07-27 NOTE — PROGRESS NOTE ADULT - ASSESSMENT
87yoF hx ESRD on HD (M and F only), AVG right side 8/3/17   CAD s/p CABG, HTN, DM, PAD, hypothyroidism presenting with generalized weakness, dysuria.   pain at graft site for 1 month.  fever in .6 now afebrile  leukocytosis to 14 now normal  Blood cx 4/4 Staph aureus.   Imaging with no focus (no pneumonia, abscess, OM). NM scan suspicious for AVG infection.  Overall Staph aureus bacteremia 2/2 AVG infection    Recommend:  - Blood cultures + 4/4 Staph aureus. BCX 7/14 NGTD  - AVG removed. AVG growing GNR  -DC cefazolin. Start Cefepime 1 G IV daily and on HD days give after HD  - Pending permacath next week  - SANFORD for endocarditis/lead vegetations (-)  - Trend Fever  - Trend WBC count  - Repositioning q2h     Will follow. D/w son

## 2019-07-28 LAB
GLUCOSE BLDC GLUCOMTR-MCNC: 83 MG/DL — SIGNIFICANT CHANGE UP (ref 70–99)
GLUCOSE BLDC GLUCOMTR-MCNC: 84 MG/DL — SIGNIFICANT CHANGE UP (ref 70–99)
GLUCOSE BLDC GLUCOMTR-MCNC: 89 MG/DL — SIGNIFICANT CHANGE UP (ref 70–99)
GLUCOSE BLDC GLUCOMTR-MCNC: 97 MG/DL — SIGNIFICANT CHANGE UP (ref 70–99)

## 2019-07-28 PROCEDURE — 99232 SBSQ HOSP IP/OBS MODERATE 35: CPT

## 2019-07-28 RX ORDER — POTASSIUM CHLORIDE 20 MEQ
20 PACKET (EA) ORAL ONCE
Refills: 0 | Status: COMPLETED | OUTPATIENT
Start: 2019-07-28 | End: 2019-07-29

## 2019-07-28 RX ORDER — POTASSIUM CHLORIDE 20 MEQ
20 PACKET (EA) ORAL ONCE
Refills: 0 | Status: DISCONTINUED | OUTPATIENT
Start: 2019-07-28 | End: 2019-07-28

## 2019-07-28 RX ADMIN — LIDOCAINE 1 PATCH: 4 CREAM TOPICAL at 19:07

## 2019-07-28 RX ADMIN — HEPARIN SODIUM 5000 UNIT(S): 5000 INJECTION INTRAVENOUS; SUBCUTANEOUS at 05:56

## 2019-07-28 RX ADMIN — OXYCODONE AND ACETAMINOPHEN 1 TABLET(S): 5; 325 TABLET ORAL at 19:46

## 2019-07-28 RX ADMIN — CARVEDILOL PHOSPHATE 6.25 MILLIGRAM(S): 80 CAPSULE, EXTENDED RELEASE ORAL at 05:55

## 2019-07-28 RX ADMIN — CEFEPIME 100 MILLIGRAM(S): 1 INJECTION, POWDER, FOR SOLUTION INTRAMUSCULAR; INTRAVENOUS at 14:51

## 2019-07-28 RX ADMIN — Medication 667 MILLIGRAM(S): at 18:21

## 2019-07-28 RX ADMIN — PANTOPRAZOLE SODIUM 40 MILLIGRAM(S): 20 TABLET, DELAYED RELEASE ORAL at 06:00

## 2019-07-28 RX ADMIN — Medication 1 TABLET(S): at 13:41

## 2019-07-28 RX ADMIN — Medication 1 MILLIGRAM(S): at 13:41

## 2019-07-28 RX ADMIN — Medication 112 MICROGRAM(S): at 05:55

## 2019-07-28 RX ADMIN — CARVEDILOL PHOSPHATE 6.25 MILLIGRAM(S): 80 CAPSULE, EXTENDED RELEASE ORAL at 18:21

## 2019-07-28 RX ADMIN — OXYCODONE AND ACETAMINOPHEN 1 TABLET(S): 5; 325 TABLET ORAL at 20:30

## 2019-07-28 RX ADMIN — ATORVASTATIN CALCIUM 40 MILLIGRAM(S): 80 TABLET, FILM COATED ORAL at 23:28

## 2019-07-28 RX ADMIN — Medication 250 MILLIGRAM(S): at 18:21

## 2019-07-28 RX ADMIN — Medication 667 MILLIGRAM(S): at 09:53

## 2019-07-28 RX ADMIN — AMLODIPINE BESYLATE 10 MILLIGRAM(S): 2.5 TABLET ORAL at 05:54

## 2019-07-28 RX ADMIN — LOSARTAN POTASSIUM 50 MILLIGRAM(S): 100 TABLET, FILM COATED ORAL at 05:54

## 2019-07-28 RX ADMIN — HEPARIN SODIUM 5000 UNIT(S): 5000 INJECTION INTRAVENOUS; SUBCUTANEOUS at 18:21

## 2019-07-28 RX ADMIN — CALCITRIOL 0.25 MICROGRAM(S): 0.5 CAPSULE ORAL at 13:42

## 2019-07-28 RX ADMIN — Medication 667 MILLIGRAM(S): at 13:41

## 2019-07-28 RX ADMIN — Medication 250 MILLIGRAM(S): at 05:55

## 2019-07-28 RX ADMIN — CHLORHEXIDINE GLUCONATE 1 APPLICATION(S): 213 SOLUTION TOPICAL at 05:54

## 2019-07-28 NOTE — PROGRESS NOTE ADULT - SUBJECTIVE AND OBJECTIVE BOX
Pt doing well, no issues.  Nursing states patient is bleeding, also stating wound care orders are unclear as to whether they were meant for nursing staff.  Dressings have not been changed since surgery.  Orders for dressing changes on chart.      AVSS    Gen NAD  Abd soft  Extr old blood strike thru in upper part of ACE bandage.  No active bleed.  Dressings taken down and reapplied.  Arm looks nominal.

## 2019-07-28 NOTE — PROGRESS NOTE ADULT - ASSESSMENT
87yoF hx ESRD on HD (M and F only), CAD s/p CABG, HTN, DM, PAD, hypothyroidism presenting with generalized weakness.  On admission, febrile, leukocytosis, UA positive, with  Blood cultures positive for staph aureus.  SANFORD done, no vegetation. s/p indium scan positive for R AV graft infection, Tissue culture of YONIS shows gram - rods  Sepsis :  Staph aureus bacteremia due to R arm AV graft infection.   s/p AVG resection and Jeffrey cath placed july 23  PC line on Tuesday needs ID clearance  SANFORD negative for vegetation  c/w cefepime      bleeding from AVG on july 25 and recurred today, stopped with pressure. vascular to see tonight.  s/p 2 units PRBC 7/26. monitor cbc. asa and plavix on hold. not clear why needs DAPT. resume asa . iscuss with  CABG long time back, likely due to CVA with ASA and severe microvasc changes on Ct. resume ASA    RUE swelling. ace wrap was very tight. loosened. vascular will see tonight. RUE elevation. may need doppler usg    AMS since admission as per son: likely related to infection/sepsis/dehydration. MS getting better. seems communicates better when family members present. also has hearing issue. CT head showed mod-severe microvascular changes. 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 follows with Dr. Marcial. possible underlying vascular dementia. needs dementia w/u OP. fall and aspiration precautions.     CAD s/p CABG:  with elevated troponin - T 0.32 - elevated in past, in setting of ESRD on HD  Cath 2/2018- LIMA to LAD patent, SVG to OM patent, SVG to RPDA patent   cardiology has discussed with son Darrin and they are unwilling for stress testing, cath, invasive - only want Medication   HFrEF  SANFORD 7/9/2019: LVEF 30-35.Ml-mod TR. No evidence of endocarditis.   Echo Limited 7/18: LVEF 35-40%.    cont  carvedilol.   Losartan.  Volume control with HD    ESRD on HD   Cont HD    c/o coccyx pain: as per wound care patient has Incontinence associated dermatitis, not decubitus ulcer.  xray--> no bony involvement. incontinence management . wound care cx appreciated. follow recommendation. RN to make sure diaper change frequently. consider rectal tube. place female condom cath    DVT-P: hold heprain as patient is bleeding.  mobility with pt. OOB to chair with assistance 3 times a day.

## 2019-07-28 NOTE — PROGRESS NOTE ADULT - SUBJECTIVE AND OBJECTIVE BOX
Dr. Berrios Hospitalist Progress Note  LAUREN ROBERSON [id number]    Patient is a 87y old  Female who presents with a chief complaint of weakness, sepsis 2/2 UTI (23 Jul 2019 16:59)    Interval: seen at bedside with family. coccyx pain, right arm swelling. no pain. bleeding from from AVF which was stopped with pressure. vascular was informed.  PT eval pending. For pemrCath on Tuesday. needs ID clearance    HPI:  87yoF hx ESRD on HD (M and F only), CAD s/p CABG, HTN, DM, PAD, hypothyroidism presenting with generalized weakness.  Pt is poor historian despite use of   She reports generalized weakness for past few days associated with generalized, abdominal pain that she is unable to describe associated with nausea, some episodes of vomiting.  Reports difficulty with urination and ?dysuria but believes her urinary symptoms are due to not drinking enough fluids recently. Unable to explain why she is only on HD twice per week, but says last HD session was on 7/1.  Denies fevers, chest pain, dyspnea, endorses chronic pain in face and all extremities which is chronic for her.  On admission, febrile, leukocytosis, UA positive, was pan scanned by ED and CT abd/pelvis showed haziness around the gallbladder however follow up abd US was negative for cholelithiasis and Stewart's sign and no LFT elevation on labs. (04 Jul 2019 22:54)      ROS:   denied chest pain/SOB/palpitation/leg or calf pain/abd pain/pain over the AVF. denied nausea/vomiting/diarrhoea/constipaiton. no headaches. gen weakness+    Vital Signs Last 24 Hrs  T(C): 37.1 (28 Jul 2019 15:20), Max: 37.1 (28 Jul 2019 01:16)  T(F): 98.8 (28 Jul 2019 15:20), Max: 98.8 (28 Jul 2019 15:20)  HR: 87 (28 Jul 2019 15:20) (78 - 95)  BP: 123/50 (28 Jul 2019 15:20) (121/50 - 154/65)  BP(mean): --  RR: 18 (28 Jul 2019 15:20) (18 - 18)  SpO2: 96% (28 Jul 2019 15:20) (95% - 99%)    CAPILLARY BLOOD GLUCOSE      POCT Blood Glucose.: 89 mg/dL (28 Jul 2019 17:33)  POCT Blood Glucose.: 84 mg/dL (28 Jul 2019 12:46)  POCT Blood Glucose.: 83 mg/dL (28 Jul 2019 08:20)  POCT Blood Glucose.: 158 mg/dL (27 Jul 2019 20:56)    I&O's Detail    27 Jul 2019 07:01  -  28 Jul 2019 07:00  --------------------------------------------------------  IN:  Total IN: 0 mL    OUT:    Other: 500 mL  Total OUT: 500 mL    Total NET: -500 mL      Physical Exam:  GENERAL: Not in distress. looks better. alert and communicating better  HEENT:  Normocephalic and atraumatic. MMM  NECK: Supple.   CARDIOVASCULAR: RRR S1, S2. No murmur/rubs/gallop  LUNGS: BLAE+, no rales, no wheezing, no rhonchi.    ABDOMEN: ND. Soft,  NT, no guarding / rebound / rigidity. BS normoactive. No CVA tenderness.    EXTREMITIES: +edema. no leg or calf TP   SKIN: no rash. Incontinence associated dermatitis, healed decubitus area, covered with stool.  NEUROLOGIC: AAO*2 grossly intact  PSYCHIATRIC: Calm.  No agitation.     Labs:                                     9.5    5.10  )-----------( 175      ( 27 Jul 2019 08:21 )             29.8       07-27    138  |  100  |  17.0  ----------------------------<  91  3.1<L>   |  27.0  |  2.78<H>    Ca    7.7<L>      27 Jul 2019 08:21  Phos  2.1     07-27

## 2019-07-28 NOTE — PROGRESS NOTE ADULT - ASSESSMENT
ESRD, infected graft.  Dressings changed.  Let me clarify that wound care orders are for nursing.  Pt otherwise is doing well, continue abx, follow up culture results.

## 2019-07-29 ENCOUNTER — TRANSCRIPTION ENCOUNTER (OUTPATIENT)
Age: 84
End: 2019-07-29

## 2019-07-29 LAB
ANION GAP SERPL CALC-SCNC: 12 MMOL/L — SIGNIFICANT CHANGE UP (ref 5–17)
BLD GP AB SCN SERPL QL: SIGNIFICANT CHANGE UP
BUN SERPL-MCNC: 26 MG/DL — HIGH (ref 8–20)
CALCIUM SERPL-MCNC: 8 MG/DL — LOW (ref 8.6–10.2)
CHLORIDE SERPL-SCNC: 101 MMOL/L — SIGNIFICANT CHANGE UP (ref 98–107)
CO2 SERPL-SCNC: 23 MMOL/L — SIGNIFICANT CHANGE UP (ref 22–29)
CREAT SERPL-MCNC: 3.69 MG/DL — HIGH (ref 0.5–1.3)
GLUCOSE BLDC GLUCOMTR-MCNC: 82 MG/DL — SIGNIFICANT CHANGE UP (ref 70–99)
GLUCOSE BLDC GLUCOMTR-MCNC: 83 MG/DL — SIGNIFICANT CHANGE UP (ref 70–99)
GLUCOSE BLDC GLUCOMTR-MCNC: 86 MG/DL — SIGNIFICANT CHANGE UP (ref 70–99)
GLUCOSE BLDC GLUCOMTR-MCNC: 86 MG/DL — SIGNIFICANT CHANGE UP (ref 70–99)
GLUCOSE SERPL-MCNC: 82 MG/DL — SIGNIFICANT CHANGE UP (ref 70–115)
HCT VFR BLD CALC: 34.6 % — SIGNIFICANT CHANGE UP (ref 34.5–45)
HGB BLD-MCNC: 10.8 G/DL — LOW (ref 11.5–15.5)
MCHC RBC-ENTMCNC: 29 PG — SIGNIFICANT CHANGE UP (ref 27–34)
MCHC RBC-ENTMCNC: 31.2 GM/DL — LOW (ref 32–36)
MCV RBC AUTO: 92.8 FL — SIGNIFICANT CHANGE UP (ref 80–100)
PLATELET # BLD AUTO: 185 K/UL — SIGNIFICANT CHANGE UP (ref 150–400)
POTASSIUM SERPL-MCNC: 4 MMOL/L — SIGNIFICANT CHANGE UP (ref 3.5–5.3)
POTASSIUM SERPL-SCNC: 4 MMOL/L — SIGNIFICANT CHANGE UP (ref 3.5–5.3)
RBC # BLD: 3.73 M/UL — LOW (ref 3.8–5.2)
RBC # FLD: 21.5 % — HIGH (ref 10.3–14.5)
SODIUM SERPL-SCNC: 136 MMOL/L — SIGNIFICANT CHANGE UP (ref 135–145)
WBC # BLD: 5.67 K/UL — SIGNIFICANT CHANGE UP (ref 3.8–10.5)
WBC # FLD AUTO: 5.67 K/UL — SIGNIFICANT CHANGE UP (ref 3.8–10.5)

## 2019-07-29 PROCEDURE — 99232 SBSQ HOSP IP/OBS MODERATE 35: CPT

## 2019-07-29 PROCEDURE — 99233 SBSQ HOSP IP/OBS HIGH 50: CPT

## 2019-07-29 RX ORDER — MORPHINE SULFATE 50 MG/1
1 CAPSULE, EXTENDED RELEASE ORAL ONCE
Refills: 0 | Status: DISCONTINUED | OUTPATIENT
Start: 2019-07-29 | End: 2019-07-30

## 2019-07-29 RX ADMIN — Medication 20 MILLIEQUIVALENT(S): at 06:05

## 2019-07-29 RX ADMIN — Medication 112 MICROGRAM(S): at 06:04

## 2019-07-29 RX ADMIN — PANTOPRAZOLE SODIUM 40 MILLIGRAM(S): 20 TABLET, DELAYED RELEASE ORAL at 06:04

## 2019-07-29 RX ADMIN — ATORVASTATIN CALCIUM 40 MILLIGRAM(S): 80 TABLET, FILM COATED ORAL at 22:43

## 2019-07-29 RX ADMIN — LOSARTAN POTASSIUM 50 MILLIGRAM(S): 100 TABLET, FILM COATED ORAL at 06:04

## 2019-07-29 RX ADMIN — CALCITRIOL 0.25 MICROGRAM(S): 0.5 CAPSULE ORAL at 13:29

## 2019-07-29 RX ADMIN — LIDOCAINE 1 PATCH: 4 CREAM TOPICAL at 09:07

## 2019-07-29 RX ADMIN — Medication 250 MILLIGRAM(S): at 06:05

## 2019-07-29 RX ADMIN — OXYCODONE AND ACETAMINOPHEN 1 TABLET(S): 5; 325 TABLET ORAL at 17:17

## 2019-07-29 RX ADMIN — Medication 1 MILLIGRAM(S): at 13:27

## 2019-07-29 RX ADMIN — Medication 667 MILLIGRAM(S): at 13:30

## 2019-07-29 RX ADMIN — Medication 1 TABLET(S): at 13:27

## 2019-07-29 RX ADMIN — CEFEPIME 100 MILLIGRAM(S): 1 INJECTION, POWDER, FOR SOLUTION INTRAMUSCULAR; INTRAVENOUS at 13:30

## 2019-07-29 RX ADMIN — Medication 667 MILLIGRAM(S): at 17:18

## 2019-07-29 RX ADMIN — AMLODIPINE BESYLATE 10 MILLIGRAM(S): 2.5 TABLET ORAL at 06:05

## 2019-07-29 RX ADMIN — CARVEDILOL PHOSPHATE 6.25 MILLIGRAM(S): 80 CAPSULE, EXTENDED RELEASE ORAL at 17:17

## 2019-07-29 RX ADMIN — OXYCODONE AND ACETAMINOPHEN 1 TABLET(S): 5; 325 TABLET ORAL at 06:04

## 2019-07-29 RX ADMIN — Medication 667 MILLIGRAM(S): at 09:05

## 2019-07-29 RX ADMIN — CHLORHEXIDINE GLUCONATE 1 APPLICATION(S): 213 SOLUTION TOPICAL at 06:05

## 2019-07-29 RX ADMIN — OXYCODONE AND ACETAMINOPHEN 1 TABLET(S): 5; 325 TABLET ORAL at 22:43

## 2019-07-29 RX ADMIN — LIDOCAINE 1 PATCH: 4 CREAM TOPICAL at 13:28

## 2019-07-29 RX ADMIN — Medication 250 MILLIGRAM(S): at 17:17

## 2019-07-29 RX ADMIN — CARVEDILOL PHOSPHATE 6.25 MILLIGRAM(S): 80 CAPSULE, EXTENDED RELEASE ORAL at 06:04

## 2019-07-29 NOTE — PROGRESS NOTE ADULT - SUBJECTIVE AND OBJECTIVE BOX
CC: weakness/Sepsis/R arm AV graft infection.     INTERVAL HPI/OVERNIGHT EVENTS: Patient seen and examined. Appetite poor today, needs encouragement. No acute issues overnight. Appears comfortable    Vital Signs Last 24 Hrs  T(C): 36.9 (29 Jul 2019 08:21), Max: 37.1 (28 Jul 2019 15:20)  T(F): 98.4 (29 Jul 2019 08:21), Max: 98.8 (28 Jul 2019 15:20)  HR: 76 (29 Jul 2019 08:21) (75 - 87)  BP: 139/79 (28 Jul 2019 23:45) (123/50 - 139/79)  BP(mean): --  RR: 18 (29 Jul 2019 08:21) (18 - 18)  SpO2: 98% (29 Jul 2019 08:21) (96% - 98%)      Physical Exam:  GENERAL: NAD  HEENT:  Normocephalic and atraumatic. MMM  NECK: Supple.   CARDIOVASCULAR: RRR S1, S2. No murmur/rubs/gallop  LUNGS: BLAE+, no rales, no wheezing, no rhonchi.    ABDOMEN: ND. Soft,  NT, no guarding / rebound / rigidity. BS normoactive. No CVA tenderness.    EXTREMITIES: RUE ACE wrap in place, C/D/I. LUE +edema, +distal pulses  SKIN: no rash. Incontinence associated dermatitis   NEUROLOGIC: AAOx2 grossly intact  PSYCHIATRIC: Calm.  No agitation.   I&O's Detail                                10.8   5.67  )-----------( 185      ( 29 Jul 2019 07:23 )             34.6     29 Jul 2019 07:23    136    |  101    |  26.0   ----------------------------<  82     4.0     |  23.0   |  3.69     Ca    8.0        29 Jul 2019 07:23        CAPILLARY BLOOD GLUCOSE      POCT Blood Glucose.: 86 mg/dL (29 Jul 2019 12:55)  POCT Blood Glucose.: 86 mg/dL (29 Jul 2019 09:04)  POCT Blood Glucose.: 97 mg/dL (28 Jul 2019 22:59)  POCT Blood Glucose.: 89 mg/dL (28 Jul 2019 17:33)          MEDICATIONS  (STANDING):  amLODIPine   Tablet 10 milliGRAM(s) Oral daily  atorvastatin 40 milliGRAM(s) Oral at bedtime  calcitriol   Capsule 0.25 MICROGram(s) Oral daily  calcium acetate 667 milliGRAM(s) Oral three times a day with meals  carvedilol 6.25 milliGRAM(s) Oral every 12 hours  cefepime   IVPB 1000 milliGRAM(s) IV Intermittent every 24 hours  chlorhexidine 2% Cloths 1 Application(s) Topical <User Schedule>  dextrose 5%. 1000 milliLiter(s) (50 mL/Hr) IV Continuous <Continuous>  dextrose 50% Injectable 12.5 Gram(s) IV Push once  dextrose 50% Injectable 12.5 Gram(s) IV Push once  dextrose 50% Injectable 25 Gram(s) IV Push once  dextrose 50% Injectable 25 Gram(s) IV Push once  epoetin barb Injectable 06627 Unit(s) IV Push <User Schedule>  folic acid 1 milliGRAM(s) Oral daily  insulin lispro (HumaLOG) corrective regimen sliding scale   SubCutaneous three times a day before meals  insulin lispro (HumaLOG) corrective regimen sliding scale   SubCutaneous at bedtime  levothyroxine 112 MICROGram(s) Oral daily  lidocaine   Patch 1 Patch Transdermal daily  losartan 50 milliGRAM(s) Oral daily  Nephro-king 1 Tablet(s) Oral daily  pantoprazole    Tablet 40 milliGRAM(s) Oral before breakfast  saccharomyces boulardii 250 milliGRAM(s) Oral two times a day    MEDICATIONS  (PRN):  acetaminophen   Tablet .. 650 milliGRAM(s) Oral every 6 hours PRN Temp greater or equal to 38C (100.4F), Mild Pain (1 - 3)  dextrose 40% Gel 15 Gram(s) Oral once PRN Blood Glucose LESS THAN 70 milliGRAM(s)/deciliter  glucagon  Injectable 1 milliGRAM(s) IntraMuscular once PRN Glucose LESS THAN 70 milligrams/deciliter  hydrALAZINE Injectable 5 milliGRAM(s) IV Push every 6 hours PRN BP above 160/100  oxyCODONE    5 mG/acetaminophen 325 mG 1 Tablet(s) Oral every 6 hours PRN Severe Pain (7 - 10)      RADIOLOGY & ADDITIONAL TESTS:

## 2019-07-29 NOTE — PROGRESS NOTE ADULT - ASSESSMENT
87yoF with ESRD who presented with an infected graft and s/p explant of infected graft on 7/24. 7/25 tissue cx growing klebsiella. 7/25 surgical swab with no growth to date, 7/26 Blood culture, no growth to date.   - currently on cefepime, can consider downscaling abx based on sensitivities  - f/u final culture results  - daily wound care per nursing  - continue care per rest of team  - continue HD per RIJ 87yoF with ESRD who presented with an infected graft and s/p explant of infected graft on 7/24. 7/25 tissue cx growing klebsiella. 7/25 surgical swab with no growth to date, 7/26 Blood culture, no growth to date.   - OR on 7/30 with Dr. Smith  - 7/29 Pre-op, NPO after midnight, T&S, labs  - currently on cefepime, can consider downscaling abx based on sensitivities  - f/u final culture results  - daily wound care per nursing  - continue care per primary team  - continue HD per RIJ 87yoF with ESRD who presented with an infected graft and s/p explant of infected graft on 7/24. 7/25 tissue cx growing klebsiella. 7/25 surgical swab with no growth to date, 7/26 Blood culture, no growth to date.   - OR on 7/30 with Dr. Smith for permacath  - 7/29 Pre-op, NPO after midnight, T&S, labs  - currently on cefepime, can consider downscaling abx based on sensitivities  - f/u final culture results  - daily wound care per nursing  - continue care per primary team  - continue HD per RIJ catheter

## 2019-07-29 NOTE — PROGRESS NOTE ADULT - SUBJECTIVE AND OBJECTIVE BOX
HPI/OVERNIGHT EVENTS:  No acute events overnight. Patient is resting comfortably at bedside.     MEDICATIONS  (STANDING):  amLODIPine   Tablet 10 milliGRAM(s) Oral daily  atorvastatin 40 milliGRAM(s) Oral at bedtime  calcitriol   Capsule 0.25 MICROGram(s) Oral daily  calcium acetate 667 milliGRAM(s) Oral three times a day with meals  carvedilol 6.25 milliGRAM(s) Oral every 12 hours  cefepime   IVPB 1000 milliGRAM(s) IV Intermittent every 24 hours  chlorhexidine 2% Cloths 1 Application(s) Topical <User Schedule>  dextrose 5%. 1000 milliLiter(s) (50 mL/Hr) IV Continuous <Continuous>  dextrose 50% Injectable 12.5 Gram(s) IV Push once  dextrose 50% Injectable 12.5 Gram(s) IV Push once  dextrose 50% Injectable 25 Gram(s) IV Push once  dextrose 50% Injectable 25 Gram(s) IV Push once  epoetin barb Injectable 55271 Unit(s) IV Push <User Schedule>  folic acid 1 milliGRAM(s) Oral daily  insulin lispro (HumaLOG) corrective regimen sliding scale   SubCutaneous three times a day before meals  insulin lispro (HumaLOG) corrective regimen sliding scale   SubCutaneous at bedtime  levothyroxine 112 MICROGram(s) Oral daily  lidocaine   Patch 1 Patch Transdermal daily  losartan 50 milliGRAM(s) Oral daily  Nephro-king 1 Tablet(s) Oral daily  pantoprazole    Tablet 40 milliGRAM(s) Oral before breakfast  saccharomyces boulardii 250 milliGRAM(s) Oral two times a day    MEDICATIONS  (PRN):  acetaminophen   Tablet .. 650 milliGRAM(s) Oral every 6 hours PRN Temp greater or equal to 38C (100.4F), Mild Pain (1 - 3)  dextrose 40% Gel 15 Gram(s) Oral once PRN Blood Glucose LESS THAN 70 milliGRAM(s)/deciliter  glucagon  Injectable 1 milliGRAM(s) IntraMuscular once PRN Glucose LESS THAN 70 milligrams/deciliter  hydrALAZINE Injectable 5 milliGRAM(s) IV Push every 6 hours PRN BP above 160/100  oxyCODONE    5 mG/acetaminophen 325 mG 1 Tablet(s) Oral every 6 hours PRN Severe Pain (7 - 10)      Vital Signs Last 24 Hrs  T(C): 36.6 (28 Jul 2019 23:45), Max: 37.1 (28 Jul 2019 15:20)  T(F): 97.9 (28 Jul 2019 23:45), Max: 98.8 (28 Jul 2019 15:20)  HR: 75 (28 Jul 2019 23:45) (75 - 87)  BP: 139/79 (28 Jul 2019 23:45) (121/50 - 139/79)  BP(mean): --  RR: 18 (28 Jul 2019 23:45) (18 - 18)  SpO2: 97% (28 Jul 2019 23:45) (96% - 99%)    Gen: nad  cv: rrr  resp: nonlabored breathing  chest: RIJ in place without surrounding erythema. no signs of infection  abd: soft, nd, nttp  msk: RUE in ACE bandage. no signs of bleeding.   vasc: 2+ b/l rad      I&O's Detail    27 Jul 2019 07:01  -  28 Jul 2019 07:00  --------------------------------------------------------  IN:  Total IN: 0 mL    OUT:    Other: 500 mL  Total OUT: 500 mL    Total NET: -500 mL          LABS:                        9.5    5.10  )-----------( 175      ( 27 Jul 2019 08:21 )             29.8     07-27    138  |  100  |  17.0  ----------------------------<  91  3.1<L>   |  27.0  |  2.78<H>    Ca    7.7<L>      27 Jul 2019 08:21  Phos  2.1     07-27 HPI/OVERNIGHT EVENTS:  No acute events overnight. Patient is resting comfortably at bedside.     MEDICATIONS  (STANDING):  amLODIPine   Tablet 10 milliGRAM(s) Oral daily  atorvastatin 40 milliGRAM(s) Oral at bedtime  calcitriol   Capsule 0.25 MICROGram(s) Oral daily  calcium acetate 667 milliGRAM(s) Oral three times a day with meals  carvedilol 6.25 milliGRAM(s) Oral every 12 hours  cefepime   IVPB 1000 milliGRAM(s) IV Intermittent every 24 hours  chlorhexidine 2% Cloths 1 Application(s) Topical <User Schedule>  dextrose 5%. 1000 milliLiter(s) (50 mL/Hr) IV Continuous <Continuous>  dextrose 50% Injectable 12.5 Gram(s) IV Push once  dextrose 50% Injectable 12.5 Gram(s) IV Push once  dextrose 50% Injectable 25 Gram(s) IV Push once  dextrose 50% Injectable 25 Gram(s) IV Push once  epoetin barb Injectable 33771 Unit(s) IV Push <User Schedule>  folic acid 1 milliGRAM(s) Oral daily  insulin lispro (HumaLOG) corrective regimen sliding scale   SubCutaneous three times a day before meals  insulin lispro (HumaLOG) corrective regimen sliding scale   SubCutaneous at bedtime  levothyroxine 112 MICROGram(s) Oral daily  lidocaine   Patch 1 Patch Transdermal daily  losartan 50 milliGRAM(s) Oral daily  Nephro-king 1 Tablet(s) Oral daily  pantoprazole    Tablet 40 milliGRAM(s) Oral before breakfast  saccharomyces boulardii 250 milliGRAM(s) Oral two times a day    MEDICATIONS  (PRN):  acetaminophen   Tablet .. 650 milliGRAM(s) Oral every 6 hours PRN Temp greater or equal to 38C (100.4F), Mild Pain (1 - 3)  dextrose 40% Gel 15 Gram(s) Oral once PRN Blood Glucose LESS THAN 70 milliGRAM(s)/deciliter  glucagon  Injectable 1 milliGRAM(s) IntraMuscular once PRN Glucose LESS THAN 70 milligrams/deciliter  hydrALAZINE Injectable 5 milliGRAM(s) IV Push every 6 hours PRN BP above 160/100  oxyCODONE    5 mG/acetaminophen 325 mG 1 Tablet(s) Oral every 6 hours PRN Severe Pain (7 - 10)      Vital Signs Last 24 Hrs  T(C): 36.6 (28 Jul 2019 23:45), Max: 37.1 (28 Jul 2019 15:20)  T(F): 97.9 (28 Jul 2019 23:45), Max: 98.8 (28 Jul 2019 15:20)  HR: 75 (28 Jul 2019 23:45) (75 - 87)  BP: 139/79 (28 Jul 2019 23:45) (121/50 - 139/79)  BP(mean): --  RR: 18 (28 Jul 2019 23:45) (18 - 18)  SpO2: 97% (28 Jul 2019 23:45) (96% - 99%)    Gen: nad  cv: rrr  resp: nonlabored breathing  chest: RIJ shiley in place without surrounding erythema. no signs of infection  abd: soft, nd, nttp  msk: RUE in ACE bandage. no signs of bleeding.   vasc: 2+ b/l rad      I&O's Detail    27 Jul 2019 07:01  -  28 Jul 2019 07:00  --------------------------------------------------------  IN:  Total IN: 0 mL    OUT:    Other: 500 mL  Total OUT: 500 mL    Total NET: -500 mL          LABS:                        9.5    5.10  )-----------( 175      ( 27 Jul 2019 08:21 )             29.8     07-27    138  |  100  |  17.0  ----------------------------<  91  3.1<L>   |  27.0  |  2.78<H>    Ca    7.7<L>      27 Jul 2019 08:21  Phos  2.1     07-27

## 2019-07-29 NOTE — PROGRESS NOTE ADULT - ASSESSMENT
87yoF hx ESRD on HD (M and F only), AVG right side 8/3/17   CAD s/p CABG, HTN, DM, PAD, hypothyroidism presenting with generalized weakness, dysuria.   pain at graft site for 1 month.  fever in .6 now afebrile  leukocytosis to 14 now normal  Blood cx 4/4 Staph aureus.   Imaging with no focus (no pneumonia, abscess, OM). NM scan suspicious for AVG infection.  Overall Staph aureus bacteremia 2/2 AVG infection    Recommend:  - Blood cultures + 4/4 Staph aureus. BCX 7/14 NGTD BCX 07/26 NGTD. No signs of sepsis  - AVG removed 07/25 for source control. AVG growing Klebsiella (R) to cefazolin and (S) to cefepime. Surgical swab NGTD  - c/w Cefepime 1 G IV daily and on HD days give after HD day # 3  - No ID contraindication for permacath placement  - SANFORD for endocarditis/lead vegetations (-)  - Trend Fever  - Trend WBC count  - Repositioning q2h     Will follow. D/w Dr Whitaker

## 2019-07-29 NOTE — PROGRESS NOTE ADULT - ATTENDING COMMENTS
Positive blood cx Staph aureus bacteremia from likely AVG infection . S/p explant .   For permacath placement tomorrow for HD   NPO after midnight   Type and screen.

## 2019-07-29 NOTE — PROGRESS NOTE ADULT - SUBJECTIVE AND OBJECTIVE BOX
NEPHROLOGY INTERVAL HPI/OVERNIGHT EVENTS: No new events.    MEDICATIONS  (STANDING):  atorvastatin 40 milliGRAM(s) Oral at bedtime  calcitriol   Capsule 0.25 MICROGram(s) Oral daily  calcium acetate 667 milliGRAM(s) Oral three times a day with meals  carvedilol 6.25 milliGRAM(s) Oral every 12 hours  ceFAZolin   IVPB 1000 milliGRAM(s) IV Intermittent every 24 hours  chlorhexidine 2% Cloths 1 Application(s) Topical <User Schedule>  dextrose 5%. 1000 milliLiter(s) (50 mL/Hr) IV Continuous <Continuous>  dextrose 50% Injectable 12.5 Gram(s) IV Push once  dextrose 50% Injectable 12.5 Gram(s) IV Push once  dextrose 50% Injectable 25 Gram(s) IV Push once  dextrose 50% Injectable 25 Gram(s) IV Push once  epoetin barb Injectable 27207 Unit(s) IV Push <User Schedule>  folic acid 1 milliGRAM(s) Oral daily  insulin lispro (HumaLOG) corrective regimen sliding scale   SubCutaneous three times a day before meals  insulin lispro (HumaLOG) corrective regimen sliding scale   SubCutaneous at bedtime  levothyroxine 112 MICROGram(s) Oral daily  lidocaine   Patch 1 Patch Transdermal daily  losartan 50 milliGRAM(s) Oral daily  morphine  - Injectable 2 milliGRAM(s) IV Push once  pantoprazole    Tablet 40 milliGRAM(s) Oral before breakfast  saccharomyces boulardii 250 milliGRAM(s) Oral two times a day    MEDICATIONS  (PRN):  acetaminophen   Tablet .. 650 milliGRAM(s) Oral every 6 hours PRN Temp greater or equal to 38C (100.4F), Mild Pain (1 - 3)  dextrose 40% Gel 15 Gram(s) Oral once PRN Blood Glucose LESS THAN 70 milliGRAM(s)/deciliter  glucagon  Injectable 1 milliGRAM(s) IntraMuscular once PRN Glucose LESS THAN 70 milligrams/deciliter  oxyCODONE    5 mG/acetaminophen 325 mG 1 Tablet(s) Oral every 6 hours PRN Severe Pain (7 - 10)      Allergies    penicillin (Anaphylaxis)  seafood (Anaphylaxis)  shellfish (Anaphylaxis)          Vital Signs Last 24 Hrs  T(C): 36.9 (2019 08:21), Max: 37.1 (2019 15:20)  T(F): 98.4 (2019 08:21), Max: 98.8 (2019 15:20)  HR: 76 (2019 08:21) (75 - 87)  BP: 139/79 (2019 23:45) (123/50 - 139/79)  BP(mean): --  RR: 18 (2019 08:21) (18 - 18)  SpO2: 98% (2019 08:21) (96% - 98%)  T(C): 36.8 (2019 07:25), Max: 37.1 (2019 00:00)  T(F): 98.3 (2019 07:25), Max: 98.8 (2019 00:00)  HR: 67 (2019 07:25) (67 - 80)  BP: 90/57 (2019 07:25) (90/57 - 191/75)  BP(mean): --  RR: 18 (2019 07:25) (17 - 18)  SpO2: 97% (2019 07:25) (97% - 100%)  T(C): 36.4 (2019 07:39), Max: 37.2 (2019 00:21)  T(F): 97.6 (2019 07:39), Max: 98.9 (2019 00:21)  HR: 65 (2019 07:39) (65 - 86)  BP: 107/47 (2019 07:39) (107/47 - 166/99)  BP(mean): --  RR: 18 (2019 07:39) (16 - 18)  SpO2: 98% (2019 07:39) (95% - 98%)  Daily     Daily Weight in k.5 (2019 16:00)    PHYSICAL EXAM:    GENERAL: Comfortable in bed today.  HEAD: wnl   EYES:   ENMT:   NECK: right central line site clean  NERVOUS SYSTEM: awake, verbal   CHEST/LUNG: no 02, no wheezes  HEART: no rub  ABDOMEN: soft, NT  EXTREMITIES: upper right arm wrapped   LYMPH:   SKIN: no rash    LABS:        136  |  101  |  26.0<H>  ----------------------------<  82  4.0   |  23.0  |  3.69<H>    Ca    8.0<L>      2019 07:23        07    138  |  100  |  17.0  ----------------------------<  91  3.1<L>   |  27.0  |  2.78<H>    Ca    7.7<L>      2019 08:21  Phos  2.1                                 11.3   8.12  )-----------( 226      ( 2019 21:00 )             35.1     07    135  |  98  |  29.0<H>  ----------------------------<  130<H>  4.0   |  25.0  |  3.70<H>    Ca    7.4<L>      2019 08:20  Phos  4.3       Mg     1.7                       RADIOLOGY & ADDITIONAL TESTS:

## 2019-07-30 LAB
ALBUMIN SERPL ELPH-MCNC: 2.4 G/DL — LOW (ref 3.3–5.2)
ALP SERPL-CCNC: 126 U/L — HIGH (ref 40–120)
ALT FLD-CCNC: <5 U/L — SIGNIFICANT CHANGE UP
ANION GAP SERPL CALC-SCNC: 13 MMOL/L — SIGNIFICANT CHANGE UP (ref 5–17)
ANISOCYTOSIS BLD QL: SLIGHT — SIGNIFICANT CHANGE UP
APTT BLD: 37.4 SEC — HIGH (ref 27.5–36.3)
AST SERPL-CCNC: 24 U/L — SIGNIFICANT CHANGE UP
BASOPHILS # BLD AUTO: 0.01 K/UL — SIGNIFICANT CHANGE UP (ref 0–0.2)
BASOPHILS NFR BLD AUTO: 0.2 % — SIGNIFICANT CHANGE UP (ref 0–2)
BILIRUB DIRECT SERPL-MCNC: 0.1 MG/DL — SIGNIFICANT CHANGE UP (ref 0–0.3)
BILIRUB INDIRECT FLD-MCNC: 0.3 MG/DL — SIGNIFICANT CHANGE UP (ref 0.2–1)
BILIRUB SERPL-MCNC: 0.4 MG/DL — SIGNIFICANT CHANGE UP (ref 0.4–2)
BUN SERPL-MCNC: 33 MG/DL — HIGH (ref 8–20)
CALCIUM SERPL-MCNC: 8 MG/DL — LOW (ref 8.6–10.2)
CHLORIDE SERPL-SCNC: 100 MMOL/L — SIGNIFICANT CHANGE UP (ref 98–107)
CO2 SERPL-SCNC: 23 MMOL/L — SIGNIFICANT CHANGE UP (ref 22–29)
CREAT SERPL-MCNC: 4.14 MG/DL — HIGH (ref 0.5–1.3)
CULTURE RESULTS: SIGNIFICANT CHANGE UP
CULTURE RESULTS: SIGNIFICANT CHANGE UP
EOSINOPHIL # BLD AUTO: 0.06 K/UL — SIGNIFICANT CHANGE UP (ref 0–0.5)
EOSINOPHIL NFR BLD AUTO: 1.1 % — SIGNIFICANT CHANGE UP (ref 0–6)
GLUCOSE BLDC GLUCOMTR-MCNC: 113 MG/DL — HIGH (ref 70–99)
GLUCOSE BLDC GLUCOMTR-MCNC: 71 MG/DL — SIGNIFICANT CHANGE UP (ref 70–99)
GLUCOSE BLDC GLUCOMTR-MCNC: 76 MG/DL — SIGNIFICANT CHANGE UP (ref 70–99)
GLUCOSE BLDC GLUCOMTR-MCNC: 85 MG/DL — SIGNIFICANT CHANGE UP (ref 70–99)
GLUCOSE BLDC GLUCOMTR-MCNC: 95 MG/DL — SIGNIFICANT CHANGE UP (ref 70–99)
GLUCOSE BLDC GLUCOMTR-MCNC: 96 MG/DL — SIGNIFICANT CHANGE UP (ref 70–99)
GLUCOSE SERPL-MCNC: 86 MG/DL — SIGNIFICANT CHANGE UP (ref 70–115)
HCT VFR BLD CALC: 31.9 % — LOW (ref 34.5–45)
HCT VFR BLD CALC: 37.3 % — SIGNIFICANT CHANGE UP (ref 34.5–45)
HGB BLD-MCNC: 10.3 G/DL — LOW (ref 11.5–15.5)
HGB BLD-MCNC: 11.6 G/DL — SIGNIFICANT CHANGE UP (ref 11.5–15.5)
HYPOCHROMIA BLD QL: SLIGHT — SIGNIFICANT CHANGE UP
IMM GRANULOCYTES NFR BLD AUTO: 0.9 % — SIGNIFICANT CHANGE UP (ref 0–1.5)
INR BLD: 1.21 RATIO — HIGH (ref 0.88–1.16)
INR BLD: 6.29 RATIO — CRITICAL HIGH (ref 0.88–1.16)
LYMPHOCYTES # BLD AUTO: 0.62 K/UL — LOW (ref 1–3.3)
LYMPHOCYTES # BLD AUTO: 10.9 % — LOW (ref 13–44)
MACROCYTES BLD QL: SLIGHT — SIGNIFICANT CHANGE UP
MAGNESIUM SERPL-MCNC: 2.1 MG/DL — SIGNIFICANT CHANGE UP (ref 1.6–2.6)
MANUAL SMEAR VERIFICATION: SIGNIFICANT CHANGE UP
MCHC RBC-ENTMCNC: 29.1 PG — SIGNIFICANT CHANGE UP (ref 27–34)
MCHC RBC-ENTMCNC: 29.2 PG — SIGNIFICANT CHANGE UP (ref 27–34)
MCHC RBC-ENTMCNC: 31.1 GM/DL — LOW (ref 32–36)
MCHC RBC-ENTMCNC: 32.3 GM/DL — SIGNIFICANT CHANGE UP (ref 32–36)
MCV RBC AUTO: 90.4 FL — SIGNIFICANT CHANGE UP (ref 80–100)
MCV RBC AUTO: 93.5 FL — SIGNIFICANT CHANGE UP (ref 80–100)
MICROCYTES BLD QL: SLIGHT — SIGNIFICANT CHANGE UP
MONOCYTES # BLD AUTO: 0.46 K/UL — SIGNIFICANT CHANGE UP (ref 0–0.9)
MONOCYTES NFR BLD AUTO: 8.1 % — SIGNIFICANT CHANGE UP (ref 2–14)
NEUTROPHILS # BLD AUTO: 4.5 K/UL — SIGNIFICANT CHANGE UP (ref 1.8–7.4)
NEUTROPHILS NFR BLD AUTO: 78.8 % — HIGH (ref 43–77)
ORGANISM # SPEC MICROSCOPIC CNT: SIGNIFICANT CHANGE UP
ORGANISM # SPEC MICROSCOPIC CNT: SIGNIFICANT CHANGE UP
OVALOCYTES BLD QL SMEAR: SLIGHT — SIGNIFICANT CHANGE UP
PHOSPHATE SERPL-MCNC: 3.9 MG/DL — SIGNIFICANT CHANGE UP (ref 2.4–4.7)
PLAT MORPH BLD: ABNORMAL
PLATELET # BLD AUTO: 159 K/UL — SIGNIFICANT CHANGE UP (ref 150–400)
PLATELET # BLD AUTO: 182 K/UL — SIGNIFICANT CHANGE UP (ref 150–400)
PLATELET COUNT - ESTIMATE: NORMAL — SIGNIFICANT CHANGE UP
POIKILOCYTOSIS BLD QL AUTO: SLIGHT — SIGNIFICANT CHANGE UP
POLYCHROMASIA BLD QL SMEAR: SLIGHT — SIGNIFICANT CHANGE UP
POTASSIUM SERPL-MCNC: 4.2 MMOL/L — SIGNIFICANT CHANGE UP (ref 3.5–5.3)
POTASSIUM SERPL-SCNC: 4.2 MMOL/L — SIGNIFICANT CHANGE UP (ref 3.5–5.3)
PROT SERPL-MCNC: 5.9 G/DL — LOW (ref 6.6–8.7)
PROTHROM AB SERPL-ACNC: 14 SEC — HIGH (ref 10–12.9)
PROTHROM AB SERPL-ACNC: 76.6 SEC — HIGH (ref 10–12.9)
RBC # BLD: 3.53 M/UL — LOW (ref 3.8–5.2)
RBC # BLD: 3.99 M/UL — SIGNIFICANT CHANGE UP (ref 3.8–5.2)
RBC # FLD: 21.4 % — HIGH (ref 10.3–14.5)
RBC # FLD: 22.1 % — HIGH (ref 10.3–14.5)
RBC BLD AUTO: ABNORMAL
SODIUM SERPL-SCNC: 136 MMOL/L — SIGNIFICANT CHANGE UP (ref 135–145)
SPECIMEN SOURCE: SIGNIFICANT CHANGE UP
SPECIMEN SOURCE: SIGNIFICANT CHANGE UP
WBC # BLD: 5.7 K/UL — SIGNIFICANT CHANGE UP (ref 3.8–10.5)
WBC # BLD: 5.9 K/UL — SIGNIFICANT CHANGE UP (ref 3.8–10.5)
WBC # FLD AUTO: 5.7 K/UL — SIGNIFICANT CHANGE UP (ref 3.8–10.5)
WBC # FLD AUTO: 5.9 K/UL — SIGNIFICANT CHANGE UP (ref 3.8–10.5)

## 2019-07-30 PROCEDURE — 71045 X-RAY EXAM CHEST 1 VIEW: CPT | Mod: 26

## 2019-07-30 PROCEDURE — 99233 SBSQ HOSP IP/OBS HIGH 50: CPT

## 2019-07-30 RX ORDER — ACETAMINOPHEN 500 MG
650 TABLET ORAL EVERY 6 HOURS
Refills: 0 | Status: DISCONTINUED | OUTPATIENT
Start: 2019-07-30 | End: 2019-08-06

## 2019-07-30 RX ORDER — ASPIRIN/CALCIUM CARB/MAGNESIUM 324 MG
81 TABLET ORAL DAILY
Refills: 0 | Status: DISCONTINUED | OUTPATIENT
Start: 2019-07-30 | End: 2019-07-31

## 2019-07-30 RX ORDER — DEXTROSE 50 % IN WATER 50 %
15 SYRINGE (ML) INTRAVENOUS ONCE
Refills: 0 | Status: DISCONTINUED | OUTPATIENT
Start: 2019-07-30 | End: 2019-08-05

## 2019-07-30 RX ORDER — FENTANYL CITRATE 50 UG/ML
25 INJECTION INTRAVENOUS
Refills: 0 | Status: DISCONTINUED | OUTPATIENT
Start: 2019-07-30 | End: 2019-07-30

## 2019-07-30 RX ORDER — PROTHROMBIN COMPLEX CONCENTRATE (HUMAN) 25.5; 16.5; 24; 22; 22; 26 [IU]/ML; [IU]/ML; [IU]/ML; [IU]/ML; [IU]/ML; [IU]/ML
1500 POWDER, FOR SOLUTION INTRAVENOUS ONCE
Refills: 0 | Status: COMPLETED | OUTPATIENT
Start: 2019-07-30 | End: 2019-07-30

## 2019-07-30 RX ORDER — PHYTONADIONE (VIT K1) 5 MG
10 TABLET ORAL ONCE
Refills: 0 | Status: DISCONTINUED | OUTPATIENT
Start: 2019-07-30 | End: 2019-08-05

## 2019-07-30 RX ORDER — FOLIC ACID 0.8 MG
1 TABLET ORAL DAILY
Refills: 0 | Status: DISCONTINUED | OUTPATIENT
Start: 2019-07-30 | End: 2019-08-05

## 2019-07-30 RX ORDER — ERYTHROPOIETIN 10000 [IU]/ML
10000 INJECTION, SOLUTION INTRAVENOUS; SUBCUTANEOUS
Refills: 0 | Status: DISCONTINUED | OUTPATIENT
Start: 2019-07-30 | End: 2019-08-05

## 2019-07-30 RX ORDER — AMLODIPINE BESYLATE 2.5 MG/1
10 TABLET ORAL DAILY
Refills: 0 | Status: DISCONTINUED | OUTPATIENT
Start: 2019-07-30 | End: 2019-08-06

## 2019-07-30 RX ORDER — INSULIN LISPRO 100/ML
VIAL (ML) SUBCUTANEOUS
Refills: 0 | Status: DISCONTINUED | OUTPATIENT
Start: 2019-07-30 | End: 2019-08-05

## 2019-07-30 RX ORDER — CALCITRIOL 0.5 UG/1
0.25 CAPSULE ORAL DAILY
Refills: 0 | Status: DISCONTINUED | OUTPATIENT
Start: 2019-07-30 | End: 2019-08-05

## 2019-07-30 RX ORDER — GLUCAGON INJECTION, SOLUTION 0.5 MG/.1ML
1 INJECTION, SOLUTION SUBCUTANEOUS ONCE
Refills: 0 | Status: DISCONTINUED | OUTPATIENT
Start: 2019-07-30 | End: 2019-08-05

## 2019-07-30 RX ORDER — LEVOTHYROXINE SODIUM 125 MCG
112 TABLET ORAL DAILY
Refills: 0 | Status: DISCONTINUED | OUTPATIENT
Start: 2019-07-30 | End: 2019-08-06

## 2019-07-30 RX ORDER — ATORVASTATIN CALCIUM 80 MG/1
40 TABLET, FILM COATED ORAL AT BEDTIME
Refills: 0 | Status: DISCONTINUED | OUTPATIENT
Start: 2019-07-30 | End: 2019-08-05

## 2019-07-30 RX ORDER — DEXTROSE 50 % IN WATER 50 %
25 SYRINGE (ML) INTRAVENOUS ONCE
Refills: 0 | Status: DISCONTINUED | OUTPATIENT
Start: 2019-07-30 | End: 2019-07-30

## 2019-07-30 RX ORDER — CALCIUM ACETATE 667 MG
667 TABLET ORAL
Refills: 0 | Status: DISCONTINUED | OUTPATIENT
Start: 2019-07-30 | End: 2019-08-05

## 2019-07-30 RX ORDER — MORPHINE SULFATE 50 MG/1
1 CAPSULE, EXTENDED RELEASE ORAL EVERY 6 HOURS
Refills: 0 | Status: DISCONTINUED | OUTPATIENT
Start: 2019-07-30 | End: 2019-07-30

## 2019-07-30 RX ORDER — LIDOCAINE 4 G/100G
1 CREAM TOPICAL DAILY
Refills: 0 | Status: DISCONTINUED | OUTPATIENT
Start: 2019-07-30 | End: 2019-08-06

## 2019-07-30 RX ORDER — SODIUM CHLORIDE 9 MG/ML
1000 INJECTION, SOLUTION INTRAVENOUS
Refills: 0 | Status: DISCONTINUED | OUTPATIENT
Start: 2019-07-30 | End: 2019-07-30

## 2019-07-30 RX ORDER — DEXTROSE 50 % IN WATER 50 %
12.5 SYRINGE (ML) INTRAVENOUS ONCE
Refills: 0 | Status: DISCONTINUED | OUTPATIENT
Start: 2019-07-30 | End: 2019-08-05

## 2019-07-30 RX ORDER — ONDANSETRON 8 MG/1
4 TABLET, FILM COATED ORAL ONCE
Refills: 0 | Status: DISCONTINUED | OUTPATIENT
Start: 2019-07-30 | End: 2019-07-30

## 2019-07-30 RX ORDER — HYDRALAZINE HCL 50 MG
5 TABLET ORAL EVERY 6 HOURS
Refills: 0 | Status: DISCONTINUED | OUTPATIENT
Start: 2019-07-30 | End: 2019-08-06

## 2019-07-30 RX ORDER — MORPHINE SULFATE 50 MG/1
1 CAPSULE, EXTENDED RELEASE ORAL ONCE
Refills: 0 | Status: DISCONTINUED | OUTPATIENT
Start: 2019-07-30 | End: 2019-07-30

## 2019-07-30 RX ORDER — SACCHAROMYCES BOULARDII 250 MG
250 POWDER IN PACKET (EA) ORAL
Refills: 0 | Status: DISCONTINUED | OUTPATIENT
Start: 2019-07-30 | End: 2019-07-31

## 2019-07-30 RX ORDER — PANTOPRAZOLE SODIUM 20 MG/1
40 TABLET, DELAYED RELEASE ORAL
Refills: 0 | Status: DISCONTINUED | OUTPATIENT
Start: 2019-07-30 | End: 2019-08-06

## 2019-07-30 RX ORDER — CARVEDILOL PHOSPHATE 80 MG/1
6.25 CAPSULE, EXTENDED RELEASE ORAL EVERY 12 HOURS
Refills: 0 | Status: DISCONTINUED | OUTPATIENT
Start: 2019-07-30 | End: 2019-08-05

## 2019-07-30 RX ORDER — DEXTROSE 50 % IN WATER 50 %
50 SYRINGE (ML) INTRAVENOUS ONCE
Refills: 0 | Status: COMPLETED | OUTPATIENT
Start: 2019-07-30 | End: 2019-07-30

## 2019-07-30 RX ORDER — LOSARTAN POTASSIUM 100 MG/1
50 TABLET, FILM COATED ORAL DAILY
Refills: 0 | Status: DISCONTINUED | OUTPATIENT
Start: 2019-07-30 | End: 2019-08-06

## 2019-07-30 RX ORDER — CLOPIDOGREL BISULFATE 75 MG/1
75 TABLET, FILM COATED ORAL DAILY
Refills: 0 | Status: DISCONTINUED | OUTPATIENT
Start: 2019-07-30 | End: 2019-07-31

## 2019-07-30 RX ORDER — SODIUM CHLORIDE 9 MG/ML
1000 INJECTION, SOLUTION INTRAVENOUS
Refills: 0 | Status: DISCONTINUED | OUTPATIENT
Start: 2019-07-30 | End: 2019-08-05

## 2019-07-30 RX ADMIN — MORPHINE SULFATE 1 MILLIGRAM(S): 50 CAPSULE, EXTENDED RELEASE ORAL at 10:13

## 2019-07-30 RX ADMIN — CEFEPIME 100 MILLIGRAM(S): 1 INJECTION, POWDER, FOR SOLUTION INTRAMUSCULAR; INTRAVENOUS at 13:52

## 2019-07-30 RX ADMIN — ERYTHROPOIETIN 10000 UNIT(S): 10000 INJECTION, SOLUTION INTRAVENOUS; SUBCUTANEOUS at 10:41

## 2019-07-30 RX ADMIN — MORPHINE SULFATE 1 MILLIGRAM(S): 50 CAPSULE, EXTENDED RELEASE ORAL at 00:30

## 2019-07-30 RX ADMIN — MORPHINE SULFATE 1 MILLIGRAM(S): 50 CAPSULE, EXTENDED RELEASE ORAL at 23:52

## 2019-07-30 RX ADMIN — Medication 50 MILLILITER(S): at 10:00

## 2019-07-30 RX ADMIN — Medication 12.5 GRAM(S): at 02:59

## 2019-07-30 RX ADMIN — PROTHROMBIN COMPLEX CONCENTRATE (HUMAN) 400 INTERNATIONAL UNIT(S): 25.5; 16.5; 24; 22; 22; 26 POWDER, FOR SOLUTION INTRAVENOUS at 10:28

## 2019-07-30 RX ADMIN — LIDOCAINE 1 PATCH: 4 CREAM TOPICAL at 13:53

## 2019-07-30 RX ADMIN — SODIUM CHLORIDE 50 MILLILITER(S): 9 INJECTION, SOLUTION INTRAVENOUS at 17:54

## 2019-07-30 NOTE — PROGRESS NOTE ADULT - SUBJECTIVE AND OBJECTIVE BOX
CC: weakness, sepsis    INTERVAL HPI/OVERNIGHT EVENTS: Patient seen and examined. Returned from HD and now going for permacath placement. Confused.     Vital Signs Last 24 Hrs  T(C): 36.6 (30 Jul 2019 13:58), Max: 36.7 (29 Jul 2019 15:05)  T(F): 97.8 (30 Jul 2019 13:58), Max: 98 (29 Jul 2019 15:05)  HR: 92 (30 Jul 2019 13:58) (80 - 99)  BP: 135/62 (30 Jul 2019 13:58) (135/62 - 178/88)  BP(mean): --  RR: 20 (30 Jul 2019 13:58) (18 - 20)  SpO2: 94% (30 Jul 2019 13:58) (94% - 100%)    ROS:  unable to assess due to mental status    Physical Exam:  GENERAL: NAD  HEENT:  Normocephalic and atraumatic. MMM  NECK: Supple.   CARDIOVASCULAR: RRR S1, S2. No murmur/rubs/gallop  LUNGS: BLAE+, no rales, no wheezing, no rhonchi.    ABDOMEN: ND. Soft,  NT, no guarding / rebound / rigidity. BS normoactive. No CVA tenderness.    EXTREMITIES: RUE ACE wrap in place, C/D/I. LUE +edema, +distal pulses  SKIN: no rash. Incontinence associated dermatitis     I&O's Detail    30 Jul 2019 07:01  -  30 Jul 2019 14:25  --------------------------------------------------------  IN:  Total IN: 0 mL    OUT:  Total OUT: 0 mL    Total NET: 0 mL                                    10.3   5.70  )-----------( 159      ( 30 Jul 2019 11:01 )             31.9     30 Jul 2019 06:52    136    |  100    |  33.0   ----------------------------<  86     4.2     |  23.0   |  4.14     Ca    8.0        30 Jul 2019 06:52  Phos  3.9       30 Jul 2019 06:52  Mg     2.1       30 Jul 2019 06:52    TPro  5.9    /  Alb  2.4    /  TBili  0.4    /  DBili  0.1    /  AST  24     /  ALT  <5     /  AlkPhos  126    30 Jul 2019 06:52    PT/INR - ( 30 Jul 2019 11:01 )   PT: 14.0 sec;   INR: 1.21 ratio         PTT - ( 30 Jul 2019 11:01 )  PTT:37.4 sec  CAPILLARY BLOOD GLUCOSE      POCT Blood Glucose.: 95 mg/dL (30 Jul 2019 11:49)  POCT Blood Glucose.: 113 mg/dL (30 Jul 2019 10:49)  POCT Blood Glucose.: 76 mg/dL (30 Jul 2019 08:28)  POCT Blood Glucose.: 96 mg/dL (30 Jul 2019 03:56)  POCT Blood Glucose.: 71 mg/dL (30 Jul 2019 02:41)  POCT Blood Glucose.: 82 mg/dL (29 Jul 2019 22:47)  POCT Blood Glucose.: 83 mg/dL (29 Jul 2019 18:33)    LIVER FUNCTIONS - ( 30 Jul 2019 06:52 )  Alb: 2.4 g/dL / Pro: 5.9 g/dL / ALK PHOS: 126 U/L / ALT: <5 U/L / AST: 24 U/L / GGT: x               MEDICATIONS  (STANDING):  amLODIPine   Tablet 10 milliGRAM(s) Oral daily  atorvastatin 40 milliGRAM(s) Oral at bedtime  calcitriol   Capsule 0.25 MICROGram(s) Oral daily  calcium acetate 667 milliGRAM(s) Oral three times a day with meals  carvedilol 6.25 milliGRAM(s) Oral every 12 hours  cefepime   IVPB 1000 milliGRAM(s) IV Intermittent every 24 hours  chlorhexidine 2% Cloths 1 Application(s) Topical <User Schedule>  dextrose 5%. 1000 milliLiter(s) (50 mL/Hr) IV Continuous <Continuous>  dextrose 50% Injectable 12.5 Gram(s) IV Push once  dextrose 50% Injectable 25 Gram(s) IV Push once  dextrose 50% Injectable 25 Gram(s) IV Push once  dextrose 50% Injectable 25 Gram(s) IV Push once  dextrose 50% Injectable 25 Gram(s) IV Push once  epoetin barb Injectable 80766 Unit(s) IV Push <User Schedule>  folic acid 1 milliGRAM(s) Oral daily  insulin lispro (HumaLOG) corrective regimen sliding scale   SubCutaneous three times a day before meals  insulin lispro (HumaLOG) corrective regimen sliding scale   SubCutaneous at bedtime  levothyroxine 112 MICROGram(s) Oral daily  lidocaine   Patch 1 Patch Transdermal daily  losartan 50 milliGRAM(s) Oral daily  Nephro-king 1 Tablet(s) Oral daily  pantoprazole    Tablet 40 milliGRAM(s) Oral before breakfast  phytonadione  IVPB 10 milliGRAM(s) IV Intermittent once  saccharomyces boulardii 250 milliGRAM(s) Oral two times a day    MEDICATIONS  (PRN):  acetaminophen   Tablet .. 650 milliGRAM(s) Oral every 6 hours PRN Temp greater or equal to 38C (100.4F), Mild Pain (1 - 3)  dextrose 40% Gel 15 Gram(s) Oral once PRN Blood Glucose LESS THAN 70 milliGRAM(s)/deciliter  glucagon  Injectable 1 milliGRAM(s) IntraMuscular once PRN Glucose LESS THAN 70 milligrams/deciliter  hydrALAZINE Injectable 5 milliGRAM(s) IV Push every 6 hours PRN BP above 160/100  morphine  - Injectable 1 milliGRAM(s) IV Push every 6 hours PRN Moderate Pain (4 - 6)  oxyCODONE    5 mG/acetaminophen 325 mG 1 Tablet(s) Oral every 6 hours PRN Severe Pain (7 - 10)      RADIOLOGY & ADDITIONAL TESTS:

## 2019-07-30 NOTE — BRIEF OPERATIVE NOTE - OPERATION/FINDINGS
good flush and return x 2 ports. Tip at the SVC/atrial junction
Infected proximal graft; arterial and venous anastomosis well incorporated. Good flush and return x 2 ports

## 2019-07-30 NOTE — BRIEF OPERATIVE NOTE - NSICDXBRIEFPREOP_GEN_ALL_CORE_FT
PRE-OP DIAGNOSIS:  ESRD on hemodialysis 30-Jul-2019 16:11:32  Goyo Smith
PRE-OP DIAGNOSIS:  Infected prosthetic vascular graft 24-Jul-2019 22:49:42  Goyo Smith

## 2019-07-30 NOTE — PROVIDER CONTACT NOTE (CRITICAL VALUE NOTIFICATION) - ACTION/TREATMENT ORDERED:
MD made aware of laboratory result, as per MD repeat cultures will be ordered.
MD made aware via voice mail
Repeat INR @ the end of dialysis

## 2019-07-30 NOTE — BRIEF OPERATIVE NOTE - NSICDXBRIEFPROCEDURE_GEN_ALL_CORE_FT
PROCEDURES:  Insertion, catheter, hemodialysis or plasmapheresis, tunneled, w US guidance and position confirmation with fluoroscopy 30-Jul-2019 16:11:13  Goyo Smith
PROCEDURES:  US guided placement of non-tunneled central venous catheter 24-Jul-2019 22:49:06  Goyo Smith  Excision, infected graft, extremity 24-Jul-2019 22:48:38  Goyo Smith

## 2019-07-30 NOTE — PROGRESS NOTE ADULT - SUBJECTIVE AND OBJECTIVE BOX
HPI/OVERNIGHT EVENTS: Patient seen and examined at bedside this AM. No acute events overnight. Tolerating diet. Patient denies chills, nausea, vomiting, chest pain, SOB, dizziness, abd pain or any other concerning symptoms    Vital Signs Last 24 Hrs  T(C): 36.8 (30 Jul 2019 18:12), Max: 37.4 (30 Jul 2019 13:36)  T(F): 98.2 (30 Jul 2019 18:12), Max: 99.4 (30 Jul 2019 13:36)  HR: 88 (30 Jul 2019 18:12) (72 - 99)  BP: 138/70 (30 Jul 2019 18:12) (109/55 - 178/88)  BP(mean): --  RR: 18 (30 Jul 2019 18:12) (13 - 20)  SpO2: 92% (30 Jul 2019 18:12) (92% - 100%)    I&O's Detail    30 Jul 2019 07:01  -  30 Jul 2019 23:59  --------------------------------------------------------  IN:  Total IN: 0 mL    OUT:  Total OUT: 0 mL    Total NET: 0 mL      Constitutional: patient resting comfortably in bed  HEENT: EOMI / PERRL b/l   Neck: RIJ permacath in place, no hematoma  Respiratory: CTAB respirations are unlabored, no accessory muscle use, no conversational dyspnea  Cardiovascular: regular rate & rhythm   Gastrointestinal: Abdomen soft, non-tender, non-distended, no rebound tenderness / guarding  Incision/Wound: Clean, dry and intact  Neurological:  A&O x 3; no gross sensory / motor / coordination deficits      LABS:                        10.3   5.70  )-----------( 159      ( 30 Jul 2019 11:01 )             31.9     07-30    136  |  100  |  33.0<H>  ----------------------------<  86  4.2   |  23.0  |  4.14<H>    Ca    8.0<L>      30 Jul 2019 06:52  Phos  3.9     07-30  Mg     2.1     07-30    TPro  5.9<L>  /  Alb  2.4<L>  /  TBili  0.4  /  DBili  0.1  /  AST  24  /  ALT  <5  /  AlkPhos  126<H>  07-30    PT/INR - ( 30 Jul 2019 11:01 )   PT: 14.0 sec;   INR: 1.21 ratio         PTT - ( 30 Jul 2019 11:01 )  PTT:37.4 sec      MEDICATIONS  (STANDING):  amLODIPine   Tablet 10 milliGRAM(s) Oral daily  aspirin  chewable 81 milliGRAM(s) Oral daily  atorvastatin 40 milliGRAM(s) Oral at bedtime  calcitriol   Capsule 0.25 MICROGram(s) Oral daily  calcium acetate 667 milliGRAM(s) Oral three times a day with meals  carvedilol 6.25 milliGRAM(s) Oral every 12 hours  clopidogrel Tablet 75 milliGRAM(s) Oral daily  dextrose 5%. 1000 milliLiter(s) (50 mL/Hr) IV Continuous <Continuous>  dextrose 50% Injectable 12.5 Gram(s) IV Push once  epoetin barb Injectable 99144 Unit(s) IV Push <User Schedule>  folic acid 1 milliGRAM(s) Oral daily  insulin lispro (HumaLOG) corrective regimen sliding scale   SubCutaneous three times a day before meals  levothyroxine 112 MICROGram(s) Oral daily  lidocaine   Patch 1 Patch Transdermal daily  losartan 50 milliGRAM(s) Oral daily  Nephro-king 1 Tablet(s) Oral daily  pantoprazole    Tablet 40 milliGRAM(s) Oral before breakfast  phytonadione  IVPB 10 milliGRAM(s) IV Intermittent once  saccharomyces boulardii 250 milliGRAM(s) Oral two times a day    MEDICATIONS  (PRN):  acetaminophen   Tablet .. 650 milliGRAM(s) Oral every 6 hours PRN Temp greater or equal to 38C (100.4F), Mild Pain (1 - 3)  dextrose 40% Gel 15 Gram(s) Oral once PRN Blood Glucose LESS THAN 70 milliGRAM(s)/deciliter  glucagon  Injectable 1 milliGRAM(s) IntraMuscular once PRN Glucose LESS THAN 70 milligrams/deciliter  hydrALAZINE Injectable 5 milliGRAM(s) IV Push every 6 hours PRN BP above 160/100

## 2019-07-30 NOTE — PROGRESS NOTE ADULT - ASSESSMENT
88 yo F s/p Permacath placement for HD    - ESRD on HD  - f/u cxs  - dressing change 88 yo F s/p Permacath placement for HD    - ESRD on HD  - f/u cxs  - dressing change  -care per primary team

## 2019-07-30 NOTE — BRIEF OPERATIVE NOTE - NSICDXBRIEFPOSTOP_GEN_ALL_CORE_FT
POST-OP DIAGNOSIS:  ESRD on hemodialysis 30-Jul-2019 16:11:46  Goyo Smtih
POST-OP DIAGNOSIS:  Infected prosthetic vascular graft 24-Jul-2019 22:49:56  Goyo Smith

## 2019-07-30 NOTE — CHART NOTE - NSCHARTNOTEFT_GEN_A_CORE
Called now  by nurse. Accucheck from 21:13 was 85. Pt Not eating tonight. Recheck accucheck in 1 hr. Continue to monitor

## 2019-07-30 NOTE — PROGRESS NOTE ADULT - SUBJECTIVE AND OBJECTIVE BOX
Morning INR was found to be >6 with no significant LFT abnormalities. Cause could be related to poor nutritional state. Pt was given KCentra and Vit K. INR after administration was 1.2. No contraindication to OR this afternoon with Dr. Smith.

## 2019-07-30 NOTE — PROGRESS NOTE ADULT - SUBJECTIVE AND OBJECTIVE BOX
NEPHROLOGY INTERVAL HPI/OVERNIGHT EVENTS: High  INR noted this am.    MEDICATIONS  (STANDING):  atorvastatin 40 milliGRAM(s) Oral at bedtime  calcitriol   Capsule 0.25 MICROGram(s) Oral daily  calcium acetate 667 milliGRAM(s) Oral three times a day with meals  carvedilol 6.25 milliGRAM(s) Oral every 12 hours  ceFAZolin   IVPB 1000 milliGRAM(s) IV Intermittent every 24 hours  chlorhexidine 2% Cloths 1 Application(s) Topical <User Schedule>  dextrose 5%. 1000 milliLiter(s) (50 mL/Hr) IV Continuous <Continuous>  dextrose 50% Injectable 12.5 Gram(s) IV Push once  dextrose 50% Injectable 12.5 Gram(s) IV Push once  dextrose 50% Injectable 25 Gram(s) IV Push once  dextrose 50% Injectable 25 Gram(s) IV Push once  epoetin barb Injectable 75171 Unit(s) IV Push <User Schedule>  folic acid 1 milliGRAM(s) Oral daily  insulin lispro (HumaLOG) corrective regimen sliding scale   SubCutaneous three times a day before meals  insulin lispro (HumaLOG) corrective regimen sliding scale   SubCutaneous at bedtime  levothyroxine 112 MICROGram(s) Oral daily  lidocaine   Patch 1 Patch Transdermal daily  losartan 50 milliGRAM(s) Oral daily  morphine  - Injectable 2 milliGRAM(s) IV Push once  pantoprazole    Tablet 40 milliGRAM(s) Oral before breakfast  saccharomyces boulardii 250 milliGRAM(s) Oral two times a day    MEDICATIONS  (PRN):  acetaminophen   Tablet .. 650 milliGRAM(s) Oral every 6 hours PRN Temp greater or equal to 38C (100.4F), Mild Pain (1 - 3)  dextrose 40% Gel 15 Gram(s) Oral once PRN Blood Glucose LESS THAN 70 milliGRAM(s)/deciliter  glucagon  Injectable 1 milliGRAM(s) IntraMuscular once PRN Glucose LESS THAN 70 milligrams/deciliter  oxyCODONE    5 mG/acetaminophen 325 mG 1 Tablet(s) Oral every 6 hours PRN Severe Pain (7 - 10)      Allergies    penicillin (Anaphylaxis)  seafood (Anaphylaxis)  shellfish (Anaphylaxis)          Vital Signs Last 24 Hrs  T(C): 36.4 (30 Jul 2019 07:00), Max: 36.7 (29 Jul 2019 15:05)  T(F): 97.5 (30 Jul 2019 07:00), Max: 98 (29 Jul 2019 15:05)  HR: 80 (30 Jul 2019 07:00) (80 - 83)  BP: 160/70 (30 Jul 2019 07:00) (143/76 - 160/70)  BP(mean): --  RR: 18 (30 Jul 2019 07:00) (18 - 18)  SpO2: 100% (30 Jul 2019 07:00) (100% - 100%)  T(C): 36.9 (29 Jul 2019 08:21), Max: 37.1 (28 Jul 2019 15:20)  T(F): 98.4 (29 Jul 2019 08:21), Max: 98.8 (28 Jul 2019 15:20)  HR: 76 (29 Jul 2019 08:21) (75 - 87)  BP: 139/79 (28 Jul 2019 23:45) (123/50 - 139/79)  BP(mean): --  RR: 18 (29 Jul 2019 08:21) (18 - 18)  SpO2: 98% (29 Jul 2019 08:21) (96% - 98%)  T(C): 36.8 (27 Jul 2019 07:25), Max: 37.1 (27 Jul 2019 00:00)  T(F): 98.3 (27 Jul 2019 07:25), Max: 98.8 (27 Jul 2019 00:00)  HR: 67 (27 Jul 2019 07:25) (67 - 80)  BP: 90/57 (27 Jul 2019 07:25) (90/57 - 191/75)    PHYSICAL EXAM:    GENERAL: Anxious in bed today.  HEAD: wnl   EYES:   ENMT:   NECK: right central line site clean  NERVOUS SYSTEM: awake, verbal   CHEST/LUNG: no 02, no wheezes  HEART: no rub  ABDOMEN: soft, NT  EXTREMITIES: upper right arm no dressing  LYMPH:   SKIN: no rash    LABS:    07-30    136  |  100  |  33.0<H>  ----------------------------<  86  4.2   |  23.0  |  4.14<H>    Ca    8.0<L>      30 Jul 2019 06:52  Phos  3.9     07-30  Mg     2.1     07-30    TPro  5.9<L>  /  Alb  2.4<L>  /  TBili  0.4  /  DBili  0.1  /  AST  24  /  ALT  <5  /  AlkPhos  126<H>  07-30 07-29    136  |  101  |  26.0<H>  ----------------------------<  82  4.0   |  23.0  |  3.69<H>    Ca    8.0<L>      29 Jul 2019 07:23        07-27    138  |  100  |  17.0  ----------------------------<  91  3.1<L>   |  27.0  |  2.78<H>    Ca    7.7<L>      27 Jul 2019 08:21  Phos  2.1     07-27                            11.3   8.12  )-----------( 226      ( 25 Jul 2019 21:00 )             35.1     07-25    135  |  98  |  29.0<H>  ----------------------------<  130<H>  4.0   |  25.0  |  3.70<H>    Ca    7.4<L>      25 Jul 2019 08:20  Phos  4.3     07-25  Mg     1.7     07-25                  RADIOLOGY & ADDITIONAL TESTS:

## 2019-07-30 NOTE — PROGRESS NOTE ADULT - SUBJECTIVE AND OBJECTIVE BOX
HPI/OVERNIGHT EVENTS:  No acute events overnight. Has been NPO for anticipated PermCath placement today. Denies any chest pain, shortness of breath, fever, chills, or concerning signs or symptoms. Right arm with incisions covered in gauze and rest left open to air.       MEDICATIONS  (STANDING):  amLODIPine   Tablet 10 milliGRAM(s) Oral daily  atorvastatin 40 milliGRAM(s) Oral at bedtime  calcitriol   Capsule 0.25 MICROGram(s) Oral daily  calcium acetate 667 milliGRAM(s) Oral three times a day with meals  carvedilol 6.25 milliGRAM(s) Oral every 12 hours  cefepime   IVPB 1000 milliGRAM(s) IV Intermittent every 24 hours  chlorhexidine 2% Cloths 1 Application(s) Topical <User Schedule>  dextrose 5%. 1000 milliLiter(s) (50 mL/Hr) IV Continuous <Continuous>  dextrose 50% Injectable 12.5 Gram(s) IV Push once  dextrose 50% Injectable 12.5 Gram(s) IV Push once  dextrose 50% Injectable 25 Gram(s) IV Push once  dextrose 50% Injectable 25 Gram(s) IV Push once  epoetin barb Injectable 71223 Unit(s) IV Push <User Schedule>  folic acid 1 milliGRAM(s) Oral daily  insulin lispro (HumaLOG) corrective regimen sliding scale   SubCutaneous three times a day before meals  insulin lispro (HumaLOG) corrective regimen sliding scale   SubCutaneous at bedtime  levothyroxine 112 MICROGram(s) Oral daily  lidocaine   Patch 1 Patch Transdermal daily  losartan 50 milliGRAM(s) Oral daily  Nephro-king 1 Tablet(s) Oral daily  pantoprazole    Tablet 40 milliGRAM(s) Oral before breakfast  saccharomyces boulardii 250 milliGRAM(s) Oral two times a day    MEDICATIONS  (PRN):  acetaminophen   Tablet .. 650 milliGRAM(s) Oral every 6 hours PRN Temp greater or equal to 38C (100.4F), Mild Pain (1 - 3)  dextrose 40% Gel 15 Gram(s) Oral once PRN Blood Glucose LESS THAN 70 milliGRAM(s)/deciliter  glucagon  Injectable 1 milliGRAM(s) IntraMuscular once PRN Glucose LESS THAN 70 milligrams/deciliter  hydrALAZINE Injectable 5 milliGRAM(s) IV Push every 6 hours PRN BP above 160/100  oxyCODONE    5 mG/acetaminophen 325 mG 1 Tablet(s) Oral every 6 hours PRN Severe Pain (7 - 10)      Vital Signs Last 24 Hrs  T(C): 36.7 (29 Jul 2019 15:05), Max: 36.9 (29 Jul 2019 08:21)  T(F): 98 (29 Jul 2019 15:05), Max: 98.4 (29 Jul 2019 08:21)  HR: 83 (29 Jul 2019 15:05) (76 - 83)  BP: 143/76 (29 Jul 2019 15:05) (143/76 - 143/76)  BP(mean): --  RR: 18 (29 Jul 2019 15:05) (18 - 18)  SpO2: 100% (29 Jul 2019 15:05) (98% - 100%)    Constitutional: patient resting comfortably in bed, in no acute distress  HEENT: EOMI / PERRL b/l, no active drainage or redness  Neck: No JVD, full ROM without pain  Respiratory: respirations are unlabored, no accessory muscle use, no conversational dyspnea  Cardiovascular: regular rate & rhythm  Gastrointestinal: Abdomen soft, non-tender, non-distended, no rebound tenderness / guarding  Neurological: Alert. No gross sensory / motor / coordination deficits  Musculoskeletal: No joint pain, swelling or deformity;  Ext: Right arm with incisions clean, dry, and nonerythematous. Notable for ecchymoses and hematoma near R axilla.  Left arm with edema.      I&O's Detail      LABS:                        10.8   5.67  )-----------( 185      ( 29 Jul 2019 07:23 )             34.6     07-29    136  |  101  |  26.0<H>  ----------------------------<  82  4.0   |  23.0  |  3.69<H>    Ca    8.0<L>      29 Jul 2019 07:23

## 2019-07-30 NOTE — PROGRESS NOTE ADULT - SUBJECTIVE AND OBJECTIVE BOX
Post-op Check    Subjective:  Pt offers no acute complaints at this time. Pain well controlled on current regiment. Denies chest pain and SOB.     STATUS POST:  permacath placement    POST OPERATIVE DAY #: 0    MEDICATIONS  (STANDING):  amLODIPine   Tablet 10 milliGRAM(s) Oral daily  aspirin  chewable 81 milliGRAM(s) Oral daily  atorvastatin 40 milliGRAM(s) Oral at bedtime  calcitriol   Capsule 0.25 MICROGram(s) Oral daily  calcium acetate 667 milliGRAM(s) Oral three times a day with meals  carvedilol 6.25 milliGRAM(s) Oral every 12 hours  clopidogrel Tablet 75 milliGRAM(s) Oral daily  dextrose 5%. 1000 milliLiter(s) (50 mL/Hr) IV Continuous <Continuous>  dextrose 50% Injectable 12.5 Gram(s) IV Push once  epoetin barb Injectable 72806 Unit(s) IV Push <User Schedule>  folic acid 1 milliGRAM(s) Oral daily  insulin lispro (HumaLOG) corrective regimen sliding scale   SubCutaneous three times a day before meals  levothyroxine 112 MICROGram(s) Oral daily  lidocaine   Patch 1 Patch Transdermal daily  losartan 50 milliGRAM(s) Oral daily  morphine  - Injectable 1 milliGRAM(s) IV Push once  Nephro-king 1 Tablet(s) Oral daily  pantoprazole    Tablet 40 milliGRAM(s) Oral before breakfast  phytonadione  IVPB 10 milliGRAM(s) IV Intermittent once  saccharomyces boulardii 250 milliGRAM(s) Oral two times a day    MEDICATIONS  (PRN):  acetaminophen   Tablet .. 650 milliGRAM(s) Oral every 6 hours PRN Temp greater or equal to 38C (100.4F), Mild Pain (1 - 3)  dextrose 40% Gel 15 Gram(s) Oral once PRN Blood Glucose LESS THAN 70 milliGRAM(s)/deciliter  glucagon  Injectable 1 milliGRAM(s) IntraMuscular once PRN Glucose LESS THAN 70 milligrams/deciliter  hydrALAZINE Injectable 5 milliGRAM(s) IV Push every 6 hours PRN BP above 160/100      Vital Signs Last 24 Hrs  T(C): 36.8 (30 Jul 2019 18:12), Max: 37.4 (30 Jul 2019 13:36)  T(F): 98.2 (30 Jul 2019 18:12), Max: 99.4 (30 Jul 2019 13:36)  HR: 88 (30 Jul 2019 18:12) (72 - 99)  BP: 138/70 (30 Jul 2019 18:12) (109/55 - 178/88)  BP(mean): --  RR: 18 (30 Jul 2019 18:12) (13 - 20)  SpO2: 92% (30 Jul 2019 18:12) (92% - 100%)    Physical Exam:    Constitutional: NAD  HEENT: PERRL, EOMI  Neck: R IJ permacath in place , dressing c/d/i, no signs of hematoma   Respiratory: no accessory muscle use  Extremities: 2 + radial pulse

## 2019-07-30 NOTE — PROGRESS NOTE ADULT - ATTENDING COMMENTS
ESRD on HD  Sepsis bacteremia from possible AVG infection.  Explant  Today had Tunnel HD catheter placed.   Continue HD  Supportive care

## 2019-07-30 NOTE — PROGRESS NOTE ADULT - ASSESSMENT
87yoF with ESRD who presented with an infected graft and s/p explant of infected graft on 7/24. 7/25 tissue cx growing klebsiella. 7/25 surgical swab with no growth to date, 7/26 Blood culture, no growth to date. NPO overnight. ID cleared for procedure.    - OR on 7/30 with Dr. Smith for permacath  - currently on cefepime, can consider downscaling abx based on sensitivities  - f/u final culture results  - daily wound care per nursing  - continue care per primary team

## 2019-07-30 NOTE — PROGRESS NOTE ADULT - SUBJECTIVE AND OBJECTIVE BOX
Pt is here for insertion of permacath for HD. Chart and clearances have been reviewed.   Risks and benefits of the procedure discussed with the patient's son including but not limited to bleeding, infection, pneumothorax. Pt's son understood all that was explained and is agreeable to proceed.   Consent is in the chart.

## 2019-07-30 NOTE — PROGRESS NOTE ADULT - ASSESSMENT
87yoF hx ESRD on HD (M and F only), CAD s/p CABG, HTN, DM, PAD, hypothyroidism presenting with generalized weakness.  On admission, febrile, leukocytosis, UA positive, with  Blood cultures positive for staph aureus.  SANFORD done, no vegetation. s/p indium scan positive for R AV graft infection, Tissue culture of YONIS shows gram - rods  Sepsis :  Staph aureus bacteremia due to R arm AV graft infection.   s/p AVG resection and Jeffrey cath placed july 23, with bleeding from AVG on july 25  s/p 2 units PRBC 7/26. monitor cbc. Asa and plavix on hold. Resume ASA post Permacath placement when ok by vascular  PC line today  SANFORD negative for vegetation  c/w cefepime      AMS since admission as per son: likely related to infection/sepsis/dehydration. MS getting better.  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 follows with Dr. Marcial. possible underlying vascular dementia. needs dementia w/u OP. fall and aspiration precautions.     CAD s/p CABG:  with elevated troponin - T 0.32 - elevated in past, in setting of ESRD on HD  Cath 2/2018- LIMA to LAD patent, SVG to OM patent, SVG to RPDA patent   cardiology has discussed with son Darrin and they are unwilling for stress testing, cath, invasive - only want Medication   SANFORD 7/9/2019: LVEF 30-35.Ml-mod TR. No evidence of endocarditis.   Echo Limited 7/18: LVEF 35-40%.  resume ASA post PC placement when ok by vascular  cont  carvedilol.   Losartan.  Volume control with HD    ESRD on HD   Cont HD    c/o coccyx pain: as per wound care patient has Incontinence associated dermatitis, not decubitus ulcer.  xray--> no bony involvement. incontinence management . wound care cx appreciated. RN to make sure diaper change frequently.    DVT-P: On hold due to anemia/bleeding from surgical site, now for placement of permacath  today 87yoF hx ESRD on HD (M and F only), CAD s/p CABG, HTN, DM, PAD, hypothyroidism presenting with generalized weakness.  On admission, febrile, leukocytosis, UA positive, with  Blood cultures positive for staph aureus.  SANFORD done, no vegetation. s/p indium scan positive for R AV graft infection, Tissue culture of YONIS shows gram - rods  Sepsis :  Staph aureus bacteremia due to R arm AV graft infection.   s/p AVG resection and Jeffrey cath placed july 23, with bleeding from AVG on july 25  s/p 2 units PRBC 7/26. monitor cbc. Asa and plavix on hold. Resume ASA post Permacath placement when ok by vascular  PC line today  SANFORD negative for vegetation  c/w cefepime    Supratherapeutic INR:  Received K centra, now 1.2. Unclear cause. LFT's normal. Repeat in am.       AMS since admission as per son: likely related to infection/sepsis/dehydration. MS getting better.  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 follows with Dr. Marcial. possible underlying vascular dementia. needs dementia w/u OP. fall and aspiration precautions.     CAD s/p CABG:  with elevated troponin - T 0.32 - elevated in past, in setting of ESRD on HD  Cath 2/2018- LIMA to LAD patent, SVG to OM patent, SVG to RPDA patent   cardiology has discussed with son Darrin and they are unwilling for stress testing, cath, invasive - only want Medication   SANFORD 7/9/2019: LVEF 30-35.Ml-mod TR. No evidence of endocarditis.   Echo Limited 7/18: LVEF 35-40%.  resume ASA post PC placement when ok by vascular  cont  carvedilol.   Losartan.  Volume control with HD    ESRD on HD   Cont HD    c/o coccyx pain: as per wound care patient has Incontinence associated dermatitis, not decubitus ulcer.  xray--> no bony involvement. incontinence management . wound care cx appreciated. RN to make sure diaper change frequently.    DVT-P: On hold due to anemia/bleeding from surgical site, now for placement of permacath  today

## 2019-07-31 DIAGNOSIS — N18.6 END STAGE RENAL DISEASE: ICD-10-CM

## 2019-07-31 DIAGNOSIS — Z51.5 ENCOUNTER FOR PALLIATIVE CARE: ICD-10-CM

## 2019-07-31 DIAGNOSIS — I25.10 ATHEROSCLEROTIC HEART DISEASE OF NATIVE CORONARY ARTERY WITHOUT ANGINA PECTORIS: ICD-10-CM

## 2019-07-31 DIAGNOSIS — R78.81 BACTEREMIA: ICD-10-CM

## 2019-07-31 DIAGNOSIS — G93.41 METABOLIC ENCEPHALOPATHY: ICD-10-CM

## 2019-07-31 LAB
CULTURE RESULTS: SIGNIFICANT CHANGE UP
GLUCOSE BLDC GLUCOMTR-MCNC: 110 MG/DL — HIGH (ref 70–99)
GLUCOSE BLDC GLUCOMTR-MCNC: 128 MG/DL — HIGH (ref 70–99)
GLUCOSE BLDC GLUCOMTR-MCNC: 66 MG/DL — LOW (ref 70–99)
GLUCOSE BLDC GLUCOMTR-MCNC: 73 MG/DL — SIGNIFICANT CHANGE UP (ref 70–99)
GLUCOSE BLDC GLUCOMTR-MCNC: 75 MG/DL — SIGNIFICANT CHANGE UP (ref 70–99)
GLUCOSE BLDC GLUCOMTR-MCNC: 84 MG/DL — SIGNIFICANT CHANGE UP (ref 70–99)
HCT VFR BLD CALC: 35.2 % — SIGNIFICANT CHANGE UP (ref 34.5–45)
HGB BLD-MCNC: 11 G/DL — LOW (ref 11.5–15.5)
INR BLD: 3.33 RATIO — HIGH (ref 0.88–1.16)
MCHC RBC-ENTMCNC: 29.4 PG — SIGNIFICANT CHANGE UP (ref 27–34)
MCHC RBC-ENTMCNC: 31.3 GM/DL — LOW (ref 32–36)
MCV RBC AUTO: 94.1 FL — SIGNIFICANT CHANGE UP (ref 80–100)
PLATELET # BLD AUTO: 167 K/UL — SIGNIFICANT CHANGE UP (ref 150–400)
PROTHROM AB SERPL-ACNC: 39.7 SEC — HIGH (ref 10–12.9)
RBC # BLD: 3.74 M/UL — LOW (ref 3.8–5.2)
RBC # FLD: 22.5 % — HIGH (ref 10.3–14.5)
SPECIMEN SOURCE: SIGNIFICANT CHANGE UP
WBC # BLD: 6.21 K/UL — SIGNIFICANT CHANGE UP (ref 3.8–10.5)
WBC # FLD AUTO: 6.21 K/UL — SIGNIFICANT CHANGE UP (ref 3.8–10.5)

## 2019-07-31 PROCEDURE — 99232 SBSQ HOSP IP/OBS MODERATE 35: CPT

## 2019-07-31 PROCEDURE — 99233 SBSQ HOSP IP/OBS HIGH 50: CPT

## 2019-07-31 PROCEDURE — 99223 1ST HOSP IP/OBS HIGH 75: CPT

## 2019-07-31 RX ORDER — CEFAZOLIN SODIUM 1 G
1000 VIAL (EA) INJECTION EVERY 24 HOURS
Refills: 0 | Status: DISCONTINUED | OUTPATIENT
Start: 2019-07-31 | End: 2019-08-05

## 2019-07-31 RX ORDER — SODIUM CHLORIDE 9 MG/ML
1000 INJECTION, SOLUTION INTRAVENOUS
Refills: 0 | Status: DISCONTINUED | OUTPATIENT
Start: 2019-07-31 | End: 2019-08-05

## 2019-07-31 RX ORDER — CHLORHEXIDINE GLUCONATE 213 G/1000ML
1 SOLUTION TOPICAL
Refills: 0 | Status: DISCONTINUED | OUTPATIENT
Start: 2019-07-31 | End: 2019-08-06

## 2019-07-31 RX ORDER — PHYTONADIONE (VIT K1) 5 MG
5 TABLET ORAL ONCE
Refills: 0 | Status: COMPLETED | OUTPATIENT
Start: 2019-07-31 | End: 2019-07-31

## 2019-07-31 RX ADMIN — Medication 1 MILLIGRAM(S): at 14:04

## 2019-07-31 RX ADMIN — Medication 667 MILLIGRAM(S): at 17:43

## 2019-07-31 RX ADMIN — CARVEDILOL PHOSPHATE 6.25 MILLIGRAM(S): 80 CAPSULE, EXTENDED RELEASE ORAL at 17:43

## 2019-07-31 RX ADMIN — Medication 650 MILLIGRAM(S): at 17:59

## 2019-07-31 RX ADMIN — Medication 650 MILLIGRAM(S): at 18:01

## 2019-07-31 RX ADMIN — LIDOCAINE 1 PATCH: 4 CREAM TOPICAL at 14:02

## 2019-07-31 RX ADMIN — Medication 1 TABLET(S): at 14:05

## 2019-07-31 RX ADMIN — SODIUM CHLORIDE 50 MILLILITER(S): 9 INJECTION, SOLUTION INTRAVENOUS at 17:41

## 2019-07-31 RX ADMIN — Medication 667 MILLIGRAM(S): at 14:03

## 2019-07-31 RX ADMIN — Medication 101 MILLIGRAM(S): at 22:36

## 2019-07-31 RX ADMIN — CALCITRIOL 0.25 MICROGRAM(S): 0.5 CAPSULE ORAL at 14:04

## 2019-07-31 NOTE — PROGRESS NOTE ADULT - ASSESSMENT
87yoF hx ESRD on HD (M and F only), AVG right side 8/3/17   CAD s/p CABG, HTN, DM, PAD, hypothyroidism presenting with generalized weakness, dysuria.   pain at graft site for 1 month.  fever in .6 now afebrile  leukocytosis to 14 now normal  Blood cx 4/4 Staph aureus.   Imaging with no focus (no pneumonia, abscess, OM). NM scan suspicious for AVG infection.  Overall Staph aureus bacteremia 2/2 AVG infection, elevated INR    Recommend:  - Blood cultures + 4/4 Staph aureus. BCX 7/14 NGTD BCX 07/26 NGTD. No signs of sepsis  - AVG removed 07/25 for source control. AVG growing Klebsiella (R) to cefazolin and (S) to cefepime. Surgical swab NGTD  - s/p permacath placement 7/30  - Resume cefazolin 1 g IV daily, if an HD day, give after HD  - Elevated INR due to prolonged cefazolin is rare but has been reported. If no other cause for elevated INR-alternative would be to switch to nafcillin- however she would require picc line to complete her course   - SANFORD for endocarditis/lead vegetations (-)  - Trend Fever  - Trend WBC count  - Repositioning q2h     Will follow.

## 2019-07-31 NOTE — PHYSICAL THERAPY INITIAL EVALUATION ADULT - PLANNED THERAPY INTERVENTIONS, PT EVAL
ROM/gait training/bed mobility training/transfer training/balance training/stretching/neuromuscular re-education/strengthening
bed mobility training/transfer training/balance training/strengthening/gait training

## 2019-07-31 NOTE — CONSULT NOTE ADULT - SUBJECTIVE AND OBJECTIVE BOX
LAUREN ROBERSON  87y  Female  [id number]      Patient is a 87y old  Female who presents with a chief complaint of weakness, sepsis 2/2 UTI (31 Jul 2019 16:39)    HPI:  87yoF hx ESRD on HD (M and F only), CAD s/p CABG, HTN, DM, PAD, hypothyroidism presenting with generalized weakness.  Pt is poor historian despite use of   She reports generalized weakness for past few days associated with generalized, abdominal pain that she is unable to describe associated with nausea, some episodes of vomiting.  Reports difficulty with urination and ?dysuria but believes her urinary symptoms are due to not drinking enough fluids recently. Unable to explain why she is only on HD twice per week, but says last HD session was on 7/1.  Denies fevers, chest pain, dyspnea, endorses chronic pain in face and all extremities which is chronic for her.  On admission, febrile, leukocytosis, UA positive, was pan scanned by ED and CT abd/pelvis showed haziness around the gallbladder however follow up abd US was negative for cholelithiasis and Stewart's sign and no LFT elevation on labs. (04 Jul 2019 22:54)  Asked to see for an elevated INR    PAST MEDICAL & SURGICAL HISTORY:  Left bundle branch block  Osteoporosis  Diabetic neuropathy  Peripheral arterial disease  Spinal stenosis  Coronary artery disease  Chronic renal failure  Pacemaker: Medtronic 2011  Hypothyroid  Hyperlipemia  Hypertension  Diabetes  S/P dialysis catheter insertion: R. arm  S/P CABG x 3  Artificial cardiac pacemaker    FAMILY HISTORY:  No pertinent family history in first degree relatives    REVIEW OF SYSTEMS  Cannot  be obtained  Basically non-verbal and unable to give Hx  No bleeding Hx seen in the chart.:	    MEDICATIONS  (STANDING):  amLODIPine   Tablet 10 milliGRAM(s) Oral daily  atorvastatin 40 milliGRAM(s) Oral at bedtime  calcitriol   Capsule 0.25 MICROGram(s) Oral daily  calcium acetate 667 milliGRAM(s) Oral three times a day with meals  carvedilol 6.25 milliGRAM(s) Oral every 12 hours  ceFAZolin   IVPB 1000 milliGRAM(s) IV Intermittent every 24 hours  chlorhexidine 2% Cloths 1 Application(s) Topical <User Schedule>  dextrose 5%. 1000 milliLiter(s) (50 mL/Hr) IV Continuous <Continuous>  dextrose 5%. 1000 milliLiter(s) (50 mL/Hr) IV Continuous <Continuous>  dextrose 50% Injectable 12.5 Gram(s) IV Push once  epoetin barb Injectable 13436 Unit(s) IV Push <User Schedule>  folic acid 1 milliGRAM(s) Oral daily  insulin lispro (HumaLOG) corrective regimen sliding scale   SubCutaneous three times a day before meals  levothyroxine 112 MICROGram(s) Oral daily  lidocaine   Patch 1 Patch Transdermal daily  losartan 50 milliGRAM(s) Oral daily  Nephro-king 1 Tablet(s) Oral daily  pantoprazole    Tablet 40 milliGRAM(s) Oral before breakfast  phytonadione  IVPB 10 milliGRAM(s) IV Intermittent once    MEDICATIONS  (PRN):  acetaminophen   Tablet .. 650 milliGRAM(s) Oral every 6 hours PRN Temp greater or equal to 38C (100.4F), Mild Pain (1 - 3)  dextrose 40% Gel 15 Gram(s) Oral once PRN Blood Glucose LESS THAN 70 milliGRAM(s)/deciliter  glucagon  Injectable 1 milliGRAM(s) IntraMuscular once PRN Glucose LESS THAN 70 milligrams/deciliter  hydrALAZINE Injectable 5 milliGRAM(s) IV Push every 6 hours PRN BP above 160/100      PHYSICAL EXAM:  Appears chorionically ill    + pallor  No jaundice  No LAD  Lungs clear  Heart RR  AbdL Non-tender  No HSM  Skin: No significant ecchymoses  or petechiae    CBC Full  -  ( 31 Jul 2019 08:46 )  WBC Count : 6.21 K/uL  RBC Count : 3.74 M/uL  Hemoglobin : 11.0 g/dL  Hematocrit : 35.2 %  Platelet Count - Automated : 167 K/uL  Mean Cell Volume : 94.1 fl  Mean Cell Hemoglobin : 29.4 pg  Mean Cell Hemoglobin Concentration : 31.3 gm/dL  Auto Neutrophil # : x  Auto Lymphocyte # : x  Auto Monocyte # : x  Auto Eosinophil # : x  Auto Basophil # : x  Auto Neutrophil % : x  Auto Lymphocyte % : x  Auto Monocyte % : x  Auto Eosinophil % : x  Auto Basophil % : x    PT/INR - ( 31 Jul 2019 07:28 )   PT: 39.7 sec;   INR: 3.33 ratio         PTT - ( 30 Jul 2019 11:01 )  PTT:37.4 sec  30 Jul 2019 06:52    136    |  100    |  33.0   ----------------------------<  86     4.2     |  23.0   |  4.14     Ca    8.0        30 Jul 2019 06:52  Phos  3.9       30 Jul 2019 06:52  Mg     2.1       30 Jul 2019 06:52    TPro  5.9    /  Alb  2.4    /  TBili  0.4    /  DBili  0.1    /  AST  24     /  ALT  <5     /  AlkPhos  126    30 Jul 2019 06:52  PT/INR - ( 31 Jul 2019 07:28 )   PT: 39.7 sec;   INR: 3.33 ratio         PTT - ( 30 Jul 2019 11:01 )  PTT:37.4 sec  PT/INR - ( 31 Jul 2019 07:28 )   PT: 39.7 sec;   INR: 3.33 ratio      Probably vit K deficient  No bleeding  Will give one dose of vit K, 5 mg IV    Thank you                7yoF hx ESRD on HD (M and F only), AVG right side 8/3/17   CAD s/p CABG, HTN, DM, PAD, hypothyroidism presenting with generalized weakness, dysuria.   pain at graft site for 1 month.- s/p AVG resection; Had some bleed, better nowfever in .6 now afebrile  leukocytosis to 14 now normal  Blood cx 4/4 Staph aureus.   Imaging with no focus (no pneumonia, abscess, OM). NM scan suspicious for AVG infection  had giuliana cath for HD, changed now to

## 2019-07-31 NOTE — PHYSICAL THERAPY INITIAL EVALUATION ADULT - MANUAL MUSCLE TESTING RESULTS, REHAB EVAL
generalized weakness t/o.
bilateral shoulder flex 3+/5, elbow flex 3+/5, hip flex 2/5, knee flex 2/5, ankle df 3+/5

## 2019-07-31 NOTE — PHYSICAL THERAPY INITIAL EVALUATION ADULT - IMPAIRMENTS FOUND, PT EVAL
muscle strength/gait, locomotion, and balance/aerobic capacity/endurance/posture
muscle strength/cognitive impairment/aerobic capacity/endurance/gait, locomotion, and balance

## 2019-07-31 NOTE — CONSULT NOTE ADULT - CONSULT REASON
weakness and fever
Bleeding right AVF.
Coagulopathy
ESRD , needs HD
Goals of Care
Thrombocytopenia
bacteremia
bacteremia

## 2019-07-31 NOTE — CONSULT NOTE ADULT - SUBJECTIVE AND OBJECTIVE BOX
Palliative Medicine Initial Consultation Note    HPI:  History from chart- patient poor historian  87yoF hx ESRD on HD (M and F only), CAD s/p CABG, HTN, DM, PAD, hypothyroidism presenting with generalized weakness.  Pt is poor historian despite use of   She reports generalized weakness for past few days associated with generalized, abdominal pain that she is unable to describe associated with nausea, some episodes of vomiting.  Reports difficulty with urination and ?dysuria but believes her urinary symptoms are due to not drinking enough fluids recently. Unable to explain why she is only on HD twice per week, but says last HD session was on 7/1.  Denies fevers, chest pain, dyspnea, endorses chronic pain in face and all extremities which is chronic for her.    PERTINENT PMH REVIEWED:  [ x] YES [ ] NO         Chronic renal failure     Coronary artery disease     Diabetes     Diabetic neuropathy     Hyperlipemia     Hypertension     Hypothyroid     Left bundle branch block     Osteoporosis     Pacemaker Medtronic 2011    Peripheral arterial disease     Spinal stenosis.     PAST SURGICAL HISTORY:  Artificial cardiac pacemaker     S/P CABG x 3     S/P dialysis catheter insertion R. arm.     FAMILY HISTORY:  Unable to give hx      SOCIAL HISTORY:  EtOH [ ] Yes  [ x] No                                    Drugs [ ] Yes [x ] No                                   [ ] smoker [ x]x nonsmoker                                    Admitted from: [x ] home [ ] SNF _________ [ ] [facility] ________    Surrogate/HCP/Guardian:  NO HCP in chart - son Jovanny Camp    FAMILY HISTORY:  No pertinent family history in first degree relatives      Baseline ADLs (prior to admission): Unknown  Independent [ ] moderately [ ] fully   Dependent   [ ] moderately [ ]fully    MEDICATIONS  (STANDING):  amLODIPine   Tablet 10 milliGRAM(s) Oral daily  atorvastatin 40 milliGRAM(s) Oral at bedtime  calcitriol   Capsule 0.25 MICROGram(s) Oral daily  calcium acetate 667 milliGRAM(s) Oral three times a day with meals  carvedilol 6.25 milliGRAM(s) Oral every 12 hours  ceFAZolin   IVPB 1000 milliGRAM(s) IV Intermittent every 24 hours  chlorhexidine 2% Cloths 1 Application(s) Topical <User Schedule>  dextrose 5%. 1000 milliLiter(s) (50 mL/Hr) IV Continuous <Continuous>  dextrose 5%. 1000 milliLiter(s) (50 mL/Hr) IV Continuous <Continuous>  dextrose 50% Injectable 12.5 Gram(s) IV Push once  epoetin barb Injectable 91404 Unit(s) IV Push <User Schedule>  folic acid 1 milliGRAM(s) Oral daily  insulin lispro (HumaLOG) corrective regimen sliding scale   SubCutaneous three times a day before meals  levothyroxine 112 MICROGram(s) Oral daily  lidocaine   Patch 1 Patch Transdermal daily  losartan 50 milliGRAM(s) Oral daily  Nephro-king 1 Tablet(s) Oral daily  pantoprazole    Tablet 40 milliGRAM(s) Oral before breakfast  phytonadione  IVPB 10 milliGRAM(s) IV Intermittent once    MEDICATIONS  (PRN):  acetaminophen   Tablet .. 650 milliGRAM(s) Oral every 6 hours PRN Temp greater or equal to 38C (100.4F), Mild Pain (1 - 3)  dextrose 40% Gel 15 Gram(s) Oral once PRN Blood Glucose LESS THAN 70 milliGRAM(s)/deciliter  glucagon  Injectable 1 milliGRAM(s) IntraMuscular once PRN Glucose LESS THAN 70 milligrams/deciliter  hydrALAZINE Injectable 5 milliGRAM(s) IV Push every 6 hours PRN BP above 160/100      Allergies    penicillin (Anaphylaxis)  seafood (Anaphylaxis)  shellfish (Anaphylaxis)    Intolerances    Send Nepro TID- RD OK (Unknown)      REVIEW OF SYSTEMS       [x ] Unable to obtain due to poor mentation     Karnofsky Performance Score/Palliative Performance Status Version 2:  30       %    Vital Signs Last 24 Hrs  T(C): 36.5 (31 Jul 2019 16:19), Max: 36.9 (31 Jul 2019 07:41)  T(F): 97.7 (31 Jul 2019 16:19), Max: 98.4 (31 Jul 2019 07:41)  HR: 95 (31 Jul 2019 16:19) (72 - 100)  BP: 143/55 (31 Jul 2019 16:19) (98/35 - 159/50)  BP(mean): --  RR: 18 (31 Jul 2019 16:19) (13 - 18)  SpO2: 99% (31 Jul 2019 16:19) (92% - 100%)    PHYSICAL EXAM:    General: Elderly woman, lethargic  + permacath   HEENT: [ x] normal  [ ] dry mouth  [ ] ET tube/trach    Lungs: [x ] comfortable [ ] tachypnea/labored breathing  [ ] excessive secretions    CV: [ x] normal  [ ] tachycardia    GI: [x ] normal  [ ] distended  [ ] tender  [ ] no BS               [ ] PEG/NG/OG tube    : [x ] normal  [ ] incontinent  [ ] oliguria/anuria  [ ] ramey    MSK: [ ] normal  [x] weakness  [ ] edema             [ ] ambulatory  [ ] bedbound/wheelchair bound    Skin: [ ] normal  [ ] pressure ulcers- Stage_____  [ x] no rash    LABS:                        11.0   6.21  )-----------( 167      ( 31 Jul 2019 08:46 )             35.2     07-30    136  |  100  |  33.0<H>  ----------------------------<  86  4.2   |  23.0  |  4.14<H>    Ca    8.0<L>      30 Jul 2019 06:52  Phos  3.9     07-30  Mg     2.1     07-30    TPro  5.9<L>  /  Alb  2.4<L>  /  TBili  0.4  /  DBili  0.1  /  AST  24  /  ALT  <5  /  AlkPhos  126<H>  07-30    PT/INR - ( 31 Jul 2019 07:28 )   PT: 39.7 sec;   INR: 3.33 ratio         PTT - ( 30 Jul 2019 11:01 )  PTT:37.4 sec    I&O's Summary    30 Jul 2019 07:01  -  31 Jul 2019 07:00  --------------------------------------------------------  IN: 0 mL / OUT: 0 mL / NET: 0 mL        RADIOLOGY & ADDITIONAL STUDIES:  < from: Xray Chest 1 View-PORTABLE IMMEDIATE (07.30.19 @ 17:21) >   EXAM:  XR CHEST PORTABLE IMMED 1V                          PROCEDURE DATE:  07/30/2019          INTERPRETATION:  AP semierect chest on July 30, 2019 at 4:54 PM. Patient   had permacath insertion.    Heart is magnified by technique. Sternotomy and left-sided pacemaker   again noted.    Minimal blunting of right lateral costophrenic angle again noted.    On July 24 there was a right jugular line which has been removed. This is   been replaced with a large caliber double-lumen right jugular line in   good position.    IMPRESSION: Right jugular line insertion. Otherwise unchanged study.        < end of copied text >    < from: SANFORD Echo Doppler (07.09.19 @ 12:53) >    Summary:   1. Technically good study.   2. Moderately decreased global left ventricular systolic function.   3. Left ventricular ejection fraction, by visual estimation, is 30 to   35%.  4. Left atrial enlargement.   5. Abnormal septal motion consistent with RV pacemaker.   6. Thickening of the anterior mitral valve leaflet.   7. No left atrial appendage thrombus.   8. Mild-moderate tricuspid regurgitation.   9. There is no evidence of pericardial effusion.  10. There is no evidence of endocarditis at the time of this study.    MD Derik Electronically signed on 7/9/2019 at 3:18:11 PM       < end of copied text >        ADVANCE DIRECTIVES:  [ ] YES [x ] NO   DNR [ ] YES [x ] NO  Completed on:                     MOLST  [ ] YES [x ] NO   Completed on:  Living Will  [ ] YES [x ] NO   Completed on:    Thank you for the opportunity to assist with the care of this patient.   [facility] [phone].

## 2019-07-31 NOTE — CONSULT NOTE ADULT - ASSESSMENT
87yoF hx ESRD on HD (M and F only), CAD s/p CABG, HTN, DM, PAD, hypothyroidism presenting with generalized weakness, dysuria. Reports pain at graft site for 1 month.  In Ed temp 102.6, leukocytosis to 14. Blood cx 4/4 Staph aureus. Imaging with no focus (no pneumonia, abscess, OM)    Overall Staph aureus bacteremia, anaphylaxis to penicillin, UTI GNR in urine, ESRD on HD M/F only via AVG. Concern for AVG infection, endocarditis    Recommend:  - Blood cultures + 4/4 Staph aureus. Repeat blood cultures tomorrow  - Will c/w Vancomycin for now due to h/o anaphylaxis to penicillin-patient says she does not remember the reaction  -s/p vanco 1 g x 1 on 07/04. Will repeat trough today and re-dose accordingly by level. Pharmacy aware (weight 55 kg). Difficult to dose with HD due to twice weekly dosing  -Will reach out to primary nephrologist Dr De Leon regarding HD schedule  - Continue azactam 250 mg IV q8h adjusted for renal function  - SANFORD for endocarditis/lead vegetations  - USG AVG   - Trend Fever  - Trend Leukocytosis      Discussed with pharmacy and Dr Mcginnis  Will Follow
87yoF hx ESRD on HD (M and F only), CAD s/p CABG, HTN, DM, PAD, hypothyroidism presenting with generalized weakness.  On admission, febrile, leukocytosis, UA positive, was CT abd/pelvis showed haziness around the gallbladder however follow up abd US was negative for cholelithiasis and Stewart's sign and no LFT elevation on labs.  Blood cultures positive for staph aureus.     Bacteremia  - ID following  - Blood cultures +staph aureus; urine culture E.Coli   - SANFORD for lead vegitation/endocarditis   - NPO after midnight   - Pt unable to consent- spoke with son Darrin, agreeable to procedure. Phone [phone]    CAD s/p CABG  - Cath 2/2018- LIMA to LAD patent, SVG to OM patent, SVG to RPDA patent   - medical therapy   - continue asa/plavix/statin   - continue BB/ACE
87yr woman  hx ESRD on HD, CAD s/p CABG, HTN, DM, PAD,  admitted with UTI, Bacteremia with R AV graft infection. Patient with prolonged hospital stay
ASSESSMENT: Patient is a 87y old female with a bleeding AVF after 30 minutes of HD that was controlled with a figure of 8 stitch. Hemostasis obtained. No signs of hematoma. Intact neurovascular exam of extremity. Patient will possibly go tomorrow to OR for revision of AVF. HDS.     PLAN:    - Observe AVF for bleeding and hematoma formation.  - No further vascular surgical intervention required at the moment.  - Please re-call Vascular surgery if bleeding resumes.
ESRD - BIW HD . Due for HD today . See orders     Anemia - Hgb stable . MILLIE held.    RO - Phoslo with meals and Rocaltrol    Gram (+) bacteremia , Gram (-) UTI ---> Multiple CT scans noted , S/P Vanco x 1 on  7-4 , Azactam  Follow up repeat cultures   ID to see   Vanco level in am
87 yr old female w CAD s/p CABG, CVA, DM II, ESRD on HD M-F, HTN, hypothyroid LBBB, PPM presented to ED by ambulance c/o weakness x 3 days      #sepsis  #poss due to UTI  #weakness  1. admit to hospital  2. trend temp  3. trend CBC  4. aztreonam/vanco  aztreonam continued  vanco held added dose since if she has HD tomorrow then dose will be need to be given after HD  5. follow up cx    #Lethargy  likely due to metabolic encephalopathy due to infection  or sedation from morphne x 1 dose  1. follow    #elevated trop  no chest pain or SOB  likely due to either renal or demand ischemia  1. trop x 1    #CAD  #LBBB old  #CVA   #HTN  1. ASA 81 mg  2. coreg 12.5 mg BID w parameters  2. atorvastatin 40  mg  3. lisinopril 20 mg  4. clonidine 0.1 mg po q 6 hrs    #ESRD on HD Monday- Friday  1. renal consult  2. calcitirol 0.25 mcg  3. calcium acetate 667 mg 1 po TID w meals    #Hypothyroid  1. check TSH  2. continue levothyroxine 112 mcg      IMPROVE VTE Individual Risk Assessment    RISK                                                                Points    [  ] Previous VTE                                                  3    [  ] Thrombophilia                                               2    [  ] Lower limb paralysis                                      2        (unable to hold up >15 seconds)      [  ] Current Cancer                                              2         (within 6 months)    [  ] Immobilization > 24 hrs                                1    [  ] ICU/CCU stay > 24 hours                              1    [ X ] Age > 60                                                      1    IMPROVE VTE Score ___1______    IMPROVE Score 0-1: Low Risk, No VTE prophylaxis required for most patients, encourage ambulation.   IMPROVE Score 2-3: At risk, pharmacologic VTE prophylaxis is indicated for most patients (in the absence of a contraindication)  IMPROVE Score > or = 4: High Risk, pharmacologic VTE prophylaxis is indicated for most patients (in the absence of a contraindication)

## 2019-07-31 NOTE — PHYSICAL THERAPY INITIAL EVALUATION ADULT - CRITERIA FOR SKILLED THERAPEUTIC INTERVENTIONS
impairments found
therapy frequency/anticipated equipment needs at discharge/anticipated discharge recommendation/risk reduction/prevention/rehab potential/impairments found/functional limitations in following categories

## 2019-07-31 NOTE — PROGRESS NOTE ADULT - SUBJECTIVE AND OBJECTIVE BOX
CC: weakness, sepsis    INTERVAL HPI/OVERNIGHT EVENTS: Patient seen and examined. As per nurse, patient refusing medications and meals. Took some po this morning with encouragement from Kazakh speaking aide. BS low this morning 66 due to poor oral intake, now on IV dextrose.  Responds to name but otherwise not following commands, not assisting in her own care. Did not cooperate with PT. Moves all extremities to noxious stimuli.     Vital Signs Last 24 Hrs  T(C): 36.9 (31 Jul 2019 07:41), Max: 37.4 (30 Jul 2019 13:36)  T(F): 98.4 (31 Jul 2019 07:41), Max: 99.4 (30 Jul 2019 13:36)  HR: 100 (31 Jul 2019 07:41) (72 - 100)  BP: 128/71 (31 Jul 2019 07:41) (109/55 - 165/65)  BP(mean): --  RR: 18 (31 Jul 2019 07:41) (13 - 20)  SpO2: 98% (31 Jul 2019 07:41) (92% - 100%)    ROS:  Unable to assess due to mental status      Physical Exam:  GENERAL: NAD  HEENT:  Normocephalic and atraumatic. MMM  NECK: Supple.   CARDIOVASCULAR: RRR S1, S2. No murmur/rubs/gallop  LUNGS: BLAE+, no rales, no wheezing, no rhonchi.    ABDOMEN: ND. Soft,  NT, no guarding / rebound / rigidity. BS normoactive. No CVA tenderness.    EXTREMITIES: LUE +edema, +distal pulses  SKIN: no rash. Incontinence associated dermatitis   I&O's Detail    30 Jul 2019 07:01  -  31 Jul 2019 07:00  --------------------------------------------------------  IN:  Total IN: 0 mL    OUT:  Total OUT: 0 mL    Total NET: 0 mL                                    11.0   6.21  )-----------( 167      ( 31 Jul 2019 08:46 )             35.2     30 Jul 2019 06:52    136    |  100    |  33.0   ----------------------------<  86     4.2     |  23.0   |  4.14     Ca    8.0        30 Jul 2019 06:52  Phos  3.9       30 Jul 2019 06:52  Mg     2.1       30 Jul 2019 06:52    TPro  5.9    /  Alb  2.4    /  TBili  0.4    /  DBili  0.1    /  AST  24     /  ALT  <5     /  AlkPhos  126    30 Jul 2019 06:52    PT/INR - ( 31 Jul 2019 07:28 )   PT: 39.7 sec;   INR: 3.33 ratio         PTT - ( 30 Jul 2019 11:01 )  PTT:37.4 sec  CAPILLARY BLOOD GLUCOSE      POCT Blood Glucose.: 73 mg/dL (31 Jul 2019 08:00)  POCT Blood Glucose.: 66 mg/dL (31 Jul 2019 07:59)  POCT Blood Glucose.: 84 mg/dL (31 Jul 2019 00:20)  POCT Blood Glucose.: 85 mg/dL (30 Jul 2019 21:13)  POCT Blood Glucose.: 95 mg/dL (30 Jul 2019 11:49)    LIVER FUNCTIONS - ( 30 Jul 2019 06:52 )  Alb: 2.4 g/dL / Pro: 5.9 g/dL / ALK PHOS: 126 U/L / ALT: <5 U/L / AST: 24 U/L / GGT: x               MEDICATIONS  (STANDING):  amLODIPine   Tablet 10 milliGRAM(s) Oral daily  atorvastatin 40 milliGRAM(s) Oral at bedtime  calcitriol   Capsule 0.25 MICROGram(s) Oral daily  calcium acetate 667 milliGRAM(s) Oral three times a day with meals  carvedilol 6.25 milliGRAM(s) Oral every 12 hours  chlorhexidine 2% Cloths 1 Application(s) Topical <User Schedule>  dextrose 5%. 1000 milliLiter(s) (50 mL/Hr) IV Continuous <Continuous>  dextrose 50% Injectable 12.5 Gram(s) IV Push once  epoetin barb Injectable 81309 Unit(s) IV Push <User Schedule>  folic acid 1 milliGRAM(s) Oral daily  insulin lispro (HumaLOG) corrective regimen sliding scale   SubCutaneous three times a day before meals  levothyroxine 112 MICROGram(s) Oral daily  lidocaine   Patch 1 Patch Transdermal daily  losartan 50 milliGRAM(s) Oral daily  Nephro-king 1 Tablet(s) Oral daily  pantoprazole    Tablet 40 milliGRAM(s) Oral before breakfast  phytonadione  IVPB 10 milliGRAM(s) IV Intermittent once    MEDICATIONS  (PRN):  acetaminophen   Tablet .. 650 milliGRAM(s) Oral every 6 hours PRN Temp greater or equal to 38C (100.4F), Mild Pain (1 - 3)  dextrose 40% Gel 15 Gram(s) Oral once PRN Blood Glucose LESS THAN 70 milliGRAM(s)/deciliter  glucagon  Injectable 1 milliGRAM(s) IntraMuscular once PRN Glucose LESS THAN 70 milligrams/deciliter  hydrALAZINE Injectable 5 milliGRAM(s) IV Push every 6 hours PRN BP above 160/100      RADIOLOGY & ADDITIONAL TESTS:

## 2019-07-31 NOTE — CONSULT NOTE ADULT - REASON FOR ADMISSION
UTI
weakness, sepsis 2/2 UTI

## 2019-07-31 NOTE — PROGRESS NOTE ADULT - ATTENDING COMMENTS
Increasing lethargy and disorientation since placement of tunneled HD catheter  Delirium compounded by  prolonged hospital stay, Azotemia of ESRD, Sepsis .   Palliative care consulted for consideration of palliation or hospice.   Will contact son/family member to discuss  Nephrology is continuing HD   Supportive care

## 2019-07-31 NOTE — PHYSICAL THERAPY INITIAL EVALUATION ADULT - GENERAL OBSERVATIONS, REHAB EVAL
awake in bed. +2L/min O2 via nc. +. +iv. , hebrerth,present throughout.
Pt rec'd in room upright with the HOB elevated. Pt breathing RA in NAD.

## 2019-07-31 NOTE — CONSULT NOTE ADULT - PROVIDER SPECIALTY LIST ADULT
Cardiology
Heme/Onc
Heme/Onc
Infectious Disease
Nephrology
Palliative Care
Vascular Surgery
TeleHospitalist

## 2019-07-31 NOTE — PROGRESS NOTE ADULT - SUBJECTIVE AND OBJECTIVE BOX
[facility]  INFECTIOUS DISEASES AND INTERNAL MEDICINE at [place]  =======================================================  Paresh Stark MD  Diplomates American Board of Internal Medicine and Infectious Diseases  Tel: [phone]      Fax: [phone]  =======================================================    LAUREN ROBERSON [id number]    Follow up: MSSA bacteremia. s/p permacath yesterday. Noted with elevated INR-unclear cause    Allergies:  penicillin (Anaphylaxis)  seafood (Anaphylaxis)  Send Nepro TID- RD OK (Unknown)  shellfish (Anaphylaxis)      Medications:  acetaminophen   Tablet .. 650 milliGRAM(s) Oral every 6 hours PRN  amLODIPine   Tablet 10 milliGRAM(s) Oral daily  atorvastatin 40 milliGRAM(s) Oral at bedtime  calcitriol   Capsule 0.25 MICROGram(s) Oral daily  calcium acetate 667 milliGRAM(s) Oral three times a day with meals  carvedilol 6.25 milliGRAM(s) Oral every 12 hours  ceFAZolin   IVPB 1000 milliGRAM(s) IV Intermittent every 24 hours  chlorhexidine 2% Cloths 1 Application(s) Topical <User Schedule>  dextrose 40% Gel 15 Gram(s) Oral once PRN  dextrose 5%. 1000 milliLiter(s) IV Continuous <Continuous>  dextrose 50% Injectable 12.5 Gram(s) IV Push once  epoetin barb Injectable 06143 Unit(s) IV Push <User Schedule>  folic acid 1 milliGRAM(s) Oral daily  glucagon  Injectable 1 milliGRAM(s) IntraMuscular once PRN  hydrALAZINE Injectable 5 milliGRAM(s) IV Push every 6 hours PRN  insulin lispro (HumaLOG) corrective regimen sliding scale   SubCutaneous three times a day before meals  levothyroxine 112 MICROGram(s) Oral daily  lidocaine   Patch 1 Patch Transdermal daily  losartan 50 milliGRAM(s) Oral daily  Nephro-king 1 Tablet(s) Oral daily  pantoprazole    Tablet 40 milliGRAM(s) Oral before breakfast  phytonadione  IVPB 10 milliGRAM(s) IV Intermittent once            REVIEW OF SYSTEMS:  unable to obtain           Physical Exam:  ICU Vital Signs Last 24 Hrs  T(C): 36.7 (31 Jul 2019 13:26), Max: 37.3 (30 Jul 2019 16:07)  T(F): 98.1 (31 Jul 2019 13:26), Max: 99.2 (30 Jul 2019 16:07)  HR: 84 (31 Jul 2019 13:26) (72 - 100)  BP: 98/35 (31 Jul 2019 13:26) (98/35 - 165/65)  BP(mean): --  ABP: --  ABP(mean): --  RR: 18 (31 Jul 2019 13:26) (13 - 18)  SpO2: 97% (31 Jul 2019 13:26) (92% - 100%)      GEN: NAD, lethargic  HEENT: normocephalic and atraumatic. EOMI. PERRL.  Anicteric   NECK: Supple.   LUNGS: Clear to auscultation.  HEART: Regular rate and rhythm without murmur. right chest permacath  ABDOMEN: Soft, nontender, and nondistended.  Positive bowel sounds.    : No CVA tenderness  EXTREMITIES: Without any edema.  MSK: no joint swelling  NEUROLOGIC: Cranial nerves II through XII are grossly intact. No focal deficits  PSYCHIATRIC: Appropriate affect .  SKIN: No Rash prior AVG graft with sutures intact, ecchymosis       Labs:  07-30    136  |  100  |  33.0<H>  ----------------------------<  86  4.2   |  23.0  |  4.14<H>    Ca    8.0<L>      30 Jul 2019 06:52  Phos  3.9     07-30  Mg     2.1     07-30    TPro  5.9<L>  /  Alb  2.4<L>  /  TBili  0.4  /  DBili  0.1  /  AST  24  /  ALT  <5  /  AlkPhos  126<H>  07-30                          11.0   6.21  )-----------( 167      ( 31 Jul 2019 08:46 )             35.2       PT/INR - ( 31 Jul 2019 07:28 )   PT: 39.7 sec;   INR: 3.33 ratio         PTT - ( 30 Jul 2019 11:01 )  PTT:37.4 sec    LIVER FUNCTIONS - ( 30 Jul 2019 06:52 )  Alb: 2.4 g/dL / Pro: 5.9 g/dL / ALK PHOS: 126 U/L / ALT: <5 U/L / AST: 24 U/L / GGT: x               CAPILLARY BLOOD GLUCOSE      POCT Blood Glucose.: 75 mg/dL (31 Jul 2019 12:44)  POCT Blood Glucose.: 73 mg/dL (31 Jul 2019 08:00)  POCT Blood Glucose.: 66 mg/dL (31 Jul 2019 07:59)  POCT Blood Glucose.: 84 mg/dL (31 Jul 2019 00:20)  POCT Blood Glucose.: 85 mg/dL (30 Jul 2019 21:13)        RECENT CULTURES:  07-26 @ 18:38 .Blood     No growth at 48 hours        07-25 @ 17:04 .Other Tissue R Upper Arm Graft     Culture is being performed.        07-25 @ 11:10 .Tissue Tissue R Upper Arm Graft Klebsiella pneumoniae    Moderate Klebsiella pneumoniae    No WBC's or organisms seen      07-25 @ 11:02 .Surgical Swab Swabs R Upper Arm     No growth at 5 days.    No WBC's or organisms seen

## 2019-07-31 NOTE — PROGRESS NOTE ADULT - SUBJECTIVE AND OBJECTIVE BOX
HPI/OVERNIGHT EVENTS: Patient seen and examined at bedside this AM. No acute events overnight. Tolerating diet. Patient denies chills, nausea, vomiting, chest pain, SOB, dizziness, abd pain or any other concerning symptoms    Vital Signs Last 24 Hrs  T(C): 36.8 (30 Jul 2019 18:12), Max: 37.4 (30 Jul 2019 13:36)  T(F): 98.2 (30 Jul 2019 18:12), Max: 99.4 (30 Jul 2019 13:36)  HR: 88 (30 Jul 2019 18:12) (72 - 99)  BP: 138/70 (30 Jul 2019 18:12) (109/55 - 178/88)  BP(mean): --  RR: 18 (30 Jul 2019 18:12) (13 - 20)  SpO2: 92% (30 Jul 2019 18:12) (92% - 100%)    I&O's Detail    30 Jul 2019 07:01  -  30 Jul 2019 23:59  --------------------------------------------------------  IN:  Total IN: 0 mL    OUT:  Total OUT: 0 mL    Total NET: 0 mL      Constitutional: patient resting comfortably in bed  HEENT: EOMI / PERRL b/l   Neck: RIJ permacath in place, no hematoma  Respiratory: CTAB respirations are unlabored, no accessory muscle use, no conversational dyspnea  Cardiovascular: regular rate & rhythm   Gastrointestinal: Abdomen soft, non-tender, non-distended, no rebound tenderness / guarding  Incision/Wound: Clean, dry and intact  Neurological:  A&O x 3; no gross sensory / motor / coordination deficits      LABS:                        10.3   5.70  )-----------( 159      ( 30 Jul 2019 11:01 )             31.9     07-30    136  |  100  |  33.0<H>  ----------------------------<  86  4.2   |  23.0  |  4.14<H>    Ca    8.0<L>      30 Jul 2019 06:52  Phos  3.9     07-30  Mg     2.1     07-30    TPro  5.9<L>  /  Alb  2.4<L>  /  TBili  0.4  /  DBili  0.1  /  AST  24  /  ALT  <5  /  AlkPhos  126<H>  07-30    PT/INR - ( 30 Jul 2019 11:01 )   PT: 14.0 sec;   INR: 1.21 ratio         PTT - ( 30 Jul 2019 11:01 )  PTT:37.4 sec      MEDICATIONS  (STANDING):  amLODIPine   Tablet 10 milliGRAM(s) Oral daily  aspirin  chewable 81 milliGRAM(s) Oral daily  atorvastatin 40 milliGRAM(s) Oral at bedtime  calcitriol   Capsule 0.25 MICROGram(s) Oral daily  calcium acetate 667 milliGRAM(s) Oral three times a day with meals  carvedilol 6.25 milliGRAM(s) Oral every 12 hours  clopidogrel Tablet 75 milliGRAM(s) Oral daily  dextrose 5%. 1000 milliLiter(s) (50 mL/Hr) IV Continuous <Continuous>  dextrose 50% Injectable 12.5 Gram(s) IV Push once  epoetin barb Injectable 73700 Unit(s) IV Push <User Schedule>  folic acid 1 milliGRAM(s) Oral daily  insulin lispro (HumaLOG) corrective regimen sliding scale   SubCutaneous three times a day before meals  levothyroxine 112 MICROGram(s) Oral daily  lidocaine   Patch 1 Patch Transdermal daily  losartan 50 milliGRAM(s) Oral daily  Nephro-king 1 Tablet(s) Oral daily  pantoprazole    Tablet 40 milliGRAM(s) Oral before breakfast  phytonadione  IVPB 10 milliGRAM(s) IV Intermittent once  saccharomyces boulardii 250 milliGRAM(s) Oral two times a day    MEDICATIONS  (PRN):  acetaminophen   Tablet .. 650 milliGRAM(s) Oral every 6 hours PRN Temp greater or equal to 38C (100.4F), Mild Pain (1 - 3)  dextrose 40% Gel 15 Gram(s) Oral once PRN Blood Glucose LESS THAN 70 milliGRAM(s)/deciliter  glucagon  Injectable 1 milliGRAM(s) IntraMuscular once PRN Glucose LESS THAN 70 milligrams/deciliter  hydrALAZINE Injectable 5 milliGRAM(s) IV Push every 6 hours PRN BP above 160/100      88 yo F s/p Permacath placement for HD    - ESRD on HD  - f/u cxs  - dressing change  -care per primary team

## 2019-07-31 NOTE — PHYSICAL THERAPY INITIAL EVALUATION ADULT - ADDITIONAL COMMENTS
via [employer] used for eval. Pt unable to provide history, just reports she cannot walk.
pt is a poor historian. states she lives in a house and has a cane and walker at home. unable to provide any further information. requested further information about home environment and premorbid level of function from ccc.

## 2019-07-31 NOTE — PHYSICAL THERAPY INITIAL EVALUATION ADULT - BED MOBILITY LIMITATIONS, REHAB EVAL
impaired ability to control trunk for mobility/decreased ability to use arms for pushing/pulling/decreased ability to use legs for bridging/pushing
decreased ability to use legs for bridging/pushing/decreased ability to use arms for pushing/pulling

## 2019-07-31 NOTE — PHYSICAL THERAPY INITIAL EVALUATION ADULT - PERTINENT HX OF CURRENT PROBLEM, REHAB EVAL
87yoF hx ESRD on HD (M and F only), CAD s/p CABG, HTN, DM, PAD, hypothyroidism presenting with generalized weakness found to have UTI and meeting sepsis criteria
Pt present to [facility] with reports of weakness and is on HD.

## 2019-07-31 NOTE — CONSULT NOTE ADULT - PROBLEM SELECTOR RECOMMENDATION 5
Patient with prolonged hospital stay.   Discussed with Daniella SELF- per previous CM notes on 7/15, son looking into MARK for mother.    Discussed with medical team. No family at bedside during the day, son perhaps may come at night. Spoke to Dr. Whitaker, who will reach out to  son.  Plan thereafter to reach out to son to discuss further GOC

## 2019-07-31 NOTE — CONSULT NOTE ADULT - CONSULT REQUESTED DATE/TIME
04-Jul-2019 21:28
05-Jul-2019
05-Jul-2019 14:48
06-Jul-2019 19:50
07-Jul-2019 12:13
16-Jul-2019 19:36
31-Jul-2019 16:39
31-Jul-2019 19:07

## 2019-07-31 NOTE — PROGRESS NOTE ADULT - ASSESSMENT
87yoF hx ESRD on HD (M and F only), AVG right side 8/3/17   CAD s/p CABG, HTN, DM, PAD, hypothyroidism presenting with generalized weakness, dysuria.   pain at graft site for 1 month.- s/p AVG resection; Had some bleed, better nowfever in .6 now afebrile  leukocytosis to 14 now normal  Blood cx 4/4 Staph aureus.   Imaging with no focus (no pneumonia, abscess, OM). NM scan suspicious for AVG infection  had giuliana cath for HD, changed now to PC  HD am

## 2019-08-01 LAB
ANION GAP SERPL CALC-SCNC: 12 MMOL/L — SIGNIFICANT CHANGE UP (ref 5–17)
APTT BLD: 38.4 SEC — HIGH (ref 27.5–36.3)
BUN SERPL-MCNC: 21 MG/DL — HIGH (ref 8–20)
CALCIUM SERPL-MCNC: 7.7 MG/DL — LOW (ref 8.6–10.2)
CHLORIDE SERPL-SCNC: 102 MMOL/L — SIGNIFICANT CHANGE UP (ref 98–107)
CO2 SERPL-SCNC: 23 MMOL/L — SIGNIFICANT CHANGE UP (ref 22–29)
CREAT SERPL-MCNC: 3.28 MG/DL — HIGH (ref 0.5–1.3)
GLUCOSE BLDC GLUCOMTR-MCNC: 102 MG/DL — HIGH (ref 70–99)
GLUCOSE BLDC GLUCOMTR-MCNC: 167 MG/DL — HIGH (ref 70–99)
GLUCOSE BLDC GLUCOMTR-MCNC: 85 MG/DL — SIGNIFICANT CHANGE UP (ref 70–99)
GLUCOSE BLDC GLUCOMTR-MCNC: 97 MG/DL — SIGNIFICANT CHANGE UP (ref 70–99)
GLUCOSE SERPL-MCNC: 90 MG/DL — SIGNIFICANT CHANGE UP (ref 70–115)
HCT VFR BLD CALC: 33.9 % — LOW (ref 34.5–45)
HGB BLD-MCNC: 10.7 G/DL — LOW (ref 11.5–15.5)
INR BLD: 1.34 RATIO — HIGH (ref 0.88–1.16)
MCHC RBC-ENTMCNC: 29.5 PG — SIGNIFICANT CHANGE UP (ref 27–34)
MCHC RBC-ENTMCNC: 31.6 GM/DL — LOW (ref 32–36)
MCV RBC AUTO: 93.4 FL — SIGNIFICANT CHANGE UP (ref 80–100)
PLATELET # BLD AUTO: 158 K/UL — SIGNIFICANT CHANGE UP (ref 150–400)
POTASSIUM SERPL-MCNC: 3.6 MMOL/L — SIGNIFICANT CHANGE UP (ref 3.5–5.3)
POTASSIUM SERPL-SCNC: 3.6 MMOL/L — SIGNIFICANT CHANGE UP (ref 3.5–5.3)
PROTHROM AB SERPL-ACNC: 15.6 SEC — HIGH (ref 10–12.9)
RBC # BLD: 3.63 M/UL — LOW (ref 3.8–5.2)
RBC # FLD: 21.9 % — HIGH (ref 10.3–14.5)
SODIUM SERPL-SCNC: 137 MMOL/L — SIGNIFICANT CHANGE UP (ref 135–145)
WBC # BLD: 6.7 K/UL — SIGNIFICANT CHANGE UP (ref 3.8–10.5)
WBC # FLD AUTO: 6.7 K/UL — SIGNIFICANT CHANGE UP (ref 3.8–10.5)

## 2019-08-01 PROCEDURE — 99233 SBSQ HOSP IP/OBS HIGH 50: CPT

## 2019-08-01 RX ADMIN — Medication 650 MILLIGRAM(S): at 12:30

## 2019-08-01 RX ADMIN — CARVEDILOL PHOSPHATE 6.25 MILLIGRAM(S): 80 CAPSULE, EXTENDED RELEASE ORAL at 17:52

## 2019-08-01 RX ADMIN — ERYTHROPOIETIN 10000 UNIT(S): 10000 INJECTION, SOLUTION INTRAVENOUS; SUBCUTANEOUS at 09:35

## 2019-08-01 RX ADMIN — Medication 667 MILLIGRAM(S): at 17:52

## 2019-08-01 RX ADMIN — LIDOCAINE 1 PATCH: 4 CREAM TOPICAL at 12:31

## 2019-08-01 RX ADMIN — Medication 667 MILLIGRAM(S): at 12:31

## 2019-08-01 RX ADMIN — Medication 112 MICROGRAM(S): at 06:23

## 2019-08-01 RX ADMIN — CALCITRIOL 0.25 MICROGRAM(S): 0.5 CAPSULE ORAL at 12:30

## 2019-08-01 RX ADMIN — Medication 100 MILLIGRAM(S): at 12:30

## 2019-08-01 RX ADMIN — Medication 5 MILLIGRAM(S): at 06:24

## 2019-08-01 RX ADMIN — Medication 1 TABLET(S): at 12:30

## 2019-08-01 RX ADMIN — Medication 1 MILLIGRAM(S): at 12:30

## 2019-08-01 RX ADMIN — AMLODIPINE BESYLATE 10 MILLIGRAM(S): 2.5 TABLET ORAL at 06:24

## 2019-08-01 RX ADMIN — LOSARTAN POTASSIUM 50 MILLIGRAM(S): 100 TABLET, FILM COATED ORAL at 06:24

## 2019-08-01 RX ADMIN — CHLORHEXIDINE GLUCONATE 1 APPLICATION(S): 213 SOLUTION TOPICAL at 06:25

## 2019-08-01 RX ADMIN — CARVEDILOL PHOSPHATE 6.25 MILLIGRAM(S): 80 CAPSULE, EXTENDED RELEASE ORAL at 06:24

## 2019-08-01 NOTE — CHART NOTE - NSCHARTNOTEFT_GEN_A_CORE
Source: Patient [ ]  Family [ ]   other [x ] EMR    Current Diet: Diet, Dysphagia 1 Pureed-Thin Liquids:   For patients receiving Renal Replacement - No Protein Restr, No Conc K, No Conc Phos, Low  Sodium (RENAL)  Supplement Feeding Modality:  Oral  Nepro Cans or Servings Per Day:  1       Frequency:  Three Times a day (07-30-19 @ 16:18)      Patient reports [ ] nausea  [ ] vomiting [ ] diarrhea [ ] constipation  [ ]chewing problems [ ] swallowing issues  [ ] other:     PO intake:  < 50% [x ]   50-75%  [ ]   %  [ ]  other :    Source for PO intake [ ] Patient [ ] family [ x] chart [x ] staff [ ] other        Current Weight:   (8/1) 62.3 kg  (7/19) 65kg  (7/11) 55kg  (7/4) 57kg       % Weight Change   fluctuations noted  Pertinent Medications: MEDICATIONS  (STANDING):  amLODIPine   Tablet 10 milliGRAM(s) Oral daily  atorvastatin 40 milliGRAM(s) Oral at bedtime  calcitriol   Capsule 0.25 MICROGram(s) Oral daily  calcium acetate 667 milliGRAM(s) Oral three times a day with meals  carvedilol 6.25 milliGRAM(s) Oral every 12 hours  ceFAZolin   IVPB 1000 milliGRAM(s) IV Intermittent every 24 hours  chlorhexidine 2% Cloths 1 Application(s) Topical <User Schedule>  dextrose 5%. 1000 milliLiter(s) (50 mL/Hr) IV Continuous <Continuous>  dextrose 5%. 1000 milliLiter(s) (50 mL/Hr) IV Continuous <Continuous>  dextrose 50% Injectable 12.5 Gram(s) IV Push once  epoetin barb Injectable 92077 Unit(s) IV Push <User Schedule>  folic acid 1 milliGRAM(s) Oral daily  insulin lispro (HumaLOG) corrective regimen sliding scale   SubCutaneous three times a day before meals  levothyroxine 112 MICROGram(s) Oral daily  lidocaine   Patch 1 Patch Transdermal daily  losartan 50 milliGRAM(s) Oral daily  Nephro-king 1 Tablet(s) Oral daily  pantoprazole    Tablet 40 milliGRAM(s) Oral before breakfast  phytonadione  IVPB 10 milliGRAM(s) IV Intermittent once    MEDICATIONS  (PRN):  acetaminophen   Tablet .. 650 milliGRAM(s) Oral every 6 hours PRN Temp greater or equal to 38C (100.4F), Mild Pain (1 - 3)  dextrose 40% Gel 15 Gram(s) Oral once PRN Blood Glucose LESS THAN 70 milliGRAM(s)/deciliter  glucagon  Injectable 1 milliGRAM(s) IntraMuscular once PRN Glucose LESS THAN 70 milligrams/deciliter  hydrALAZINE Injectable 5 milliGRAM(s) IV Push every 6 hours PRN BP above 160/100    Pertinent Labs: CBC Full  -  ( 01 Aug 2019 08:02 )  WBC Count : 6.70 K/uL  RBC Count : 3.63 M/uL  Hemoglobin : 10.7 g/dL  Hematocrit : 33.9 %  Platelet Count - Automated : 158 K/uL  Mean Cell Volume : 93.4 fl  Mean Cell Hemoglobin : 29.5 pg  Mean Cell Hemoglobin Concentration : 31.6 gm/dL  Auto Neutrophil # : x  Auto Lymphocyte # : x  Auto Monocyte # : x  Auto Eosinophil # : x  Auto Basophil # : x  Auto Neutrophil % : x  Auto Lymphocyte % : x  Auto Monocyte % : x  Auto Eosinophil % : x  Auto Basophil % : x  08-01 Na137 mmol/L Glu 90 mg/dL K+ 3.6 mmol/L Cr  3.28 mg/dL<H> BUN 21.0 mg/dL<H> Phos n/a   Alb n/a   PAB n/a               Skin: stage II coccyx    Nutrition focused physical exam conducted - found signs of malnutrition [ ]absent [ ]present    Subcutaneous fat loss: [ ] Orbital fat pads region, [ ]Buccal fat region, [ ]Triceps region,  [ ]Ribs region    Muscle wasting: [ ]Temples region, [ ]Clavicle region, [ ]Shoulder region, [ ]Scapula region, [ ]Interosseous region,  [ ]thigh region, [ ]Calf region    Estimated Needs:   [ x] no change since previous assessment  [ ] recalculated:     Current Nutrition Diagnosis:   Current Nutrition Diagnosis: Pt remains at high nutrition risk secondary to malnutrition (mild chronic) related to inadequate energy intake in setting of texture modified diet consistency secondary to swallowing difficulties, increased nutrient needs due to ESRD on HD, +bacteremia as evidenced by moderate fluid accumulation (2+ B/L hands), likely <75% of nutrient needs >1 mo, moderate muscle wasting (temples), generalized weakness, increased protein losses associated with HD. Pt continues with suboptimal PO intake despite total assistance and encouragement, tolerating pureed diet. Unclear accuracy of weights with frequent fluctuations since admission with edema seemingly resolving, will continue to monitor for trend.     Recommendations:   1) continue Nepro BID   2) continue Nephro-king daily   3) Monitor weights daily for trend         Monitoring and Evaluation:   [x ] PO intake [x ] Tolerance to diet prescription [X] Weights  [X] Follow up per protocol [X] Labs:

## 2019-08-01 NOTE — PROGRESS NOTE ADULT - ASSESSMENT
87yoF hx ESRD on HD (M and F only), AVG right side 8/3/17   CAD s/p CABG, HTN, DM, PAD, hypothyroidism presenting with generalized weakness, dysuria.   leukocytosis to 14 now normal  Blood cx 4/4 Staph aureus. Abx per ID  Imaging with no focus (no pneumonia, abscess, OM). NM scan suspicious for AVG infection  had giuliana cath for HD, changed now to PC  [facility] HD easily

## 2019-08-01 NOTE — PROGRESS NOTE ADULT - ATTENDING COMMENTS
Infected AVG explant   Tunneled HD catheter palced  ESRD on HD   Still lethargic but managing to eat some.   Continue cefazoline 1g iv daily   Supportive care .

## 2019-08-01 NOTE — PROGRESS NOTE ADULT - SUBJECTIVE AND OBJECTIVE BOX
SUBJECTIVE: Patient seen and examined at bedside this AM. No acute events overnight. Tolerating diet. Patient denies chills, nausea, vomiting, chest pain, SOB, dizziness, abd pain or any other concerning symptoms      MEDICATIONS  (STANDING):  amLODIPine   Tablet 10 milliGRAM(s) Oral daily  atorvastatin 40 milliGRAM(s) Oral at bedtime  calcitriol   Capsule 0.25 MICROGram(s) Oral daily  calcium acetate 667 milliGRAM(s) Oral three times a day with meals  carvedilol 6.25 milliGRAM(s) Oral every 12 hours  ceFAZolin   IVPB 1000 milliGRAM(s) IV Intermittent every 24 hours  chlorhexidine 2% Cloths 1 Application(s) Topical <User Schedule>  dextrose 5%. 1000 milliLiter(s) (50 mL/Hr) IV Continuous <Continuous>  dextrose 5%. 1000 milliLiter(s) (50 mL/Hr) IV Continuous <Continuous>  dextrose 50% Injectable 12.5 Gram(s) IV Push once  epoetin barb Injectable 80843 Unit(s) IV Push <User Schedule>  folic acid 1 milliGRAM(s) Oral daily  insulin lispro (HumaLOG) corrective regimen sliding scale   SubCutaneous three times a day before meals  levothyroxine 112 MICROGram(s) Oral daily  lidocaine   Patch 1 Patch Transdermal daily  losartan 50 milliGRAM(s) Oral daily  Nephro-king 1 Tablet(s) Oral daily  pantoprazole    Tablet 40 milliGRAM(s) Oral before breakfast  phytonadione  IVPB 10 milliGRAM(s) IV Intermittent once    MEDICATIONS  (PRN):  acetaminophen   Tablet .. 650 milliGRAM(s) Oral every 6 hours PRN Temp greater or equal to 38C (100.4F), Mild Pain (1 - 3)  dextrose 40% Gel 15 Gram(s) Oral once PRN Blood Glucose LESS THAN 70 milliGRAM(s)/deciliter  glucagon  Injectable 1 milliGRAM(s) IntraMuscular once PRN Glucose LESS THAN 70 milligrams/deciliter  hydrALAZINE Injectable 5 milliGRAM(s) IV Push every 6 hours PRN BP above 160/100      Vital Signs Last 24 Hrs  T(C): 36.4 (31 Jul 2019 23:52), Max: 36.9 (31 Jul 2019 07:41)  T(F): 97.5 (31 Jul 2019 23:52), Max: 98.4 (31 Jul 2019 07:41)  HR: 93 (31 Jul 2019 23:52) (84 - 100)  BP: 158/81 (31 Jul 2019 23:52) (98/35 - 158/81)  BP(mean): --  RR: 18 (31 Jul 2019 23:52) (18 - 18)  SpO2: 97% (31 Jul 2019 23:52) (97% - 99%)    PE  Constitutional: patient resting comfortably in bed  HEENT: EOMI / PERRL b/l   Neck: RIJ permacath in place, no hematoma  Respiratory: CTAB respirations are unlabored, no accessory muscle use, no conversational dyspnea  Cardiovascular: regular rate & rhythm   Gastrointestinal: Abdomen soft, non-tender, non-distended, no rebound tenderness / guarding  Incision/Wound: RUE Clean, dry and intact  Neurological:  A&O x 3; no gross sensory / motor / coordination deficits      I&O's Detail    30 Jul 2019 07:01  -  31 Jul 2019 07:00  --------------------------------------------------------  IN:  Total IN: 0 mL    OUT:  Total OUT: 0 mL    Total NET: 0 mL          LABS:                        11.0   6.21  )-----------( 167      ( 31 Jul 2019 08:46 )             35.2     07-30    136  |  100  |  33.0<H>  ----------------------------<  86  4.2   |  23.0  |  4.14<H>    Ca    8.0<L>      30 Jul 2019 06:52  Phos  3.9     07-30  Mg     2.1     07-30    TPro  5.9<L>  /  Alb  2.4<L>  /  TBili  0.4  /  DBili  0.1  /  AST  24  /  ALT  <5  /  AlkPhos  126<H>  07-30    PT/INR - ( 31 Jul 2019 07:28 )   PT: 39.7 sec;   INR: 3.33 ratio         PTT - ( 30 Jul 2019 11:01 )  PTT:37.4 sec      RADIOLOGY & ADDITIONAL STUDIES: SUBJECTIVE: Patient seen and examined at bedside this AM. No acute events overnight. Tolerating diet. Patient denies chills, nausea, vomiting, chest pain, SOB, dizziness, abd pain or any other concerning symptoms      MEDICATIONS  (STANDING):  amLODIPine   Tablet 10 milliGRAM(s) Oral daily  atorvastatin 40 milliGRAM(s) Oral at bedtime  calcitriol   Capsule 0.25 MICROGram(s) Oral daily  calcium acetate 667 milliGRAM(s) Oral three times a day with meals  carvedilol 6.25 milliGRAM(s) Oral every 12 hours  ceFAZolin   IVPB 1000 milliGRAM(s) IV Intermittent every 24 hours  chlorhexidine 2% Cloths 1 Application(s) Topical <User Schedule>  dextrose 5%. 1000 milliLiter(s) (50 mL/Hr) IV Continuous <Continuous>  dextrose 5%. 1000 milliLiter(s) (50 mL/Hr) IV Continuous <Continuous>  dextrose 50% Injectable 12.5 Gram(s) IV Push once  epoetin barb Injectable 33092 Unit(s) IV Push <User Schedule>  folic acid 1 milliGRAM(s) Oral daily  insulin lispro (HumaLOG) corrective regimen sliding scale   SubCutaneous three times a day before meals  levothyroxine 112 MICROGram(s) Oral daily  lidocaine   Patch 1 Patch Transdermal daily  losartan 50 milliGRAM(s) Oral daily  Nephro-king 1 Tablet(s) Oral daily  pantoprazole    Tablet 40 milliGRAM(s) Oral before breakfast  phytonadione  IVPB 10 milliGRAM(s) IV Intermittent once    MEDICATIONS  (PRN):  acetaminophen   Tablet .. 650 milliGRAM(s) Oral every 6 hours PRN Temp greater or equal to 38C (100.4F), Mild Pain (1 - 3)  dextrose 40% Gel 15 Gram(s) Oral once PRN Blood Glucose LESS THAN 70 milliGRAM(s)/deciliter  glucagon  Injectable 1 milliGRAM(s) IntraMuscular once PRN Glucose LESS THAN 70 milligrams/deciliter  hydrALAZINE Injectable 5 milliGRAM(s) IV Push every 6 hours PRN BP above 160/100      Vital Signs Last 24 Hrs  T(C): 36.4 (31 Jul 2019 23:52), Max: 36.9 (31 Jul 2019 07:41)  T(F): 97.5 (31 Jul 2019 23:52), Max: 98.4 (31 Jul 2019 07:41)  HR: 93 (31 Jul 2019 23:52) (84 - 100)  BP: 158/81 (31 Jul 2019 23:52) (98/35 - 158/81)  BP(mean): --  RR: 18 (31 Jul 2019 23:52) (18 - 18)  SpO2: 97% (31 Jul 2019 23:52) (97% - 99%)    PE  Constitutional: patient resting comfortably in bed  HEENT: EOMI / PERRL b/l   Neck: RIJ permacath in place, no hematoma  Respiratory: CTAB respirations are unlabored, no accessory muscle use, no conversational dyspnea  Cardiovascular: regular rate & rhythm   Gastrointestinal: Abdomen soft, non-tender, non-distended, no rebound tenderness / guarding  Incision/Wound: RUE Clean, dry and intact  Neurological:  A&O x 3; no gross sensory / motor / coordination deficits      I&O's Detail    30 Jul 2019 07:01  -  31 Jul 2019 07:00  --------------------------------------------------------  IN:  Total IN: 0 mL    OUT:  Total OUT: 0 mL    Total NET: 0 mL          LABS:                        11.0   6.21  )-----------( 167      ( 31 Jul 2019 08:46 )             35.2     07-30    136  |  100  |  33.0<H>  ----------------------------<  86  4.2   |  23.0  |  4.14<H>    Ca    8.0<L>      30 Jul 2019 06:52  Phos  3.9     07-30  Mg     2.1     07-30    TPro  5.9<L>  /  Alb  2.4<L>  /  TBili  0.4  /  DBili  0.1  /  AST  24  /  ALT  <5  /  AlkPhos  126<H>  07-30    PT/INR - ( 31 Jul 2019 07:28 )   PT: 39.7 sec;   INR: 3.33 ratio         PTT - ( 30 Jul 2019 11:01 )  PTT:37.4 sec      RADIOLOGY & ADDITIONAL STUDIES:

## 2019-08-01 NOTE — PROGRESS NOTE ADULT - SUBJECTIVE AND OBJECTIVE BOX
CC: weakness, sepsis     INTERVAL HPI/OVERNIGHT EVENTS: Patient seen and examined. No acute issues overnight. Had HD this morning. Fed by staff, taking some po but appetite remains poor. Appears comfortable at present.    Vital Signs Last 24 Hrs  T(C): 36.3 (01 Aug 2019 10:10), Max: 36.5 (31 Jul 2019 16:19)  T(F): 97.4 (01 Aug 2019 10:10), Max: 97.7 (31 Jul 2019 16:19)  HR: 98 (01 Aug 2019 10:10) (83 - 98)  BP: 128/66 (01 Aug 2019 10:10) (128/66 - 191/77)  BP(mean): --  RR: 18 (01 Aug 2019 10:10) (18 - 18)  SpO2: 91% (01 Aug 2019 10:10) (91% - 100%)    Physical Exam:  GENERAL: NAD  HEENT:  Normocephalic and atraumatic. MMM  NECK: Supple.   CARDIOVASCULAR: RRR S1, S2. No murmur/rubs/gallop  LUNGS: BLAE+, no rales, no wheezing, no rhonchi.    ABDOMEN: ND. Soft,  NT, no guarding / rebound / rigidity. BS normoactive. No CVA tenderness.    EXTREMITIES: LUE +edema, +distal pulses  SKIN: no rash. Incontinence associated dermatitis   I&O's Detail      01 Aug 2019 07:01  -  01 Aug 2019 13:53  --------------------------------------------------------  IN:  Total IN: 0 mL    OUT:    Other: 700 mL  Total OUT: 700 mL    Total NET: -700 mL                                    10.7   6.70  )-----------( 158      ( 01 Aug 2019 08:02 )             33.9     01 Aug 2019 08:02    137    |  102    |  21.0   ----------------------------<  90     3.6     |  23.0   |  3.28     Ca    7.7        01 Aug 2019 08:02      PT/INR - ( 01 Aug 2019 08:02 )   PT: 15.6 sec;   INR: 1.34 ratio         PTT - ( 01 Aug 2019 08:02 )  PTT:38.4 sec  CAPILLARY BLOOD GLUCOSE      POCT Blood Glucose.: 97 mg/dL (01 Aug 2019 13:10)  POCT Blood Glucose.: 85 mg/dL (01 Aug 2019 07:58)  POCT Blood Glucose.: 128 mg/dL (31 Jul 2019 21:02)  POCT Blood Glucose.: 110 mg/dL (31 Jul 2019 16:38)          MEDICATIONS  (STANDING):  amLODIPine   Tablet 10 milliGRAM(s) Oral daily  atorvastatin 40 milliGRAM(s) Oral at bedtime  calcitriol   Capsule 0.25 MICROGram(s) Oral daily  calcium acetate 667 milliGRAM(s) Oral three times a day with meals  carvedilol 6.25 milliGRAM(s) Oral every 12 hours  ceFAZolin   IVPB 1000 milliGRAM(s) IV Intermittent every 24 hours  chlorhexidine 2% Cloths 1 Application(s) Topical <User Schedule>  dextrose 5%. 1000 milliLiter(s) (50 mL/Hr) IV Continuous <Continuous>  dextrose 5%. 1000 milliLiter(s) (50 mL/Hr) IV Continuous <Continuous>  dextrose 50% Injectable 12.5 Gram(s) IV Push once  epoetin barb Injectable 39633 Unit(s) IV Push <User Schedule>  folic acid 1 milliGRAM(s) Oral daily  insulin lispro (HumaLOG) corrective regimen sliding scale   SubCutaneous three times a day before meals  levothyroxine 112 MICROGram(s) Oral daily  lidocaine   Patch 1 Patch Transdermal daily  losartan 50 milliGRAM(s) Oral daily  Nephro-king 1 Tablet(s) Oral daily  pantoprazole    Tablet 40 milliGRAM(s) Oral before breakfast  phytonadione  IVPB 10 milliGRAM(s) IV Intermittent once    MEDICATIONS  (PRN):  acetaminophen   Tablet .. 650 milliGRAM(s) Oral every 6 hours PRN Temp greater or equal to 38C (100.4F), Mild Pain (1 - 3)  dextrose 40% Gel 15 Gram(s) Oral once PRN Blood Glucose LESS THAN 70 milliGRAM(s)/deciliter  glucagon  Injectable 1 milliGRAM(s) IntraMuscular once PRN Glucose LESS THAN 70 milligrams/deciliter  hydrALAZINE Injectable 5 milliGRAM(s) IV Push every 6 hours PRN BP above 160/100      RADIOLOGY & ADDITIONAL TESTS:

## 2019-08-01 NOTE — PROGRESS NOTE ADULT - ASSESSMENT
86 yo F w/ infected R AVG s/p excision of graft with permacath placement    - Continue management per primary team  - ESRD on HD, ok to use permacath  - Blood cultures + 4/4 Staph aureus. BCX 7/14 NGTD BCX 07/26 NGTD  - AVG growing Klebsiella, sensitivities reviewed. Abx per ID  - dressing change daily per nursing  - Patient not a candidate for alternate AVG creation at this time 86 yo F w/ infected R AVG s/p excision of graft with permacath placement    - Continue management per primary team  - ESRD on HD, ok to use permacath  - Blood cultures + 4/4 Staph aureus. BCX 7/14 NGTD BCX 07/26 NGTD  - AVG growing Klebsiella, sensitivities reviewed. Abx per ID  - dressing change daily per nursing  - Patient not a candidate for alternate AVG creation at this time  - Surgery to sign off 86 yo F w/ infected R AVG s/p excision of graft with permacath placement    - Continue management per primary team  - ESRD on HD, ok to use permacath  - Blood cultures + 4/4 Staph aureus. BCX 7/14 NGTD BCX 07/26 NGTD  - AVG growing Klebsiella, sensitivities reviewed. Abx per ID  - dressing change daily per nursing  - Patient not a candidate for alternate AVG creation at this time  - Surgery to sign off. Please contact us for any questions. 86 yo F w/ infected R AVG s/p excision of graft with permacath placement    - Continue management per primary team  - ESRD on HD, ok to use permacath  - Blood cultures + 4/4 Staph aureus. BCX 7/14 NGTD BCX 07/26 NGTD  - AVG growing Klebsiella, sensitivities reviewed. Abx per ID  - dressing change daily per nursing  - Patient not a candidate for alternate AVG creation at this time  - Surgery to sign off. Pt to follow up in 1 week with Dr. Sood. Please contact us for any questions.

## 2019-08-01 NOTE — PROGRESS NOTE ADULT - ASSESSMENT
87yoF hx ESRD on HD (M and F only), CAD s/p CABG, HTN, DM, PAD, hypothyroidism presenting with generalized weakness.  On admission, febrile, leukocytosis, UA positive, with  Blood cultures positive for staph aureus.  SANFORD done, no vegetation. s/p indium scan positive for R AV graft infection, Tissue culture of YONIS shows gram - rods  Sepsis :  Staph aureus bacteremia due to R arm AV graft infection.   s/p AVG resection and Jeffrey cath placed july 23, with bleeding from AVG on july 25  s/p 2 units PRBC 7/26. monitor cbc.   S/P PC POD#2  SANFORD negative for vegetation  Antibiotics as per ID    Supratherapeutic INR:   Unclear cause. LFT's normal.  ASA/plavix stopped.  Possible due to poor po intake. Heme/Onc input appreciated. Probable Vitamin K deficiency, Received Vitamin K again yesterday Monitor INR. Palliative consult for GOC      AMS since admission as per son: likely related to infection/sepsis/dehydration.  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 follows with Dr. Marcial. possible underlying vascular dementia. needs dementia w/u OP. fall and aspiration precautions. Not participating in care, po intake poor, Palliative consulted, will try to arrange family meeting for GOC    CAD s/p CABG:  with elevated troponin - T 0.32 - elevated in past, in setting of ESRD on HD  Cath 2/2018- LIMA to LAD patent, SVG to OM patent, SVG to RPDA patent   cardiology has discussed with son Darrin and they are unwilling for stress testing, cath, invasive - only want Medication   SANFORD 7/9/2019: LVEF 30-35.Ml-mod TR. No evidence of endocarditis.   Echo Limited 7/18: LVEF 35-40%.  cont  carvedilol.   Losartan.  Volume control with HD    ESRD on HD   Cont HD    c/o coccyx pain: as per wound care patient has Incontinence associated dermatitis, not decubitus ulcer.  xray--> no bony involvement. incontinence management . wound care cx appreciated. RN to make sure diaper change frequently

## 2019-08-01 NOTE — PROGRESS NOTE ADULT - SUBJECTIVE AND OBJECTIVE BOX
Patient seen and examined  Just came back after HD  Feels tired, moans intermittently; couldn't ascertain if any pain  no c/o CP SOB NV   No swelling feet    Vital Signs Last 24 Hrs  T(C): 36.3 (08-01-19 @ 10:10), Max: 36.5 (07-31-19 @ 16:19)  T(F): 97.4 (08-01-19 @ 10:10), Max: 97.7 (07-31-19 @ 16:19)  HR: 98 (08-01-19 @ 10:10) (83 - 98)  BP: 128/66 (08-01-19 @ 10:10) (128/66 - 191/77)  BP(mean): --  RR: 18 (08-01-19 @ 10:10) (18 - 18)  SpO2: 91% (08-01-19 @ 10:10) (91% - 100%)  PHYSICAL EXAM    GENERAL: NAD, Frail  NECK: Supple, No JVD/Bruit  NERVOUS SYSTEM:  A/O x3,   CHEST:  No rales, No rhonchi  HEART:  RRR, gr 2 murmur  ABDOMEN: Soft, NT/ND BS+  EXTREMITIES:  No Edema; No R arm swelling, incision well apposed  SKIN: No rashes    30 Jul 2019 06:52    136    |  100    |  33.0   ----------------------------<  86     4.2     |  23.0   |  4.14     Ca    8.0        30 Jul 2019 06:52  Phos  3.9       30 Jul 2019 06:52  Mg     2.1       30 Jul 2019 06:52    TPro  5.9    /  Alb  2.4    /  TBili  0.4    /  DBili  0.1    /  AST  24     /  ALT  <5     /  AlkPhos  126    30 Jul 2019 06:52                          11.0   6.21  )-----------( 167      ( 31 Jul 2019 08:46 )             35.2                           10.7   6.70  )-----------( 158      ( 01 Aug 2019 08:02 )             33.9   08-01    137  |  102  |  21.0<H>  ----------------------------<  90  3.6   |  23.0  |  3.28<H>    Ca    7.7<L>      01 Aug 2019 08:02

## 2019-08-02 DIAGNOSIS — R52 PAIN, UNSPECIFIED: ICD-10-CM

## 2019-08-02 DIAGNOSIS — R53.2 FUNCTIONAL QUADRIPLEGIA: ICD-10-CM

## 2019-08-02 LAB
ANION GAP SERPL CALC-SCNC: 12 MMOL/L — SIGNIFICANT CHANGE UP (ref 5–17)
BUN SERPL-MCNC: 12 MG/DL — SIGNIFICANT CHANGE UP (ref 8–20)
CALCIUM SERPL-MCNC: 7.9 MG/DL — LOW (ref 8.6–10.2)
CHLORIDE SERPL-SCNC: 105 MMOL/L — SIGNIFICANT CHANGE UP (ref 98–107)
CO2 SERPL-SCNC: 24 MMOL/L — SIGNIFICANT CHANGE UP (ref 22–29)
CREAT SERPL-MCNC: 3.01 MG/DL — HIGH (ref 0.5–1.3)
GLUCOSE BLDC GLUCOMTR-MCNC: 117 MG/DL — HIGH (ref 70–99)
GLUCOSE BLDC GLUCOMTR-MCNC: 89 MG/DL — SIGNIFICANT CHANGE UP (ref 70–99)
GLUCOSE BLDC GLUCOMTR-MCNC: 92 MG/DL — SIGNIFICANT CHANGE UP (ref 70–99)
GLUCOSE BLDC GLUCOMTR-MCNC: 93 MG/DL — SIGNIFICANT CHANGE UP (ref 70–99)
GLUCOSE SERPL-MCNC: 100 MG/DL — SIGNIFICANT CHANGE UP (ref 70–115)
HCT VFR BLD CALC: 34.3 % — LOW (ref 34.5–45)
HGB BLD-MCNC: 10.6 G/DL — LOW (ref 11.5–15.5)
MCHC RBC-ENTMCNC: 29.4 PG — SIGNIFICANT CHANGE UP (ref 27–34)
MCHC RBC-ENTMCNC: 30.9 GM/DL — LOW (ref 32–36)
MCV RBC AUTO: 95 FL — SIGNIFICANT CHANGE UP (ref 80–100)
PLATELET # BLD AUTO: 153 K/UL — SIGNIFICANT CHANGE UP (ref 150–400)
POTASSIUM SERPL-MCNC: 4 MMOL/L — SIGNIFICANT CHANGE UP (ref 3.5–5.3)
POTASSIUM SERPL-SCNC: 4 MMOL/L — SIGNIFICANT CHANGE UP (ref 3.5–5.3)
RBC # BLD: 3.61 M/UL — LOW (ref 3.8–5.2)
RBC # FLD: 23 % — HIGH (ref 10.3–14.5)
SODIUM SERPL-SCNC: 141 MMOL/L — SIGNIFICANT CHANGE UP (ref 135–145)
WBC # BLD: 8.29 K/UL — SIGNIFICANT CHANGE UP (ref 3.8–10.5)
WBC # FLD AUTO: 8.29 K/UL — SIGNIFICANT CHANGE UP (ref 3.8–10.5)

## 2019-08-02 PROCEDURE — 99233 SBSQ HOSP IP/OBS HIGH 50: CPT

## 2019-08-02 PROCEDURE — 99232 SBSQ HOSP IP/OBS MODERATE 35: CPT

## 2019-08-02 RX ORDER — OXYCODONE HYDROCHLORIDE 5 MG/1
5 TABLET ORAL EVERY 6 HOURS
Refills: 0 | Status: DISCONTINUED | OUTPATIENT
Start: 2019-08-02 | End: 2019-08-05

## 2019-08-02 RX ORDER — HYDROMORPHONE HYDROCHLORIDE 2 MG/ML
0.5 INJECTION INTRAMUSCULAR; INTRAVENOUS; SUBCUTANEOUS EVERY 6 HOURS
Refills: 0 | Status: DISCONTINUED | OUTPATIENT
Start: 2019-08-02 | End: 2019-08-05

## 2019-08-02 RX ADMIN — Medication 667 MILLIGRAM(S): at 12:09

## 2019-08-02 RX ADMIN — Medication 650 MILLIGRAM(S): at 21:31

## 2019-08-02 RX ADMIN — Medication 1 MILLIGRAM(S): at 12:09

## 2019-08-02 RX ADMIN — Medication 1 TABLET(S): at 12:09

## 2019-08-02 RX ADMIN — Medication 100 MILLIGRAM(S): at 13:48

## 2019-08-02 RX ADMIN — Medication 112 MICROGRAM(S): at 06:20

## 2019-08-02 RX ADMIN — CALCITRIOL 0.25 MICROGRAM(S): 0.5 CAPSULE ORAL at 12:09

## 2019-08-02 RX ADMIN — CARVEDILOL PHOSPHATE 6.25 MILLIGRAM(S): 80 CAPSULE, EXTENDED RELEASE ORAL at 17:53

## 2019-08-02 RX ADMIN — ATORVASTATIN CALCIUM 40 MILLIGRAM(S): 80 TABLET, FILM COATED ORAL at 21:31

## 2019-08-02 RX ADMIN — LOSARTAN POTASSIUM 50 MILLIGRAM(S): 100 TABLET, FILM COATED ORAL at 06:20

## 2019-08-02 RX ADMIN — CHLORHEXIDINE GLUCONATE 1 APPLICATION(S): 213 SOLUTION TOPICAL at 06:20

## 2019-08-02 RX ADMIN — Medication 650 MILLIGRAM(S): at 22:05

## 2019-08-02 RX ADMIN — PANTOPRAZOLE SODIUM 40 MILLIGRAM(S): 20 TABLET, DELAYED RELEASE ORAL at 06:20

## 2019-08-02 RX ADMIN — LIDOCAINE 1 PATCH: 4 CREAM TOPICAL at 12:10

## 2019-08-02 RX ADMIN — Medication 667 MILLIGRAM(S): at 17:53

## 2019-08-02 RX ADMIN — LIDOCAINE 1 PATCH: 4 CREAM TOPICAL at 19:34

## 2019-08-02 RX ADMIN — CARVEDILOL PHOSPHATE 6.25 MILLIGRAM(S): 80 CAPSULE, EXTENDED RELEASE ORAL at 06:20

## 2019-08-02 RX ADMIN — AMLODIPINE BESYLATE 10 MILLIGRAM(S): 2.5 TABLET ORAL at 06:20

## 2019-08-02 NOTE — PROGRESS NOTE ADULT - SUBJECTIVE AND OBJECTIVE BOX
[facility]  INFECTIOUS DISEASES AND INTERNAL MEDICINE at [place]  =======================================================  Paresh Stark MD  Diplomates American Board of Internal Medicine and Infectious Diseases  Tel: [phone]      Fax: [phone]  =======================================================    LAUREN ROBERSON [id number]    Follow up: MSSA bacteremia 2/2 AVG infection. AVg removed and permacath placed    Allergies:  penicillin (Anaphylaxis)  seafood (Anaphylaxis)  Send Nepro TID- RD OK (Unknown)  shellfish (Anaphylaxis)      Medications:  acetaminophen   Tablet .. 650 milliGRAM(s) Oral every 6 hours PRN  amLODIPine   Tablet 10 milliGRAM(s) Oral daily  atorvastatin 40 milliGRAM(s) Oral at bedtime  calcitriol   Capsule 0.25 MICROGram(s) Oral daily  calcium acetate 667 milliGRAM(s) Oral three times a day with meals  carvedilol 6.25 milliGRAM(s) Oral every 12 hours  ceFAZolin   IVPB 1000 milliGRAM(s) IV Intermittent every 24 hours  chlorhexidine 2% Cloths 1 Application(s) Topical <User Schedule>  dextrose 40% Gel 15 Gram(s) Oral once PRN  dextrose 5%. 1000 milliLiter(s) IV Continuous <Continuous>  dextrose 5%. 1000 milliLiter(s) IV Continuous <Continuous>  dextrose 50% Injectable 12.5 Gram(s) IV Push once  epoetin barb Injectable 63368 Unit(s) IV Push <User Schedule>  folic acid 1 milliGRAM(s) Oral daily  glucagon  Injectable 1 milliGRAM(s) IntraMuscular once PRN  hydrALAZINE Injectable 5 milliGRAM(s) IV Push every 6 hours PRN  HYDROmorphone  Injectable 0.5 milliGRAM(s) IV Push every 6 hours PRN  insulin lispro (HumaLOG) corrective regimen sliding scale   SubCutaneous three times a day before meals  levothyroxine 112 MICROGram(s) Oral daily  lidocaine   Patch 1 Patch Transdermal daily  losartan 50 milliGRAM(s) Oral daily  Nephro-king 1 Tablet(s) Oral daily  oxyCODONE    IR 5 milliGRAM(s) Oral every 6 hours PRN  pantoprazole    Tablet 40 milliGRAM(s) Oral before breakfast  phytonadione  IVPB 10 milliGRAM(s) IV Intermittent once            REVIEW OF SYSTEMS:  CONSTITUTIONAL:  No Fever or chills  HEENT:   No diplopia or blurred vision.  No earache, sore throat or runny nose.  CARDIOVASCULAR:  No pressure, squeezing, strangling, tightness, heaviness or aching about the chest, neck, axilla or epigastrium.  RESPIRATORY:  No cough, shortness of breath  GASTROINTESTINAL:  No nausea, vomiting or diarrhea.  GENITOURINARY:  No dysuria, frequency or urgency. No Blood in urine  MUSCULOSKELETAL:  no joint aches, no muscle pain  SKIN:  No change in skin, hair or nails.  NEUROLOGIC:  No Headaches, seizures or weakness.  PSYCHIATRIC:  No disorder of thought or mood.  ENDOCRINE:  No heat or cold intolerance  HEMATOLOGICAL:  No easy bruising or bleeding.            Physical Exam:  ICU Vital Signs Last 24 Hrs  T(C): 36.5 (02 Aug 2019 07:23), Max: 36.8 (01 Aug 2019 15:25)  T(F): 97.7 (02 Aug 2019 07:23), Max: 98.3 (01 Aug 2019 15:25)  HR: 86 (02 Aug 2019 07:23) (83 - 95)  BP: 133/78 (02 Aug 2019 07:23) (133/78 - 149/70)  BP(mean): --  ABP: --  ABP(mean): --  RR: 18 (02 Aug 2019 07:23) (18 - 18)  SpO2: 97% (02 Aug 2019 07:23) (95% - 97%)      GEN: NAD, drowsy  HEENT: normocephalic and atraumatic. EOMI. PERRL.  Anicteric   NECK: Supple.   LUNGS: Clear to auscultation.  HEART: Regular rate and rhythm without murmur. right chest permacath PPM  ABDOMEN: Soft, nontender, and nondistended.  Positive bowel sounds.    : No CVA tenderness  EXTREMITIES: Without any edema.  MSK: no joint swelling  NEUROLOGIC: Cranial nerves II through XII are grossly intact. No focal deficits  PSYCHIATRIC: Appropriate affect .  SKIN: No Rash      Labs:  08-02    141  |  105  |  12.0  ----------------------------<  100  4.0   |  24.0  |  3.01<H>    Ca    7.9<L>      02 Aug 2019 06:11                            10.6   8.29  )-----------( 153      ( 02 Aug 2019 06:11 )             34.3       PT/INR - ( 01 Aug 2019 08:02 )   PT: 15.6 sec;   INR: 1.34 ratio         PTT - ( 01 Aug 2019 08:02 )  PTT:38.4 sec          CAPILLARY BLOOD GLUCOSE      POCT Blood Glucose.: 89 mg/dL (02 Aug 2019 12:06)  POCT Blood Glucose.: 92 mg/dL (02 Aug 2019 08:15)  POCT Blood Glucose.: 167 mg/dL (01 Aug 2019 20:50)  POCT Blood Glucose.: 102 mg/dL (01 Aug 2019 17:50)        RECENT CULTURES:  07-26 @ 18:38 .Blood     No growth at 5 days.        07-25 @ 17:04 .Other Tissue R Upper Arm Graft     Culture is being performed.        07-25 @ 11:10 .Tissue Tissue R Upper Arm Graft Klebsiella pneumoniae    Moderate Klebsiella pneumoniae    No WBC's or organisms seen      07-25 @ 11:02 .Surgical Swab Swabs R Upper Arm     No growth at 5 days.    No WBC's or organisms seen

## 2019-08-02 NOTE — PROGRESS NOTE ADULT - SUBJECTIVE AND OBJECTIVE BOX
CC:  follow up GOC, symptoms    INTERVAL HPI/OVERNIGHT EVENTS:  Informed by NP- Alina Rajesh : had discussion with son earlier today. Informed of medical condition and prognosis. He wishes to cont HD, and wants mother to be comfortable.   Agrees to DNR/I.     PRESENT SYMPTOMS: SOURCE:  Patient/Family/Team    PAIN SCALE:  0 = none  1 = mild   2 = moderate  3 = severe    Pain: reported episodes of moaning    Dyspnea:  [ ] YES [ x] NO  Anxiety:  [ ] YES [x ] NO  Fatigue: [ x] YES [ ] NO  Nausea: [ ] YES [ x] NO  Loss of Appetite: [ x] YES [ ] NO  Other symptoms: __________    MEDICATIONS  (STANDING):  amLODIPine   Tablet 10 milliGRAM(s) Oral daily  atorvastatin 40 milliGRAM(s) Oral at bedtime  calcitriol   Capsule 0.25 MICROGram(s) Oral daily  calcium acetate 667 milliGRAM(s) Oral three times a day with meals  carvedilol 6.25 milliGRAM(s) Oral every 12 hours  ceFAZolin   IVPB 1000 milliGRAM(s) IV Intermittent every 24 hours  chlorhexidine 2% Cloths 1 Application(s) Topical <User Schedule>  dextrose 5%. 1000 milliLiter(s) (50 mL/Hr) IV Continuous <Continuous>  dextrose 5%. 1000 milliLiter(s) (50 mL/Hr) IV Continuous <Continuous>  dextrose 50% Injectable 12.5 Gram(s) IV Push once  epoetin barb Injectable 83446 Unit(s) IV Push <User Schedule>  folic acid 1 milliGRAM(s) Oral daily  insulin lispro (HumaLOG) corrective regimen sliding scale   SubCutaneous three times a day before meals  levothyroxine 112 MICROGram(s) Oral daily  lidocaine   Patch 1 Patch Transdermal daily  losartan 50 milliGRAM(s) Oral daily  Nephro-king 1 Tablet(s) Oral daily  pantoprazole    Tablet 40 milliGRAM(s) Oral before breakfast  phytonadione  IVPB 10 milliGRAM(s) IV Intermittent once    MEDICATIONS  (PRN):  acetaminophen   Tablet .. 650 milliGRAM(s) Oral every 6 hours PRN Temp greater or equal to 38C (100.4F), Mild Pain (1 - 3)  dextrose 40% Gel 15 Gram(s) Oral once PRN Blood Glucose LESS THAN 70 milliGRAM(s)/deciliter  glucagon  Injectable 1 milliGRAM(s) IntraMuscular once PRN Glucose LESS THAN 70 milligrams/deciliter  hydrALAZINE Injectable 5 milliGRAM(s) IV Push every 6 hours PRN BP above 160/100  HYDROmorphone  Injectable 0.5 milliGRAM(s) IV Push every 6 hours PRN Severe Pain (7 - 10)  oxyCODONE    IR 5 milliGRAM(s) Oral every 6 hours PRN Moderate to severe pain      Allergies    penicillin (Anaphylaxis)  seafood (Anaphylaxis)  shellfish (Anaphylaxis)    Intolerances    Send Nepro TID- RD OK (Unknown)      Karnofsky Performance Score/Palliative Performance Status Version 2:  30       %    Vital Signs Last 24 Hrs  T(C): 36.5 (02 Aug 2019 07:23), Max: 36.8 (01 Aug 2019 15:25)  T(F): 97.7 (02 Aug 2019 07:23), Max: 98.3 (01 Aug 2019 15:25)  HR: 86 (02 Aug 2019 07:23) (83 - 95)  BP: 133/78 (02 Aug 2019 07:23) (133/78 - 149/70)  BP(mean): --  RR: 18 (02 Aug 2019 07:23) (18 - 18)  SpO2: 97% (02 Aug 2019 07:23) (95% - 97%)    PHYSICAL EXAM:    General: Lethargic NAD, mumbles  HEENT: [ ]x normal  [ ] dry mouth  [ ] ET tube/trach    Lungs: [x ] comfortable [ ] tachypnea/labored breathing  [ ] excessive secretions    CV: [ ]x normal  [ ] tachycardia    GI: [x ] normal  [ ] distended  [ ] tender  [ ] no BS               [ ] PEG/NG/OG tube    : + permacath, intact, area clean/dry    MSK: [ ] normal  [x ] weakness  [ ] edema             [ ] ambulatory  [x ] bedbound/wheelchair bound    Skin: [ ] normal  [ ] pressure ulcers- Stage_____  [x ] no rash    LABS:                        10.6   8.29  )-----------( 153      ( 02 Aug 2019 06:11 )             34.3     08-02    141  |  105  |  12.0  ----------------------------<  100  4.0   |  24.0  |  3.01<H>    Ca    7.9<L>      02 Aug 2019 06:11      PT/INR - ( 01 Aug 2019 08:02 )   PT: 15.6 sec;   INR: 1.34 ratio         PTT - ( 01 Aug 2019 08:02 )  PTT:38.4 sec    I&O's Summary    01 Aug 2019 07:01  -  02 Aug 2019 07:00  --------------------------------------------------------  IN: 0 mL / OUT: 700 mL / NET: -700 mL      Thank you for the opportunity to assist with the care of this patient.   [facility] [phone].

## 2019-08-02 NOTE — PROGRESS NOTE ADULT - ASSESSMENT
87yoF hx ESRD on HD (M and F only), CAD s/p CABG, HTN, DM, PAD, hypothyroidism presenting with generalized weakness.  On admission, febrile, leukocytosis, UA positive, with  Blood cultures positive for staph aureus.  SANFORD done, no vegetation. s/p indium scan positive for R AV graft infection, Tissue culture of YONIS shows gram - rods  Sepsis :  Staph aureus bacteremia due to R arm AV graft infection.   s/p AVG resection and Jeffrey cath placed july 23, with bleeding from AVG on july 25  s/p 2 units PRBC 7/26. monitor cbc.   S/P PC POD#23  SANFORD negative for vegetation  Antibiotics as per ID    Supratherapeutic INR:   Unclear cause. LFT's normal.  ASA/plavix stopped.  Possible due to poor po intake. Heme/Onc input appreciated. Probable Vitamin K deficiency, Received Vitamin K x 2.      AMS since admission as per son: likely related to infection/sepsis/dehydration.  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 follows with Dr. Marcial. possible underlying vascular dementia. needs dementia w/u OP. fall and aspiration precautions. Not participating in care, po intake poor, Palliative following, patient is now DNR/DNI with son wishing to focus on comfort measures    CAD s/p CABG:  with elevated troponin - T 0.32 - elevated in past, in setting of ESRD on HD  Cath 2/2018- LIMA to LAD patent, SVG to OM patent, SVG to RPDA patent   cardiology has discussed with son Darrin and they are unwilling for stress testing, cath, invasive - only want Medication   SANFORD 7/9/2019: LVEF 30-35.Ml-mod TR. No evidence of endocarditis.   Echo Limited 7/18: LVEF 35-40%.  cont  carvedilol.   Losartan.  Volume control with HD    ESRD on HD   Cont HD    c/o coccyx pain: as per wound care patient has Incontinence associated dermatitis, not decubitus ulcer.  xray--> no bony involvement. incontinence management ,  wound care     Dispo: Patient not particip[ating with PT, discussion with son and would like to pursue placement in LTC, has made patient DNR/DNI. Palliative to further explore advanced directives.

## 2019-08-02 NOTE — PROGRESS NOTE ADULT - ASSESSMENT
ESRD HD today (M and F only), AVG right side 8/3/17   CAD s/p CABG, HTN, DM, PAD, hypothyroidism presenting with generalized weakness, dysuria.   leukocytosis better  Blood cx 4/4 Staph aureus. Abx per ID  NM scan suspicious for AVG infection  had giuliana cath for HD, changed now to PC      Will follow

## 2019-08-02 NOTE — PROGRESS NOTE ADULT - ASSESSMENT
87yr woman  hx ESRD on HD, CAD s/p CABG, HTN, DM, PAD,  admitted with UTI, Bacteremia with R AV graft infection. Patient with prolonged hospital stay

## 2019-08-02 NOTE — PROGRESS NOTE ADULT - PROBLEM SELECTOR PLAN 6
Discussed with Alina Mena NP who met with son earlier today.  Agreed to DNR/I. He wishes to cont HD, but also wants mother not to suffer. Looking into LTC.  Will reach out to have further GOC discussions. Given patient with poor outlook- consider comfort measures at NH.

## 2019-08-02 NOTE — PROGRESS NOTE ADULT - PROBLEM SELECTOR PLAN 5
Reported patient with signs of pain -   Likely from s/p permacath procedure and or sacral dermatitis  cont Lidocaine, Tylenol PRN for mild pain  Oxycodone 5mg po q6hr PRN   Dilaudid 0.5mg IVP P6hr PRN BTP

## 2019-08-02 NOTE — PROGRESS NOTE ADULT - ASSESSMENT
87yoF hx ESRD on HD (M and F only), AVG right side 8/3/17   CAD s/p CABG, HTN, DM, PAD, hypothyroidism presenting with generalized weakness, dysuria.   pain at graft site for 1 month.  fever in .6 now afebrile  leukocytosis to 14 now normal  Blood cx 4/4 Staph aureus.   Imaging with no focus (no pneumonia, abscess, OM). NM scan suspicious for AVG infection.  Overall Staph aureus bacteremia 2/2 AVG infection, elevated INR likely due to nutritional deficiency    Recommend:  - Blood cultures + 4/4 Staph aureus. BCX 7/14 NGTD BCX 07/26 NGTD. No signs of sepsis  - AVG removed 07/25 for source control. AVG growing Klebsiella (R) to cefazolin and (S) to cefepime. Surgical swab NGTD. Was breifly on cefepime through surgery  - s/p permacath placement 7/30  - Resume cefazolin 1 g IV daily, if an HD day, give after HD. When ready for discharge can dose Cefazolin 2 g on Monday, 2 g on Wednesday and 3 g on Friday post HD through 9/4/19 (starting from day of AVG removal)  - SANFORD for endocarditis/lead vegetations (-)  - Trend Fever  - Trend WBC count  - Palliate care discussions underway.    Will sign off. Please call if questions arise.

## 2019-08-02 NOTE — PROGRESS NOTE ADULT - SUBJECTIVE AND OBJECTIVE BOX
NEPHROLOGY INTERVAL HPI/OVERNIGHT EVENTS:    Examined   Clinically same  Minimally verbal    MEDICATIONS  (STANDING):  amLODIPine   Tablet 10 milliGRAM(s) Oral daily  atorvastatin 40 milliGRAM(s) Oral at bedtime  calcitriol   Capsule 0.25 MICROGram(s) Oral daily  calcium acetate 667 milliGRAM(s) Oral three times a day with meals  carvedilol 6.25 milliGRAM(s) Oral every 12 hours  ceFAZolin   IVPB 1000 milliGRAM(s) IV Intermittent every 24 hours  chlorhexidine 2% Cloths 1 Application(s) Topical <User Schedule>  dextrose 5%. 1000 milliLiter(s) (50 mL/Hr) IV Continuous <Continuous>  dextrose 5%. 1000 milliLiter(s) (50 mL/Hr) IV Continuous <Continuous>  dextrose 50% Injectable 12.5 Gram(s) IV Push once  epoetin barb Injectable 53979 Unit(s) IV Push <User Schedule>  folic acid 1 milliGRAM(s) Oral daily  insulin lispro (HumaLOG) corrective regimen sliding scale   SubCutaneous three times a day before meals  levothyroxine 112 MICROGram(s) Oral daily  lidocaine   Patch 1 Patch Transdermal daily  losartan 50 milliGRAM(s) Oral daily  Nephro-king 1 Tablet(s) Oral daily  pantoprazole    Tablet 40 milliGRAM(s) Oral before breakfast  phytonadione  IVPB 10 milliGRAM(s) IV Intermittent once    MEDICATIONS  (PRN):  acetaminophen   Tablet .. 650 milliGRAM(s) Oral every 6 hours PRN Temp greater or equal to 38C (100.4F), Mild Pain (1 - 3)  dextrose 40% Gel 15 Gram(s) Oral once PRN Blood Glucose LESS THAN 70 milliGRAM(s)/deciliter  glucagon  Injectable 1 milliGRAM(s) IntraMuscular once PRN Glucose LESS THAN 70 milligrams/deciliter  hydrALAZINE Injectable 5 milliGRAM(s) IV Push every 6 hours PRN BP above 160/100  HYDROmorphone  Injectable 0.5 milliGRAM(s) IV Push every 6 hours PRN Severe Pain (7 - 10)  oxyCODONE    IR 5 milliGRAM(s) Oral every 6 hours PRN Moderate to severe pain      Allergies    penicillin (Anaphylaxis)  seafood (Anaphylaxis)  shellfish (Anaphylaxis)    Intolerances    Send Nepro TID- RD OK (Unknown)      Vital Signs Last 24 Hrs  T(C): 36.6 (02 Aug 2019 15:53), Max: 36.7 (02 Aug 2019 00:26)  T(F): 97.9 (02 Aug 2019 15:53), Max: 98.1 (02 Aug 2019 00:26)  HR: 89 (02 Aug 2019 15:53) (83 - 89)  BP: 133/78 (02 Aug 2019 07:23) (133/78 - 149/70)  BP(mean): --  RR: 18 (02 Aug 2019 15:53) (18 - 18)  SpO2: 100% (02 Aug 2019 15:53) (97% - 100%)  Daily     Daily     PHYSICAL EXAM:  GENERAL: NAD, Frail  NECK: Supple, No JVD/Bruit  NERVOUS SYSTEM:  A/O x3,   CHEST:  No rales, No rhonchi  HEART:  RRR, gr 2 murmur  ABDOMEN: Soft, NT/ND BS+  EXTREMITIES:  No Edema; No R arm swelling    LABS:                        10.6   8.29  )-----------( 153      ( 02 Aug 2019 06:11 )             34.3     08-02    141  |  105  |  12.0  ----------------------------<  100  4.0   |  24.0  |  3.01<H>    Ca    7.9<L>      02 Aug 2019 06:11      PT/INR - ( 01 Aug 2019 08:02 )   PT: 15.6 sec;   INR: 1.34 ratio         PTT - ( 01 Aug 2019 08:02 )  PTT:38.4 sec            RADIOLOGY & ADDITIONAL TESTS:

## 2019-08-02 NOTE — PROGRESS NOTE ADULT - SUBJECTIVE AND OBJECTIVE BOX
CC: weakness, sepsis, ESRD    INTERVAL HPI/OVERNIGHT EVENTS: Patient seen and examined. Occasional yelling out, moaning. Extensive talk with son at bedside regarding patient, informed of medical condition and prognosis. He wishes to cont HD, and wants mother to be comfortable. He has made her DNR and DNI and would like to place her if LTC facility.     Vital Signs Last 24 Hrs  T(C): 36.5 (02 Aug 2019 07:23), Max: 36.8 (01 Aug 2019 15:25)  T(F): 97.7 (02 Aug 2019 07:23), Max: 98.3 (01 Aug 2019 15:25)  HR: 86 (02 Aug 2019 07:23) (83 - 95)  BP: 133/78 (02 Aug 2019 07:23) (133/78 - 149/70)  BP(mean): --  RR: 18 (02 Aug 2019 07:23) (18 - 18)  SpO2: 97% (02 Aug 2019 07:23) (95% - 97%)    ROS: Unable to assess due to mental status      Physical Exam:  GENERAL: NAD  HEENT:  Normocephalic and atraumatic. MMM  NECK: Supple.   CARDIOVASCULAR: RRR S1, S2. No murmur/rubs/gallop  LUNGS: BLAE+, no rales, no wheezing, no rhonchi.    ABDOMEN: ND. Soft,  NT, no guarding / rebound / rigidity. BS normoactive. No CVA tenderness.    EXTREMITIES: LUE +edema, +distal pulses  SKIN: no rash. Incontinence associated dermatitis     I&O's Detail    01 Aug 2019 07:01  -  02 Aug 2019 07:00  --------------------------------------------------------  IN:  Total IN: 0 mL    OUT:    Other: 700 mL  Total OUT: 700 mL    Total NET: -700 mL                                    10.6   8.29  )-----------( 153      ( 02 Aug 2019 06:11 )             34.3     02 Aug 2019 06:11    141    |  105    |  12.0   ----------------------------<  100    4.0     |  24.0   |  3.01     Ca    7.9        02 Aug 2019 06:11      PT/INR - ( 01 Aug 2019 08:02 )   PT: 15.6 sec;   INR: 1.34 ratio         PTT - ( 01 Aug 2019 08:02 )  PTT:38.4 sec  CAPILLARY BLOOD GLUCOSE      POCT Blood Glucose.: 89 mg/dL (02 Aug 2019 12:06)  POCT Blood Glucose.: 92 mg/dL (02 Aug 2019 08:15)  POCT Blood Glucose.: 167 mg/dL (01 Aug 2019 20:50)  POCT Blood Glucose.: 102 mg/dL (01 Aug 2019 17:50)          MEDICATIONS  (STANDING):  amLODIPine   Tablet 10 milliGRAM(s) Oral daily  atorvastatin 40 milliGRAM(s) Oral at bedtime  calcitriol   Capsule 0.25 MICROGram(s) Oral daily  calcium acetate 667 milliGRAM(s) Oral three times a day with meals  carvedilol 6.25 milliGRAM(s) Oral every 12 hours  ceFAZolin   IVPB 1000 milliGRAM(s) IV Intermittent every 24 hours  chlorhexidine 2% Cloths 1 Application(s) Topical <User Schedule>  dextrose 5%. 1000 milliLiter(s) (50 mL/Hr) IV Continuous <Continuous>  dextrose 5%. 1000 milliLiter(s) (50 mL/Hr) IV Continuous <Continuous>  dextrose 50% Injectable 12.5 Gram(s) IV Push once  epoetin barb Injectable 27205 Unit(s) IV Push <User Schedule>  folic acid 1 milliGRAM(s) Oral daily  insulin lispro (HumaLOG) corrective regimen sliding scale   SubCutaneous three times a day before meals  levothyroxine 112 MICROGram(s) Oral daily  lidocaine   Patch 1 Patch Transdermal daily  losartan 50 milliGRAM(s) Oral daily  Nephro-king 1 Tablet(s) Oral daily  pantoprazole    Tablet 40 milliGRAM(s) Oral before breakfast  phytonadione  IVPB 10 milliGRAM(s) IV Intermittent once    MEDICATIONS  (PRN):  acetaminophen   Tablet .. 650 milliGRAM(s) Oral every 6 hours PRN Temp greater or equal to 38C (100.4F), Mild Pain (1 - 3)  dextrose 40% Gel 15 Gram(s) Oral once PRN Blood Glucose LESS THAN 70 milliGRAM(s)/deciliter  glucagon  Injectable 1 milliGRAM(s) IntraMuscular once PRN Glucose LESS THAN 70 milligrams/deciliter  hydrALAZINE Injectable 5 milliGRAM(s) IV Push every 6 hours PRN BP above 160/100  HYDROmorphone  Injectable 0.5 milliGRAM(s) IV Push every 6 hours PRN Severe Pain (7 - 10)  oxyCODONE    IR 5 milliGRAM(s) Oral every 6 hours PRN Moderate to severe pain      RADIOLOGY & ADDITIONAL TESTS:

## 2019-08-02 NOTE — PROGRESS NOTE ADULT - ATTENDING COMMENTS
ESRD on HD.   AVG infection status post explant.  New Tunneled HD catheter  Delirium multifactorial- uremia, dementia infection, prolong hospitalization  Discussed with son today.  Agree with MARK/SNF/Long term care facility .    Consult palliative care as patient maybe a candidate give 4 straight weeks of hospitalization with no improvement, rather decline in functional status, Supportive care

## 2019-08-03 LAB
ANION GAP SERPL CALC-SCNC: 10 MMOL/L — SIGNIFICANT CHANGE UP (ref 5–17)
BUN SERPL-MCNC: 19 MG/DL — SIGNIFICANT CHANGE UP (ref 8–20)
CALCIUM SERPL-MCNC: 7.9 MG/DL — LOW (ref 8.6–10.2)
CHLORIDE SERPL-SCNC: 101 MMOL/L — SIGNIFICANT CHANGE UP (ref 98–107)
CO2 SERPL-SCNC: 24 MMOL/L — SIGNIFICANT CHANGE UP (ref 22–29)
CREAT SERPL-MCNC: 3.54 MG/DL — HIGH (ref 0.5–1.3)
GLUCOSE BLDC GLUCOMTR-MCNC: 105 MG/DL — HIGH (ref 70–99)
GLUCOSE BLDC GLUCOMTR-MCNC: 175 MG/DL — HIGH (ref 70–99)
GLUCOSE BLDC GLUCOMTR-MCNC: 71 MG/DL — SIGNIFICANT CHANGE UP (ref 70–99)
GLUCOSE BLDC GLUCOMTR-MCNC: 90 MG/DL — SIGNIFICANT CHANGE UP (ref 70–99)
GLUCOSE BLDC GLUCOMTR-MCNC: 93 MG/DL — SIGNIFICANT CHANGE UP (ref 70–99)
GLUCOSE SERPL-MCNC: 110 MG/DL — SIGNIFICANT CHANGE UP (ref 70–115)
HCT VFR BLD CALC: 33.4 % — LOW (ref 34.5–45)
HGB BLD-MCNC: 10.4 G/DL — LOW (ref 11.5–15.5)
MCHC RBC-ENTMCNC: 29.6 PG — SIGNIFICANT CHANGE UP (ref 27–34)
MCHC RBC-ENTMCNC: 31.1 GM/DL — LOW (ref 32–36)
MCV RBC AUTO: 95.2 FL — SIGNIFICANT CHANGE UP (ref 80–100)
PHOSPHATE SERPL-MCNC: 2.9 MG/DL — SIGNIFICANT CHANGE UP (ref 2.4–4.7)
PLATELET # BLD AUTO: 171 K/UL — SIGNIFICANT CHANGE UP (ref 150–400)
POTASSIUM SERPL-MCNC: 3.8 MMOL/L — SIGNIFICANT CHANGE UP (ref 3.5–5.3)
POTASSIUM SERPL-SCNC: 3.8 MMOL/L — SIGNIFICANT CHANGE UP (ref 3.5–5.3)
RBC # BLD: 3.51 M/UL — LOW (ref 3.8–5.2)
RBC # FLD: 23.7 % — HIGH (ref 10.3–14.5)
SODIUM SERPL-SCNC: 135 MMOL/L — SIGNIFICANT CHANGE UP (ref 135–145)
WBC # BLD: 9.41 K/UL — SIGNIFICANT CHANGE UP (ref 3.8–10.5)
WBC # FLD AUTO: 9.41 K/UL — SIGNIFICANT CHANGE UP (ref 3.8–10.5)

## 2019-08-03 PROCEDURE — 99233 SBSQ HOSP IP/OBS HIGH 50: CPT

## 2019-08-03 RX ADMIN — Medication 650 MILLIGRAM(S): at 20:54

## 2019-08-03 RX ADMIN — Medication 1 MILLIGRAM(S): at 12:54

## 2019-08-03 RX ADMIN — LOSARTAN POTASSIUM 50 MILLIGRAM(S): 100 TABLET, FILM COATED ORAL at 05:24

## 2019-08-03 RX ADMIN — LIDOCAINE 1 PATCH: 4 CREAM TOPICAL at 00:22

## 2019-08-03 RX ADMIN — Medication 667 MILLIGRAM(S): at 17:53

## 2019-08-03 RX ADMIN — Medication 1 TABLET(S): at 12:57

## 2019-08-03 RX ADMIN — Medication 112 MICROGRAM(S): at 05:24

## 2019-08-03 RX ADMIN — PANTOPRAZOLE SODIUM 40 MILLIGRAM(S): 20 TABLET, DELAYED RELEASE ORAL at 05:24

## 2019-08-03 RX ADMIN — AMLODIPINE BESYLATE 10 MILLIGRAM(S): 2.5 TABLET ORAL at 05:24

## 2019-08-03 RX ADMIN — CARVEDILOL PHOSPHATE 6.25 MILLIGRAM(S): 80 CAPSULE, EXTENDED RELEASE ORAL at 17:53

## 2019-08-03 RX ADMIN — CHLORHEXIDINE GLUCONATE 1 APPLICATION(S): 213 SOLUTION TOPICAL at 05:24

## 2019-08-03 RX ADMIN — Medication 667 MILLIGRAM(S): at 12:57

## 2019-08-03 RX ADMIN — ERYTHROPOIETIN 10000 UNIT(S): 10000 INJECTION, SOLUTION INTRAVENOUS; SUBCUTANEOUS at 13:49

## 2019-08-03 RX ADMIN — CALCITRIOL 0.25 MICROGRAM(S): 0.5 CAPSULE ORAL at 12:57

## 2019-08-03 RX ADMIN — Medication 100 MILLIGRAM(S): at 13:01

## 2019-08-03 RX ADMIN — CARVEDILOL PHOSPHATE 6.25 MILLIGRAM(S): 80 CAPSULE, EXTENDED RELEASE ORAL at 05:24

## 2019-08-03 RX ADMIN — Medication 667 MILLIGRAM(S): at 08:19

## 2019-08-03 RX ADMIN — LIDOCAINE 1 PATCH: 4 CREAM TOPICAL at 19:59

## 2019-08-03 RX ADMIN — ATORVASTATIN CALCIUM 40 MILLIGRAM(S): 80 TABLET, FILM COATED ORAL at 22:56

## 2019-08-03 RX ADMIN — LIDOCAINE 1 PATCH: 4 CREAM TOPICAL at 12:55

## 2019-08-03 NOTE — PROGRESS NOTE ADULT - SUBJECTIVE AND OBJECTIVE BOX
CC: Metabolic encephalopathy secondary to sepsis bacteremia from AVG infection, Removed.  ESRD on HD via permacath placed.   HPI:  87yoF hx ESRD on HD (M and F only), CAD s/p CABG, HTN, DM, PAD, hypothyroidism presenting with generalized weakness.  Pt is poor historian despite use of   She reports generalized weakness for past few days associated with generalized, abdominal pain that she is unable to describe associated with nausea, some episodes of vomiting.  Reports difficulty with urination and ?dysuria but believes her urinary symptoms are due to not drinking enough fluids recently. Unable to explain why she is only on HD twice per week, but says last HD session was on 7/1.  Denies fevers, chest pain, dyspnea, endorses chronic pain in face and all extremities which is chronic for her.  On admission, febrile, leukocytosis, UA positive, was pan scanned by ED and CT abd/pelvis showed haziness around the gallbladder however follow up abd US was negative for cholelithiasis and Stewart's sign and no LFT elevation on labs. (04 Jul 2019 22:54)    REVIEW OF SYSTEMS:    Patient denied fever, chills, abdominal pain, nausea, vomiting, cough, shortness of breath, chest pain or palpitations    Vital Signs Last 24 Hrs  T(C): 36.7 (03 Aug 2019 07:43), Max: 37.2 (02 Aug 2019 23:50)  T(F): 98 (03 Aug 2019 07:43), Max: 99 (02 Aug 2019 23:50)  HR: 84 (03 Aug 2019 07:43) (84 - 98)  BP: 130/69 (03 Aug 2019 07:43) (130/69 - 156/76)  BP(mean): --  RR: 20 (03 Aug 2019 07:43) (18 - 20)  SpO2: 94% (03 Aug 2019 07:43) (94% - 100%)I&O's Summary    02 Aug 2019 07:01  -  03 Aug 2019 07:00  --------------------------------------------------------  IN: 240 mL / OUT: 0 mL / NET: 240 mL      PHYSICAL EXAM:  GENERAL: NAD,   HEENT: PERRL, +EOMI, anicteric, no Habematolel  NECK: Supple, No JVD   CHEST/LUNG: CTA bilaterally; Normal effort  HEART: S1S2 Normal intensity, no murmurs, gallops or rubs noted  ABDOMEN: Soft, BS Normoactive, NT, ND, no HSM noted  EXTREMITIES:  2+ radial and DP pulses noted, no clubbing, cyanosis, or edema noted, Bedbound   SKIN: Gluteal ulcers   NEURO: Lethargy , no focal deficits noted, CN II-XII intact  PSYCH: Depressed  mood and affect; insight/judgement inappropriate  LABS:                        10.6   8.29  )-----------( 153      ( 02 Aug 2019 06:11 )             34.3     08-02    141  |  105  |  12.0  ----------------------------<  100  4.0   |  24.0  |  3.01<H>    Ca    7.9<L>      02 Aug 2019 06:11          RADIOLOGY & ADDITIONAL TESTS:    MEDICATIONS:  MEDICATIONS  (STANDING):  amLODIPine   Tablet 10 milliGRAM(s) Oral daily  atorvastatin 40 milliGRAM(s) Oral at bedtime  calcitriol   Capsule 0.25 MICROGram(s) Oral daily  calcium acetate 667 milliGRAM(s) Oral three times a day with meals  carvedilol 6.25 milliGRAM(s) Oral every 12 hours  ceFAZolin   IVPB 1000 milliGRAM(s) IV Intermittent every 24 hours  chlorhexidine 2% Cloths 1 Application(s) Topical <User Schedule>  dextrose 5%. 1000 milliLiter(s) (50 mL/Hr) IV Continuous <Continuous>  dextrose 5%. 1000 milliLiter(s) (50 mL/Hr) IV Continuous <Continuous>  dextrose 50% Injectable 12.5 Gram(s) IV Push once  epoetin barb Injectable 46930 Unit(s) IV Push <User Schedule>  folic acid 1 milliGRAM(s) Oral daily  insulin lispro (HumaLOG) corrective regimen sliding scale   SubCutaneous three times a day before meals  levothyroxine 112 MICROGram(s) Oral daily  lidocaine   Patch 1 Patch Transdermal daily  losartan 50 milliGRAM(s) Oral daily  Nephro-king 1 Tablet(s) Oral daily  pantoprazole    Tablet 40 milliGRAM(s) Oral before breakfast  phytonadione  IVPB 10 milliGRAM(s) IV Intermittent once    MEDICATIONS  (PRN):  acetaminophen   Tablet .. 650 milliGRAM(s) Oral every 6 hours PRN Temp greater or equal to 38C (100.4F), Mild Pain (1 - 3)  dextrose 40% Gel 15 Gram(s) Oral once PRN Blood Glucose LESS THAN 70 milliGRAM(s)/deciliter  glucagon  Injectable 1 milliGRAM(s) IntraMuscular once PRN Glucose LESS THAN 70 milligrams/deciliter  hydrALAZINE Injectable 5 milliGRAM(s) IV Push every 6 hours PRN BP above 160/100  HYDROmorphone  Injectable 0.5 milliGRAM(s) IV Push every 6 hours PRN Severe Pain (7 - 10)  oxyCODONE    IR 5 milliGRAM(s) Oral every 6 hours PRN Moderate to severe pain

## 2019-08-03 NOTE — PROGRESS NOTE ADULT - SUBJECTIVE AND OBJECTIVE BOX
NEPHROLOGY INTERVAL HPI/OVERNIGHT EVENTS:    Examined   Lethagic  Clinically same  Minimally verbal      MEDICATIONS  (STANDING):  amLODIPine   Tablet 10 milliGRAM(s) Oral daily  atorvastatin 40 milliGRAM(s) Oral at bedtime  calcitriol   Capsule 0.25 MICROGram(s) Oral daily  calcium acetate 667 milliGRAM(s) Oral three times a day with meals  carvedilol 6.25 milliGRAM(s) Oral every 12 hours  ceFAZolin   IVPB 1000 milliGRAM(s) IV Intermittent every 24 hours  chlorhexidine 2% Cloths 1 Application(s) Topical <User Schedule>  dextrose 5%. 1000 milliLiter(s) (50 mL/Hr) IV Continuous <Continuous>  dextrose 5%. 1000 milliLiter(s) (50 mL/Hr) IV Continuous <Continuous>  dextrose 50% Injectable 12.5 Gram(s) IV Push once  epoetin barb Injectable 01349 Unit(s) IV Push <User Schedule>  folic acid 1 milliGRAM(s) Oral daily  insulin lispro (HumaLOG) corrective regimen sliding scale   SubCutaneous three times a day before meals  levothyroxine 112 MICROGram(s) Oral daily  lidocaine   Patch 1 Patch Transdermal daily  losartan 50 milliGRAM(s) Oral daily  Nephro-king 1 Tablet(s) Oral daily  pantoprazole    Tablet 40 milliGRAM(s) Oral before breakfast  phytonadione  IVPB 10 milliGRAM(s) IV Intermittent once    MEDICATIONS  (PRN):  acetaminophen   Tablet .. 650 milliGRAM(s) Oral every 6 hours PRN Temp greater or equal to 38C (100.4F), Mild Pain (1 - 3)  dextrose 40% Gel 15 Gram(s) Oral once PRN Blood Glucose LESS THAN 70 milliGRAM(s)/deciliter  glucagon  Injectable 1 milliGRAM(s) IntraMuscular once PRN Glucose LESS THAN 70 milligrams/deciliter  hydrALAZINE Injectable 5 milliGRAM(s) IV Push every 6 hours PRN BP above 160/100  HYDROmorphone  Injectable 0.5 milliGRAM(s) IV Push every 6 hours PRN Severe Pain (7 - 10)  oxyCODONE    IR 5 milliGRAM(s) Oral every 6 hours PRN Moderate to severe pain      Allergies    penicillin (Anaphylaxis)  seafood (Anaphylaxis)  shellfish (Anaphylaxis)    Intolerances    Send Nepro TID- RD OK (Unknown)      Vital Signs Last 24 Hrs  T(C): 36.7 (03 Aug 2019 07:43), Max: 37.2 (02 Aug 2019 23:50)  T(F): 98 (03 Aug 2019 07:43), Max: 99 (02 Aug 2019 23:50)  HR: 84 (03 Aug 2019 07:43) (84 - 98)  BP: 130/69 (03 Aug 2019 07:43) (130/69 - 156/76)  BP(mean): --  RR: 20 (03 Aug 2019 07:43) (18 - 20)  SpO2: 94% (03 Aug 2019 07:43) (94% - 100%)  Daily     Daily     PHYSICAL EXAM:  GENERAL: NAD, Frail  NECK: Supple, No JVD/Bruit  NERVOUS SYSTEM:  A/O x3,   CHEST:  No rales, No rhonchi  HEART:  RRR, gr 2 murmur  ABDOMEN: Soft, NT/ND BS+  EXTREMITIES:  No Edema; No R arm swelling      LABS:                        10.6   8.29  )-----------( 153      ( 02 Aug 2019 06:11 )             34.3     08-02    141  |  105  |  12.0  ----------------------------<  100  4.0   |  24.0  |  3.01<H>    Ca    7.9<L>      02 Aug 2019 06:11                  RADIOLOGY & ADDITIONAL TESTS:

## 2019-08-03 NOTE — PROGRESS NOTE ADULT - ASSESSMENT
87yoF hx ESRD on HD (M and F only), CAD s/p CABG, HTN, DM, PAD, hypothyroidism presenting with generalized weakness.  On admission, febrile, leukocytosis, UA positive, with  Blood cultures positive for staph aureus.  SANFORD done, no vegetation. s/p indium scan positive for R AV graft infection, Tissue culture of YONIS shows gram - rods  Sepsis :  Staph aureus bacteremia due to R arm AV graft infection.   s/p AVG resection and Jeffrey cath placed july 23, with bleeding from AVG on july 25  s/p 2 units PRBC 7/26. monitor cbc.   SANFORD negative for vegetation  Antibiotics as per ID cefazolin    Supratherapeutic INR:   Unclear cause. LFT's normal.  ASA/plavix stopped.  Possible due to poor po intake and dehydration. Heme/Onc input appreciated. Probable Vitamin K deficiency, Received Vitamin K x 2.    Metabolic encephalopathy -AMS since admission as per son: likely related to infection/sepsis/dehydration.  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 follows with Dr. Marcial. possible underlying vascular dementia. needs dementia w/u OP. fall and aspiration precautions. Not participating in care, po intake poor, Palliative following, patient is now DNR/DNI with son wishing to focus on comfort measures    CAD s/p CABG:  with elevated troponin - T 0.32 - elevated in past, in setting of ESRD on HD  Cath 2/2018- LIMA to LAD patent, SVG to OM patent, SVG to RPDA patent   cardiology has discussed with jose Clifford and they are unwilling for stress testing, cath, invasive - only want Medication   SANFORD 7/9/2019: LVEF 30-35.Ml-mod TR. No evidence of endocarditis.   Echo Limited 7/18: LVEF 35-40%.  cont  carvedilol.   Losartan.  Volume control with HD    ESRD on HD   Cont HD    c/o coccyx pain: as per wound care patient has Incontinence associated dermatitis, not decubitus ulcer.  xray--> no bony involvement. incontinence management ,  wound care     Dispo: Patient not particip[ating with PT, discussion with son and would like to pursue placement in LTC, has made patient DNR/DNI. Palliative to further explore advanced directives.     Supportive care

## 2019-08-03 NOTE — PROGRESS NOTE ADULT - SUBJECTIVE AND OBJECTIVE BOX
LAUREN ROBERSON Heme/Onc. FU for coagulopathy  Possibly vit K def  Given one dose of vit K, 5 mg IVPB 3 days ago    87y  Female  [id number]      Patient is a 87y old  Female who presents with a chief complaint of weakness, sepsis 2/2 UTI (31 Jul 2019 16:39)    HPI:  87yoF hx ESRD on HD (M and F only), CAD s/p CABG, HTN, DM, PAD, hypothyroidism presenting with generalized weakness.  Pt is poor historian despite use of   She reports generalized weakness for past few days associated with generalized, abdominal pain that she is unable to describe associated with nausea, some episodes of vomiting.  Reports difficulty with urination and ?dysuria but believes her urinary symptoms are due to not drinking enough fluids recently. Unable to explain why she is only on HD twice per week, but says last HD session was on 7/1.  Denies fevers, chest pain, dyspnea, endorses chronic pain in face and all extremities which is chronic for her.  On admission, febrile, leukocytosis, UA positive, was pan scanned by ED and CT abd/pelvis showed haziness around the gallbladder however follow up abd US was negative for cholelithiasis and Stewart's sign and no LFT elevation on labs. (04 Jul 2019 22:54)  Asked to see for an elevated INR    PAST MEDICAL & SURGICAL HISTORY:  Left bundle branch block  Osteoporosis  Diabetic neuropathy  Peripheral arterial disease  Spinal stenosis  Coronary artery disease  Chronic renal failure  Pacemaker: Medtronic 2011  Hypothyroid  Hyperlipemia  Hypertension  Diabetes  S/P dialysis catheter insertion: R. arm  S/P CABG x 3  Artificial cardiac pacemaker    FAMILY HISTORY:  No pertinent family history in first degree relatives    REVIEW OF SYSTEMS  Cannot  be obtained  Basically non-verbal and unable to give Hx  No bleeding Hx seen in the chart.:	    MEDICATIONS  (STANDING):  amLODIPine   Tablet 10 milliGRAM(s) Oral daily  atorvastatin 40 milliGRAM(s) Oral at bedtime  calcitriol   Capsule 0.25 MICROGram(s) Oral daily  calcium acetate 667 milliGRAM(s) Oral three times a day with meals  carvedilol 6.25 milliGRAM(s) Oral every 12 hours  ceFAZolin   IVPB 1000 milliGRAM(s) IV Intermittent every 24 hours  chlorhexidine 2% Cloths 1 Application(s) Topical <User Schedule>  dextrose 5%. 1000 milliLiter(s) (50 mL/Hr) IV Continuous <Continuous>  dextrose 5%. 1000 milliLiter(s) (50 mL/Hr) IV Continuous <Continuous>  dextrose 50% Injectable 12.5 Gram(s) IV Push once  epoetin barb Injectable 57769 Unit(s) IV Push <User Schedule>  folic acid 1 milliGRAM(s) Oral daily  insulin lispro (HumaLOG) corrective regimen sliding scale   SubCutaneous three times a day before meals  levothyroxine 112 MICROGram(s) Oral daily  lidocaine   Patch 1 Patch Transdermal daily  losartan 50 milliGRAM(s) Oral daily  Nephro-king 1 Tablet(s) Oral daily  pantoprazole    Tablet 40 milliGRAM(s) Oral before breakfast  phytonadione  IVPB 10 milliGRAM(s) IV Intermittent once    MEDICATIONS  (PRN):  acetaminophen   Tablet .. 650 milliGRAM(s) Oral every 6 hours PRN Temp greater or equal to 38C (100.4F), Mild Pain (1 - 3)  dextrose 40% Gel 15 Gram(s) Oral once PRN Blood Glucose LESS THAN 70 milliGRAM(s)/deciliter  glucagon  Injectable 1 milliGRAM(s) IntraMuscular once PRN Glucose LESS THAN 70 milligrams/deciliter  hydrALAZINE Injectable 5 milliGRAM(s) IV Push every 6 hours PRN BP above 160/100  HYDROmorphone  Injectable 0.5 milliGRAM(s) IV Push every 6 hours PRN Severe Pain (7 - 10)  oxyCODONE    IR 5 milliGRAM(s) Oral every 6 hours PRN Moderate to severe pain        PHYSICAL EXAM:  Appears chorionically ill  Seems a bit confused  + pallor  No jaundice  No LAD  Lungs clear  Heart RR  AbdL Non-tender  No HSM  Skin: No significant ecchymoses  or petechiae    CBC Full  -  ( 31 Jul 2019 08:46 )  WBC Count : 6.21 K/uL  RBC Count : 3.74 M/uL  Hemoglobin : 11.0 g/dL  Hematocrit : 35.2 %  Platelet Count - Automated : 167 K/uL  Mean Cell Volume : 94.1 fl  Mean Cell Hemoglobin : 29.4 pg  Mean Cell Hemoglobin Concentration : 31.3 gm/dL  Auto Neutrophil # : x  Auto Lymphocyte # : x  Auto Monocyte # : x  Auto Eosinophil # : x  Auto Basophil # : x  Auto Neutrophil % : x  Auto Lymphocyte % : x  Auto Monocyte % : x  Auto Eosinophil % : x  Auto Basophil % : x    PT/INR - ( 31 Jul 2019 07:28 )   PT: 39.7 sec;   INR: 3.33 ratio         PTT - ( 30 Jul 2019 11:01 )  PTT:37.4 sec  30 Jul 2019 06:52    136    |  100    |  33.0   ----------------------------<  86     4.2     |  23.0   |  4.14     Ca    8.0        30 Jul 2019 06:52  Phos  3.9       30 Jul 2019 06:52  Mg     2.1       30 Jul 2019 06:52    TPro  5.9    /  Alb  2.4    /  TBili  0.4    /  DBili  0.1    /  AST  24     /  ALT  <5     /  AlkPhos  126    30 Jul 2019 06:52  PT/INR - ( 31 Jul 2019 07:28 )   PT: 39.7 sec;   INR: 3.33 ratio         PTT - ( 30 Jul 2019 11:01 )  PTT:37.4 sec  PT/INR - ( 31 Jul 2019 07:28 )   PT: 39.7 sec;   INR: 3.33 ratio      CBC Full  -  ( 03 Aug 2019 13:36 )  WBC Count : 9.41 K/uL  RBC Count : 3.51 M/uL  Hemoglobin : 10.4 g/dL  Hematocrit : 33.4 %  Platelet Count - Automated : 171 K/uL  Mean Cell Volume : 95.2 fl  Mean Cell Hemoglobin : 29.6 pg  Mean Cell Hemoglobin Concentration : 31.1 gm/dL  Auto Neutrophil # : x  Auto Lymphocyte # : x  Auto Monocyte # : x  Auto Eosinophil # : x  Auto Basophil # : x  Auto Neutrophil % : x  Auto Lymphocyte % : x  Auto Monocyte % : x  Auto Eosinophil % : x  Auto Basophil % : x    08-03    135  |  101  |  19.0  ----------------------------<  110  3.8   |  24.0  |  3.54<H>    Ca    7.9<L>      03 Aug 2019 13:36  Phos  2.9     08-03      Probably vit K deficient  No bleeding  Got one dose of vit K, 5 mg IV  PT/INR in AM of 8/5/19    Thank you LAUREN ROBERSON Heme/Onc. FU for coagulopathy  Possibly vit K def  Given one dose of vit K, 5 mg IVPB 3 days ago    87y  Female  [id number]      Patient is a 87y old  Female who presents with a chief complaint of weakness, sepsis 2/2 UTI (31 Jul 2019 16:39)    HPI:  87yoF hx ESRD on HD (M and F only), CAD s/p CABG, HTN, DM, PAD, hypothyroidism presenting with generalized weakness.  Pt is poor historian despite use of   She reports generalized weakness for past few days associated with generalized, abdominal pain that she is unable to describe associated with nausea, some episodes of vomiting.  Reports difficulty with urination and ?dysuria but believes her urinary symptoms are due to not drinking enough fluids recently. Unable to explain why she is only on HD twice per week, but says last HD session was on 7/1.  Denies fevers, chest pain, dyspnea, endorses chronic pain in face and all extremities which is chronic for her.  On admission, febrile, leukocytosis, UA positive, was pan scanned by ED and CT abd/pelvis showed haziness around the gallbladder however follow up abd US was negative for cholelithiasis and Stewart's sign and no LFT elevation on labs. (04 Jul 2019 22:54)  Asked to see for an elevated INR    PAST MEDICAL & SURGICAL HISTORY:  Left bundle branch block  Osteoporosis  Diabetic neuropathy  Peripheral arterial disease  Spinal stenosis  Coronary artery disease  Chronic renal failure  Pacemaker: Medtronic 2011  Hypothyroid  Hyperlipemia  Hypertension  Diabetes  S/P dialysis catheter insertion: R. arm  S/P CABG x 3  Artificial cardiac pacemaker    FAMILY HISTORY:  No pertinent family history in first degree relatives    REVIEW OF SYSTEMS  Cannot  be obtained  Basically non-verbal and unable to give Hx  No bleeding Hx seen in the chart.:	    MEDICATIONS  (STANDING):  amLODIPine   Tablet 10 milliGRAM(s) Oral daily  atorvastatin 40 milliGRAM(s) Oral at bedtime  calcitriol   Capsule 0.25 MICROGram(s) Oral daily  calcium acetate 667 milliGRAM(s) Oral three times a day with meals  carvedilol 6.25 milliGRAM(s) Oral every 12 hours  ceFAZolin   IVPB 1000 milliGRAM(s) IV Intermittent every 24 hours  chlorhexidine 2% Cloths 1 Application(s) Topical <User Schedule>  dextrose 5%. 1000 milliLiter(s) (50 mL/Hr) IV Continuous <Continuous>  dextrose 5%. 1000 milliLiter(s) (50 mL/Hr) IV Continuous <Continuous>  dextrose 50% Injectable 12.5 Gram(s) IV Push once  epoetin barb Injectable 26751 Unit(s) IV Push <User Schedule>  folic acid 1 milliGRAM(s) Oral daily  insulin lispro (HumaLOG) corrective regimen sliding scale   SubCutaneous three times a day before meals  levothyroxine 112 MICROGram(s) Oral daily  lidocaine   Patch 1 Patch Transdermal daily  losartan 50 milliGRAM(s) Oral daily  Nephro-king 1 Tablet(s) Oral daily  pantoprazole    Tablet 40 milliGRAM(s) Oral before breakfast  phytonadione  IVPB 10 milliGRAM(s) IV Intermittent once    MEDICATIONS  (PRN):  acetaminophen   Tablet .. 650 milliGRAM(s) Oral every 6 hours PRN Temp greater or equal to 38C (100.4F), Mild Pain (1 - 3)  dextrose 40% Gel 15 Gram(s) Oral once PRN Blood Glucose LESS THAN 70 milliGRAM(s)/deciliter  glucagon  Injectable 1 milliGRAM(s) IntraMuscular once PRN Glucose LESS THAN 70 milligrams/deciliter  hydrALAZINE Injectable 5 milliGRAM(s) IV Push every 6 hours PRN BP above 160/100  HYDROmorphone  Injectable 0.5 milliGRAM(s) IV Push every 6 hours PRN Severe Pain (7 - 10)  oxyCODONE    IR 5 milliGRAM(s) Oral every 6 hours PRN Moderate to severe pain        PHYSICAL EXAM:  Appears chorionically ill  Seems a bit confused  + pallor  No jaundice  No LAD  Lungs clear  Heart RR  AbdL Non-tender  No HSM  Skin: No significant ecchymoses  or petechiae    CBC Full  -  ( 31 Jul 2019 08:46 )  WBC Count : 6.21 K/uL  RBC Count : 3.74 M/uL  Hemoglobin : 11.0 g/dL  Hematocrit : 35.2 %  Platelet Count - Automated : 167 K/uL  Mean Cell Volume : 94.1 fl  Mean Cell Hemoglobin : 29.4 pg  Mean Cell Hemoglobin Concentration : 31.3 gm/dL  Auto Neutrophil # : x  Auto Lymphocyte # : x  Auto Monocyte # : x  Auto Eosinophil # : x  Auto Basophil # : x  Auto Neutrophil % : x  Auto Lymphocyte % : x  Auto Monocyte % : x  Auto Eosinophil % : x  Auto Basophil % : x    PT/INR - ( 31 Jul 2019 07:28 )   PT: 39.7 sec;   INR: 3.33 ratio         PTT - ( 30 Jul 2019 11:01 )  PTT:37.4 sec  30 Jul 2019 06:52    136    |  100    |  33.0   ----------------------------<  86     4.2     |  23.0   |  4.14     Ca    8.0        30 Jul 2019 06:52  Phos  3.9       30 Jul 2019 06:52  Mg     2.1       30 Jul 2019 06:52    TPro  5.9    /  Alb  2.4    /  TBili  0.4    /  DBili  0.1    /  AST  24     /  ALT  <5     /  AlkPhos  126    30 Jul 2019 06:52  PT/INR - ( 31 Jul 2019 07:28 )   PT: 39.7 sec;   INR: 3.33 ratio         PTT - ( 30 Jul 2019 11:01 )  PTT:37.4 sec  PT/INR - ( 31 Jul 2019 07:28 )   PT: 39.7 sec;   INR: 3.33 ratio      CBC Full  -  ( 03 Aug 2019 13:36 )  WBC Count : 9.41 K/uL  RBC Count : 3.51 M/uL  Hemoglobin : 10.4 g/dL  Hematocrit : 33.4 %  Platelet Count - Automated : 171 K/uL  Mean Cell Volume : 95.2 fl  Mean Cell Hemoglobin : 29.6 pg  Mean Cell Hemoglobin Concentration : 31.1 gm/dL  Auto Neutrophil # : x  Auto Lymphocyte # : x  Auto Monocyte # : x  Auto Eosinophil # : x  Auto Basophil # : x  Auto Neutrophil % : x  Auto Lymphocyte % : x  Auto Monocyte % : x  Auto Eosinophil % : x  Auto Basophil % : x    08-03    135  |  101  |  19.0  ----------------------------<  110  3.8   |  24.0  |  3.54<H>    Ca    7.9<L>      03 Aug 2019 13:36  Phos  2.9     08-03      Probably vit K deficient  No bleeding  Got one dose of vit K, 5 mg IV with INR down to 1.34  PT/INR in AM of 8/5/19    Thank you

## 2019-08-03 NOTE — PROGRESS NOTE ADULT - ASSESSMENT
ESRD HD yest (M and F only),   s/p removal of infected AVG R side   Has h/o CAD s/p CABG, HTN, DM  leukocytosis better  Blood cx 4/4 Staph aureus. Abx per ID  had giuliana cath for HD, changed now to PC    Will follow ESRD HD today and will keep on TTS schedule   s/p removal of infected AVG R side   Has h/o CAD s/p CABG, HTN, DM  leukocytosis better  Blood cx 4/4 Staph aureus. Abx per ID  had giuliana cath for HD, changed now to PC    Will follow

## 2019-08-04 LAB
ANION GAP SERPL CALC-SCNC: 10 MMOL/L — SIGNIFICANT CHANGE UP (ref 5–17)
BUN SERPL-MCNC: 10 MG/DL — SIGNIFICANT CHANGE UP (ref 8–20)
CALCIUM SERPL-MCNC: 7.3 MG/DL — LOW (ref 8.4–10.5)
CALCIUM SERPL-MCNC: 7.7 MG/DL — LOW (ref 8.6–10.2)
CHLORIDE SERPL-SCNC: 102 MMOL/L — SIGNIFICANT CHANGE UP (ref 98–107)
CO2 SERPL-SCNC: 25 MMOL/L — SIGNIFICANT CHANGE UP (ref 22–29)
CREAT SERPL-MCNC: 2.13 MG/DL — HIGH (ref 0.5–1.3)
GLUCOSE BLDC GLUCOMTR-MCNC: 113 MG/DL — HIGH (ref 70–99)
GLUCOSE BLDC GLUCOMTR-MCNC: 123 MG/DL — HIGH (ref 70–99)
GLUCOSE BLDC GLUCOMTR-MCNC: 96 MG/DL — SIGNIFICANT CHANGE UP (ref 70–99)
GLUCOSE SERPL-MCNC: 88 MG/DL — SIGNIFICANT CHANGE UP (ref 70–115)
HCT VFR BLD CALC: 34.3 % — LOW (ref 34.5–45)
HGB BLD-MCNC: 10.4 G/DL — LOW (ref 11.5–15.5)
INR BLD: 1.36 RATIO — HIGH (ref 0.88–1.16)
MCHC RBC-ENTMCNC: 29.1 PG — SIGNIFICANT CHANGE UP (ref 27–34)
MCHC RBC-ENTMCNC: 30.3 GM/DL — LOW (ref 32–36)
MCV RBC AUTO: 95.8 FL — SIGNIFICANT CHANGE UP (ref 80–100)
PLATELET # BLD AUTO: 143 K/UL — LOW (ref 150–400)
POTASSIUM SERPL-MCNC: 3.4 MMOL/L — LOW (ref 3.5–5.3)
POTASSIUM SERPL-SCNC: 3.4 MMOL/L — LOW (ref 3.5–5.3)
PROTHROM AB SERPL-ACNC: 15.8 SEC — HIGH (ref 10–12.9)
PTH-INTACT FLD-MCNC: 49 PG/ML — SIGNIFICANT CHANGE UP (ref 15–65)
RBC # BLD: 3.58 M/UL — LOW (ref 3.8–5.2)
RBC # FLD: 23.6 % — HIGH (ref 10.3–14.5)
SODIUM SERPL-SCNC: 137 MMOL/L — SIGNIFICANT CHANGE UP (ref 135–145)
WBC # BLD: 7.97 K/UL — SIGNIFICANT CHANGE UP (ref 3.8–10.5)
WBC # FLD AUTO: 7.97 K/UL — SIGNIFICANT CHANGE UP (ref 3.8–10.5)

## 2019-08-04 PROCEDURE — 99233 SBSQ HOSP IP/OBS HIGH 50: CPT

## 2019-08-04 RX ADMIN — CALCITRIOL 0.25 MICROGRAM(S): 0.5 CAPSULE ORAL at 12:28

## 2019-08-04 RX ADMIN — CHLORHEXIDINE GLUCONATE 1 APPLICATION(S): 213 SOLUTION TOPICAL at 05:29

## 2019-08-04 RX ADMIN — CARVEDILOL PHOSPHATE 6.25 MILLIGRAM(S): 80 CAPSULE, EXTENDED RELEASE ORAL at 05:29

## 2019-08-04 RX ADMIN — CARVEDILOL PHOSPHATE 6.25 MILLIGRAM(S): 80 CAPSULE, EXTENDED RELEASE ORAL at 17:49

## 2019-08-04 RX ADMIN — ATORVASTATIN CALCIUM 40 MILLIGRAM(S): 80 TABLET, FILM COATED ORAL at 23:12

## 2019-08-04 RX ADMIN — Medication 667 MILLIGRAM(S): at 12:28

## 2019-08-04 RX ADMIN — Medication 667 MILLIGRAM(S): at 08:06

## 2019-08-04 RX ADMIN — Medication 667 MILLIGRAM(S): at 17:49

## 2019-08-04 RX ADMIN — LOSARTAN POTASSIUM 50 MILLIGRAM(S): 100 TABLET, FILM COATED ORAL at 05:30

## 2019-08-04 RX ADMIN — LIDOCAINE 1 PATCH: 4 CREAM TOPICAL at 12:25

## 2019-08-04 RX ADMIN — Medication 100 MILLIGRAM(S): at 12:26

## 2019-08-04 RX ADMIN — LIDOCAINE 1 PATCH: 4 CREAM TOPICAL at 00:00

## 2019-08-04 RX ADMIN — Medication 1 MILLIGRAM(S): at 12:29

## 2019-08-04 RX ADMIN — PANTOPRAZOLE SODIUM 40 MILLIGRAM(S): 20 TABLET, DELAYED RELEASE ORAL at 05:30

## 2019-08-04 RX ADMIN — Medication 112 MICROGRAM(S): at 05:29

## 2019-08-04 RX ADMIN — Medication 1 TABLET(S): at 12:28

## 2019-08-04 RX ADMIN — AMLODIPINE BESYLATE 10 MILLIGRAM(S): 2.5 TABLET ORAL at 05:30

## 2019-08-04 NOTE — PROGRESS NOTE ADULT - SUBJECTIVE AND OBJECTIVE BOX
NEPHROLOGY INTERVAL HPI/OVERNIGHT EVENTS:    Examined   Clinically same    MEDICATIONS  (STANDING):  amLODIPine   Tablet 10 milliGRAM(s) Oral daily  atorvastatin 40 milliGRAM(s) Oral at bedtime  calcitriol   Capsule 0.25 MICROGram(s) Oral daily  calcium acetate 667 milliGRAM(s) Oral three times a day with meals  carvedilol 6.25 milliGRAM(s) Oral every 12 hours  ceFAZolin   IVPB 1000 milliGRAM(s) IV Intermittent every 24 hours  chlorhexidine 2% Cloths 1 Application(s) Topical <User Schedule>  dextrose 5%. 1000 milliLiter(s) (50 mL/Hr) IV Continuous <Continuous>  dextrose 5%. 1000 milliLiter(s) (50 mL/Hr) IV Continuous <Continuous>  dextrose 50% Injectable 12.5 Gram(s) IV Push once  epoetin barb Injectable 60949 Unit(s) IV Push <User Schedule>  folic acid 1 milliGRAM(s) Oral daily  insulin lispro (HumaLOG) corrective regimen sliding scale   SubCutaneous three times a day before meals  levothyroxine 112 MICROGram(s) Oral daily  lidocaine   Patch 1 Patch Transdermal daily  losartan 50 milliGRAM(s) Oral daily  Nephro-king 1 Tablet(s) Oral daily  pantoprazole    Tablet 40 milliGRAM(s) Oral before breakfast  phytonadione  IVPB 10 milliGRAM(s) IV Intermittent once    MEDICATIONS  (PRN):  acetaminophen   Tablet .. 650 milliGRAM(s) Oral every 6 hours PRN Temp greater or equal to 38C (100.4F), Mild Pain (1 - 3)  dextrose 40% Gel 15 Gram(s) Oral once PRN Blood Glucose LESS THAN 70 milliGRAM(s)/deciliter  glucagon  Injectable 1 milliGRAM(s) IntraMuscular once PRN Glucose LESS THAN 70 milligrams/deciliter  hydrALAZINE Injectable 5 milliGRAM(s) IV Push every 6 hours PRN BP above 160/100  HYDROmorphone  Injectable 0.5 milliGRAM(s) IV Push every 6 hours PRN Severe Pain (7 - 10)  oxyCODONE    IR 5 milliGRAM(s) Oral every 6 hours PRN Moderate to severe pain      Allergies    penicillin (Anaphylaxis)  seafood (Anaphylaxis)  shellfish (Anaphylaxis)    Intolerances    Send Nepro TID- RD OK (Unknown)      Vital Signs Last 24 Hrs  T(C): 37 (04 Aug 2019 08:11), Max: 37.4 (03 Aug 2019 23:43)  T(F): 98.6 (04 Aug 2019 08:11), Max: 99.4 (03 Aug 2019 23:43)  HR: 91 (04 Aug 2019 08:11) (90 - 96)  BP: 130/51 (04 Aug 2019 08:11) (128/68 - 158/57)  BP(mean): --  RR: 18 (04 Aug 2019 08:11) (18 - 18)  SpO2: 99% (04 Aug 2019 08:11) (93% - 99%)  Daily     Daily Weight in k.8 (03 Aug 2019 16:20)    PHYSICAL EXAM:  GENERAL: NAD, Frail  NECK: Supple, No JVD/Bruit  NERVOUS SYSTEM:  A/O x3,   CHEST:  No rales, No rhonchi  HEART:  RRR, gr 2 murmur  ABDOMEN: Soft, NT/ND BS+  EXTREMITIES:  No Edema; No R arm swelling    LABS:                        10.4   9.41  )-----------( 171      ( 03 Aug 2019 13:36 )             33.4     08-03    135  |  101  |  19.0  ----------------------------<  110  3.8   |  24.0  |  3.54<H>    Ca    7.9<L>      03 Aug 2019 13:36  Phos  2.9               Phosphorus Level, Serum: 2.9 mg/dL ( @ 13:36)          RADIOLOGY & ADDITIONAL TESTS:

## 2019-08-04 NOTE — PROGRESS NOTE ADULT - ASSESSMENT
87yoF hx ESRD on HD (M and F only), CAD s/p CABG, HTN, DM, PAD, hypothyroidism presenting with generalized weakness.  On admission, febrile, leukocytosis, UA positive, with  Blood cultures positive for staph aureus.  SANFORD done, no vegetation. s/p indium scan positive for R AV graft infection, Tissue culture of YONIS shows gram - rods    Sepsis   Staph aureus bacteremia due to R arm AV graft infection.   s/p AVG resection and Jeffrey cath placed july 23, with bleeding from AVG on july 25  s/p 2 units PRBC 7/26. monitor cbc.   SANFORD negative for vegetation  Antibiotics as per ID cefazolin    Supratherapeutic INR:   Unclear cause. LFT's normal.  ASA/plavix stopped.  Possible due to poor po intake and dehydration. Heme/Onc input appreciated. Probable Vitamin K deficiency, Received Vitamin K x 2.  Dr Gerry beckham is following. Will obtain PT/INR in AM.     Metabolic encephalopathy -AMS since admission as per son: likely related to infection/sepsis/dehydration.  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 follows with Dr. Marcial. possible underlying vascular dementia.   Fall and aspiration precautions. Not participating in care, po intake poor,   Palliative following, patient is now DNR/DNI with son wishing to focus on comfort measures    CAD s/p CABG:  with elevated troponin - T 0.32 - elevated in past, in setting of ESRD on HD  Cath 2/2018- LIMA to LAD patent, SVG to OM patent, SVG to RPDA patent   cardiology has discussed with son Darrin and they are unwilling for stress testing, cath, invasive - only want Medication   SANFORD 7/9/2019: LVEF 30-35.Ml-mod TR. No evidence of endocarditis.   Echo Limited 7/18: LVEF 35-40%.  cont  carvedilol.   Losartan.  Volume mgt with HD    ESRD on HD   Cont HD    c/o coccyx pain: as per wound care patient has Incontinence associated dermatitis, not decubitus ulcer.  xray--> no bony involvement. incontinence management ,  wound care     Dispo: Patient not particip[ating with PT, discussion with son and would like to pursue placement in LTC, has made patient DNR/DNI. Palliative to further explore advanced directives.     Supportive care

## 2019-08-04 NOTE — PROGRESS NOTE ADULT - ASSESSMENT
ESRD HD on TTS schedule   s/p removal of infected AVG R side   Has h/o CAD s/p CABG, HTN, DM  leukocytosis better  Blood cx 4/4 Staph aureus. Abx per ID  had giuliana cath for HD, changed now to PC    Will follow

## 2019-08-04 NOTE — PROGRESS NOTE ADULT - SUBJECTIVE AND OBJECTIVE BOX
CC: Delirium multiple etiology , dementia, AVG infection, Azotemia of ESRD.  Confused today, poor po intake.   HPI:  87yoF hx ESRD on HD (M and F only), CAD s/p CABG, HTN, DM, PAD, hypothyroidism presenting with generalized weakness.  Pt is poor historian despite use of   She reports generalized weakness for past few days associated with generalized, abdominal pain that she is unable to describe associated with nausea, some episodes of vomiting.  Reports difficulty with urination and ?dysuria but believes her urinary symptoms are due to not drinking enough fluids recently. Unable to explain why she is only on HD twice per week, but says last HD session was on 7/1.  Denies fevers, chest pain, dyspnea, endorses chronic pain in face and all extremities which is chronic for her.  On admission, febrile, leukocytosis, UA positive, was pan scanned by ED and CT abd/pelvis showed haziness around the gallbladder however follow up abd US was negative for cholelithiasis and Stewart's sign and no LFT elevation on labs. (04 Jul 2019 22:54)    REVIEW OF SYSTEMS:    Patient denied fever, chills, abdominal pain, nausea, vomiting, cough, shortness of breath, chest pain or palpitations    Vital Signs Last 24 Hrs  T(C): 37 (04 Aug 2019 08:11), Max: 37.4 (03 Aug 2019 23:43)  T(F): 98.6 (04 Aug 2019 08:11), Max: 99.4 (03 Aug 2019 23:43)  HR: 91 (04 Aug 2019 08:11) (90 - 96)  BP: 130/51 (04 Aug 2019 08:11) (128/68 - 158/57)  BP(mean): --  RR: 18 (04 Aug 2019 08:11) (18 - 18)  SpO2: 99% (04 Aug 2019 08:11) (93% - 99%)I&O's Summary    03 Aug 2019 07:01  -  04 Aug 2019 07:00  --------------------------------------------------------  IN: 0 mL / OUT: 1301 mL / NET: -1301 mL      PHYSICAL EXAM:  GENERAL: Confused   HEENT: PERRL, +EOMI, anicteric, no [place]  NECK: Supple, No JVD   CHEST/LUNG: CTA bilaterally; Normal effort  HEART: S1S2 Normal intensity, no murmurs, gallops or rubs noted  ABDOMEN: Soft, BS Normoactive, NT, ND, no HSM noted  EXTREMITIES:  2+ radial and DP pulses noted, no clubbing, cyanosis, or edema noted, Limited mobility   SKIN: No rashes or lesions noted  NEURO: Confusion lethargy., no focal deficits noted, CN II-XII intact  PSYCH: Depressed mood and affect; insight/judgement inappropriate  LABS:                        10.4   7.97  )-----------( 143      ( 04 Aug 2019 10:08 )             34.3     08-04    137  |  102  |  10.0  ----------------------------<  88  3.4<L>   |  25.0  |  2.13<H>    Ca    7.7<L>      04 Aug 2019 10:08  Phos  2.9     08-03      PT/INR - ( 04 Aug 2019 10:08 )   PT: 15.8 sec;   INR: 1.36 ratio             RADIOLOGY & ADDITIONAL TESTS:    MEDICATIONS:  MEDICATIONS  (STANDING):  amLODIPine   Tablet 10 milliGRAM(s) Oral daily  atorvastatin 40 milliGRAM(s) Oral at bedtime  calcitriol   Capsule 0.25 MICROGram(s) Oral daily  calcium acetate 667 milliGRAM(s) Oral three times a day with meals  carvedilol 6.25 milliGRAM(s) Oral every 12 hours  ceFAZolin   IVPB 1000 milliGRAM(s) IV Intermittent every 24 hours  chlorhexidine 2% Cloths 1 Application(s) Topical <User Schedule>  dextrose 5%. 1000 milliLiter(s) (50 mL/Hr) IV Continuous <Continuous>  dextrose 5%. 1000 milliLiter(s) (50 mL/Hr) IV Continuous <Continuous>  dextrose 50% Injectable 12.5 Gram(s) IV Push once  epoetin barb Injectable 64632 Unit(s) IV Push <User Schedule>  folic acid 1 milliGRAM(s) Oral daily  insulin lispro (HumaLOG) corrective regimen sliding scale   SubCutaneous three times a day before meals  levothyroxine 112 MICROGram(s) Oral daily  lidocaine   Patch 1 Patch Transdermal daily  losartan 50 milliGRAM(s) Oral daily  Nephro-king 1 Tablet(s) Oral daily  pantoprazole    Tablet 40 milliGRAM(s) Oral before breakfast  phytonadione  IVPB 10 milliGRAM(s) IV Intermittent once    MEDICATIONS  (PRN):  acetaminophen   Tablet .. 650 milliGRAM(s) Oral every 6 hours PRN Temp greater or equal to 38C (100.4F), Mild Pain (1 - 3)  dextrose 40% Gel 15 Gram(s) Oral once PRN Blood Glucose LESS THAN 70 milliGRAM(s)/deciliter  glucagon  Injectable 1 milliGRAM(s) IntraMuscular once PRN Glucose LESS THAN 70 milligrams/deciliter  hydrALAZINE Injectable 5 milliGRAM(s) IV Push every 6 hours PRN BP above 160/100  HYDROmorphone  Injectable 0.5 milliGRAM(s) IV Push every 6 hours PRN Severe Pain (7 - 10)  oxyCODONE    IR 5 milliGRAM(s) Oral every 6 hours PRN Moderate to severe pain

## 2019-08-05 DIAGNOSIS — G93.40 ENCEPHALOPATHY, UNSPECIFIED: ICD-10-CM

## 2019-08-05 DIAGNOSIS — R13.10 DYSPHAGIA, UNSPECIFIED: ICD-10-CM

## 2019-08-05 DIAGNOSIS — D72.829 ELEVATED WHITE BLOOD CELL COUNT, UNSPECIFIED: ICD-10-CM

## 2019-08-05 LAB
ANION GAP SERPL CALC-SCNC: 11 MMOL/L — SIGNIFICANT CHANGE UP (ref 5–17)
BUN SERPL-MCNC: 18 MG/DL — SIGNIFICANT CHANGE UP (ref 8–20)
CALCIUM SERPL-MCNC: 7.4 MG/DL — LOW (ref 8.6–10.2)
CHLORIDE SERPL-SCNC: 102 MMOL/L — SIGNIFICANT CHANGE UP (ref 98–107)
CO2 SERPL-SCNC: 25 MMOL/L — SIGNIFICANT CHANGE UP (ref 22–29)
CREAT SERPL-MCNC: 2.91 MG/DL — HIGH (ref 0.5–1.3)
GLUCOSE SERPL-MCNC: 134 MG/DL — HIGH (ref 70–115)
HCT VFR BLD CALC: 35 % — SIGNIFICANT CHANGE UP (ref 34.5–45)
HGB BLD-MCNC: 11 G/DL — LOW (ref 11.5–15.5)
INR BLD: 1.44 RATIO — HIGH (ref 0.88–1.16)
MCHC RBC-ENTMCNC: 30.1 PG — SIGNIFICANT CHANGE UP (ref 27–34)
MCHC RBC-ENTMCNC: 31.4 GM/DL — LOW (ref 32–36)
MCV RBC AUTO: 95.6 FL — SIGNIFICANT CHANGE UP (ref 80–100)
PLATELET # BLD AUTO: 137 K/UL — LOW (ref 150–400)
POTASSIUM SERPL-MCNC: 3.4 MMOL/L — LOW (ref 3.5–5.3)
POTASSIUM SERPL-SCNC: 3.4 MMOL/L — LOW (ref 3.5–5.3)
PROTHROM AB SERPL-ACNC: 16.8 SEC — HIGH (ref 10–12.9)
RBC # BLD: 3.66 M/UL — LOW (ref 3.8–5.2)
RBC # FLD: 23.2 % — HIGH (ref 10.3–14.5)
SODIUM SERPL-SCNC: 138 MMOL/L — SIGNIFICANT CHANGE UP (ref 135–145)
WBC # BLD: 13.34 K/UL — HIGH (ref 3.8–10.5)
WBC # FLD AUTO: 13.34 K/UL — HIGH (ref 3.8–10.5)

## 2019-08-05 PROCEDURE — 99233 SBSQ HOSP IP/OBS HIGH 50: CPT

## 2019-08-05 PROCEDURE — 99358 PROLONG SERVICE W/O CONTACT: CPT

## 2019-08-05 PROCEDURE — 99232 SBSQ HOSP IP/OBS MODERATE 35: CPT

## 2019-08-05 RX ORDER — ROBINUL 0.2 MG/ML
0.2 INJECTION INTRAMUSCULAR; INTRAVENOUS EVERY 6 HOURS
Refills: 0 | Status: DISCONTINUED | OUTPATIENT
Start: 2019-08-05 | End: 2019-08-06

## 2019-08-05 RX ORDER — MINERAL OIL
133 OIL (ML) MISCELLANEOUS ONCE
Refills: 0 | Status: COMPLETED | OUTPATIENT
Start: 2019-08-05 | End: 2019-08-05

## 2019-08-05 RX ORDER — HYDROMORPHONE HYDROCHLORIDE 2 MG/ML
0.5 INJECTION INTRAMUSCULAR; INTRAVENOUS; SUBCUTANEOUS EVERY 6 HOURS
Refills: 0 | Status: DISCONTINUED | OUTPATIENT
Start: 2019-08-05 | End: 2019-08-06

## 2019-08-05 RX ADMIN — LOSARTAN POTASSIUM 50 MILLIGRAM(S): 100 TABLET, FILM COATED ORAL at 05:53

## 2019-08-05 RX ADMIN — Medication 112 MICROGRAM(S): at 05:53

## 2019-08-05 RX ADMIN — CHLORHEXIDINE GLUCONATE 1 APPLICATION(S): 213 SOLUTION TOPICAL at 05:54

## 2019-08-05 RX ADMIN — CARVEDILOL PHOSPHATE 6.25 MILLIGRAM(S): 80 CAPSULE, EXTENDED RELEASE ORAL at 05:53

## 2019-08-05 RX ADMIN — LIDOCAINE 1 PATCH: 4 CREAM TOPICAL at 16:02

## 2019-08-05 RX ADMIN — LIDOCAINE 1 PATCH: 4 CREAM TOPICAL at 01:36

## 2019-08-05 RX ADMIN — HYDROMORPHONE HYDROCHLORIDE 0.5 MILLIGRAM(S): 2 INJECTION INTRAMUSCULAR; INTRAVENOUS; SUBCUTANEOUS at 16:02

## 2019-08-05 RX ADMIN — LIDOCAINE 1 PATCH: 4 CREAM TOPICAL at 01:35

## 2019-08-05 RX ADMIN — Medication 133 MILLILITER(S): at 22:22

## 2019-08-05 RX ADMIN — HYDROMORPHONE HYDROCHLORIDE 0.5 MILLIGRAM(S): 2 INJECTION INTRAMUSCULAR; INTRAVENOUS; SUBCUTANEOUS at 16:15

## 2019-08-05 RX ADMIN — AMLODIPINE BESYLATE 10 MILLIGRAM(S): 2.5 TABLET ORAL at 05:53

## 2019-08-05 RX ADMIN — Medication 650 MILLIGRAM(S): at 00:00

## 2019-08-05 NOTE — PROGRESS NOTE ADULT - SUBJECTIVE AND OBJECTIVE BOX
CC:  follow up GOC/ sympotms  INTERVAL HPI/OVERNIGHT EVENTS:  discussed with NP Alina Mena - patient continues to decline  Increased WBC, fever, not eating  PRESENT SYMPTOMS: SOURCE:  Patient/Family/Team    PAIN SCALE:  0 = none  1 = mild   2 = moderate  3 = severe    Pain: 1    Dyspnea:  [ ] YES [ x] NO  Anxiety:  [ ] YES [ x] NO  Fatigue: [ x] YES [ ] NO  Nausea: [ ] YES [ ] NO  na  Loss of Appetite: [x ] YES [ ] NO  Other symptoms: __________    MEDICATIONS  (STANDING):  amLODIPine   Tablet 10 milliGRAM(s) Oral daily  chlorhexidine 2% Cloths 1 Application(s) Topical <User Schedule>  levothyroxine 112 MICROGram(s) Oral daily  lidocaine   Patch 1 Patch Transdermal daily  losartan 50 milliGRAM(s) Oral daily  pantoprazole    Tablet 40 milliGRAM(s) Oral before breakfast    MEDICATIONS  (PRN):  acetaminophen   Tablet .. 650 milliGRAM(s) Oral every 6 hours PRN Temp greater or equal to 38C (100.4F), Mild Pain (1 - 3)  glycopyrrolate Injectable 0.2 milliGRAM(s) IV Push every 6 hours PRN secretions  hydrALAZINE Injectable 5 milliGRAM(s) IV Push every 6 hours PRN BP above 160/100  HYDROmorphone  Injectable 0.5 milliGRAM(s) IV Push every 6 hours PRN Severe Pain (7 - 10)  LORazepam   Injectable 1 milliGRAM(s) IV Push every 4 hours PRN Agitation      Allergies    penicillin (Anaphylaxis)  seafood (Anaphylaxis)  shellfish (Anaphylaxis)    Intolerances    Send Luke TIBALDO- RD OK (Unknown)      Karnofsky Performance Score/Palliative Performance Status Version 2:  10  %    Vital Signs Last 24 Hrs  T(C): 37.4 (05 Aug 2019 08:00), Max: 38.2 (05 Aug 2019 00:05)  T(F): 99.4 (05 Aug 2019 08:00), Max: 100.8 (05 Aug 2019 00:05)  HR: 87 (05 Aug 2019 08:00) (87 - 97)  BP: 122/57 (05 Aug 2019 08:00) (122/57 - 150/54)  BP(mean): --  RR: 18 (05 Aug 2019 08:00) (18 - 18)  SpO2: 96% (05 Aug 2019 08:00) (94% - 96%)    PHYSICAL EXAM:    General: Lethargic NAD  HEENT: [x ] normal  [ ] dry mouth  [ ] ET tube/trach    Lungs: [ x] comfortable [ ] tachypnea/labored breathing  [ ] excessive secretions    CV: [x ] normal  [ ] tachycardia    GI: [ x] normal  [ ] distended  [ ] tender  [ ] no BS               [ ] PEG/NG/OG tube    : [ ] normal  [ ] incontinent  [x ] oliguria/anuria on HD    MSK: [ ] normal  [x ] weakness  [ ] edema             [ ] ambulatory  [x ] bedbound/wheelchair bound    Skin: [ ] normal  [ ] pressure ulcers- Stage_____  [ x] no rash    LABS:                        11.0   13.34 )-----------( 137      ( 05 Aug 2019 07:12 )             35.0     08-05    138  |  102  |  18.0  ----------------------------<  134<H>  3.4<L>   |  25.0  |  2.91<H>    Ca    7.4<L>      05 Aug 2019 07:12      PT/INR - ( 05 Aug 2019 07:12 )   PT: 16.8 sec;   INR: 1.44 ratio           Thank you for the opportunity to assist with the care of this patient.   [facility] [phone].

## 2019-08-05 NOTE — PROGRESS NOTE ADULT - NSHPATTENDINGPLANDISCUSS_GEN_ALL_CORE
Dr. Torres
RN
RN
patient, RN, family at bedside, dr. Nieto [ vascular attending on call]
RN
patient, RN, Vascular
patient, RN, Vascular
patient, RN, family at bedside
patient, RN, family at bedside
patient RN palliative care hospice  ARAMIS ARAIZA
patient, RN, Vascular
Dr. Torres, Alina Mena, MERARI, Dr. Radha Ruano RN

## 2019-08-05 NOTE — PROGRESS NOTE ADULT - PROBLEM SELECTOR PLAN 8
See GO note  In summary:  1. Family Electing comfort care  2. Agreeable to transfer to [facility]. See GO note  In summary:  1. Family Electing comfort care  2. Agreeable to transfer to [facility].  Hospice contacted See GOC note  In summary:  1. Family Electing comfort care. NO further HD.   2. Agreeable to transfer to [facility].  Hospice contacted

## 2019-08-05 NOTE — PROGRESS NOTE ADULT - SUBJECTIVE AND OBJECTIVE BOX
LAUREN [facility]ERSON Heme/Onc. FU for coagulopathy  Possibly vit K def  Given one dose of vit K, 5 mg IVPB 4 days ago  INR down to 1.44    87y  Female  [id number]      Patient is a 87y old  Female who presents with a chief complaint of weakness, sepsis 2/2 UTI (31 Jul 2019 16:39)    HPI:  87yoF hx ESRD on HD (M and F only), CAD s/p CABG, HTN, DM, PAD, hypothyroidism presenting with generalized weakness.  Pt is poor historian despite use of   She reports generalized weakness for past few days associated with generalized, abdominal pain that she is unable to describe associated with nausea, some episodes of vomiting.  Reports difficulty with urination and ?dysuria but believes her urinary symptoms are due to not drinking enough fluids recently. Unable to explain why she is only on HD twice per week, but says last HD session was on 7/1.  Denies fevers, chest pain, dyspnea, endorses chronic pain in face and all extremities which is chronic for her.  On admission, febrile, leukocytosis, UA positive, was pan scanned by ED and CT abd/pelvis showed haziness around the gallbladder however follow up abd US was negative for cholelithiasis and Stewart's sign and no LFT elevation on labs. (04 Jul 2019 22:54)  Asked to see for an elevated INR    PAST MEDICAL & SURGICAL HISTORY:  Left bundle branch block  Osteoporosis  Diabetic neuropathy  Peripheral arterial disease  Spinal stenosis  Coronary artery disease  Chronic renal failure  Pacemaker: Medtronic 2011  Hypothyroid  Hyperlipemia  Hypertension  Diabetes  S/P dialysis catheter insertion: R. arm  S/P CABG x 3  Artificial cardiac pacemaker    FAMILY HISTORY:  No pertinent family history in first degree relatives    REVIEW OF SYSTEMS  Cannot  be obtained  Basically non-verbal and unable to give Hx  No bleeding Hx seen in the chart.:	    MEDICATIONS  (STANDING):  amLODIPine   Tablet 10 milliGRAM(s) Oral daily  atorvastatin 40 milliGRAM(s) Oral at bedtime  calcitriol   Capsule 0.25 MICROGram(s) Oral daily  calcium acetate 667 milliGRAM(s) Oral three times a day with meals  carvedilol 6.25 milliGRAM(s) Oral every 12 hours  ceFAZolin   IVPB 1000 milliGRAM(s) IV Intermittent every 24 hours  chlorhexidine 2% Cloths 1 Application(s) Topical <User Schedule>  dextrose 5%. 1000 milliLiter(s) (50 mL/Hr) IV Continuous <Continuous>  dextrose 5%. 1000 milliLiter(s) (50 mL/Hr) IV Continuous <Continuous>  dextrose 50% Injectable 12.5 Gram(s) IV Push once  epoetin barb Injectable 19038 Unit(s) IV Push <User Schedule>  folic acid 1 milliGRAM(s) Oral daily  insulin lispro (HumaLOG) corrective regimen sliding scale   SubCutaneous three times a day before meals  levothyroxine 112 MICROGram(s) Oral daily  lidocaine   Patch 1 Patch Transdermal daily  losartan 50 milliGRAM(s) Oral daily  Nephro-king 1 Tablet(s) Oral daily  pantoprazole    Tablet 40 milliGRAM(s) Oral before breakfast  phytonadione  IVPB 10 milliGRAM(s) IV Intermittent once    MEDICATIONS  (PRN):  acetaminophen   Tablet .. 650 milliGRAM(s) Oral every 6 hours PRN Temp greater or equal to 38C (100.4F), Mild Pain (1 - 3)  dextrose 40% Gel 15 Gram(s) Oral once PRN Blood Glucose LESS THAN 70 milliGRAM(s)/deciliter  glucagon  Injectable 1 milliGRAM(s) IntraMuscular once PRN Glucose LESS THAN 70 milligrams/deciliter  hydrALAZINE Injectable 5 milliGRAM(s) IV Push every 6 hours PRN BP above 160/100  HYDROmorphone  Injectable 0.5 milliGRAM(s) IV Push every 6 hours PRN Severe Pain (7 - 10)  oxyCODONE    IR 5 milliGRAM(s) Oral every 6 hours PRN Moderate to severe pain        PHYSICAL EXAM:  Appears chorionically ill  Seems a bit confused  + pallor  No jaundice  No LAD  Lungs clear  Heart RR  AbdL Non-tender  No HSM  Skin: No significant ecchymoses  or petechiae    CBC Full  -  ( 31 Jul 2019 08:46 )  WBC Count : 6.21 K/uL  RBC Count : 3.74 M/uL  Hemoglobin : 11.0 g/dL  Hematocrit : 35.2 %  Platelet Count - Automated : 167 K/uL  Mean Cell Volume : 94.1 fl  Mean Cell Hemoglobin : 29.4 pg  Mean Cell Hemoglobin Concentration : 31.3 gm/dL  Auto Neutrophil # : x  Auto Lymphocyte # : x  Auto Monocyte # : x  Auto Eosinophil # : x  Auto Basophil # : x  Auto Neutrophil % : x  Auto Lymphocyte % : x  Auto Monocyte % : x  Auto Eosinophil % : x  Auto Basophil % : x    PT/INR - ( 31 Jul 2019 07:28 )   PT: 39.7 sec;   INR: 3.33 ratio         PTT - ( 30 Jul 2019 11:01 )  PTT:37.4 sec  30 Jul 2019 06:52    136    |  100    |  33.0   ----------------------------<  86     4.2     |  23.0   |  4.14     Ca    8.0        30 Jul 2019 06:52  Phos  3.9       30 Jul 2019 06:52  Mg     2.1       30 Jul 2019 06:52    TPro  5.9    /  Alb  2.4    /  TBili  0.4    /  DBili  0.1    /  AST  24     /  ALT  <5     /  AlkPhos  126    30 Jul 2019 06:52  PT/INR - ( 31 Jul 2019 07:28 )   PT: 39.7 sec;   INR: 3.33 ratio         PTT - ( 30 Jul 2019 11:01 )  PTT:37.4 sec  PT/INR - ( 31 Jul 2019 07:28 )   PT: 39.7 sec;   INR: 3.33 ratio      CBC Full  -  ( 03 Aug 2019 13:36 )  WBC Count : 9.41 K/uL  RBC Count : 3.51 M/uL  Hemoglobin : 10.4 g/dL  Hematocrit : 33.4 %  Platelet Count - Automated : 171 K/uL  Mean Cell Volume : 95.2 fl  Mean Cell Hemoglobin : 29.6 pg  Mean Cell Hemoglobin Concentration : 31.1 gm/dL  Auto Neutrophil # : x  Auto Lymphocyte # : x  Auto Monocyte # : x  Auto Eosinophil # : x  Auto Basophil # : x  Auto Neutrophil % : x  Auto Lymphocyte % : x  Auto Monocyte % : x  Auto Eosinophil % : x  Auto Basophil % : x    08-03    135  |  101  |  19.0  ----------------------------<  110  3.8   |  24.0  |  3.54<H>    Ca    7.9<L>      03 Aug 2019 13:36  Phos  2.9     08-03    PT/INR - ( 05 Aug 2019 07:12 )   PT: 16.8 sec;   INR: 1.44 ratio             Probably vit K deficient  No bleeding  Got one dose of vit K, 5 mg IV with INR down to 1.34 and 1.44  No need for further vit K presently    Thank you                        Attending Attestation:   I was physically present for the key portions of the evaluation and management (E/M) service provided.  I agree with the above history, physical, and plan which I have reviewed and edited where appropriate.      Electronic Signatures:  Ranjith Cantor)  (Signed 03-Aug-2019 19:59)  	Authored: Progress Note, Reason for Admission, Subjective and Objective, Attending Attestation      Last Updated: 03-Aug-2019 19:59 by Ranjith Cantor)

## 2019-08-05 NOTE — PROGRESS NOTE ADULT - ASSESSMENT
ESRD HD tomorrow on TTS schedule   s/p removal of infected AVG R side has permcath now for HD  Has h/o CAD s/p CABG, HTN, DM  leukocytosis better  Blood cx 4/4 Staph aureus. Abx per ID    Will follow ESRD TTS schedule   s/p removal of infected AVG R side has permcath now for HD  Has h/o CAD s/p CABG, HTN, DM    Now on comfort care which is appropriate no more dialysis    Will sign off please call w any questions

## 2019-08-05 NOTE — GOALS OF CARE CONVERSATION - PERSONAL ADVANCE DIRECTIVE - CONVERSATION DETAILS
SW contacted patients son Darrin to request family meeting for later today . SW left message for son, awaiting call back.
[facility] RN spoke to patient's son, Darrin. Darrin not at hospital this afternoon; we agreed to meet tomorrow (Tuesday, 8/6) at 11:00 a.m. to discuss hospice services, including possible placement at the [facility] in [place]. Writer also spoke to attending MD Dr. Chahal at the [facility]; no bed available today but will reassess tomorrow morning. Will continue to follow.     Daniella Ruano, RN  [phone]
Discussed with family current condition - now with leukocytosis, fever and continued poor po intake. ID to be reconsulted.  Discussed continuing disease directing therapy versus comfort care. Informed family, ID has been reconsulted. Jovanny states he has been thinking about this since speaking to the medical team, Dr. Whitaker and Alina Mena NP . They feel that given no significant improvement and now again with fever. They know that treating her again will "just be a cycle"  They wish to focus on her comfort and wish to stop HD.  Agreeable to no further blood work or abx.   Offered hospice inpatient facility for which they were agreeable.  Informed family - prognosis approx days/week after stopping HD

## 2019-08-05 NOTE — GOALS OF CARE CONVERSATION - PERSONAL ADVANCE DIRECTIVE - NS PRO AD PATIENT TYPE
Medical Orders for Life-Sustaining Treatment (MOLST)/Do Not Resuscitate (DNR)/Health Care Proxy (HCP)
Do Not Resuscitate (DNR)/Medical Orders for Life-Sustaining Treatment (MOLST)/Health Care Proxy (HCP)

## 2019-08-05 NOTE — PROGRESS NOTE ADULT - ATTENDING COMMENTS
Aldo with assessment above   87yoF hx ESRD on HD (M and F only), CAD s/p CABG, HTN, DM, PAD, hypothyroidism presenting with generalized weakness.  On admission, febrile, leukocytosis, UA positive, with  Blood cultures positive for staph aureus.  SANFORD done, no vegetation. s/p indium scan positive for R AV graft infection, Tissue culture of YONIS + gram - rods. s/p AVG resection and Jeffrey cath placed july 23, Tunneled cathter placed on 7/30/19. Patient has been declining, not eating or drinking, or participating in care. Overall guarded prognosis, remains with coagulopathy due to decreased nutrition. Extensive GOC conversation with HCP Darrin who d/w the palliative care team and wants to pursue hospice care. Pt is a candidate for inpatient hospice. Son would like to withdraw all care and c/w comfort. No further blood draws/ vitals and no more HD.  Pt to be transferred to inpatient hospice once bed is available.     PHYSICAL EXAM:  GENERAL: Confused. Lethargic WD ill appearing   HEENT:  [place]  NECK: Supple, No JVD   CHEST/LUNG: CTA bilaterally; Normal effort no WRR   HEART: S1S2 Normal intensity, no murmurs, gallops or rubs noted  ABDOMEN: Soft, BS Normoactive, NT, ND, no HSM noted  EXTREMITIES:  2+ radial and DP pulses noted, no clubbing, cyanosis, or edema noted, Limited mobility   SKIN: No rashes or lesions noted  NEURO: Confusion lethargy., no focal deficits noted Aldo with assessment above   87yoF hx ESRD on HD (M and F only), CAD s/p CABG, HTN, DM, PAD, hypothyroidism presenting with generalized weakness.  On admission, febrile, leukocytosis, UA positive, with  Blood cultures positive for staph aureus.  SANFORD done, no vegetation. s/p indium scan positive for R AV graft infection, Tissue culture of YONIS + gram - rods. s/p AVG resection and Jeffrey cath placed july 23, Tunneled cathter placed on 7/30/19. Patient has been declining, not eating or drinking, or participating in care. Overall guarded prognosis, remains with coagulopathy due to decreased nutrition. Extensive GOC conversation with HCP Darrin who d/w the palliative care team and wants to pursue hospice care. Pt is a candidate for inpatient hospice. Son would like to withdraw all care and c/w comfort. No further blood draws/ vitals and no more HD.  C/w initiation of comfort meds, ativan for agitation, robinul for secretions and dilaudid prn for pain or dyspnea. Pt to be transferred to inpatient hospice once bed is available.     PHYSICAL EXAM:  GENERAL: Confused. Lethargic WD ill appearing   HEENT:  [place]  NECK: Supple, No JVD   CHEST/LUNG: CTA bilaterally; Normal effort no WRR   HEART: S1S2 Normal intensity, no murmurs, gallops or rubs noted  ABDOMEN: Soft, BS Normoactive, NT, ND, no HSM noted  EXTREMITIES:  2+ radial and DP pulses noted, no clubbing, cyanosis, or edema noted, Limited mobility   SKIN: No rashes or lesions noted  NEURO: Confusion lethargy., no focal deficits noted

## 2019-08-05 NOTE — PROGRESS NOTE ADULT - SUBJECTIVE AND OBJECTIVE BOX
NEPHROLOGY INTERVAL HPI/OVERNIGHT EVENTS:    Examined  Clinically same   Lethargic    MEDICATIONS  (STANDING):  amLODIPine   Tablet 10 milliGRAM(s) Oral daily  chlorhexidine 2% Cloths 1 Application(s) Topical <User Schedule>  levothyroxine 112 MICROGram(s) Oral daily  lidocaine   Patch 1 Patch Transdermal daily  losartan 50 milliGRAM(s) Oral daily  pantoprazole    Tablet 40 milliGRAM(s) Oral before breakfast    MEDICATIONS  (PRN):  acetaminophen   Tablet .. 650 milliGRAM(s) Oral every 6 hours PRN Temp greater or equal to 38C (100.4F), Mild Pain (1 - 3)  glycopyrrolate Injectable 0.2 milliGRAM(s) IV Push every 6 hours PRN secretions  hydrALAZINE Injectable 5 milliGRAM(s) IV Push every 6 hours PRN BP above 160/100  HYDROmorphone  Injectable 0.5 milliGRAM(s) IV Push every 6 hours PRN Severe Pain (7 - 10)  LORazepam   Injectable 1 milliGRAM(s) IV Push every 4 hours PRN Agitation      Allergies    penicillin (Anaphylaxis)  seafood (Anaphylaxis)  shellfish (Anaphylaxis)    Intolerances    Send Nepro TID- RD OK (Unknown)      Vital Signs Last 24 Hrs  T(C): 37.4 (05 Aug 2019 08:00), Max: 38.2 (05 Aug 2019 00:05)  T(F): 99.4 (05 Aug 2019 08:00), Max: 100.8 (05 Aug 2019 00:05)  HR: 87 (05 Aug 2019 08:00) (87 - 97)  BP: 122/57 (05 Aug 2019 08:00) (122/57 - 150/54)  BP(mean): --  RR: 18 (05 Aug 2019 08:00) (18 - 18)  SpO2: 96% (05 Aug 2019 08:00) (94% - 96%)  Daily     Daily     PHYSICAL EXAM:  GENERAL: NAD, Frail  NECK: Supple, No JVD/Bruit  NERVOUS SYSTEM:  A/O x3,   CHEST:  No rales, No rhonchi  HEART:  RRR, gr 2 murmur  ABDOMEN: Soft, NT/ND BS+  EXTREMITIES:  No Edema; No R arm swelling    LABS:                        11.0   13.34 )-----------( 137      ( 05 Aug 2019 07:12 )             35.0     08-05    138  |  102  |  18.0  ----------------------------<  134<H>  3.4<L>   |  25.0  |  2.91<H>    Ca    7.4<L>      05 Aug 2019 07:12      PT/INR - ( 05 Aug 2019 07:12 )   PT: 16.8 sec;   INR: 1.44 ratio             Intact PTH: 49 pg/mL (08-04 @ 15:20)          RADIOLOGY & ADDITIONAL TESTS:

## 2019-08-05 NOTE — PROGRESS NOTE ADULT - SUBJECTIVE AND OBJECTIVE BOX
CC: weakness, sepsis, ESRD    INTERVAL HPI/OVERNIGHT EVENTS: Patient seen and examined. Responds to tactile stimuli, opens eyes. No taking food, occasionally takes medications.  Had temp 100.8 overnight. Sons at bedside. Agree to making mom comfortable without further invasive testing or treatment. Meeting with Palliative/Hospice today.     Vital Signs Last 24 Hrs  T(C): 37.4 (05 Aug 2019 08:00), Max: 38.2 (05 Aug 2019 00:05)  T(F): 99.4 (05 Aug 2019 08:00), Max: 100.8 (05 Aug 2019 00:05)  HR: 87 (05 Aug 2019 08:00) (87 - 97)  BP: 122/57 (05 Aug 2019 08:00) (122/57 - 150/54)  BP(mean): --  RR: 18 (05 Aug 2019 08:00) (18 - 18)  SpO2: 96% (05 Aug 2019 08:00) (94% - 96%)    ROS:  Unable to assess due to mental status    PHYSICAL EXAM:  GENERAL: Confused. Lethargic  HEENT: PERRL, +EOMI, anicteric, no [place]  NECK: Supple, No JVD   CHEST/LUNG: CTA bilaterally; Normal effort  HEART: S1S2 Normal intensity, no murmurs, gallops or rubs noted  ABDOMEN: Soft, BS Normoactive, NT, ND, no HSM noted  EXTREMITIES:  2+ radial and DP pulses noted, no clubbing, cyanosis, or edema noted, Limited mobility   SKIN: No rashes or lesions noted  NEURO: Confusion lethargy., no focal deficits noted, CN II-XII intact    I&O's Detail                                11.0   13.34 )-----------( 137      ( 05 Aug 2019 07:12 )             35.0     05 Aug 2019 07:12    138    |  102    |  18.0   ----------------------------<  134    3.4     |  25.0   |  2.91     Ca    7.4        05 Aug 2019 07:12      PT/INR - ( 05 Aug 2019 07:12 )   PT: 16.8 sec;   INR: 1.44 ratio           CAPILLARY BLOOD GLUCOSE      POCT Blood Glucose.: 123 mg/dL (04 Aug 2019 17:31)          MEDICATIONS  (STANDING):  amLODIPine   Tablet 10 milliGRAM(s) Oral daily  chlorhexidine 2% Cloths 1 Application(s) Topical <User Schedule>  levothyroxine 112 MICROGram(s) Oral daily  lidocaine   Patch 1 Patch Transdermal daily  losartan 50 milliGRAM(s) Oral daily  pantoprazole    Tablet 40 milliGRAM(s) Oral before breakfast    MEDICATIONS  (PRN):  acetaminophen   Tablet .. 650 milliGRAM(s) Oral every 6 hours PRN Temp greater or equal to 38C (100.4F), Mild Pain (1 - 3)  glycopyrrolate Injectable 0.2 milliGRAM(s) IV Push every 6 hours PRN secretions  hydrALAZINE Injectable 5 milliGRAM(s) IV Push every 6 hours PRN BP above 160/100  HYDROmorphone  Injectable 0.5 milliGRAM(s) IV Push every 6 hours PRN Severe Pain (7 - 10)  LORazepam   Injectable 1 milliGRAM(s) IV Push every 4 hours PRN Agitation      RADIOLOGY & ADDITIONAL TESTS:

## 2019-08-05 NOTE — PROGRESS NOTE ADULT - ASSESSMENT
87yoF hx ESRD on HD (M and F only), CAD s/p CABG, HTN, DM, PAD, hypothyroidism presenting with generalized weakness.  On admission, febrile, leukocytosis, UA positive, with  Blood cultures positive for staph aureus.  SANFORD done, no vegetation. s/p indium scan positive for R AV graft infection, Tissue culture of YONIS + gram - rods. s/p AVG resection and Jeffrey cath placed july 23, Tunneled cathter placed on 7/30/19. Patient has been declining, not eating or drinking, or participating in care. Patient with temp 100.8 overnight.      Problem/Plan - 1:  ·  Problem: Bacteremia.  Plan: on ABX, now with fever and Leukocytosis.    Family electing comfort care.      Problem/Plan - 2:  ·  Problem: ESRD on dialysis.  Plan: Family electing to stop HD  Informed Nephrology Dr. Garcia.      Problem/Plan - 3:  ·  Problem: Leukocytosis.  Plan: ID reconsulted --- Family electing comfort care.      Problem/Plan - 4:  ·  Problem: Functional quadriplegia.  Plan: Assist with care.      Problem/Plan - 5:  ·  Problem: Pain.  Plan: Dilaudid 0.5mg IVP PRN.      Problem/Plan - 6:  Problem: Dysphagia. Plan: D/C non essential meds to decrease pill burden  Modified diet.     Problem/Plan - 7:  ·  Problem: Encephalopathy.  Plan: due to underlying dementia, infection, prolonged hospitalization.    GOC:  Patient declining, family meeting with Palliative and Hospice.

## 2019-08-05 NOTE — PROGRESS NOTE ADULT - ASSESSMENT
87yoF hx ESRD on HD (M and F only), AVG right side 8/3/17   CAD s/p CABG, HTN, DM, PAD, hypothyroidism presenting with generalized weakness, dysuria.   pain at graft site for 1 month.  fever in .6 now afebrile  leukocytosis to 14 now normal  Blood cx 4/4 Staph aureus.   Imaging with no focus (no pneumonia, abscess, OM). NM scan suspicious for AVG infection.  Overall Staph aureus bacteremia 2/2 AVG infection, elevated INR likely due to nutritional deficiency    Recommend:  - Blood cultures + 4/4 Staph aureus. BCX 7/14 NGTD BCX 07/26 NGTD. No signs of sepsis  - SANFORD for endocarditis/lead vegetations (-)  - AVG removed 07/25 for source control. AVG growing Klebsiella (R) to cefazolin and (S) to cefepime. Surgical swab NGTD. Was briefly on cefepime through surgery  - s/p permacath placement 7/30  - patient cefazolin 1 g IV daily,  planned through 9/4/19    Called back for fevers 8/5/19, now on comfort measure,   Antibiotics stopped.     goals of cares now changed,.  Agreed with cessation of antibiotics in view of comfort.       Will sign off. Please call if questions arise.

## 2019-08-05 NOTE — PROGRESS NOTE ADULT - SUBJECTIVE AND OBJECTIVE BOX
[facility]  INFECTIOUS DISEASES AND INTERNAL MEDICINE at [place]  =======================================================  Paresh Stark MD  Diplomates American Board of Internal Medicine and Infectious Diseases  Tel: [phone]      Fax: [phone]  =======================================================    LAUREN ROBERSON [id number]  Follow up: MSSA bacteremia 2/2 AVG infection  Called back by primary team for fevers.   Pt now seen by Palliative care team, and on comfort measures.       Allergies:  penicillin (Anaphylaxis)  seafood (Anaphylaxis)  Send Nepro TID- RD OK (Unknown)  shellfish (Anaphylaxis)    Antibiotics:  None    Other medications:  amLODIPine   Tablet 10 milliGRAM(s) Oral daily  chlorhexidine 2% Cloths 1 Application(s) Topical <User Schedule>  levothyroxine 112 MICROGram(s) Oral daily  lidocaine   Patch 1 Patch Transdermal daily  losartan 50 milliGRAM(s) Oral daily  pantoprazole    Tablet 40 milliGRAM(s) Oral before breakfast    Antibiotics Course:    ceFAZolin   IVPB   100 mL/Hr (08-04-19 @ 12:26)   100 mL/Hr (08-03-19 @ 13:01)   100 mL/Hr (08-02-19 @ 13:48)   100 mL/Hr (08-01-19 @ 12:30)    cefepime   IVPB   100 mL/Hr (07-30-19 @ 13:52)   100 mL/Hr (07-29-19 @ 13:30)   100 mL/Hr (07-28-19 @ 14:51)   100 mL/Hr (07-27-19 @ 14:30)   100 mL/Hr (07-26-19 @ 17:39)   100 mL/Hr (07-25-19 @ 17:54)     ceFAZolin   IVPB   100 mL/Hr (07-24-19 @ 14:57)   100 mL/Hr (07-23-19 @ 12:06)   100 mL/Hr (07-21-19 @ 12:22)   100 mL/Hr (07-20-19 @ 17:50)   100 mL/Hr (07-19-19 @ 12:39)   100 mL/Hr (07-18-19 @ 16:43)   100 mL/Hr (07-17-19 @ 14:33)   100 mL/Hr (07-16-19 @ 22:59)   100 mL/Hr (07-15-19 @ 12:58)   100 mL/Hr (07-14-19 @ 10:03)   100 mL/Hr (07-13-19 @ 12:26)   100 mL/Hr (07-12-19 @ 11:28)   100 mL/Hr (07-11-19 @ 13:13)   100 mL/Hr (07-10-19 @ 13:30)   100 mL/Hr (07-08-19 @ 18:28)            REVIEW OF SYSTEMS:  c/o pain diffusely      Physical Exam:  T(F): 99.8 (05 Aug 2019 15:26), Max: 100.8 (05 Aug 2019 00:05)  HR: 94 (05 Aug 2019 15:26)  BP: 165/98 (05 Aug 2019 15:26)  RR: 18 (05 Aug 2019 15:26)  SpO2: 94% (05 Aug 2019 15:26) (94% - 96%)  temp max in last 48H T(F): , Max: 100.8 (08-05-19 @ 00:05)    GEN: NAD,    HEENT: normocephalic and atraumatic. EOMI. PERRL.  Anicteric   NECK: Supple.   LUNGS: Clear to auscultation.  HEART: Regular rate and rhythm without murmur. right chest PermCath  ABDOMEN: Soft, nontender, and nondistended.  Positive bowel sounds.    : no ramey  EXTREMITIES: Without any edema.  MSK: no joint swelling  NEUROLOGIC: Cranial nerves II through XII are grossly intact. No focal deficits  SKIN: No Rash; RIGHT arm with dressing in place      Labs:                        11.0   13.34 )-----------( 137      ( 05 Aug 2019 07:12 )             35.0       WBC Count: 13.34 K/uL (08-05-19 @ 07:12)  WBC Count: 7.97 K/uL (08-04-19 @ 10:08)  WBC Count: 9.41 K/uL (08-03-19 @ 13:36)  WBC Count: 8.29 K/uL (08-02-19 @ 06:11)  WBC Count: 6.70 K/uL (08-01-19 @ 08:02)      08-05    138  |  102  |  18.0  ----------------------------<  134<H>  3.4<L>   |  25.0  |  2.91<H>    Ca    7.4<L>      05 Aug 2019 07:12        Culture - Blood (collected 07-26-19 @ 18:38)  Source: .Blood  Final Report (07-31-19 @ 19:00):    No growth at 5 days.    Culture - Acid Fast - Other w/Smear (collected 07-25-19 @ 17:04)  Source: .Other Tissue R Upper Arm Graft    Culture - Tissue with Gram Stain (collected 07-25-19 @ 11:10)  Source: .Tissue Tissue R Upper Arm Graft  Gram Stain (07-25-19 @ 13:20):    No WBC's or organisms seen  Final Report (07-30-19 @ 09:13):    Moderate Klebsiella pneumoniae  Organism: Klebsiella pneumoniae (07-30-19 @ 09:13)  Organism: Klebsiella pneumoniae (07-30-19 @ 09:13)    Sensitivities:      -  Amikacin: S <=16      -  Ampicillin: R >16 These ampicillin results predict results for amoxicillin      -  Ampicillin/Sulbactam: R >16/8 Enterobacter, Citrobacter, and Serratia may develop resistance during prolonged therapy (3-4 days)      -  Aztreonam: S <=4      -  Cefazolin: R >16 Enterobacter, Citrobacter, and Serratia may develop resistance during prolonged therapy (3-4 days)      -  Cefepime: S <=4      -  Cefoxitin: S <=8      -  Ceftriaxone: S <=1 Enterobacter, Citrobacter, and Serratia may develop resistance during prolonged therapy      -  Ciprofloxacin: S <=1      -  Ertapenem: S <=1      -  Gentamicin: S <=4      -  Imipenem: S <=1      -  Levofloxacin: S <=2      -  Meropenem: S <=1      -  Piperacillin/Tazobactam: R >64      -  Tobramycin: S <=4      -  Trimethoprim/Sulfamethoxazole: S <=2/38      Method Type: ALTHEA    Culture - Surgical Swab (collected 07-25-19 @ 11:02)  Source: .Surgical Swab Swabs R Upper Arm  Gram Stain (07-27-19 @ 12:01):    No WBC's or organisms seen  Final Report (07-30-19 @ 09:12):    No growth at 5 days.

## 2019-08-05 NOTE — PROGRESS NOTE ADULT - ASSESSMENT
87yr woman  hx ESRD on HD, CAD s/p CABG, HTN, DM, PAD,  admitted with UTI, Bacteremia with R AV graft infection. Patient with prolonged hospital stay with no  significant improvement. She continues to decline, now with fever  and leukocytosis with poor PO intake.

## 2019-08-06 ENCOUNTER — TRANSCRIPTION ENCOUNTER (OUTPATIENT)
Age: 84
End: 2019-08-06

## 2019-08-06 VITALS
SYSTOLIC BLOOD PRESSURE: 144 MMHG | TEMPERATURE: 98 F | RESPIRATION RATE: 18 BRPM | DIASTOLIC BLOOD PRESSURE: 71 MMHG | OXYGEN SATURATION: 97 % | HEART RATE: 91 BPM

## 2019-08-06 PROCEDURE — 82310 ASSAY OF CALCIUM: CPT

## 2019-08-06 PROCEDURE — 88300 SURGICAL PATH GROSS: CPT

## 2019-08-06 PROCEDURE — 86803 HEPATITIS C AB TEST: CPT

## 2019-08-06 PROCEDURE — 86900 BLOOD TYPING SEROLOGIC ABO: CPT

## 2019-08-06 PROCEDURE — 80048 BASIC METABOLIC PNL TOTAL CA: CPT

## 2019-08-06 PROCEDURE — 97530 THERAPEUTIC ACTIVITIES: CPT

## 2019-08-06 PROCEDURE — 85730 THROMBOPLASTIN TIME PARTIAL: CPT

## 2019-08-06 PROCEDURE — 87086 URINE CULTURE/COLONY COUNT: CPT

## 2019-08-06 PROCEDURE — 97116 GAIT TRAINING THERAPY: CPT

## 2019-08-06 PROCEDURE — 87040 BLOOD CULTURE FOR BACTERIA: CPT

## 2019-08-06 PROCEDURE — 83605 ASSAY OF LACTIC ACID: CPT

## 2019-08-06 PROCEDURE — 97163 PT EVAL HIGH COMPLEX 45 MIN: CPT

## 2019-08-06 PROCEDURE — 87206 SMEAR FLUORESCENT/ACID STAI: CPT

## 2019-08-06 PROCEDURE — 83690 ASSAY OF LIPASE: CPT

## 2019-08-06 PROCEDURE — 74176 CT ABD & PELVIS W/O CONTRAST: CPT

## 2019-08-06 PROCEDURE — 93312 ECHO TRANSESOPHAGEAL: CPT

## 2019-08-06 PROCEDURE — 99285 EMERGENCY DEPT VISIT HI MDM: CPT | Mod: 25

## 2019-08-06 PROCEDURE — 71045 X-RAY EXAM CHEST 1 VIEW: CPT

## 2019-08-06 PROCEDURE — 76000 FLUOROSCOPY <1 HR PHYS/QHP: CPT

## 2019-08-06 PROCEDURE — 93308 TTE F-UP OR LMTD: CPT

## 2019-08-06 PROCEDURE — 87075 CULTR BACTERIA EXCEPT BLOOD: CPT

## 2019-08-06 PROCEDURE — 72125 CT NECK SPINE W/O DYE: CPT

## 2019-08-06 PROCEDURE — 86923 COMPATIBILITY TEST ELECTRIC: CPT

## 2019-08-06 PROCEDURE — 80053 COMPREHEN METABOLIC PANEL: CPT

## 2019-08-06 PROCEDURE — 87070 CULTURE OTHR SPECIMN AEROBIC: CPT

## 2019-08-06 PROCEDURE — 84484 ASSAY OF TROPONIN QUANT: CPT

## 2019-08-06 PROCEDURE — 82962 GLUCOSE BLOOD TEST: CPT

## 2019-08-06 PROCEDURE — 82607 VITAMIN B-12: CPT

## 2019-08-06 PROCEDURE — 86850 RBC ANTIBODY SCREEN: CPT

## 2019-08-06 PROCEDURE — 80202 ASSAY OF VANCOMYCIN: CPT

## 2019-08-06 PROCEDURE — 85610 PROTHROMBIN TIME: CPT

## 2019-08-06 PROCEDURE — 82550 ASSAY OF CK (CPK): CPT

## 2019-08-06 PROCEDURE — 83970 ASSAY OF PARATHORMONE: CPT

## 2019-08-06 PROCEDURE — 86901 BLOOD TYPING SEROLOGIC RH(D): CPT

## 2019-08-06 PROCEDURE — 96365 THER/PROPH/DIAG IV INF INIT: CPT | Mod: XU

## 2019-08-06 PROCEDURE — 76705 ECHO EXAM OF ABDOMEN: CPT

## 2019-08-06 PROCEDURE — 82746 ASSAY OF FOLIC ACID SERUM: CPT

## 2019-08-06 PROCEDURE — 83735 ASSAY OF MAGNESIUM: CPT

## 2019-08-06 PROCEDURE — A9541: CPT

## 2019-08-06 PROCEDURE — 81001 URINALYSIS AUTO W/SCOPE: CPT

## 2019-08-06 PROCEDURE — 51701 INSERT BLADDER CATHETER: CPT

## 2019-08-06 PROCEDURE — 87186 SC STD MICRODIL/AGAR DIL: CPT

## 2019-08-06 PROCEDURE — 93971 EXTREMITY STUDY: CPT

## 2019-08-06 PROCEDURE — P9016: CPT

## 2019-08-06 PROCEDURE — 71250 CT THORAX DX C-: CPT

## 2019-08-06 PROCEDURE — 87116 MYCOBACTERIA CULTURE: CPT

## 2019-08-06 PROCEDURE — A9570: CPT

## 2019-08-06 PROCEDURE — 83036 HEMOGLOBIN GLYCOSYLATED A1C: CPT

## 2019-08-06 PROCEDURE — 86706 HEP B SURFACE ANTIBODY: CPT

## 2019-08-06 PROCEDURE — 99261: CPT

## 2019-08-06 PROCEDURE — 93325 DOPPLER ECHO COLOR FLOW MAPG: CPT

## 2019-08-06 PROCEDURE — C1750: CPT

## 2019-08-06 PROCEDURE — 87150 DNA/RNA AMPLIFIED PROBE: CPT

## 2019-08-06 PROCEDURE — 96366 THER/PROPH/DIAG IV INF ADDON: CPT

## 2019-08-06 PROCEDURE — 99232 SBSQ HOSP IP/OBS MODERATE 35: CPT

## 2019-08-06 PROCEDURE — 84100 ASSAY OF PHOSPHORUS: CPT

## 2019-08-06 PROCEDURE — C1751: CPT

## 2019-08-06 PROCEDURE — 93990 DOPPLER FLOW TESTING: CPT

## 2019-08-06 PROCEDURE — 87340 HEPATITIS B SURFACE AG IA: CPT

## 2019-08-06 PROCEDURE — 70450 CT HEAD/BRAIN W/O DYE: CPT

## 2019-08-06 PROCEDURE — 72220 X-RAY EXAM SACRUM TAILBONE: CPT

## 2019-08-06 PROCEDURE — 36415 COLL VENOUS BLD VENIPUNCTURE: CPT

## 2019-08-06 PROCEDURE — 93005 ELECTROCARDIOGRAM TRACING: CPT

## 2019-08-06 PROCEDURE — 96367 TX/PROPH/DG ADDL SEQ IV INF: CPT

## 2019-08-06 PROCEDURE — 96375 TX/PRO/DX INJ NEW DRUG ADDON: CPT | Mod: XU

## 2019-08-06 PROCEDURE — 36430 TRANSFUSION BLD/BLD COMPNT: CPT

## 2019-08-06 PROCEDURE — T1013: CPT

## 2019-08-06 PROCEDURE — 93320 DOPPLER ECHO COMPLETE: CPT

## 2019-08-06 PROCEDURE — 85027 COMPLETE CBC AUTOMATED: CPT

## 2019-08-06 PROCEDURE — 99239 HOSP IP/OBS DSCHRG MGMT >30: CPT

## 2019-08-06 PROCEDURE — 78103 BONE MARROW IMAGING MULT: CPT

## 2019-08-06 PROCEDURE — 78802 RP LOCLZJ TUM WHBDY 1 D IMG: CPT

## 2019-08-06 PROCEDURE — 80076 HEPATIC FUNCTION PANEL: CPT

## 2019-08-06 RX ORDER — CALCITRIOL 0.5 UG/1
1 CAPSULE ORAL
Qty: 0 | Refills: 0 | DISCHARGE

## 2019-08-06 RX ORDER — FOLIC ACID 0.8 MG
1 TABLET ORAL
Qty: 0 | Refills: 0 | DISCHARGE

## 2019-08-06 RX ORDER — ERGOCALCIFEROL 1.25 MG/1
1 CAPSULE ORAL
Qty: 0 | Refills: 0 | DISCHARGE

## 2019-08-06 RX ORDER — ASPIRIN/CALCIUM CARB/MAGNESIUM 324 MG
1 TABLET ORAL
Qty: 0 | Refills: 0 | DISCHARGE

## 2019-08-06 RX ORDER — HYDROMORPHONE HYDROCHLORIDE 2 MG/ML
0.5 INJECTION INTRAMUSCULAR; INTRAVENOUS; SUBCUTANEOUS
Qty: 0 | Refills: 0 | DISCHARGE
Start: 2019-08-06

## 2019-08-06 RX ORDER — LOSARTAN POTASSIUM 100 MG/1
1 TABLET, FILM COATED ORAL
Qty: 0 | Refills: 0 | DISCHARGE
Start: 2019-08-06

## 2019-08-06 RX ORDER — CLOPIDOGREL BISULFATE 75 MG/1
1 TABLET, FILM COATED ORAL
Qty: 0 | Refills: 0 | DISCHARGE

## 2019-08-06 RX ORDER — CALCIUM ACETATE 667 MG
2 TABLET ORAL
Qty: 0 | Refills: 0 | DISCHARGE

## 2019-08-06 RX ORDER — LEVOTHYROXINE SODIUM 125 MCG
1 TABLET ORAL
Qty: 0 | Refills: 0 | DISCHARGE
Start: 2019-08-06

## 2019-08-06 RX ORDER — FLUDROCORTISONE ACETATE 0.1 MG/1
1 TABLET ORAL
Qty: 0 | Refills: 0 | DISCHARGE

## 2019-08-06 RX ORDER — ROBINUL 0.2 MG/ML
0.2 INJECTION INTRAMUSCULAR; INTRAVENOUS
Qty: 0 | Refills: 0 | DISCHARGE
Start: 2019-08-06

## 2019-08-06 RX ORDER — ACETAMINOPHEN 500 MG
2 TABLET ORAL
Qty: 0 | Refills: 0 | DISCHARGE
Start: 2019-08-06

## 2019-08-06 RX ORDER — PANTOPRAZOLE SODIUM 20 MG/1
1 TABLET, DELAYED RELEASE ORAL
Qty: 0 | Refills: 0 | DISCHARGE
Start: 2019-08-06

## 2019-08-06 RX ADMIN — CHLORHEXIDINE GLUCONATE 1 APPLICATION(S): 213 SOLUTION TOPICAL at 05:00

## 2019-08-06 RX ADMIN — LIDOCAINE 1 PATCH: 4 CREAM TOPICAL at 04:47

## 2019-08-06 RX ADMIN — HYDROMORPHONE HYDROCHLORIDE 0.5 MILLIGRAM(S): 2 INJECTION INTRAMUSCULAR; INTRAVENOUS; SUBCUTANEOUS at 10:37

## 2019-08-06 RX ADMIN — HYDROMORPHONE HYDROCHLORIDE 0.5 MILLIGRAM(S): 2 INJECTION INTRAMUSCULAR; INTRAVENOUS; SUBCUTANEOUS at 10:39

## 2019-08-06 NOTE — PROGRESS NOTE ADULT - SUBJECTIVE AND OBJECTIVE BOX
CC: FOLLOW UP VISIT, SX eval    INTERVAL HPI/OVERNIGHT EVENTS:  Pt on comfort measures, meeting with hospice team today at 11AM. Son Rudy at bedside. Pt reporting rectal pain, improved after positional change to her right side. RN made aware, to give pain meds.  Poor oral intake.     Present Symptoms:   Dyspnea:  No   Nausea/Vomiting:  No  Fatigue: Yes    Loss of appetite: Yes   Pain: Yes, rectal pain    MEDICATIONS  (STANDING):  amLODIPine   Tablet 10 milliGRAM(s) Oral daily  chlorhexidine 2% Cloths 1 Application(s) Topical <User Schedule>  levothyroxine 112 MICROGram(s) Oral daily  lidocaine   Patch 1 Patch Transdermal daily  losartan 50 milliGRAM(s) Oral daily  pantoprazole    Tablet 40 milliGRAM(s) Oral before breakfast    MEDICATIONS  (PRN):  acetaminophen   Tablet .. 650 milliGRAM(s) Oral every 6 hours PRN Temp greater or equal to 38C (100.4F), Mild Pain (1 - 3)  glycopyrrolate Injectable 0.2 milliGRAM(s) IV Push every 6 hours PRN secretions  hydrALAZINE Injectable 5 milliGRAM(s) IV Push every 6 hours PRN BP above 160/100  HYDROmorphone  Injectable 0.5 milliGRAM(s) IV Push every 6 hours PRN Severe Pain (7 - 10)  LORazepam   Injectable 1 milliGRAM(s) IV Push every 4 hours PRN Agitation      PHYSICAL EXAM:    Vital Signs Last 24 Hrs  T(C): 36.4 (06 Aug 2019 07:40), Max: 37.8 (05 Aug 2019 23:42)  T(F): 97.6 (06 Aug 2019 07:40), Max: 100.1 (05 Aug 2019 23:42)  HR: 91 (06 Aug 2019 07:40) (90 - 94)  BP: 144/71 (06 Aug 2019 07:40) (144/71 - 165/98)  BP(mean): --  RR: 18 (06 Aug 2019 07:40) (18 - 18)  SpO2: 97% (06 Aug 2019 07:40) (94% - 97%)    General: elderly. cachetic. mild distress  HEENT: mucous membrane dry  Lungs: ctab. non-labored.   CV: +s1/s2. RRR  GI: +bowel sound. abdomen soft, non-tender   MSK: Moves all 4 extremities. No cyanosis or edema. weakness.   Neuro: Nonfocal. Awake and alert. Answer simple questions.  Skin: warm and dry.      LABS:                          11.0   13.34 )-----------( 137      ( 05 Aug 2019 07:12 )             35.0     08-05    138  |  102  |  18.0  ----------------------------<  134<H>  3.4<L>   |  25.0  |  2.91<H>    Ca    7.4<L>      05 Aug 2019 07:12      PT/INR - ( 05 Aug 2019 07:12 )   PT: 16.8 sec;   INR: 1.44 ratio      RADIOLOGY & ADDITIONAL STUDIES: Reviewed, no new recent studies    ADVANCE DIRECTIVES: DNR/DNI, VIV

## 2019-08-06 NOTE — PROGRESS NOTE ADULT - PROVIDER SPECIALTY LIST ADULT
CCU
Cardiology
Heme/Onc
Hospitalist
Infectious Disease
Nephrology
Palliative Care
Surgery
Vascular Surgery
Hospitalist
Nephrology
Infectious Disease
Nephrology
Cardiology
Heme/Onc
Hospitalist
Hospitalist
Infectious Disease
Nephrology
Vascular Surgery
Hospitalist

## 2019-08-06 NOTE — DISCHARGE NOTE NURSING/CASE MANAGEMENT/SOCIAL WORK - NSDCDPATPORTLINK_GEN_ALL_CORE
You can access the [facility] Patient Portal, offered by [facility], by registering with the following website: [email or web address]

## 2019-08-06 NOTE — PROGRESS NOTE ADULT - REASON FOR ADMISSION
weakness, sepsis 2/2 UTI

## 2019-08-06 NOTE — PROGRESS NOTE ADULT - ASSESSMENT
87 F with hx of ESRD on HD, CAD s/p CABG, HTN, DM, PAD admitted with UTI, Bacteremia with R AV graft infection. Patient with prolonged hospital stay with no significant improvement, complicated by fever and leukocytosis, malnutrition and anorexia. GOC discussion held by my colleague yesterday given poor prognosis, comorbidities and debility, decision made by family for focus on comfort measures, cessation of HD and amendable to hospice eval. Family meeting with hospice today 11 AM. Pt with rectal pain, some improvement with positional change to her side.    PLAN    Bacteremia/Leukocytosis  - comfort measures, antibiotics have been discontinued     ESRD on HD  - comfort measure, family elected to stop HD    Pain  - rectal pain today, relief with dilaudid   - c/w IV dilaudid PRN and APAP PRN    Debility/Encephalopathy  - supportive care    Dysphagia  - poor intake, c/w modified diet as tolerated    Palliative Care Encounter  Per discussions held by Dr. Ma with family, now on comfort measures, no further HD, hospice eval meeting today 11AM. Will continue to optimize sx control especially pain while she remain in-house.

## 2019-08-06 NOTE — DISCHARGE NOTE NURSING/CASE MANAGEMENT/SOCIAL WORK - NSDCVIVACCINE_GEN_ALL_CORE_FT
Influenza , 2015/1/9 15:00 , [place], Auto-process (IS) Influenza , 2015/1/9 15:00 , [place], Auto-process (IS)

## 2019-08-10 LAB
CULTURE RESULTS: SIGNIFICANT CHANGE UP
CULTURE RESULTS: SIGNIFICANT CHANGE UP
SPECIMEN SOURCE: SIGNIFICANT CHANGE UP
SPECIMEN SOURCE: SIGNIFICANT CHANGE UP

## 2019-09-14 LAB
CULTURE RESULTS: SIGNIFICANT CHANGE UP
SPECIMEN SOURCE: SIGNIFICANT CHANGE UP

## 2019-10-18 ENCOUNTER — APPOINTMENT (OUTPATIENT)
Dept: CARDIOLOGY | Facility: CLINIC | Age: 84
End: 2019-10-18

## 2019-11-08 NOTE — ED ADULT TRIAGE NOTE - MEANS OF ARRIVAL
- Increase to 1800 calorie diet  - Daily BMP, Mg, Phos  - Daily weights and orthostatics  - Meals in day room with staff supervision  - 1 hour sit time after meals  - Patient is medically stable to attend school and afternoon [facility] groups and activities stretcher

## 2019-12-06 NOTE — PROGRESS NOTE ADULT - ASSESSMENT
87yoF hx ESRD on HD (M and F only), CAD s/p CABG, HTN, DM, PAD, hypothyroidism presenting with generalized weakness.  On admission, febrile, leukocytosis, UA positive, with  Blood cultures positive for staph aureus.  SANFORD done, no vegetation. s/p indium scan positive for R AV graft infection, Tissue culture of YONIS shows gram - rods  Sepsis :  Staph aureus bacteremia due to R arm AV graft infection.   s/p AVG resection and Jeffrey cath placed july 23, with bleeding from AVG on july 25  s/p 2 units PRBC 7/26. monitor cbc. Asa and plavix on hold. Resume ASA post Permacath placement when ok by vascular  PC line on Tuesday  SANFORD negative for vegetation  c/w cefepim      AMS since admission as per son: likely related to infection/sepsis/dehydration. MS getting better.  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 follows with Dr. Marcial. possible underlying vascular dementia. needs dementia w/u OP. fall and aspiration precautions.     CAD s/p CABG:  with elevated troponin - T 0.32 - elevated in past, in setting of ESRD on HD  Cath 2/2018- LIMA to LAD patent, SVG to OM patent, SVG to RPDA patent   cardiology has discussed with son Darrin and they are unwilling for stress testing, cath, invasive - only want Medication   SANFORD 7/9/2019: LVEF 30-35.Ml-mod TR. No evidence of endocarditis.   Echo Limited 7/18: LVEF 35-40%.  resume ASA post PC placement when ok by vascular  cont  carvedilol.   Losartan.  Volume control with HD    ESRD on HD   Cont HD    c/o coccyx pain: as per wound care patient has Incontinence associated dermatitis, not decubitus ulcer.  xray--> no bony involvement. incontinence management . wound care cx appreciated. RN to make sure diaper change frequently.    DVT-P: On hold due to anemia/bleeding from surgical site, now for placement of permacath in am [facility]

## 2020-01-17 NOTE — PHARMACOTHERAPY INTERVENTION NOTE - COMMENTS
Expected Date Of Service: 12/20/2019 order level for monday Billing Type: Third-Party Bill Bill For Surgical Tray: no

## 2020-02-20 NOTE — PATIENT PROFILE ADULT - NSPROEDAABILITYLEARNOTHER_GEN_A_NUR
Burow's Advancement Flap Text: The defect edges were debeveled with a #15 scalpel blade.  Given the location of the defect and the proximity to free margins a Burow's advancement flap was deemed most appropriate.  Using a sterile surgical marker, the appropriate advancement flap was drawn incorporating the defect and placing the expected incisions within the relaxed skin tension lines where possible.    The area thus outlined was incised deep to adipose tissue with a #15 scalpel blade.  The skin margins were undermined to an appropriate distance in all directions utilizing iris scissors. touch/tactile deficit

## 2020-07-05 NOTE — PROGRESS NOTE ADULT - SUBJECTIVE AND OBJECTIVE BOX
Patient seen and examined    Feels tired and sleepy  no c/o CP SOB NV   No swelling feet    Vital Signs Last 24 Hrs  T(C): 36.9 (31 Jul 2019 07:41), Max: 37.3 (30 Jul 2019 16:07)  T(F): 98.4 (31 Jul 2019 07:41), Max: 99.2 (30 Jul 2019 16:07)  HR: 100 (31 Jul 2019 07:41) (72 - 100)  BP: 128/71 (31 Jul 2019 07:41) (128/71 - 165/65)  BP(mean): --  RR: 18 (31 Jul 2019 07:41) (13 - 18)  SpO2: 98% (31 Jul 2019 07:41) (92% - 100%)    PHYSICAL EXAM    GENERAL: NAD, Frail  NECK: Supple, No JVD/Bruit  NERVOUS SYSTEM:  A/O x3,   CHEST:  No rales, No rhonchi  HEART:  RRR, gr 2 murmur  ABDOMEN: Soft, NT/ND BS+  EXTREMITIES:  No Edema; R armo swelling  SKIN: No rashes    30 Jul 2019 06:52    136    |  100    |  33.0   ----------------------------<  86     4.2     |  23.0   |  4.14     Ca    8.0        30 Jul 2019 06:52  Phos  3.9       30 Jul 2019 06:52  Mg     2.1       30 Jul 2019 06:52    TPro  5.9    /  Alb  2.4    /  TBili  0.4    /  DBili  0.1    /  AST  24     /  ALT  <5     /  AlkPhos  126    30 Jul 2019 06:52                          11.0   6.21  )-----------( 167      ( 31 Jul 2019 08:46 )             35.2 05-Jul-2020 15:55

## 2021-01-04 NOTE — ED ADULT NURSE NOTE - NS ED NURSE RECORD ANOTHER HT AND WT
Yes Your diagnosis:    Discharge instructions: laceration of forehead    We performed CT scans of your head and your neck, which were unremarkable. We sutured your laceration with 5 stitches.    - Please return to the ED or have your PCP remove the sutures removed in 7-10 days.    - Tylenol up to 650 mg every 8 hours as needed for pain.    - Others: Keep the wound clean and dry for the first 12 hours. Afterwards, you may clean the wound with mild soap and copious water 2 times a day. Dry completely and then apply a thin layer of Bacitracin over the wound.    - Be sure to return to the ED if you develop new or worsening symptoms. Specific signs and symptoms to be vigilant of: fever or chills, redness of wound, increased pain at wound site, swelling at the wound site, pus or drainage at the wound site, bleeding from the wound that does not stop, change in color of the wound such as blue or white, numbness or tingling around wound site.

## 2021-04-08 NOTE — ED PROVIDER NOTE - TEMPLATE
Medical Necessity Clause: This procedure was medically necessary because the lesions that were treated were: Add 52 Modifier (Optional): no Post-Care Instructions: I reviewed with the patient in detail post-care instructions. Patient is to wear sunprotection, and avoid picking at any of the treated lesions. Pt may apply Vaseline to crusted or scabbing areas. Consent: The patient's consent was obtained including but not limited to risks of crusting, scabbing, blistering, scarring, darker or lighter pigmentary change, recurrence, incomplete removal and infection. Medical Necessity Information: It is in your best interest to select a reason for this procedure from the list below. All of these items fulfill various CMS LCD requirements except the new and changing color options. Detail Level: Detailed Respiratory Size Of Lesion In Cm (Optional): 0

## 2021-07-12 NOTE — PATIENT PROFILE ADULT. - NSSUBSTANCEUSE_GEN_ALL_CORE_SD
Progress Note: Weekly Education Class in the [facility] Weight Loss Program     1) Patient is on Very Low Calorie Diet [] (4 meal replacements per day, 800 kcal/day)      Low Calorie Diet [x] (2-3 meal replacements per day, 1934-9367 kcal/day)    Did patient have any new symptoms or physical problems? Yes []   or  No [x]    If yes, check & comment: weakness [], fatigue [], lightheadedness [], headache [], cramps [], cold intolerance [], hair loss [], diarrhea [], constipation [],  NA [] other:                                 Has patient had any medical attention from other providers, urgent care or the emergency room this week? Yes []  or No [x]       NA [], If yes, why:                                         2) Number of meal replacements consumed daily? 2 (range)  NA []    Did you eat any food outside of the program? Yes [x] No []    3) Average ounces of water patient consumed daily this week (not including shakes)? 80     (divide the weekly total by 7)    Any other sugar sweetened beverages consumed this week? Yes []  No [x]    Did patient have any problems adhering to the diet? Yes []  No [x] NA []    If yes, Vacation [], Celebrations [], Conferences [], Family Reunions [] other:                                                4) How has patient mood overall been this week? Sad [], Happy [], Stressed [], Tired [], Content [], NA [x], other            5) Physical Activity Over the Past Week:    Cardio exercise:43 min  Strength exercise: 1 workouts / week  Number of steps walked per day: n/a      Medications reconciled by nurse Yes [x]  No[]    Patient was given therapeutic recommendations for any noted side effects of their dietary approach based upon [facility] patient manual per providers recommendation. never used

## 2021-09-21 NOTE — ED ADULT NURSE NOTE - CCCP TRG CHIEF CMPLNT
Patient: Anita Ramirez    Procedure: Colonoscopy    Sedation: MAC    Physician: Any GI Physician    Diagnosis: Screenign    Pharmacy:     Instructions:     Diabetic: No    Blood Thinners: No    COVID test date: bleeding fistula

## 2022-01-01 NOTE — PROGRESS NOTE ADULT - SUBJECTIVE AND OBJECTIVE BOX
[facility]  INFECTIOUS DISEASES AND INTERNAL MEDICINE at [place]  =======================================================  Paresh Stark MD  Diplomates American Board of Internal Medicine and Infectious Diseases  Tel: [phone]      Fax: [phone]  =======================================================    LAUREN ROBERSON [id number]    Follow up: MSSA bacteremia, R AVG infection    Allergies:  penicillin (Anaphylaxis)  seafood (Anaphylaxis)  Send Nepro TID- RD OK (Unknown)  shellfish (Anaphylaxis)      Medications:  acetaminophen   Tablet .. 650 milliGRAM(s) Oral every 6 hours PRN  amLODIPine   Tablet 10 milliGRAM(s) Oral daily  atorvastatin 40 milliGRAM(s) Oral at bedtime  calcitriol   Capsule 0.25 MICROGram(s) Oral daily  calcium acetate 667 milliGRAM(s) Oral three times a day with meals  carvedilol 6.25 milliGRAM(s) Oral every 12 hours  cefepime   IVPB 1000 milliGRAM(s) IV Intermittent every 24 hours  chlorhexidine 2% Cloths 1 Application(s) Topical <User Schedule>  dextrose 40% Gel 15 Gram(s) Oral once PRN  dextrose 5%. 1000 milliLiter(s) IV Continuous <Continuous>  dextrose 50% Injectable 12.5 Gram(s) IV Push once  dextrose 50% Injectable 12.5 Gram(s) IV Push once  dextrose 50% Injectable 25 Gram(s) IV Push once  dextrose 50% Injectable 25 Gram(s) IV Push once  epoetin barb Injectable 14173 Unit(s) IV Push <User Schedule>  folic acid 1 milliGRAM(s) Oral daily  glucagon  Injectable 1 milliGRAM(s) IntraMuscular once PRN  hydrALAZINE Injectable 5 milliGRAM(s) IV Push every 6 hours PRN  insulin lispro (HumaLOG) corrective regimen sliding scale   SubCutaneous three times a day before meals  insulin lispro (HumaLOG) corrective regimen sliding scale   SubCutaneous at bedtime  levothyroxine 112 MICROGram(s) Oral daily  lidocaine   Patch 1 Patch Transdermal daily  losartan 50 milliGRAM(s) Oral daily  Nephro-king 1 Tablet(s) Oral daily  oxyCODONE    5 mG/acetaminophen 325 mG 1 Tablet(s) Oral every 6 hours PRN  pantoprazole    Tablet 40 milliGRAM(s) Oral before breakfast  saccharomyces boulardii 250 milliGRAM(s) Oral two times a day            REVIEW OF SYSTEMS:  CONSTITUTIONAL:  No Fever or chills  HEENT:   No diplopia or blurred vision.  No earache, sore throat or runny nose.  CARDIOVASCULAR:  No pressure, squeezing, strangling, tightness, heaviness or aching about the chest, neck, axilla or epigastrium.  RESPIRATORY:  No cough, shortness of breath  GASTROINTESTINAL:  No nausea, vomiting or diarrhea.  GENITOURINARY:  No dysuria, frequency or urgency. No Blood in urine  MUSCULOSKELETAL:  no joint aches, no muscle pain  SKIN:  No change in skin, hair or nails.  NEUROLOGIC:  No Headaches, seizures or weakness.  PSYCHIATRIC:  No disorder of thought or mood.  ENDOCRINE:  No heat or cold intolerance  HEMATOLOGICAL:  No easy bruising or bleeding.            Physical Exam:  ICU Vital Signs Last 24 Hrs  T(C): 36.9 (29 Jul 2019 08:21), Max: 37.1 (28 Jul 2019 15:20)  T(F): 98.4 (29 Jul 2019 08:21), Max: 98.8 (28 Jul 2019 15:20)  HR: 76 (29 Jul 2019 08:21) (75 - 87)  BP: 139/79 (28 Jul 2019 23:45) (123/50 - 139/79)  BP(mean): --  ABP: --  ABP(mean): --  RR: 18 (29 Jul 2019 08:21) (18 - 18)  SpO2: 98% (29 Jul 2019 08:21) (96% - 98%)      GEN: NAD, pleasant  HEENT: normocephalic and atraumatic. EOMI. PERRL.  Anicteric   NECK: Supple.   LUNGS: Clear to auscultation.  HEART: Regular rate and rhythm without murmur. PPM intact, giuliana in place  ABDOMEN: Soft, nontender, and nondistended.  Positive bowel sounds.    : No CVA tenderness  EXTREMITIES: Without any edema.  MSK: no joint swelling  NEUROLOGIC: Cranial nerves II through XII are grossly intact. No focal deficits  PSYCHIATRIC: Appropriate affect .  SKIN: No Rash RUE ACe wrap c/d/i      Labs:  07-29    136  |  101  |  26.0<H>  ----------------------------<  82  4.0   |  23.0  |  3.69<H>    Ca    8.0<L>      29 Jul 2019 07:23                            10.8   5.67  )-----------( 185      ( 29 Jul 2019 07:23 )             34.6                 CAPILLARY BLOOD GLUCOSE      POCT Blood Glucose.: 86 mg/dL (29 Jul 2019 12:55)  POCT Blood Glucose.: 86 mg/dL (29 Jul 2019 09:04)  POCT Blood Glucose.: 97 mg/dL (28 Jul 2019 22:59)  POCT Blood Glucose.: 89 mg/dL (28 Jul 2019 17:33)        RECENT CULTURES:  07-26 @ 18:38 .Blood     No growth at 48 hours        07-25 @ 17:04 .Other Tissue R Upper Arm Graft     Culture is being performed.        07-25 @ 11:10 .Tissue Tissue R Upper Arm Graft Klebsiella pneumoniae    Moderate Klebsiella pneumoniae  Culture in progress    No WBC's or organisms seen      07-25 @ 11:02 .Surgical Swab Swabs R Upper Arm     No growth at 4 days.  Culture in progress    No WBC's or organisms seen 54

## 2022-04-04 NOTE — DISCHARGE NOTE PROVIDER - NSDCHOSPICE_GEN_A_CORE
Cataract(s) are not yet visually significant to the patient. Recommend monitoring, and patient agrees with this plan. No

## 2022-06-28 NOTE — DISCHARGE NOTE ADULT - DO YOU HAVE DIFFICULTY CLIMBING STAIRS
Discharge Instructions For Pain Injections    DIET                    Resume your usual diet. If you are nauseated, remain on clear liquids until nausea passes.    ACTIVITY      Do not drive with any numbness or weakness in your legs.  If Valium was given prior to your injection, do not drive for 24 hours.  We recommend that you rest and relax today.  No strenuous activity, heavy lifting or weight training for 3 days.  Please discuss resuming physical therapy appointments with Dr. Anderson.         MEDICATION     Resume previous all medications. Blood thinners may be resumed tomorrow.  Should you develop a severe headache after treatment lie down, rest and drink extra fluids (caffeine if possible) and take a mild pain reliever.  Please call Dr. Anderson if your headache does not resolve    WOUND CARE   You may experience facial flushing, restlessness and or sleeplessness.   These are common side effects of the steroid medication and will resolve within 4 days.   Vaginal bleeding has also been associated with steroid injections in pre and postmenopausal women.   If bleeding persists more than 5 days please contact your OB/GYNE or Primary Care Physician.  It is not unusual to have increased soreness at the site of your injection.  Apply ice to the injection site for 20 minutes every 4 hours as needed for discomfort.  Do not use heating pads for the first 24 hours.  Leave the Band-Aid on for the rest of today.  You may shower later this evening. No bath tubs or swimming for 3 days.  Wash your hands with soap and water before and after touching your injection site.    Please contact Dr. Anderson with any questions, concerns or any of the following:  -Redness, drainage, or swelling at the injection site.  -Fever above 101.0  -Persistent nausea/vomiting or stiff neck  -CALL 911 FOR ANY CHEST PAIN OR DIFFICULTLY BREATHING   Office Number [phone] and After Hours [phone]    For hip and knee injections:  Resume  your usual diet and activity.  Call for temperature above 101.0, redness, drainage or swelling at the injection site.  P  Dr. Anderson's office will contact you to schedule the next appointment as requested by Dr. Anderson.   If you have not been contacted by the office within two weeks, please contact them at [phone].    Call the office to provide a 24 hour notice when cancelling any appointments at [phone].  Do not cancel [facility] appointments using My Chart.    Patient has been instructed on the above. Patient verbalizes understanding.     Yes

## 2023-02-26 NOTE — ED PROVIDER NOTE - GASTROINTESTINAL NEGATIVE STATEMENT, MLM
Call if fever, increased pain or excessive vaginal bleeding.  Call if blood pressure 150/95 or higher or if blood pressure less than 100/65.  Check blood pressure twice a day after resting for 30 minutes.  Record blood pressures and bring blood pressure log to next appointment.  No driving while taking narcotics.  No driving for 7 days.  Pelvic rest.  General diet.   no abdominal pain, no bloating, no constipation, no diarrhea, no nausea and no vomiting.

## 2023-04-04 NOTE — ED ADULT TRIAGE NOTE - WEIGHT IN KG
Virtual Visit Details    Type of service:  Telephone Visit   Phone call duration:22 minutes   3 party phone call: me; staff; guardian.   She has moved to [facility] where she can get one to one supervision. Major concern of her guardian and staff is that her balance is worse and she is having falls. Also sleeping a lot. Only a few brief seizure lasting a few seconds, but other behaviors which staff is concerned about being seizures. Has had ER visits, but MD in ER thinks they were not seizures.    Behavior continues to be a major problem.Recue plan needs to be upated to reduce ER visits.   Staff needs educqtion re seizure types.  Otherwise in [facility] health.  Plan:  Clinic visit soon to examine for toxicity.             63.5

## 2023-11-26 NOTE — DISCHARGE NOTE ADULT - CLICK TO LAUNCH ORM
Patient deep suctioned with 8fr cath. Large pink tingled mucus obtained.   
Patient discharged to care of mother. Told reasons to be seen again and explained amoxicillin medication. Mother instructed to frequently suction patient. Slight retractions at time of discharge. No other distress at this time.   
.
26-Nov-2023 16:53

## 2024-03-26 ENCOUNTER — TRANSCRIPTION ENCOUNTER (OUTPATIENT)
Age: 89
End: 2024-03-26

## 2024-04-01 NOTE — ASU PATIENT PROFILE, ADULT - PMH
----- Message from Gaby Henning MD sent at 4/1/2024  1:58 PM CDT -----  No signs of infection. (+) Glucosuria: He is taking Januvia.Normal result.     Chronic renal failure    Coronary artery disease    Diabetes    Diabetic neuropathy    Hyperlipemia    Hypertension    Hypothyroid    Left bundle branch block    Osteoporosis    Pacemaker  [employer] 2011  Peripheral arterial disease    Spinal stenosis

## 2024-06-03 NOTE — PROGRESS NOTE ADULT - PROBLEM/PLAN-7
DISPLAY PLAN FREE TEXT
Standing/Walking

## 2024-06-27 NOTE — ED ADULT NURSE NOTE - NS ED NURSE DISCH DISPOSITION
chest wall non-tender, breathing is unlabored without accessory muscle use, normal breath sounds
Discharged

## 2025-02-10 NOTE — ED PROVIDER NOTE - NS_BEDUNITTYPES_ED_ALL_ED
Phone call received from Dr. Lee's office, patient is not to be treated while on antibiotics. Patient called and notified. Patient to be rescheduled.   
STROKE UNIT

## 2025-05-14 NOTE — PATIENT PROFILE ADULT - LIVES WITH
Discussed poc with pt, pt verbalized understanding    Purposeful rounding every 2hours    VS monitored for need of PRN interventions   Cardiac monitoring in use, pt is SB to NSR, tele monitor # [id number]  Blood glucose monitoring   Fall precautions in place, remains injury free    IVFs  Accurate I&Os  Bed locked at lowest position  Call light within reach    Chart check complete  Will cont with POC      adult child(daria)

## 2025-07-09 NOTE — ED ADULT TRIAGE NOTE - BANDS:
Allergy; Do Not Use Extremity; 75 year old male consulted by psych for worsening AMS and agitation over the past 2 weeks. He reports a past psychiatric history of anxiety and depression which he says is controlled by medication. Upon psychiatric assessment, he is A&Ox3 although he states feeling confused and appears uncertain about the reason for his admission. Collateral information from his wife reveals changes in his behavior for the past 2 years in which he has been very forgetful, confused, and agitated. DDX includes delirium vs. dementia, mood d/o and OCD by hx